# Patient Record
Sex: MALE | Race: WHITE | NOT HISPANIC OR LATINO
[De-identification: names, ages, dates, MRNs, and addresses within clinical notes are randomized per-mention and may not be internally consistent; named-entity substitution may affect disease eponyms.]

---

## 2016-12-31 NOTE — ED ADULT TRIAGE NOTE - NS ED TRIAGE AVPU SCALE
Verbal - The patient responds to verbal stimuli by opening their eyes when someone speaks to them. The patient is not fully oriented to time, place, or person.

## 2016-12-31 NOTE — ED PROVIDER NOTE - CRITICAL CARE PROVIDED
additional history taking/direct patient care (not related to procedure)/documentation/interpretation of diagnostic studies

## 2016-12-31 NOTE — ED PROVIDER NOTE - OBJECTIVE STATEMENT
[age] y/o male ems and family state he has a h/o DM and parkinson's disease and he has been vomiting for 2 days his family says other at home were also sick with vomiting a few days prior and pt was found next to his bed this am and he was too weak to stand and was obtunded pt can speak a little and says no pain no c/o other than feels generally weak

## 2016-12-31 NOTE — CONSULT NOTE ADULT - SUBJECTIVE AND OBJECTIVE BOX
75 yo male admitted to MICU for DKA.  Was found down and is unable to provide history.  Consulted by MICU attending 2/2 concerns over a foul smelling b/l groin/perineal/scrotal infection.  Patient unable to provide info.  WBC 20K.  Lactate 2.1 on presentation, now down to 1.8 with some fluid resuscitation.  B/l inguinal region, medial thighs, lower abdominal wall, scrotum and perineum has malodorous, sharply demarcated, erythematous, raised, scaly rash particularly in the intertrigal areas.  Appears to be Tinea cruris and less likely necrotizing soft tissue infection.  Keep area clean and dry.  Apply topical antifungal.  Maintain good glycemic control.  Re-consult if worsens or changes.

## 2016-12-31 NOTE — H&P ADULT. - HISTORY OF PRESENT ILLNESS
76M with Pmhx of DM. Not feeling well at home for last few days. Not eating/drinking with vomiting. brought to ER today with lethargy. Found to have severe Hyperglycemia, with a BG > 1100 and a venous pH of 6.9. His anion gap was 43. He was started on IY hydration and an insulin infusion for DKA. 76M with Pmhx of DM. Not feeling well at home for last few days. Not eating/drinking with vomiting. brought to ER today with lethargy. Found to have severe Hyperglycemia, with a BG > 1100 and a venous pH of 6.9. His anion gap was 43. He was started on IV hydration and an insulin infusion for DKA/Hyperosmolar state .

## 2016-12-31 NOTE — ED ADULT TRIAGE NOTE - CHIEF COMPLAINT QUOTE
as per family, pt found down on ground next to the bed. pt lethargic, confused at this time. pt is insulin dependent diabetic. fingerstick critical high. breathing even but labored. no signs of trauma noted. dr. lanza called to bedside.

## 2016-12-31 NOTE — H&P ADULT. - ASSESSMENT
Impression/Plan:    1) Altered mental status: Metabolic encephalopathy likely related to severe DKA and Dehydration. Need to do STAT Head CT and Check Tox screen.     2) DKA: Will aggressively hydrate, treat with insulin infusion, Long Acting insulin, electrolyte F/U and replacement as needed. Will check Hgb A1C and obtain Diabetic teaching evaluation. Jraett check cultures for sign s of underlying infection as nidus, although it appears there may have been a viral prodrome which precipitated tis event.    3) Acute renal failure: Appears multifactoral and related to DKA/Dehydration in presence of ACE inhibitor and LASIX use. Will cont hydration, F/U BUN/CRT and hold ACE and diuretics for now.    4) HTN: need to hold ACE at this point due to ARF. Will treat with IV hrdralazine of beta blocker as needed.    A total of 60 mins was spent evaluating and treating this critical patient. Impression/Plan:    1) Altered mental status: Metabolic encephalopathy likely related to severe DKA and Dehydration. Need to do STAT Head CT to exclude central neurologic event and Check Tox screen.     2) DKA: Will aggressively hydrate, treat with insulin infusion, Long Acting insulin, electrolyte F/U and replacement as needed. Will check Hgb A1C and obtain Diabetic teaching evaluation. Jarett check cultures for sign s of underlying infection as nidus, although it appears there may have been a viral prodrome which precipitated tis event.    3) Acute renal failure: Appears multifactoral and related to DKA/Dehydration in presence of ACE inhibitor and LASIX use. Will cont hydration, F/U BUN/CRT and hold ACE and diuretics for now.    4) HTN: need to hold ACE at this point due to ARF. Will treat with IV hrdralazine of beta blocker as needed.    A total of 60 mins was spent evaluating and treating this critical patient.

## 2016-12-31 NOTE — H&P ADULT. - ATTENDING COMMENTS
I saw the patient independently at 1700 and agree with the above.  Of note, his mental status remains quite poor, and per report worse than prior.  He has received 3 liters of normal saline, and reportedly was on plasmalyte as well as insulin infusion, and fingersticks remain "high".    This is an unusual case of diabetic ketoacidosis which has morphed into hyperosmolar hyperglycemic coma, and he has both entities.  The strategy is to maintain euvolemia in the face of significant osmotic (hyperglycemic) diuresis, replete electrolytes, and continue insulin infusion to attempt to bring down the glucose more gradually -- hopefully it will take a few more hours prior to having a value which registers on the glucometer.    His HgBA1C was 12.3%.  On examination, he is very somnulent but arousable to deep stimuli.  His penis and R scrotal area are beefy red without exudates, but particularly malodorous.  There was the suggestion of lymphadenopathy in the right inguinal region.  No necrotic tissue was noted.  No crepitation.  painful scrotum.  There was also erythema in the superior portion of the pelvis.    Plan:    Continue with aggressive intravascular volume repletion.  Insulin infusion.  Electrolytes every 4 hours.  ABG stat to assess acid/base and whether he is hypercapneic driving his alteration in mental status (tachypneic).  Will ask for surgical evaluation; this could be dermatitis -- but could be West's Gangrene.    Critical care time excluding procedures: 40 minutes.

## 2016-12-31 NOTE — ED PROVIDER NOTE - SKIN, MLM
Skin normal color for race, warm, dry and intact pos peripheral motteling no edema no cellulitis noted

## 2017-01-01 NOTE — PROGRESS NOTE ADULT - SUBJECTIVE AND OBJECTIVE BOX
Patient is a 76y old  Male who presents with a chief complaint of not feeling well lethargy (31 Dec 2016 11:15)      BRIEF HOSPITAL COURSE: Pt presented  in coma, with both DKA and hyperosmotic hyperglycemic coma    Events last 24 hours: Patient has been waking up slowly; metabolic derangements have improved.    PAST MEDICAL & SURGICAL HISTORY:  High cholesterol  Diabetes  Hypertension  S/P hip hemiarthroplasty: right hip repair 2014  No significant past surgical history      Review of Systems:  unable, somnulent    Medications:    hydrALAZINE Injectable 10milliGRAM(s) IV Push every 4 hours PRN          heparin  Injectable 5000Unit(s) SubCutaneous every 12 hours  aspirin  chewable 81milliGRAM(s) Oral daily        atorvastatin 20milliGRAM(s) Oral at bedtime  insulin glargine Injectable (LANTUS) 30Unit(s) SubCutaneous at bedtime  insulin lispro (HumaLOG) corrective regimen sliding scale  SubCutaneous every 6 hours  dextrose Gel 1Dose(s) Oral once PRN  dextrose 50% Injectable 12.5Gram(s) IV Push once  dextrose 50% Injectable 25Gram(s) IV Push once  dextrose 50% Injectable 25Gram(s) IV Push once  glucagon  Injectable 1milliGRAM(s) IntraMuscular once PRN    plasma-lyte A. 1000milliLiter(s) IV Continuous <Continuous>  dextrose 5%. 1000milliLiter(s) IV Continuous <Continuous>      timolol 0.5% Solution 1Drop(s) Both EYES daily  nystatin Powder 1Application(s) Topical three times a day            ICU Vital Signs Last 24 Hrs  T(C): 36.8, Max: 93 ( @ 11:15)  T(F): 98.3, Max: 199.4 ( @ 11:15)  HR: 106 (77 - 110)  BP: 167/77 (121/76 - 184/84)  BP(mean): 111 (94 - 127)  ABP: --  ABP(mean): --  RR: 26 (20 - 35)  SpO2: 98% (96% - 98%)      ABG - ( 31 Dec 2016 17:39 )  pH: 7.39  /  pCO2: 29    /  pO2: 76    / HCO3: 19    / Base Excess: -6.5  /  SaO2: 96                  I&O's Detail        LABS:                        12.7   12.63 )-----------( 200      ( 2017 05:19 )             35.5     2017 05:19    144    |  103    |  53.0   ----------------------------<  148    3.7     |  24.0   |  1.78     Ca    8.4        2017 05:19  Phos  2.4       2017 05:19  Mg     2.9       2017 05:19    TPro  6.8    /  Alb  3.3    /  TBili  0.2    /  DBili  x      /  AST  15     /  ALT  12     /  AlkPhos  101    31 Dec 2016 07:39      CARDIAC MARKERS ( 31 Dec 2016 07:39 )  x     / 0.09 ng/mL / x     / x     / x          CAPILLARY BLOOD GLUCOSE  104 (2017 06:30)    PT/INR - ( 31 Dec 2016 07:40 )   PT: 10.2 sec;   INR: 0.93 ratio         PTT - ( 31 Dec 2016 07:40 )  PTT:30.6 sec  Urinalysis Basic - ( 31 Dec 2016 12:45 )    Color: Yellow / Appearance: Clear / S.020 / pH: x  Gluc: x / Ketone: Large  / Bili: Negative / Urobili: Negative mg/dL   Blood: x / Protein: 100 mg/dL / Nitrite: Negative   Leuk Esterase: Negative / RBC: 3-5 /HPF / WBC 0-2   Sq Epi: x / Non Sq Epi: x / Bacteria: x      CULTURES:      Physical Examination:    General: Dissheveled, somnulent but arousable to voice, intermittently responding verbally with groans, not following commands, moving all extremities.    HEENT: Pupils equal, reactive to light.  Symmetric.    PULM: Coarse bilaterally with additional upper airway sounds, inability to fully clear airway independently.    CVS: Regular rate and rhythm, no murmurs, rubs, or gallops    ABD: Soft, nondistended, nontender, normoactive bowel sounds, no masses    EXT: 1+ edema with venous stasis changes    SKIN: Warm and well perfused, severe groin rash    Radiology: CT   IMPRESSION:      Small to moderate right occipital lobe acute/subacute   infarct. Routine MRI is recommended to confirm.

## 2017-01-01 NOTE — PROGRESS NOTE ADULT - SUBJECTIVE AND OBJECTIVE BOX
Patient is a 76y old  Male who presents with a chief complaint of not feeling well lethargy (31 Dec 2016 11:15)    PAST MEDICAL & SURGICAL HISTORY:  High cholesterol  Diabetes  Hypertension  S/P hip hemiarthroplasty: right hip repair 2014  No significant past surgical history      BRIEF HOSPITAL COURSE:   Severe mixed DKA/HHS    Events last 24 hours:   insulin drip IVF       Review of Systems:        confused                                              Medications:    hydrALAZINE Injectable 10milliGRAM(s) IV Push every 4 hours PRN          heparin  Injectable 5000Unit(s) SubCutaneous every 12 hours  aspirin  chewable 81milliGRAM(s) Oral daily        insulin Infusion 3Unit(s)/Hr IV Continuous <Continuous>  atorvastatin 20milliGRAM(s) Oral at bedtime  insulin glargine Injectable (LANTUS) 30Unit(s) SubCutaneous at bedtime    plasma-lyte A. 1000milliLiter(s) IV Continuous <Continuous>      timolol 0.5% Solution 1Drop(s) Both EYES daily        ICU Vital Signs Last 24 Hrs  T(C): 36.8, Max: 93 ( @ 11:15)  T(F): 98.3, Max: 199.4 ( @ 11:15)  HR: 101 (77 - 110)  BP: 148/67 (103/52 - 184/84)  BP(mean): 97 (94 - 127)  ABP: --  ABP(mean): --  RR: 21 (20 - 35)  SpO2: 98% (96% - 98%)    Physical Examination:    General:     HEENT: no JVD    PULM: bilateeral BS    CVS:s1 s2    ABD: soft    scrotum/penis erythematous     EXT: no edema venous stasis changes   groin erythema excoriation   no nec fasc per sx    SKIN:   as above    Neuro:  ABG - ( 31 Dec 2016 17:39 )  pH: 7.39  /  pCO2: 29    /  pO2: 76    / HCO3: 19    / Base Excess: -6.5  /  SaO2: 96                  LABS:                        12.7   12.63 )-----------( 200      ( 2017 05:19 )             35.5     2017 05:19    144    |  103    |  53.0   ----------------------------<  148    3.7     |  24.0   |  1.78     Ca    8.4        2017 05:19  Phos  2.4       2017 05:19  Mg     2.9       2017 05:19    TPro  6.8    /  Alb  3.3    /  TBili  0.2    /  DBili  x      /  AST  15     /  ALT  12     /  AlkPhos  101    31 Dec 2016 07:39      CARDIAC MARKERS ( 31 Dec 2016 07:39 )  x     / 0.09 ng/mL / x     / x     / x          CAPILLARY BLOOD GLUCOSE  104 (2017 06:30)    PT/INR - ( 31 Dec 2016 07:40 )   PT: 10.2 sec;   INR: 0.93 ratio         PTT - ( 31 Dec 2016 07:40 )  PTT:30.6 sec  Urinalysis Basic - ( 31 Dec 2016 12:45 )    Color: Yellow / Appearance: Clear / S.020 / pH: x  Gluc: x / Ketone: Large  / Bili: Negative / Urobili: Negative mg/dL   Blood: x / Protein: 100 mg/dL / Nitrite: Negative   Leuk Esterase: Negative / RBC: 3-5 /HPF / WBC 0-2   Sq Epi: x / Non Sq Epi: x / Bacteria: x          RADIOLOGY/IMAGING/ECHO        Critical Care time: 34  (Reviewing data, imaging, discussing with multidisciplinary team, non inclusive of procedures, discussing goals of care with patient/family)

## 2017-01-01 NOTE — PROGRESS NOTE ADULT - ASSESSMENT
76M DKA/HHS/HENRIETTA/AMS   all improved with insulin infusion and hydration.   MS improved  ?? baseline.

## 2017-01-01 NOTE — CONSULT NOTE ADULT - SUBJECTIVE AND OBJECTIVE BOX
[facility]                                 Julito Hill, & Tom                                  [address] [facility] [address]                                        [place], [place], [address]                                             [phone]    HISTORY:    The patient is a 76y Male with history of tremor for which I had seen him in the past.  He was now admitted after being found poorly responsive and was found to be in DKA. Initial blood sugar was > 1000.    In addition CT of the head was done yesterday and showed infarct.   I was called this afternoon to evaluate him.    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    PAST MEDICAL & SURGICAL HISTORY:  High cholesterol  Diabetes  Hypertension  S/P hip hemiarthroplasty: right hip repair june 2014  No significant past surgical history      MEDICATION PRIOR TO ADMISSION:  Insulin  Enalapril  Sertraline  Lipitor  Lasix  Eye drops.    MEDICATIONS  (STANDING):  plasma-lyte A. 1000milliLiter(s) IV Continuous <Continuous>  heparin  Injectable 5000Unit(s) SubCutaneous every 12 hours  atorvastatin 20milliGRAM(s) Oral at bedtime  timolol 0.5% Solution 1Drop(s) Both EYES daily  aspirin  chewable 81milliGRAM(s) Oral daily  dextrose 5%. 1000milliLiter(s) IV Continuous <Continuous>  dextrose 50% Injectable 12.5Gram(s) IV Push once  dextrose 50% Injectable 25Gram(s) IV Push once  dextrose 50% Injectable 25Gram(s) IV Push once  nystatin Powder 1Application(s) Topical three times a day  insulin glargine Injectable (LANTUS) 20Unit(s) SubCutaneous at bedtime  insulin lispro (HumaLOG) corrective regimen sliding scale  SubCutaneous every 4 hours    MEDICATIONS  (PRN):  hydrALAZINE Injectable 10milliGRAM(s) IV Push every 4 hours PRN SBP>160 mm/hg  dextrose Gel 1Dose(s) Oral once PRN Blood Glucose LESS THAN 70 milliGRAM(s)/deciliter  glucagon  Injectable 1milliGRAM(s) IntraMuscular once PRN Glucose LESS THAN 70 milligrams/deciliter      Allergies  No Known Allergies    SOCIAL HISTORY:  Non smoker    FAMILY HISTORY:  No significant family history    ROS:  Unobtainable due to patient's condition.     Exam:  Vital Signs Last 24 Hrs  T(C): 37.2, Max: 37.2 (12-31 @ 15:58)  T(F): 99, Max: 99 (12-31 @ 15:58)  HR: 109 (92 - 110)  BP: 167/81 (128/76 - 184/84)  BP(mean): 111 (94 - 127)  RR: 21 (17 - 30)  SpO2: 98% (96% - 99%)  General: NAD.   Carotid bruits absent.     Mental status: The patient opens his eyes slightly to voice. He does not answer questions appropriately and does not follow instructions.    Cranial nerves: There is no papilledema. Pupils react Symmetrically to light.  Corneal reflexes are present. Facial musculature is symmetric (although a nasogastric tube is in place). Palate elevates symmetrically. Tongue is midline.    Motor/Sensory: There is some increased tone throughout with some cogwheeling.  He moves all extremities to stimuli although seems diffusely weak.    Reflexes: 1+ throughout and plantar responses are flexor.    Cerebellar: Cannot be tested.    LABS:                         12.7   12.63 )-----------( 200      ( 01 Jan 2017 05:19 )             35.5       144    |  103    |  53.0   ----------------------------<  148    3.7     |  24.0   |  1.78     Ca    8.4       Phos  2.4        Mg     2.9          TPro  6.8    /  Alb  3.3    /  TBili  0.2    /  DBili  x      /  AST  15     /  ALT  12     /  AlkPhos  101    31 Dec 2016 07:39    PT/INR -  PT: 10.2 sec;   INR: 0.93 ratio    PTT -  PTT:30.6 sec    RADIOLOGY & ADDITIONAL STUDIES:  CT head reviewed: there is what appears to be acute infarct in the right occipital region as well as a large arachnoid cyst in the right middle cranial fossa.

## 2017-01-01 NOTE — PROGRESS NOTE ADULT - PROBLEM SELECTOR PLAN 1
AG has closed glucose has normalized HGB A!C > 12 poor baseline control.  transition to LA insulin and coverage feed
Anion gap closed.  HgBA1C 12%.  Clearly poorly controlled.  Need education, continue fluids, gave basal lantus, now to be on sliding scale.

## 2017-01-01 NOTE — PROGRESS NOTE ADULT - PROBLEM SELECTOR PROBLEM 1
Diabetic ketoacidosis with coma associated with type 1 diabetes mellitus
Diabetic ketoacidosis with coma associated with type 1 diabetes mellitus

## 2017-01-01 NOTE — PROGRESS NOTE ADULT - ASSESSMENT
This is 76Y M with PMH of DM, HTN, admitted for DKA and AMS, admitted to ICU, started on insulin drip, anion gap closed, insulin drip d/c'd, received Lantus 30 units last night. Pt is very lethargic, barely open eyes on verbal stimuli. He was also noted to foul smelling discharge from groin, seen bu surgery, looks fungal infection, started on Nystatin powder. He had CT head showed right occipital lobe acute/subacute infarct.    A/P    > DKA - Non compliant  A1C: 12  anion gap closed, off insulin drip  Pt is very lethargic, NG tube, start glucerna, decrease lantus 20 units plus q4h FS  monitor BP  diabetic education consult    >AMS - Multifactorial - DKA, stroke  will monitor closely  Neurology consult requested    >Right occipital lobe stroke  aspirin, statin  TTE, carotid doppler  Neurology consult requested    >HTN  permissive HTN for now due to stroke  monitor BP    >Groin fungal infection  appreciate surgery input, cont. Nystatin    >HENRIETTA - improving  CPK  cont. IVF, f/u BMP    >DVT PPX  heparin

## 2017-01-01 NOTE — PROGRESS NOTE ADULT - PROBLEM SELECTOR PLAN 5
ASA/statin.  Unknown timing.  Would benefit from eventual neurology consultation and workup as he wakes up.

## 2017-01-01 NOTE — PROGRESS NOTE ADULT - PROBLEM SELECTOR PLAN 2
Improved with hydration.  Pre renal component and ?? some baseline CKD
Slow to wake; difficulty with secretions (needs verbal stimulation to remind); improving slowly.  NPO for now.

## 2017-01-01 NOTE — PROGRESS NOTE ADULT - SUBJECTIVE AND OBJECTIVE BOX
Internal Medicine Hospitalist - Dr. Goddard - Medicine accepting note     SHARON EDSTROM    [id number]    76y      Male    Patient is a 76y old  Male who presents with a chief complaint of not feeling well lethargy (31 Dec 2016 11:15)    HPI:  76M with Pmhx of DM. Not feeling well at home for last few days. Not eating/drinking with vomiting. brought to ER today with lethargy. Found to have severe Hyperglycemia, with a BG > 1100 and a venous pH of 6.9. His anion gap was 43. He was started on IY hydration and an insulin infusion for DKA. (31 Dec 2016 11:15)    INTERVAL HPI/ OVERNIGHT EVENTS: Patient is seen and examined, this is 76Y M with PMH of DM, HTN, admitted for DKA and AMS, admitted to ICU, started on insulin drip, anion gap closed, insulin drip d/c'd, received Lantus 30 units. Pt is very lethargic, barely open eyes on verbal stimuli, moving all 4 extremities. He was also noted to foul smelling discharge from groin, seen bu surgery, looks fungal infection, started on Nystatin powder.     REVIEW OF SYSTEMS:    Unable to obtain    PHYSICAL EXAM:    Vital Signs Last 24 Hrs  T(C): 37.2, Max: 37.2 (12- @ 15:58)  T(F): 99, Max: 99 (12- @ 15:58)  HR: 109 (92 - 110)  BP: 167/81 (128/76 - 184/84)  BP(mean): 111 (94 - 127)  RR: 21 (17 - 30)  SpO2: 98% (96% - 99%)    GENERAL: Lethargic, open eyes on verbal stimuli  Neck: supple  CHEST/LUNG: positive air entry b/l  HEART: S1S2+ audible  ABDOMEN: Soft, Nontender, Nondistended; Bowel sounds present, foul smelling discharge noted - improving  CNS: lethargic, open eyes on verbal stimuli     LABS:                        12.7   12.63 )-----------( 200      ( 2017 05:19 )             35.5     2017 05:19    144    |  103    |  53.0   ----------------------------<  148    3.7     |  24.0   |  1.78     Ca    8.4        2017 05:19  Phos  2.4       2017 05:19  Mg     2.9       2017 05:19    TPro  6.8    /  Alb  3.3    /  TBili  0.2    /  DBili  x      /  AST  15     /  ALT  12     /  AlkPhos  101    31 Dec 2016 07:39    PT/INR - ( 31 Dec 2016 07:40 )   PT: 10.2 sec;   INR: 0.93 ratio      Hemoglobin A1C, Whole Blood: 12.3 % (12.31.16 @ 12:59)      Color: Yellow / Appearance: Clear / S.020 / pH: x  Gluc: x / Ketone: Large  / Bili: Negative / Urobili: Negative mg/dL   Blood: x / Protein: 100 mg/dL / Nitrite: Negative   Leuk Esterase: Negative / RBC: 3-5 /HPF / WBC 0-2   Sq Epi: x / Non Sq Epi: x / Bacteria: x      MEDICATIONS  (STANDING):  plasma-lyte A. 1000milliLiter(s) IV Continuous <Continuous>  heparin  Injectable 5000Unit(s) SubCutaneous every 12 hours  atorvastatin 20milliGRAM(s) Oral at bedtime  timolol 0.5% Solution 1Drop(s) Both EYES daily  aspirin  chewable 81milliGRAM(s) Oral daily  dextrose 5%. 1000milliLiter(s) IV Continuous <Continuous>  dextrose 50% Injectable 12.5Gram(s) IV Push once  dextrose 50% Injectable 25Gram(s) IV Push once  dextrose 50% Injectable 25Gram(s) IV Push once  nystatin Powder 1Application(s) Topical three times a day  insulin glargine Injectable (LANTUS) 20Unit(s) SubCutaneous at bedtime  insulin lispro (HumaLOG) corrective regimen sliding scale  SubCutaneous every 4 hours    MEDICATIONS  (PRN):  hydrALAZINE Injectable 10milliGRAM(s) IV Push every 4 hours PRN SBP>160 mm/hg  dextrose Gel 1Dose(s) Oral once PRN Blood Glucose LESS THAN 70 milliGRAM(s)/deciliter  glucagon  Injectable 1milliGRAM(s) IntraMuscular once PRN Glucose LESS THAN 70 milligrams/deciliter      RADIOLOGY & ADDITIONAL TEST   EXAM:  CT BRAIN                        PROCEDURE DATE:  2016    IMPRESSION:      Small to moderate right occipital lobe acute/subacute   infarct. Routine MRI is recommended to confirm

## 2017-01-02 NOTE — DIETITIAN INITIAL EVALUATION ADULT. - OTHER INFO
Pt currently NPO. Per nsg, pt removed dobhoff before initiating enteral feeds. Awaiting replacement dobhoff to initiate enteral feeds.

## 2017-01-02 NOTE — DIETITIAN INITIAL EVALUATION ADULT. - MD RECOMMEND
Initiate Glucerna 1.5cal at 20ml/hr, increase by 10ml/hr every 8 hours to goal rate 70ml/hr x 20 hours  (1400ml/daily) to provide 2100kcal and 115 grams

## 2017-01-02 NOTE — PROGRESS NOTE ADULT - SUBJECTIVE AND OBJECTIVE BOX
Internal Medicine Hospitalist - Dr. Rupesh HERRERA EDSTROM    [id number]    76y      Male    Patient is a 76y old  Male who presents with a chief complaint of not feeling well lethargy (31 Dec 2016 11:15)    HPI:  76M with Pmhx of DM. Not feeling well at home for last few days. Not eating/drinking with vomiting. brought to ER today with lethargy. Found to have severe Hyperglycemia, with a BG > 1100 and a venous pH of 6.9. His anion gap was 43. He was started on IY hydration and an insulin infusion for DKA. (31 Dec 2016 11:15)    INTERVAL HPI/ OVERNIGHT EVENTS: Patient is seen and examined, pt is more awake today, able to answer simple question, moving all 4 extremities, denied chest pain, SOB, abd. pain, nausea and vomiting.     REVIEW OF SYSTEMS:    Denied fever, chills, abd. pain, nausea, vomiting, chest pain, SOB, headache, dizziness    PHYSICAL EXAM:    Vital Signs Last 24 Hrs  T(C): 37.2, Max: 37.5 ( @ 10:00)  T(F): 99, Max: 99.5 ( @ 10:00)  HR: 102 (100 - 115)  BP: 151/79 (129/91 - 211/96)  BP(mean): 109 (100 - 138)  RR: 15 (12 - 22)  SpO2: 96% (94% - 98%)    GENERAL: Elderly woman laying on bed, drowsy but arouse able with verbal stimuli  Neck: supple  CHEST/LUNG: positive air entry   HEART: S1S2+ audible  ABDOMEN: Soft, Nontender, Nondistended; Bowel sounds present  EXTREMITIES:  no edema  CNS: open eyes on verbal stimuli, moving all 4 extremities, answer simple question and follow simple commands    LABS:                        11.9   9.43  )-----------( 196      ( 2017 03:41 )             34.5     2017 03:41    148    |  106    |  53.0   ----------------------------<  217    3.4     |  24.0   |  1.53     Ca    8.2        2017 03:41  Phos  2.4       2017 05:19  Mg     2.9       2017 05:19        Urinalysis Basic - ( 2017 11:28 )    Color: Yellow / Appearance: Clear / S.020 / pH: x  Gluc: x / Ketone: Small  / Bili: Negative / Urobili: Negative mg/dL   Blood: x / Protein: 500 mg/dL / Nitrite: Negative   Leuk Esterase: Negative / RBC: x / WBC 6-10   Sq Epi: x / Non Sq Epi: Few / Bacteria: Few      MEDICATIONS  (STANDING):  plasma-lyte A. 1000milliLiter(s) IV Continuous <Continuous>  heparin  Injectable 5000Unit(s) SubCutaneous every 12 hours  atorvastatin 20milliGRAM(s) Oral at bedtime  timolol 0.5% Solution 1Drop(s) Both EYES daily  aspirin  chewable 81milliGRAM(s) Oral daily  dextrose 5%. 1000milliLiter(s) IV Continuous <Continuous>  dextrose 50% Injectable 12.5Gram(s) IV Push once  dextrose 50% Injectable 25Gram(s) IV Push once  dextrose 50% Injectable 25Gram(s) IV Push once  nystatin Powder 1Application(s) Topical three times a day  insulin glargine Injectable (LANTUS) 20Unit(s) SubCutaneous at bedtime  insulin lispro (HumaLOG) corrective regimen sliding scale  SubCutaneous every 4 hours  carbidopa/levodopa  25/100 1Tablet(s) Oral three times a day    MEDICATIONS  (PRN):  hydrALAZINE Injectable 10milliGRAM(s) IV Push every 4 hours PRN SBP>160 mm/hg  dextrose Gel 1Dose(s) Oral once PRN Blood Glucose LESS THAN 70 milliGRAM(s)/deciliter  glucagon  Injectable 1milliGRAM(s) IntraMuscular once PRN Glucose LESS THAN 70 milligrams/deciliter      RADIOLOGY & ADDITIONAL TEST

## 2017-01-02 NOTE — PROGRESS NOTE ADULT - ASSESSMENT
This is 76Y M with PMH of DM, HTN, admitted for DKA and AMS, admitted to ICU, started on insulin drip, anion gap closed, insulin drip d/c'd, received Lantus 30 units last night. Pt is very lethargic, barely open eyes on verbal stimuli. He was also noted to foul smelling discharge from groin, seen bu surgery, looks fungal infection, started on Nystatin powder. He had CT head showed right occipital lobe acute/subacute infarct.    A/P    > DKA - Non compliant  FS: [phone]  A1C: 12  anion gap closed, off insulin drip  more awake today, had bedside swallow eval, started on pureed diet with honey  cont. lantus 20 units plus q4h FS  diabetic education consult    >Hypokalemia  potassium supplement f/u BMP and mag    >AMS - Multifactorial - DKA, stroke - improving  appreciate Neurology consult    >Right occipital lobe stroke  appreciate Neurology input  cont. aspirin, statin  TTE, carotid doppler, MRI pending    >HTN - 151/79  permissive HTN for now due to stroke  hydralazine prn if SBP is more than 160mmHg    >Groin fungal infection - improving  appreciate surgery input, cont. Nystatin    >HENRIETTA - improving  cont. IVF, f/u BMP    >DVT PPX  heparin This is 76Y M with PMH of DM, HTN, admitted for DKA and AMS, admitted to ICU, started on insulin drip, anion gap closed, insulin drip d/c'd, received Lantus 30 units last night. Pt is very lethargic, barely open eyes on verbal stimuli. He was also noted to foul smelling discharge from groin, seen bu surgery, looks fungal infection, started on Nystatin powder. He had CT head showed right occipital lobe acute/subacute infarct.    A/P    > DKA - Non compliant  FS: [phone]  A1C: 12  anion gap closed, off insulin drip  more awake today, had bedside swallow eval, started on pureed diet with honey  cont. lantus 20 units plus q4h FS  diabetic education consult    >Hypokalemia  potassium supplement f/u BMP and mag    >AMS - Multifactorial - DKA, stroke - improving  appreciate Neurology consult    >Right occipital lobe stroke  appreciate Neurology input  cont. aspirin, statin  TTE, carotid doppler, MRI pending    >HTN -   permissive HTN for now due to stroke  start amlodipine 5mg daily  hydralazine prn if SBP is more than 160mmHg    >Groin fungal infection - improving  appreciate surgery input, cont. Nystatin    >Parkinson disease  cont. sinemet as per Neurology    >HENRIETTA - improving  cont. IVF, f/u BMP    >DVT PPX  heparin

## 2017-01-02 NOTE — SWALLOW BEDSIDE ASSESSMENT ADULT - ASR SWALLOW ASPIRATION MONITOR
fever/position upright (90Y)/cough/gurgly voice/upper respiratory infection/pneumonia/change of breathing pattern/oral hygiene/throat clearing/If any s/s aspiration noted, discontinue PO & resume NPO with alternative means of nutrition/hydration

## 2017-01-02 NOTE — SWALLOW BEDSIDE ASSESSMENT ADULT - SWALLOW EVAL: RECOMMENDED FEEDING/EATING TECHNIQUES
position upright (90 degrees)/allow for swallow between intakes/check mouth frequently for oral residue/pocketing/crush medication (when feasible)/maintain upright posture during/after eating for 30 mins/small sips/bites/oral hygiene

## 2017-01-02 NOTE — DIETITIAN INITIAL EVALUATION ADULT. - NS AS NUTRI INTERV ENTERAL NUTRITION
Initiate Glucerna 1.5cal at 20ml/hr, increase by 10ml/hr every 8 hours to goal rate 70ml/hr x 20 hours  (1400ml/daily) to provide 2100kcal and 115 grams protein

## 2017-01-02 NOTE — SWALLOW BEDSIDE ASSESSMENT ADULT - SWALLOW EVAL: DIAGNOSIS
At least mild oral dysphagia negatively impacted by lethargy, suspect posterior loss with thin liquids, solids not assessed 2* lethargy.  Pt able to maintain arousal for assessment, however, kept eyes closed.  Suspect pharyngeal dysphagia 2* +cough after nectar.

## 2017-01-02 NOTE — PROGRESS NOTE ADULT - SUBJECTIVE AND OBJECTIVE BOX
[facility]                                 Julito Hill, & Tom                                  [address]                                        [place], [place], [address]                                             [phone]      Vital signs:  T(C): 37.1, Max: 37.5 (01-02 @ 10:00)  HR: 94 (94 - 115)  BP: 180/88 (129/91 - 211/96)  RR: 17 (12 - 26)  SpO2: 98% (94% - 98%)  Wt(kg): --    Exam:    No new complaints.  Awake and alert.  speech language intact  Pupils react.  visually impaired  Face symmetric smile and sensation  hearing symmetric  tongue ML  4 to 4+/5 power in 4 ext  increased tone and (+) tremor at rest  intact FT x 4 ext    Parkinson's disease/parkinsonism stable  Await MRI to further evaluate CVA    will follow with you    Valentin Vila MD PhD   [phone]

## 2017-01-03 NOTE — DISCHARGE NOTE ADULT - MEDICATION SUMMARY - MEDICATIONS TO TAKE
I will START or STAY ON the medications listed below when I get home from the hospital:    aspirin 81 mg oral tablet, chewable  -- 1 tab(s) by mouth once a day  -- Indication: For Stroke    acetaminophen 325 mg oral tablet  -- 2 tab(s) by mouth every 6 hours, As needed, Headache  -- Indication: For pain     lisinopril 10 mg oral tablet  -- 1 tab(s) by mouth once a day  -- Indication: For Essential hypertension    sertraline 25 mg oral tablet  -- 1 tab(s) by mouth once a day  -- Indication: For Depression     insulin glargine 100 units/mL subcutaneous solution  -- 34 unit(s) subcutaneous once a day (at bedtime)  -- Indication: For Diabetes    Lipitor 20 mg oral tablet  -- 1 tab(s) by mouth once a day (at bedtime)  -- Indication: For Stroke    carbidopa-levodopa 25 mg-100 mg oral tablet  -- 1 tab(s) by mouth 3 times a day  -- Indication: For Parkinson    amLODIPine 10 mg oral tablet  -- 1 tab(s) by mouth once a day  -- Indication: For Essential hypertension    nystatin 100,000 units/g topical powder  -- 1 application on skin 3 times a day  -- Indication: For tinea cruris    Lasix 40 mg oral tablet  -- 1 tab(s) by mouth once a day  -- Indication: For Essential hypertension    timolol ophthalmic 0.5% ophthalmic gel forming solution  -- 1 drop(s) to each affected eye once a day (at bedtime)  -- Indication: For Home med

## 2017-01-03 NOTE — DISCHARGE NOTE ADULT - SECONDARY DIAGNOSIS.
Altered mental status, unspecified Acute renal failure with tubular necrosis Stroke Essential hypertension Patent foramen ovale Acute DVT of left tibial vein

## 2017-01-03 NOTE — DISCHARGE NOTE ADULT - CARE PLAN
Principal Discharge DX:	Diabetic ketoacidosis with coma associated with type 1 diabetes mellitus  Goal:	resolved  Instructions for follow-up, activity and diet:	f/u with PMD  activity - per rehab  Secondary Diagnosis:	Altered mental status, unspecified  Secondary Diagnosis:	Acute renal failure with tubular necrosis  Secondary Diagnosis:	Stroke  Instructions for follow-up, activity and diet:	rehab   f/u with Neuro  Secondary Diagnosis:	Essential hypertension Principal Discharge DX:	Diabetic ketoacidosis with coma associated with type 1 diabetes mellitus  Goal:	resolved  Instructions for follow-up, activity and diet:	f/u with PMD  activity - per rehab  Secondary Diagnosis:	Altered mental status, unspecified  Secondary Diagnosis:	Acute renal failure with tubular necrosis  Secondary Diagnosis:	Stroke  Instructions for follow-up, activity and diet:	rehab   f/u with Neuro  Secondary Diagnosis:	Essential hypertension  Secondary Diagnosis:	Patent foramen ovale  Instructions for follow-up, activity and diet:	f/u with cardiology  Secondary Diagnosis:	Acute DVT of left tibial vein

## 2017-01-03 NOTE — DISCHARGE NOTE ADULT - NS AS DC STROKE ED MATERIALS
Prescribed Medications/Risk Factors for Stroke/Stroke Warning Signs and Symptoms/Call 911 for Stroke/Need for Followup After Discharge/Stroke Education Booklet

## 2017-01-03 NOTE — DISCHARGE NOTE ADULT - PATIENT PORTAL LINK FT
“You can access the Follow[employer] Patient Portal, offered by [facility], by registering with the following website: [email or web address]”

## 2017-01-03 NOTE — DISCHARGE NOTE ADULT - CARE PROVIDERS DIRECT ADDRESSES
[email or web address],[email or web address],DirectAddress_Unknown

## 2017-01-03 NOTE — DISCHARGE NOTE ADULT - CARE PROVIDER_API CALL
Rey Rader), Cardiology; Internal Medicine  [address] [place] [address]  Phone: [phone]  Fax: [phone]    Valentin Vila), Neurology  [address] [place] [address]  Phone: [phone]  Fax: [phone]

## 2017-01-03 NOTE — PHYSICAL THERAPY INITIAL EVALUATION ADULT - CRITERIA FOR SKILLED THERAPEUTIC INTERVENTIONS
anticipated discharge recommendation/impairments found/functional limitations in following categories/anticipated equipment needs at discharge/rehab potential

## 2017-01-03 NOTE — DISCHARGE NOTE ADULT - HOSPITAL COURSE
76Y M with PMH of DM, HTN, admitted for DKA and AMS, admitted to ICU, started on insulin drip, anion gap closed, off insulin drip, on Lantus 15 units. He was also noted to foul smelling discharge from groin, seen bu surgery, looks fungal infection, started on Nystatin powder, improving. MRI brain showed b/l stroke with mild petechial hemorrhage in right occipital lobe, Stroke work up was done. ECHO - EF - 55%, JESUS MANUEL - showed 76Y M with PMH of DM, HTN, admitted for DKA and AMS, admitted to ICU, started on insulin drip, anion gap closed, off insulin drip, on Lantus 15 units. He was also noted to foul smelling discharge from groin, seen bu surgery, looks fungal infection, started on Nystatin powder, improving. MRI brain showed b/l stroke with mild petechial hemorrhage in right occipital lobe, Stroke work up was done. ECHO - EF - 55%, JESUS MANUEL - showed large  and acute DVT in LLE -He was deemed not a good candidate for AC per Neuro and Cardio 2/2 parknison and multiple falls. Hence  - decided to undergo- IVC filter placement on 1/11. He was cleared by neuro and cardio for discharge. HE was discahrged in stable condition to Acute rehab.  Everything was discussed with patient in details. he is in agreement with the plan,     Vital Signs Last 24 Hrs  T(C): 36.8, Max: 37.4 (01-12 @ 00:18)  T(F): 98.2, Max: 99.3 (01-12 @ 00:18)  HR: 79 (71 - 81)  BP: 119/66 (119/66 - 145/66)  BP(mean): 93 (82 - 99)  RR: 11 (11 - 20)  SpO2: 100% (94% - 100%)    PHYSICAL EXAM:    GENERAL: NAD, well-groomed, well-developed, obese in NAD  HEAD:  Atraumatic, Normocephalic  EYES: EOMI, PERRLA, conjunctiva and sclera clear  ENMT:  No lesions  NECK: Supple, No JVD  NERVOUS SYSTEM:  Alert & Oriented X3, Good concentration  CHEST/LUNG: Clear to percussion bilaterally; No rales, rhonchi, wheezing, or rubs  HEART: Regular rate and rhythm. murmur+  ABDOMEN: Soft, Nontender, Nondistended; Bowel sounds present  EXTREMITIES:  2+ Peripheral Pulses, No clubbing, cyanosis, or edema    Time spent in discharge planning and co-ordination : 60min

## 2017-01-03 NOTE — DISCHARGE NOTE ADULT - MEDICATION SUMMARY - MEDICATIONS TO STOP TAKING
I will STOP taking the medications listed below when I get home from the hospital:    enalapril 10 mg oral tablet  -- 1 tab(s) by mouth 2 times a day    Percocet 5/325 oral tablet  -- 2 tab(s) by mouth every 6 hours, As Needed for severe pain  -- Caution federal law prohibits the transfer of this drug to any person other  than the person for whom it was prescribed.  May cause drowsiness.  Alcohol may intensify this effect.  Use care when operating dangerous machinery.  This prescription cannot be refilled.  This product contains acetaminophen.  Do not use  with any other product containing acetaminophen to prevent possible liver damage.  Using more of this medication than prescribed may cause serious breathing problems.

## 2017-01-03 NOTE — PROGRESS NOTE ADULT - SUBJECTIVE AND OBJECTIVE BOX
[facility]                                 Julito Hill, & Tom                                  [address]                                        [place], [place], [address]                                             [phone]        No new complaints.    Awake and alert.  Vision and hearing poor.  Pupils react.  Face symmetric.  Good strength bilaterally.  Tremor persists.

## 2017-01-03 NOTE — PROGRESS NOTE ADULT - ASSESSMENT
This is 76Y M with PMH of DM, HTN, admitted for DKA and AMS, admitted to ICU, started on insulin drip, anion gap closed, off insulin drip, on Lantus 20 units. He was also noted to foul smelling discharge from groin, seen bu surgery, looks fungal infection, started on Nystatin powder. MRI brain showed b/l stroke with mild petechial hemorrhage in right occipital lobe    A/P    > DKA - Non compliant  A1C: 12  anion gap closed, off insulin drip  FS are stable  cont. pureed diet with honey  cont. Lantus 20 units plus q4h FS  diabetic education consult appreciated    >Hypokalemia - resolved    >AMS - Multifactorial - DKA, stroke - improving  appreciate Neurology input    >B/L stroke with mild petechial hemorrhage in right occipital lobe  appreciate Neurology input, MRI showed b/l stroke with petechial hemorrhage in occipital lobe  d/c'd aspirin, heparin, will discuss with Neurology   cont. statin  TTE normal, carotid doppler showed ?stenosis in distal intracranial stenosis, may need CTA head, defer to Neurology for further management    >HTN -   permissive HTN for now due to stroke, his BP is high, received hydralazine x 4 prn dose, increase amlodipine 10 mg daily, monitor BP  hydralazine prn if SBP is more than 160mmHg    >Groin fungal infection - improving  appreciate surgery input, cont. Nystatin    >Parkinson disease  cont. sinemet as per Neurology    >HENRIETTA - improving  cont. IVF, f/u BMP    >urinary retention  healy catheter for now     >DVT PPX  scd

## 2017-01-03 NOTE — PROGRESS NOTE ADULT - ASSESSMENT
The patient is a 76y Male seen previously in my office for Parkinson's disease and started on sinemet.   Now with CVA in setting of DKA. (Glucose 179 today).    Await MRI brain.  Mobilize with physical therapy.   Continue antiplatelet and statin.

## 2017-01-04 ENCOUNTER — APPOINTMENT (OUTPATIENT)
Dept: FAMILY MEDICINE | Facility: CLINIC | Age: 77
End: 2017-01-04

## 2017-01-04 NOTE — PROGRESS NOTE ADULT - ASSESSMENT
This is 76Y M with PMH of DM, HTN, admitted for DKA and AMS, admitted to ICU, started on insulin drip, anion gap closed, off insulin drip, on Lantus 20 units. He was also noted to foul smelling discharge from groin, seen bu surgery, looks fungal infection, started on Nystatin powder. MRI brain showed b/l stroke with mild petechial hemorrhage in right occipital lobe    A/P    > DKA - Non compliant  A1C: 12  anion gap closed, off insulin drip  FS are stable  cont. pureed diet with honey  cont. Lantus 20 units plus q4h FS  diabetic education consult appreciated    >Hypokalemia - resolved    >AMS - Multifactorial - DKA, stroke - improving  appreciate Neurology input    >B/L stroke with mild petechial hemorrhage in right occipital lobe  appreciate Neurology input, MRI showed b/l stroke with petechial hemorrhage in occipital lobe  d/c'd aspirin, heparin, will discuss with Neurology   cont. statin  TTE normal, carotid doppler showed ?stenosis in distal intracranial stenosis, may need CTA head, defer to Neurology for further management    >HTN -   permissive HTN for now due to stroke, his BP is high, received hydralazine x 4 prn dose, increase amlodipine 10 mg daily, monitor BP  hydralazine prn if SBP is more than 160mmHg    >Groin fungal infection - improving  appreciate surgery input, cont. Nystatin    >Parkinson disease  cont. sinemet as per Neurology    >HENRIETTA - improving  cont. IVF, f/u BMP    >urinary retention  healy catheter for now     >DVT PPX  scd This is 76Y M with PMH of DM, HTN, admitted for DKA and AMS, admitted to ICU, started on insulin drip, anion gap closed, off insulin drip, on Lantus 20 units. He was also noted to foul smelling discharge from groin, seen bu surgery, looks fungal infection, started on Nystatin powder. MRI brain showed b/l stroke with mild petechial hemorrhage in right occipital lobe    A/P    > DKA due to Non compliant  FS stable, cont. Lantus 20 units plus q4h FS  A1C: 12  cont. pureed diet with honey  diabetic education consult appreciated    >Hypokalemia - supplement  f/u bmp, mag      >B/L stroke with mild petechial hemorrhage in right occipital lobe  appreciate Neurology input, MRI showed b/l stroke with petechial hemorrhage in occipital lobe  Repeat CT scan stable mild petechial hemorrhage, discussed with Dr. Vila, agree to restart baby aspirin  cont. statin  TTE normal, carotid doppler showed ?stenosis in distal intracranial stenosis, may need CTA head, defer to Neurology for further management  cardiology consulted for JESUS MANUEL to r/o PFO as patient has b/l stroke    >HTN - Stable  cont. amlodipine 10 mg daily, monitor BP  hydralazine prn if SBP is more than 160mmHg    >Groin fungal infection - improving  appreciate surgery input, cont. Nystatin    >Parkinson disease  cont. sinemet as per Neurology    >HENRIETTA - improving  cont. IVF, f/u BMP    >urinary retention  healy catheter for now     >DVT PPX  scd This is 76Y M with PMH of DM, HTN, admitted for DKA and AMS, admitted to ICU, started on insulin drip, anion gap closed, off insulin drip, on Lantus 20 units. He was also noted to foul smelling discharge from groin, seen bu surgery, looks fungal infection, started on Nystatin powder. MRI brain showed b/l stroke with mild petechial hemorrhage in right occipital lobe    A/P    > DKA due to Non compliant  FS stable, cont. Lantus 20 units plus q4h FS  A1C: 12  cont. pureed diet with honey  diabetic education consult appreciated    >Hypokalemia - supplement  f/u bmp, mag      >B/L stroke with mild petechial hemorrhage in right occipital lobe  appreciate Neurology input, MRI showed b/l stroke with petechial hemorrhage in occipital lobe  Repeat CT scan stable mild petechial hemorrhage, discussed with Dr. Vila, agree to restart baby aspirin  cont. statin  TTE normal, carotid doppler showed ?stenosis in distal intracranial stenosis, may need CTA head, defer to Neurology for further management  cardiology consulted for JESUS MANUEL to r/o PFO as patient has b/l stroke  PMR consult requested     >HTN - Stable  cont. amlodipine 10 mg daily, monitor BP  hydralazine prn if SBP is more than 160mmHg    >Groin fungal infection - improving  appreciate surgery input, cont. Nystatin    >Parkinson disease  cont. sinemet as per Neurology    >HENRIETTA - improving  cont. IVF, f/u BMP    >urinary retention  healy catheter for now     >DVT PPX  scd

## 2017-01-04 NOTE — CONSULT NOTE ADULT - SUBJECTIVE AND OBJECTIVE BOX
76 M with PMH of DM admitted 12/31/16 with a chief complaint of not feeling well and lethargy for few days PTA and not eating/drinking with vomiting. Found to have severe hyperglycemia, with a BG > 1100 and a venous pH of 6.9. His anion gap was 43. He was started on IV hydration and an insulin infusion for DKA.    REVIEW OF SYSTEMS  Constitutional - No fever, No weight loss, No fatigue  HEENT - No eye pain, No visual disturbances, No difficulty hearing, No tinnitus, No vertigo, No neck pain  Respiratory - No cough, No wheezing, No shortness of breath  Cardiovascular - No chest pain, No palpitations  Gastrointestinal - No abdominal pain, No nausea, No vomiting, No diarrhea, No constipation  Genitourinary - No dysuria, No frequency, No hematuria, No incontinence  Neurological - No headaches, No memory loss, No loss of strength, No numbness, No tremors  Skin - No itching, No rashes, No lesions   Endocrine - No temperature intolerance  Musculoskeletal - No joint pain, No joint swelling, No muscle pain  Psychiatric - No depression, No anxiety    PAST MEDICAL & SURGICAL HISTORY  High cholesterol  Diabetes  Hypertension  S/P hip hemiarthroplasty  No significant past surgical history      SOCIAL HISTORY  Smoking - Denied  EtOH - Denied   Drugs - Denied    FUNCTIONAL HISTORY  Lives   Independent    CURRENT FUNCTIONAL STATUS      FAMILY HISTORY   No significant family history      RECENT LABS/IMAGING  CBC Full  -  ( 04 Jan 2017 07:28 )  WBC Count : 8.01 K/uL  Hemoglobin : 10.6 g/dL  Hematocrit : 31.7 %  Platelet Count - Automated : 154 K/uL  Mean Cell Volume : 94.3 fl  Mean Cell Hemoglobin : 31.5 pg  Mean Cell Hemoglobin Concentration : 33.4 g/dL  Auto Neutrophil # : x  Auto Lymphocyte # : x  Auto Monocyte # : x  Auto Eosinophil # : x  Auto Basophil # : x  Auto Neutrophil % : x  Auto Lymphocyte % : x  Auto Monocyte % : x  Auto Eosinophil % : x  Auto Basophil % : x    04 Jan 2017 05:26    144    |  104    |  37.0   ----------------------------<  120    3.4     |  27.0   |  1.06     Ca    7.9        04 Jan 2017 05:26  Mg     3.0       03 Jan 2017 04:21          VITALS  T(C): 37, Max: 37 (01-04 @ 11:00)  HR: 78 (74 - 89)  BP: 121/58 (109/59 - 176/79)  RR: 22 (9 - 22)  SpO2: 97% (94% - 99%)  Wt(kg): --    ALLERGIES  No Known Allergies      MEDICATIONS   atorvastatin 20milliGRAM(s) Oral at bedtime  timolol 0.5% Solution 1Drop(s) Both EYES daily  hydrALAZINE Injectable 10milliGRAM(s) IV Push every 4 hours PRN  dextrose 5%. 1000milliLiter(s) IV Continuous <Continuous>  dextrose Gel 1Dose(s) Oral once PRN  dextrose 50% Injectable 12.5Gram(s) IV Push once  dextrose 50% Injectable 25Gram(s) IV Push once  dextrose 50% Injectable 25Gram(s) IV Push once  glucagon  Injectable 1milliGRAM(s) IntraMuscular once PRN  nystatin Powder 1Application(s) Topical three times a day  insulin glargine Injectable (LANTUS) 20Unit(s) SubCutaneous at bedtime  carbidopa/levodopa  25/100 1Tablet(s) Oral three times a day  ondansetron Injectable 4milliGRAM(s) IV Push every 6 hours PRN  amLODIPine   Tablet 10milliGRAM(s) Oral daily  BACItracin   Ointment 1Application(s) Topical two times a day  sodium chloride 0.45%. 1000milliLiter(s) IV Continuous <Continuous>  insulin lispro (HumaLOG) corrective regimen sliding scale  SubCutaneous every 6 hours      ----------------------------------------------------------------------------------------  PHYSICAL EXAM  Constitutional - NAD, Comfortable  HEENT - NCAT, EOMI  Neck - Supple, No limited ROM  Chest - CTA bilaterally, No wheeze, No rhonchi, No crackles  Cardiovascular - RRR, S1S2, No murmurs  Abdomen - BS+, Soft, NTND  Extremities - No C/C/E, No calf tenderness   Neurologic Exam -                    Cognitive - Awake, Alert, AAO to self, place, date, year, situation     Communication - Fluent, No dysarthria     Cranial Nerves - CN 2-12 intact     Motor - No focal deficits                    LEFT    UE - ShAB 5/5, EF 5/5, EE 5/5, WE 5/5,  5/5                    RIGHT UE - ShAB 5/5, EF 5/5, EE 5/5, WE 5/5,  5/5                    LEFT    LE - HF 5/5, KE 5/5, DF 5/5, PF 5/5                    RIGHT LE - HF 5/5, KE 5/5, DF 5/5, PF 5/5        Sensory - Intact to LT     Reflexes - DTR Intact, No primitive reflexive     Coordination - FTN intact     OculoVestibular - No saccades, No nystagmus, VOR         Balance - WNL Static  Psychiatric - Mood stable, Affect WNL  ----------------------------------------------------------------------------------------  ASSESSMENT/PLAN      - Recommend ACUTE inpatient rehabilitation for the functional deficits consisting of 3 hours of therapy/day & 24 hour RN/daily PMR physician for comorbid medical management. Will continue to follow for ongoing rehab needs and recommendations.  - Recommend ENE, patient DOES NOT meet acute inpatient rehabilitation criteria  - Recommend DC HOME with outpatient  - Recommend DC HOME with homecare  - Follow up with [facility] - Call [phone] for an appointment

## 2017-01-04 NOTE — CONSULT NOTE ADULT - ATTENDING COMMENTS
76 year old right handed male with prior history of  Parkinson's disease admitted with DKA and also found to have bilateral supra and infratentorial infarcts. Scheduled for [facility] today. Patient has generalized weakness on exam mainly in bilateral lower extremity. Recommend acute rehab PT/OT/ST upto 3hrs/day 5 days/week when medically stable.   will follow up in am.

## 2017-01-04 NOTE — PROGRESS NOTE ADULT - ASSESSMENT
The patient is a 76y Male with mulitple infarcts in multiple arterial distributions arguing for embolic phenomenon  restart ASA but given hemorrhage hold a/c  Suggest J[facility]  will follow with you    Valentin Vila MD PhD   [phone]

## 2017-01-04 NOTE — PROGRESS NOTE ADULT - SUBJECTIVE AND OBJECTIVE BOX
[facility]                                 [address]                                  [address]                                        [place], [place], [address]                                             [phone]      Vital signs:  T(C): 36.8, Max: 37 (01-04 @ 11:00)  HR: 80 (74 - 89)  BP: 131/61 (109/59 - 176/79)  RR: 14 (9 - 22)  SpO2: 93% (93% - 99%)  Wt(kg): --    Exam:    No new complaints.  Awake and alert.  speech language intact  Pupils react.  left VF cut  Face symmetric smile and sensation  hearing symmetric  tongue ML  4 to 4+/5 power in 4 ext  intact FT x 4 ext      MRI brain:  b/l CVA in cerebrum and cerebellar peduncle, right occipital petechial hemorrhage  CT head: stable occipital petechial hemorrhage, evolving right occipital CVA

## 2017-01-04 NOTE — CONSULT NOTE ADULT - SUBJECTIVE AND OBJECTIVE BOX
76 M with PMH of DM admitted 12/31 c/o not feeling well at home with lethargy for last few days PTA and not eating/drinking with vomiting. Found to have severe Hyperglycemia, with a BG > 1100 and a venous pH of 6.9. His anion gap was 43. He was started on IV hydration and an insulin infusion for DKA.    REVIEW OF SYSTEMS  Constitutional - No fever, No weight loss, No fatigue  HEENT - No eye pain, No visual disturbances, No difficulty hearing, No tinnitus, No vertigo, No neck pain  Respiratory - No cough, No wheezing, No shortness of breath  Cardiovascular - No chest pain, No palpitations  Gastrointestinal - No abdominal pain, No nausea, No vomiting, No diarrhea, No constipation  Genitourinary - No dysuria, No frequency, No hematuria, No incontinence  Neurological - No headaches, No memory loss, No loss of strength, No numbness, No tremors  Skin - No itching, No rashes, No lesions   Endocrine - No temperature intolerance  Musculoskeletal - No joint pain, No joint swelling, No muscle pain  Psychiatric - No depression, No anxiety    PAST MEDICAL & SURGICAL HISTORY  High cholesterol  Diabetes  Hypertension  S/P hip hemiarthroplasty  No significant past surgical history      SOCIAL HISTORY  Smoking - Denied  EtOH - Denied   Drugs - Denied    FUNCTIONAL HISTORY  Lives   Independent    CURRENT FUNCTIONAL STATUS      FAMILY HISTORY   No significant family history      RECENT LABS/IMAGING  CBC Full  -  ( 04 Jan 2017 07:28 )  WBC Count : 8.01 K/uL  Hemoglobin : 10.6 g/dL  Hematocrit : 31.7 %  Platelet Count - Automated : 154 K/uL  Mean Cell Volume : 94.3 fl  Mean Cell Hemoglobin : 31.5 pg  Mean Cell Hemoglobin Concentration : 33.4 g/dL  Auto Neutrophil # : x  Auto Lymphocyte # : x  Auto Monocyte # : x  Auto Eosinophil # : x  Auto Basophil # : x  Auto Neutrophil % : x  Auto Lymphocyte % : x  Auto Monocyte % : x  Auto Eosinophil % : x  Auto Basophil % : x    04 Jan 2017 05:26    144    |  104    |  37.0   ----------------------------<  120    3.4     |  27.0   |  1.06     Ca    7.9        04 Jan 2017 05:26  Mg     3.0       03 Jan 2017 04:21          VITALS  T(C): 37, Max: 37 (01-04 @ 11:00)  HR: 78 (74 - 89)  BP: 121/58 (109/59 - 176/79)  RR: 22 (9 - 22)  SpO2: 97% (94% - 99%)  Wt(kg): --    ALLERGIES  No Known Allergies      MEDICATIONS   atorvastatin 20milliGRAM(s) Oral at bedtime  timolol 0.5% Solution 1Drop(s) Both EYES daily  hydrALAZINE Injectable 10milliGRAM(s) IV Push every 4 hours PRN  dextrose 5%. 1000milliLiter(s) IV Continuous <Continuous>  dextrose Gel 1Dose(s) Oral once PRN  dextrose 50% Injectable 12.5Gram(s) IV Push once  dextrose 50% Injectable 25Gram(s) IV Push once  dextrose 50% Injectable 25Gram(s) IV Push once  glucagon  Injectable 1milliGRAM(s) IntraMuscular once PRN  nystatin Powder 1Application(s) Topical three times a day  insulin glargine Injectable (LANTUS) 20Unit(s) SubCutaneous at bedtime  carbidopa/levodopa  25/100 1Tablet(s) Oral three times a day  ondansetron Injectable 4milliGRAM(s) IV Push every 6 hours PRN  amLODIPine   Tablet 10milliGRAM(s) Oral daily  BACItracin   Ointment 1Application(s) Topical two times a day  sodium chloride 0.45%. 1000milliLiter(s) IV Continuous <Continuous>  insulin lispro (HumaLOG) corrective regimen sliding scale  SubCutaneous every 6 hours  aspirin  chewable 81milliGRAM(s) Oral daily      ----------------------------------------------------------------------------------------  PHYSICAL EXAM  Constitutional - NAD, Comfortable  HEENT - NCAT, EOMI  Neck - Supple, No limited ROM  Chest - CTA bilaterally, No wheeze, No rhonchi, No crackles  Cardiovascular - RRR, S1S2, No murmurs  Abdomen - BS+, Soft, NTND  Extremities - No C/C/E, No calf tenderness   Neurologic Exam -                    Cognitive - Awake, Alert, AAO to self, place, date, year, situation     Communication - Fluent, No dysarthria     Cranial Nerves - CN 2-12 intact     Motor - No focal deficits                    LEFT    UE - ShAB 5/5, EF 5/5, EE 5/5, WE 5/5,  5/5                    RIGHT UE - ShAB 5/5, EF 5/5, EE 5/5, WE 5/5,  5/5                    LEFT    LE - HF 5/5, KE 5/5, DF 5/5, PF 5/5                    RIGHT LE - HF 5/5, KE 5/5, DF 5/5, PF 5/5        Sensory - Intact to LT     Reflexes - DTR Intact, No primitive reflexive     Coordination - FTN intact     OculoVestibular - No saccades, No nystagmus, VOR         Balance - WNL Static  Psychiatric - Mood stable, Affect WNL  ----------------------------------------------------------------------------------------  ASSESSMENT/PLAN      - Recommend ACUTE inpatient rehabilitation for the functional deficits consisting of 3 hours of therapy/day & 24 hour RN/daily PMR physician for comorbid medical management. Will continue to follow for ongoing rehab needs and recommendations.  - Recommend ENE, patient DOES NOT meet acute inpatient rehabilitation criteria  - Recommend DC HOME with outpatient  - Recommend DC HOME with homecare  - Follow up with CONCUSSION PROGRAM - Call [phone] for an appointment 76 M with PMH of DM admitted 12/31 c/o not feeling well at home with lethargy for last few days PTA and not eating/drinking with vomiting. Found to have severe Hyperglycemia, with a BG > 1100 and a venous pH of 6.9. His anion gap was 43. He was started on IV hydration and an insulin infusion for DKA. Surgery/wound care evaluated patient for foul smelling b/l groin/perineal/scrotal infection in b/l inguinal region, medial thighs, lower abdominal wall, scrotum and perineum. Appears to be Tinea cruris and not likely necrotizing soft tissue infection per surgery.     REVIEW OF SYSTEMS  Constitutional - No fever, No weight loss, No fatigue  HEENT - No eye pain, No visual disturbances, No difficulty hearing, No tinnitus, No vertigo, No neck pain  Respiratory - No cough, No wheezing, No shortness of breath  Cardiovascular - No chest pain, No palpitations  Gastrointestinal - No abdominal pain, No nausea, No vomiting, No diarrhea, No constipation  Genitourinary - No dysuria, No frequency, No hematuria, No incontinence  Neurological - No headaches, No memory loss, No loss of strength, No numbness, No tremors  Skin - No itching, No rashes, No lesions   Endocrine - No temperature intolerance  Musculoskeletal - No joint pain, No joint swelling, No muscle pain  Psychiatric - No depression, No anxiety    PAST MEDICAL & SURGICAL HISTORY  High cholesterol  Diabetes  Hypertension  S/P hip hemiarthroplasty  No significant past surgical history      SOCIAL HISTORY  Smoking - Denied  EtOH - Denied   Drugs - Denied    FUNCTIONAL HISTORY  Lives   Independent    CURRENT FUNCTIONAL STATUS      FAMILY HISTORY   No significant family history      RECENT LABS/IMAGING  CBC Full  -  ( 04 Jan 2017 07:28 )  WBC Count : 8.01 K/uL  Hemoglobin : 10.6 g/dL  Hematocrit : 31.7 %  Platelet Count - Automated : 154 K/uL  Mean Cell Volume : 94.3 fl  Mean Cell Hemoglobin : 31.5 pg  Mean Cell Hemoglobin Concentration : 33.4 g/dL  Auto Neutrophil # : x  Auto Lymphocyte # : x  Auto Monocyte # : x  Auto Eosinophil # : x  Auto Basophil # : x  Auto Neutrophil % : x  Auto Lymphocyte % : x  Auto Monocyte % : x  Auto Eosinophil % : x  Auto Basophil % : x    04 Jan 2017 05:26    144    |  104    |  37.0   ----------------------------<  120    3.4     |  27.0   |  1.06     Ca    7.9        04 Jan 2017 05:26  Mg     3.0       03 Jan 2017 04:21          VITALS  T(C): 37, Max: 37 (01-04 @ 11:00)  HR: 78 (74 - 89)  BP: 121/58 (109/59 - 176/79)  RR: 22 (9 - 22)  SpO2: 97% (94% - 99%)  Wt(kg): --    ALLERGIES  No Known Allergies      MEDICATIONS   atorvastatin 20milliGRAM(s) Oral at bedtime  timolol 0.5% Solution 1Drop(s) Both EYES daily  hydrALAZINE Injectable 10milliGRAM(s) IV Push every 4 hours PRN  dextrose 5%. 1000milliLiter(s) IV Continuous <Continuous>  dextrose Gel 1Dose(s) Oral once PRN  dextrose 50% Injectable 12.5Gram(s) IV Push once  dextrose 50% Injectable 25Gram(s) IV Push once  dextrose 50% Injectable 25Gram(s) IV Push once  glucagon  Injectable 1milliGRAM(s) IntraMuscular once PRN  nystatin Powder 1Application(s) Topical three times a day  insulin glargine Injectable (LANTUS) 20Unit(s) SubCutaneous at bedtime  carbidopa/levodopa  25/100 1Tablet(s) Oral three times a day  ondansetron Injectable 4milliGRAM(s) IV Push every 6 hours PRN  amLODIPine   Tablet 10milliGRAM(s) Oral daily  BACItracin   Ointment 1Application(s) Topical two times a day  sodium chloride 0.45%. 1000milliLiter(s) IV Continuous <Continuous>  insulin lispro (HumaLOG) corrective regimen sliding scale  SubCutaneous every 6 hours  aspirin  chewable 81milliGRAM(s) Oral daily      ----------------------------------------------------------------------------------------  PHYSICAL EXAM  Constitutional - NAD, Comfortable  HEENT - NCAT, EOMI  Neck - Supple, No limited ROM  Chest - CTA bilaterally, No wheeze, No rhonchi, No crackles  Cardiovascular - RRR, S1S2, No murmurs  Abdomen - BS+, Soft, NTND  Extremities - No C/C/E, No calf tenderness   Neurologic Exam -                    Cognitive - Awake, Alert, AAO to self, place, date, year, situation     Communication - Fluent, No dysarthria     Cranial Nerves - CN 2-12 intact     Motor - No focal deficits                    LEFT    UE - ShAB 5/5, EF 5/5, EE 5/5, WE 5/5,  5/5                    RIGHT UE - ShAB 5/5, EF 5/5, EE 5/5, WE 5/5,  5/5                    LEFT    LE - HF 5/5, KE 5/5, DF 5/5, PF 5/5                    RIGHT LE - HF 5/5, KE 5/5, DF 5/5, PF 5/5        Sensory - Intact to LT     Reflexes - DTR Intact, No primitive reflexive     Coordination - FTN intact     OculoVestibular - No saccades, No nystagmus, VOR         Balance - WNL Static  Psychiatric - Mood stable, Affect WNL  ----------------------------------------------------------------------------------------  ASSESSMENT/PLAN      - Recommend ACUTE inpatient rehabilitation for the functional deficits consisting of 3 hours of therapy/day & 24 hour RN/daily PMR physician for comorbid medical management. Will continue to follow for ongoing rehab needs and recommendations.  - Recommend ENE, patient DOES NOT meet acute inpatient rehabilitation criteria  - Recommend DC HOME with outpatient  - Recommend DC HOME with homecare  - Follow up with CONCUSSION PROGRAM - Call [phone] for an appointment 76 M with PMH of DM admitted 12/31 c/o not feeling well at home with lethargy for last few days PTA and not eating/drinking with vomiting. Found to have severe Hyperglycemia, with a BG > 1100 and a venous pH of 6.9. His anion gap was 43. He was started on IV hydration and an insulin infusion for DKA. Surgery/wound care evaluated patient for foul smelling b/l groin/perineal/scrotal infection in b/l inguinal region, medial thighs, lower abdominal wall, scrotum and perineum. Appears to be Tinea cruris and not likely necrotizing soft tissue infection per surgery. CTH 12/31 for increased lethargy revealed acute/subacute small to moderate right occipital lobe infarct.     REVIEW OF SYSTEMS  Constitutional - No fever, No weight loss, No fatigue  HEENT - No eye pain, No visual disturbances, No difficulty hearing, No tinnitus, No vertigo, No neck pain  Respiratory - No cough, No wheezing, No shortness of breath  Cardiovascular - No chest pain, No palpitations  Gastrointestinal - No abdominal pain, No nausea, No vomiting, No diarrhea, No constipation  Genitourinary - No dysuria, No frequency, No hematuria, No incontinence  Neurological - No headaches, No memory loss, No loss of strength, No numbness, No tremors  Skin - No itching, No rashes, No lesions   Endocrine - No temperature intolerance  Musculoskeletal - No joint pain, No joint swelling, No muscle pain  Psychiatric - No depression, No anxiety    PAST MEDICAL & SURGICAL HISTORY  High cholesterol  Diabetes  Hypertension  S/P hip hemiarthroplasty  No significant past surgical history      SOCIAL HISTORY  Smoking - Denied  EtOH - Denied   Drugs - Denied    FUNCTIONAL HISTORY  Lives   Independent    CURRENT FUNCTIONAL STATUS      FAMILY HISTORY   No significant family history      RECENT LABS/IMAGING  CBC Full  -  ( 04 Jan 2017 07:28 )  WBC Count : 8.01 K/uL  Hemoglobin : 10.6 g/dL  Hematocrit : 31.7 %  Platelet Count - Automated : 154 K/uL  Mean Cell Volume : 94.3 fl  Mean Cell Hemoglobin : 31.5 pg  Mean Cell Hemoglobin Concentration : 33.4 g/dL  Auto Neutrophil # : x  Auto Lymphocyte # : x  Auto Monocyte # : x  Auto Eosinophil # : x  Auto Basophil # : x  Auto Neutrophil % : x  Auto Lymphocyte % : x  Auto Monocyte % : x  Auto Eosinophil % : x  Auto Basophil % : x    04 Jan 2017 05:26    144    |  104    |  37.0   ----------------------------<  120    3.4     |  27.0   |  1.06     Ca    7.9        04 Jan 2017 05:26  Mg     3.0       03 Jan 2017 04:21          VITALS  T(C): 37, Max: 37 (01-04 @ 11:00)  HR: 78 (74 - 89)  BP: 121/58 (109/59 - 176/79)  RR: 22 (9 - 22)  SpO2: 97% (94% - 99%)  Wt(kg): --    ALLERGIES  No Known Allergies      MEDICATIONS   atorvastatin 20milliGRAM(s) Oral at bedtime  timolol 0.5% Solution 1Drop(s) Both EYES daily  hydrALAZINE Injectable 10milliGRAM(s) IV Push every 4 hours PRN  dextrose 5%. 1000milliLiter(s) IV Continuous <Continuous>  dextrose Gel 1Dose(s) Oral once PRN  dextrose 50% Injectable 12.5Gram(s) IV Push once  dextrose 50% Injectable 25Gram(s) IV Push once  dextrose 50% Injectable 25Gram(s) IV Push once  glucagon  Injectable 1milliGRAM(s) IntraMuscular once PRN  nystatin Powder 1Application(s) Topical three times a day  insulin glargine Injectable (LANTUS) 20Unit(s) SubCutaneous at bedtime  carbidopa/levodopa  25/100 1Tablet(s) Oral three times a day  ondansetron Injectable 4milliGRAM(s) IV Push every 6 hours PRN  amLODIPine   Tablet 10milliGRAM(s) Oral daily  BACItracin   Ointment 1Application(s) Topical two times a day  sodium chloride 0.45%. 1000milliLiter(s) IV Continuous <Continuous>  insulin lispro (HumaLOG) corrective regimen sliding scale  SubCutaneous every 6 hours  aspirin  chewable 81milliGRAM(s) Oral daily      ----------------------------------------------------------------------------------------  PHYSICAL EXAM  Constitutional - NAD, Comfortable  HEENT - NCAT, EOMI  Neck - Supple, No limited ROM  Chest - CTA bilaterally, No wheeze, No rhonchi, No crackles  Cardiovascular - RRR, S1S2, No murmurs  Abdomen - BS+, Soft, NTND  Extremities - No C/C/E, No calf tenderness   Neurologic Exam -                    Cognitive - Awake, Alert, AAO to self, place, date, year, situation     Communication - Fluent, No dysarthria     Cranial Nerves - CN 2-12 intact     Motor - No focal deficits                    LEFT    UE - ShAB 5/5, EF 5/5, EE 5/5, WE 5/5,  5/5                    RIGHT UE - ShAB 5/5, EF 5/5, EE 5/5, WE 5/5,  5/5                    LEFT    LE - HF 5/5, KE 5/5, DF 5/5, PF 5/5                    RIGHT LE - HF 5/5, KE 5/5, DF 5/5, PF 5/5        Sensory - Intact to LT     Reflexes - DTR Intact, No primitive reflexive     Coordination - FTN intact     OculoVestibular - No saccades, No nystagmus, VOR         Balance - WNL Static  Psychiatric - Mood stable, Affect WNL  ----------------------------------------------------------------------------------------  ASSESSMENT/PLAN      - Recommend ACUTE inpatient rehabilitation for the functional deficits consisting of 3 hours of therapy/day & 24 hour RN/daily PMR physician for comorbid medical management. Will continue to follow for ongoing rehab needs and recommendations.  - Recommend ENE, patient DOES NOT meet acute inpatient rehabilitation criteria  - Recommend DC HOME with outpatient  - Recommend DC HOME with homecare  - Follow up with CONCUSSION PROGRAM - Call [phone] for an appointment 76 M with PMH of DM admitted 12/31 c/o not feeling well at home with lethargy for last few days PTA and not eating/drinking with vomiting. Found to have severe Hyperglycemia, with a BG > 1100 and a venous pH of 6.9. His anion gap was 43. He was started on IV hydration and an insulin infusion for DKA. Surgery/wound care evaluated patient for foul smelling b/l groin/perineal/scrotal infection in b/l inguinal region, medial thighs, lower abdominal wall, scrotum and perineum. Appears to be Tinea cruris and not likely necrotizing soft tissue infection per surgery. CTH 12/31 for increased lethargy revealed acute/subacute small to moderate right occipital lobe infarct. Neurology evaluated patient and started on statin/ASA. MRI acute b/l supratentorial and infratentorial infarcts with mild petechial hemorrhage in right occipital lobe. Carotid dopplers and TTE WNL. Patients mentation gradually improving.     REVIEW OF SYSTEMS  Constitutional - No fever, No weight loss, No fatigue  HEENT - No eye pain, No visual disturbances, No difficulty hearing, No tinnitus, No vertigo, No neck pain  Respiratory - No cough, No wheezing, No shortness of breath  Cardiovascular - No chest pain, No palpitations  Gastrointestinal - No abdominal pain, No nausea, No vomiting, No diarrhea, No constipation  Genitourinary - No dysuria, No frequency, No hematuria, No incontinence  Neurological - No headaches, No memory loss, No loss of strength, No numbness, No tremors  Skin - No itching, No rashes, No lesions   Endocrine - No temperature intolerance  Musculoskeletal - No joint pain, No joint swelling, No muscle pain  Psychiatric - No depression, No anxiety    PAST MEDICAL & SURGICAL HISTORY  High cholesterol  Diabetes  Hypertension  S/P hip hemiarthroplasty  No significant past surgical history      SOCIAL HISTORY  Smoking - Denied  EtOH - Denied   Drugs - Denied    FUNCTIONAL HISTORY  Lives   Independent    CURRENT FUNCTIONAL STATUS      FAMILY HISTORY   No significant family history      RECENT LABS/IMAGING  CBC Full  -  ( 04 Jan 2017 07:28 )  WBC Count : 8.01 K/uL  Hemoglobin : 10.6 g/dL  Hematocrit : 31.7 %  Platelet Count - Automated : 154 K/uL  Mean Cell Volume : 94.3 fl  Mean Cell Hemoglobin : 31.5 pg  Mean Cell Hemoglobin Concentration : 33.4 g/dL  Auto Neutrophil # : x  Auto Lymphocyte # : x  Auto Monocyte # : x  Auto Eosinophil # : x  Auto Basophil # : x  Auto Neutrophil % : x  Auto Lymphocyte % : x  Auto Monocyte % : x  Auto Eosinophil % : x  Auto Basophil % : x    04 Jan 2017 05:26    144    |  104    |  37.0   ----------------------------<  120    3.4     |  27.0   |  1.06     Ca    7.9        04 Jan 2017 05:26  Mg     3.0       03 Jan 2017 04:21          VITALS  T(C): 37, Max: 37 (01-04 @ 11:00)  HR: 78 (74 - 89)  BP: 121/58 (109/59 - 176/79)  RR: 22 (9 - 22)  SpO2: 97% (94% - 99%)  Wt(kg): --    ALLERGIES  No Known Allergies      MEDICATIONS   atorvastatin 20milliGRAM(s) Oral at bedtime  timolol 0.5% Solution 1Drop(s) Both EYES daily  hydrALAZINE Injectable 10milliGRAM(s) IV Push every 4 hours PRN  dextrose 5%. 1000milliLiter(s) IV Continuous <Continuous>  dextrose Gel 1Dose(s) Oral once PRN  dextrose 50% Injectable 12.5Gram(s) IV Push once  dextrose 50% Injectable 25Gram(s) IV Push once  dextrose 50% Injectable 25Gram(s) IV Push once  glucagon  Injectable 1milliGRAM(s) IntraMuscular once PRN  nystatin Powder 1Application(s) Topical three times a day  insulin glargine Injectable (LANTUS) 20Unit(s) SubCutaneous at bedtime  carbidopa/levodopa  25/100 1Tablet(s) Oral three times a day  ondansetron Injectable 4milliGRAM(s) IV Push every 6 hours PRN  amLODIPine   Tablet 10milliGRAM(s) Oral daily  BACItracin   Ointment 1Application(s) Topical two times a day  sodium chloride 0.45%. 1000milliLiter(s) IV Continuous <Continuous>  insulin lispro (HumaLOG) corrective regimen sliding scale  SubCutaneous every 6 hours  aspirin  chewable 81milliGRAM(s) Oral daily      ----------------------------------------------------------------------------------------  PHYSICAL EXAM  Constitutional - NAD, Comfortable  HEENT - NCAT, EOMI  Neck - Supple, No limited ROM  Chest - CTA bilaterally, No wheeze, No rhonchi, No crackles  Cardiovascular - RRR, S1S2, No murmurs  Abdomen - BS+, Soft, NTND  Extremities - No C/C/E, No calf tenderness   Neurologic Exam -                    Cognitive - Awake, Alert, AAO to self, place, date, year, situation     Communication - Fluent, No dysarthria     Cranial Nerves - CN 2-12 intact     Motor - No focal deficits                    LEFT    UE - ShAB 5/5, EF 5/5, EE 5/5, WE 5/5,  5/5                    RIGHT UE - ShAB 5/5, EF 5/5, EE 5/5, WE 5/5,  5/5                    LEFT    LE - HF 5/5, KE 5/5, DF 5/5, PF 5/5                    RIGHT LE - HF 5/5, KE 5/5, DF 5/5, PF 5/5        Sensory - Intact to LT     Reflexes - DTR Intact, No primitive reflexive     Coordination - FTN intact     OculoVestibular - No saccades, No nystagmus, VOR         Balance - WNL Static  Psychiatric - Mood stable, Affect WNL  ----------------------------------------------------------------------------------------  ASSESSMENT/PLAN      - Recommend ACUTE inpatient rehabilitation for the functional deficits consisting of 3 hours of therapy/day & 24 hour RN/daily PMR physician for comorbid medical management. Will continue to follow for ongoing rehab needs and recommendations.  - Recommend ENE, patient DOES NOT meet acute inpatient rehabilitation criteria  - Recommend DC HOME with outpatient  - Recommend DC HOME with homecare  - Follow up with CONCUSSION PROGRAM - Call [phone] for an appointment 76 M with PMH of DM admitted 12/31 c/o not feeling well at home with lethargy for last few days PTA and not eating/drinking with vomiting. Found to have severe Hyperglycemia, with a BG > 1100 and a venous pH of 6.9. His anion gap was 43. He was started on IV hydration and an insulin infusion for DKA. Surgery/wound care evaluated patient for foul smelling b/l groin/perineal/scrotal infection in b/l inguinal region, medial thighs, lower abdominal wall, scrotum and perineum. Appears to be Tinea cruris and not likely necrotizing soft tissue infection per surgery. CTH 12/31 for increased lethargy revealed acute/subacute small to moderate right occipital lobe infarct. Neurology evaluated patient and started on statin/ASA. MRI acute b/l supratentorial and infratentorial infarcts with mild petechial hemorrhage in right occipital lobe. Carotid dopplers and TTE WNL. Patients mentation gradually improving. Cardiology consulted for possible JESUS MANUEL r/o PFO.     REVIEW OF SYSTEMS  Constitutional - No fever, No weight loss, No fatigue  HEENT - No eye pain, No visual disturbances, No difficulty hearing, No tinnitus, No vertigo, No neck pain  Respiratory - No cough, No wheezing, No shortness of breath  Cardiovascular - No chest pain, No palpitations  Gastrointestinal - No abdominal pain, No nausea, No vomiting, No diarrhea, No constipation  Genitourinary - No dysuria, No frequency, No hematuria, No incontinence  Neurological - No headaches, No memory loss, No loss of strength, No numbness, No tremors  Skin - No itching, No rashes, No lesions   Endocrine - No temperature intolerance  Musculoskeletal - No joint pain, No joint swelling, No muscle pain  Psychiatric - No depression, No anxiety    PAST MEDICAL & SURGICAL HISTORY  High cholesterol  Diabetes  Hypertension  S/P hip hemiarthroplasty  No significant past surgical history      SOCIAL HISTORY  Smoking - Denied  EtOH - Denied   Drugs - Denied    FUNCTIONAL HISTORY  Lives   Independent    CURRENT FUNCTIONAL STATUS      FAMILY HISTORY   No significant family history      RECENT LABS/IMAGING  CBC Full  -  ( 04 Jan 2017 07:28 )  WBC Count : 8.01 K/uL  Hemoglobin : 10.6 g/dL  Hematocrit : 31.7 %  Platelet Count - Automated : 154 K/uL  Mean Cell Volume : 94.3 fl  Mean Cell Hemoglobin : 31.5 pg  Mean Cell Hemoglobin Concentration : 33.4 g/dL  Auto Neutrophil # : x  Auto Lymphocyte # : x  Auto Monocyte # : x  Auto Eosinophil # : x  Auto Basophil # : x  Auto Neutrophil % : x  Auto Lymphocyte % : x  Auto Monocyte % : x  Auto Eosinophil % : x  Auto Basophil % : x    04 Jan 2017 05:26    144    |  104    |  37.0   ----------------------------<  120    3.4     |  27.0   |  1.06     Ca    7.9        04 Jan 2017 05:26  Mg     3.0       03 Jan 2017 04:21          VITALS  T(C): 37, Max: 37 (01-04 @ 11:00)  HR: 78 (74 - 89)  BP: 121/58 (109/59 - 176/79)  RR: 22 (9 - 22)  SpO2: 97% (94% - 99%)  Wt(kg): --    ALLERGIES  No Known Allergies      MEDICATIONS   atorvastatin 20milliGRAM(s) Oral at bedtime  timolol 0.5% Solution 1Drop(s) Both EYES daily  hydrALAZINE Injectable 10milliGRAM(s) IV Push every 4 hours PRN  dextrose 5%. 1000milliLiter(s) IV Continuous <Continuous>  dextrose Gel 1Dose(s) Oral once PRN  dextrose 50% Injectable 12.5Gram(s) IV Push once  dextrose 50% Injectable 25Gram(s) IV Push once  dextrose 50% Injectable 25Gram(s) IV Push once  glucagon  Injectable 1milliGRAM(s) IntraMuscular once PRN  nystatin Powder 1Application(s) Topical three times a day  insulin glargine Injectable (LANTUS) 20Unit(s) SubCutaneous at bedtime  carbidopa/levodopa  25/100 1Tablet(s) Oral three times a day  ondansetron Injectable 4milliGRAM(s) IV Push every 6 hours PRN  amLODIPine   Tablet 10milliGRAM(s) Oral daily  BACItracin   Ointment 1Application(s) Topical two times a day  sodium chloride 0.45%. 1000milliLiter(s) IV Continuous <Continuous>  insulin lispro (HumaLOG) corrective regimen sliding scale  SubCutaneous every 6 hours  aspirin  chewable 81milliGRAM(s) Oral daily      ----------------------------------------------------------------------------------------  PHYSICAL EXAM  Constitutional - NAD, Comfortable  HEENT - NCAT, EOMI  Neck - Supple, No limited ROM  Chest - CTA bilaterally, No wheeze, No rhonchi, No crackles  Cardiovascular - RRR, S1S2, No murmurs  Abdomen - BS+, Soft, NTND  Extremities - No C/C/E, No calf tenderness   Neurologic Exam -                    Cognitive - Awake, Alert, AAO to self, place, date, year, situation     Communication - Fluent, No dysarthria     Cranial Nerves - CN 2-12 intact     Motor - No focal deficits                    LEFT    UE - ShAB 5/5, EF 5/5, EE 5/5, WE 5/5,  5/5                    RIGHT UE - ShAB 5/5, EF 5/5, EE 5/5, WE 5/5,  5/5                    LEFT    LE - HF 5/5, KE 5/5, DF 5/5, PF 5/5                    RIGHT LE - HF 5/5, KE 5/5, DF 5/5, PF 5/5        Sensory - Intact to LT     Reflexes - DTR Intact, No primitive reflexive     Coordination - FTN intact     OculoVestibular - No saccades, No nystagmus, VOR         Balance - WNL Static  Psychiatric - Mood stable, Affect WNL  ----------------------------------------------------------------------------------------  ASSESSMENT/PLAN      - Recommend ACUTE inpatient rehabilitation for the functional deficits consisting of 3 hours of therapy/day & 24 hour RN/daily PMR physician for comorbid medical management. Will continue to follow for ongoing rehab needs and recommendations.  - Recommend ENE, patient DOES NOT meet acute inpatient rehabilitation criteria  - Recommend DC HOME with outpatient  - Recommend DC HOME with homecare  - Follow up with [facility] - Call [phone] for an appointment 76 M with PMH of DM admitted 12/31 c/o not feeling well at home with lethargy for last few days PTA and not eating/drinking with vomiting. Found to have severe Hyperglycemia, with a BG > 1100 and a venous pH of 6.9. His anion gap was 43. He was started on IV hydration and an insulin infusion for DKA. Surgery/wound care evaluated patient for foul smelling b/l groin/perineal/scrotal infection in b/l inguinal region, medial thighs, lower abdominal wall, scrotum and perineum. Appears to be Tinea cruris and not likely necrotizing soft tissue infection per surgery. CTH 12/31 for increased lethargy revealed acute/subacute small to moderate right occipital lobe infarct. Neurology evaluated patient and started on statin/ASA. MRI acute b/l supratentorial and infratentorial infarcts with mild petechial hemorrhage in right occipital lobe. Carotid dopplers and TTE WNL. Patients mentation gradually improving. Cardiology consulted for possible JESUS MANUEL r/o PFO.     REVIEW OF SYSTEMS  Constitutional - No fever, No weight loss, No fatigue  HEENT - No eye pain, No visual disturbances, No difficulty hearing, No tinnitus, No vertigo, No neck pain  Respiratory - No cough, No wheezing, No shortness of breath  Cardiovascular - No chest pain, No palpitations  Gastrointestinal - No abdominal pain, No nausea, No vomiting, No diarrhea, No constipation  Genitourinary - No dysuria, No frequency, No hematuria, No incontinence  Neurological - No headaches, No memory loss, No loss of strength, No numbness, No tremors  Skin - No itching, No rashes, No lesions   Endocrine - No temperature intolerance  Musculoskeletal - No joint pain, No joint swelling, No muscle pain  Psychiatric - No depression, No anxiety    PAST MEDICAL & SURGICAL HISTORY  High cholesterol  Diabetes  Hypertension  S/P hip hemiarthroplasty  No significant past surgical history  Parkinson's Disease      SOCIAL HISTORY  [place]  to enter with a handrail.  Uses a cane for ambulation but poor historian for other levels of function.	    Smoking - Denied  EtOH - Denied   Drugs - Denied    FUNCTIONAL HISTORY    Independent with cane    CURRENT FUNCTIONAL STATUS  Gait - max asisst x 2 with RW from bed to chair  Bed mobility - max assist  Transfers - max assist      FAMILY HISTORY   No significant family history      RECENT LABS/IMAGING  CBC Full  -  ( 04 Jan 2017 07:28 )  WBC Count : 8.01 K/uL  Hemoglobin : 10.6 g/dL  Hematocrit : 31.7 %  Platelet Count - Automated : 154 K/uL  Mean Cell Volume : 94.3 fl  Mean Cell Hemoglobin : 31.5 pg  Mean Cell Hemoglobin Concentration : 33.4 g/dL  Auto Neutrophil # : x  Auto Lymphocyte # : x  Auto Monocyte # : x  Auto Eosinophil # : x  Auto Basophil # : x  Auto Neutrophil % : x  Auto Lymphocyte % : x  Auto Monocyte % : x  Auto Eosinophil % : x  Auto Basophil % : x    04 Jan 2017 05:26    144    |  104    |  37.0   ----------------------------<  120    3.4     |  27.0   |  1.06     Ca    7.9        04 Jan 2017 05:26  Mg     3.0       03 Jan 2017 04:21          VITALS  T(C): 37, Max: 37 (01-04 @ 11:00)  HR: 78 (74 - 89)  BP: 121/58 (109/59 - 176/79)  RR: 22 (9 - 22)  SpO2: 97% (94% - 99%)  Wt(kg): --    ALLERGIES  No Known Allergies      MEDICATIONS   atorvastatin 20milliGRAM(s) Oral at bedtime  timolol 0.5% Solution 1Drop(s) Both EYES daily  hydrALAZINE Injectable 10milliGRAM(s) IV Push every 4 hours PRN  dextrose 5%. 1000milliLiter(s) IV Continuous <Continuous>  dextrose Gel 1Dose(s) Oral once PRN  dextrose 50% Injectable 12.5Gram(s) IV Push once  dextrose 50% Injectable 25Gram(s) IV Push once  dextrose 50% Injectable 25Gram(s) IV Push once  glucagon  Injectable 1milliGRAM(s) IntraMuscular once PRN  nystatin Powder 1Application(s) Topical three times a day  insulin glargine Injectable (LANTUS) 20Unit(s) SubCutaneous at bedtime  carbidopa/levodopa  25/100 1Tablet(s) Oral three times a day  ondansetron Injectable 4milliGRAM(s) IV Push every 6 hours PRN  amLODIPine   Tablet 10milliGRAM(s) Oral daily  BACItracin   Ointment 1Application(s) Topical two times a day  sodium chloride 0.45%. 1000milliLiter(s) IV Continuous <Continuous>  insulin lispro (HumaLOG) corrective regimen sliding scale  SubCutaneous every 6 hours  aspirin  chewable 81milliGRAM(s) Oral daily      ----------------------------------------------------------------------------------------  PHYSICAL EXAM  Constitutional - NAD, Comfortable  HEENT - NCAT, EOMI  Neck - Supple, No limited ROM  Chest - CTA bilaterally, No wheeze, No rhonchi, No crackles  Cardiovascular - RRR, S1S2, No murmurs  Abdomen - BS+, Soft, NTND  Extremities - No C/C/E, No calf tenderness   Neurologic Exam -                    Cognitive - Awake, Alert, AAO to self, place, date, year, situation     Communication - Fluent, No dysarthria     Cranial Nerves - CN 2-12 intact     Motor - No focal deficits                    LEFT    UE - ShAB 5/5, EF 5/5, EE 5/5, WE 5/5,  5/5                    RIGHT UE - ShAB 5/5, EF 5/5, EE 5/5, WE 5/5,  5/5                    LEFT    LE - HF 5/5, KE 5/5, DF 5/5, PF 5/5                    RIGHT LE - HF 5/5, KE 5/5, DF 5/5, PF 5/5        Sensory - Intact to LT     Reflexes - DTR Intact, No primitive reflexive     Coordination - FTN intact     OculoVestibular - No saccades, No nystagmus, VOR         Balance - WNL Static  Psychiatric - Mood stable, Affect WNL  ----------------------------------------------------------------------------------------  ASSESSMENT/PLAN    - PT - strength training, mobility, gait training  - OT - ADLs, balance  - Precautions - Fall, aspiration  - Recommend ACUTE inpatient rehabilitation for the functional deficits consisting of 3 hours of therapy/day & 24 hour RN/daily PMR physician for comorbid medical management. Will continue to follow for ongoing rehab needs and recommendations. 76 M with PMH of DM admitted 12/31 c/o not feeling well at home with lethargy for last few days PTA and not eating/drinking with vomiting. Found to have severe Hyperglycemia, with a BG > 1100 and a venous pH of 6.9. His anion gap was 43. He was started on IV hydration and an insulin infusion for DKA. Surgery/wound care evaluated patient for foul smelling b/l groin/perineal/scrotal infection in b/l inguinal region, medial thighs, lower abdominal wall, scrotum and perineum. Appears to be Tinea cruris and not likely necrotizing soft tissue infection per surgery. CTH 12/31 for increased lethargy revealed acute/subacute small to moderate right occipital lobe infarct. Neurology evaluated patient and started on statin/ASA. MRI acute b/l supratentorial and infratentorial infarcts with mild petechial hemorrhage in right occipital lobe. Carotid dopplers and TTE WNL. Patients mentation gradually improving. Cardiology consulted for possible JESUS MANUEL r/o PFO. Dysphagia diet: puree with honey. ICDs for DVT ppx.     REVIEW OF SYSTEMS  Constitutional - No fever, No weight loss, No fatigue  HEENT - No eye pain, No visual disturbances, No difficulty hearing, No tinnitus, No vertigo, No neck pain  Respiratory - No cough, No wheezing, No shortness of breath  Cardiovascular - No chest pain, No palpitations  Gastrointestinal - No abdominal pain, No nausea, No vomiting, No diarrhea, No constipation  Genitourinary - No dysuria, + healy   Neurological - No headaches, No memory loss, No loss of strength, No numbness, No tremors  Skin - No itching, No rashes, No lesions   Endocrine - No temperature intolerance  Musculoskeletal - No joint pain, No joint swelling, No muscle pain  Psychiatric - No depression, No anxiety    PAST MEDICAL & SURGICAL HISTORY  High cholesterol  Diabetes  Hypertension  S/P hip hemiarthroplasty  No significant past surgical history  Parkinson's Disease      SOCIAL HISTORY  [place]  to enter with a handrail.  Uses a cane for ambulation but poor historian for other levels of function.	    Smoking - Denied  EtOH - Denied   Drugs - Denied    FUNCTIONAL HISTORY    Independent with cane    CURRENT FUNCTIONAL STATUS  Gait - max asisst x 2 with RW from bed to chair  Bed mobility - max assist  Transfers - max assist      FAMILY HISTORY   No significant family history      RECENT LABS/IMAGING  CBC Full  -  ( 04 Jan 2017 07:28 )  WBC Count : 8.01 K/uL  Hemoglobin : 10.6 g/dL  Hematocrit : 31.7 %  Platelet Count - Automated : 154 K/uL  Mean Cell Volume : 94.3 fl  Mean Cell Hemoglobin : 31.5 pg  Mean Cell Hemoglobin Concentration : 33.4 g/dL  Auto Neutrophil # : x  Auto Lymphocyte # : x  Auto Monocyte # : x  Auto Eosinophil # : x  Auto Basophil # : x  Auto Neutrophil % : x  Auto Lymphocyte % : x  Auto Monocyte % : x  Auto Eosinophil % : x  Auto Basophil % : x    04 Jan 2017 05:26    144    |  104    |  37.0   ----------------------------<  120    3.4     |  27.0   |  1.06     Ca    7.9        04 Jan 2017 05:26  Mg     3.0       03 Jan 2017 04:21          VITALS  T(C): 37, Max: 37 (01-04 @ 11:00)  HR: 78 (74 - 89)  BP: 121/58 (109/59 - 176/79)  RR: 22 (9 - 22)  SpO2: 97% (94% - 99%)  Wt(kg): --    ALLERGIES  No Known Allergies      MEDICATIONS   atorvastatin 20milliGRAM(s) Oral at bedtime  timolol 0.5% Solution 1Drop(s) Both EYES daily  hydrALAZINE Injectable 10milliGRAM(s) IV Push every 4 hours PRN  dextrose 5%. 1000milliLiter(s) IV Continuous <Continuous>  dextrose Gel 1Dose(s) Oral once PRN  dextrose 50% Injectable 12.5Gram(s) IV Push once  dextrose 50% Injectable 25Gram(s) IV Push once  dextrose 50% Injectable 25Gram(s) IV Push once  glucagon  Injectable 1milliGRAM(s) IntraMuscular once PRN  nystatin Powder 1Application(s) Topical three times a day  insulin glargine Injectable (LANTUS) 20Unit(s) SubCutaneous at bedtime  carbidopa/levodopa  25/100 1Tablet(s) Oral three times a day  ondansetron Injectable 4milliGRAM(s) IV Push every 6 hours PRN  amLODIPine   Tablet 10milliGRAM(s) Oral daily  BACItracin   Ointment 1Application(s) Topical two times a day  sodium chloride 0.45%. 1000milliLiter(s) IV Continuous <Continuous>  insulin lispro (HumaLOG) corrective regimen sliding scale  SubCutaneous every 6 hours  aspirin  chewable 81milliGRAM(s) Oral daily      ----------------------------------------------------------------------------------------  PHYSICAL EXAM  Constitutional - NAD, Comfortable  HEENT - NCAT, EOMI  Neck - Supple, No limited ROM  Chest - CTA bilaterally, No wheeze, No rhonchi, No crackles  Cardiovascular - RRR, S1S2, No murmurs  Abdomen - BS+, Soft, NTND  Extremities - No C/C/E, No calf tenderness   Neurologic Exam -                    Cognitive - Awake, Alert, AAO to self, place, date, year, situation     Communication - Fluent, No dysarthria     Cranial Nerves - CN 2-12 intact     Motor - No focal deficits                    LEFT    UE - ShAB 5/5, EF 5/5, EE 5/5, WE 5/5,  5/5                    RIGHT UE - ShAB 5/5, EF 5/5, EE 5/5, WE 5/5,  5/5                    LEFT    LE - HF 5/5, KE 5/5, DF 5/5, PF 5/5                    RIGHT LE - HF 5/5, KE 5/5, DF 5/5, PF 5/5        Sensory - Intact to LT     Reflexes - DTR Intact, No primitive reflexive     Coordination - FTN intact     OculoVestibular - No saccades, No nystagmus, VOR         Balance - WNL Static  Psychiatric - Mood stable, Affect WNL  ----------------------------------------------------------------------------------------  ASSESSMENT/PLAN    - PT - strength training, mobility, gait training  - OT - ADLs, balance  - ST - cont. dysphagia diet, advance as tolerated  - Precautions - Fall, aspiration  - ICDs per primary team for DVT ppx.   - Recommend ACUTE inpatient rehabilitation for the functional deficits consisting of 3 hours of therapy/day & 24 hour RN/daily PMR physician for comorbid medical management. Will continue to follow for ongoing rehab needs and recommendations. 76 RH M with PMH of DM admitted 12/31 c/o not feeling well at home with lethargy for last few days PTA and not eating/drinking with vomiting. Found to have severe Hyperglycemia, with a BG > 1100 and a venous pH of 6.9. His anion gap was 43. He was started on IV hydration and an insulin infusion for DKA. Surgery/wound care evaluated patient for foul smelling b/l groin/perineal/scrotal infection in b/l inguinal region, medial thighs, lower abdominal wall, scrotum and perineum. Appears to be Tinea cruris and not likely necrotizing soft tissue infection per surgery. CTH 12/31 for increased lethargy revealed acute/subacute small to moderate right occipital lobe infarct. Neurology evaluated patient and started on statin/ASA. MRI acute b/l supratentorial and infratentorial infarcts with mild petechial hemorrhage in right occipital lobe. Carotid dopplers and TTE WNL. Patients mentation gradually improving. Cardiology consulted for possible JESUS MANUEL r/o PFO. Dysphagia diet: puree with honey. ICDs for DVT ppx.     REVIEW OF SYSTEMS  Constitutional - No fever, No weight loss, No fatigue, + polydipsia  HEENT - No eye pain, No visual disturbances, No difficulty hearing, No tinnitus, No vertigo, No neck pain  Respiratory - No cough, No wheezing, No shortness of breath  Cardiovascular - No chest pain, No palpitations  Gastrointestinal - No abdominal pain, No nausea, No vomiting, No diarrhea, No constipation  Genitourinary - No dysuria, + healy   Neurological - No headaches, No memory loss, No loss of strength, No numbness, No tremors  Skin - No itching, No rashes, No lesions   Endocrine - No temperature intolerance  Musculoskeletal - No joint pain, No joint swelling, No muscle pain  Psychiatric - No depression, No anxiety    PAST MEDICAL & SURGICAL HISTORY  High cholesterol  Diabetes  Hypertension  S/P hip hemiarthroplasty  No significant past surgical history  Parkinson's Disease      SOCIAL HISTORY  [place]  to enter with a handrail.  Uses a cane for ambulation but poor historian for other levels of function.	    Smoking - Denied  EtOH - Denied   Drugs - Denied    FUNCTIONAL HISTORY    Independent with cane    CURRENT FUNCTIONAL STATUS  Gait - max asisst x 2 with RW from bed to chair  Bed mobility - max assist  Transfers - max assist      FAMILY HISTORY   No significant family history      RECENT LABS/IMAGING  CBC Full  -  ( 04 Jan 2017 07:28 )  WBC Count : 8.01 K/uL  Hemoglobin : 10.6 g/dL  Hematocrit : 31.7 %  Platelet Count - Automated : 154 K/uL  Mean Cell Volume : 94.3 fl  Mean Cell Hemoglobin : 31.5 pg  Mean Cell Hemoglobin Concentration : 33.4 g/dL  Auto Neutrophil # : x  Auto Lymphocyte # : x  Auto Monocyte # : x  Auto Eosinophil # : x  Auto Basophil # : x  Auto Neutrophil % : x  Auto Lymphocyte % : x  Auto Monocyte % : x  Auto Eosinophil % : x  Auto Basophil % : x    04 Jan 2017 05:26    144    |  104    |  37.0   ----------------------------<  120    3.4     |  27.0   |  1.06     Ca    7.9        04 Jan 2017 05:26  Mg     3.0       03 Jan 2017 04:21          VITALS  T(C): 37, Max: 37 (01-04 @ 11:00)  HR: 78 (74 - 89)  BP: 121/58 (109/59 - 176/79)  RR: 22 (9 - 22)  SpO2: 97% (94% - 99%)  Wt(kg): --    ALLERGIES  No Known Allergies      MEDICATIONS   atorvastatin 20milliGRAM(s) Oral at bedtime  timolol 0.5% Solution 1Drop(s) Both EYES daily  hydrALAZINE Injectable 10milliGRAM(s) IV Push every 4 hours PRN  dextrose 5%. 1000milliLiter(s) IV Continuous <Continuous>  dextrose Gel 1Dose(s) Oral once PRN  dextrose 50% Injectable 12.5Gram(s) IV Push once  dextrose 50% Injectable 25Gram(s) IV Push once  dextrose 50% Injectable 25Gram(s) IV Push once  glucagon  Injectable 1milliGRAM(s) IntraMuscular once PRN  nystatin Powder 1Application(s) Topical three times a day  insulin glargine Injectable (LANTUS) 20Unit(s) SubCutaneous at bedtime  carbidopa/levodopa  25/100 1Tablet(s) Oral three times a day  ondansetron Injectable 4milliGRAM(s) IV Push every 6 hours PRN  amLODIPine   Tablet 10milliGRAM(s) Oral daily  BACItracin   Ointment 1Application(s) Topical two times a day  sodium chloride 0.45%. 1000milliLiter(s) IV Continuous <Continuous>  insulin lispro (HumaLOG) corrective regimen sliding scale  SubCutaneous every 6 hours  aspirin  chewable 81milliGRAM(s) Oral daily      ----------------------------------------------------------------------------------------  PHYSICAL EXAM  Constitutional - NAD, Comfortable  HEENT - NCAT, Left neglect  Neck - Supple, No limited ROM  Chest - CTA bilaterally, No wheeze, No rhonchi, No crackles  Cardiovascular - RRR, S1S2  Abdomen - BS+, Soft, NTND  Extremities - No C/C/E, No calf tenderness, + b/l venous stasis changes  Neurologic Exam -                    Cognitive - Awake, Alert, AAO to self, place, date, year, situation, names objects     Left pronator drift     Communication - Fluent, No dysarthria     Cranial Nerves - CN 2-12 intact grossly     Motor - No focal deficits                    LEFT    UE - ShAB 3/5, EF 4/5, EE 4/5, WE 4/5,  5/5                    RIGHT UE - ShAB 3/5, EF 4/5, EE 4/5, WE 4/5,  4/5                    LEFT    LE - HF 3/5, KE 3/5, DF 5/5, PF 5/5                    RIGHT LE - HF 3/5, KE 3/5, DF 5/5, PF 5/5        Sensory - Intact to LT     Reflexes - DTR Intact, No primitive reflexive     Coordination - FTN impaired b/l     OculoVestibular - No saccades, No nystagmus        Balance - Poor static and dynamic balance  Psychiatric - Mood stable, Affect WNL  ----------------------------------------------------------------------------------------  ASSESSMENT/PLAN    - PT - strength training, mobility, gait training  - OT - ADLs, balance  - ST - cont. dysphagia diet, advance as tolerated  - Precautions - Fall, aspiration  - ICDs per primary team for DVT ppx.   - Recommend ACUTE inpatient rehabilitation for the functional deficits consisting of 3 hours of therapy/day & 24 hour RN/daily PMR physician for comorbid medical management. Will continue to follow for ongoing rehab needs and recommendations. 76 RH M with PMH of DM admitted 12/31 c/o not feeling well at home with lethargy for last few days PTA and not eating/drinking with vomiting. Found to have severe Hyperglycemia, with a BG > 1100 and a venous pH of 6.9. His anion gap was 43. He was started on IV hydration and an insulin infusion for DKA. Surgery/wound care evaluated patient for foul smelling b/l groin/perineal/scrotal infection in b/l inguinal region, medial thighs, lower abdominal wall, scrotum and perineum. Appears to be Tinea cruris and not likely necrotizing soft tissue infection per surgery. CTH 12/31 for increased lethargy revealed acute/subacute small to moderate right occipital lobe infarct. Neurology evaluated patient and started on statin/ASA. MRI acute b/l supratentorial and infratentorial infarcts with mild petechial hemorrhage in right occipital lobe. Carotid dopplers and TTE WNL. Patients mentation gradually improving. JESUS MANUEL today per cards to r/o PFO. Dysphagia diet: puree with honey. ICDs for DVT ppx.     REVIEW OF SYSTEMS  Constitutional - No fever, No weight loss, No fatigue, + polydipsia  HEENT - No eye pain, No visual disturbances, No difficulty hearing, No tinnitus, No vertigo, No neck pain  Respiratory - No cough, No wheezing, No shortness of breath  Cardiovascular - No chest pain, No palpitations  Gastrointestinal - No abdominal pain, No nausea, No vomiting, No diarrhea, No constipation  Genitourinary - No dysuria, + healy   Neurological - No headaches, No memory loss, No loss of strength, No numbness, No tremors  Skin - No itching, No rashes, No lesions   Endocrine - No temperature intolerance  Musculoskeletal - No joint pain, No joint swelling, No muscle pain  Psychiatric - No depression, No anxiety    PAST MEDICAL & SURGICAL HISTORY  High cholesterol  Diabetes  Hypertension  S/P hip hemiarthroplasty  No significant past surgical history  Parkinson's Disease      SOCIAL HISTORY  [place]. [place]  to enter with a handrail.  Uses a cane for ambulation but poor historian for other levels of function.	    Smoking - Denied  EtOH - Denied   Drugs - Denied    FUNCTIONAL HISTORY    Independent with cane    CURRENT FUNCTIONAL STATUS  Gait - max asisst x 2 with RW from bed to chair  Bed mobility - max assist  Transfers - max assist      FAMILY HISTORY   No significant family history      RECENT LABS/IMAGING  CBC Full  -  ( 04 Jan 2017 07:28 )  WBC Count : 8.01 K/uL  Hemoglobin : 10.6 g/dL  Hematocrit : 31.7 %  Platelet Count - Automated : 154 K/uL  Mean Cell Volume : 94.3 fl  Mean Cell Hemoglobin : 31.5 pg  Mean Cell Hemoglobin Concentration : 33.4 g/dL  Auto Neutrophil # : x  Auto Lymphocyte # : x  Auto Monocyte # : x  Auto Eosinophil # : x  Auto Basophil # : x  Auto Neutrophil % : x  Auto Lymphocyte % : x  Auto Monocyte % : x  Auto Eosinophil % : x  Auto Basophil % : x    04 Jan 2017 05:26    144    |  104    |  37.0   ----------------------------<  120    3.4     |  27.0   |  1.06     Ca    7.9        04 Jan 2017 05:26  Mg     3.0       03 Jan 2017 04:21          VITALS  T(C): 37, Max: 37 (01-04 @ 11:00)  HR: 78 (74 - 89)  BP: 121/58 (109/59 - 176/79)  RR: 22 (9 - 22)  SpO2: 97% (94% - 99%)  Wt(kg): --    ALLERGIES  No Known Allergies      MEDICATIONS   atorvastatin 20milliGRAM(s) Oral at bedtime  timolol 0.5% Solution 1Drop(s) Both EYES daily  hydrALAZINE Injectable 10milliGRAM(s) IV Push every 4 hours PRN  dextrose 5%. 1000milliLiter(s) IV Continuous <Continuous>  dextrose Gel 1Dose(s) Oral once PRN  dextrose 50% Injectable 12.5Gram(s) IV Push once  dextrose 50% Injectable 25Gram(s) IV Push once  dextrose 50% Injectable 25Gram(s) IV Push once  glucagon  Injectable 1milliGRAM(s) IntraMuscular once PRN  nystatin Powder 1Application(s) Topical three times a day  insulin glargine Injectable (LANTUS) 20Unit(s) SubCutaneous at bedtime  carbidopa/levodopa  25/100 1Tablet(s) Oral three times a day  ondansetron Injectable 4milliGRAM(s) IV Push every 6 hours PRN  amLODIPine   Tablet 10milliGRAM(s) Oral daily  BACItracin   Ointment 1Application(s) Topical two times a day  sodium chloride 0.45%. 1000milliLiter(s) IV Continuous <Continuous>  insulin lispro (HumaLOG) corrective regimen sliding scale  SubCutaneous every 6 hours  aspirin  chewable 81milliGRAM(s) Oral daily      ----------------------------------------------------------------------------------------  PHYSICAL EXAM  Constitutional - NAD, Comfortable  HEENT - NCAT, Left field cut  Neck - Supple, No limited ROM  Chest - CTA bilaterally, No wheeze, No rhonchi, No crackles  Cardiovascular - RRR, S1S2  Abdomen - BS+, Soft, NTND  Extremities - No C/C/E, No calf tenderness, + b/l venous stasis changes  Neurologic Exam -                    Cognitive - Awake, Alert, AAO to self, place, date, year, situation, names objects     Left pronator drift     Communication - Fluent, No dysarthria     Cranial Nerves - CN 2-12 intact grossly     Motor - No focal deficits                    LEFT    UE - ShAB 3/5, EF 4/5, EE 4/5, WE 4/5,  5/5                    RIGHT UE - ShAB 3/5, EF 4/5, EE 4/5, WE 4/5,  4/5                    LEFT    LE - HF 3/5, KE 3/5, DF 5/5, PF 5/5                    RIGHT LE - HF 3/5, KE 3/5, DF 5/5, PF 5/5        Sensory - Intact to LT     Reflexes - DTR Intact, No primitive reflexive     Coordination - FTN impaired b/l     OculoVestibular - No saccades, No nystagmus        Psychiatric - Mood stable, Affect WNL  ----------------------------------------------------------------------------------------  ASSESSMENT/PLAN    - PT - strength training, mobility, gait training  - OT - ADLs, balance  - ST - cont. dysphagia diet, advance as tolerated  - Precautions - Fall, aspiration  - ICDs per primary team for DVT ppx.   - Recommend ACUTE inpatient rehabilitation for the functional deficits consisting of 3 hours of therapy/day & 24 hour RN/daily PMR physician for comorbid medical management. Will continue to follow for ongoing rehab needs and recommendations. 76 RH M with PMH of DM admitted 12/31 c/o not feeling well at home with lethargy for last few days PTA and not eating/drinking with vomiting. Found to have severe Hyperglycemia, with a BG > 1100 and a venous pH of 6.9. His anion gap was 43. He was started on IV hydration and an insulin infusion for DKA. Surgery/wound care evaluated patient for foul smelling b/l groin/perineal/scrotal infection in b/l inguinal region, medial thighs, lower abdominal wall, scrotum and perineum. Appears to be Tinea cruris and not likely necrotizing soft tissue infection per surgery. CTH 12/31 for increased lethargy revealed acute/subacute small to moderate right occipital lobe infarct. Neurology evaluated patient and started on statin/ASA. MRI acute b/l supratentorial and infratentorial infarcts with mild petechial hemorrhage in right occipital lobe. Carotid dopplers and TTE WNL. Patients mentation gradually improving. JESUS MANUEL today per cards to r/o PFO. Dysphagia diet: puree with honey. ICDs for DVT ppx.       PAST MEDICAL & SURGICAL HISTORY  High cholesterol  Diabetes  Hypertension  S/P hip hemiarthroplasty  No significant past surgical history  Parkinson's Disease      SOCIAL HISTORY  [place]  to enter with a handrail.  Uses a cane for ambulation but poor historian for other levels of function.	    Smoking - Denied  EtOH - Denied   Drugs - Denied    FUNCTIONAL HISTORY    Independent with cane    CURRENT FUNCTIONAL STATUS  Gait - max asisst x 2 with RW from bed to chair  Bed mobility - max assist  Transfers - max assist      FAMILY HISTORY   No significant family history    REVIEW OF SYSTEMS  Constitutional - No fever, No weight loss, No fatigue, + polydipsia  HEENT - No eye pain, No visual disturbances, No difficulty hearing, No tinnitus, No vertigo, No neck pain  Respiratory - No cough, No wheezing, No shortness of breath  Cardiovascular - No chest pain, No palpitations  Gastrointestinal - No abdominal pain, No nausea, No vomiting, No diarrhea, No constipation  Genitourinary - No dysuria, + healy   Neurological - No headaches, No memory loss, No loss of strength, No numbness, No tremors  Skin - No itching, No rashes, No lesions   Endocrine - No temperature intolerance  Musculoskeletal - No joint pain, No joint swelling, No muscle pain  Psychiatric - No depression, No anxiety      RECENT LABS/IMAGING  CBC Full  -  ( 04 Jan 2017 07:28 )  WBC Count : 8.01 K/uL  Hemoglobin : 10.6 g/dL  Hematocrit : 31.7 %  Platelet Count - Automated : 154 K/uL  Mean Cell Volume : 94.3 fl  Mean Cell Hemoglobin : 31.5 pg  Mean Cell Hemoglobin Concentration : 33.4 g/dL  Auto Neutrophil # : x  Auto Lymphocyte # : x  Auto Monocyte # : x  Auto Eosinophil # : x  Auto Basophil # : x  Auto Neutrophil % : x  Auto Lymphocyte % : x  Auto Monocyte % : x  Auto Eosinophil % : x  Auto Basophil % : x    04 Jan 2017 05:26    144    |  104    |  37.0   ----------------------------<  120    3.4     |  27.0   |  1.06     Ca    7.9        04 Jan 2017 05:26  Mg     3.0       03 Jan 2017 04:21          VITALS  T(C): 37, Max: 37 (01-04 @ 11:00)  HR: 78 (74 - 89)  BP: 121/58 (109/59 - 176/79)  RR: 22 (9 - 22)  SpO2: 97% (94% - 99%)  Wt(kg): --    ALLERGIES  No Known Allergies      MEDICATIONS   atorvastatin 20milliGRAM(s) Oral at bedtime  timolol 0.5% Solution 1Drop(s) Both EYES daily  hydrALAZINE Injectable 10milliGRAM(s) IV Push every 4 hours PRN  dextrose 5%. 1000milliLiter(s) IV Continuous <Continuous>  dextrose Gel 1Dose(s) Oral once PRN  dextrose 50% Injectable 12.5Gram(s) IV Push once  dextrose 50% Injectable 25Gram(s) IV Push once  dextrose 50% Injectable 25Gram(s) IV Push once  glucagon  Injectable 1milliGRAM(s) IntraMuscular once PRN  nystatin Powder 1Application(s) Topical three times a day  insulin glargine Injectable (LANTUS) 20Unit(s) SubCutaneous at bedtime  carbidopa/levodopa  25/100 1Tablet(s) Oral three times a day  ondansetron Injectable 4milliGRAM(s) IV Push every 6 hours PRN  amLODIPine   Tablet 10milliGRAM(s) Oral daily  BACItracin   Ointment 1Application(s) Topical two times a day  sodium chloride 0.45%. 1000milliLiter(s) IV Continuous <Continuous>  insulin lispro (HumaLOG) corrective regimen sliding scale  SubCutaneous every 6 hours  aspirin  chewable 81milliGRAM(s) Oral daily      ----------------------------------------------------------------------------------------  PHYSICAL EXAM  Constitutional - NAD, Comfortable  HEENT - NCAT, Left field cut  Neck - Supple, No limited ROM  Chest - CTA bilaterally, No wheeze, No rhonchi, No crackles  Cardiovascular - RRR, S1S2  Abdomen - BS+, Soft, NTND  Extremities - No C/C/E, No calf tenderness, + b/l venous stasis changes  Neurologic Exam -                    Cognitive - Awake, Alert, AAO to self, place, date, year, situation, names objects     Left pronator drift     Communication - Fluent, No dysarthria     Cranial Nerves - CN 2-12 intact grossly     Motor - No focal deficits                    LEFT    UE - ShAB 3/5, EF 4/5, EE 4/5, WE 4/5,  5/5                    RIGHT UE - ShAB 3/5, EF 4/5, EE 4/5, WE 4/5,  4/5                    LEFT    LE - HF 3/5, KE 3/5, DF 5/5, PF 5/5                    RIGHT LE - HF 3/5, KE 3/5, DF 5/5, PF 5/5        Sensory - Intact to LT     Reflexes - DTR Intact, No primitive reflexive     Coordination - FTN impaired b/l     OculoVestibular - No saccades, No nystagmus        Psychiatric - Mood stable, Affect WNL  ----------------------------------------------------------------------------------------  ASSESSMENT/PLAN    - PT - strength training, mobility, gait training  - OT - ADLs, balance  - ST - cont. dysphagia diet, advance as tolerated  - Precautions - Fall, aspiration  - ICDs per primary team for DVT ppx.   - Recommend ACUTE inpatient rehabilitation for the functional deficits consisting of 3 hours of therapy/day & 24 hour RN/daily PMR physician for comorbid medical management. Will continue to follow for ongoing rehab needs and recommendations. 76 RH M with PMH of DM admitted 12/31 c/o not feeling well at home with lethargy for last few days PTA and not eating/drinking with vomiting. Found to have severe Hyperglycemia, with a BG > 1100 and a venous pH of 6.9. His anion gap was 43. He was started on IV hydration and an insulin infusion for DKA. Surgery/wound care evaluated patient for foul smelling b/l groin/perineal/scrotal infection in b/l inguinal region, medial thighs, lower abdominal wall, scrotum and perineum. Appears to be Tinea cruris and not likely necrotizing soft tissue infection per surgery. CTH 12/31 for increased lethargy revealed acute/subacute small to moderate right occipital lobe infarct. Neurology evaluated patient and started on statin/ASA. MRI acute b/l supratentorial and infratentorial infarcts with mild petechial hemorrhage in right occipital lobe. Carotid dopplers and TTE WNL. Patients mentation and blood sugars gradually improving. JESUS MANUEL today per cards to r/o PFO. Dysphagia diet: puree with honey. ICDs for DVT ppx.       PAST MEDICAL & SURGICAL HISTORY  High cholesterol  Diabetes  Hypertension  S/P hip hemiarthroplasty  No significant past surgical history  Parkinson's Disease      SOCIAL HISTORY  [place]  to enter with a handrail.  Uses a cane for ambulation.	    Smoking - Denied  EtOH - Denied   Drugs - Denied    FUNCTIONAL HISTORY    Independent with cane    CURRENT FUNCTIONAL STATUS  Gait - max asisst x 2 with RW from bed to chair  Bed mobility - max assist  Transfers - max assist      FAMILY HISTORY   No significant family history    REVIEW OF SYSTEMS  Constitutional - No fever, No weight loss, No fatigue, + polydipsia  HEENT - No eye pain, No visual disturbances, No difficulty hearing,  Respiratory - No cough, No wheezing, No shortness of breath  Cardiovascular - No chest pain, No palpitations  Gastrointestinal - No abdominal pain, No nausea, No vomiting, No diarrhea, No constipation  Genitourinary - No dysuria, + healy   Neurological - No headaches, No memory loss, No numbness,   Skin - No itching, No rashes, No lesions   Endocrine - No temperature intolerance  Musculoskeletal - No joint pain, No joint swelling, No muscle pain  Psychiatric - No depression, No anxiety      RECENT LABS/IMAGING  CBC Full  -  ( 04 Jan 2017 07:28 )  WBC Count : 8.01 K/uL  Hemoglobin : 10.6 g/dL  Hematocrit : 31.7 %  Platelet Count - Automated : 154 K/uL  Mean Cell Volume : 94.3 fl  Mean Cell Hemoglobin : 31.5 pg  Mean Cell Hemoglobin Concentration : 33.4 g/dL  Auto Neutrophil # : x  Auto Lymphocyte # : x  Auto Monocyte # : x  Auto Eosinophil # : x  Auto Basophil # : x  Auto Neutrophil % : x  Auto Lymphocyte % : x  Auto Monocyte % : x  Auto Eosinophil % : x  Auto Basophil % : x    04 Jan 2017 05:26    144    |  104    |  37.0   ----------------------------<  120    3.4     |  27.0   |  1.06     Ca    7.9        04 Jan 2017 05:26  Mg     3.0       03 Jan 2017 04:21          VITALS  T(C): 37, Max: 37 (01-04 @ 11:00)  HR: 78 (74 - 89)  BP: 121/58 (109/59 - 176/79)  RR: 22 (9 - 22)  SpO2: 97% (94% - 99%)    ALLERGIES  No Known Allergies      MEDICATIONS   atorvastatin 20milliGRAM(s) Oral at bedtime  timolol 0.5% Solution 1Drop(s) Both EYES daily  hydrALAZINE Injectable 10milliGRAM(s) IV Push every 4 hours PRN  dextrose 5%. 1000milliLiter(s) IV Continuous <Continuous>  dextrose Gel 1Dose(s) Oral once PRN  dextrose 50% Injectable 12.5Gram(s) IV Push once  dextrose 50% Injectable 25Gram(s) IV Push once  dextrose 50% Injectable 25Gram(s) IV Push once  glucagon  Injectable 1milliGRAM(s) IntraMuscular once PRN  nystatin Powder 1Application(s) Topical three times a day  insulin glargine Injectable (LANTUS) 20Unit(s) SubCutaneous at bedtime  carbidopa/levodopa  25/100 1Tablet(s) Oral three times a day  ondansetron Injectable 4milliGRAM(s) IV Push every 6 hours PRN  amLODIPine   Tablet 10milliGRAM(s) Oral daily  BACItracin   Ointment 1Application(s) Topical two times a day  sodium chloride 0.45%. 1000milliLiter(s) IV Continuous <Continuous>  insulin lispro (HumaLOG) corrective regimen sliding scale  SubCutaneous every 6 hours  aspirin  chewable 81milliGRAM(s) Oral daily      ----------------------------------------------------------------------------------------  PHYSICAL EXAM  Constitutional - NAD, Comfortable  HEENT - NCAT, Left field cut  Chest - CTA bilaterally, No wheeze, No rhonchi,  Cardiovascular - RRR, S1S2  Abdomen - BS+, Soft, NTND  Extremities - No C/C/E, No calf tenderness, + b/l venous stasis changes  Neurologic Exam -                    Cognitive - Awake, Alert, AAO to self, place, date, year, situation, names objects     Left pronator drift     Communication - Fluent, No dysarthria     Cranial Nerves - CN 2-12 intact grossly     Motor - No focal deficits                    LEFT    UE - ShAB 3/5, EF 4/5, EE 4/5, WE 4/5,  5/5                    RIGHT UE - ShAB 3/5, EF 4/5, EE 4/5, WE 4/5,  4/5                    LEFT    LE - HF 3/5, KE 3/5, DF 5/5, PF 5/5                    RIGHT LE - HF 3/5, KE 3/5, DF 5/5, PF 5/5        Sensory - Intact to LT     Reflexes - DTR Intact,      Coordination - FTN impaired b/l     Psychiatric - Mood stable, Affect WNL  ----------------------------------------------------------------------------------------  ASSESSMENT/PLAN    - PT - strength training, mobility, gait training  - OT - ADLs, balance  - ST - cont. dysphagia diet, advance as tolerated  - Precautions - Fall, aspiration  - ICDs per primary team for DVT ppx.   - Recommend ACUTE inpatient rehabilitation for the functional deficits consisting of 3 hours of therapy/day & 24 hour RN/daily PMR physician for comorbid medical management. Will continue to follow for ongoing rehab needs and recommendations. 76 RH M with PMH of DM admitted 12/31 c/o not feeling well at home with lethargy for last few days PTA and not eating/drinking with vomiting. Found to have severe Hyperglycemia, with a BG > 1100 and a venous pH of 6.9. His anion gap was 43. He was started on IV hydration and an insulin infusion for DKA. Surgery/wound care evaluated patient for foul smelling b/l groin/perineal/scrotal infection in b/l inguinal region, medial thighs, lower abdominal wall, scrotum and perineum. Appears to be Tinea cruris and not likely necrotizing soft tissue infection per surgery. CTH 12/31 for increased lethargy revealed acute/subacute small to moderate right occipital lobe infarct. Neurology evaluated patient and started on statin/ASA. MRI acute b/l supratentorial and infratentorial infarcts with mild petechial hemorrhage in right occipital lobe. Carotid dopplers and TTE WNL. Patients mentation and blood sugars gradually improving. JESUS MANUEL today per cards to r/o PFO. Dysphagia diet: puree with honey. ICDs for DVT ppx.       PAST MEDICAL & SURGICAL HISTORY  High cholesterol  Diabetes  Hypertension  S/P hip hemiarthroplasty  No significant past surgical history  Parkinson's Disease    Allergies: NKDA    SOCIAL HISTORY  [place]. [place]7 steps  to enter with a handrail.  Uses a cane for ambulation.	    Smoking - Denied  EtOH - Denied   Drugs - Denied    FUNCTIONAL HISTORY    Independent with cane    CURRENT FUNCTIONAL STATUS  Gait - max asisst x 2 with RW from bed to chair  Bed mobility - max assist  Transfers - max assist      FAMILY HISTORY   No significant family history    REVIEW OF SYSTEMS  Constitutional - No fever, No weight loss, No fatigue, + polydipsia  HEENT - No eye pain, No visual disturbances, No difficulty hearing,  Respiratory - No cough, No wheezing, No shortness of breath  Cardiovascular - No chest pain, No palpitations  Gastrointestinal - No abdominal pain, No nausea, No vomiting, No diarrhea, No constipation  Genitourinary - No dysuria, + healy   Neurological - No headaches, No memory loss, No numbness,   Skin - No itching, No rashes, No lesions   Endocrine - No temperature intolerance  Musculoskeletal - No joint pain, No joint swelling, No muscle pain  Psychiatric - No depression, No anxiety      RECENT LABS/IMAGING  CBC Full  -  ( 04 Jan 2017 07:28 )  WBC Count : 8.01 K/uL  Hemoglobin : 10.6 g/dL  Hematocrit : 31.7 %  Platelet Count - Automated : 154 K/uL  Mean Cell Volume : 94.3 fl  Mean Cell Hemoglobin : 31.5 pg  Mean Cell Hemoglobin Concentration : 33.4 g/dL  Auto Neutrophil # : x  Auto Lymphocyte # : x  Auto Monocyte # : x  Auto Eosinophil # : x  Auto Basophil # : x  Auto Neutrophil % : x  Auto Lymphocyte % : x  Auto Monocyte % : x  Auto Eosinophil % : x  Auto Basophil % : x    04 Jan 2017 05:26    144    |  104    |  37.0   ----------------------------<  120    3.4     |  27.0   |  1.06     Ca    7.9        04 Jan 2017 05:26  Mg     3.0       03 Jan 2017 04:21          VITALS  T(C): 37, Max: 37 (01-04 @ 11:00)  HR: 78 (74 - 89)  BP: 121/58 (109/59 - 176/79)  RR: 22 (9 - 22)  SpO2: 97% (94% - 99%)      MEDICATIONS   atorvastatin 20milliGRAM(s) Oral at bedtime  timolol 0.5% Solution 1Drop(s) Both EYES daily  hydrALAZINE Injectable 10milliGRAM(s) IV Push every 4 hours PRN  dextrose 5%. 1000milliLiter(s) IV Continuous <Continuous>  dextrose Gel 1Dose(s) Oral once PRN  dextrose 50% Injectable 12.5Gram(s) IV Push once  dextrose 50% Injectable 25Gram(s) IV Push once  dextrose 50% Injectable 25Gram(s) IV Push once  glucagon  Injectable 1milliGRAM(s) IntraMuscular once PRN  nystatin Powder 1Application(s) Topical three times a day  insulin glargine Injectable (LANTUS) 20Unit(s) SubCutaneous at bedtime  carbidopa/levodopa  25/100 1Tablet(s) Oral three times a day  ondansetron Injectable 4milliGRAM(s) IV Push every 6 hours PRN  amLODIPine   Tablet 10milliGRAM(s) Oral daily  BACItracin   Ointment 1Application(s) Topical two times a day  sodium chloride 0.45%. 1000milliLiter(s) IV Continuous <Continuous>  insulin lispro (HumaLOG) corrective regimen sliding scale  SubCutaneous every 6 hours  aspirin  chewable 81milliGRAM(s) Oral daily      ----------------------------------------------------------------------------------------  PHYSICAL EXAM  Constitutional - NAD, Comfortable  HEENT - NCAT, Left field cut  Chest - CTA bilaterally, No wheeze, No rhonchi,  Cardiovascular - RRR, S1S2  Abdomen - BS+, Soft, NTND  Extremities - No C/C/E, No calf tenderness, + b/l venous stasis changes  Neurologic Exam -                    Cognitive - Awake, Alert, AAO to self, place, date, year, situation, names objects     Left pronator drift     Communication - Fluent, No dysarthria     Cranial Nerves - CN 2-12 intact grossly     Motor - No focal deficits                    LEFT    UE - ShAB 3/5, EF 4/5, EE 4/5, WE 4/5,  5/5                    RIGHT UE - ShAB 3/5, EF 4/5, EE 4/5, WE 4/5,  4/5                    LEFT    LE - HF 3/5, KE 3/5, DF 5/5, PF 5/5                    RIGHT LE - HF 3/5, KE 3/5, DF 5/5, PF 5/5        Sensory - Intact to LT     Reflexes - DTR Intact,      Coordination - FTN impaired b/l     Psychiatric - Mood stable, Affect WNL  ----------------------------------------------------------------------------------------  ASSESSMENT/PLAN: 76  year old male admitted with DKA and finding of bilateral supratentorial and infratentorial strokes and w/u in progress:     - PT - strength training, mobility, gait training  - OT - ADLs, balance  - ST - cont. dysphagia diet, advance as tolerated  - Precautions - Fall, aspiration  - ICDs per primary team for DVT ppx.   - Recommend ACUTE inpatient rehabilitation for the functional deficits consisting of 3 hours of therapy/day & 24 hour RN/daily PMR physician for comorbid medical management. Will continue to follow for ongoing rehab needs and recommendations.

## 2017-01-04 NOTE — PROGRESS NOTE ADULT - SUBJECTIVE AND OBJECTIVE BOX
Internal Medicine Hospitalist - Dr. Rupesh HERRERA EDSTROM    [id number]    76y      Male    Patient is a 76y old  Male who presents with a chief complaint of not feeling well lethargy (03 Jan 2017 15:48)    HPI:  76M with Pmhx of DM. Not feeling well at home for last few days. Not eating/drinking with vomiting. brought to ER today with lethargy. Found to have severe Hyperglycemia, with a BG > 1100 and a venous pH of 6.9. His anion gap was 43. He was started on IY hydration and an insulin infusion for DKA. (31 Dec 2016 11:15)        INTERVAL HPI/ OVERNIGHT EVENTS: Patient is seen and examined, no new event overnight.     REVIEW OF SYSTEMS:    Denied fever, chills, abd. pain, nausea, vomiting, chest pain, SOB, headache, dizziness    PHYSICAL EXAM:    Vital Signs Last 24 Hrs  T(C): 37, Max: 37 (01-04 @ 11:00)  T(F): 98.6, Max: 98.6 (01-04 @ 11:00)  HR: 78 (74 - 89)  BP: 121/58 (109/59 - 176/79)  BP(mean): 84 (80 - 116)  RR: 22 (9 - 22)  SpO2: 97% (94% - 99%)    GENERAL: NAD  HEENT: EOMI  Neck: supple  CHEST/LUNG: Clear to percussion bilaterally; No wheezing  HEART: S1S2+ audible  ABDOMEN: Soft, Nontender, Nondistended; Bowel sounds present  EXTREMITIES:  no edema  CNS: AAO X 3  Psychiatry: normal mood    LABS:                        10.6   8.01  )-----------( 154      ( 04 Jan 2017 07:28 )             31.7     04 Jan 2017 05:26    144    |  104    |  37.0   ----------------------------<  120    3.4     |  27.0   |  1.06     Ca    7.9        04 Jan 2017 05:26  Mg     3.0       03 Jan 2017 04:21      MEDICATIONS  (STANDING):  atorvastatin 20milliGRAM(s) Oral at bedtime  timolol 0.5% Solution 1Drop(s) Both EYES daily  dextrose 5%. 1000milliLiter(s) IV Continuous <Continuous>  dextrose 50% Injectable 12.5Gram(s) IV Push once  dextrose 50% Injectable 25Gram(s) IV Push once  dextrose 50% Injectable 25Gram(s) IV Push once  nystatin Powder 1Application(s) Topical three times a day  insulin glargine Injectable (LANTUS) 20Unit(s) SubCutaneous at bedtime  carbidopa/levodopa  25/100 1Tablet(s) Oral three times a day  amLODIPine   Tablet 10milliGRAM(s) Oral daily  BACItracin   Ointment 1Application(s) Topical two times a day  sodium chloride 0.45%. 1000milliLiter(s) IV Continuous <Continuous>  insulin lispro (HumaLOG) corrective regimen sliding scale  SubCutaneous every 6 hours    MEDICATIONS  (PRN):  hydrALAZINE Injectable 10milliGRAM(s) IV Push every 4 hours PRN SBP>160 mm/hg  dextrose Gel 1Dose(s) Oral once PRN Blood Glucose LESS THAN 70 milliGRAM(s)/deciliter  glucagon  Injectable 1milliGRAM(s) IntraMuscular once PRN Glucose LESS THAN 70 milligrams/deciliter  ondansetron Injectable 4milliGRAM(s) IV Push every 6 hours PRN Nausea and/or Vomiting      RADIOLOGY & ADDITIONAL TEST  EXAM:  CT BRAIN                        PROCEDURE DATE:  01/04/2017   IMPRESSION:      Evolving right occipital lobe infarct with stable mild   petechial hemorrhage. No new hemorrhage Internal Medicine Hospitalist - Dr. Rupesh HERRERA EDSTROM    [id number]    76y      Male    Patient is a 76y old  Male who presents with a chief complaint of not feeling well lethargy (03 Jan 2017 15:48)    HPI:  76M with Pmhx of DM. Not feeling well at home for last few days. Not eating/drinking with vomiting. brought to ER today with lethargy. Found to have severe Hyperglycemia, with a BG > 1100 and a venous pH of 6.9. His anion gap was 43. He was started on IY hydration and an insulin infusion for DKA.    INTERVAL HPI/ OVERNIGHT EVENTS: Patient is seen and examined, pt is improving, more awake, answering questions appropriately, moving all 4 extremities, report generalized weakness     REVIEW OF SYSTEMS:    Denied fever, chills, abd. pain, nausea, vomiting, chest pain, SOB, headache, dizziness    PHYSICAL EXAM:    Vital Signs Last 24 Hrs  T(C): 37, Max: 37 (01-04 @ 11:00)  T(F): 98.6, Max: 98.6 (01-04 @ 11:00)  HR: 78 (74 - 89)  BP: 121/58 (109/59 - 176/79)  BP(mean): 84 (80 - 116)  RR: 22 (9 - 22)  SpO2: 97% (94% - 99%)    GENERAL: NAD  HEENT: EOMI  Neck: supple  CHEST/LUNG: Clear to percussion bilaterally; No wheezing  HEART: S1S2+ audible  ABDOMEN: Soft, Nontender, Nondistended; Bowel sounds present  EXTREMITIES:  no edema  CNS: Awake, oriented, moving all extremities   Psychiatry: normal mood    LABS:                        10.6   8.01  )-----------( 154      ( 04 Jan 2017 07:28 )             31.7     04 Jan 2017 05:26    144    |  104    |  37.0   ----------------------------<  120    3.4     |  27.0   |  1.06     Ca    7.9        04 Jan 2017 05:26  Mg     3.0       03 Jan 2017 04:21      MEDICATIONS  (STANDING):  atorvastatin 20milliGRAM(s) Oral at bedtime  timolol 0.5% Solution 1Drop(s) Both EYES daily  dextrose 5%. 1000milliLiter(s) IV Continuous <Continuous>  dextrose 50% Injectable 12.5Gram(s) IV Push once  dextrose 50% Injectable 25Gram(s) IV Push once  dextrose 50% Injectable 25Gram(s) IV Push once  nystatin Powder 1Application(s) Topical three times a day  insulin glargine Injectable (LANTUS) 20Unit(s) SubCutaneous at bedtime  carbidopa/levodopa  25/100 1Tablet(s) Oral three times a day  amLODIPine   Tablet 10milliGRAM(s) Oral daily  BACItracin   Ointment 1Application(s) Topical two times a day  sodium chloride 0.45%. 1000milliLiter(s) IV Continuous <Continuous>  insulin lispro (HumaLOG) corrective regimen sliding scale  SubCutaneous every 6 hours    MEDICATIONS  (PRN):  hydrALAZINE Injectable 10milliGRAM(s) IV Push every 4 hours PRN SBP>160 mm/hg  dextrose Gel 1Dose(s) Oral once PRN Blood Glucose LESS THAN 70 milliGRAM(s)/deciliter  glucagon  Injectable 1milliGRAM(s) IntraMuscular once PRN Glucose LESS THAN 70 milligrams/deciliter  ondansetron Injectable 4milliGRAM(s) IV Push every 6 hours PRN Nausea and/or Vomiting      RADIOLOGY & ADDITIONAL TEST  EXAM:  CT BRAIN                        PROCEDURE DATE:  01/04/2017   IMPRESSION:      Evolving right occipital lobe infarct with stable mild   petechial hemorrhage. No new hemorrhage

## 2017-01-05 NOTE — PROGRESS NOTE ADULT - ASSESSMENT
This is 76Y M with PMH of DM, HTN, admitted for DKA and AMS, admitted to ICU, started on insulin drip, anion gap closed, off insulin drip, on Lantus 20 units. He was also noted to foul smelling discharge from groin, seen bu surgery, looks fungal infection, started on Nystatin powder. MRI brain showed b/l stroke with mild petechial hemorrhage in right occipital lobe    A/P    > DKA due to Non compliant  FS stable, cont. Lantus 20 units plus q4h FS  A1C: 12  cont. pureed diet with honey  diabetic education consult appreciated    >Hypokalemia - supplement  f/u bmp, mag      >B/L stroke with mild petechial hemorrhage in right occipital lobe  appreciate Neurology input, MRI showed b/l stroke with petechial hemorrhage in occipital lobe  Repeat CT scan stable mild petechial hemorrhage, discussed with Dr. Vila, agree to restart baby aspirin  cont. statin  TTE normal, carotid doppler showed ?stenosis in distal intracranial stenosis, may need CTA head, defer to Neurology for further management  cardiology consulted for JESUS MANUEL to r/o PFO as patient has b/l stroke  PMR consult requested     >HTN - Stable  cont. amlodipine 10 mg daily, monitor BP  hydralazine prn if SBP is more than 160mmHg    >Groin fungal infection - improving  appreciate surgery input, cont. Nystatin    >Parkinson disease  cont. sinemet as per Neurology    >HENRIETTA - improving  cont. IVF, f/u BMP    >urinary retention  healy catheter for now     >DVT PPX  scd This is 76Y M with PMH of DM, HTN, admitted for DKA and AMS, admitted to ICU, started on insulin drip, anion gap closed, off insulin drip, on Lantus 20 units. He was also noted to foul smelling discharge from groin, seen bu surgery, looks fungal infection, started on Nystatin powder. MRI brain showed b/l stroke with mild petechial hemorrhage in right occipital lobe    A/P    > DKA due to Non compliant  noted to have hypoglycemic episode last night, FS - [id number], decrease Lantus 10 units plus sliding scale  A1C: 12  cont. pureed diet with honey, need speech reval as pt is more awake  diabetic education consult appreciated    >Hypokalemia - supplement, add potassium in IVF  f/u bmp, mag normal      >B/L stroke with mild petechial hemorrhage in right occipital lobe  appreciate Neurology input, MRI showed b/l stroke with petechial hemorrhage in occipital lobe  Repeat CT scan stable mild petechial hemorrhage, discussed with Dr. Vila, agree to restart baby aspirin  cont. statin  TTE normal, carotid doppler showed ?stenosis in distal intracranial stenosis, as per Neurology no need of CTA  cardiology consulted for JESUS MANUEL to r/o cardiac source of emboli  PMR consult requested     >HTN - Stable  cont. amlodipine 10 mg daily, renal function in improving, start lisinopril 10mg   hydralazine prn if SBP is more than 160mmHg    >Groin fungal infection - improving  appreciate surgery input, cont. Nystatin    >Parkinson disease  cont. sinemet as per Neurology    >HENRIETTA - improving  cont. IVF, f/u BMP    >urinary retention  healy catheter for now     >DVT PPX  scd

## 2017-01-05 NOTE — CONSULT NOTE ADULT - SUBJECTIVE AND OBJECTIVE BOX
Cardiology Consult    CHIEF COMPLAINT: Diabetes mellitus of other type with ketoacidosis without coma    HISTORY OF PRESENT ILLNESS: SHARON FELDMAN is a 76y old male with a history of essential hypertension,IDDM,hyperlipidemia,Parkinson's disease who presents with increased lethargy and glucose>1100 and noted to have DKA with hyperosmolar coma.He was seen by neurology and his CT scan of the head revealed an occipital stroke with petechial hemorrhage.    PROBLEM LIST:  Living accommodation issues  Stroke  Intravascular volume depletion  Hyperosmolar coma  Diabetes  Essential hypertension  Dehydration  Acute renal failure with tubular necrosis  Diabetic ketoacidosis with coma associated with type 1 diabetes mellitus  Altered mental status, unspecified    PAST MEDICAL HISTORY:  High cholesterol  Diabetes  Hypertension    PAST SURGICAL HISTORY:  S/P hip hemiarthroplasty  No significant past surgical history    MEDICATIONS  (STANDING):  atorvastatin 20milliGRAM(s) Oral at bedtime  timolol 0.5% Solution 1Drop(s) Both EYES daily  dextrose 5%. 1000milliLiter(s) IV Continuous <Continuous>  dextrose 50% Injectable 12.5Gram(s) IV Push once  dextrose 50% Injectable 25Gram(s) IV Push once  dextrose 50% Injectable 25Gram(s) IV Push once  nystatin Powder 1Application(s) Topical three times a day  carbidopa/levodopa  25/100 1Tablet(s) Oral three times a day  amLODIPine   Tablet 10milliGRAM(s) Oral daily  BACItracin   Ointment 1Application(s) Topical two times a day  insulin lispro (HumaLOG) corrective regimen sliding scale  SubCutaneous every 6 hours  aspirin  chewable 81milliGRAM(s) Oral daily  potassium chloride    Tablet ER 40milliEquivalent(s) Oral once  insulin glargine Injectable (LANTUS) 10Unit(s) SubCutaneous at bedtime  lisinopril 10milliGRAM(s) Oral daily  sodium chloride 0.45% with potassium chloride 20 mEq/L 1000milliLiter(s) IV Continuous <Continuous>    MEDICATIONS  (PRN):  hydrALAZINE Injectable 10milliGRAM(s) IV Push every 4 hours PRN SBP>160 mm/hg  dextrose Gel 1Dose(s) Oral once PRN Blood Glucose LESS THAN 70 milliGRAM(s)/deciliter  glucagon  Injectable 1milliGRAM(s) IntraMuscular once PRN Glucose LESS THAN 70 milligrams/deciliter  ondansetron Injectable 4milliGRAM(s) IV Push every 6 hours PRN Nausea and/or Vomiting    ALLERGIES:  No Known Allergies    SOCIAL HISTORY:  Tobacco: quit smoking 35 years ago  Alcohol: no  Drugs: no    FAMILY HISTORY:  No significant family history    REVIEW OF SYSTEMS:  Constitutional: had fatigue, no fevers/chills, no weight change  HEENT: has visual disturbances, no hearing loss  Cardiac: no chest pain, no palpitations, no orthopnea, no PND  Respiratory: no shortness of breath, no cough  GI: no abdominal pain, no blood in the stool, no N/V, no diarrhea  Urological: no dysuria, no hematuria  Hematological: no easy bruising or bleeding  Musculoskeletal: no joint pain, no back pain  Neurological: no headaches, no syncope  Psychiatric: no anxiety, no depression  Skin: no rashes    PHYSICAL EXAM:  I & Os for 24h ending  @ 07:00  =============================================  IN: 2520 ml / OUT: 1000 ml / NET: 1520 ml    I & Os for current day (as of  @ 13:10)  =============================================  IN: 0 ml / OUT: 50 ml / NET: -50 ml    T(C): 36.8, Max: 37 ( @ 19:00)  T(F): 98.2, Max: 98.6 ( @ 19:00)  HR: 78 (75 - 80)  BP: 143/57 (109/59 - 166/75)  RR: 18 (11 - 37)  SpO2: 96% (93% - 99%)  Wt(kg): --  Telemetry: sinus rhythm  General: comfortable, in NAD  HEENT: EOMI, normocephalic, atraumatic  Neck: no JVD, no carotid bruits  Heart: +S1S2, RRR, no murmurs, no rubs, no gallops  Lungs: clear to auscultation bilaterally, no wheezes, no rales, no rhonchi  Abdomen: soft, non-tender, non-distended, + bowel sounds  Extremities: no clubbing, no cyanosis, no edema  Vascular: 2+ dorsalis pedis pulses bilaterally  Neuro: A&O x3    EKG: sinus rhythm,NSTT    LABS:                              10.5   8.21  )-----------( 165      ( 2017 05:53 )             30.8     2017 05:53    143    |  105    |  28.0   ----------------------------<  71     3.2     |  28.0   |  0.97     Ca    7.7        2017 05:53  Mg     2.3       2017 05:53        RADIOLOGY & ADDITIONAL TESTS:    ASSESSMENT:  SHARON FELDMAN is a 76y old male with a history of IDDM,essential hypertension,Parkinson's disease,hyperlipidemia who presented with increased lethargy,DKA,hyperosmolar coma.He has subsequently been diagnosed with an occipital CVA.His echo revealed normal LV systolic function with no intracavitary thrombus noted.    Please permit me to suggest the followin. He will be scheduled for a JESUS MANUEL as per in house cardiology to definitively assess for a cardiac source of embolization  2.His K+ was repleted,maintain level>4.0  3.Continue treatment as per neurology  4.Will follow rhythm and BP which are currently controlled

## 2017-01-05 NOTE — PROGRESS NOTE ADULT - SUBJECTIVE AND OBJECTIVE BOX
Internal Medicine Hospitalist - Dr. Rupesh HERRERA EDSTROM    [id number]    76y      Male    Patient is a 76y old  Male who presents with a chief complaint of not feeling well lethargy (03 Jan 2017 15:48)    HPI:  76M with Pmhx of DM. Not feeling well at home for last few days. Not eating/drinking with vomiting. brought to ER today with lethargy. Found to have severe Hyperglycemia, with a BG > 1100 and a venous pH of 6.9. His anion gap was 43. He was started on IY hydration and an insulin infusion for DKA. (31 Dec 2016 11:15    INTERVAL HPI/ OVERNIGHT EVENTS: Patient is seen and examined, pt is improving, awake, oriented,     REVIEW OF SYSTEMS:    Denied fever, chills, abd. pain, nausea, vomiting, chest pain, SOB, headache, dizziness    PHYSICAL EXAM:    Vital Signs Last 24 Hrs  T(C): 36.8, Max: 37 (01-04 @ 19:00)  T(F): 98.2, Max: 98.6 (01-04 @ 19:00)  HR: 80 (75 - 81)  BP: 143/57 (109/59 - 166/75)  BP(mean): 82 (80 - 114)  RR: 17 (11 - 37)  SpO2: 96% (93% - 99%)    GENERAL: NAD  HEENT: EOMI  Neck: supple  CHEST/LUNG: Clear to percussion bilaterally; No wheezing  HEART: S1S2+ audible  ABDOMEN: Soft, Nontender, Nondistended; Bowel sounds present  EXTREMITIES:  no edema  CNS: AAO X 3  Psychiatry: normal mood    LABS:                        10.5   8.21  )-----------( 165      ( 05 Jan 2017 05:53 )             30.8     05 Jan 2017 05:53    143    |  105    |  28.0   ----------------------------<  71     3.2     |  28.0   |  0.97     Ca    7.7        05 Jan 2017 05:53  Mg     2.3       05 Jan 2017 05:53              MEDICATIONS  (STANDING):  atorvastatin 20milliGRAM(s) Oral at bedtime  timolol 0.5% Solution 1Drop(s) Both EYES daily  dextrose 5%. 1000milliLiter(s) IV Continuous <Continuous>  dextrose 50% Injectable 12.5Gram(s) IV Push once  dextrose 50% Injectable 25Gram(s) IV Push once  dextrose 50% Injectable 25Gram(s) IV Push once  nystatin Powder 1Application(s) Topical three times a day  carbidopa/levodopa  25/100 1Tablet(s) Oral three times a day  amLODIPine   Tablet 10milliGRAM(s) Oral daily  BACItracin   Ointment 1Application(s) Topical two times a day  sodium chloride 0.45%. 1000milliLiter(s) IV Continuous <Continuous>  insulin lispro (HumaLOG) corrective regimen sliding scale  SubCutaneous every 6 hours  aspirin  chewable 81milliGRAM(s) Oral daily  potassium chloride    Tablet ER 40milliEquivalent(s) Oral once  insulin glargine Injectable (LANTUS) 10Unit(s) SubCutaneous at bedtime    MEDICATIONS  (PRN):  hydrALAZINE Injectable 10milliGRAM(s) IV Push every 4 hours PRN SBP>160 mm/hg  dextrose Gel 1Dose(s) Oral once PRN Blood Glucose LESS THAN 70 milliGRAM(s)/deciliter  glucagon  Injectable 1milliGRAM(s) IntraMuscular once PRN Glucose LESS THAN 70 milligrams/deciliter  ondansetron Injectable 4milliGRAM(s) IV Push every 6 hours PRN Nausea and/or Vomiting      RADIOLOGY & ADDITIONAL TEST Internal Medicine Hospitalist - Dr. Rupesh HERRERA EDSTROM    [id number]    76y      Male    Patient is a 76y old  Male who presents with a chief complaint of not feeling well lethargy (03 Jan 2017 15:48)    HPI:  76M with Pmhx of DM. Not feeling well at home for last few days. Not eating/drinking with vomiting. brought to ER today with lethargy. Found to have severe Hyperglycemia, with a BG > 1100 and a venous pH of 6.9. His anion gap was 43. He was started on IY hydration and an insulin infusion for DKA. (31 Dec 2016 11:15    INTERVAL HPI/ OVERNIGHT EVENTS: Patient is seen and examined, pt is improving, awake, oriented, moving all 4 extremities, last night he had hypoglycemic episode. He is schedule fro JESUS MANUEL today.    REVIEW OF SYSTEMS:    Denied fever, chills, abd. pain, nausea, vomiting, chest pain, SOB, headache, dizziness    PHYSICAL EXAM:    Vital Signs Last 24 Hrs  T(C): 36.8, Max: 37 (01-04 @ 19:00)  T(F): 98.2, Max: 98.6 (01-04 @ 19:00)  HR: 80 (75 - 81)  BP: 143/57 (109/59 - 166/75)  BP(mean): 82 (80 - 114)  RR: 17 (11 - 37)  SpO2: 96% (93% - 99%)    GENERAL: NAD  HEENT: EOMI  Neck: supple  CHEST/LUNG: Clear to percussion bilaterally; No wheezing  HEART: S1S2+ audible  ABDOMEN: Soft, Nontender, Nondistended; Bowel sounds present  EXTREMITIES:  no edema  CNS: AAO X 3, moving all 4 extremities  Psychiatry: normal mood    LABS:                        10.5   8.21  )-----------( 165      ( 05 Jan 2017 05:53 )             30.8     05 Jan 2017 05:53    143    |  105    |  28.0   ----------------------------<  71     3.2     |  28.0   |  0.97     Ca    7.7        05 Jan 2017 05:53  Mg     2.3       05 Jan 2017 05:53              MEDICATIONS  (STANDING):  atorvastatin 20milliGRAM(s) Oral at bedtime  timolol 0.5% Solution 1Drop(s) Both EYES daily  dextrose 5%. 1000milliLiter(s) IV Continuous <Continuous>  dextrose 50% Injectable 12.5Gram(s) IV Push once  dextrose 50% Injectable 25Gram(s) IV Push once  dextrose 50% Injectable 25Gram(s) IV Push once  nystatin Powder 1Application(s) Topical three times a day  carbidopa/levodopa  25/100 1Tablet(s) Oral three times a day  amLODIPine   Tablet 10milliGRAM(s) Oral daily  BACItracin   Ointment 1Application(s) Topical two times a day  sodium chloride 0.45%. 1000milliLiter(s) IV Continuous <Continuous>  insulin lispro (HumaLOG) corrective regimen sliding scale  SubCutaneous every 6 hours  aspirin  chewable 81milliGRAM(s) Oral daily  potassium chloride    Tablet ER 40milliEquivalent(s) Oral once  insulin glargine Injectable (LANTUS) 10Unit(s) SubCutaneous at bedtime    MEDICATIONS  (PRN):  hydrALAZINE Injectable 10milliGRAM(s) IV Push every 4 hours PRN SBP>160 mm/hg  dextrose Gel 1Dose(s) Oral once PRN Blood Glucose LESS THAN 70 milliGRAM(s)/deciliter  glucagon  Injectable 1milliGRAM(s) IntraMuscular once PRN Glucose LESS THAN 70 milligrams/deciliter  ondansetron Injectable 4milliGRAM(s) IV Push every 6 hours PRN Nausea and/or Vomiting      RADIOLOGY & ADDITIONAL TEST

## 2017-01-06 NOTE — ADVANCED PRACTICE NURSE CONSULT - RECOMMEDATIONS
Wound care recommendation:  1. Clean the wounds at bilateral buttocks with normal saline and pat dry,   2. Apply 3M no sting barrier film to periwound skin,   3. Cover the wounds with allevyn foam dressing.   Wound care recommended daily. Wound care recommendation discussed with ILAN Valdes. Will continue to follow up with pt upon request.

## 2017-01-06 NOTE — ADVANCED PRACTICE NURSE CONSULT - ASSESSMENT
Pt is 77 y/o male, alert and oriented. Assessed pt's pelvic skin with RN Keisha. As per RN, pt is continent on stool, pt had healy bedside bag to divert the urine. Pt had multiple area of partial thickness skin loss on bilateral buttocks. Multiple areas of skin flap noted around the wound edge, as per RN, she received reported that pt had history of bullaes/blisters at bilateral buttocks. Periwound skin intact with no erythema. Periwound skin non tender to touch. The wounds at bilateral buttocks presented as skin tears vs. deroofed bulleas/blisters? Wounds were irregular in shapes. Wounds did not presented as pressure injury/ulcers. No sign and symptoms of infection noted. Please see flow sheet for detailed wound care assessment.

## 2017-01-06 NOTE — PROGRESS NOTE ADULT - SUBJECTIVE AND OBJECTIVE BOX
[facility]                                 [address], [address]                                  [address]                                        [place], [place], [address]                                             [phone]      Vital signs:  T(C): 36.9, Max: 37.7 (01-06 @ 00:18)  HR: 77 (77 - 90)  BP: 126/71 (107/77 - 145/68)  RR: 20 (15 - 22)  SpO2: 92% (92% - 99%)  Wt(kg): --    Exam:    No new complaints.  Awake and alert.  speech language intact  Pupils react.  Face symmetric smile and sensation  mild diffuse weakness  no drift  intact FT x 4 ext    b/l CVA on MRI  await JESUS MANUEL  no a/c for now due to heme on MRI    will follow with you    Valentin Vila MD PhD   [id number]

## 2017-01-06 NOTE — PROGRESS NOTE ADULT - SUBJECTIVE AND OBJECTIVE BOX
Cardiology Follow-up    SHARON FELDMAN was seen and examined. No events overnight. The patient denies any chest pain, SOB, palpitations, orthopnea, PND or abdominal pain.He is resting comfortably.    PROBLEM LIST:  Living accommodation issues  Stroke  Intravascular volume depletion  Hyperosmolar coma  Diabetes  Essential hypertension  Dehydration  Acute renal failure with tubular necrosis  Diabetic ketoacidosis with coma associated with type 1 diabetes mellitus  Altered mental status, unspecified    PAST MEDICAL HISTORY:  High cholesterol  Diabetes  Hypertension    PAST SURGICAL HISTORY:  S/P hip hemiarthroplasty  No significant past surgical history    MEDICATIONS  (STANDING):  atorvastatin 20milliGRAM(s) Oral at bedtime  timolol 0.5% Solution 1Drop(s) Both EYES daily  dextrose 5%. 1000milliLiter(s) IV Continuous <Continuous>  dextrose 50% Injectable 12.5Gram(s) IV Push once  dextrose 50% Injectable 25Gram(s) IV Push once  dextrose 50% Injectable 25Gram(s) IV Push once  nystatin Powder 1Application(s) Topical three times a day  carbidopa/levodopa  25/100 1Tablet(s) Oral three times a day  amLODIPine   Tablet 10milliGRAM(s) Oral daily  BACItracin   Ointment 1Application(s) Topical two times a day  insulin lispro (HumaLOG) corrective regimen sliding scale  SubCutaneous every 6 hours  aspirin  chewable 81milliGRAM(s) Oral daily  insulin glargine Injectable (LANTUS) 10Unit(s) SubCutaneous at bedtime  lisinopril 10milliGRAM(s) Oral daily  sodium chloride 0.45% with potassium chloride 20 mEq/L 1000milliLiter(s) IV Continuous <Continuous>    MEDICATIONS  (PRN):  hydrALAZINE Injectable 10milliGRAM(s) IV Push every 4 hours PRN SBP>160 mm/hg  dextrose Gel 1Dose(s) Oral once PRN Blood Glucose LESS THAN 70 milliGRAM(s)/deciliter  glucagon  Injectable 1milliGRAM(s) IntraMuscular once PRN Glucose LESS THAN 70 milligrams/deciliter  ondansetron Injectable 4milliGRAM(s) IV Push every 6 hours PRN Nausea and/or Vomiting    ALLERGIES:  No Known Allergies    PHYSICAL EXAM:  T(C): 37.2, Max: 37.7 ( @ 00:18)  T(F): 99, Max: 99.8 (01-06 @ 00:18)  HR: 81 (77 - 90)  BP: 137/69 (107/77 - 145/68)  RR: 20 (15 - 22)  SpO2: 96% (95% - 99%)  Wt(kg): --  I&O's Summary    Telemetry: sinus rhythmCardiology Follow-up    SHARON FELDMAN was seen and examined. No events overnight. The patient denies any chest pain, SOB, palpitations, orthopnea, PND or abdominal pain.    PROBLEM LIST:  Living accommodation issues  Stroke  Intravascular volume depletion  Hyperosmolar coma  Diabetes  Essential hypertension  Dehydration  Acute renal failure with tubular necrosis  Diabetic ketoacidosis with coma associated with type 1 diabetes mellitus  Altered mental status, unspecified    PAST MEDICAL HISTORY:  High cholesterol  Diabetes  Hypertension    PAST SURGICAL HISTORY:  S/P hip hemiarthroplasty  No significant past surgical history    MEDICATIONS  (STANDING):  atorvastatin 20milliGRAM(s) Oral at bedtime  timolol 0.5% Solution 1Drop(s) Both EYES daily  dextrose 5%. 1000milliLiter(s) IV Continuous <Continuous>  dextrose 50% Injectable 12.5Gram(s) IV Push once  dextrose 50% Injectable 25Gram(s) IV Push once  dextrose 50% Injectable 25Gram(s) IV Push once  nystatin Powder 1Application(s) Topical three times a day  carbidopa/levodopa  25/100 1Tablet(s) Oral three times a day  amLODIPine   Tablet 10milliGRAM(s) Oral daily  BACItracin   Ointment 1Application(s) Topical two times a day  insulin lispro (HumaLOG) corrective regimen sliding scale  SubCutaneous every 6 hours  aspirin  chewable 81milliGRAM(s) Oral daily  insulin glargine Injectable (LANTUS) 10Unit(s) SubCutaneous at bedtime  lisinopril 10milliGRAM(s) Oral daily  sodium chloride 0.45% with potassium chloride 20 mEq/L 1000milliLiter(s) IV Continuous <Continuous>    MEDICATIONS  (PRN):  hydrALAZINE Injectable 10milliGRAM(s) IV Push every 4 hours PRN SBP>160 mm/hg  dextrose Gel 1Dose(s) Oral once PRN Blood Glucose LESS THAN 70 milliGRAM(s)/deciliter  glucagon  Injectable 1milliGRAM(s) IntraMuscular once PRN Glucose LESS THAN 70 milligrams/deciliter  ondansetron Injectable 4milliGRAM(s) IV Push every 6 hours PRN Nausea and/or Vomiting    ALLERGIES:  No Known Allergies    PHYSICAL EXAM:  T(C): 37.2, Max: 37.7 ( @ 00:18)  T(F): 99, Max: 99.8 ( @ 00:18)  HR: 81 (77 - 90)  BP: 137/69 (107/77 - 145/68)  RR: 20 (15 - 22)  SpO2: 96% (95% - 99%)  Wt(kg): --  I&O's Summary    Telemetry: sinus rhythm  General: comfortable, in NAD  Heart: +S1S2, RRR, 2/6 systolic murmur at the LSB no rubs, no gallops  Lungs: clear to auscultation bilaterally, no wheezes, no rales, no rhonchi  Abdomen: soft, non-tender, non-distended, + bowel sounds  Extremities: no clubbing, no cyanosis, no edema  Neuro: A&O x3    LABS:                              10.5   8.21  )-----------( 165      ( 2017 05:53 )             30.8     2017 06:11    137    |  101    |  22.0   ----------------------------<  234    3.9     |  27.0   |  0.99     Ca    7.9        2017 06:11  Mg     2.3       2017 05:53        RADIOLOGY & ADDITIONAL TESTS:    ASSESSMENT:  SHARON FELDMAN is a 76y old male with a history of essential hypertension,hyperlipidemia,Parkinson's disease who presented with DKA,hyperosmolar coma.He was subsequently diagnosed with an occipital stroke.    Please permit me to suggest the followin. Awaiting JESUS MANUEL to assess for a cardiac source of embolization  2.Continue treatment as per neurology  3.Will follow prn  General: comfortable, in NAD  Heart: +S1S2, RRR, no murmurs, no rubs, no gallops  Lungs: clear to auscultation bilaterally, no wheezes, no rales, no rhonchi  Abdomen: soft, non-tender, non-distended, + bowel sounds  Extremities: no clubbing, no cyanosis, no edema  Neuro: A&O x3    LABS:                              10.5   8.21  )-----------( 165      ( 2017 05:53 )             30.8     2017 06:11    137    |  101    |  22.0   ----------------------------<  234    3.9     |  27.0   |  0.99     Ca    7.9        2017 06:11  Mg     2.3       2017 05:53        RADIOLOGY & ADDITIONAL TESTS:  ***  ASSESSMENT:  SHARON FELDMAN is a 76y old male with a history as described above who presented with ***.    Please permit me to suggest the followin.

## 2017-01-06 NOTE — PROGRESS NOTE ADULT - SUBJECTIVE AND OBJECTIVE BOX
Internal Medicine Hospitalist - Dr. Rupesh HERRERA EDSTROM    [id number]    76y      Male    Patient is a 76y old  Male who presents with a chief complaint of not feeling well lethargy (03 Jan 2017 15:48)    HPI:  76M with Pmhx of DM. Not feeling well at home for last few days. Not eating/drinking with vomiting. brought to ER today with lethargy. Found to have severe Hyperglycemia, with a BG > 1100 and a venous pH of 6.9. His anion gap was 43. He was started on IY hydration and an insulin infusion for DKA. (31 Dec 2016 11:15)    INTERVAL HPI/ OVERNIGHT EVENTS: Patient is seen and examined, JESUS MANUEL was cancelled yesterday as pt ate, reschedule for today. Pt report he is feeling better, denied chest pain, SOB    REVIEW OF SYSTEMS:    Denied fever, chills, abd. pain, nausea, vomiting, chest pain, SOB, headache, dizziness    PHYSICAL EXAM:    Vital Signs Last 24 Hrs  T(C): 37, Max: 37.7 (01-06 @ 00:18)  T(F): 98.6, Max: 99.8 (01-06 @ 00:18)  HR: 80 (77 - 90)  BP: 144/69 (107/77 - 145/68)  BP(mean): 86 (86 - 96)  RR: 19 (15 - 22)  SpO2: 96% (95% - 99%)    GENERAL: NAD  HEENT: EOMI  Neck: supple  CHEST/LUNG: Clear to percussion bilaterally; No wheezing  HEART: S1S2+ audible  ABDOMEN: Soft, Nontender, Nondistended; Bowel sounds present  EXTREMITIES:  no edema  CNS: AAO X 3, moving all 4 extremities  Psychiatry: normal mood    LABS:                        10.5   8.21  )-----------( 165      ( 05 Jan 2017 05:53 )             30.8     06 Jan 2017 06:11    137    |  101    |  22.0   ----------------------------<  234    3.9     |  27.0   |  0.99     Ca    7.9        06 Jan 2017 06:11  Mg     2.3       05 Jan 2017 05:53      MEDICATIONS  (STANDING):  atorvastatin 20milliGRAM(s) Oral at bedtime  timolol 0.5% Solution 1Drop(s) Both EYES daily  dextrose 5%. 1000milliLiter(s) IV Continuous <Continuous>  dextrose 50% Injectable 12.5Gram(s) IV Push once  dextrose 50% Injectable 25Gram(s) IV Push once  dextrose 50% Injectable 25Gram(s) IV Push once  nystatin Powder 1Application(s) Topical three times a day  carbidopa/levodopa  25/100 1Tablet(s) Oral three times a day  amLODIPine   Tablet 10milliGRAM(s) Oral daily  BACItracin   Ointment 1Application(s) Topical two times a day  insulin lispro (HumaLOG) corrective regimen sliding scale  SubCutaneous every 6 hours  aspirin  chewable 81milliGRAM(s) Oral daily  lisinopril 10milliGRAM(s) Oral daily  sodium chloride 0.45% with potassium chloride 20 mEq/L 1000milliLiter(s) IV Continuous <Continuous>  insulin glargine Injectable (LANTUS) 15Unit(s) SubCutaneous at bedtime    MEDICATIONS  (PRN):  hydrALAZINE Injectable 10milliGRAM(s) IV Push every 4 hours PRN SBP>160 mm/hg  dextrose Gel 1Dose(s) Oral once PRN Blood Glucose LESS THAN 70 milliGRAM(s)/deciliter  glucagon  Injectable 1milliGRAM(s) IntraMuscular once PRN Glucose LESS THAN 70 milligrams/deciliter  ondansetron Injectable 4milliGRAM(s) IV Push every 6 hours PRN Nausea and/or Vomiting      RADIOLOGY & ADDITIONAL TEST

## 2017-01-06 NOTE — PROGRESS NOTE ADULT - ASSESSMENT
This is 76Y M with PMH of DM, HTN, admitted for DKA and AMS, admitted to ICU, started on insulin drip, anion gap closed, off insulin drip, on Lantus 15 units. He was also noted to foul smelling discharge from groin, seen bu surgery, looks fungal infection, started on Nystatin powder, improving. MRI brain showed b/l stroke with mild petechial hemorrhage in right occipital lobe    A/P    > DKA due to Non compliant  FS was 224 this morning, increase Lantus 15 units plus sliding scale  A1C: 12  revaluated by jack bach with honey  diabetic education consult appreciated    >Hypokalemia - resolved    >B/L stroke with mild petechial hemorrhage in right occipital lobe  appreciate Neurology input, MRI showed b/l stroke with petechial hemorrhage in occipital lobe  Repeat CT scan stable mild petechial hemorrhage, discussed with Dr. Vila, agree to restart baby aspirin  cont. statin  TTE normal, carotid doppler showed ?stenosis in distal intracranial stenosis, as per Neurology no need of CTA  cardiology consulted for JESUS MANUEL to r/o cardiac source of emboli  PMR consult requested, may need acute rehab     >HTN - Stable  cont. amlodipine 10 mg daily and lisinopril 10mg   hydralazine prn if SBP is more than 160mmHg    >Groin fungal infection - improving  appreciate surgery input, cont. Nystatin    >Parkinson disease  cont. sinemet as per Neurology    >HENRIETTA - improving  cont. IVF, f/u BMP    >urinary retention  healy catheter for now     >DVT PPX  scd

## 2017-01-07 RX ORDER — LISINOPRIL 2.5 MG/1
10 TABLET ORAL DAILY
Qty: 0 | Refills: 0 | Status: DISCONTINUED | OUTPATIENT
Start: 2017-01-13 | End: 2017-01-18

## 2017-01-07 RX ORDER — ACETAMINOPHEN 500 MG
650 TABLET ORAL EVERY 6 HOURS
Qty: 0 | Refills: 0 | Status: DISCONTINUED | OUTPATIENT
Start: 2017-01-13 | End: 2017-02-02

## 2017-01-07 RX ORDER — AMLODIPINE BESYLATE 2.5 MG/1
10 TABLET ORAL DAILY
Qty: 0 | Refills: 0 | Status: DISCONTINUED | OUTPATIENT
Start: 2017-01-13 | End: 2017-02-02

## 2017-01-07 RX ORDER — CARBIDOPA AND LEVODOPA 25; 100 MG/1; MG/1
1 TABLET ORAL THREE TIMES A DAY
Qty: 0 | Refills: 0 | Status: DISCONTINUED | OUTPATIENT
Start: 2017-01-13 | End: 2017-02-02

## 2017-01-07 RX ORDER — INSULIN LISPRO 100/ML
VIAL (ML) SUBCUTANEOUS
Qty: 0 | Refills: 0 | Status: DISCONTINUED | OUTPATIENT
Start: 2017-01-13 | End: 2017-01-17

## 2017-01-07 RX ORDER — ASPIRIN/CALCIUM CARB/MAGNESIUM 324 MG
81 TABLET ORAL DAILY
Qty: 0 | Refills: 0 | Status: DISCONTINUED | OUTPATIENT
Start: 2017-01-13 | End: 2017-02-02

## 2017-01-07 RX ORDER — ATORVASTATIN CALCIUM 80 MG/1
20 TABLET, FILM COATED ORAL AT BEDTIME
Qty: 0 | Refills: 0 | Status: DISCONTINUED | OUTPATIENT
Start: 2017-01-13 | End: 2017-02-02

## 2017-01-07 RX ORDER — TIMOLOL 0.5 %
1 DROPS OPHTHALMIC (EYE) DAILY
Qty: 0 | Refills: 0 | Status: DISCONTINUED | OUTPATIENT
Start: 2017-01-13 | End: 2017-02-02

## 2017-01-07 NOTE — PROGRESS NOTE ADULT - SUBJECTIVE AND OBJECTIVE BOX
HPI:  76M with Pmhx of DM. Not feeling well at home for last few days. brought to ER today with lethargy. Found to have severe Hyperglycemia, with a BG > 1100 and a venous pH of 6.9. His anion gap was 43. He was started on IV hydration and an insulin infusion for DKA.     CC: no specific complaints today, low back pain 2/2 position    INTERVAL HPI/ OVERNIGHT EVENTS: Patient is seen and examined, JESUS MANUEL was cancelled yesterday as pt ate, reschedule for today.     REVIEW OF SYSTEMS:    Denied fever, chills, abd. pain, nausea, vomiting, chest pain, SOB, headache, dizziness    PHYSICAL EXAM:  Vital Signs Last 24 Hrs  T(C): 36.6, Max: 37.4 (01-07 @ 04:28)  T(F): 97.9, Max: 99.3 (01-07 @ 04:28)  HR: 78 (77 - 84)  BP: 132/62 (116/59 - 153/92)  BP(mean): 79 (77 - 100)  RR: 12 (12 - 20)  SpO2: 98% (92% - 98%)    GENERAL: NAD  HEENT: EOMI  Neck: supple  CHEST/LUNG: Clear to percussion bilaterally; No wheezing  HEART: S1S2+ audible  ABDOMEN: Soft, Nontender, Nondistended; Bowel sounds present  EXTREMITIES:  no edema  CNS: AAO X 3, moving all 4 extremities  Psychiatry: normal mood    LABS:                        10.5   8.21  )-----------( 165      ( 05 Jan 2017 05:53 )             30.8     06 Jan 2017 06:11    137    |  101    |  22.0   ----------------------------<  234    3.9     |  27.0   |  0.99     Ca    7.9        06 Jan 2017 06:11  Mg     2.3       05 Jan 2017 05:53      MEDICATIONS   reviewed    RADIOLOGY & ADDITIONAL TEST:  MRI brain and CT brain - reviewed  ECHO - reviewed  carotid duplex - reviewed

## 2017-01-07 NOTE — PROGRESS NOTE ADULT - ASSESSMENT
This is 76Y M with PMH of DM, HTN, admitted for DKA and AMS, admitted to ICU, started on insulin drip, anion gap closed, off insulin drip, on Lantus 15 units. He was also noted to foul smelling discharge from groin, seen bu surgery, looks fungal infection, started on Nystatin powder, improving. MRI brain showed b/l stroke with mild petechial hemorrhage in right occipital lobe    A/P    > DKA due to Non compliant  ct current regimen   A1C: 12  revaluated by jack bach with honey  diabetic education consult appreciated    >Hypokalemia - resolved    >B/L stroke with mild petechial hemorrhage in right occipital lobe  appreciate Neurology input, MRI showed b/l stroke with petechial hemorrhage in occipital lobe  Repeat CT scan stable mild petechial hemorrhage, discussed with Dr. Vila, agree to restart baby aspirin  cont. statin  TTE normal, carotid doppler showed ?stenosis in distal intracranial stenosis, as per Neurology no need of CTA  cardiology consulted for JESUS MANUEL to r/o cardiac source of emboli  PMR - acute rehab after JESUS MANUEL    >HTN - Stable  cont. amlodipine 10 mg daily and lisinopril 10mg   hydralazine prn if SBP is more than 160mmHg    >Groin fungal infection - improving  appreciate surgery input, cont. Nystatin    >Parkinson disease  cont. sinemet as per Neurology    >HENRIETTA - improving  cont. IVF, f/u BMP    >urinary retention  healy catheter for now     >DVT PPX  scd

## 2017-01-08 NOTE — PROGRESS NOTE ADULT - SUBJECTIVE AND OBJECTIVE BOX
[facility]                                 Julito Hill, [address] Tom                                  [address]                                        [place], [place], [address]                                             [phone]      Vital signs:  T(C): 37.3, Max: 37.4 (01-07 @ 23:39)  HR: 87 (77 - 87)  BP: 146/73 (129/63 - 168/67)  RR: 20 (14 - 24)  SpO2: 98% (94% - 98%)  Wt(kg): --    Exam:    No new complaints.  Awake and alert.  speech language intact  Pupils react.  Face symmetric smile and sensation  hearing symmetric  tongue ML  mild diffuse weakness  no drift  intact FT x 4 ext

## 2017-01-08 NOTE — PROGRESS NOTE ADULT - ASSESSMENT
This is 76Y M with PMH of DM, HTN, admitted for DKA and AMS, admitted to ICU, started on insulin drip, anion gap closed, off insulin drip, on Lantus 15 units. He was also noted to foul smelling discharge from groin, seen bu surgery, looks fungal infection, started on Nystatin powder, improving. MRI brain showed b/l stroke with mild petechial hemorrhage in right occipital lobe    A/P    > DKA due to Non compliant  Increase lantus   A1C: 12  revaluated by jack bach with honey  diabetic education consult appreciated    >Hypokalemia - resolved    >B/L stroke with mild petechial hemorrhage in right occipital lobe  appreciate Neurology input, MRI showed b/l stroke with petechial hemorrhage in occipital lobe  Repeat CT scan stable mild petechial hemorrhage, discussed with Dr. Vila, agree to restart baby aspirin  cont. statin  TTE normal, carotid doppler showed ?stenosis in distal intracranial stenosis, as per Neurology no need of CTA  cardiology consulted for JESUS MANUEL to r/o cardiac source of emboli  PMR - acute rehab after JESUS MANUEL    >HTN - Stable  cont. amlodipine 10 mg daily and lisinopril 10mg   hydralazine prn if SBP is more than 160mmHg    >Groin fungal infection - improving  appreciate surgery input, cont. Nystatin    >Parkinson disease  cont. sinemet as per Neurology    >HENRIETTA - improving  cont. IVF, f/u BMP    >urinary retention  healy catheter for now     >DVT PPX  scd

## 2017-01-08 NOTE — PROGRESS NOTE ADULT - SUBJECTIVE AND OBJECTIVE BOX
HPI:  76M with Pmhx of DM. Not feeling well at home for last few days. brought to ER today with lethargy. Found to have severe Hyperglycemia, with a BG > 1100 and a venous pH of 6.9. His anion gap was 43. He was started on IV hydration and an insulin infusion for DKA.     CC: no specific complaints today. doing well    INTERVAL HPI/ OVERNIGHT EVENTS: Patient is seen and examined    REVIEW OF SYSTEMS:    Denied fever, chills, abd. pain, nausea, vomiting, chest pain, SOB, headache, dizziness    PHYSICAL EXAM:  Vital Signs Last 24 Hrs  T(C): 37.1, Max: 37.4 (01-07 @ 23:39)  T(F): 98.7, Max: 99.3 (01-07 @ 23:39)  HR: 83 (71 - 83)  BP: 168/67 (112/61 - 168/67)  BP(mean): 92 (76 - 92)  RR: 21 (12 - 24)  SpO2: 95% (94% - 98%)    GENERAL: NAD  HEENT: EOMI  Neck: supple  CHEST/LUNG: Clear to percussion bilaterally; No wheezing  HEART: S1S2+ audible  ABDOMEN: Soft, Nontender, Nondistended; Bowel sounds present  EXTREMITIES:  no edema  CNS: AAO X 3, moving all 4 extremities  Psychiatry: normal mood    LABS:                        10.5   8.21  )-----------( 165      ( 05 Jan 2017 05:53 )             30.8     06 Jan 2017 06:11    137    |  101    |  22.0   ----------------------------<  234    3.9     |  27.0   |  0.99     Ca    7.9        06 Jan 2017 06:11  Mg     2.3       05 Jan 2017 05:53      MEDICATIONS   reviewed    RADIOLOGY & ADDITIONAL TEST:  MRI brain and CT brain - reviewed  ECHO - reviewed  carotid duplex - reviewed

## 2017-01-09 RX ORDER — HEPARIN SODIUM 5000 [USP'U]/ML
5000 INJECTION INTRAVENOUS; SUBCUTANEOUS EVERY 12 HOURS
Qty: 0 | Refills: 0 | Status: DISCONTINUED | OUTPATIENT
Start: 2017-01-13 | End: 2017-02-02

## 2017-01-09 RX ORDER — INSULIN GLARGINE 100 [IU]/ML
18 INJECTION, SOLUTION SUBCUTANEOUS AT BEDTIME
Qty: 0 | Refills: 0 | Status: DISCONTINUED | OUTPATIENT
Start: 2017-01-13 | End: 2017-01-17

## 2017-01-09 NOTE — PROGRESS NOTE ADULT - SUBJECTIVE AND OBJECTIVE BOX
[facility]                                                                     Julito Hill, [address] Tom                                                                      [address]                                                                           [place], [place], [address]                                                                                 [phone]                                                                           Neurology Progress Note      No new complaints.    Awake and alert, fully oriented  Speech, comprehension intact  Pupils =, reactive  Full visual fields and extraocular movements  Face symmetric.  Good strength bilaterally  No drift    Neuro status stable    Await JESUS MANUEL results

## 2017-01-09 NOTE — PROGRESS NOTE ADULT - SUBJECTIVE AND OBJECTIVE BOX
HPI:  76M with Pmhx of DM. Not feeling well at home for last few days. brought to ER today with lethargy. Found to have severe Hyperglycemia, with a BG > 1100 and a venous pH of 6.9. His anion gap was 43. He was started on IV hydration and an insulin infusion for DKA.     CC: no specific complaints today. waiting for JESUS MANUEL    INTERVAL HPI/ OVERNIGHT EVENTS: Patient is seen and examined    REVIEW OF SYSTEMS:    Denied fever, chills, abd. pain, nausea, vomiting, chest pain, SOB, headache, dizziness    PHYSICAL EXAM:  Vital Signs Last 24 Hrs  T(C): 36.9, Max: 37.3 (01-08 @ 15:12)  T(F): 98.5, Max: 99.1 (01-08 @ 15:12)  HR: 76 (70 - 87)  BP: 126/65 (126/65 - 164/69)  BP(mean): 83 (79 - 106)  RR: 18 (15 - 20)  SpO2: 97% (96% - 98%)    GENERAL: NAD  HEENT: EOMI  Neck: supple  CHEST/LUNG: Clear to percussion bilaterally; No wheezing  HEART: S1S2+ audible  ABDOMEN: Soft, Nontender, Nondistended; Bowel sounds present  EXTREMITIES:  no edema  CNS: AAO X 3, moving all 4 extremities  Psychiatry: normal mood             MEDICATIONS   reviewed    RADIOLOGY & ADDITIONAL TEST:  MRI brain and CT brain - reviewed  ECHO - reviewed  carotid duplex - reviewed

## 2017-01-09 NOTE — PROGRESS NOTE ADULT - ASSESSMENT
This is 76Y M with PMH of DM, HTN, admitted for DKA and AMS, admitted to ICU, started on insulin drip, anion gap closed, off insulin drip, on Lantus 15 units. He was also noted to foul smelling discharge from groin, seen bu surgery, looks fungal infection, started on Nystatin powder, improving. MRI brain showed b/l stroke with mild petechial hemorrhage in right occipital lobe    A/P    > DKA due to Non compliant  ct lantus   A1C: 12  revaluated by jack bach with honey  diabetic education consult appreciated    >Hypokalemia - resolved    >B/L stroke with mild petechial hemorrhage in right occipital lobe  appreciate Neurology input, MRI showed b/l stroke with petechial hemorrhage in occipital lobe  Repeat CT scan stable mild petechial hemorrhage, discussed with Dr. Vila, agree to restart baby aspirin  cont. statin  TTE normal, carotid doppler showed ?stenosis in distal intracranial stenosis, as per Neurology no need of CTA  cardiology consulted for JESUS MANUEL to r/o cardiac source of emboli  PMR - acute rehab after JESUS MANUEL    >HTN - Stable  cont. amlodipine 10 mg daily and lisinopril 10mg   hydralazine prn if SBP is more than 160mmHg    >Groin fungal infection - improving  appreciate surgery input, cont. Nystatin    >Parkinson disease  cont. sinemet as per Neurology    >HENRIETTA - improving  cont. IVF, f/u BMP    >urinary retention  healy catheter for now     >DVT PPX  scd

## 2017-01-09 NOTE — PROGRESS NOTE ADULT - SUBJECTIVE AND OBJECTIVE BOX
INTERVAL SUBJECTIVE & REVIEW OF SYMPTOMS:    Patient seen and examined. No overnight events. Patient stable with no new issues. Awaiting JESUS MANUEL (scheduled today) before coming to rehab pending results/further action on medical floor.       FUNCTIONAL PROGRESS:  Bed mobility:   Transfers:   Gait:   ADLs:     MEDICATIONS:  atorvastatin 20milliGRAM(s) Oral at bedtime  timolol 0.5% Solution 1Drop(s) Both EYES daily  hydrALAZINE Injectable 10milliGRAM(s) IV Push every 4 hours PRN  dextrose 5%. 1000milliLiter(s) IV Continuous <Continuous>  dextrose Gel 1Dose(s) Oral once PRN  dextrose 50% Injectable 12.5Gram(s) IV Push once  dextrose 50% Injectable 25Gram(s) IV Push once  dextrose 50% Injectable 25Gram(s) IV Push once  glucagon  Injectable 1milliGRAM(s) IntraMuscular once PRN  nystatin Powder 1Application(s) Topical three times a day  carbidopa/levodopa  25/100 1Tablet(s) Oral three times a day  ondansetron Injectable 4milliGRAM(s) IV Push every 6 hours PRN  amLODIPine   Tablet 10milliGRAM(s) Oral daily  BACItracin   Ointment 1Application(s) Topical two times a day  insulin lispro (HumaLOG) corrective regimen sliding scale  SubCutaneous every 6 hours  aspirin  chewable 81milliGRAM(s) Oral daily  lisinopril 10milliGRAM(s) Oral daily  heparin  Injectable 5000Unit(s) SubCutaneous every 12 hours  insulin glargine Injectable (LANTUS) 18Unit(s) SubCutaneous at bedtime  acetaminophen   Tablet. 650milliGRAM(s) Oral every 6 hours PRN      REVIEW OF SYSTEMS  [   ] Constitutional WNL  [   ] Cardio WNL  [   ] Resp WNL  [   ] GI WNL  [   ]  WNL  [   ] Heme WNL  [   ] Endo WNL  [   ] Skin WNL  [   ] MSK WNL  [   ] Neuro WNL  [   ] Cognitive WNL  [   ] Psych WNL    VITAL SIGNS  Vital Signs Last 24 Hrs  T(C): 36.9, Max: 37.3 (01-08 @ 15:12)  T(F): 98.5, Max: 99.1 (01-08 @ 15:12)  HR: 76 (70 - 87)  BP: 126/65 (126/65 - 164/69)  BP(mean): 83 (79 - 106)  RR: 18 (15 - 20)  SpO2: 97% (96% - 98%)    PHYSICAL EXAM  General:   HEENT:   Cardio:   Resp:   Abdomen:   Neuro:   Extrem:   Skin:   Functional Exam:     RECENT LABS:                RADIOLOGY/OTHER RESULTS: INTERVAL SUBJECTIVE & REVIEW OF SYMPTOMS:    Patient seen and examined. No overnight events. Patient stable with no new issues. Awaiting JESUS MANUEL (scheduled today) before coming to rehab pending results/further action on medical floor. Diet upgraded yesterday to mechanical soft with thin liquids.       FUNCTIONAL PROGRESS:  Bed mobility: max assist  Transfers: max assist  Gait: mod asisst x 2 with RW from bed to chair        MEDICATIONS:  atorvastatin 20milliGRAM(s) Oral at bedtime  timolol 0.5% Solution 1Drop(s) Both EYES daily  hydrALAZINE Injectable 10milliGRAM(s) IV Push every 4 hours PRN  dextrose 5%. 1000milliLiter(s) IV Continuous <Continuous>  dextrose Gel 1Dose(s) Oral once PRN  dextrose 50% Injectable 12.5Gram(s) IV Push once  dextrose 50% Injectable 25Gram(s) IV Push once  dextrose 50% Injectable 25Gram(s) IV Push once  glucagon  Injectable 1milliGRAM(s) IntraMuscular once PRN  nystatin Powder 1Application(s) Topical three times a day  carbidopa/levodopa  25/100 1Tablet(s) Oral three times a day  ondansetron Injectable 4milliGRAM(s) IV Push every 6 hours PRN  amLODIPine   Tablet 10milliGRAM(s) Oral daily  BACItracin   Ointment 1Application(s) Topical two times a day  insulin lispro (HumaLOG) corrective regimen sliding scale  SubCutaneous every 6 hours  aspirin  chewable 81milliGRAM(s) Oral daily  lisinopril 10milliGRAM(s) Oral daily  heparin  Injectable 5000Unit(s) SubCutaneous every 12 hours  insulin glargine Injectable (LANTUS) 18Unit(s) SubCutaneous at bedtime  acetaminophen   Tablet. 650milliGRAM(s) Oral every 6 hours PRN      REVIEW OF SYSTEMS  [ X] Constitutional WNL  [   ] Cardio WNL  [ X] Resp WNL  [ X] GI WNL  [   ]  WNL  [   ] Heme WNL  [   ] Endo WNL  [   ] Skin WNL  [ X] MSK WNL  [   ] Neuro WNL  [   ] Cognitive WNL  [ X] Psych WNL    VITAL SIGNS  Vital Signs Last 24 Hrs  T(C): 36.9, Max: 37.3 (01-08 @ 15:12)  T(F): 98.5, Max: 99.1 (01-08 @ 15:12)  HR: 76 (70 - 87)  BP: 126/65 (126/65 - 164/69)  BP(mean): 83 (79 - 106)  RR: 18 (15 - 20)  SpO2: 97% (96% - 98%)    PHYSICAL EXAM  General: AAO x 3, NAD, sitting up in chair  HEENT: NCAT  Cardio: +S1/S2  Resp: CTA b/l, no rales, rhonchi, wheezing  Abdomen: soft, NT/ND, +BS  Neuro: CN 2-12 grossly intact, sensation to LT intact throughout  Extrem: LE venous stasis changes b/l INTERVAL SUBJECTIVE & REVIEW OF SYMPTOMS:    Patient seen and examined. No overnight events. Patient stable with no new issues. Awaiting JESUS MANUEL (scheduled today) before coming to rehab pending results/further action on medical floor. Diet upgraded yesterday to mechanical soft with thin liquids.       FUNCTIONAL PROGRESS:  Bed mobility: max assist  Transfers: max assist  Gait: mod asisst x 2 with RW from bed to chair        MEDICATIONS:  atorvastatin 20milliGRAM(s) Oral at bedtime  timolol 0.5% Solution 1Drop(s) Both EYES daily  hydrALAZINE Injectable 10milliGRAM(s) IV Push every 4 hours PRN  dextrose 5%. 1000milliLiter(s) IV Continuous <Continuous>  dextrose Gel 1Dose(s) Oral once PRN  dextrose 50% Injectable 12.5Gram(s) IV Push once  dextrose 50% Injectable 25Gram(s) IV Push once  dextrose 50% Injectable 25Gram(s) IV Push once  glucagon  Injectable 1milliGRAM(s) IntraMuscular once PRN  nystatin Powder 1Application(s) Topical three times a day  carbidopa/levodopa  25/100 1Tablet(s) Oral three times a day  ondansetron Injectable 4milliGRAM(s) IV Push every 6 hours PRN  amLODIPine   Tablet 10milliGRAM(s) Oral daily  BACItracin   Ointment 1Application(s) Topical two times a day  insulin lispro (HumaLOG) corrective regimen sliding scale  SubCutaneous every 6 hours  aspirin  chewable 81milliGRAM(s) Oral daily  lisinopril 10milliGRAM(s) Oral daily  heparin  Injectable 5000Unit(s) SubCutaneous every 12 hours  insulin glargine Injectable (LANTUS) 18Unit(s) SubCutaneous at bedtime  acetaminophen   Tablet. 650milliGRAM(s) Oral every 6 hours PRN      REVIEW OF SYSTEMS  [ X] Constitutional WNL  [   ] Cardio WNL  [ X] Resp WNL  [ X] GI WNL  [   ]  WNL  [   ] Heme WNL  [   ] Endo WNL  [   ] Skin WNL  [ X] MSK WNL  [   ] Neuro WNL  [   ] Cognitive WNL  [ X] Psych WNL    VITAL SIGNS  Vital Signs Last 24 Hrs  T(C): 36.9, Max: 37.3 (01-08 @ 15:12)  T(F): 98.5, Max: 99.1 (01-08 @ 15:12)  HR: 76 (70 - 87)  BP: 126/65 (126/65 - 164/69)  BP(mean): 83 (79 - 106)  RR: 18 (15 - 20)  SpO2: 97% (96% - 98%)    PHYSICAL EXAM  General: AAO x 3, NAD, sitting up in chair  HEENT: NCAT  Cardio: +S1/S2  Resp: CTA b/l, no rales, rhonchi, wheezing  Abdomen: soft, NT/ND, +BS  Neuro: CN 2-12 grossly intact, sensation to LT intact throughout  Extrem: LE venous stasis changes b/l      ASSESSMENT/PLAN: 76  year old male admitted with DKA and finding of 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 dysphagia diet, advance as tolerated  - Precautions - Fall, aspiration  - ICDs per primary team for DVT ppx.   - ACUTE inpatient rehabilitation for the functional deficits consisting of 3 hours of therapy/day & 24 hour RN/daily PMR physician for comorbid medical management pending JESUS MANUEL and further work-up by medical team. INTERVAL SUBJECTIVE & REVIEW OF SYMPTOMS:    Patient seen and examined. No overnight events. Patient stable with no new issues. Awaiting JESUS MANUEL (scheduled today) before coming to rehab pending results/further action on medical floor. Diet upgraded yesterday to mechanical soft with thin liquids. Callie d/c'ed.       FUNCTIONAL PROGRESS:  Bed mobility: max assist  Transfers: max assist  Gait: mod asisst x 2 with RW from bed to chair        MEDICATIONS:  atorvastatin 20milliGRAM(s) Oral at bedtime  timolol 0.5% Solution 1Drop(s) Both EYES daily  hydrALAZINE Injectable 10milliGRAM(s) IV Push every 4 hours PRN  dextrose 5%. 1000milliLiter(s) IV Continuous <Continuous>  dextrose Gel 1Dose(s) Oral once PRN  dextrose 50% Injectable 12.5Gram(s) IV Push once  dextrose 50% Injectable 25Gram(s) IV Push once  dextrose 50% Injectable 25Gram(s) IV Push once  glucagon  Injectable 1milliGRAM(s) IntraMuscular once PRN  nystatin Powder 1Application(s) Topical three times a day  carbidopa/levodopa  25/100 1Tablet(s) Oral three times a day  ondansetron Injectable 4milliGRAM(s) IV Push every 6 hours PRN  amLODIPine   Tablet 10milliGRAM(s) Oral daily  BACItracin   Ointment 1Application(s) Topical two times a day  insulin lispro (HumaLOG) corrective regimen sliding scale  SubCutaneous every 6 hours  aspirin  chewable 81milliGRAM(s) Oral daily  lisinopril 10milliGRAM(s) Oral daily  heparin  Injectable 5000Unit(s) SubCutaneous every 12 hours  insulin glargine Injectable (LANTUS) 18Unit(s) SubCutaneous at bedtime  acetaminophen   Tablet. 650milliGRAM(s) Oral every 6 hours PRN      REVIEW OF SYSTEMS  [ X] Constitutional WNL  [   ] Cardio WNL  [ X] Resp WNL  [ X] GI WNL  [   ]  WNL  [   ] Heme WNL  [   ] Endo WNL  [   ] Skin WNL  [ X] MSK WNL  [   ] Neuro WNL  [   ] Cognitive WNL  [ X] Psych WNL    VITAL SIGNS  Vital Signs Last 24 Hrs  T(C): 36.9, Max: 37.3 (01-08 @ 15:12)  T(F): 98.5, Max: 99.1 (01-08 @ 15:12)  HR: 76 (70 - 87)  BP: 126/65 (126/65 - 164/69)  BP(mean): 83 (79 - 106)  RR: 18 (15 - 20)  SpO2: 97% (96% - 98%)    PHYSICAL EXAM  General: AAO x 3, NAD, sitting up in chair  HEENT: NCAT  Cardio: +S1/S2  Resp: CTA b/l, no rales, rhonchi, wheezing  Abdomen: soft, NT/ND, +BS  Neuro: CN 2-12 grossly intact, sensation to LT intact throughout  Extrem: LE venous stasis changes b/l      ASSESSMENT/PLAN: 76  year old male admitted with DKA and finding of 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 dysphagia diet, advance as tolerated  - Precautions - Fall, aspiration  - ICDs per primary team for DVT ppx.   - ACUTE inpatient rehabilitation for the functional deficits consisting of 3 hours of therapy/day & 24 hour RN/daily PMR physician for comorbid medical management pending JESUS MANUEL and further work-up by medical team. INTERVAL SUBJECTIVE & REVIEW OF SYMPTOMS:    Patient seen and examined. No overnight events. Patient stable with no new issues. Awaiting JESUS MANUEL (scheduled today) before coming to rehab pending results/further action on medical floor. Diet upgraded yesterday to mechanical soft with thin liquids. Callie d/c'ed.       FUNCTIONAL PROGRESS:  Bed mobility: max assist  Transfers: max assist  Gait: mod asisst x 2 with RW from bed to chair        MEDICATIONS:  MEDICATIONS  (STANDING):  atorvastatin 20milliGRAM(s) Oral at bedtime  timolol 0.5% Solution 1Drop(s) Both EYES daily  dextrose 5%. 1000milliLiter(s) IV Continuous <Continuous>  dextrose 50% Injectable 12.5Gram(s) IV Push once  dextrose 50% Injectable 25Gram(s) IV Push once  dextrose 50% Injectable 25Gram(s) IV Push once  nystatin Powder 1Application(s) Topical three times a day  carbidopa/levodopa  25/100 1Tablet(s) Oral three times a day  amLODIPine   Tablet 10milliGRAM(s) Oral daily  BACItracin   Ointment 1Application(s) Topical two times a day  insulin lispro (HumaLOG) corrective regimen sliding scale  SubCutaneous every 6 hours  aspirin  chewable 81milliGRAM(s) Oral daily  lisinopril 10milliGRAM(s) Oral daily  heparin  Injectable 5000Unit(s) SubCutaneous every 12 hours  insulin glargine Injectable (LANTUS) 18Unit(s) SubCutaneous at bedtime    MEDICATIONS  (PRN):  hydrALAZINE Injectable 10milliGRAM(s) IV Push every 4 hours PRN SBP>160 mm/hg  dextrose Gel 1Dose(s) Oral once PRN Blood Glucose LESS THAN 70 milliGRAM(s)/deciliter  glucagon  Injectable 1milliGRAM(s) IntraMuscular once PRN Glucose LESS THAN 70 milligrams/deciliter  ondansetron Injectable 4milliGRAM(s) IV Push every 6 hours PRN Nausea and/or Vomiting  acetaminophen   Tablet. 650milliGRAM(s) Oral every 6 hours PRN Headache      REVIEW OF SYSTEMS  [ X] Constitutional WNL  [   ] Cardio WNL  [ X] Resp WNL  [ X] GI WNL  [   ]  WNL  [   ] Heme WNL  [   ] Endo WNL  [   ] Skin WNL  [ X] MSK WNL  [   ] Neuro WNL  [   ] Cognitive WNL  [ X] Psych WNL    VITAL SIGNS  Vital Signs Last 24 Hrs  T(C): 36.9, Max: 37.3 (01-08 @ 15:12)  T(F): 98.5, Max: 99.1 (01-08 @ 15:12)  HR: 76 (70 - 87)  BP: 126/65 (126/65 - 164/69)  BP(mean): 83 (79 - 106)  RR: 18 (15 - 20)  SpO2: 97% (96% - 98%)    PHYSICAL EXAM  General: AAO x 3, NAD, sitting up in chair  HEENT: NCAT  Cardio: +S1/S2  Resp: CTA b/l, no rales, rhonchi, wheezing  Abdomsoft, ND, +BS  Neuro: No facial droop, Motor LE 2/5 proximally, 4/5 distally. 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 dysphagia diet, advance as tolerated  - Precautions - Fall, aspiration  - ICDs per primary team for DVT ppx.   - ACUTE inpatient rehabilitation for the functional deficits consisting of 3 hours of therapy/day & 24 hour RN/daily PMR physician for comorbid medical management pending JESUS MANUEL and further work-up by medical team.

## 2017-01-10 NOTE — PROGRESS NOTE ADULT - SUBJECTIVE AND OBJECTIVE BOX
Cardiology Follow-up    SHARON FELDMAN was seen and examined. No events overnight. The patient denies any chest pain, SOB, palpitations, orthopnea, PND or abdominal pain.    PROBLEM LIST:  Living accommodation issues  Stroke  Intravascular volume depletion  Hyperosmolar coma  Diabetes  Essential hypertension  Dehydration  Acute renal failure with tubular necrosis  Diabetic ketoacidosis with coma associated with type 1 diabetes mellitus  Altered mental status, unspecified    PAST MEDICAL HISTORY:  High cholesterol  Diabetes  Hypertension    PAST SURGICAL HISTORY:  S/P hip hemiarthroplasty  No significant past surgical history    MEDICATIONS  (STANDING):  atorvastatin 20milliGRAM(s) Oral at bedtime  timolol 0.5% Solution 1Drop(s) Both EYES daily  dextrose 5%. 1000milliLiter(s) IV Continuous <Continuous>  dextrose 50% Injectable 12.5Gram(s) IV Push once  dextrose 50% Injectable 25Gram(s) IV Push once  dextrose 50% Injectable 25Gram(s) IV Push once  nystatin Powder 1Application(s) Topical three times a day  carbidopa/levodopa  25/100 1Tablet(s) Oral three times a day  amLODIPine   Tablet 10milliGRAM(s) Oral daily  BACItracin   Ointment 1Application(s) Topical two times a day  insulin lispro (HumaLOG) corrective regimen sliding scale  SubCutaneous every 6 hours  aspirin  chewable 81milliGRAM(s) Oral daily  lisinopril 10milliGRAM(s) Oral daily  heparin  Injectable 5000Unit(s) SubCutaneous every 12 hours  insulin glargine Injectable (LANTUS) 18Unit(s) SubCutaneous at bedtime    MEDICATIONS  (PRN):  hydrALAZINE Injectable 10milliGRAM(s) IV Push every 4 hours PRN SBP>160 mm/hg  dextrose Gel 1Dose(s) Oral once PRN Blood Glucose LESS THAN 70 milliGRAM(s)/deciliter  glucagon  Injectable 1milliGRAM(s) IntraMuscular once PRN Glucose LESS THAN 70 milligrams/deciliter  ondansetron Injectable 4milliGRAM(s) IV Push every 6 hours PRN Nausea and/or Vomiting  acetaminophen   Tablet. 650milliGRAM(s) Oral every 6 hours PRN Headache    ALLERGIES:  No Known Allergies    PHYSICAL EXAM:  T(C): 37.2, Max: 37.7 (01-10 @ 00:12)  T(F): 99, Max: 99.9 (01-10 @ 00:12)  HR: 84 (78 - 94)  BP: 130/61 (113/59 - 149/67)  RR: 21 (18 - 22)  SpO2: 96% (92% - 98%)  Wt(kg): --  I&O's Summary    Telemetry: ***  General: comfortable, in NAD  Heart: +S1S2, RRR, no murmurs, no rubs, no gallops  Lungs: clear to auscultation bilaterally, no wheezes, no rales, no rhonchi  Abdomen: soft, non-tender, non-distended, + bowel sounds  Extremities: no clubbing, no cyanosis, no edema  Neuro: A&O x3    LABS:        CBC:                9.8    7.15  >-----------< 192     @ 08:03               29.7       CHEMISTRY:   140   |  104    |  16.0   --------------------------------< --       @ 08:03    4.0    |  26.0   |  0.84     eGFR non-  86     eGFR   100     137   |  101    |  22.0   --------------------------------< --       @ 06:11    3.9    |  27.0   |  0.99     eGFR non-  75     eGFR   87           URINALYSIS: ( @ 11:28)  Color Yellow / pH 5.0   / Sp Gr 1.020 / LE <<16 / Nit Negative / Prot 500   / Blood Moderate / Uro Negative / WBC 6-10  / RBC --    / Bacteria Few      RADIOLOGY & ADDITIONAL TESTS:  JESUS MANUEL:   1. No cardiac mass, vegetations or thrombus visualized.    2. The left atrium is normal in size.   3. No left atrial or left atrial appendage thrombus visualized.   Prominent left atrial appendage and normal left atrial appendage   velocities.   4. Large patent foramen ovale, with bidirectional shunting across atrial   septum.   5. Interatrial septal aneurym with PFO. Tunnel length 1.3 cm, PFO size   or diameter 0.58 cm. There is a 0.5 cm aortic rim.   6. There is mild concentric left ventricular hypertrophy.   7. Left ventricular ejection fraction, by visual estimation, is 55 to   60%. Grade I diastolic dysfunction.   8. The right atrium is normal in size.   9. Normal right ventricular size and function.  10. No significant valvular abnormality.  11. There is no evidence of pericardial effusion.      ASSESSMENT:  SHARON FELDMAN is a 76y old male with a history of *** who presented with an acute embolic stroke.    Please permit me to suggest the followin. The patient was found to have a large PFO and atrial septal aneurysm on JESUS MANUEL. Cardiology Follow-up    SHARON FELDMAN was seen and examined. No events overnight. The patient denies any chest pain, SOB, palpitations, orthopnea, PND or abdominal pain.    PROBLEM LIST:  Living accommodation issues  Stroke  Intravascular volume depletion  Hyperosmolar coma  Diabetes  Essential hypertension  Dehydration  Acute renal failure with tubular necrosis  Diabetic ketoacidosis with coma associated with type 1 diabetes mellitus  Altered mental status, unspecified    PAST MEDICAL HISTORY:  High cholesterol  Diabetes  Hypertension    PAST SURGICAL HISTORY:  S/P hip hemiarthroplasty  No significant past surgical history    MEDICATIONS  (STANDING):  atorvastatin 20milliGRAM(s) Oral at bedtime  timolol 0.5% Solution 1Drop(s) Both EYES daily  dextrose 5%. 1000milliLiter(s) IV Continuous <Continuous>  dextrose 50% Injectable 12.5Gram(s) IV Push once  dextrose 50% Injectable 25Gram(s) IV Push once  dextrose 50% Injectable 25Gram(s) IV Push once  nystatin Powder 1Application(s) Topical three times a day  carbidopa/levodopa  25/100 1Tablet(s) Oral three times a day  amLODIPine   Tablet 10milliGRAM(s) Oral daily  BACItracin   Ointment 1Application(s) Topical two times a day  insulin lispro (HumaLOG) corrective regimen sliding scale  SubCutaneous every 6 hours  aspirin  chewable 81milliGRAM(s) Oral daily  lisinopril 10milliGRAM(s) Oral daily  heparin  Injectable 5000Unit(s) SubCutaneous every 12 hours  insulin glargine Injectable (LANTUS) 18Unit(s) SubCutaneous at bedtime    MEDICATIONS  (PRN):  hydrALAZINE Injectable 10milliGRAM(s) IV Push every 4 hours PRN SBP>160 mm/hg  dextrose Gel 1Dose(s) Oral once PRN Blood Glucose LESS THAN 70 milliGRAM(s)/deciliter  glucagon  Injectable 1milliGRAM(s) IntraMuscular once PRN Glucose LESS THAN 70 milligrams/deciliter  ondansetron Injectable 4milliGRAM(s) IV Push every 6 hours PRN Nausea and/or Vomiting  acetaminophen   Tablet. 650milliGRAM(s) Oral every 6 hours PRN Headache    ALLERGIES:  No Known Allergies    PHYSICAL EXAM:  T(C): 37.2, Max: 37.7 (01-10 @ 00:12)  T(F): 99, Max: 99.9 (01-10 @ 00:12)  HR: 84 (78 - 94)  BP: 130/61 (113/59 - 149/67)  RR: 21 (18 - 22)  SpO2: 96% (92% - 98%)  Wt(kg): --  I&O's Summary    Telemetry: Sinus, HR 70s-90s  General: comfortable, in NAD  Heart: +S1S2, RRR, no murmurs, no rubs, no gallops  Lungs: clear to auscultation bilaterally, no wheezes, no rales, no rhonchi  Abdomen: soft, non-tender, non-distended, + bowel sounds  Extremities: no clubbing, no cyanosis, no edema  Neuro: A&O x3    LABS:        CBC:                9.8    7.15  >-----------< 192     @ 08:03               29.7       CHEMISTRY:   140   |  104    |  16.0   --------------------------------< --       @ 08:03    4.0    |  26.0   |  0.84     eGFR non-  86     eGFR   100     137   |  101    |  22.0   --------------------------------< --       @ 06:11    3.9    |  27.0   |  0.99     eGFR non-  75     eGFR   87           URINALYSIS: ( @ 11:28)  Color Yellow / pH 5.0   / Sp Gr 1.020 / LE <<16 / Nit Negative / Prot 500   / Blood Moderate / Uro Negative / WBC 6-10  / RBC --    / Bacteria Few      RADIOLOGY & ADDITIONAL TESTS:  JESUS MANUEL:   1. No cardiac mass, vegetations or thrombus visualized.    2. The left atrium is normal in size.   3. No left atrial or left atrial appendage thrombus visualized.   Prominent left atrial appendage and normal left atrial appendage   velocities.   4. Large patent foramen ovale, with bidirectional shunting across atrial   septum.   5. Interatrial septal aneurym with PFO. Tunnel length 1.3 cm, PFO size   or diameter 0.58 cm. There is a 0.5 cm aortic rim.   6. There is mild concentric left ventricular hypertrophy.   7. Left ventricular ejection fraction, by visual estimation, is 55 to   60%. Grade I diastolic dysfunction.   8. The right atrium is normal in size.   9. Normal right ventricular size and function.  10. No significant valvular abnormality.  11. There is no evidence of pericardial effusion.      ASSESSMENT:  SHARON FELDMAN is a 76y old male with a history of IDDM with ophthalmic complications, essential hypertension and pure hypercholesterolemia who presented with an acute embolic stroke.    Please permit me to suggest the followin. The patient was found to have a large PFO and atrial septal aneurysm on JESUS MANUEL. Cardiology Follow-up    SHARON FELDMAN was seen and examined. No events overnight. The patient denies any chest pain, SOB, palpitations, orthopnea, PND or abdominal pain.    PROBLEM LIST:  Living accommodation issues  Stroke  Intravascular volume depletion  Hyperosmolar coma  Diabetes  Essential hypertension  Dehydration  Acute renal failure with tubular necrosis  Diabetic ketoacidosis with coma associated with type 1 diabetes mellitus  Altered mental status, unspecified    PAST MEDICAL HISTORY:  High cholesterol  Diabetes  Hypertension    PAST SURGICAL HISTORY:  S/P hip hemiarthroplasty  No significant past surgical history    MEDICATIONS  (STANDING):  atorvastatin 20milliGRAM(s) Oral at bedtime  timolol 0.5% Solution 1Drop(s) Both EYES daily  dextrose 5%. 1000milliLiter(s) IV Continuous <Continuous>  dextrose 50% Injectable 12.5Gram(s) IV Push once  dextrose 50% Injectable 25Gram(s) IV Push once  dextrose 50% Injectable 25Gram(s) IV Push once  nystatin Powder 1Application(s) Topical three times a day  carbidopa/levodopa  25/100 1Tablet(s) Oral three times a day  amLODIPine   Tablet 10milliGRAM(s) Oral daily  BACItracin   Ointment 1Application(s) Topical two times a day  insulin lispro (HumaLOG) corrective regimen sliding scale  SubCutaneous every 6 hours  aspirin  chewable 81milliGRAM(s) Oral daily  lisinopril 10milliGRAM(s) Oral daily  heparin  Injectable 5000Unit(s) SubCutaneous every 12 hours  insulin glargine Injectable (LANTUS) 18Unit(s) SubCutaneous at bedtime    MEDICATIONS  (PRN):  hydrALAZINE Injectable 10milliGRAM(s) IV Push every 4 hours PRN SBP>160 mm/hg  dextrose Gel 1Dose(s) Oral once PRN Blood Glucose LESS THAN 70 milliGRAM(s)/deciliter  glucagon  Injectable 1milliGRAM(s) IntraMuscular once PRN Glucose LESS THAN 70 milligrams/deciliter  ondansetron Injectable 4milliGRAM(s) IV Push every 6 hours PRN Nausea and/or Vomiting  acetaminophen   Tablet. 650milliGRAM(s) Oral every 6 hours PRN Headache    ALLERGIES:  No Known Allergies    PHYSICAL EXAM:  T(C): 37.2, Max: 37.7 (01-10 @ 00:12)  T(F): 99, Max: 99.9 (01-10 @ 00:12)  HR: 84 (78 - 94)  BP: 130/61 (113/59 - 149/67)  RR: 21 (18 - 22)  SpO2: 96% (92% - 98%)  Wt(kg): --  I&O's Summary    Telemetry: Sinus, HR 70s-90s  General: comfortable, in NAD  Heart: +S1S2, RRR, 1/6 systolic murmur at the right 2nd ICS and LLSB, no rubs, no gallops  Lungs: clear to auscultation bilaterally, no wheezes, no rales, no rhonchi  Abdomen: soft, non-tender, non-distended, + bowel sounds  Extremities: no clubbing, no cyanosis, trace RLE and 1+ LLE edema  Neuro: A&O x3    LABS:    CBC:                9.8    7.15  >-----------< 192     @ 08:03               29.7       CHEMISTRY:   140   |  104    |  16.0   --------------------------------< --       @ 08:03    4.0    |  26.0   |  0.84     eGFR non-  86     eGFR   100     137   |  101    |  22.0   --------------------------------< --       @ 06:11    3.9    |  27.0   |  0.99     eGFR non-  75     eGFR   87           URINALYSIS: ( @ 11:28)  Color Yellow / pH 5.0   / Sp Gr 1.020 / LE <<16 / Nit Negative / Prot 500   / Blood Moderate / Uro Negative / WBC 6-10  / RBC --    / Bacteria Few      RADIOLOGY & ADDITIONAL TESTS:    JESUS MANUEL:   1. No cardiac mass, vegetations or thrombus visualized.    2. The left atrium is normal in size.   3. No left atrial or left atrial appendage thrombus visualized.   Prominent left atrial appendage and normal left atrial appendage   velocities.   4. Large patent foramen ovale, with bidirectional shunting across atrial   septum.   5. Interatrial septal aneurym with PFO. Tunnel length 1.3 cm, PFO size   or diameter 0.58 cm. There is a 0.5 cm aortic rim.   6. There is mild concentric left ventricular hypertrophy.   7. Left ventricular ejection fraction, by visual estimation, is 55 to   60%. Grade I diastolic dysfunction.   8. The right atrium is normal in size.   9. Normal right ventricular size and function.  10. No significant valvular abnormality.  11. There is no evidence of pericardial effusion.      ASSESSMENT:  SHARON FELDMAN is a 76y old male with a history of IDDM with ophthalmic complications, essential hypertension and pure hypercholesterolemia who presented with an acute embolic stroke.    Please permit me to suggest the followin. The patient was found to have a large PFO and atrial septal aneurysm on JESUS MANUEL. I will call Dr. Joya regarding the possibility of PFO closure. In the meantime, he has asymmetric edema so I will order a venous duplex to rule out DVT. If this is positive, he should be on full dose anticoagulation and closure would only be necessary if he has another event on anticoagulation. If negative, closure would theoretically be an option although I believe that he should ideally fail aspirin and then aspirin + plavix. He was not even on aspirin prior to admission when he had his stroke.  2. Venous duplex to rule out DVT.

## 2017-01-10 NOTE — PROGRESS NOTE ADULT - ASSESSMENT
The patient is a 76y Male with bilateral CVAs and large PFO.     Further plan per cardiology.   ? Closure prior to transfer to rehab.

## 2017-01-10 NOTE — PROGRESS NOTE ADULT - SUBJECTIVE AND OBJECTIVE BOX
HPI:  76M with Pmhx of DM. Not feeling well at home for last few days. brought to ER today with lethargy. Found to have severe Hyperglycemia, with a BG > 1100 and a venous pH of 6.9. His anion gap was 43. He was started on IV hydration and an insulin infusion for DKA.     CC: no specific complaints today. feels well    INTERVAL HPI/ OVERNIGHT EVENTS: Patient is seen and examined    REVIEW OF SYSTEMS:    Denied fever, chills, abd. pain, nausea, vomiting, chest pain, SOB, headache, dizziness    PHYSICAL EXAM:  Vital Signs Last 24 Hrs  T(C): 37.2, Max: 37.7 (01-10 @ 00:12)  T(F): 99, Max: 99.9 (01-10 @ 00:12)  HR: 88 (78 - 94)  BP: 140/68 (113/59 - 154/74)  BP(mean): 90 (74 - 96)  RR: 22 (18 - 24)  SpO2: 98% (92% - 98%)    GENERAL: NAD  HEENT: EOMI  Neck: supple  CHEST/LUNG: Clear to percussion bilaterally; No wheezing  HEART: S1S2+ audible  ABDOMEN: Soft, Nontender, Nondistended; Bowel sounds present  EXTREMITIES:  no edema  CNS: AAO X 3, moving all 4 extremities  Psychiatry: normal mood             MEDICATIONS   reviewed    RADIOLOGY & ADDITIONAL TEST:  MRI brain and CT brain - reviewed  ECHO - reviewed  carotid duplex - reviewed

## 2017-01-10 NOTE — PROGRESS NOTE ADULT - ASSESSMENT
This is 76Y M with PMH of DM, HTN, admitted for DKA and AMS, admitted to ICU, started on insulin drip, anion gap closed, off insulin drip, on Lantus 15 units. He was also noted to foul smelling discharge from groin, seen bu surgery, looks fungal infection, started on Nystatin powder, improving. MRI brain showed b/l stroke with mild petechial hemorrhage in right occipital lobe    A/P    > DKA due to Non compliant  ct lantus   A1C: 12  revaluated by jack bach with honey  diabetic education consult appreciated    >Hypokalemia - resolved    >B/L stroke with mild petechial hemorrhage in right occipital lobe  appreciate Neurology input, MRI showed b/l stroke with petechial hemorrhage in occipital lobe  Repeat CT scan stable mild petechial hemorrhage, discussed with Dr. Vila, agree to restart baby aspirin  cont. statin  TTE normal, carotid doppler showed ?stenosis in distal intracranial stenosis, as per Neurology no need of CTA  JESUS MANUEL - Large PFO.   await final cardio/interventional recs. ?AC    >HTN - Stable  cont. amlodipine 10 mg daily and lisinopril 10mg   hydralazine prn if SBP is more than 160mmHg    >Groin fungal infection - improving  appreciate surgery input, cont. Nystatin    >Parkinson disease  cont. sinemet as per Neurology    >HENRIETTA - improving  cont. IVF, f/u BMP    >urinary retention  healy catheter for now     >DVT PPX  scd

## 2017-01-10 NOTE — PROGRESS NOTE ADULT - SUBJECTIVE AND OBJECTIVE BOX
I discussed the case with Dr. Joya, who reviewed the patient's JESUS MANUEL images. Although percutaneous PFO closure would be feasible, the patient would have to be on aspirin and plavix for 6 weeks following closure. I don't feel that baby aspirin is sufficient for preventing a future stroke. I will therefore add plavix. He has a history of frequent falls secondary to Parkinson's. Therefore, I don't believe that full dose anticoagulation is a good option. Should he fail aspirin and plavix, I would recommend PFO closure at that time. After discharge, I will order a 30 day Cardionet monitor to look for AFib as the cause of stroke.    I will sign off at this time. Please call if any further questions or concerns.

## 2017-01-10 NOTE — PROGRESS NOTE ADULT - SUBJECTIVE AND OBJECTIVE BOX
[facility]                                 [address]                                  [address]                                        [place], [place], [address]                                             [phone]        No new complaints.    Awake and alert.  Pupils react.  Face symmetric.  symmetric strength although diffusely weak.    JESUS MANUEL reports large PFO with bidirectional shunting.

## 2017-01-11 PROCEDURE — 37191 INS ENDOVAS VENA CAVA FILTR: CPT

## 2017-01-11 NOTE — PROGRESS NOTE ADULT - ASSESSMENT
Imp:  CVA likely cause is DVT/PFO  will send for IVC filter as he is poor anticoagulation candidate  discussed with patient and Christine Shannon (IM), Prasanth (Cards) and David (IR)    will follow with you    Valentin Vila MD PhD   [id number] Imp:  CVA likely cause is DVT/PFO  will send for IVC filter as he is poor anticoagulation candidate  discussed with patient and Christine Shannon (IM), Prasanth (Cards) and David (IR)    will follow with you    Valentin Vila MD PhD   [id number]    Addendum:    He is unsafe at this time for anticoagulation b/c of risk of falls from Parkinson's disease and recent ICH (from CVA)  Valentin Vila MD, PhD   [id number]

## 2017-01-11 NOTE — PROGRESS NOTE ADULT - SUBJECTIVE AND OBJECTIVE BOX
[facility]                                 [address]                                  [address]                                        [place], [place], [address]                                             [phone]      Vital signs:  T(C): 36.8, Max: 36.9 (01-10 @ 20:00)  HR: 79 (74 - 88)  BP: 136/60 (122/69 - 142/64)  RR: 18 (17 - 22)  SpO2: 98% (98% - 99%)  Wt(kg): --    Exam:    No new complaints.  Awake and alert.  speech language intact  Pupils react.  left HH  Face symmetric smile and sensation  hearing symmetric  tongue ML  mild diffuse weakness t/o  no drift  intact FT x 4 ext    JESUS MANUEL showed large PFO with bidirectional shunting  LE duplex shows acute DVT    He is not safe for long term anticoagualtion    I will arrange for IVC filter to protect from future stroke due to DVT/PFO.  will keep ASA for CVA prophylaxis, will d/c plavix

## 2017-01-11 NOTE — PROGRESS NOTE ADULT - SUBJECTIVE AND OBJECTIVE BOX
HPI:  76M with Pmhx of DM. Not feeling well at home for last few days. brought to ER today with lethargy. Found to have severe Hyperglycemia, with a BG > 1100 and a venous pH of 6.9. His anion gap was 43. He was started on IV hydration and an insulin infusion for DKA. some improvement - transferred out of ICU . CT brain s/o infarcts. MRI brain showed acute b/l infarcts. JESUS MANUEL - large PFO, and doppler s/o acute LL DVT    CC: no specific complaints today. c/o urinary burning    INTERVAL HPI/ OVERNIGHT EVENTS: Patient is seen and examined    REVIEW OF SYSTEMS:    Denied fever, chills, abd. pain, nausea, vomiting, chest pain, SOB, headache, dizziness    PHYSICAL EXAM:  Vital Signs Last 24 Hrs  T(C): 36.8, Max: 36.9 (01-10 @ 20:00)  T(F): 98.2, Max: 98.5 (01-10 @ 20:00)  HR: 79 (74 - 88)  BP: 136/60 (122/69 - 142/64)  BP(mean): 85 (84 - 90)  RR: 18 (17 - 22)  SpO2: 98% (98% - 99%)    GENERAL: NAD  HEENT: EOMI  Neck: supple  CHEST/LUNG: Clear to percussion bilaterally; No wheezing  HEART: S1S2+ audible  ABDOMEN: Soft, Nontender, Nondistended; Bowel sounds present  EXTREMITIES:  no edema  CNS: AAO X 3, moving all 4 extremities  Psychiatry: normal mood             MEDICATIONS   reviewed    RADIOLOGY & ADDITIONAL TEST:  MRI brain and CT brain - reviewed  ECHO - reviewed  carotid duplex - reviewed

## 2017-01-11 NOTE — PROGRESS NOTE ADULT - SUBJECTIVE AND OBJECTIVE BOX
Cardiology Follow-up    SHARON FELDMAN was seen and examined. No events overnight. The patient denies any chest pain, SOB, palpitations, orthopnea, PND or abdominal pain.    PROBLEM LIST:  Living accommodation issues  Stroke  Intravascular volume depletion  Hyperosmolar coma  Diabetes  Essential hypertension  Dehydration  Acute renal failure with tubular necrosis  Diabetic ketoacidosis with coma associated with type 1 diabetes mellitus  Altered mental status, unspecified    PAST MEDICAL HISTORY:  High cholesterol  Diabetes  Hypertension    PAST SURGICAL HISTORY:  S/P hip hemiarthroplasty  No significant past surgical history    MEDICATIONS  (STANDING):  atorvastatin 20milliGRAM(s) Oral at bedtime  timolol 0.5% Solution 1Drop(s) Both EYES daily  dextrose 5%. 1000milliLiter(s) IV Continuous <Continuous>  dextrose 50% Injectable 12.5Gram(s) IV Push once  dextrose 50% Injectable 25Gram(s) IV Push once  dextrose 50% Injectable 25Gram(s) IV Push once  nystatin Powder 1Application(s) Topical three times a day  carbidopa/levodopa  25/100 1Tablet(s) Oral three times a day  amLODIPine   Tablet 10milliGRAM(s) Oral daily  BACItracin   Ointment 1Application(s) Topical two times a day  insulin lispro (HumaLOG) corrective regimen sliding scale  SubCutaneous every 6 hours  aspirin  chewable 81milliGRAM(s) Oral daily  lisinopril 10milliGRAM(s) Oral daily  heparin  Injectable 5000Unit(s) SubCutaneous every 12 hours  insulin glargine Injectable (LANTUS) 18Unit(s) SubCutaneous at bedtime    MEDICATIONS  (PRN):  hydrALAZINE Injectable 10milliGRAM(s) IV Push every 4 hours PRN SBP>160 mm/hg  dextrose Gel 1Dose(s) Oral once PRN Blood Glucose LESS THAN 70 milliGRAM(s)/deciliter  glucagon  Injectable 1milliGRAM(s) IntraMuscular once PRN Glucose LESS THAN 70 milligrams/deciliter  ondansetron Injectable 4milliGRAM(s) IV Push every 6 hours PRN Nausea and/or Vomiting  acetaminophen   Tablet. 650milliGRAM(s) Oral every 6 hours PRN Headache    ALLERGIES:  No Known Allergies    PHYSICAL EXAM:  T(C): 36.8, Max: 36.9 (01-10 @ 20:00)  T(F): 98.2, Max: 98.5 (01-10 @ 20:00)  HR: 79 (74 - 88)  BP: 136/60 (122/69 - 142/64)  RR: 18 (17 - 22)  SpO2: 98% (98% - 99%)  Wt(kg): --  I&O's Summary    Telemetry: Sinus, HR 70s-90s, APC  General: comfortable, in NAD  Heart: +S1S2, RRR, 1/6 systolic murmur at the right 2nd ICS and LLSB, no rubs, no gallops  Lungs: clear to auscultation bilaterally, no wheezes, no rales, no rhonchi  Abdomen: soft, non-tender, non-distended, + bowel sounds  Extremities: no clubbing, no cyanosis, trace RLE and 1+ LLE edema  Neuro: A&O x3      LABS:        CBC:                10.2   10.34 >-----------< 257     @ 06:46               30.8                   9.8    7.15  >-----------< 192     @ 08:03               29.7       CHEMISTRY:   134   |  97     |  15.0   --------------------------------< --       @ 06:46    3.8    |  25.0   |  0.95     eGFR non-  79     eGFR   91      140   |  104    |  16.0   --------------------------------< --       @ 08:03    4.0    |  26.0   |  0.84     eGFR non-  86     eGFR   100          URINALYSIS: ( @ 11:28)  Color Yellow / pH 5.0   / Sp Gr 1.020 / LE <<16 / Nit Negative / Prot 500   / Blood Moderate / Uro Negative / WBC 6-10  / RBC --    / Bacteria Few      RADIOLOGY & ADDITIONAL TESTS:  JESUS MANUEL:   1. No cardiac mass, vegetations or thrombus visualized.    2. The left atrium is normal in size.   3. No left atrial or left atrial appendage thrombus visualized.   Prominent left atrial appendage and normal left atrial appendage   velocities.   4. Large patent foramen ovale, with bidirectional shunting across atrial   septum.   5. Interatrial septal aneurym with PFO. Tunnel length 1.3 cm, PFO size   or diameter 0.58 cm. There is a 0.5 cm aortic rim.   6. There is mild concentric left ventricular hypertrophy.   7. Left ventricular ejection fraction, by visual estimation, is 55 to   60%. Grade I diastolic dysfunction.   8. The right atrium is normal in size.   9. Normal right ventricular size and function.  10. No significant valvular abnormality.  11. There is no evidence of pericardial effusion.    Venous duplex:   Acute deep vein thrombosis below the knee in the right lower   extremity.       ASSESSMENT:  complications, essential hypertension and pure hypercholesterolemia who presented with an acute embolic stroke and was found to have a large PFO and atrial septal aneurysm and a DVT.    Please permit me to suggest the followin. Given the PFO and the newly diagnosed DVT, I suspect that his initial stroke was secondary to a paradoxical embolus. The patient has Parkinson's and has had multiple falls so he is not a good candidate for full anticoagulation. I agree with placement of an IVC filter. No need for plavix but I would continue aspirin 81 mg daily.   2. I spoke with Dr. Joya yesterday, who reviewed the patient's JESUS MANUEL images. Although PFO closure would be feasible, I would only do this if he has another TIA or CVA despite having an IVC filter.  3. I will follow as needed.  Discussed with Dr. Vila and Dr. Shannon. Cardiology Follow-up    SHARON FELDMAN was seen and examined. No events overnight. The patient denies any chest pain, SOB, palpitations, orthopnea, PND or abdominal pain.    PROBLEM LIST:  Living accommodation issues  Stroke  Intravascular volume depletion  Hyperosmolar coma  Diabetes  Essential hypertension  Dehydration  Acute renal failure with tubular necrosis  Diabetic ketoacidosis with coma associated with type 1 diabetes mellitus  Altered mental status, unspecified    PAST MEDICAL HISTORY:  High cholesterol  Diabetes  Hypertension    PAST SURGICAL HISTORY:  S/P hip hemiarthroplasty  No significant past surgical history    MEDICATIONS  (STANDING):  atorvastatin 20milliGRAM(s) Oral at bedtime  timolol 0.5% Solution 1Drop(s) Both EYES daily  dextrose 5%. 1000milliLiter(s) IV Continuous <Continuous>  dextrose 50% Injectable 12.5Gram(s) IV Push once  dextrose 50% Injectable 25Gram(s) IV Push once  dextrose 50% Injectable 25Gram(s) IV Push once  nystatin Powder 1Application(s) Topical three times a day  carbidopa/levodopa  25/100 1Tablet(s) Oral three times a day  amLODIPine   Tablet 10milliGRAM(s) Oral daily  BACItracin   Ointment 1Application(s) Topical two times a day  insulin lispro (HumaLOG) corrective regimen sliding scale  SubCutaneous every 6 hours  aspirin  chewable 81milliGRAM(s) Oral daily  lisinopril 10milliGRAM(s) Oral daily  heparin  Injectable 5000Unit(s) SubCutaneous every 12 hours  insulin glargine Injectable (LANTUS) 18Unit(s) SubCutaneous at bedtime    MEDICATIONS  (PRN):  hydrALAZINE Injectable 10milliGRAM(s) IV Push every 4 hours PRN SBP>160 mm/hg  dextrose Gel 1Dose(s) Oral once PRN Blood Glucose LESS THAN 70 milliGRAM(s)/deciliter  glucagon  Injectable 1milliGRAM(s) IntraMuscular once PRN Glucose LESS THAN 70 milligrams/deciliter  ondansetron Injectable 4milliGRAM(s) IV Push every 6 hours PRN Nausea and/or Vomiting  acetaminophen   Tablet. 650milliGRAM(s) Oral every 6 hours PRN Headache    ALLERGIES:  No Known Allergies    PHYSICAL EXAM:  T(C): 36.8, Max: 36.9 (01-10 @ 20:00)  T(F): 98.2, Max: 98.5 (01-10 @ 20:00)  HR: 79 (74 - 88)  BP: 136/60 (122/69 - 142/64)  RR: 18 (17 - 22)  SpO2: 98% (98% - 99%)  Wt(kg): --  I&O's Summary    Telemetry: Sinus, HR 70s-90s, APC  General: comfortable, in NAD  Heart: +S1S2, RRR, 1/6 systolic murmur at the right 2nd ICS and LLSB, no rubs, no gallops  Lungs: clear to auscultation bilaterally, no wheezes, no rales, no rhonchi  Abdomen: soft, non-tender, non-distended, + bowel sounds  Extremities: no clubbing, no cyanosis, 1+ bilateral lower extremity edema  Neuro: A&O x3      LABS:        CBC:                10.2   10.34 >-----------< 257     @ 06:46               30.8                   9.8    7.15  >-----------< 192     @ 08:03               29.7       CHEMISTRY:   134   |  97     |  15.0   --------------------------------< --       @ 06:46    3.8    |  25.0   |  0.95     eGFR non-  79     eGFR   91      140   |  104    |  16.0   --------------------------------< --       @ 08:03    4.0    |  26.0   |  0.84     eGFR non-  86     eGFR   100          URINALYSIS: ( @ 11:28)  Color Yellow / pH 5.0   / Sp Gr 1.020 / LE <<16 / Nit Negative / Prot 500   / Blood Moderate / Uro Negative / WBC 6-10  / RBC --    / Bacteria Few      RADIOLOGY & ADDITIONAL TESTS:  JESUS MANUEL:   1. No cardiac mass, vegetations or thrombus visualized.    2. The left atrium is normal in size.   3. No left atrial or left atrial appendage thrombus visualized.   Prominent left atrial appendage and normal left atrial appendage   velocities.   4. Large patent foramen ovale, with bidirectional shunting across atrial   septum.   5. Interatrial septal aneurym with PFO. Tunnel length 1.3 cm, PFO size   or diameter 0.58 cm. There is a 0.5 cm aortic rim.   6. There is mild concentric left ventricular hypertrophy.   7. Left ventricular ejection fraction, by visual estimation, is 55 to   60%. Grade I diastolic dysfunction.   8. The right atrium is normal in size.   9. Normal right ventricular size and function.  10. No significant valvular abnormality.  11. There is no evidence of pericardial effusion.    Venous duplex:   Acute deep vein thrombosis below the knee in the right lower   extremity.       ASSESSMENT:  complications, essential hypertension and pure hypercholesterolemia who presented with an acute embolic stroke and was found to have a large PFO and atrial septal aneurysm and a DVT.    Please permit me to suggest the followin. Given the PFO and the newly diagnosed DVT, I suspect that his initial stroke was secondary to a paradoxical embolus. The patient has Parkinson's with a history of multiple falls and petechial hemorrhages in his brain so he is not a good candidate for full anticoagulation. I agree with placement of an IVC filter. No need for plavix but I would continue aspirin 81 mg daily.   2. I spoke with Dr. Joya yesterday, who reviewed the patient's JESUS MANUEL images. Although PFO closure would be feasible, I would only do this if he has another TIA or CVA despite having an IVC filter.  3. I will follow as needed.  Discussed with Dr. Vila and Dr. Shannon.

## 2017-01-11 NOTE — PROGRESS NOTE ADULT - ASSESSMENT
This is 76Y M with PMH of DM, HTN, admitted for DKA and AMS, admitted to ICU, started on insulin drip, anion gap closed, off insulin drip, on Lantus 15 units. He was also noted to foul smelling discharge from groin, seen bu surgery, looks fungal infection, started on Nystatin powder, improving. MRI brain showed b/l stroke with mild petechial hemorrhage in right occipital lobe    A/P    > DKA due to Non compliant  ct lantus   A1C: 12  revaluated by jack bach with honey  diabetic education consult appreciated    >Hypokalemia - resolved    >B/L stroke with mild petechial hemorrhage in right occipital lobe  appreciate Neurology input, MRI showed b/l stroke with petechial hemorrhage in occipital lobe  Repeat CT scan stable mild petechial hemorrhage, discussed with Dr. Vila, agree to restart baby aspirin  cont. statin  TTE normal, carotid doppler showed ?stenosis in distal intracranial stenosis, as per Neurology no need of CTA  JESUS MANUEL - Large PFO. acute DVT in LLE - not a good candidate for AC per Neuro and Cardio - plan - IVC filter placement. DC Plavix.      >HTN - Stable  cont. amlodipine 10 mg daily and lisinopril 10mg   hydralazine prn if SBP is more than 160mmHg    >Groin fungal infection - improving  appreciate surgery input, cont. Nystatin    >Parkinson disease  cont. sinemet as per Neurology    >HENRIETTA - improved

## 2017-01-12 NOTE — PROGRESS NOTE ADULT - ASSESSMENT
This is 76Y M with PMH of DM, HTN, admitted for DKA and AMS, admitted to ICU, started on insulin drip, anion gap closed, off insulin drip, on Lantus 15 units. He was also noted to foul smelling discharge from groin, seen bu surgery, looks fungal infection, started on Nystatin powder, improving. MRI brain showed b/l stroke with mild petechial hemorrhage in right occipital lobe    A/P    > DKA due to Non compliant  ct lantus   A1C: 12  revaluated by jack bach with honey  diabetic education consult appreciated    >Hypokalemia - resolved    >B/L stroke with mild petechial hemorrhage in right occipital lobe  appreciate Neurology input, MRI showed b/l stroke with petechial hemorrhage in occipital lobe  Repeat CT scan stable mild petechial hemorrhage, discussed with Dr. Vila, agree to restart baby aspirin  cont. statin  TTE normal, carotid doppler showed ?stenosis in distal intracranial stenosis, as per Neurology no need of CTA  JESUS MANUEL - Large PFO. acute DVT in LLE - not a good candidate for AC per Neuro and Cardio - s/p - IVC filter placement. DC Plavix.      >HTN - Stable  cont. amlodipine 10 mg daily and lisinopril 10mg   hydralazine prn if SBP is more than 160mmHg    >Groin fungal infection - improving  appreciate surgery input, cont. Nystatin    >Parkinson disease  cont. sinemet as per Neurology    >HENRIETTA - improved    Rehab soon

## 2017-01-12 NOTE — PROGRESS NOTE ADULT - SUBJECTIVE AND OBJECTIVE BOX
INTERVAL SUBJECTIVE & REVIEW OF SYMPTOMS:    Patient seen and examined at bedside. No overnight events. Pt. s/p IVCF placement yesterday. Doing well. Offers no complaints at this time.       FUNCTIONAL PROGRESS:  Bed mobility:   Transfers:   Gait:   ADLs:     MEDICATIONS:  atorvastatin 20milliGRAM(s) Oral at bedtime  timolol 0.5% Solution 1Drop(s) Both EYES daily  hydrALAZINE Injectable 10milliGRAM(s) IV Push every 4 hours PRN  dextrose 5%. 1000milliLiter(s) IV Continuous <Continuous>  dextrose Gel 1Dose(s) Oral once PRN  dextrose 50% Injectable 12.5Gram(s) IV Push once  dextrose 50% Injectable 25Gram(s) IV Push once  dextrose 50% Injectable 25Gram(s) IV Push once  glucagon  Injectable 1milliGRAM(s) IntraMuscular once PRN  nystatin Powder 1Application(s) Topical three times a day  carbidopa/levodopa  25/100 1Tablet(s) Oral three times a day  ondansetron Injectable 4milliGRAM(s) IV Push every 6 hours PRN  amLODIPine   Tablet 10milliGRAM(s) Oral daily  BACItracin   Ointment 1Application(s) Topical two times a day  insulin lispro (HumaLOG) corrective regimen sliding scale  SubCutaneous every 6 hours  aspirin  chewable 81milliGRAM(s) Oral daily  lisinopril 10milliGRAM(s) Oral daily  heparin  Injectable 5000Unit(s) SubCutaneous every 12 hours  insulin glargine Injectable (LANTUS) 18Unit(s) SubCutaneous at bedtime  acetaminophen   Tablet. 650milliGRAM(s) Oral every 6 hours PRN      REVIEW OF SYSTEMS  [   ] Constitutional WNL  [   ] Cardio WNL  [   ] Resp WNL  [   ] GI WNL  [   ]  WNL  [   ] Heme WNL  [   ] Endo WNL  [   ] Skin WNL  [   ] MSK WNL  [   ] Neuro WNL  [   ] Cognitive WNL  [   ] Psych WNL    VITAL SIGNS  Vital Signs Last 24 Hrs  T(C): 36.8, Max: 37.4 (01-12 @ 00:18)  T(F): 98.3, Max: 99.3 (01-12 @ 00:18)  HR: 79 (71 - 81)  BP: 124/80 (124/66 - 145/66)  BP(mean): 93 (82 - 99)  RR: 11 (11 - 20)  SpO2: 100% (94% - 100%)    PHYSICAL EXAM  General:   HEENT:   Cardio:   Resp:   Abdomen:   Neuro:   Extrem:   Skin:   Functional Exam:     RECENT LABS:                        10.2   10.34 )-----------( 257      ( 11 Jan 2017 06:46 )             30.8     11 Jan 2017 06:46    134    |  97     |  15.0   ----------------------------<  173    3.8     |  25.0   |  0.95     Ca    8.4        11 Jan 2017 06:46            RADIOLOGY/OTHER RESULTS: INTERVAL SUBJECTIVE & REVIEW OF SYMPTOMS:    Patient seen and examined at bedside. No overnight events. Pt. s/p IVCF placement yesterday. Doing well. Offers no complaints at this time.       FUNCTIONAL PROGRESS: 1/12 Min assist with RW, marched in place 10 steps       MEDICATIONS:  atorvastatin 20milliGRAM(s) Oral at bedtime  timolol 0.5% Solution 1Drop(s) Both EYES daily  hydrALAZINE Injectable 10milliGRAM(s) IV Push every 4 hours PRN  dextrose 5%. 1000milliLiter(s) IV Continuous <Continuous>  dextrose Gel 1Dose(s) Oral once PRN  dextrose 50% Injectable 12.5Gram(s) IV Push once  dextrose 50% Injectable 25Gram(s) IV Push once  dextrose 50% Injectable 25Gram(s) IV Push once  glucagon  Injectable 1milliGRAM(s) IntraMuscular once PRN  nystatin Powder 1Application(s) Topical three times a day  carbidopa/levodopa  25/100 1Tablet(s) Oral three times a day  ondansetron Injectable 4milliGRAM(s) IV Push every 6 hours PRN  amLODIPine   Tablet 10milliGRAM(s) Oral daily  BACItracin   Ointment 1Application(s) Topical two times a day  insulin lispro (HumaLOG) corrective regimen sliding scale  SubCutaneous every 6 hours  aspirin  chewable 81milliGRAM(s) Oral daily  lisinopril 10milliGRAM(s) Oral daily  heparin  Injectable 5000Unit(s) SubCutaneous every 12 hours  insulin glargine Injectable (LANTUS) 18Unit(s) SubCutaneous at bedtime  acetaminophen   Tablet. 650milliGRAM(s) Oral every 6 hours PRN      REVIEW OF SYSTEMS  [ X] Constitutional WNL  [ X] Cardio WNL  [ X] Resp WNL  [   ] GI WNL  [ X]  WNL  [   ] Heme WNL  [   ] Endo WNL  [   ] Skin WNL  [   ] MSK WNL  [ X] Neuro WNL  [   ] Cognitive WNL  [   ] Psych WNL    VITAL SIGNS  Vital Signs Last 24 Hrs  T(C): 36.8, Max: 37.4 (01-12 @ 00:18)  T(F): 98.3, Max: 99.3 (01-12 @ 00:18)  HR: 79 (71 - 81)  BP: 124/80 (124/66 - 145/66)  BP(mean): 93 (82 - 99)  RR: 11 (11 - 20)  SpO2: 100% (94% - 100%)    PHYSICAL EXAM  General: NAD  HEENT: NCAT  Cardio: +S1/S2  Resp: CTA b/l, mild expiratory wheeze R>L  Abdomen: soft, NT, ND, +BS  Neuro: AAO x 3, CN 2-12 intact grossly  Extrem: b/l LE edema 1+ R>L, no focal muscle weakness, but diffusely weak LE>UE  Skin: b/l LE venous stasis changes      RECENT LABS:                        10.2   10.34 )-----------( 257      ( 11 Jan 2017 06:46 )             30.8     11 Jan 2017 06:46    134    |  97     |  15.0   ----------------------------<  173    3.8     |  25.0   |  0.95     Ca    8.4        11 Jan 2017 06:46 INTERVAL SUBJECTIVE & REVIEW OF SYMPTOMS:    Patient seen and examined at bedside. No overnight events. Pt. s/p IVCF placement yesterday. Doing well. Offers no complaints at this time.       FUNCTIONAL PROGRESS: 1/12 Min assist with RW, marched in place 10 steps       MEDICATIONS:  atorvastatin 20milliGRAM(s) Oral at bedtime  timolol 0.5% Solution 1Drop(s) Both EYES daily  hydrALAZINE Injectable 10milliGRAM(s) IV Push every 4 hours PRN  dextrose 5%. 1000milliLiter(s) IV Continuous <Continuous>  dextrose Gel 1Dose(s) Oral once PRN  dextrose 50% Injectable 12.5Gram(s) IV Push once  dextrose 50% Injectable 25Gram(s) IV Push once  dextrose 50% Injectable 25Gram(s) IV Push once  glucagon  Injectable 1milliGRAM(s) IntraMuscular once PRN  nystatin Powder 1Application(s) Topical three times a day  carbidopa/levodopa  25/100 1Tablet(s) Oral three times a day  ondansetron Injectable 4milliGRAM(s) IV Push every 6 hours PRN  amLODIPine   Tablet 10milliGRAM(s) Oral daily  BACItracin   Ointment 1Application(s) Topical two times a day  insulin lispro (HumaLOG) corrective regimen sliding scale  SubCutaneous every 6 hours  aspirin  chewable 81milliGRAM(s) Oral daily  lisinopril 10milliGRAM(s) Oral daily  heparin  Injectable 5000Unit(s) SubCutaneous every 12 hours  insulin glargine Injectable (LANTUS) 18Unit(s) SubCutaneous at bedtime  acetaminophen   Tablet. 650milliGRAM(s) Oral every 6 hours PRN      REVIEW OF SYSTEMS  [ X] Constitutional WNL  [ X] Cardio WNL  [ X] Resp WNL  [   ] GI WNL  [ X]  WNL  [   ] Heme WNL  [   ] Endo WNL  [   ] Skin WNL  [   ] MSK WNL  [ X] Neuro WNL  [   ] Cognitive WNL  [   ] Psych WNL    VITAL SIGNS  Vital Signs Last 24 Hrs  T(C): 36.8, Max: 37.4 (01-12 @ 00:18)  T(F): 98.3, Max: 99.3 (01-12 @ 00:18)  HR: 79 (71 - 81)  BP: 124/80 (124/66 - 145/66)  BP(mean): 93 (82 - 99)  RR: 11 (11 - 20)  SpO2: 100% (94% - 100%)    PHYSICAL EXAM  General: NAD  HEENT: NCAT  Cardio: +S1/S2  Resp: CTA b/l, mild expiratory wheeze R>L  Abdomen: soft, NT, ND, +BS  Neuro: AAO x 3, CN 2-12 intact grossly  Extrem: b/l LE edema 1+ R>L, no focal muscle weakness, but diffusely weak LE>UE  Skin: b/l LE venous stasis changes      RECENT LABS:                        10.2   10.34 )-----------( 257      ( 11 Jan 2017 06:46 )             30.8     11 Jan 2017 06:46    134    |  97     |  15.0   ----------------------------<  173    3.8     |  25.0   |  0.95     Ca    8.4        11 Jan 2017 06:46    ASSESSMENT/PLAN    76  year old male admitted with DKA and finding of bilateral supratentorial and infratentorial strokes, with PFO, DVT with gait impairment, decreased functional mobility    - PT - strength training, mobility, gait training  - OT - ADLs, balance  - ST - cont. dysphagia diet, advance as tolerated  - Precautions - Fall, aspiration   - ACUTE inpatient rehabilitation for the functional deficits consisting of 3 hours of therapy/day & 24 hour RN/daily PMR physician for comorbid medical management.

## 2017-01-12 NOTE — PROGRESS NOTE ADULT - SUBJECTIVE AND OBJECTIVE BOX
HPI:  76M with Pmhx of DM. Not feeling well at home for last few days. brought to ER today with lethargy. Found to have severe Hyperglycemia, with a BG > 1100 and a venous pH of 6.9. His anion gap was 43. He was started on IV hydration and an insulin infusion for DKA. some improvement - transferred out of ICU . CT brain s/o infarcts. MRI brain showed acute b/l infarcts. JESUS MANUEL - large PFO, and doppler s/o acute LL DVT    CC: no specific complaints today    INTERVAL HPI/ OVERNIGHT EVENTS: Patient is seen and examined    REVIEW OF SYSTEMS:    Denied fever, chills, abd. pain, nausea, vomiting, chest pain, SOB, headache, dizziness    PHYSICAL EXAM:  Vital Signs Last 24 Hrs  T(C): 36.8, Max: 37.4 (01-12 @ 00:18)  T(F): 98.2, Max: 99.3 (01-12 @ 00:18)  HR: 79 (71 - 81)  BP: 119/66 (119/66 - 145/66)  BP(mean): 93 (82 - 99)  RR: 11 (11 - 20)  SpO2: 100% (94% - 100%)    GENERAL: NAD  HEENT: EOMI  Neck: supple  CHEST/LUNG: Clear to percussion bilaterally; No wheezing  HEART: S1S2+ audible  ABDOMEN: Soft, Nontender, Nondistended; Bowel sounds present  EXTREMITIES:  no edema  CNS: AAO X 3, moving all 4 extremities  Psychiatry: normal mood             MEDICATIONS   reviewed    RADIOLOGY & ADDITIONAL TEST:  MRI brain and CT brain - reviewed  ECHO - reviewed  carotid duplex - reviewed

## 2017-01-12 NOTE — PROGRESS NOTE ADULT - ATTENDING COMMENTS
JESUS MANUEL Tomorrow hopefully  Acute rehab   YIN healy
rehab once bed arranged
Mr. Mojica was seen at the bedside. Seated in a chair. Appears comfortable, but does not remember that he had filter placed after findings of DVT.     As recommended, he would benefit from acute rehab when medically stable.
Patient had JESUS MANUEL today.   If cleared medically, may be transferred to rehab. Thank you.  Rehab program d/w wife at bedside.
I discussed with Dr Goddard who will care for Mr Mojica on his service.  I feel stepdown status will be required for the next several hours to day while he continues to improve his mental status.

## 2017-01-13 ENCOUNTER — INPATIENT (INPATIENT)
Facility: HOSPITAL | Age: 77
LOS: 19 days | Discharge: HOME | DRG: 57 | End: 2017-02-02
Attending: PHYSICAL MEDICINE & REHABILITATION | Admitting: PHYSICAL MEDICINE & REHABILITATION
Payer: COMMERCIAL

## 2017-01-13 VITALS
RESPIRATION RATE: 18 BRPM | WEIGHT: 227.96 LBS | SYSTOLIC BLOOD PRESSURE: 147 MMHG | OXYGEN SATURATION: 95 % | DIASTOLIC BLOOD PRESSURE: 80 MMHG | HEART RATE: 72 BPM | TEMPERATURE: 99 F

## 2017-01-13 DIAGNOSIS — Z51.89 ENCOUNTER FOR OTHER SPECIFIED AFTERCARE: ICD-10-CM

## 2017-01-13 RX ORDER — INSULIN LISPRO 100/ML
VIAL (ML) SUBCUTANEOUS AT BEDTIME
Qty: 0 | Refills: 0 | Status: DISCONTINUED | OUTPATIENT
Start: 2017-01-13 | End: 2017-01-17

## 2017-01-13 RX ORDER — LANOLIN ALCOHOL/MO/W.PET/CERES
6 CREAM (GRAM) TOPICAL AT BEDTIME
Qty: 0 | Refills: 0 | Status: DISCONTINUED | OUTPATIENT
Start: 2017-01-13 | End: 2017-01-18

## 2017-01-13 RX ORDER — BACITRACIN ZINC 500 UNIT/G
1 OINTMENT IN PACKET (EA) TOPICAL
Qty: 0 | Refills: 0 | Status: DISCONTINUED | OUTPATIENT
Start: 2017-01-13 | End: 2017-01-17

## 2017-01-13 RX ORDER — NYSTATIN CREAM 100000 [USP'U]/G
1 CREAM TOPICAL THREE TIMES A DAY
Qty: 0 | Refills: 0 | Status: DISCONTINUED | OUTPATIENT
Start: 2017-01-13 | End: 2017-01-17

## 2017-01-13 RX ADMIN — INSULIN GLARGINE 18 UNIT(S): 100 INJECTION, SOLUTION SUBCUTANEOUS at 23:26

## 2017-01-13 RX ADMIN — Medication 2: at 23:26

## 2017-01-13 NOTE — PROGRESS NOTE ADULT - ASSESSMENT
The patient is a 76y Male now with CVAs found to have PFO and DVT.  He is status post IVC filter.   On aspirin and statin.    Rehab when medically stable.

## 2017-01-13 NOTE — PROGRESS NOTE ADULT - SUBJECTIVE AND OBJECTIVE BOX
[facility]                                 [address], [address]                                  [address]                                        [place], [place], [address]                                             [phone]        No new complaints.    Awake and alert.  Pupils react.  Face symmetric.  diffusely weak.   Mild cogwheeling.

## 2017-01-13 NOTE — PROGRESS NOTE ADULT - PROVIDER SPECIALTY LIST ADULT
Cardiology
Critical Care
Critical Care
Hospitalist
Neurology
Rehab Medicine
Rehab Medicine
Cardiology
Neurology

## 2017-01-13 NOTE — PROGRESS NOTE ADULT - ASSESSMENT
This is 76Y M with PMH of DM, HTN, admitted for DKA and AMS, admitted to ICU, started on insulin drip, anion gap closed, off insulin drip, on Lantus 15 units. He was also noted to foul smelling discharge from groin, seen bu surgery, looks fungal infection, started on Nystatin powder, improving. MRI brain showed b/l stroke with mild petechial hemorrhage in right occipital lobe. JESUS MANUEL - Large PFO. acute DVT in LLE - not a good candidate for AC per Neuro and Cardio - s/p - IVC filter placement. DC Plavix. awaiting bed in rehab -[facility]      A/P    > DKA due to Non compliant  ct lantus   A1C: 12  revaluated by jack bach with honey  diabetic education consult appreciated    >Hypokalemia - resolved    >B/L stroke with mild petechial hemorrhage in right occipital lobe  appreciate Neurology input, MRI showed b/l stroke with petechial hemorrhage in occipital lobe  Repeat CT scan stable mild petechial hemorrhage, discussed with Dr. Vila, agree to restart baby aspirin  cont. statin  TTE normal, carotid doppler showed ?stenosis in distal intracranial stenosis, as per Neurology no need of CTA  JESUS MANUEL - Large PFO. acute DVT in LLE - not a good candidate for AC per Neuro and Cardio - s/p - IVC filter placement. DC Plavix.      >HTN - Stable  cont. amlodipine 10 mg daily and lisinopril 10mg   hydralazine prn if SBP is more than 160mmHg    >Groin fungal infection - improving  appreciate surgery input, cont. Nystatin    >Parkinson disease  cont. sinemet as per Neurology    >HENRIETTA - improved    Rehab once bed arranged

## 2017-01-13 NOTE — PROGRESS NOTE ADULT - SUBJECTIVE AND OBJECTIVE BOX
76M with Pmhx of DM. Not feeling well at home for last few days. brought to ER today with lethargy. Found to have severe Hyperglycemia, with a BG > 1100 and a venous pH of 6.9. His anion gap was 43. He was started on IV hydration and an insulin infusion for DKA. some improvement - transferred out of ICU . CT brain s/o infarcts. MRI brain showed acute b/l infarcts. JESUS MANUEL - large PFO, and doppler s/o acute LL DVT    CC: no specific complaints today    INTERVAL HPI/ OVERNIGHT EVENTS: Patient is seen and examined    REVIEW OF SYSTEMS:    Denied fever, chills, abd. pain, nausea, vomiting, chest pain, SOB, headache, dizziness    PHYSICAL EXAM:  Vital Signs Last 24 Hrs  T(C): 36.9, Max: 37.1 (01-13 @ 04:20)  T(F): 98.4, Max: 98.7 (01-13 @ 04:20)  HR: 81 (78 - 86)  BP: 131/84 (128/65 - 159/86)  BP(mean): 85 (85 - 85)  RR: 14 (14 - 19)  SpO2: 97% (95% - 97%)    GENERAL: NAD  HEENT: EOMI  Neck: supple  CHEST/LUNG: Clear to percussion bilaterally; No wheezing  HEART: S1S2+ audible  ABDOMEN: Soft, Nontender, Nondistended; Bowel sounds present  EXTREMITIES:  no edema  CNS: AAO X 3, moving all 4 extremities  Psychiatry: normal mood             MEDICATIONS   reviewed    RADIOLOGY & ADDITIONAL TEST:  MRI brain and CT brain - reviewed  ECHO - reviewed  carotid duplex - reviewed

## 2017-01-14 LAB
ALBUMIN SERPL ELPH-MCNC: 2.4 G/DL — LOW (ref 3.3–5.2)
ALP SERPL-CCNC: 63 U/L — SIGNIFICANT CHANGE UP (ref 40–120)
ALT FLD-CCNC: <5 U/L — SIGNIFICANT CHANGE UP
ANION GAP SERPL CALC-SCNC: 12 MMOL/L — SIGNIFICANT CHANGE UP (ref 5–17)
APPEARANCE UR: ABNORMAL
AST SERPL-CCNC: 11 U/L — SIGNIFICANT CHANGE UP
BACTERIA # UR AUTO: ABNORMAL
BILIRUB SERPL-MCNC: 0.1 MG/DL — LOW (ref 0.4–2)
BILIRUB UR-MCNC: NEGATIVE — SIGNIFICANT CHANGE UP
BUN SERPL-MCNC: 13 MG/DL — SIGNIFICANT CHANGE UP (ref 8–20)
CALCIUM SERPL-MCNC: 7.8 MG/DL — LOW (ref 8.6–10.2)
CHLORIDE SERPL-SCNC: 95 MMOL/L — LOW (ref 98–107)
CO2 SERPL-SCNC: 25 MMOL/L — SIGNIFICANT CHANGE UP (ref 22–29)
COLOR SPEC: YELLOW — SIGNIFICANT CHANGE UP
CREAT SERPL-MCNC: 0.9 MG/DL — SIGNIFICANT CHANGE UP (ref 0.5–1.3)
DIFF PNL FLD: ABNORMAL
EPI CELLS # UR: SIGNIFICANT CHANGE UP
GLUCOSE SERPL-MCNC: 171 MG/DL — HIGH (ref 70–115)
GLUCOSE UR QL: 100 MG/DL
HCT VFR BLD CALC: 25 % — LOW (ref 42–52)
HGB BLD-MCNC: 8.4 G/DL — LOW (ref 14–18)
KETONES UR-MCNC: NEGATIVE — SIGNIFICANT CHANGE UP
LEUKOCYTE ESTERASE UR-ACNC: ABNORMAL
MCHC RBC-ENTMCNC: 31.6 PG — HIGH (ref 27–31)
MCHC RBC-ENTMCNC: 33.6 G/DL — SIGNIFICANT CHANGE UP (ref 32–36)
MCV RBC AUTO: 94 FL — SIGNIFICANT CHANGE UP (ref 80–94)
NITRITE UR-MCNC: NEGATIVE — SIGNIFICANT CHANGE UP
PH UR: 6 — SIGNIFICANT CHANGE UP (ref 4.8–8)
PLATELET # BLD AUTO: 225 K/UL — SIGNIFICANT CHANGE UP (ref 150–400)
POTASSIUM SERPL-MCNC: 3.8 MMOL/L — SIGNIFICANT CHANGE UP (ref 3.5–5.3)
POTASSIUM SERPL-SCNC: 3.8 MMOL/L — SIGNIFICANT CHANGE UP (ref 3.5–5.3)
PREALB SERPL-MCNC: 12 MG/DL — LOW (ref 18–38)
PROT SERPL-MCNC: 5.1 G/DL — LOW (ref 6.6–8.7)
PROT UR-MCNC: 100 MG/DL
RBC # BLD: 2.66 M/UL — LOW (ref 4.6–6.2)
RBC # FLD: 12.8 % — SIGNIFICANT CHANGE UP (ref 11–15.6)
RBC CASTS # UR COMP ASSIST: ABNORMAL /HPF (ref 0–4)
SODIUM SERPL-SCNC: 132 MMOL/L — LOW (ref 135–145)
SP GR SPEC: 1.01 — SIGNIFICANT CHANGE UP (ref 1.01–1.02)
TSH SERPL-MCNC: 2.1 UIU/ML — SIGNIFICANT CHANGE UP (ref 0.27–4.2)
UROBILINOGEN FLD QL: NEGATIVE MG/DL — SIGNIFICANT CHANGE UP
WBC # BLD: 5.7 K/UL — SIGNIFICANT CHANGE UP (ref 4.8–10.8)
WBC # FLD AUTO: 5.7 K/UL — SIGNIFICANT CHANGE UP (ref 4.8–10.8)
WBC UR QL: >50

## 2017-01-14 PROCEDURE — 99233 SBSQ HOSP IP/OBS HIGH 50: CPT

## 2017-01-14 PROCEDURE — 99223 1ST HOSP IP/OBS HIGH 75: CPT

## 2017-01-14 RX ORDER — LANOLIN ALCOHOL/MO/W.PET/CERES
3 CREAM (GRAM) TOPICAL AT BEDTIME
Qty: 0 | Refills: 0 | Status: DISCONTINUED | OUTPATIENT
Start: 2017-01-14 | End: 2017-01-14

## 2017-01-14 RX ORDER — SENNA PLUS 8.6 MG/1
1 TABLET ORAL AT BEDTIME
Qty: 0 | Refills: 0 | Status: DISCONTINUED | OUTPATIENT
Start: 2017-01-14 | End: 2017-01-17

## 2017-01-14 RX ORDER — FERROUS SULFATE 325(65) MG
325 TABLET ORAL DAILY
Qty: 0 | Refills: 0 | Status: DISCONTINUED | OUTPATIENT
Start: 2017-01-14 | End: 2017-01-22

## 2017-01-14 RX ORDER — FUROSEMIDE 40 MG
40 TABLET ORAL DAILY
Qty: 0 | Refills: 0 | Status: DISCONTINUED | OUTPATIENT
Start: 2017-01-15 | End: 2017-01-15

## 2017-01-14 RX ORDER — DOCUSATE SODIUM 100 MG
100 CAPSULE ORAL
Qty: 0 | Refills: 0 | Status: DISCONTINUED | OUTPATIENT
Start: 2017-01-14 | End: 2017-01-17

## 2017-01-14 RX ADMIN — HEPARIN SODIUM 5000 UNIT(S): 5000 INJECTION INTRAVENOUS; SUBCUTANEOUS at 17:09

## 2017-01-14 RX ADMIN — CARBIDOPA AND LEVODOPA 1 TABLET(S): 25; 100 TABLET ORAL at 21:35

## 2017-01-14 RX ADMIN — Medication 81 MILLIGRAM(S): at 12:03

## 2017-01-14 RX ADMIN — Medication 6: at 17:08

## 2017-01-14 RX ADMIN — NYSTATIN CREAM 1 APPLICATION(S): 100000 CREAM TOPICAL at 13:11

## 2017-01-14 RX ADMIN — SENNA PLUS 1 TABLET(S): 8.6 TABLET ORAL at 21:35

## 2017-01-14 RX ADMIN — Medication 650 MILLIGRAM(S): at 06:20

## 2017-01-14 RX ADMIN — LISINOPRIL 10 MILLIGRAM(S): 2.5 TABLET ORAL at 05:32

## 2017-01-14 RX ADMIN — Medication 1 TABLET(S): at 16:16

## 2017-01-14 RX ADMIN — Medication 650 MILLIGRAM(S): at 05:41

## 2017-01-14 RX ADMIN — Medication 6 MILLIGRAM(S): at 21:34

## 2017-01-14 RX ADMIN — CARBIDOPA AND LEVODOPA 1 TABLET(S): 25; 100 TABLET ORAL at 05:32

## 2017-01-14 RX ADMIN — AMLODIPINE BESYLATE 10 MILLIGRAM(S): 2.5 TABLET ORAL at 05:32

## 2017-01-14 RX ADMIN — NYSTATIN CREAM 1 APPLICATION(S): 100000 CREAM TOPICAL at 05:32

## 2017-01-14 RX ADMIN — Medication 1 APPLICATION(S): at 05:32

## 2017-01-14 RX ADMIN — CARBIDOPA AND LEVODOPA 1 TABLET(S): 25; 100 TABLET ORAL at 13:11

## 2017-01-14 RX ADMIN — Medication 1 APPLICATION(S): at 17:12

## 2017-01-14 RX ADMIN — Medication 2: at 21:35

## 2017-01-14 RX ADMIN — Medication 2: at 12:02

## 2017-01-14 RX ADMIN — Medication 1 TABLET(S): at 21:34

## 2017-01-14 RX ADMIN — ATORVASTATIN CALCIUM 20 MILLIGRAM(S): 80 TABLET, FILM COATED ORAL at 21:34

## 2017-01-14 RX ADMIN — NYSTATIN CREAM 1 APPLICATION(S): 100000 CREAM TOPICAL at 21:36

## 2017-01-14 RX ADMIN — HEPARIN SODIUM 5000 UNIT(S): 5000 INJECTION INTRAVENOUS; SUBCUTANEOUS at 05:32

## 2017-01-14 RX ADMIN — Medication 1 DROP(S): at 12:03

## 2017-01-14 RX ADMIN — Medication 6 MILLIGRAM(S): at 00:03

## 2017-01-14 RX ADMIN — Medication 100 MILLIGRAM(S): at 17:09

## 2017-01-14 RX ADMIN — Medication 2: at 08:51

## 2017-01-14 RX ADMIN — Medication 325 MILLIGRAM(S): at 13:10

## 2017-01-14 RX ADMIN — INSULIN GLARGINE 18 UNIT(S): 100 INJECTION, SOLUTION SUBCUTANEOUS at 21:35

## 2017-01-14 NOTE — DIETITIAN INITIAL EVALUATION ADULT. - OTHER INFO
Pt s/p DKA + CVA. Pt tolerating consistent carbohydrate diet with Glucerna TID. Pt with good intake. T2DM nutrition education left at bedside; will f/u to discuss diet with Pt. No noted GI distress.

## 2017-01-14 NOTE — PROGRESS NOTE ADULT - ASSESSMENT
This is 76Y M with PMH of DM, HTN, admitted for DKA and AMS, admitted to ICU, started on insulin drip, anion gap closed, off insulin drip, on Lantus 15 units. He was also noted to foul smelling discharge from groin, seen bu surgery, looks fungal infection, started on Nystatin powder, improving. MRI brain showed b/l stroke with mild petechial hemorrhage in right occipital lobe. JESUS MANUEL - Large PFO. acute DVT in LLE - not a good candidate for AC per Neuro and Cardio - s/p - IVC filter placement. DC Plavix.       A/P    > DKA due to Non compliant , resolved , on  lantus   A1C: 12 , diabetic education consult appreciated    >Hypokalemia - resolved    >B/L stroke with mild petechial hemorrhage in right occipital lobe  appreciate Neurology input, MRI showed b/l stroke with petechial hemorrhage in occipital lobe  Repeat CT scan stable mild petechial hemorrhage, as per neuro on baby aspirin ,  cont. statin  TTE normal, carotid doppler showed ?stenosis in distal intracranial stenosis, as per Neurology no need of CTA  JESUS MANUEL - Large PFO.     Acute DVT in LLE - not a good candidate for AC per Neuro and Cardio - s/p - IVC filter placement.       >HTN - Stable  cont. amlodipine 10 mg daily and lisinopril 10mg       >Groin fungal infection - improving  appreciate surgery input, cont. Nystatin    >Parkinson disease  cont. sinemet as per Neurology    >HENRIETTA - improved , follow up bmp . This is 76Y M with PMH of DM, HTN, admitted for DKA and AMS, admitted to ICU, started on insulin drip, anion gap closed, off insulin drip, on Lantus 15 units. He was also noted to foul smelling discharge from groin, seen bu surgery, looks fungal infection, started on Nystatin powder, improving. MRI brain showed b/l stroke with mild petechial hemorrhage in right occipital lobe. JESUS MANUEL - Large PFO. acute DVT in LLE - not a good candidate for AC per Neuro and Cardio - s/p - IVC filter placement. C/O  urinary frequency/burning , otherwise doing well      A/P    > DKA due to Non compliant , resolved , on  lantus   A1C: 12 , diabetic education consult appreciated    >Hypokalemia - resolved    >B/L stroke with mild petechial hemorrhage in right occipital lobe  appreciate Neurology input, MRI showed b/l stroke with petechial hemorrhage in occipital lobe  Repeat CT scan stable mild petechial hemorrhage, as per neuro on baby aspirin ,  cont. statin  TTE normal, carotid doppler showed ?stenosis in distal intracranial stenosis, as per Neurology no need of CTA  JESUS MANUEL - Large PFO - as per cardiology .     Acute DVT in LLE - not a good candidate for AC per Neuro and Cardio - s/p - IVC filter placement.       >HTN - Stable  cont. amlodipine 10 mg daily and lisinopril 10mg       >Groin fungal infection - improving  appreciate surgery input, cont. Nystatin    >Parkinson disease  cont. sinemet as per Neurology    >HENRIETTA - improved , follow up bmp .    > UTI- will start PO abx , follow up cultures    > Hyponatremia - will repeat BMP

## 2017-01-14 NOTE — PROGRESS NOTE ADULT - SUBJECTIVE AND OBJECTIVE BOX
Patient seen and examined . Known to Hospitalist team .    76Y M with PMH of DM, HTN, admitted for DKA and AMS, admitted to ICU, started on insulin drip, anion gap closed, off insulin drip, on Lantus 15 units. He was also noted to foul smelling discharge from groin, seen bu surgery, looks fungal infection, started on Nystatin powder, improving. MRI brain showed b/l stroke with mild petechial hemorrhage in right occipital lobe, Stroke work up was done. ECHO - EF - 55%, JESUS MANUEL - showed large  and acute DVT in LLE -He was deemed not a good candidate for AC per Neuro and Cardio 2/2 parknison and multiple falls. Hence  - decided to undergo- IVC filter placement on . He was cleared by neuro and cardio for discharge. HE was discharged in stable condition to Acute rehab.         PAST MEDICAL & SURGICAL HISTORY:  High cholesterol  Diabetes  Hypertension  S/P hip hemiarthroplasty: right hip repair 2014  No significant past surgical history      MEDICATIONS  (STANDING):  aspirin enteric coated 81milliGRAM(s) Oral daily  lisinopril 10milliGRAM(s) Oral daily  amLODIPine   Tablet 10milliGRAM(s) Oral daily  carbidopa/levodopa  25/100 1Tablet(s) Oral three times a day  atorvastatin 20milliGRAM(s) Oral at bedtime  timolol 0.5% Solution 1Drop(s) Both EYES daily  insulin lispro (HumaLOG) corrective regimen sliding scale  SubCutaneous three times a day before meals  heparin  Injectable 5000Unit(s) SubCutaneous every 12 hours  insulin glargine Injectable (LANTUS) 18Unit(s) SubCutaneous at bedtime  insulin lispro (HumaLOG) corrective regimen sliding scale  SubCutaneous at bedtime  BACItracin   Ointment 1Application(s) Topical two times a day  nystatin Powder 1Application(s) Topical three times a day  melatonin. 6milliGRAM(s) Oral at bedtime  ferrous    sulfate 325milliGRAM(s) Oral daily  docusate sodium 100milliGRAM(s) Oral two times a day  senna 1Tablet(s) Oral at bedtime    MEDICATIONS  (PRN):  acetaminophen   Tablet 650milliGRAM(s) Oral every 6 hours PRN For Temp greater than 38 C (100.4 F)  acetaminophen   Tablet. 650milliGRAM(s) Oral every 6 hours PRN Mild Pain (1 - 3)      LABS:                          8.4    5.70  )-----------( 225      ( 2017 09:27 )             25.0     2017 09:27    132    |  95     |  13.0   ----------------------------<  171    3.8     |  25.0   |  0.90     Ca    7.8        2017 09:27    TPro  5.1    /  Alb  2.4    /  TBili  0.1    /  DBili  x      /  AST  11     /  ALT  <5     /  AlkPhos  63     2017 09:27      Urinalysis Basic - ( 2017 12:28 )    Color: Yellow / Appearance: very cloudy / S.015 / pH: x  Gluc: x / Ketone: Negative  / Bili: Negative / Urobili: Negative mg/dL   Blood: x / Protein: 100 mg/dL / Nitrite: Negative   Leuk Esterase: Moderate / RBC: 3-5 /HPF / WBC >50   Sq Epi: x / Non Sq Epi: Few / Bacteria: Occasional        RADIOLOGY & ADDITIONAL TESTS:      REVIEW OF SYSTEMS:    CONSTITUTIONAL: No fever, weight loss, or fatigue  EYES: No eye pain, visual disturbances, or discharge  ENMT:  No difficulty hearing, tinnitus, vertigo; No sinus or throat pain  NECK: No pain or stiffness  RESPIRATORY: No cough, wheezing, chills or hemoptysis; No shortness of breath  CARDIOVASCULAR: No chest pain, palpitations, dizziness, or leg swelling  GASTROINTESTINAL: No abdominal or epigastric pain. No nausea, vomiting, or hematemesis; No diarrhea or constipation. No melena or hematochezia.  GENITOURINARY: No dysuria, frequency, hematuria, or incontinence  NEUROLOGICAL: No headaches, memory loss, loss of strength, numbness, or tremors  SKIN: No itching, burning, rashes, or lesions   LYMPH NODES: No enlarged glands  ENDOCRINE: No heat or cold intolerance; No hair loss  MUSCULOSKELETAL:   PSYCHIATRIC: No depression, anxiety, mood swings, or difficulty sleeping  HEME/LYMPH: No easy bruising, or bleeding gums  ALLERGY AND IMMUNOLOGIC: No hives or eczema    Vital Signs Last 24 Hrs  T(C): 37.4, Max: 37.4 ( @ 05:20)  T(F): 99.3, Max: 99.3 (-14 @ 05:20)  HR: 73 (72 - 73)  BP: 124/67 (124/67 - 147/80)  BP(mean): --  RR: 18 (18 - 18)  SpO2: 94% (92% - 95%)    f/s - 255    PHYSICAL EXAM:    GENERAL: NAD, well-groomed, well-developed  HEAD:  Atraumatic, Normocephalic  EYES: EOMI, PERRLA, conjunctiva and sclera clear  NECK: Supple, No JVD, Normal thyroid  NERVOUS SYSTEM:  Alert & Oriented X3, no focal deficit  CHEST/LUNG: CTA b/l ,  no  rales, rhonchi, wheezing, or rubs  HEART: Regular rate and rhythm; No murmurs, rubs, or gallops  ABDOMEN: Soft, Nontender, Nondistended; Bowel sounds present  EXTREMITIES:  2+ Peripheral Pulses, No clubbing, cyanosis, or edema  LYMPH: No lymphadenopathy noted  SKIN: No rashes or lesions Patient seen and examined . Known to Hospitalist team . NAD , c/o urinary frequency and burning , no sob , no cp    76Y M with PMH of DM, HTN, admitted for DKA and AMS, admitted to ICU, started on insulin drip, anion gap closed, off insulin drip, on Lantus 15 units. He was also noted to foul smelling discharge from groin, seen bu surgery, looks fungal infection, started on Nystatin powder, improving. MRI brain showed b/l stroke with mild petechial hemorrhage in right occipital lobe, Stroke work up was done. ECHO - EF - 55%, JESUS MANUEL - showed large  and acute DVT in LLE -He was deemed not a good candidate for AC per Neuro and Cardio 2/2 parknison and multiple falls. Hence  - decided to undergo- IVC filter placement on . He was cleared by neuro and cardio for discharge. HE was discharged in stable condition to Acute rehab.         PAST MEDICAL & SURGICAL HISTORY:  High cholesterol  Diabetes  Hypertension  S/P hip hemiarthroplasty: right hip repair 2014  No significant past surgical history      MEDICATIONS  (STANDING):  aspirin enteric coated 81milliGRAM(s) Oral daily  lisinopril 10milliGRAM(s) Oral daily  amLODIPine   Tablet 10milliGRAM(s) Oral daily  carbidopa/levodopa  25/100 1Tablet(s) Oral three times a day  atorvastatin 20milliGRAM(s) Oral at bedtime  timolol 0.5% Solution 1Drop(s) Both EYES daily  insulin lispro (HumaLOG) corrective regimen sliding scale  SubCutaneous three times a day before meals  heparin  Injectable 5000Unit(s) SubCutaneous every 12 hours  insulin glargine Injectable (LANTUS) 18Unit(s) SubCutaneous at bedtime  insulin lispro (HumaLOG) corrective regimen sliding scale  SubCutaneous at bedtime  BACItracin   Ointment 1Application(s) Topical two times a day  nystatin Powder 1Application(s) Topical three times a day  melatonin. 6milliGRAM(s) Oral at bedtime  ferrous    sulfate 325milliGRAM(s) Oral daily  docusate sodium 100milliGRAM(s) Oral two times a day  senna 1Tablet(s) Oral at bedtime    MEDICATIONS  (PRN):  acetaminophen   Tablet 650milliGRAM(s) Oral every 6 hours PRN For Temp greater than 38 C (100.4 F)  acetaminophen   Tablet. 650milliGRAM(s) Oral every 6 hours PRN Mild Pain (1 - 3)      LABS:                          8.4    5.70  )-----------( 225      ( 2017 09:27 )             25.0     2017 09:27    132    |  95     |  13.0   ----------------------------<  171    3.8     |  25.0   |  0.90     Ca    7.8        2017 09:27    TPro  5.1    /  Alb  2.4    /  TBili  0.1    /  DBili  x      /  AST  11     /  ALT  <5     /  AlkPhos  63     2017 09:27      Urinalysis Basic - ( 2017 12:28 )    Color: Yellow / Appearance: very cloudy / S.015 / pH: x  Gluc: x / Ketone: Negative  / Bili: Negative / Urobili: Negative mg/dL   Blood: x / Protein: 100 mg/dL / Nitrite: Negative   Leuk Esterase: Moderate / RBC: 3-5 /HPF / WBC >50   Sq Epi: x / Non Sq Epi: Few / Bacteria: Occasional        REVIEW OF SYSTEMS:    CONSTITUTIONAL: No fever, weight loss, or fatigue  EYES: No eye pain, visual disturbances, or discharge  ENMT:  No difficulty hearing, tinnitus, vertigo; No sinus or throat pain  NECK: No pain or stiffness  RESPIRATORY: No cough, wheezing, chills or hemoptysis; No shortness of breath  CARDIOVASCULAR: No chest pain, palpitations, dizziness, or leg swelling  GASTROINTESTINAL: No abdominal or epigastric pain. No nausea, vomiting, or hematemesis; No diarrhea or constipation. No melena or hematochezia.  GENITOURINARY:  frequency + , burning+ , no hematuria, or incontinence  NEUROLOGICAL: no HA  SKIN: No itching, burning, rashes, or lesions   LYMPH NODES: No enlarged glands  ENDOCRINE: No heat or cold intolerance; No hair loss  MUSCULOSKELETAL: no joint pain or swelling   PSYCHIATRIC: No depression, anxiety, mood swings, or difficulty sleeping  HEME/LYMPH: No easy bruising, or bleeding gums  ALLERGY AND IMMUNOLOGIC: No hives or eczema    Vital Signs Last 24 Hrs  T(C): 37.4, Max: 37.4 ( @ 05:20)  T(F): 99.3, Max: 99.3 (-14 @ 05:20)  HR: 73 (72 - 73)  BP: 124/67 (124/67 - 147/80)  BP(mean): --  RR: 18 (18 - 18)  SpO2: 94% (92% - 95%)    f/s - 255    PHYSICAL EXAM:    GENERAL: NAD, well-groomed, well-developed  HEAD:  Atraumatic, Normocephalic  EYES: EOMI, PERRLA, conjunctiva and sclera clear  NECK: Supple, No JVD, Normal thyroid  NERVOUS SYSTEM:  Alert & Oriented X3, no focal deficit  CHEST/LUNG: CTA b/l ,  no  rales, rhonchi, wheezing, or rubs  HEART: IRRR +; No murmurs, rubs, or gallops  ABDOMEN: Soft, Nontender, Nondistended; Bowel sounds present  EXTREMITIES:  2+ Peripheral Pulses, No clubbing, cyanosis, or edema, B/L LE edema 2+ as per patient chronic  LYMPH: No lymphadenopathy noted  SKIN: No rashes or lesions , chronic lower ext changes

## 2017-01-15 LAB
CULTURE RESULTS: SIGNIFICANT CHANGE UP
SPECIMEN SOURCE: SIGNIFICANT CHANGE UP

## 2017-01-15 PROCEDURE — 99232 SBSQ HOSP IP/OBS MODERATE 35: CPT

## 2017-01-15 RX ORDER — SOD,AMMONIUM,POTASSIUM LACTATE
1 CREAM (GRAM) TOPICAL
Qty: 0 | Refills: 0 | Status: DISCONTINUED | OUTPATIENT
Start: 2017-01-15 | End: 2017-02-02

## 2017-01-15 RX ORDER — FUROSEMIDE 40 MG
20 TABLET ORAL DAILY
Qty: 0 | Refills: 0 | Status: DISCONTINUED | OUTPATIENT
Start: 2017-01-15 | End: 2017-01-16

## 2017-01-15 RX ADMIN — Medication 325 MILLIGRAM(S): at 12:00

## 2017-01-15 RX ADMIN — ATORVASTATIN CALCIUM 20 MILLIGRAM(S): 80 TABLET, FILM COATED ORAL at 21:33

## 2017-01-15 RX ADMIN — Medication 1 DROP(S): at 12:00

## 2017-01-15 RX ADMIN — Medication 1 TABLET(S): at 17:09

## 2017-01-15 RX ADMIN — HEPARIN SODIUM 5000 UNIT(S): 5000 INJECTION INTRAVENOUS; SUBCUTANEOUS at 05:34

## 2017-01-15 RX ADMIN — Medication 650 MILLIGRAM(S): at 05:34

## 2017-01-15 RX ADMIN — INSULIN GLARGINE 18 UNIT(S): 100 INJECTION, SOLUTION SUBCUTANEOUS at 21:33

## 2017-01-15 RX ADMIN — Medication 40 MILLIGRAM(S): at 05:34

## 2017-01-15 RX ADMIN — CARBIDOPA AND LEVODOPA 1 TABLET(S): 25; 100 TABLET ORAL at 12:55

## 2017-01-15 RX ADMIN — Medication 4: at 17:09

## 2017-01-15 RX ADMIN — Medication 6 MILLIGRAM(S): at 21:33

## 2017-01-15 RX ADMIN — Medication 100 MILLIGRAM(S): at 17:09

## 2017-01-15 RX ADMIN — Medication 1 APPLICATION(S): at 17:09

## 2017-01-15 RX ADMIN — LISINOPRIL 10 MILLIGRAM(S): 2.5 TABLET ORAL at 05:34

## 2017-01-15 RX ADMIN — Medication 1 APPLICATION(S): at 21:32

## 2017-01-15 RX ADMIN — Medication 1 APPLICATION(S): at 05:34

## 2017-01-15 RX ADMIN — NYSTATIN CREAM 1 APPLICATION(S): 100000 CREAM TOPICAL at 21:34

## 2017-01-15 RX ADMIN — NYSTATIN CREAM 1 APPLICATION(S): 100000 CREAM TOPICAL at 05:35

## 2017-01-15 RX ADMIN — CARBIDOPA AND LEVODOPA 1 TABLET(S): 25; 100 TABLET ORAL at 21:33

## 2017-01-15 RX ADMIN — NYSTATIN CREAM 1 APPLICATION(S): 100000 CREAM TOPICAL at 12:55

## 2017-01-15 RX ADMIN — AMLODIPINE BESYLATE 10 MILLIGRAM(S): 2.5 TABLET ORAL at 05:34

## 2017-01-15 RX ADMIN — HEPARIN SODIUM 5000 UNIT(S): 5000 INJECTION INTRAVENOUS; SUBCUTANEOUS at 17:09

## 2017-01-15 RX ADMIN — Medication 2: at 12:00

## 2017-01-15 RX ADMIN — SENNA PLUS 1 TABLET(S): 8.6 TABLET ORAL at 21:34

## 2017-01-15 RX ADMIN — Medication 100 MILLIGRAM(S): at 05:34

## 2017-01-15 RX ADMIN — Medication 1 TABLET(S): at 05:34

## 2017-01-15 RX ADMIN — CARBIDOPA AND LEVODOPA 1 TABLET(S): 25; 100 TABLET ORAL at 05:34

## 2017-01-15 RX ADMIN — Medication 81 MILLIGRAM(S): at 12:00

## 2017-01-15 NOTE — PROGRESS NOTE ADULT - SUBJECTIVE AND OBJECTIVE BOX
76 RH M with PMH of DM admitted  c/o not feeling well at home with lethargy for last few days PTA and not eating/drinking with vomiting. Found to have severe Hyperglycemia, with a BG > 1100 and a venous pH of 6.9. His anion gap was 43. He was started on IV hydration and an insulin infusion for DKA. Surgery/wound care evaluated patient for foul smelling b/l groin/perineal/scrotal infection in b/l inguinal region, medial thighs, lower abdominal wall, scrotum and perineum. Appears to be Tinea cruris and not likely necrotizing soft tissue infection per surgery. CTH  for increased lethargy revealed acute/subacute small to moderate right occipital lobe infarct. Neurology evaluated patient and started on statin/ASA. MRI acute b/l supratentorial and infratentorial infarcts with mild petechial hemorrhage in right occipital lobe. Carotid dopplers and TTE WNL. Patients mentation and blood sugars gradually improving. JESUS MANUEL  revealed PFO. Venous duplex 1/10 revealed acute DVT right post tibial vein.  Pt not a good candidate for AC per Neuro and Cardio - s/p - IVC filter placement   Dysphagia diet: puree with honey.  Pt medically stabilized and transferred to acute inpatient rehab 17      PAST MEDICAL & SURGICAL HISTORY  High cholesterol  Diabetes  Hypertension  S/P hip hemiarthroplasty  No significant past surgical history  Parkinson's Disease    Allergies: NKDA    SOCIAL HISTORY  [place] 6[place]7 steps  to enter with a handrail.  Uses a cane for ambulation.	    CURRENT FUNCTIONAL STATUS  Gait - Pt ambulated 20' with RW mod A  Bed mobility - max assist  Transfers - sit to stand mod assist  Bed transfers max/total A  ADLs Grooming min A, bathing max A, UE dress mod A, LE dress total A      RECENT LABS/IMAGING                        8.4    5.70  )-----------( 225      ( 2017 09:27 )             25.0     2017 09:27    132    |  95     |  13.0   ----------------------------<  171    3.8     |  25.0   |  0.90     Ca    7.8        2017 09:27    TPro  5.1    /  Alb  2.4    /  TBili  0.1    /  DBili  x      /  AST  11     /  ALT  <5     /  AlkPhos  63     2017 09:27    Urinalysis Basic - ( 2017 12:28 )    Color: Yellow / Appearance: very cloudy / S.015 / pH: x  Gluc: x / Ketone: Negative  / Bili: Negative / Urobili: Negative mg/dL   Blood: x / Protein: 100 mg/dL / Nitrite: Negative   Leuk Esterase: Moderate / RBC: 3-5 /HPF / WBC >50   Sq Epi: x / Non Sq Epi: Few / Bacteria: Occasional        VITALS  T(C): 37, Max: 37 (-04 @ 11:00)  HR: 78 (74 - 89)  BP: 121/58 (109/59 - 176/79)  RR: 22 (9 - 22)  SpO2: 97% (94% - 99%)      MEDICATIONS   atorvastatin 20milliGRAM(s) Oral at bedtime  timolol 0.5% Solution 1Drop(s) Both EYES daily  hydrALAZINE Injectable 10milliGRAM(s) IV Push every 4 hours PRN  dextrose 5%. 1000milliLiter(s) IV Continuous <Continuous>  dextrose Gel 1Dose(s) Oral once PRN  dextrose 50% Injectable 12.5Gram(s) IV Push once  dextrose 50% Injectable 25Gram(s) IV Push once  dextrose 50% Injectable 25Gram(s) IV Push once  glucagon  Injectable 1milliGRAM(s) IntraMuscular once PRN  nystatin Powder 1Application(s) Topical three times a day  insulin glargine Injectable (LANTUS) 20Unit(s) SubCutaneous at bedtime  carbidopa/levodopa  25/100 1Tablet(s) Oral three times a day  ondansetron Injectable 4milliGRAM(s) IV Push every 6 hours PRN  amLODIPine   Tablet 10milliGRAM(s) Oral daily  BACItracin   Ointment 1Application(s) Topical two times a day  sodium chloride 0.45%. 1000milliLiter(s) IV Continuous <Continuous>  insulin lispro (HumaLOG) corrective regimen sliding scale  SubCutaneous every 6 hours  aspirin  chewable 81milliGRAM(s) Oral daily    Subjective:  Pt reports that he slept better.  Pt c/o constipation, dysuria, urinary frequency and urgency.    ----------------------------------------------------------------------------------------  PHYSICAL EXAM  Constitutional - NAD, Comfortable  Chest - CTAB, No wheeze, No rhonchi  Cardiovascular - RRR, S1S2  Abdomen - BS+, Soft, NTND  Extremities - No C/C/E, No calf tenderness, + bilateral venous stasis changes with mild BLE edema  Neurologic Exam -                    Cognitive - Awake, Alert, AAO to self, place, date, year, situation, names objects     Communication - Fluent, No dysarthria     Cranial Nerves - CN 2-12 intact grossly     Motor - No focal deficits.  3-4/5     Sensory - Intact to LT     Psychiatric - Mood stable, Affect WNL  ----------------------------------------------------------------------------------------  ASSESSMENT/PLAN: 76  year old male admitted with DKA and finding of bilateral supratentorial and infratentorial strokes and w/u in progress:     - Continue comprehensive PT and OT - strength training, mobility, gait training, ADLs, balance  - ST - Pt upgraded dysphagia diet to regular diet and thin liquids  with close supervision with all po intake  - Precautions - Fall, aspiration  -CVA-ASA  +Acute RLE DVT-not a good candidate for AC per Neuro and Cardio - s/p - IVC filter placement  Cont heparin 5,000 SC q12 and ASA  -Diabetes-Insulin SS, Lantus.  Monitor FS; 157, 136 this a.m, Consistent carb diet  -HTN-Lasix, Lisinopril, DASH diet  -HLD-Lipitor  -Parkinsons Disease-Sinemet  -Anemia-Cont Fe  -Bowel regimen-Senna, colace  -Protein Malnutrition-Ensure  -Hyponatremia-F/U BMP in a.m  Consider fluid restriction  -UTI-+UA  Placed on Bactrim empirically . Low grade temp 100.4 this a.m  F/U urine C&S  -Groin fungal infection-cont Nystatin 76 RH M with PMH of DM admitted  c/o not feeling well at home with lethargy for last few days PTA and not eating/drinking with vomiting. Found to have severe Hyperglycemia, with a BG > 1100 and a venous pH of 6.9. His anion gap was 43. He was started on IV hydration and an insulin infusion for DKA. Surgery/wound care evaluated patient for foul smelling b/l groin/perineal/scrotal infection in b/l inguinal region, medial thighs, lower abdominal wall, scrotum and perineum. Appears to be Tinea cruris and not likely necrotizing soft tissue infection per surgery. CTH  for increased lethargy revealed acute/subacute small to moderate right occipital lobe infarct. Neurology evaluated patient and started on statin/ASA. MRI acute b/l supratentorial and infratentorial infarcts with mild petechial hemorrhage in right occipital lobe. Carotid dopplers and TTE WNL. Patients mentation and blood sugars gradually improving. JESUS MANUEL  revealed PFO. Venous duplex 1/10 revealed acute DVT right post tibial vein.  Pt not a good candidate for AC per Neuro and Cardio - s/p - IVC filter placement   Dysphagia diet: puree with honey.  Pt medically stabilized and transferred to acute inpatient rehab 17      PAST MEDICAL & SURGICAL HISTORY  High cholesterol  Diabetes  Hypertension  S/P hip hemiarthroplasty  No significant past surgical history  Parkinson's Disease    Allergies: NKDA    SOCIAL HISTORY  Pt. [place] 6/7 steps  to enter with a handrail.  Uses a cane for ambulation.	    CURRENT FUNCTIONAL STATUS  Gait - Pt ambulated 20' with RW mod A  Bed mobility - max assist  Transfers - sit to stand mod assist  Bed transfers max/total A  ADLs Grooming min A, bathing max A, UE dress mod A, LE dress total A      RECENT LABS/IMAGING                        8.4    5.70  )-----------( 225      ( 2017 09:27 )             25.0     2017 09:27    132    |  95     |  13.0   ----------------------------<  171    3.8     |  25.0   |  0.90     Ca    7.8        2017 09:27    TPro  5.1    /  Alb  2.4    /  TBili  0.1    /  DBili  x      /  AST  11     /  ALT  <5     /  AlkPhos  63     2017 09:27    Urinalysis Basic - ( 2017 12:28 )    Color: Yellow / Appearance: very cloudy / S.015 / pH: x  Gluc: x / Ketone: Negative  / Bili: Negative / Urobili: Negative mg/dL   Blood: x / Protein: 100 mg/dL / Nitrite: Negative   Leuk Esterase: Moderate / RBC: 3-5 /HPF / WBC >50   Sq Epi: x / Non Sq Epi: Few / Bacteria: Occasional        VITALS  T(C): 37, Max: 37 (-04 @ 11:00)  HR: 78 (74 - 89)  BP: 121/58 (109/59 - 176/79)  RR: 22 (9 - 22)  SpO2: 97% (94% - 99%)      MEDICATIONS   atorvastatin 20milliGRAM(s) Oral at bedtime  timolol 0.5% Solution 1Drop(s) Both EYES daily  hydrALAZINE Injectable 10milliGRAM(s) IV Push every 4 hours PRN  dextrose 5%. 1000milliLiter(s) IV Continuous <Continuous>  dextrose Gel 1Dose(s) Oral once PRN  dextrose 50% Injectable 12.5Gram(s) IV Push once  dextrose 50% Injectable 25Gram(s) IV Push once  dextrose 50% Injectable 25Gram(s) IV Push once  glucagon  Injectable 1milliGRAM(s) IntraMuscular once PRN  nystatin Powder 1Application(s) Topical three times a day  insulin glargine Injectable (LANTUS) 20Unit(s) SubCutaneous at bedtime  carbidopa/levodopa  25/100 1Tablet(s) Oral three times a day  ondansetron Injectable 4milliGRAM(s) IV Push every 6 hours PRN  amLODIPine   Tablet 10milliGRAM(s) Oral daily  BACItracin   Ointment 1Application(s) Topical two times a day  sodium chloride 0.45%. 1000milliLiter(s) IV Continuous <Continuous>  insulin lispro (HumaLOG) corrective regimen sliding scale  SubCutaneous every 6 hours  aspirin  chewable 81milliGRAM(s) Oral daily    Subjective:  Pt reports that he slept better.  Pt c/o constipation, dysuria, urinary frequency and urgency.    ----------------------------------------------------------------------------------------  PHYSICAL EXAM  Constitutional - NAD, Comfortable  Chest - CTAB, No wheeze, No rhonchi  Cardiovascular - RRR, S1S2  Abdomen - BS+, Soft, NTND  Extremities - No C/C/E, No calf tenderness, + bilateral venous stasis changes with mild BLE edema  Neurologic Exam -                    Cognitive - Awake, Alert, AAO to self, place, date, year, situation, names objects     Communication - Fluent, No dysarthria     Cranial Nerves - CN 2-12 intact grossly     Motor - No focal deficits.  3-4/5     Sensory - Intact to LT     Psychiatric - Mood stable, Affect WNL  ----------------------------------------------------------------------------------------  ASSESSMENT/PLAN: 76  year old male admitted with DKA and finding of bilateral supratentorial and infratentorial strokes and w/u in progress:     - Continue comprehensive PT and OT - strength training, mobility, gait training, ADLs, balance  - ST - Pt upgraded dysphagia diet to regular diet and thin liquids  with close supervision with all po intake  - Precautions - Fall, aspiration  -CVA-ASA  +Acute RLE DVT-not a good candidate for AC per Neuro and Cardio - s/p - IVC filter placement  Cont heparin 5,000 SC q12 and ASA  -Diabetes-Insulin SS, Lantus.  Monitor FS; 157, 136 this a.m, Consistent carb diet  -HTN-Lasix, Lisinopril, DASH diet  -HLD-Lipitor  -Parkinsons Disease-Sinemet  -Anemia-Cont Fe  -Bowel regimen-Senna, colace  -Protein Malnutrition-Ensure  -Hyponatremia-F/U BMP in a.m  Consider fluid restriction  -UTI-+UA  Placed on Bactrim empirically . Low grade temp 100.4 this a.m  F/U urine C&S  Urinary retention-Bladder scan 730cc, straight cathd 700cc.  Will check bladder scans q6hrs  -Groin fungal infection-cont Nystatin

## 2017-01-16 DIAGNOSIS — R33.9 RETENTION OF URINE, UNSPECIFIED: ICD-10-CM

## 2017-01-16 DIAGNOSIS — N30.00 ACUTE CYSTITIS WITHOUT HEMATURIA: ICD-10-CM

## 2017-01-16 LAB
ANION GAP SERPL CALC-SCNC: 11 MMOL/L — SIGNIFICANT CHANGE UP (ref 5–17)
BUN SERPL-MCNC: 17 MG/DL — SIGNIFICANT CHANGE UP (ref 8–20)
CALCIUM SERPL-MCNC: 8.6 MG/DL — SIGNIFICANT CHANGE UP (ref 8.6–10.2)
CHLORIDE SERPL-SCNC: 97 MMOL/L — LOW (ref 98–107)
CO2 SERPL-SCNC: 27 MMOL/L — SIGNIFICANT CHANGE UP (ref 22–29)
CREAT SERPL-MCNC: 1.1 MG/DL — SIGNIFICANT CHANGE UP (ref 0.5–1.3)
GLUCOSE SERPL-MCNC: 181 MG/DL — HIGH (ref 70–115)
HCT VFR BLD CALC: 26.3 % — LOW (ref 42–52)
HGB BLD-MCNC: 9 G/DL — LOW (ref 14–18)
MCHC RBC-ENTMCNC: 31.9 PG — HIGH (ref 27–31)
MCHC RBC-ENTMCNC: 34.2 G/DL — SIGNIFICANT CHANGE UP (ref 32–36)
MCV RBC AUTO: 93.3 FL — SIGNIFICANT CHANGE UP (ref 80–94)
PLATELET # BLD AUTO: 227 K/UL — SIGNIFICANT CHANGE UP (ref 150–400)
POTASSIUM SERPL-MCNC: 4.1 MMOL/L — SIGNIFICANT CHANGE UP (ref 3.5–5.3)
POTASSIUM SERPL-SCNC: 4.1 MMOL/L — SIGNIFICANT CHANGE UP (ref 3.5–5.3)
RBC # BLD: 2.82 M/UL — LOW (ref 4.6–6.2)
RBC # FLD: 13 % — SIGNIFICANT CHANGE UP (ref 11–15.6)
SODIUM SERPL-SCNC: 135 MMOL/L — SIGNIFICANT CHANGE UP (ref 135–145)
WBC # BLD: 5.28 K/UL — SIGNIFICANT CHANGE UP (ref 4.8–10.8)
WBC # FLD AUTO: 5.28 K/UL — SIGNIFICANT CHANGE UP (ref 4.8–10.8)

## 2017-01-16 PROCEDURE — 99233 SBSQ HOSP IP/OBS HIGH 50: CPT

## 2017-01-16 PROCEDURE — 99232 SBSQ HOSP IP/OBS MODERATE 35: CPT | Mod: GC

## 2017-01-16 RX ORDER — FUROSEMIDE 40 MG
20 TABLET ORAL ONCE
Qty: 0 | Refills: 0 | Status: COMPLETED | OUTPATIENT
Start: 2017-01-16 | End: 2017-01-16

## 2017-01-16 RX ORDER — CEFTRIAXONE 500 MG/1
1 INJECTION, POWDER, FOR SOLUTION INTRAMUSCULAR; INTRAVENOUS EVERY 24 HOURS
Qty: 0 | Refills: 0 | Status: DISCONTINUED | OUTPATIENT
Start: 2017-01-17 | End: 2017-01-17

## 2017-01-16 RX ORDER — TAMSULOSIN HYDROCHLORIDE 0.4 MG/1
0.4 CAPSULE ORAL AT BEDTIME
Qty: 0 | Refills: 0 | Status: DISCONTINUED | OUTPATIENT
Start: 2017-01-16 | End: 2017-01-25

## 2017-01-16 RX ORDER — FUROSEMIDE 40 MG
40 TABLET ORAL DAILY
Qty: 0 | Refills: 0 | Status: DISCONTINUED | OUTPATIENT
Start: 2017-01-17 | End: 2017-02-02

## 2017-01-16 RX ORDER — CEFTRIAXONE 500 MG/1
INJECTION, POWDER, FOR SOLUTION INTRAMUSCULAR; INTRAVENOUS
Qty: 0 | Refills: 0 | Status: DISCONTINUED | OUTPATIENT
Start: 2017-01-16 | End: 2017-01-17

## 2017-01-16 RX ORDER — CEFTRIAXONE 500 MG/1
1 INJECTION, POWDER, FOR SOLUTION INTRAMUSCULAR; INTRAVENOUS ONCE
Qty: 0 | Refills: 0 | Status: COMPLETED | OUTPATIENT
Start: 2017-01-16 | End: 2017-01-16

## 2017-01-16 RX ADMIN — Medication 6: at 12:27

## 2017-01-16 RX ADMIN — NYSTATIN CREAM 1 APPLICATION(S): 100000 CREAM TOPICAL at 22:03

## 2017-01-16 RX ADMIN — Medication 2: at 21:55

## 2017-01-16 RX ADMIN — INSULIN GLARGINE 18 UNIT(S): 100 INJECTION, SOLUTION SUBCUTANEOUS at 21:55

## 2017-01-16 RX ADMIN — CARBIDOPA AND LEVODOPA 1 TABLET(S): 25; 100 TABLET ORAL at 12:26

## 2017-01-16 RX ADMIN — TAMSULOSIN HYDROCHLORIDE 0.4 MILLIGRAM(S): 0.4 CAPSULE ORAL at 21:54

## 2017-01-16 RX ADMIN — Medication 81 MILLIGRAM(S): at 12:25

## 2017-01-16 RX ADMIN — Medication 6 MILLIGRAM(S): at 21:54

## 2017-01-16 RX ADMIN — Medication 1 DROP(S): at 12:25

## 2017-01-16 RX ADMIN — LISINOPRIL 10 MILLIGRAM(S): 2.5 TABLET ORAL at 05:47

## 2017-01-16 RX ADMIN — Medication 325 MILLIGRAM(S): at 12:25

## 2017-01-16 RX ADMIN — SENNA PLUS 1 TABLET(S): 8.6 TABLET ORAL at 21:54

## 2017-01-16 RX ADMIN — Medication 20 MILLIGRAM(S): at 05:47

## 2017-01-16 RX ADMIN — NYSTATIN CREAM 1 APPLICATION(S): 100000 CREAM TOPICAL at 05:47

## 2017-01-16 RX ADMIN — HEPARIN SODIUM 5000 UNIT(S): 5000 INJECTION INTRAVENOUS; SUBCUTANEOUS at 18:22

## 2017-01-16 RX ADMIN — Medication 20 MILLIGRAM(S): at 09:10

## 2017-01-16 RX ADMIN — Medication 1 TABLET(S): at 05:47

## 2017-01-16 RX ADMIN — Medication 1 APPLICATION(S): at 18:22

## 2017-01-16 RX ADMIN — Medication 100 MILLIGRAM(S): at 05:46

## 2017-01-16 RX ADMIN — NYSTATIN CREAM 1 APPLICATION(S): 100000 CREAM TOPICAL at 12:25

## 2017-01-16 RX ADMIN — ATORVASTATIN CALCIUM 20 MILLIGRAM(S): 80 TABLET, FILM COATED ORAL at 21:54

## 2017-01-16 RX ADMIN — Medication 1 APPLICATION(S): at 05:46

## 2017-01-16 RX ADMIN — Medication 1 APPLICATION(S): at 05:47

## 2017-01-16 RX ADMIN — CARBIDOPA AND LEVODOPA 1 TABLET(S): 25; 100 TABLET ORAL at 05:46

## 2017-01-16 RX ADMIN — CEFTRIAXONE 100 GRAM(S): 500 INJECTION, POWDER, FOR SOLUTION INTRAMUSCULAR; INTRAVENOUS at 10:06

## 2017-01-16 RX ADMIN — Medication 2: at 09:09

## 2017-01-16 RX ADMIN — HEPARIN SODIUM 5000 UNIT(S): 5000 INJECTION INTRAVENOUS; SUBCUTANEOUS at 05:47

## 2017-01-16 RX ADMIN — AMLODIPINE BESYLATE 10 MILLIGRAM(S): 2.5 TABLET ORAL at 05:47

## 2017-01-16 RX ADMIN — Medication 1 APPLICATION(S): at 18:24

## 2017-01-16 RX ADMIN — Medication 4: at 18:23

## 2017-01-16 RX ADMIN — Medication 100 MILLIGRAM(S): at 18:22

## 2017-01-16 RX ADMIN — CARBIDOPA AND LEVODOPA 1 TABLET(S): 25; 100 TABLET ORAL at 21:54

## 2017-01-16 NOTE — CONSULT NOTE ADULT - PROBLEM SELECTOR RECOMMENDATION 9
Patient was started on flomax today.  Continue with flomax.  As patient is miserable with each catheterization, I recommend an indwelling healy for several days.  TOV toward end of week.

## 2017-01-16 NOTE — PROGRESS NOTE ADULT - SUBJECTIVE AND OBJECTIVE BOX
HISTORY OF PRESENT ILLNESS  76 RH M with PMH of DM admitted  c/o not feeling well at home with lethargy for last few days PTA and not eating/drinking with vomiting. Found to have severe Hyperglycemia, with a BG > 1100 and a venous pH of 6.9. His anion gap was 43. He was started on IV hydration and an insulin infusion for DKA. Surgery/wound care evaluated patient for foul smelling b/l groin/perineal/scrotal infection in b/l inguinal region, medial thighs, lower abdominal wall, scrotum and perineum. Appears to be Tinea cruris and not likely necrotizing soft tissue infection per surgery. CTH  for increased lethargy revealed acute/subacute small to moderate right occipital lobe infarct. Neurology evaluated patient and started on statin/ASA. MRI acute b/l supratentorial and infratentorial infarcts with mild petechial hemorrhage in right occipital lobe. Carotid dopplers and TTE WNL. Patients mentation and blood sugars gradually improving. JESUS MANUEL  revealed PFO. Venous duplex 1/10 revealed acute DVT right post tibial vein.  Pt not a good candidate for AC per Neuro and Cardio - s/p - IVC filter placement   Dysphagia diet: puree with honey.  Pt medically stabilized and transferred to acute inpatient rehab 17      PAST MEDICAL & SURGICAL HISTORY  High cholesterol  Diabetes  Hypertension  S/P hip hemiarthroplasty  No significant past surgical history  Parkinson's Disease    INTERVAL SUBJECTIVE AND OBJECTIVE  Patient seen and examined at bedside. No acute overnight events, continues to have urinary retention and burning with urination and cloudy urine, was switched from PO to IV antibioitcs per medicine and repeat urine culture is pending. Urology consult (Dr. Blanco) placed and case discussed, to see patient today.  Increase lower extremity edema, lasix increased per medicine. He denies abdominal pain or constipation, last BM .      CURRENT FUNCTIONAL STATUS  Gait - Pt ambulated 20' with RW mod A  Bed mobility - max assist  Transfers - toilet transfer stand pivot with RW with moderate assistance.  Bed transfers mod assist  ADLs Grooming min A, bathing max A, UE dress mod A, LE dress total A      RECENT LABS/IMAGING                        9.0    5.28  )-----------( 227      ( 2017 08:26 )             26.3   2017 08:26    135    |  97     |  17.0   ----------------------------<  181    4.1     |  27.0   |  1.10     Ca    8.6        2017 08:26    2017 08:26    135    |  97     |  17.0   ----------------------------<  181    4.1     |  27.0   |  1.10     Ca    8.6        2017 08:26    Urinalysis Basic - ( 2017 12:28 )    Color: Yellow / Appearance: very cloudy / S.015 / pH: x  Gluc: x / Ketone: Negative  / Bili: Negative / Urobili: Negative mg/dL   Blood: x / Protein: 100 mg/dL / Nitrite: Negative   Leuk Esterase: Moderate / RBC: 3-5 /HPF / WBC >50   Sq Epi: x / Non Sq Epi: Few / Bacteria: Occasional    Culture - Urine (17 @ 12:28)    Specimen Source: .Urine Clean Catch (Midstream)    Culture Results:   Culture grew 3 or more types of organisms which indicate  collection contamination; consider recollection only if clinically  indicated.        VITALS  Vital Signs Last 24 Hrs  T(C): 36.7, Max: 37.4 ( @ 05:10)  T(F): 98.1, Max: 99.3 ( @ 05:10)  HR: 59 (59 - 71)  BP: 125/68 (125/68 - 134/71)  BP(mean): --  RR: 18 (16 - 18)  SpO2: 95% (92% - 95%)      MEDICATIONS  (STANDING):  aspirin enteric coated 81milliGRAM(s) Oral daily  lisinopril 10milliGRAM(s) Oral daily  amLODIPine   Tablet 10milliGRAM(s) Oral daily  carbidopa/levodopa  25/100 1Tablet(s) Oral three times a day  atorvastatin 20milliGRAM(s) Oral at bedtime  timolol 0.5% Solution 1Drop(s) Both EYES daily  insulin lispro (HumaLOG) corrective regimen sliding scale  SubCutaneous three times a day before meals  heparin  Injectable 5000Unit(s) SubCutaneous every 12 hours  insulin glargine Injectable (LANTUS) 18Unit(s) SubCutaneous at bedtime  insulin lispro (HumaLOG) corrective regimen sliding scale  SubCutaneous at bedtime  BACItracin   Ointment 1Application(s) Topical two times a day  nystatin Powder 1Application(s) Topical three times a day  melatonin. 6milliGRAM(s) Oral at bedtime  ferrous    sulfate 325milliGRAM(s) Oral daily  docusate sodium 100milliGRAM(s) Oral two times a day  senna 1Tablet(s) Oral at bedtime  ammonium lactate 12% Lotion 1Application(s) Topical two times a day  cefTRIAXone   IVPB  IV Intermittent     MEDICATIONS  (PRN):  acetaminophen   Tablet 650milliGRAM(s) Oral every 6 hours PRN For Temp greater than 38 C (100.4 F)  acetaminophen   Tablet. 650milliGRAM(s) Oral every 6 hours PRN Mild Pain (1 - 3)    ----------------------------------------------------------------------------------------  PHYSICAL EXAM  Constitutional - NAD, Comfortable  Chest - CTAB, No wheeze, No rhonchi  Cardiovascular - RRR, S1S2  Abdomen - BS+, Soft, NTND  Extremities - No C/C/E, No calf tenderness, + bilateral venous stasis changes with moderate BLE edema  Neurologic Exam -                    Cognitive - Awake, Alert, AAO to self, place, date, year, situation, names objects     Communication - Fluent, No dysarthria     Cranial Nerves - CN 2-12 intact grossly     Motor - No focal deficits.  3-4/5     Sensory - Intact to LT     Psychiatric - Mood stable, Affect WNL  ----------------------------------------------------------------------------------------  ASSESSMENT/PLAN: 76  year old male admitted with DKA and finding of bilateral supratentorial and infratentorial strokes and w/u in progress:     - Continue comprehensive PT and OT - strength training, mobility, gait training, ADLs, balance  - ST - Pt upgraded dysphagia diet to regular diet and thin liquids  with close supervision with all po intake  - Precautions - Fall, aspiration  -CVA-ASA  +Acute RLE DVT-not a good candidate for AC per Neuro and Cardio - s/p - IVC filter placement  Cont heparin 5,000 SC q12 and ASA  -Diabetes-Insulin SS, Lantus.  Monitor FS; 157, 136 this a.m, Consistent carb diet  -HTN-Lasix increased ), Lisinopril, DASH diet  -HLD-Lipitor  -Parkinsons Disease-Sinemet  -Anemia-Cont Fe  -Bowel regimen-Senna, colace  -Protein Malnutrition-Ensure  -Hyponatremia-F/U BMP in a.m  Consider fluid restriction  -Urinary retention/UTI - BS/IC Q6hrs, DC Bactrim change to Rocephin Q24hrs (start), start Flomax 0.4 QHS, Urology consult (Dr. Blanco) called today  -Groin fungal infection-cont Nystatin  -Sleep - melatonin 6mg QHS

## 2017-01-16 NOTE — PROGRESS NOTE ADULT - SUBJECTIVE AND OBJECTIVE BOX
Patient seen and examined .       76Y M with PMH of DM, HTN, admitted for DKA and AMS, admitted to ICU, started on insulin drip, anion gap closed, off insulin drip, on Lantus 15 units. He was also noted to foul smelling discharge from groin, seen bu surgery, looks fungal infection, started on Nystatin powder, improving. MRI brain showed b/l stroke with mild petechial hemorrhage in right occipital lobe, Stroke work up was done. ECHO - EF - 55%, JESUS MANUEL - showed large  and acute DVT in LLE -He was deemed not a good candidate for AC per Neuro and Cardio 2/2 Parkinson's  and multiple falls. Hence  - decided to undergo- IVC filter placement on . He was cleared by neuro and cardio for discharge. He was discharged in stable condition to Acute rehab.      PAST MEDICAL & SURGICAL HISTORY:  High cholesterol  Diabetes  Hypertension  S/P hip hemiarthroplasty: right hip repair 2014  No significant past surgical history      MEDICATIONS  (STANDING):  aspirin enteric coated 81milliGRAM(s) Oral daily  lisinopril 10milliGRAM(s) Oral daily  amLODIPine   Tablet 10milliGRAM(s) Oral daily  carbidopa/levodopa  25/100 1Tablet(s) Oral three times a day  atorvastatin 20milliGRAM(s) Oral at bedtime  timolol 0.5% Solution 1Drop(s) Both EYES daily  insulin lispro (HumaLOG) corrective regimen sliding scale  SubCutaneous three times a day before meals  heparin  Injectable 5000Unit(s) SubCutaneous every 12 hours  insulin glargine Injectable (LANTUS) 18Unit(s) SubCutaneous at bedtime  insulin lispro (HumaLOG) corrective regimen sliding scale  SubCutaneous at bedtime  BACItracin   Ointment 1Application(s) Topical two times a day  nystatin Powder 1Application(s) Topical three times a day  melatonin. 6milliGRAM(s) Oral at bedtime  ferrous    sulfate 325milliGRAM(s) Oral daily  docusate sodium 100milliGRAM(s) Oral two times a day  senna 1Tablet(s) Oral at bedtime  trimethoprim  160 mG/sulfamethoxazole 800 mG 1Tablet(s) Oral two times a day  ammonium lactate 12% Lotion 1Application(s) Topical two times a day  furosemide    Tablet 20milliGRAM(s) Oral daily    MEDICATIONS  (PRN):  acetaminophen   Tablet 650milliGRAM(s) Oral every 6 hours PRN For Temp greater than 38 C (100.4 F)  acetaminophen   Tablet. 650milliGRAM(s) Oral every 6 hours PRN Mild Pain (1 - 3)      LABS:                          8.4    5.70  )-----------( 225      ( 2017 09:27 )             25.0     2017 09:27    132    |  95     |  13.0   ----------------------------<  171    3.8     |  25.0   |  0.90     Ca    7.8        2017 09:27    TPro  5.1    /  Alb  2.4    /  TBili  0.1    /  DBili  x      /  AST  11     /  ALT  <5     /  AlkPhos  63     2017 09:27      Urinalysis Basic - ( 2017 12:28 )    Color: Yellow / Appearance: very cloudy / S.015 / pH: x  Gluc: x / Ketone: Negative  / Bili: Negative / Urobili: Negative mg/dL   Blood: x / Protein: 100 mg/dL / Nitrite: Negative   Leuk Esterase: Moderate / RBC: 3-5 /HPF / WBC >50   Sq Epi: x / Non Sq Epi: Few / Bacteria: Occasional        RADIOLOGY & ADDITIONAL TESTS:      REVIEW OF SYSTEMS:    CONSTITUTIONAL: No fever, weight loss, or fatigue  EYES: No eye pain, visual disturbances, or discharge  ENMT:  No difficulty hearing, tinnitus, vertigo; No sinus or throat pain  NECK: No pain or stiffness  RESPIRATORY: No cough, wheezing, chills or hemoptysis; No shortness of breath  CARDIOVASCULAR: No chest pain, palpitations, dizziness, or leg swelling  GASTROINTESTINAL: No abdominal or epigastric pain. No nausea, vomiting, or hematemesis; No diarrhea or constipation. No melena or hematochezia.  GENITOURINARY: No dysuria, frequency, hematuria, or incontinence  NEUROLOGICAL: No headaches, memory loss, loss of strength, numbness, or tremors  SKIN: No itching, burning, rashes, or lesions   LYMPH NODES: No enlarged glands  ENDOCRINE: No heat or cold intolerance; No hair loss  MUSCULOSKELETAL:   PSYCHIATRIC: No depression, anxiety, mood swings, or difficulty sleeping  HEME/LYMPH: No easy bruising, or bleeding gums  ALLERGY AND IMMUNOLOGIC: No hives or eczema    Vital Signs Last 24 Hrs  T(C): 37.4, Max: 37.4 ( @ 05:10)  T(F): 99.3, Max: 99.3 ( @ 05:10)  HR: 69 (69 - 82)  BP: 133/70 (96/54 - 134/71)  BP(mean): --  RR: 17 (16 - 18)  SpO2: 92% (92% - 94%)    f/s -166    PHYSICAL EXAM:    GENERAL: NAD, well-groomed, well-developed  HEAD:  Atraumatic, Normocephalic  EYES: EOMI, PERRLA, conjunctiva and sclera clear  NECK: Supple, No JVD, Normal thyroid  NERVOUS SYSTEM:  Alert & Oriented X3, no focal deficit  CHEST/LUNG: CTA b/l ,  no  rales, rhonchi, wheezing, or rubs  HEART: Regular rate and rhythm; No murmurs, rubs, or gallops  ABDOMEN: Soft, Nontender, Nondistended; Bowel sounds present  EXTREMITIES:  2+ Peripheral Pulses, No clubbing, cyanosis, or edema  LYMPH: No lymphadenopathy noted  SKIN: No rashes or lesions Patient seen and examined . C/O dysuria , urinary retention      76Y M with PMH of DM, HTN, admitted for DKA and AMS, admitted to ICU, started on insulin drip, anion gap closed, off insulin drip, on Lantus 15 units. He was also noted to foul smelling discharge from groin, seen bu surgery, looks fungal infection, started on Nystatin powder, improving. MRI brain showed b/l stroke with mild petechial hemorrhage in right occipital lobe, Stroke work up was done. ECHO - EF - 55%, JESUS MANUEL - showed large  and acute DVT in LLE -He was deemed not a good candidate for AC per Neuro and Cardio 2/2 Parkinson's  and multiple falls. Hence  - decided to undergo- IVC filter placement on . He was cleared by neuro and cardio for discharge. He was discharged in stable condition to Acute rehab.    CC: urinary retention , dysuria ,all other systems reviewed and negative    PAST MEDICAL & SURGICAL HISTORY:    High cholesterol  Diabetes  Hypertension  S/P hip hemiarthroplasty: right hip repair 2014  No significant past surgical history      MEDICATIONS  (STANDING):  aspirin enteric coated 81milliGRAM(s) Oral daily  lisinopril 10milliGRAM(s) Oral daily  amLODIPine   Tablet 10milliGRAM(s) Oral daily  carbidopa/levodopa  25/100 1Tablet(s) Oral three times a day  atorvastatin 20milliGRAM(s) Oral at bedtime  timolol 0.5% Solution 1Drop(s) Both EYES daily  insulin lispro (HumaLOG) corrective regimen sliding scale  SubCutaneous three times a day before meals  heparin  Injectable 5000Unit(s) SubCutaneous every 12 hours  insulin glargine Injectable (LANTUS) 18Unit(s) SubCutaneous at bedtime  insulin lispro (HumaLOG) corrective regimen sliding scale  SubCutaneous at bedtime  BACItracin   Ointment 1Application(s) Topical two times a day  nystatin Powder 1Application(s) Topical three times a day  melatonin. 6milliGRAM(s) Oral at bedtime  ferrous    sulfate 325milliGRAM(s) Oral daily  docusate sodium 100milliGRAM(s) Oral two times a day  senna 1Tablet(s) Oral at bedtime  trimethoprim  160 mG/sulfamethoxazole 800 mG 1Tablet(s) Oral two times a day  ammonium lactate 12% Lotion 1Application(s) Topical two times a day  furosemide    Tablet 20milliGRAM(s) Oral daily    MEDICATIONS  (PRN):  acetaminophen   Tablet 650milliGRAM(s) Oral every 6 hours PRN For Temp greater than 38 C (100.4 F)  acetaminophen   Tablet. 650milliGRAM(s) Oral every 6 hours PRN Mild Pain (1 - 3)      LABS:                          8.4    5.70  )-----------( 225      ( 2017 09:27 )             25.0     2017 09:27    132    |  95     |  13.0   ----------------------------<  171    3.8     |  25.0   |  0.90     Ca    7.8        2017 09:27    TPro  5.1    /  Alb  2.4    /  TBili  0.1    /  DBili  x      /  AST  11     /  ALT  <5     /  AlkPhos  63     2017 09:27      Urinalysis Basic - ( 2017 12:28 )    Color: Yellow / Appearance: very cloudy / S.015 / pH: x  Gluc: x / Ketone: Negative  / Bili: Negative / Urobili: Negative mg/dL   Blood: x / Protein: 100 mg/dL / Nitrite: Negative   Leuk Esterase: Moderate / RBC: 3-5 /HPF / WBC >50   Sq Epi: x / Non Sq Epi: Few / Bacteria: Occasional          REVIEW OF SYSTEMS:    CONSTITUTIONAL: No fever, weight loss, or fatigue  EYES: No eye pain, visual disturbances, or discharge  ENMT:  No difficulty hearing, tinnitus, vertigo; No sinus or throat pain  NECK: No pain or stiffness  RESPIRATORY: No cough, wheezing, chills or hemoptysis; No shortness of breath  CARDIOVASCULAR: No chest pain, palpitations, dizziness, or leg swelling  GASTROINTESTINAL: No abdominal or epigastric pain. No nausea, vomiting, or hematemesis; No diarrhea or constipation. No melena or hematochezia.  GENITOURINARY: dysuria+, frequency+,  incontinence +, no hematuria,    NEUROLOGICAL: No headaches, memory loss, loss of strength, numbness, or tremors  SKIN: No itching, burning, rashes, or lesions   LYMPH NODES: No enlarged glands  ENDOCRINE: No heat or cold intolerance; No hair loss  MUSCULOSKELETAL: no joint pain or swelling  PSYCHIATRIC: no anxiety, flat affect +  HEME/LYMPH: No easy bruising, or bleeding gums  ALLERGY AND IMMUNOLOGIC: No hives or eczema    Vital Signs Last 24 Hrs    T(C): 37.4, Max: 37.4 ( @ 05:10)  T(F): 99.3, Max: 99.3 ( @ 05:10)  HR: 69 (69 - 82)  BP: 133/70 (96/54 - 134/71)  BP(mean): --  RR: 17 (16 - 18)  SpO2: 92% (92% - 94%)    f/s -166    PHYSICAL EXAM:    GENERAL: NAD, well-groomed, well-developed  HEAD:  Atraumatic, Normocephalic  EYES: EOMI, PERRLA, conjunctiva and sclera clear  NECK: Supple, No JVD, Normal thyroid  NERVOUS SYSTEM:  Alert & Oriented X3, no focal deficit  CHEST/LUNG: CTA b/l ,  no  rales, rhonchi, wheezing, or rubs  HEART: Regular rate and rhythm; No murmurs, rubs, or gallops  ABDOMEN: Soft, Nontender, Nondistended; Bowel sounds present  EXTREMITIES:  2+ Peripheral Pulses, No clubbing, cyanosis, b/l LE edema 2+ , mild erythema up to mid calf with chronic skin changes  LYMPH: No lymphadenopathy noted  SKIN: No rashes or lesions Patient seen and examined . C/O dysuria , urinary retention, urinary frequecy      76Y M with PMH of DM, HTN, admitted for DKA and AMS, admitted to ICU, started on insulin drip, anion gap closed, off insulin drip, on Lantus . He was also noted to foul smelling discharge from groin, seen bu surgery, looks fungal infection, started on Nystatin powder, improving. MRI brain showed b/l stroke with mild petechial hemorrhage in right occipital lobe, Stroke work up was done. ECHO - EF - 55%, JESUS MANUEL - showed large  and acute DVT in LLE -He was deemed not a good candidate for AC per Neuro and Cardio 2/2 Parkinson's  and multiple falls. Hence  - decided to undergo- IVC filter placement on . He was cleared by neuro and cardio for discharge. He was discharged in stable condition to Acute rehab.    CC: urinary retention , dysuria , urinary frequency ,all other systems reviewed and negative    PAST MEDICAL & SURGICAL HISTORY:    High cholesterol  Diabetes  Hypertension  S/P hip hemiarthroplasty: right hip repair 2014  No significant past surgical history      MEDICATIONS  (STANDING):    aspirin enteric coated 81milliGRAM(s) Oral daily  lisinopril 10milliGRAM(s) Oral daily  amLODIPine   Tablet 10milliGRAM(s) Oral daily  carbidopa/levodopa  25/100 1Tablet(s) Oral three times a day  atorvastatin 20milliGRAM(s) Oral at bedtime  timolol 0.5% Solution 1Drop(s) Both EYES daily  insulin lispro (HumaLOG) corrective regimen sliding scale  SubCutaneous three times a day before meals  heparin  Injectable 5000Unit(s) SubCutaneous every 12 hours  insulin glargine Injectable (LANTUS) 18Unit(s) SubCutaneous at bedtime  insulin lispro (HumaLOG) corrective regimen sliding scale  SubCutaneous at bedtime  BACItracin   Ointment 1Application(s) Topical two times a day  nystatin Powder 1Application(s) Topical three times a day  melatonin. 6milliGRAM(s) Oral at bedtime  ferrous    sulfate 325milliGRAM(s) Oral daily  docusate sodium 100milliGRAM(s) Oral two times a day  senna 1Tablet(s) Oral at bedtime  trimethoprim  160 mG/sulfamethoxazole 800 mG 1Tablet(s) Oral two times a day  ammonium lactate 12% Lotion 1Application(s) Topical two times a day  furosemide    Tablet 20milliGRAM(s) Oral daily    MEDICATIONS  (PRN):  acetaminophen   Tablet 650milliGRAM(s) Oral every 6 hours PRN For Temp greater than 38 C (100.4 F)  acetaminophen   Tablet. 650milliGRAM(s) Oral every 6 hours PRN Mild Pain (1 - 3)      LABS:                          8.4    5.70  )-----------( 225      ( 2017 09:27 )             25.0     2017 09:27    132    |  95     |  13.0   ----------------------------<  171    3.8     |  25.0   |  0.90     Ca    7.8        2017 09:27    TPro  5.1    /  Alb  2.4    /  TBili  0.1    /  DBili  x      /  AST  11     /  ALT  <5     /  AlkPhos  63     2017 09:27      Urinalysis Basic - ( 2017 12:28 )    Color: Yellow / Appearance: very cloudy / S.015 / pH: x  Gluc: x / Ketone: Negative  / Bili: Negative / Urobili: Negative mg/dL   Blood: x / Protein: 100 mg/dL / Nitrite: Negative   Leuk Esterase: Moderate / RBC: 3-5 /HPF / WBC >50   Sq Epi: x / Non Sq Epi: Few / Bacteria: Occasional          REVIEW OF SYSTEMS:    CONSTITUTIONAL: No fever, weight loss, or fatigue  EYES: No eye pain, visual disturbances, or discharge  ENMT:  No difficulty hearing, tinnitus, vertigo; No sinus or throat pain  NECK: No pain or stiffness  RESPIRATORY: No cough, wheezing, chills or hemoptysis; No shortness of breath  CARDIOVASCULAR: No chest pain, palpitations, dizziness, or leg swelling  GASTROINTESTINAL: No abdominal or epigastric pain. No nausea, vomiting, or hematemesis; No diarrhea or constipation. No melena or hematochezia.  GENITOURINARY: dysuria+, frequency+,  incontinence +, no hematuria,    NEUROLOGICAL: No headaches, memory loss, loss of strength, numbness, or tremors  SKIN: No itching, burning, rashes, or lesions   LYMPH NODES: No enlarged glands  ENDOCRINE: No heat or cold intolerance; No hair loss  MUSCULOSKELETAL: no joint pain or swelling  PSYCHIATRIC: no anxiety, flat affect +  HEME/LYMPH: No easy bruising, or bleeding gums  ALLERGY AND IMMUNOLOGIC: No hives or eczema    Vital Signs Last 24 Hrs    T(C): 37.4, Max: 37.4 ( @ 05:10)  T(F): 99.3, Max: 99.3 ( @ 05:10)  HR: 69 (69 - 82)  BP: 133/70 (96/54 - 134/71)  BP(mean): --  RR: 17 (16 - 18)  SpO2: 92% (92% - 94%)    f/s -166    PHYSICAL EXAM:    GENERAL: NAD, well-groomed, well-developed  HEAD:  Atraumatic, Normocephalic  EYES: EOMI, PERRLA, conjunctiva and sclera clear  NECK: Supple, No JVD, Normal thyroid  NERVOUS SYSTEM:  Alert & Oriented X3, no focal deficit  CHEST/LUNG: CTA b/l ,  no  rales, rhonchi, wheezing, or rubs  HEART: Regular rate and rhythm; No murmurs, rubs, or gallops  ABDOMEN: Soft, Nontender, Nondistended; Bowel sounds present  EXTREMITIES:  2+ Peripheral Pulses, No clubbing, cyanosis, b/l LE edema 2+ , mild erythema up to mid calf with chronic skin changes  LYMPH: No lymphadenopathy noted  SKIN: No rashes or lesions

## 2017-01-16 NOTE — PROGRESS NOTE ADULT - ASSESSMENT
This is 76Y M with PMH of DM, HTN, admitted for DKA and AMS, admitted to ICU, started on insulin drip, anion gap closed, off insulin drip, on Lantus 15 units. He was also noted to foul smelling discharge from groin, seen bu surgery, looks fungal infection, started on Nystatin powder, improving. MRI brain showed b/l stroke with mild petechial hemorrhage in right occipital lobe. JESUS MANUEL - Large PFO. acute DVT in LLE - not a good candidate for AC per Neuro and Cardio - s/p - IVC filter placement. C/O  urinary frequency/burning , otherwise doing well      A/P    > DKA due to Non compliant , resolved , on  lantus   A1C: 12 , diabetic education consult provided    >Hypokalemia - resolved , follow up BMP     >B/L stroke with mild petechial hemorrhage in right occipital lobe  appreciate Neurology input, MRI showed b/l stroke with petechial hemorrhage in occipital lobe  Repeat CT scan stable mild petechial hemorrhage, as per neuro on baby aspirin ,  cont. statin  TTE normal, carotid doppler showed ?stenosis in distal intracranial stenosis, as per Neurology no need of CTA  JESUS MANUEL - Large PFO - as per cardiology .     Acute DVT in LLE - not a good candidate for AC per Neuro and Cardio - s/p - IVC filter placement.       >HTN - Stable  cont. amlodipine 10 mg daily and lisinopril 10mg       >Groin fungal infection - improving  appreciate surgery input, cont. Nystatin    >Parkinson disease  cont. sinemet as per Neurology    >HENRIETTA - improved , follow up bmp .    > UTI- will start PO abx , follow up cultures    > Hyponatremia - will repeat BMP This is 76Y M with PMH of DM, HTN, admitted for DKA and AMS, admitted to ICU, started on insulin drip, anion gap closed, off insulin drip, on Lantus 15 units. He was also noted to foul smelling discharge from groin, seen bu surgery, looks fungal infection, started on Nystatin powder, improving. MRI brain showed b/l stroke with mild petechial hemorrhage in right occipital lobe. JESUS MANUEL - Large PFO. acute DVT in LLE - not a good candidate for AC per Neuro and Cardio - s/p - IVC filter placement. C/O  urinary frequency/burning , otherwise doing well      A/P    > DKA due to Non compliant , resolved - continue diet , ISSC ,  lantus   A1C: 12 , diabetic education consult provided    >Hypokalemia - resolved , follow up BMP     >B/L stroke with mild petechial hemorrhage in right occipital lobe  appreciate Neurology input, MRI showed b/l stroke with petechial hemorrhage in occipital lobe  Repeat CT scan stable mild petechial hemorrhage, as per neuro on baby aspirin ,  cont. statin  TTE normal, carotid doppler showed ?stenosis in distal intracranial stenosis, as per Neurology no need of CTA  JESUS MANUEL - Large PFO - as per cardiology .     Acute DVT in LLE - not a good candidate for AC per Neuro and Cardio - s/p - IVC filter placement.       >HTN - Stable  continue  amlodipine 10 mg daily and lisinopril 10mg daily with parameters      >Groin fungal infection - improving , continue  Nystatin    >Parkinson disease  cont. sinemet as per Neurology    >HENRIETTA - improved , follow up bmp .    > UTI- still symptomatic , urine cultures repeated as firs specimen was contaminated , will change abx to iv Rochephin    > Hyponatremia - will repeat BMP    > Urinary retention - consider to add Flomax and  eval    > B/L LE edema - will increase Lasix to 40 mg Daily    f/u today's labs

## 2017-01-16 NOTE — CONSULT NOTE ADULT - SUBJECTIVE AND OBJECTIVE BOX
HPI:  The patient is now in BIU s/p admission for DKA.  He currently is undergoing rehab for weakness.  The patient had a complaint of dysuria a couple days ago.  A urinalysis demonstrated a contaminated specimen.  Subsequently, he developed urinary retention and he has been straight cath'd for high residuals.  The patient is miserable with each catheterization.  He has been empirically started on Rocephin and flomax today.    The patient has a history of BPH and is s/p TURP about 10 years ago.  He does not recall who performed the TURP.  At home, he reports nocturia x 2 but denies daytime frequency.  He reports his urinary stream is adequate at home.  He denies a prior history of gross hematuria or dysuria.      PAST MEDICAL & SURGICAL HISTORY:  High cholesterol  Diabetes  Hypertension  S/P hip hemiarthroplasty: right hip repair june 2014  No significant past surgical history      REVIEW OF SYSTEMS:    Constitutional: No fever, weight loss. Has history of weakness  Eyes: No eye pain, visual disturbances, or discharge  ENMT:  No difficulty hearing, tinnitus, vertigo; No sinus or throat pain  Respiratory: No cough, wheezing, chills or hemoptysis  Cardiovascular: No chest pain, palpitations, shortness of breath, dizziness or leg swelling  Gastrointestinal: No abdominal or epigastric pain. No nausea, vomiting or hematemesis; No diarrhea or constipation. No melena or hematochezia.  Genitourinary: No dysuria, frequency, hematuria or incontinence  Rectal: No pain, hemorrhoids or incontinence  Neurological: No headaches, memory loss, loss of strength, numbness or tremors  Skin: No itching, burning, rashes or lesions   Lymph Nodes: No enlarged glands  Musculoskeletal: No joint pain or swelling; No muscle, back or extremity pain  Psychiatric: No depression, anxiety, mood swings or difficulty sleeping  Heme/Lymph: No easy bruising or bleeding gums  Allergy and Immunologic: No hives or eczema    MEDICATIONS  (STANDING):  aspirin enteric coated 81milliGRAM(s) Oral daily  lisinopril 10milliGRAM(s) Oral daily  amLODIPine   Tablet 10milliGRAM(s) Oral daily  carbidopa/levodopa  25/100 1Tablet(s) Oral three times a day  atorvastatin 20milliGRAM(s) Oral at bedtime  timolol 0.5% Solution 1Drop(s) Both EYES daily  insulin lispro (HumaLOG) corrective regimen sliding scale  SubCutaneous three times a day before meals  heparin  Injectable 5000Unit(s) SubCutaneous every 12 hours  insulin glargine Injectable (LANTUS) 18Unit(s) SubCutaneous at bedtime  insulin lispro (HumaLOG) corrective regimen sliding scale  SubCutaneous at bedtime  BACItracin   Ointment 1Application(s) Topical two times a day  nystatin Powder 1Application(s) Topical three times a day  melatonin. 6milliGRAM(s) Oral at bedtime  ferrous    sulfate 325milliGRAM(s) Oral daily  docusate sodium 100milliGRAM(s) Oral two times a day  senna 1Tablet(s) Oral at bedtime  ammonium lactate 12% Lotion 1Application(s) Topical two times a day  cefTRIAXone   IVPB  IV Intermittent   tamsulosin 0.4milliGRAM(s) Oral at bedtime    MEDICATIONS  (PRN):  acetaminophen   Tablet 650milliGRAM(s) Oral every 6 hours PRN For Temp greater than 38 C (100.4 F)  acetaminophen   Tablet. 650milliGRAM(s) Oral every 6 hours PRN Mild Pain (1 - 3)      Allergies    No Known Allergies    Intolerances        SOCIAL HISTORY:    FAMILY HISTORY:  No significant family history      Vital Signs Last 24 Hrs  T(C): 36.7, Max: 37.4 (01-16 @ 05:10)  T(F): 98.1, Max: 99.3 (01-16 @ 05:10)  HR: 59 (59 - 71)  BP: 125/68 (125/68 - 134/71)  BP(mean): --  RR: 18 (16 - 18)  SpO2: 95% (92% - 95%)    PHYSICAL EXAM:    General: Well developed; well nourished; in no acute distress  Head: Normocephalic; atraumatic  Respiratory: No tachypnea  Gastrointestinal: Soft non-tender non-distended;   Genitourinary: No costovertebral angle tenderness.  Urinary bladder is clinically not distended  Extremities: Normal range of motion, No edema  Neurological: Alert and oriented x4  Skin: Warm and dry. No acute rash  Musculoskeletal: Normal gait, tone, without deformities  Psychiatric: Cooperative and appropriate      LABS:                        9.0    5.28  )-----------( 227      ( 16 Jan 2017 08:26 )             26.3     16 Jan 2017 08:26    135    |  97     |  17.0   ----------------------------<  181    4.1     |  27.0   |  1.10     Ca    8.6        16 Jan 2017 08:26            RADIOLOGY & ADDITIONAL STUDIES:

## 2017-01-17 DIAGNOSIS — K59.09 OTHER CONSTIPATION: ICD-10-CM

## 2017-01-17 DIAGNOSIS — T83.511D INFECTION AND INFLAMMATORY REACTION DUE TO INDWELLING URETHRAL CATHETER, SUBSEQUENT ENCOUNTER: ICD-10-CM

## 2017-01-17 PROCEDURE — 99233 SBSQ HOSP IP/OBS HIGH 50: CPT | Mod: GC

## 2017-01-17 PROCEDURE — 99233 SBSQ HOSP IP/OBS HIGH 50: CPT

## 2017-01-17 RX ORDER — POTASSIUM CHLORIDE 20 MEQ
20 PACKET (EA) ORAL DAILY
Qty: 0 | Refills: 0 | Status: DISCONTINUED | OUTPATIENT
Start: 2017-01-17 | End: 2017-02-02

## 2017-01-17 RX ORDER — LIDOCAINE HCL 20 MG/ML
5 VIAL (ML) INJECTION
Qty: 0 | Refills: 0 | Status: DISCONTINUED | OUTPATIENT
Start: 2017-01-17 | End: 2017-01-24

## 2017-01-17 RX ORDER — CEFPODOXIME PROXETIL 100 MG
100 TABLET ORAL EVERY 12 HOURS
Qty: 0 | Refills: 0 | Status: DISCONTINUED | OUTPATIENT
Start: 2017-01-18 | End: 2017-01-18

## 2017-01-17 RX ORDER — INSULIN LISPRO 100/ML
VIAL (ML) SUBCUTANEOUS
Qty: 0 | Refills: 0 | Status: DISCONTINUED | OUTPATIENT
Start: 2017-01-17 | End: 2017-02-02

## 2017-01-17 RX ORDER — INSULIN GLARGINE 100 [IU]/ML
22 INJECTION, SOLUTION SUBCUTANEOUS AT BEDTIME
Qty: 0 | Refills: 0 | Status: DISCONTINUED | OUTPATIENT
Start: 2017-01-17 | End: 2017-01-22

## 2017-01-17 RX ORDER — NYSTATIN CREAM 100000 [USP'U]/G
1 CREAM TOPICAL THREE TIMES A DAY
Qty: 0 | Refills: 0 | Status: DISCONTINUED | OUTPATIENT
Start: 2017-01-17 | End: 2017-02-02

## 2017-01-17 RX ORDER — INSULIN GLARGINE 100 [IU]/ML
20 INJECTION, SOLUTION SUBCUTANEOUS EVERY MORNING
Qty: 0 | Refills: 0 | Status: DISCONTINUED | OUTPATIENT
Start: 2017-01-17 | End: 2017-01-17

## 2017-01-17 RX ORDER — BACITRACIN ZINC 500 UNIT/G
1 OINTMENT IN PACKET (EA) TOPICAL
Qty: 0 | Refills: 0 | Status: DISCONTINUED | OUTPATIENT
Start: 2017-01-17 | End: 2017-01-22

## 2017-01-17 RX ORDER — SACCHAROMYCES BOULARDII 250 MG
250 POWDER IN PACKET (EA) ORAL
Qty: 0 | Refills: 0 | Status: DISCONTINUED | OUTPATIENT
Start: 2017-01-17 | End: 2017-01-18

## 2017-01-17 RX ADMIN — TAMSULOSIN HYDROCHLORIDE 0.4 MILLIGRAM(S): 0.4 CAPSULE ORAL at 21:54

## 2017-01-17 RX ADMIN — Medication 325 MILLIGRAM(S): at 12:46

## 2017-01-17 RX ADMIN — Medication 650 MILLIGRAM(S): at 05:00

## 2017-01-17 RX ADMIN — Medication 8: at 12:47

## 2017-01-17 RX ADMIN — NYSTATIN CREAM 1 APPLICATION(S): 100000 CREAM TOPICAL at 12:48

## 2017-01-17 RX ADMIN — Medication 6 MILLIGRAM(S): at 21:54

## 2017-01-17 RX ADMIN — CARBIDOPA AND LEVODOPA 1 TABLET(S): 25; 100 TABLET ORAL at 12:46

## 2017-01-17 RX ADMIN — NYSTATIN CREAM 1 APPLICATION(S): 100000 CREAM TOPICAL at 06:08

## 2017-01-17 RX ADMIN — Medication 10: at 08:11

## 2017-01-17 RX ADMIN — ATORVASTATIN CALCIUM 20 MILLIGRAM(S): 80 TABLET, FILM COATED ORAL at 21:54

## 2017-01-17 RX ADMIN — CEFTRIAXONE 100 GRAM(S): 500 INJECTION, POWDER, FOR SOLUTION INTRAMUSCULAR; INTRAVENOUS at 12:47

## 2017-01-17 RX ADMIN — Medication 650 MILLIGRAM(S): at 04:02

## 2017-01-17 RX ADMIN — CARBIDOPA AND LEVODOPA 1 TABLET(S): 25; 100 TABLET ORAL at 21:54

## 2017-01-17 RX ADMIN — Medication 650 MILLIGRAM(S): at 17:26

## 2017-01-17 RX ADMIN — Medication 1 APPLICATION(S): at 06:08

## 2017-01-17 RX ADMIN — Medication 5 MILLILITER(S): at 17:33

## 2017-01-17 RX ADMIN — Medication 650 MILLIGRAM(S): at 10:55

## 2017-01-17 RX ADMIN — Medication 1 APPLICATION(S): at 17:33

## 2017-01-17 RX ADMIN — Medication 1 DROP(S): at 12:47

## 2017-01-17 RX ADMIN — Medication 4: at 17:33

## 2017-01-17 RX ADMIN — HEPARIN SODIUM 5000 UNIT(S): 5000 INJECTION INTRAVENOUS; SUBCUTANEOUS at 06:08

## 2017-01-17 RX ADMIN — Medication 650 MILLIGRAM(S): at 10:10

## 2017-01-17 RX ADMIN — Medication 40 MILLIGRAM(S): at 06:08

## 2017-01-17 RX ADMIN — NYSTATIN CREAM 1 APPLICATION(S): 100000 CREAM TOPICAL at 21:55

## 2017-01-17 RX ADMIN — AMLODIPINE BESYLATE 10 MILLIGRAM(S): 2.5 TABLET ORAL at 06:08

## 2017-01-17 RX ADMIN — LISINOPRIL 10 MILLIGRAM(S): 2.5 TABLET ORAL at 06:08

## 2017-01-17 RX ADMIN — Medication 20 MILLIEQUIVALENT(S): at 22:28

## 2017-01-17 RX ADMIN — HEPARIN SODIUM 5000 UNIT(S): 5000 INJECTION INTRAVENOUS; SUBCUTANEOUS at 17:32

## 2017-01-17 RX ADMIN — INSULIN GLARGINE 22 UNIT(S): 100 INJECTION, SOLUTION SUBCUTANEOUS at 21:54

## 2017-01-17 RX ADMIN — Medication 1 APPLICATION(S): at 17:32

## 2017-01-17 RX ADMIN — Medication 8: at 21:55

## 2017-01-17 RX ADMIN — Medication 100 MILLIGRAM(S): at 06:08

## 2017-01-17 RX ADMIN — Medication 650 MILLIGRAM(S): at 18:10

## 2017-01-17 RX ADMIN — Medication 81 MILLIGRAM(S): at 12:46

## 2017-01-17 RX ADMIN — CARBIDOPA AND LEVODOPA 1 TABLET(S): 25; 100 TABLET ORAL at 06:08

## 2017-01-17 NOTE — PROGRESS NOTE ADULT - ASSESSMENT
This is 76Y M with PMH of DM, HTN, admitted for DKA and AMS, admitted to ICU, started on insulin drip, anion gap closed, off insulin drip, on Lantus 15 units. He was also noted to foul smelling discharge from groin, seen bu surgery, looks fungal infection, started on Nystatin powder, improving. MRI brain showed b/l stroke with mild petechial hemorrhage in right occipital lobe. JESUS MANUEL - Large PFO. acute DVT in LLE - not a good candidate for AC per Neuro and Cardio - s/p - IVC filter placement. C/O  urinary frequency/burning , otherwise doing well      A/P    > DKA due to Non compliant , resolved - continue diet , ISSC ,  lantus   A1C: 12 , diabetic education consult provided    >Hypokalemia - resolved , follow up BMP     >B/L stroke with mild petechial hemorrhage in right occipital lobe  appreciate Neurology input, MRI showed b/l stroke with petechial hemorrhage in occipital lobe  Repeat CT scan stable mild petechial hemorrhage, as per neuro on baby aspirin ,  cont. statin  TTE normal, carotid doppler showed ?stenosis in distal intracranial stenosis, as per Neurology no need of CTA  JESUS MANUEL - Large PFO - as per cardiology .     Acute DVT in LLE - not a good candidate for AC per Neuro and Cardio - s/p - IVC filter placement.       >HTN - Stable  continue  amlodipine 10 mg daily and lisinopril 10mg daily with parameters      >Groin fungal infection - improving , continue  Nystatin    >Parkinson disease  cont. sinemet as per Neurology    >HENRIETTA - improved , follow up bmp .    > UTI- still symptomatic , urine cultures repeated as firs specimen was contaminated , will change abx to iv Rochephin    > Hyponatremia - will repeat BMP    > Urinary retention - consider to add Flomax and  eval    > B/L LE edema - will increase Lasix to 40 mg Daily This is 76Y M with PMH of DM, HTN, admitted for DKA and AMS, admitted to ICU, started on insulin drip, anion gap closed, off insulin drip, on Lantus 15 units. He was also noted to foul smelling discharge from groin, seen bu surgery, looks fungal infection, started on Nystatin powder, improving. MRI brain showed b/l stroke with mild petechial hemorrhage in right occipital lobe. JESUS MANUEL - Large PFO. acute DVT in LLE - not a good candidate for AC per Neuro and Cardio - s/p - IVC filter placement. S/P Ledesma cath placement , feeling better , c/o b/l LE itching , no other complaints .      A/P    > DKA due to Non compliant , resolved - continue diet , ISSC ,  will increase Lantus   A1C: 12 , diabetic education consult provided    >Hypokalemia - resolved , follow up BMP     >B/L stroke with mild petechial hemorrhage in right occipital lobe  appreciate Neurology input, MRI showed b/l stroke with petechial hemorrhage in occipital lobe  Repeat CT scan stable mild petechial hemorrhage, as per neuro on baby aspirin ,  cont. statin  TTE normal, carotid doppler showed ?stenosis in distal intracranial stenosis, as per Neurology no need of CTA  JESUS MANUEL - Large PFO - as per cardiology .     Acute DVT in LLE - not a good candidate for AC per Neuro and Cardio - s/p - IVC filter placement.       >HTN - Stable  continue  amlodipine 10 mg daily and lisinopril 10mg daily with parameters      >Groin fungal infection - improving , continue  Nystatin    >Parkinson disease  cont. sinemet as per Neurology    > UTI- on empiric  abx  , f/u culture    > Hyponatremia -  resolved    > Urinary retention - Hx of bph and s/p TURP 10 yrs ago,  consult  noted  and appreciated , started on Flomax , Ledesma placed secondary to painful urination and painful straight  cath  , will give TOV in few days    > B/L LE edema - will increase Lasix to 40 mg Daily, monitor BMP , continue topical care of LE

## 2017-01-17 NOTE — PROGRESS NOTE ADULT - SUBJECTIVE AND OBJECTIVE BOX
INTERVAL HPI/OVERNIGHT EVENTS:  healy placed  some urethral discomfort, waiting to start lidocaine jelly.  no bowel movement since 1/15  feels better now that he is not getting catheterized.      MEDICATIONS  (STANDING):  aspirin enteric coated 81milliGRAM(s) Oral daily  lisinopril 10milliGRAM(s) Oral daily  amLODIPine   Tablet 10milliGRAM(s) Oral daily  carbidopa/levodopa  25/100 1Tablet(s) Oral three times a day  atorvastatin 20milliGRAM(s) Oral at bedtime  timolol 0.5% Solution 1Drop(s) Both EYES daily  insulin lispro (HumaLOG) corrective regimen sliding scale  SubCutaneous three times a day before meals  heparin  Injectable 5000Unit(s) SubCutaneous every 12 hours  insulin lispro (HumaLOG) corrective regimen sliding scale  SubCutaneous at bedtime  BACItracin   Ointment 1Application(s) Topical two times a day  nystatin Powder 1Application(s) Topical three times a day  melatonin. 6milliGRAM(s) Oral at bedtime  ferrous    sulfate 325milliGRAM(s) Oral daily  ammonium lactate 12% Lotion 1Application(s) Topical two times a day  cefTRIAXone   IVPB 1Gram(s) IV Intermittent every 24 hours  cefTRIAXone   IVPB  IV Intermittent   furosemide    Tablet 40milliGRAM(s) Oral daily  tamsulosin 0.4milliGRAM(s) Oral at bedtime  lidocaine 2% Jelly 5milliLiter(s) IntraUrethral two times a day  insulin glargine Injectable (LANTUS) 22Unit(s) SubCutaneous at bedtime    MEDICATIONS  (PRN):  acetaminophen   Tablet 650milliGRAM(s) Oral every 6 hours PRN For Temp greater than 38 C (100.4 F)  acetaminophen   Tablet. 650milliGRAM(s) Oral every 6 hours PRN Mild Pain (1 - 3)  sodium biphosphate Rectal Enema 1Enema Rectal daily PRN Constipation  bisacodyl 10milliGRAM(s) Oral daily PRN Constipation      Vital Signs Last 24 Hrs  T(C): 36.9, Max: 37.1 (01-16 @ 20:35)  T(F): 98.4, Max: 98.8 (01-16 @ 20:35)  HR: 67 (67 - 70)  BP: 108/59 (108/59 - 138/79)  BP(mean): --  RR: 18 (17 - 18)  SpO2: 95% (91% - 95%)    PHYSICAL EXAM:    Gen: tired  Respirations: unlabored  Abdomen: softly distended  extremities: warm  urine yellow.        I&O's Detail      LABS:                        9.0    5.28  )-----------( 227      ( 16 Jan 2017 08:26 )             26.3     16 Jan 2017 08:26    135    |  97     |  17.0   ----------------------------<  181    4.1     |  27.0   |  1.10     Ca    8.6        16 Jan 2017 08:26            RADIOLOGY & ADDITIONAL STUDIES:

## 2017-01-17 NOTE — PROGRESS NOTE ADULT - SUBJECTIVE AND OBJECTIVE BOX
Patient seen and examined . C/O dysuria , urinary retention, urinary frequecy      76Y M with PMH of DM, HTN, admitted for DKA and AMS, admitted to ICU, started on insulin drip, anion gap closed, off insulin drip, on Lantus . He was also noted to foul smelling discharge from groin, seen bu surgery, looks fungal infection, started on Nystatin powder, improving. MRI brain showed b/l stroke with mild petechial hemorrhage in right occipital lobe, Stroke work up was done. ECHO - EF - 55%, JESUS MANUEL - showed large  and acute DVT in LLE -He was deemed not a good candidate for AC per Neuro and Cardio 2/2 Parkinson's  and multiple falls. Hence  - decided to undergo- IVC filter placement on 1/11. He was cleared by neuro and cardio for discharge. He was discharged in stable condition to Acute rehab.    CC: urinary retention , dysuria , urinary frequency ,all other systems reviewed and negative    PAST MEDICAL & SURGICAL HISTORY:  High cholesterol  Diabetes  Hypertension  S/P hip hemiarthroplasty: right hip repair june 2014  No significant past surgical history      MEDICATIONS  (STANDING):  aspirin enteric coated 81milliGRAM(s) Oral daily  lisinopril 10milliGRAM(s) Oral daily  amLODIPine   Tablet 10milliGRAM(s) Oral daily  carbidopa/levodopa  25/100 1Tablet(s) Oral three times a day  atorvastatin 20milliGRAM(s) Oral at bedtime  timolol 0.5% Solution 1Drop(s) Both EYES daily  insulin lispro (HumaLOG) corrective regimen sliding scale  SubCutaneous three times a day before meals  heparin  Injectable 5000Unit(s) SubCutaneous every 12 hours  insulin glargine Injectable (LANTUS) 18Unit(s) SubCutaneous at bedtime  insulin lispro (HumaLOG) corrective regimen sliding scale  SubCutaneous at bedtime  BACItracin   Ointment 1Application(s) Topical two times a day  nystatin Powder 1Application(s) Topical three times a day  melatonin. 6milliGRAM(s) Oral at bedtime  ferrous    sulfate 325milliGRAM(s) Oral daily  docusate sodium 100milliGRAM(s) Oral two times a day  senna 1Tablet(s) Oral at bedtime  ammonium lactate 12% Lotion 1Application(s) Topical two times a day  cefTRIAXone   IVPB 1Gram(s) IV Intermittent every 24 hours  cefTRIAXone   IVPB  IV Intermittent   furosemide    Tablet 40milliGRAM(s) Oral daily  tamsulosin 0.4milliGRAM(s) Oral at bedtime    MEDICATIONS  (PRN):  acetaminophen   Tablet 650milliGRAM(s) Oral every 6 hours PRN For Temp greater than 38 C (100.4 F)  acetaminophen   Tablet. 650milliGRAM(s) Oral every 6 hours PRN Mild Pain (1 - 3)      LABS:                          9.0    5.28  )-----------( 227      ( 16 Jan 2017 08:26 )             26.3     16 Jan 2017 08:26    135    |  97     |  17.0   ----------------------------<  181    4.1     |  27.0   |  1.10     Ca    8.6        16 Jan 2017 08:26            RADIOLOGY & ADDITIONAL TESTS:      REVIEW OF SYSTEMS:    CONSTITUTIONAL: No fever, weight loss, or fatigue  EYES: No eye pain, visual disturbances, or discharge  ENMT:  No difficulty hearing, tinnitus, vertigo; No sinus or throat pain  NECK: No pain or stiffness  RESPIRATORY: No cough, wheezing, chills or hemoptysis; No shortness of breath  CARDIOVASCULAR: No chest pain, palpitations, dizziness, or leg swelling  GASTROINTESTINAL: No abdominal or epigastric pain. No nausea, vomiting, or hematemesis; No diarrhea or constipation. No melena or hematochezia.  GENITOURINARY: No dysuria, frequency, hematuria, or incontinence  NEUROLOGICAL: No headaches, memory loss, loss of strength, numbness, or tremors  SKIN: No itching, burning, rashes, or lesions   LYMPH NODES: No enlarged glands  ENDOCRINE: No heat or cold intolerance; No hair loss  MUSCULOSKELETAL:   PSYCHIATRIC: No depression, anxiety, mood swings, or difficulty sleeping  HEME/LYMPH: No easy bruising, or bleeding gums  ALLERGY AND IMMUNOLOGIC: No hives or eczema    Vital Signs Last 24 Hrs  T(C): 37.1, Max: 37.1 (01-16 @ 20:35)  T(F): 98.8, Max: 98.8 (01-16 @ 20:35)  HR: 70 (59 - 70)  BP: 115/58 (115/58 - 138/79)  BP(mean): --  RR: 17 (17 - 18)  SpO2: 94% (91% - 95%)  PHYSICAL EXAM:    GENERAL: NAD, well-groomed, well-developed  HEAD:  Atraumatic, Normocephalic  EYES: EOMI, PERRLA, conjunctiva and sclera clear  NECK: Supple, No JVD, Normal thyroid  NERVOUS SYSTEM:  Alert & Oriented X3, no focal deficit  CHEST/LUNG: CTA b/l ,  no  rales, rhonchi, wheezing, or rubs  HEART: Regular rate and rhythm; No murmurs, rubs, or gallops  ABDOMEN: Soft, Nontender, Nondistended; Bowel sounds present  EXTREMITIES:  2+ Peripheral Pulses, No clubbing, cyanosis, or edema  LYMPH: No lymphadenopathy noted  SKIN: No rashes or lesions Patient seen and examined . S/P Ledesma catheter placement  , feels better today , c/o b/l LE itching , no other complaints      76Y M with PMH of DM, HTN, admitted for DKA and AMS, admitted to ICU, started on insulin drip, anion gap closed, off insulin drip, on Lantus . He was also noted to foul smelling discharge from groin, seen bu surgery, looks fungal infection, started on Nystatin powder, improving. MRI brain showed b/l stroke with mild petechial hemorrhage in right occipital lobe, Stroke work up was done. ECHO - EF - 55%, JESUS MANUEL - showed large  and acute DVT in LLE -He was deemed not a good candidate for AC per Neuro and Cardio 2/2 Parkinson's  and multiple falls. Hence  - decided to undergo- IVC filter placement on 1/11. He was cleared by neuro and cardio for discharge. He was discharged in stable condition to Acute rehab.    CC: urinary retention , dysuria , urinary frequency  resolved today after Ledesma placement , c/o B/L LE itching , all other systems reviewed and negative    PAST MEDICAL & SURGICAL HISTORY:    High cholesterol  Diabetes  Hypertension  BPH   Parkinson's disease    S/P TURP - 10 yrs ago  S/P hip hemiarthroplasty:   S/P R  hip repair june 2014        MEDICATIONS  (STANDING):    aspirin enteric coated 81milliGRAM(s) Oral daily  lisinopril 10milliGRAM(s) Oral daily  amLODIPine   Tablet 10milliGRAM(s) Oral daily  carbidopa/levodopa  25/100 1Tablet(s) Oral three times a day  atorvastatin 20milliGRAM(s) Oral at bedtime  timolol 0.5% Solution 1Drop(s) Both EYES daily  insulin lispro (HumaLOG) corrective regimen sliding scale  SubCutaneous three times a day before meals  heparin  Injectable 5000Unit(s) SubCutaneous every 12 hours  insulin glargine Injectable (LANTUS) 18Unit(s) SubCutaneous at bedtime  insulin lispro (HumaLOG) corrective regimen sliding scale  SubCutaneous at bedtime  BACItracin   Ointment 1Application(s) Topical two times a day  nystatin Powder 1Application(s) Topical three times a day  melatonin. 6milliGRAM(s) Oral at bedtime  ferrous    sulfate 325milliGRAM(s) Oral daily  docusate sodium 100milliGRAM(s) Oral two times a day  senna 1Tablet(s) Oral at bedtime  ammonium lactate 12% Lotion 1Application(s) Topical two times a day  cefTRIAXone   IVPB 1Gram(s) IV Intermittent every 24 hours  cefTRIAXone   IVPB  IV Intermittent   furosemide    Tablet 40milliGRAM(s) Oral daily  tamsulosin 0.4milliGRAM(s) Oral at bedtime    MEDICATIONS  (PRN):  acetaminophen   Tablet 650milliGRAM(s) Oral every 6 hours PRN For Temp greater than 38 C (100.4 F)  acetaminophen   Tablet. 650milliGRAM(s) Oral every 6 hours PRN Mild Pain (1 - 3)      LABS:                          9.0    5.28  )-----------( 227      ( 16 Jan 2017 08:26 )             26.3     16 Jan 2017 08:26    135    |  97     |  17.0   ----------------------------<  181    4.1     |  27.0   |  1.10     Ca    8.6        16 Jan 2017 08:26      REVIEW OF SYSTEMS:    CONSTITUTIONAL: No fever, weight loss, or fatigue  EYES: No eye pain, visual disturbances, or discharge  ENMT:  No difficulty hearing, tinnitus, vertigo; No sinus or throat pain  NECK: No pain or stiffness  RESPIRATORY: No cough, wheezing, chills or hemoptysis; No shortness of breath  CARDIOVASCULAR: No chest pain, palpitations, dizziness, or leg swelling  GASTROINTESTINAL: No abdominal or epigastric pain. No nausea, vomiting, or hematemesis; No diarrhea or constipation. No melena or hematochezia.  GENITOURINARY: No dysuria, frequency, hematuria, or incontinence  NEUROLOGICAL: No headaches, memory loss, loss of strength, numbness, or tremors  SKIN: No itching, burning, rashes, or lesions   LYMPH NODES: No enlarged glands  ENDOCRINE: No heat or cold intolerance; No hair loss  MUSCULOSKELETAL:   PSYCHIATRIC: No depression, anxiety, mood swings, or difficulty sleeping  HEME/LYMPH: No easy bruising, or bleeding gums  ALLERGY AND IMMUNOLOGIC: No hives or eczema    Vital Signs Last 24 Hrs  T(C): 37.1, Max: 37.1 (01-16 @ 20:35)  T(F): 98.8, Max: 98.8 (01-16 @ 20:35)  HR: 70 (59 - 70)  BP: 115/58 (115/58 - 138/79)  BP(mean): --  RR: 17 (17 - 18)  SpO2: 94% (91% - 95%)  PHYSICAL EXAM:    GENERAL: NAD, well-groomed, well-developed  HEAD:  Atraumatic, Normocephalic  EYES: EOMI, PERRLA, conjunctiva and sclera clear  NECK: Supple, No JVD, Normal thyroid  NERVOUS SYSTEM:  Alert & Oriented X3, no focal deficit  CHEST/LUNG: CTA b/l ,  no  rales, rhonchi, wheezing, or rubs  HEART: Regular rate and rhythm; No murmurs, rubs, or gallops  ABDOMEN: Soft, Nontender, Nondistended; Bowel sounds present  EXTREMITIES:  2+ Peripheral Pulses, No clubbing, cyanosis, or edema  LYMPH: No lymphadenopathy noted  SKIN: No rashes or lesions

## 2017-01-17 NOTE — PROGRESS NOTE ADULT - SUBJECTIVE AND OBJECTIVE BOX
HISTORY OF PRESENT ILLNESS    PMHx - 77 yo right-hand dominant man with PMHx of DM2 admitted to [facility] on 12/31/16 c/o not feeling well at home with lethargy for last few days PTA and not eating/drinking with vomiting. He was found to have severe Hyperglycemia, with blood glucose > 1100, venous pH of 6.9, and anion gap of 43. He was treated with IV hydration and insulin infusion for DKA. Surgery/wound care evaluated patient for foul smelling b/l groin/perineal/scrotal infection in b/l inguinal region, medial thighs, lower abdominal wall, scrotum, and perineum; appeared to be Tinea cruris and not likely necrotizing soft tissue infection as per surgery.    Hospital course complicated by increased lethargy. CTH revealed acute/subacute small to moderate right occipital lobe infarct. Neurology evaluated patient and started on statin/ASA. MRI showed acute b/l supratentorial and infratentorial infarcts with mild petechial hemorrhage in right occipital lobe. Carotid dopplers WNL. JESUS MANUEL on 1/9/2017 revealed PFO, large and acute DVT found in LLE. As he was deemed not a good candidate for AC per neurology and cardiology secondary to Parkinson's Disease and multiple falls, an IVC filter was placed without complication on 1/11/2017. Stable for discharge to acute rehab on 1/13/2017.    PMHx: HLD, DM II, HTN, Parkinson's Disease      TODAY'S SUBJECTIVE & REVIEW OF SYMPTOMS  [X] Constitutional WNL     [X] Cardio WNL            [X] Resp WNL           [X] GI WNL                          [X]  WNL                   [X] Heme WNL              [X] Endo WNL                     [X] Skin WNL                 [X] MSK WNL            [X] Neuro WNL                   [X] Cognitive WNL        [X] Psych WNL      No acute events overnight. Ledesma placed yesterday for urinary retention + UTI and rocephin was started. Eugenia'd by Urology over weekend --> recommend TOV at end of week. Initial urine cultures contaminated, repeat pending.  Patient reports that his b/l LEs are more swollen than his baseline and does cause some pain but mostly itches.  Denies bowel issues. +Ledesma.  No other complaints today.      VITALS  Vital Signs Last 24 Hrs  T(C): 37.1, Max: 37.1 (01-16 @ 20:35)  T(F): 98.8, Max: 98.8 (01-16 @ 20:35)  HR: 70 (59 - 70)  BP: 115/58 (115/58 - 138/79)  BP(mean): --  RR: 17 (17 - 18)  SpO2: 94% (91% - 95%)      PHYSICAL EXAM  Constitutional - NAD, Comfortable  HEENT - NCAT, EOMI  Neck - Supple, No limited ROM  Chest - CTA bilaterally, No wheeze, No rhonchi, No crackles  Cardiovascular - RRR, S1S2, No murmurs  Abdomen - BS+, Soft, NTND  Extremities - No calf tenderness, 1+ edema bilaterally with venous stasis changes  Neurologic Exam -                    Cognitive - Awake, Alert, AAO to self, place, year, situation     Communication - Fluent, No dysarthria     Cranial Nerves - CN 2-12 grossly intact     Motor - No focal deficits                    LEFT    UE - 5-/5                    RIGHT UE - 4+/5                    LEFT    LE - proximal weakness ~3/5, 5/5 distally                    RIGHT LE - proximal weakness ~3/5, 5/5 distally     Sensory - Intact to LT     Reflexes - DTR 2+ bilaterally  Psychiatric - Mood stable, Affect WNL  Skin - Bilateral LE venous stasis changes, left medial great toe abrasion, bilateral groin/medial thigh rashes  Wounds - None    FUNCTIONAL PROGRESS  Gait - length of parallel bars min A  ADLs - UE Dress mod A, LE Dress total A  Transfers - sit-to-stand mod A  Functional transfer - bed max/total A      RECENT LABS                        9.0    5.28  )-----------( 227      ( 16 Jan 2017 08:26 )             26.3     16 Jan 2017 08:26    135    |  97     |  17.0   ----------------------------<  181    4.1     |  27.0   |  1.10     Ca    8.6        16 Jan 2017 08:26    UCx (1/14): Contaminated                 RADIOLOGY/OTHER RESULTS  ---      MEDICATIONS  (STANDING):  aspirin enteric coated 81milliGRAM(s) Oral daily  lisinopril 10milliGRAM(s) Oral daily  amLODIPine   Tablet 10milliGRAM(s) Oral daily  carbidopa/levodopa  25/100 1Tablet(s) Oral three times a day  atorvastatin 20milliGRAM(s) Oral at bedtime  timolol 0.5% Solution 1Drop(s) Both EYES daily  insulin lispro (HumaLOG) corrective regimen sliding scale  SubCutaneous three times a day before meals  heparin  Injectable 5000Unit(s) SubCutaneous every 12 hours  insulin lispro (HumaLOG) corrective regimen sliding scale  SubCutaneous at bedtime  BACItracin   Ointment 1Application(s) Topical two times a day  nystatin Powder 1Application(s) Topical three times a day  melatonin. 6milliGRAM(s) Oral at bedtime  ferrous    sulfate 325milliGRAM(s) Oral daily  ammonium lactate 12% Lotion 1Application(s) Topical two times a day  cefTRIAXone   IVPB 1Gram(s) IV Intermittent every 24 hours  cefTRIAXone   IVPB  IV Intermittent   furosemide    Tablet 40milliGRAM(s) Oral daily  tamsulosin 0.4milliGRAM(s) Oral at bedtime  insulin glargine Injectable (LANTUS) 20Unit(s) SubCutaneous every morning    MEDICATIONS  (PRN):  acetaminophen   Tablet 650milliGRAM(s) Oral every 6 hours PRN For Temp greater than 38 C (100.4 F)  acetaminophen   Tablet. 650milliGRAM(s) Oral every 6 hours PRN Mild Pain (1 - 3)        ASSESSMENT & PLAN  76M admitted for DKA and found to have bilateral supratentorial and infratentorial strokes with resulting gait impairment, decreased functional mobility, and dysphagia.    HTN - Lisinopril, Amlodipine, Lasix  DM II - Lantus, ISS  CVA PPX/HLD - Lipitor, ASA  Urinary Retention/UTI - Flomax, Lasix, +Ledesma, Rocephin, repeat UCx pending  Anemia - Ferrous Sulfate  Parkinson's Disease - Sinemet  Insomnia - Melatonin  GI/Bowel Management - Dulcolax PRN, Fleet PRN   Management - Toilet Q2  Skin - Turn Q2, Nystatin Powder, Bacitracin Ointment, Lac-Hydrin  Pain - Tylenol PRN  DVT PPX - Heparin SC, TEDs, SCDs  Diet - CC/DASH/TLC + Glucerna    Continue comprehensive acute rehab program consisting of 3hrs/day of OT/PT and SLP. HISTORY OF PRESENT ILLNESS    PMHx - 77 yo right-hand dominant man with PMHx of DM2 admitted to [facility] on 12/31/16 c/o not feeling well at home with lethargy for last few days PTA and not eating/drinking with vomiting. He was found to have severe Hyperglycemia, with blood glucose > 1100, venous pH of 6.9, and anion gap of 43. He was treated with IV hydration and insulin infusion for DKA. Surgery/wound care evaluated patient for foul smelling b/l groin/perineal/scrotal infection in b/l inguinal region, medial thighs, lower abdominal wall, scrotum, and perineum; appeared to be Tinea cruris and not likely necrotizing soft tissue infection as per surgery.    Hospital course complicated by increased lethargy. CTH revealed acute/subacute small to moderate right occipital lobe infarct. Neurology evaluated patient and started on statin/ASA. MRI showed acute b/l supratentorial and infratentorial infarcts with mild petechial hemorrhage in right occipital lobe. Carotid dopplers WNL. JESUS MANUEL on 1/9/2017 revealed PFO, large and acute DVT found in LLE. As he was deemed not a good candidate for AC per neurology and cardiology secondary to Parkinson's Disease and multiple falls, an IVC filter was placed without complication on 1/11/2017. Stable for discharge to acute rehab on 1/13/2017.    PMHx: HLD, DM II, HTN, Parkinson's Disease      TODAY'S SUBJECTIVE & REVIEW OF SYMPTOMS  [X] Constitutional WNL     [X] Cardio WNL            [X] Resp WNL           [X] GI WNL                          [X]  WNL                   [X] Heme WNL              [X] Endo WNL                     [X] Skin WNL                 [X] MSK WNL            [X] Neuro WNL                   [X] Cognitive WNL        [X] Psych WNL      No acute events overnight. Ledesma placed yesterday for urinary retention + UTI and rocephin was started. Eugenia'd by Urology over weekend --> recommend TOV at end of week. Initial urine cultures contaminated, repeat pending.  Patient reports that his b/l LEs are more swollen than his baseline and does cause some pain but mostly itches.  Denies bowel issues. +Ledesma.  No other complaints today.      VITALS  Vital Signs Last 24 Hrs  T(C): 37.1, Max: 37.1 (01-16 @ 20:35)  T(F): 98.8, Max: 98.8 (01-16 @ 20:35)  HR: 70 (59 - 70)  BP: 115/58 (115/58 - 138/79)  BP(mean): --  RR: 17 (17 - 18)  SpO2: 94% (91% - 95%)      PHYSICAL EXAM  Constitutional - NAD, Comfortable  HEENT - NCAT, EOMI  Neck - Supple, No limited ROM  Chest - CTA bilaterally, No wheeze, No rhonchi, No crackles  Cardiovascular - RRR, S1S2, No murmurs  Abdomen - BS+, Soft, NTND  Extremities - No calf tenderness, 1+ edema bilaterally with venous stasis changes  Neurologic Exam -                    Cognitive - Awake, Alert, AAO to self, place, year, situation     Communication - Fluent, No dysarthria     Cranial Nerves - CN 2-12 grossly intact     Motor - No focal deficits                    LEFT    UE - 5-/5                    RIGHT UE - 4+/5                    LEFT    LE - proximal weakness ~3/5, 5/5 distally                    RIGHT LE - proximal weakness ~3/5, 5/5 distally     Sensory - Intact to LT     Reflexes - DTR 2+ bilaterally  Psychiatric - Mood stable, Affect WNL  Skin - Bilateral LE venous stasis changes, left medial great toe abrasion, bilateral groin/medial thigh rashes  Wounds - None    FUNCTIONAL PROGRESS  Gait - length of parallel bars min A  ADLs - UE Dress mod A, LE Dress total A  Transfers - sit-to-stand mod A  Functional transfer - bed max/total A      RECENT LABS                        9.0    5.28  )-----------( 227      ( 16 Jan 2017 08:26 )             26.3     16 Jan 2017 08:26    135    |  97     |  17.0   ----------------------------<  181    4.1     |  27.0   |  1.10     Ca    8.6        16 Jan 2017 08:26    UCx (1/14): Contaminated                 RADIOLOGY/OTHER RESULTS  ---      MEDICATIONS  (STANDING):  aspirin enteric coated 81milliGRAM(s) Oral daily  lisinopril 10milliGRAM(s) Oral daily  amLODIPine   Tablet 10milliGRAM(s) Oral daily  carbidopa/levodopa  25/100 1Tablet(s) Oral three times a day  atorvastatin 20milliGRAM(s) Oral at bedtime  timolol 0.5% Solution 1Drop(s) Both EYES daily  insulin lispro (HumaLOG) corrective regimen sliding scale  SubCutaneous three times a day before meals  heparin  Injectable 5000Unit(s) SubCutaneous every 12 hours  insulin lispro (HumaLOG) corrective regimen sliding scale  SubCutaneous at bedtime  BACItracin   Ointment 1Application(s) Topical two times a day  nystatin Powder 1Application(s) Topical three times a day  melatonin. 6milliGRAM(s) Oral at bedtime  ferrous    sulfate 325milliGRAM(s) Oral daily  ammonium lactate 12% Lotion 1Application(s) Topical two times a day  cefTRIAXone   IVPB 1Gram(s) IV Intermittent every 24 hours  cefTRIAXone   IVPB  IV Intermittent   furosemide    Tablet 40milliGRAM(s) Oral daily  tamsulosin 0.4milliGRAM(s) Oral at bedtime  insulin glargine Injectable (LANTUS) 20Unit(s) SubCutaneous every morning    MEDICATIONS  (PRN):  acetaminophen   Tablet 650milliGRAM(s) Oral every 6 hours PRN For Temp greater than 38 C (100.4 F)  acetaminophen   Tablet. 650milliGRAM(s) Oral every 6 hours PRN Mild Pain (1 - 3)        ASSESSMENT & PLAN  76M admitted for DKA and found to have bilateral supratentorial and infratentorial strokes with resulting gait impairment, decreased functional mobility, and dysphagia.    HTN - Lisinopril, Amlodipine, Lasix  DM II - Lantus, ISS  CVA PPX/HLD - Lipitor, ASA  Urinary Retention/UTI - Flomax, Lasix, +Ledesma, Rocephin, repeat UCx pending  Anemia - Ferrous Sulfate  Parkinson's Disease - Sinemet  Insomnia - Melatonin  Increased IOP - Timolol ophth  GI/Bowel Management - Dulcolax PRN, Fleet PRN   Management - Toilet Q2  Skin - Turn Q2, Nystatin Powder, Bacitracin Ointment, Lac-Hydrin  Pain - Tylenol PRN  DVT PPX - Heparin SC, TEDs, SCDs  Diet - CC/DASH/TLC + Glucerna    Continue comprehensive acute rehab program consisting of 3hrs/day of OT/PT and SLP. HISTORY OF PRESENT ILLNESS    PMHx - 75 yo right-hand dominant man with PMHx of DM2 admitted to [facility] on 12/31/16 c/o not feeling well at home with lethargy for last few days PTA and not eating/drinking with vomiting. He was found to have severe Hyperglycemia, with blood glucose > 1100, venous pH of 6.9, and anion gap of 43. He was treated with IV hydration and insulin infusion for DKA. Surgery/wound care evaluated patient for foul smelling b/l groin/perineal/scrotal infection in b/l inguinal region, medial thighs, lower abdominal wall, scrotum, and perineum; appeared to be Tinea cruris and not likely necrotizing soft tissue infection as per surgery.    Hospital course complicated by increased lethargy. CTH revealed acute/subacute small to moderate right occipital lobe infarct. Neurology evaluated patient and started on statin/ASA. MRI showed acute b/l supratentorial and infratentorial infarcts with mild petechial hemorrhage in right occipital lobe. Carotid dopplers WNL. JESUS MANUEL on 1/9/2017 revealed PFO, large and acute DVT found in LLE. As he was deemed not a good candidate for AC per neurology and cardiology secondary to Parkinson's Disease and multiple falls, an IVC filter was placed without complication on 1/11/2017. Stable for discharge to acute rehab on 1/13/2017.    PMHx: HLD, DM II, HTN, Parkinson's Disease      TODAY'S SUBJECTIVE & REVIEW OF SYMPTOMS  [X] Constitutional WNL     [X] Cardio WNL            [X] Resp WNL           [X] GI WNL                          [X]  WNL                   [X] Heme WNL              [X] Endo WNL                     [X] Skin WNL                 [X] MSK WNL            [X] Neuro WNL                   [X] Cognitive WNL        [X] Psych WNL      No acute events overnight. Healy placed yesterday for urinary retention + UTI and rocephin was started. Eugenia'd by Urology over weekend --> recommend TOV at end of week. Initial urine cultures contaminated, repeat pending.  Patient reports that his b/l LEs are more swollen than his baseline and does cause some pain but mostly itches.  Denies bowel issues. +Healy with pain around healy - added Lidocaine Jelly.  No other complaints today.      VITALS  Vital Signs Last 24 Hrs  T(C): 37.1, Max: 37.1 (01-16 @ 20:35)  T(F): 98.8, Max: 98.8 (01-16 @ 20:35)  HR: 70 (59 - 70)  BP: 115/58 (115/58 - 138/79)  BP(mean): --  RR: 17 (17 - 18)  SpO2: 94% (91% - 95%)      PHYSICAL EXAM  Constitutional - NAD, Comfortable  HEENT - NCAT, EOMI  Neck - Supple, No limited ROM  Chest - CTA bilaterally, No wheeze, No rhonchi, No crackles  Cardiovascular - RRR, S1S2, No murmurs  Abdomen - BS+, Soft, NTND  Extremities - No calf tenderness, 1+ edema bilaterally with venous stasis changes  Neurologic Exam -                    Cognitive - Awake, Alert, AAO to self, place, year, situation     Communication - Fluent, No dysarthria     Cranial Nerves - CN 2-12 grossly intact     Motor - No focal deficits                    LEFT    UE - 5-/5                    RIGHT UE - 4+/5                    LEFT    LE - proximal weakness ~3/5, 5/5 distally                    RIGHT LE - proximal weakness ~3/5, 5/5 distally     Sensory - Intact to LT     Reflexes - DTR 2+ bilaterally  Psychiatric - Mood stable, Affect WNL  Skin - Bilateral LE venous stasis changes, left medial great toe abrasion, bilateral groin/medial thigh rashes  Wounds - None    FUNCTIONAL PROGRESS  Gait - length of parallel bars min A  ADLs - UE Dress mod A, LE Dress total A  Transfers - sit-to-stand mod A  Functional transfer - bed max/total A      RECENT LABS                        9.0    5.28  )-----------( 227      ( 16 Jan 2017 08:26 )             26.3     16 Jan 2017 08:26    135    |  97     |  17.0   ----------------------------<  181    4.1     |  27.0   |  1.10     Ca    8.6        16 Jan 2017 08:26    UCx (1/14): Contaminated                 RADIOLOGY/OTHER RESULTS  ---      MEDICATIONS  (STANDING):  aspirin enteric coated 81milliGRAM(s) Oral daily  lisinopril 10milliGRAM(s) Oral daily  amLODIPine   Tablet 10milliGRAM(s) Oral daily  carbidopa/levodopa  25/100 1Tablet(s) Oral three times a day  atorvastatin 20milliGRAM(s) Oral at bedtime  timolol 0.5% Solution 1Drop(s) Both EYES daily  insulin lispro (HumaLOG) corrective regimen sliding scale  SubCutaneous three times a day before meals  heparin  Injectable 5000Unit(s) SubCutaneous every 12 hours  insulin lispro (HumaLOG) corrective regimen sliding scale  SubCutaneous at bedtime  BACItracin   Ointment 1Application(s) Topical two times a day  nystatin Powder 1Application(s) Topical three times a day  melatonin. 6milliGRAM(s) Oral at bedtime  ferrous    sulfate 325milliGRAM(s) Oral daily  ammonium lactate 12% Lotion 1Application(s) Topical two times a day  cefTRIAXone   IVPB 1Gram(s) IV Intermittent every 24 hours  cefTRIAXone   IVPB  IV Intermittent   furosemide    Tablet 40milliGRAM(s) Oral daily  tamsulosin 0.4milliGRAM(s) Oral at bedtime  insulin glargine Injectable (LANTUS) 20Unit(s) SubCutaneous every morning    MEDICATIONS  (PRN):  acetaminophen   Tablet 650milliGRAM(s) Oral every 6 hours PRN For Temp greater than 38 C (100.4 F)  acetaminophen   Tablet. 650milliGRAM(s) Oral every 6 hours PRN Mild Pain (1 - 3)        ASSESSMENT & PLAN  76M admitted for DKA and found to have bilateral supratentorial and infratentorial strokes with resulting gait impairment, decreased functional mobility, and dysphagia.    HTN - Lisinopril, Amlodipine, Lasix  DM II - Lantus, ISS  CVA PPX/HLD - Lipitor, ASA  UTI - Rocephin, repeat UCx pending  Anemia - Ferrous Sulfate  Parkinson's Disease - Sinemet  Insomnia - Melatonin  Increased IOP - Timolol ophth  GI/Bowel Management - Dulcolax PRN, Fleet PRN   Management - Toilet Q2, +Healy, Lidocaine Jelly, Flomax  Skin - Turn Q2, Nystatin Powder, Bacitracin Ointment, Lac-Hydrin  Pain - Tylenol PRN  DVT PPX - Heparin SC, TEDs, SCDs  Diet - CC/DASH/TLC + Glucerna    Continue comprehensive acute rehab program consisting of 3hrs/day of OT/PT and SLP. HISTORY OF PRESENT ILLNESS    PMHx - 75 yo right-hand dominant man with PMHx of DM2 admitted to [facility] on 12/31/16 c/o not feeling well at home with lethargy for last few days PTA and not eating/drinking with vomiting. He was found to have severe Hyperglycemia, with blood glucose > 1100, venous pH of 6.9, and anion gap of 43. He was treated with IV hydration and insulin infusion for DKA. Surgery/wound care evaluated patient for foul smelling b/l groin/perineal/scrotal infection in b/l inguinal region, medial thighs, lower abdominal wall, scrotum, and perineum; appeared to be Tinea cruris and not likely necrotizing soft tissue infection as per surgery.    Hospital course complicated by increased lethargy. CTH revealed acute/subacute small to moderate right occipital lobe infarct. Neurology evaluated patient and started on statin/ASA. MRI showed acute b/l supratentorial and infratentorial infarcts with mild petechial hemorrhage in right occipital lobe. Carotid dopplers WNL. JESUS MANUEL on 1/9/2017 revealed PFO, large and acute DVT found in LLE. As he was deemed not a good candidate for AC per neurology and cardiology secondary to Parkinson's Disease and multiple falls, an IVC filter was placed without complication on 1/11/2017. Stable for discharge to acute rehab on 1/13/2017.    PMHx: HLD, DM II, HTN, Parkinson's Disease      TODAY'S SUBJECTIVE & REVIEW OF SYMPTOMS  [X] Constitutional WNL     [X] Cardio WNL            [X] Resp WNL           [X] GI WNL                          [X]  WNL                   [X] Heme WNL              [X] Endo WNL                     [X] Skin WNL                 [X] MSK WNL            [X] Neuro WNL                   [X] Cognitive WNL        [X] Psych WNL      No acute events overnight. Healy placed yesterday for urinary retention + UTI and rocephin was started. Eugenia'd by Urology over weekend --> recommend TOV at end of week. Initial urine cultures contaminated, repeat pending.  Patient reports that his b/l LEs are more swollen than his baseline and does cause some pain but mostly itches.  Denies bowel issues. +Healy with pain around healy - added Lidocaine Jelly.  No other complaints today.      VITALS  Vital Signs Last 24 Hrs  T(C): 37.1, Max: 37.1 (01-16 @ 20:35)  T(F): 98.8, Max: 98.8 (01-16 @ 20:35)  HR: 70 (59 - 70)  BP: 115/58 (115/58 - 138/79)  BP(mean): --  RR: 17 (17 - 18)  SpO2: 94% (91% - 95%)      PHYSICAL EXAM  Constitutional - NAD, Comfortable  HEENT - NCAT, EOMI  Neck - Supple, No limited ROM  Chest - CTA bilaterally, No wheeze, No rhonchi, No crackles  Cardiovascular - RRR, S1S2, No murmurs  Abdomen - BS+, Soft, NTND  Extremities - No calf tenderness, 1+ edema bilaterally with venous stasis changes  Neurologic Exam -                    Cognitive - Awake, Alert, AAO to self, place, year, situation     Communication - Fluent, No dysarthria     Cranial Nerves - CN 2-12 grossly intact     Motor - No focal deficits                    LEFT    UE - 5-/5                    RIGHT UE - 4+/5                    LEFT    LE - proximal weakness ~3/5, 5/5 distally                    RIGHT LE - proximal weakness ~3/5, 5/5 distally     Sensory - Intact to LT     Reflexes - DTR 2+ bilaterally  Psychiatric - Mood stable, Affect WNL  Skin - Bilateral LE venous stasis changes, left medial great toe abrasion, bilateral groin/medial thigh rashes  Wounds - None    FUNCTIONAL PROGRESS  Gait - length of parallel bars min A  ADLs - UE Dress mod A, LE Dress total A  Transfers - sit-to-stand mod A  Functional transfer - bed max/total A      RECENT LABS                        9.0    5.28  )-----------( 227      ( 16 Jan 2017 08:26 )             26.3     16 Jan 2017 08:26    135    |  97     |  17.0   ----------------------------<  181    4.1     |  27.0   |  1.10     Ca    8.6        16 Jan 2017 08:26    UCx (1/14): Contaminated                 RADIOLOGY/OTHER RESULTS  ---      MEDICATIONS  (STANDING):  aspirin enteric coated 81milliGRAM(s) Oral daily  lisinopril 10milliGRAM(s) Oral daily  amLODIPine   Tablet 10milliGRAM(s) Oral daily  carbidopa/levodopa  25/100 1Tablet(s) Oral three times a day  atorvastatin 20milliGRAM(s) Oral at bedtime  timolol 0.5% Solution 1Drop(s) Both EYES daily  insulin lispro (HumaLOG) corrective regimen sliding scale  SubCutaneous three times a day before meals  heparin  Injectable 5000Unit(s) SubCutaneous every 12 hours  insulin lispro (HumaLOG) corrective regimen sliding scale  SubCutaneous at bedtime  BACItracin   Ointment 1Application(s) Topical two times a day  nystatin Powder 1Application(s) Topical three times a day  melatonin. 6milliGRAM(s) Oral at bedtime  ferrous    sulfate 325milliGRAM(s) Oral daily  ammonium lactate 12% Lotion 1Application(s) Topical two times a day  cefTRIAXone   IVPB 1Gram(s) IV Intermittent every 24 hours  cefTRIAXone   IVPB  IV Intermittent   furosemide    Tablet 40milliGRAM(s) Oral daily  tamsulosin 0.4milliGRAM(s) Oral at bedtime  insulin glargine Injectable (LANTUS) 20Unit(s) SubCutaneous every morning    MEDICATIONS  (PRN):  acetaminophen   Tablet 650milliGRAM(s) Oral every 6 hours PRN For Temp greater than 38 C (100.4 F)  acetaminophen   Tablet. 650milliGRAM(s) Oral every 6 hours PRN Mild Pain (1 - 3)        ASSESSMENT & PLAN  76M admitted for DKA and found to have bilateral supratentorial and infratentorial strokes with resulting gait impairment, decreased functional mobility, and dysphagia.    HTN - Lisinopril, Amlodipine, Lasix  DM II - Lantus, ISS  CVA PPX/HLD - Lipitor, ASA  UTI - Vantin + Florastor (1/17-1/22), repeat UCx pending  Anemia - Ferrous Sulfate  Parkinson's Disease - Sinemet  Insomnia - Melatonin  Increased IOP - Timolol ophth  GI/Bowel Management - Dulcolax PRN, Fleet PRN   Management - Toilet Q2, +Healy, Lidocaine Jelly, Flomax  Skin - Turn Q2, Nystatin Powder, Bacitracin Ointment, Lac-Hydrin  Pain - Tylenol PRN  DVT PPX - Heparin SC, TEDs, SCDs  Diet - CC/DASH/TLC + Glucerna    Continue comprehensive acute rehab program consisting of 3hrs/day of OT/PT and SLP. HISTORY OF PRESENT ILLNESS  76M was admitted to [facility] on 12/31/16 after not feeling well for a few days. Workup showed his FS was >1100 and in DKA. Course was complicated by Tinea in the groin, lethargy on 1/2 where a brain MRI showed acute bilateral supratentorial and infratentorial infarcts with mild petechial hemorrhage in right occipital lobe. Source was found to be from PFO on JESUS MANUEL done 1/9. Course was further complicated by an acute Left LE DVT s/p IVCF on 1/11. Admitted to acute rehab on 1/13.    PMHx - HLD, DM 2, HTN, Parkinson's Disease      TODAY'S SUBJECTIVE & REVIEW OF SYMPTOMS  [X] Constitutional WNL     [X] Cardio WNL            [X] Resp WNL           [X] GI WNL                          [X]  WNL                   [X] Heme WNL              [X] Endo WNL                     [X] Skin WNL                 [X] MSK WNL            [X] Neuro WNL                   [X] Cognitive WNL        [X] Psych WNL      No acute events overnight. Healy placed yesterday for urinary retention + UTI and rocephin was started. Eval'd by Urology over weekend --> recommend TOV at end of week. Initial urine cultures contaminated, repeat pending.  Patient reports that his b/l LEs are more swollen than his baseline and does cause some pain but mostly itches.  Denies bowel issues. +Healy with pain around healy - added Lidocaine Jelly.  No other complaints today.      VITALS    T(C): 37.1, Max: 37.1 (01-16 @ 20:35)  T(F): 98.8, Max: 98.8 (01-16 @ 20:35)  HR: 70 (59 - 70)  BP: 115/58 (115/58 - 138/79)  BP(mean): --  RR: 17 (17 - 18)  SpO2: 94% (91% - 95%)      PHYSICAL EXAM  Constitutional - NAD, Comfortable  HEENT - NCAT, EOMI  Neck - Supple, No limited ROM  Chest - CTA bilaterally, No wheeze, No rhonchi, No crackles  Cardiovascular - RRR, S1S2, No murmurs  Abdomen - BS+, Soft, NTND  Extremities - BLE 1+ edema and tenderness to light touch  Neurologic Exam -                    Cognitive - Awake, Alert, AAO to self, place, year, situation     Communication - Fluent, No dysarthria     Cranial Nerves - CN 2-12 grossly intact     Motor -     LEFT    UE - ShAB 4/5, EF 4/5, EE 4/5, WE 4/5,  5/5                    RIGHT UE - ShAB 5/5, EF 5/5, EE 5/5, WE 5/5,  5/5                    LEFT    LE - HF 2/5, KE 3/5, DF 5/5, PF 5/5                    RIGHT LE - HF 2/5, KE 3/5, DF 5/5, PF 5/5         Sensory - Decreased to LT in BLE  Psychiatric - Mood stable, Affect Flat  Skin - Bilateral LE venous stasis changes, left medial great toe abrasion, bilateral groin/medial thigh rashes  Wounds - None    FUNCTIONAL PROGRESS  Gait - length of parallel bars min A  ADLs - UE Dress mod A, LE Dress total A  Transfers - sit-to-stand mod A  Functional transfer - bed max/total A      RECENT LABS            9.0    5.28  )-----------( 227      ( 16 Jan 2017 08:26 )             26.3     16 Jan 2017 08:26    135    |  97     |  17.0   ----------------------------<  181    4.1     |  27.0   |  1.10     Ca    8.6        16 Jan 2017 08:26    UCx 1/14 - Contaminated               FS Last 24HRS  268, 387, 306      RADIOLOGY/OTHER RESULTS  ---      MEDICATIONS  (STANDING):  aspirin enteric coated 81milliGRAM(s) Oral daily  lisinopril 10milliGRAM(s) Oral daily  amLODIPine   Tablet 10milliGRAM(s) Oral daily  carbidopa/levodopa  25/100 1Tablet(s) Oral three times a day  atorvastatin 20milliGRAM(s) Oral at bedtime  timolol 0.5% Solution 1Drop(s) Both EYES daily  insulin lispro (HumaLOG) corrective regimen sliding scale  SubCutaneous three times a day before meals  heparin  Injectable 5000Unit(s) SubCutaneous every 12 hours  insulin lispro (HumaLOG) corrective regimen sliding scale  SubCutaneous at bedtime  BACItracin   Ointment 1Application(s) Topical two times a day  nystatin Powder 1Application(s) Topical three times a day  melatonin. 6milliGRAM(s) Oral at bedtime  ferrous    sulfate 325milliGRAM(s) Oral daily  ammonium lactate 12% Lotion 1Application(s) Topical two times a day  cefTRIAXone   IVPB 1Gram(s) IV Intermittent every 24 hours  cefTRIAXone   IVPB  IV Intermittent   furosemide    Tablet 40milliGRAM(s) Oral daily  tamsulosin 0.4milliGRAM(s) Oral at bedtime  insulin glargine Injectable (LANTUS) 20Unit(s) SubCutaneous every morning    MEDICATIONS  (PRN):  acetaminophen   Tablet 650milliGRAM(s) Oral every 6 hours PRN For Temp greater than 38 C (100.4 F)  acetaminophen   Tablet. 650milliGRAM(s) Oral every 6 hours PRN Mild Pain (1 - 3)      ASSESSMENT & PLAN  76M with multiple CVAs now with functional deficits  HTN - Lisinopril, Amlodipine, Lasix + KCl  DM2 - Lantus, ISS  CVA - Lipitor, Aspirin  UTI - Vantin x5 days (1/17-1/22)  Anemia - Iron  Parkinson's Disease - Sinemet  Sleep - Melatonin 6mg   Glaucoma - Timolol   GI/Bowel Management - Dulcolax PRN, Fleet PRN   Management - Toilet Q2, +Healy, Flomax 0.4mg (1/16)  Skin - Turn Q2, Nystatin to groin, Bacitracin to groin, Lac-Hydrin to LE  Pain - Tylenol PRN  DVT PPX - Heparin Q12, TEDs, SCDs  Diet - Cardiac, CC/Thins, Glucerna TID    Continue comprehensive acute rehab program consisting of 3hrs/day of OT/PT. HISTORY OF PRESENT ILLNESS  76M was admitted to [facility] on 12/31/16 after not feeling well for a few days. Workup showed his FS was >1100 and in DKA. Course was complicated by Tinea in the groin, lethargy on 1/2 where a brain MRI showed acute bilateral supratentorial and infratentorial infarcts with mild petechial hemorrhage in right occipital lobe. Source was found to be from PFO on JESUS MANUEL done 1/9. Course was further complicated by an acute Left LE DVT s/p IVCF on 1/11. Admitted to acute rehab on 1/13.    PMHx - HLD, DM 2, HTN, Parkinson's Disease      TODAY'S SUBJECTIVE & REVIEW OF SYMPTOMS  [X] Constitutional WNL     [X] Cardio WNL            [X] Resp WNL           [X] GI WNL                          [X]  WNL                   [X] Heme WNL              [X] Endo WNL                     [X] Skin WNL                 [X] MSK WNL            [X] Neuro WNL                   [X] Cognitive WNL        [X] Psych WNL      No acute events overnight. Healy placed yesterday for urinary retention + UTI and rocephin was started. Eval'd by Urology over weekend --> recommend TOV at end of week. Initial urine cultures contaminated, repeat pending.  Patient reports that his b/l LEs are more swollen than his baseline and does cause some pain but mostly itches.  Denies bowel issues. +Healy with pain around healy - added Lidocaine Jelly.  No other complaints today.      VITALS    T(C): 37.1, Max: 37.1 (01-16 @ 20:35)  T(F): 98.8, Max: 98.8 (01-16 @ 20:35)  HR: 70 (59 - 70)  BP: 115/58 (115/58 - 138/79)  BP(mean): --  RR: 17 (17 - 18)  SpO2: 94% (91% - 95%)      PHYSICAL EXAM  Constitutional - NAD, Comfortable  HEENT - NCAT, EOMI  Neck - Supple, No limited ROM  Chest - CTA bilaterally, No wheeze, No rhonchi, No crackles  Cardiovascular - RRR, S1S2, No murmurs  Abdomen - BS+, Soft, NTND  Extremities - BLE 1+ edema and tenderness to light touch  Neurologic Exam -                    Cognitive - Awake, Alert, AAO to self, place, year, situation     Communication - Fluent, No dysarthria     Cranial Nerves - CN 2-12 grossly intact     Motor -     LEFT    UE - ShAB 4/5, EF 4/5, EE 4/5, WE 4/5,  5/5                    RIGHT UE - ShAB 5/5, EF 5/5, EE 5/5, WE 5/5,  5/5                    LEFT    LE - HF 2/5, KE 3/5, DF 5/5, PF 5/5                    RIGHT LE - HF 2/5, KE 3/5, DF 5/5, PF 5/5         Sensory - Decreased to LT in BLE  Psychiatric - Mood stable, Affect Flat  Skin - Bilateral LE venous stasis changes, left medial great toe abrasion, bilateral groin/medial thigh rashes  Wounds - None    FUNCTIONAL PROGRESS  Gait - length of parallel bars min A  ADLs - UE Dress mod A, LE Dress total A  Transfers - sit-to-stand mod A  Functional transfer - bed max/total A      RECENT LABS            9.0    5.28  )-----------( 227      ( 16 Jan 2017 08:26 )             26.3     16 Jan 2017 08:26    135    |  97     |  17.0   ----------------------------<  181    4.1     |  27.0   |  1.10     Ca    8.6        16 Jan 2017 08:26    UCx 1/14 - Contaminated               FS Last 24HRS  268, 387, 306      RADIOLOGY/OTHER RESULTS  ---      MEDICATIONS  (STANDING):  aspirin enteric coated 81milliGRAM(s) Oral daily  lisinopril 10milliGRAM(s) Oral daily  amLODIPine   Tablet 10milliGRAM(s) Oral daily  carbidopa/levodopa  25/100 1Tablet(s) Oral three times a day  atorvastatin 20milliGRAM(s) Oral at bedtime  timolol 0.5% Solution 1Drop(s) Both EYES daily  heparin  Injectable 5000Unit(s) SubCutaneous every 12 hours  melatonin. 6milliGRAM(s) Oral at bedtime  ferrous    sulfate 325milliGRAM(s) Oral daily  ammonium lactate 12% Lotion 1Application(s) Topical two times a day  furosemide    Tablet 40milliGRAM(s) Oral daily  tamsulosin 0.4milliGRAM(s) Oral at bedtime  lidocaine 2% Jelly 5milliLiter(s) IntraUrethral two times a day  insulin glargine Injectable (LANTUS) 22Unit(s) SubCutaneous at bedtime  saccharomyces boulardii 250milliGRAM(s) Oral two times a day  BACItracin   Ointment 1Application(s) Topical two times a day  potassium chloride    Tablet ER 20milliEquivalent(s) Oral daily  insulin lispro (HumaLOG) corrective regimen sliding scale  SubCutaneous Before meals and at bedtime  nystatin Powder 1Application(s) Topical three times a day    MEDICATIONS  (PRN):  acetaminophen   Tablet 650milliGRAM(s) Oral every 6 hours PRN For Temp greater than 38 C (100.4 F)  acetaminophen   Tablet. 650milliGRAM(s) Oral every 6 hours PRN Mild Pain (1 - 3)  sodium biphosphate Rectal Enema 1Enema Rectal daily PRN Constipation  bisacodyl 10milliGRAM(s) Oral daily PRN Constipation      ASSESSMENT & PLAN  76M with multiple CVAs now with functional deficits  HTN - Lisinopril, Amlodipine, Lasix + KCl  DM2 - Lantus, ISS  CVA - Lipitor, Aspirin  UTI - Vantin x5 days (1/17-1/22)  Anemia - Iron  Parkinson's Disease - Sinemet  Sleep - Melatonin 6mg   Glaucoma - Timolol   GI/Bowel Management - Dulcolax PRN, Fleet PRN   Management - Toilet Q2, +Healy, Flomax 0.4mg (1/16)  Skin - Turn Q2, Nystatin to groin, Bacitracin to groin, Lac-Hydrin to LE  Pain - Tylenol PRN  DVT PPX - Heparin Q12, TEDs, SCDs  Diet - Cardiac, CC/Thins, Glucerna TID    Continue comprehensive acute rehab program consisting of 3hrs/day of OT/PT. HISTORY OF PRESENT ILLNESS  76M was admitted to [facility] on 12/31/16 after not feeling well for a few days. Workup showed his FS was >1100 and in DKA. Course was complicated by Tinea in the groin, lethargy on 1/2 where a brain MRI showed acute bilateral supratentorial and infratentorial infarcts with mild petechial hemorrhage in right occipital lobe. Source was found to be from PFO on JESUS MANUEL done 1/9. Course was further complicated by an acute Left LE DVT s/p IVCF on 1/11. Admitted to acute rehab on 1/13.    PMHx - HLD, DM 2, HTN, Parkinson's Disease      TODAY'S SUBJECTIVE & REVIEW OF SYMPTOMS  [X] Constitutional WNL     [X] Cardio WNL            [X] Resp WNL           [X] GI WNL                          [X]  WNL                   [X] Heme WNL              [X] Endo WNL                     [X] Skin WNL                 [X] MSK WNL            [X] Neuro WNL                   [X] Cognitive WNL        [X] Psych WNL      No acute events overnight. Healy placed yesterday for urinary retention + UTI and rocephin was started. Eval'd by Urology over weekend --> recommend TOV at end of week. Initial urine cultures contaminated, repeat pending.  Patient reports that his b/l LEs are more swollen than his baseline and does cause some pain but mostly itches.  Denies bowel issues. +Healy with pain around healy - added Lidocaine Jelly.  No other complaints today.      VITALS    T(C): 37.1, Max: 37.1 (01-16 @ 20:35)  T(F): 98.8, Max: 98.8 (01-16 @ 20:35)  HR: 70 (59 - 70)  BP: 115/58 (115/58 - 138/79)  BP(mean): --  RR: 17 (17 - 18)  SpO2: 94% (91% - 95%)      PHYSICAL EXAM  Constitutional - NAD, Comfortable  HEENT - NCAT, EOMI  Neck - Supple, No limited ROM  Chest - CTA bilaterally, No wheeze, No rhonchi, No crackles  Cardiovascular - RRR, S1S2, No murmurs  Abdomen - BS+, Soft, NTND  Extremities - BLE 1+ edema and tenderness to light touch  Neurologic Exam -                    Cognitive - Awake, Alert, AAO to self, place, year, situation     Communication - Fluent, No dysarthria     Cranial Nerves - CN 2-12 grossly intact     Motor -     LEFT    UE - ShAB 4/5, EF 4/5, EE 4/5, WE 4/5,  5/5                    RIGHT UE - ShAB 5/5, EF 5/5, EE 5/5, WE 5/5,  5/5                    LEFT    LE - HF 2/5, KE 3/5, DF 5/5, PF 5/5                    RIGHT LE - HF 2/5, KE 3/5, DF 5/5, PF 5/5         Sensory - Decreased to LT in BLE  Psychiatric - Mood stable, Affect Flat  Skin - Bilateral LE venous stasis changes, left medial great toe abrasion, bilateral groin/medial thigh rashes  Wounds - None    FUNCTIONAL PROGRESS  Gait - length of parallel bars min A  ADLs - UE Dress mod A, LE Dress total A  Transfers - sit-to-stand mod A  Functional transfer - bed max/total A      RECENT LABS            9.0    5.28  )-----------( 227      ( 16 Jan 2017 08:26 )             26.3     16 Jan 2017 08:26    135    |  97     |  17.0   ----------------------------<  181    4.1     |  27.0   |  1.10     Ca    8.6        16 Jan 2017 08:26    UCx 1/14 - Contaminated               FS Last 24HRS  268, 387, 306      RADIOLOGY/OTHER RESULTS  ---      MEDICATIONS  (STANDING):  aspirin enteric coated 81milliGRAM(s) Oral daily  lisinopril 10milliGRAM(s) Oral daily  amLODIPine   Tablet 10milliGRAM(s) Oral daily  carbidopa/levodopa  25/100 1Tablet(s) Oral three times a day  atorvastatin 20milliGRAM(s) Oral at bedtime  timolol 0.5% Solution 1Drop(s) Both EYES daily  heparin  Injectable 5000Unit(s) SubCutaneous every 12 hours  melatonin. 6milliGRAM(s) Oral at bedtime  ferrous    sulfate 325milliGRAM(s) Oral daily  ammonium lactate 12% Lotion 1Application(s) Topical two times a day  furosemide    Tablet 40milliGRAM(s) Oral daily  tamsulosin 0.4milliGRAM(s) Oral at bedtime  lidocaine 2% Jelly 5milliLiter(s) IntraUrethral two times a day  insulin glargine Injectable (LANTUS) 22Unit(s) SubCutaneous at bedtime  saccharomyces boulardii 250milliGRAM(s) Oral two times a day  BACItracin   Ointment 1Application(s) Topical two times a day  potassium chloride    Tablet ER 20milliEquivalent(s) Oral daily  insulin lispro (HumaLOG) corrective regimen sliding scale  SubCutaneous Before meals and at bedtime  nystatin Powder 1Application(s) Topical three times a day    MEDICATIONS  (PRN):  acetaminophen   Tablet 650milliGRAM(s) Oral every 6 hours PRN For Temp greater than 38 C (100.4 F)  acetaminophen   Tablet. 650milliGRAM(s) Oral every 6 hours PRN Mild Pain (1 - 3)  sodium biphosphate Rectal Enema 1Enema Rectal daily PRN Constipation  bisacodyl 10milliGRAM(s) Oral daily PRN Constipation      ASSESSMENT & PLAN  76M with multiple CVAs now with functional deficits  HTN - Lisinopril, Amlodipine, Lasix + KCl  DM2 - Lantus, ISS  CVA - Lipitor, Aspirin  UTI - Vantin x5 days (1/17-1/22)  Anemia - Iron  Parkinson's Disease - Sinemet  Sleep - Melatonin 6mg   Glaucoma - Timolol   GI/Bowel Management - Dulcolax PRN, Fleet PRN   Management - Toilet Q2, +Healy, Flomax 0.4mg (1/16)  Skin - Turn Q2, Nystatin to groin, Bacitracin to groin, Lac-Hydrin to LE  Pain - Tylenol PRN  DVT PPX - Heparin Q12, TEDs, SCDs  Diet - Cardiac, CC/Thins, Glucerna TID    Continue comprehensive acute rehab program consisting of 3hrs/day of OT/PT/SLP.

## 2017-01-18 PROCEDURE — 99233 SBSQ HOSP IP/OBS HIGH 50: CPT

## 2017-01-18 PROCEDURE — 99232 SBSQ HOSP IP/OBS MODERATE 35: CPT

## 2017-01-18 RX ORDER — FINASTERIDE 5 MG/1
5 TABLET, FILM COATED ORAL DAILY
Qty: 0 | Refills: 0 | Status: DISCONTINUED | OUTPATIENT
Start: 2017-01-18 | End: 2017-02-02

## 2017-01-18 RX ORDER — LANOLIN ALCOHOL/MO/W.PET/CERES
9 CREAM (GRAM) TOPICAL AT BEDTIME
Qty: 0 | Refills: 0 | Status: DISCONTINUED | OUTPATIENT
Start: 2017-01-18 | End: 2017-02-02

## 2017-01-18 RX ORDER — LACTULOSE 10 G/15ML
20 SOLUTION ORAL AT BEDTIME
Qty: 0 | Refills: 0 | Status: DISCONTINUED | OUTPATIENT
Start: 2017-01-18 | End: 2017-01-19

## 2017-01-18 RX ADMIN — Medication 650 MILLIGRAM(S): at 16:03

## 2017-01-18 RX ADMIN — NYSTATIN CREAM 1 APPLICATION(S): 100000 CREAM TOPICAL at 05:38

## 2017-01-18 RX ADMIN — LACTULOSE 20 GRAM(S): 10 SOLUTION ORAL at 21:10

## 2017-01-18 RX ADMIN — Medication 1 DROP(S): at 13:08

## 2017-01-18 RX ADMIN — Medication 5 MILLILITER(S): at 18:13

## 2017-01-18 RX ADMIN — NYSTATIN CREAM 1 APPLICATION(S): 100000 CREAM TOPICAL at 13:04

## 2017-01-18 RX ADMIN — Medication 1 APPLICATION(S): at 18:13

## 2017-01-18 RX ADMIN — ATORVASTATIN CALCIUM 20 MILLIGRAM(S): 80 TABLET, FILM COATED ORAL at 21:11

## 2017-01-18 RX ADMIN — Medication 2: at 21:10

## 2017-01-18 RX ADMIN — Medication 40 MILLIGRAM(S): at 05:37

## 2017-01-18 RX ADMIN — Medication 5 MILLILITER(S): at 05:38

## 2017-01-18 RX ADMIN — NYSTATIN CREAM 1 APPLICATION(S): 100000 CREAM TOPICAL at 21:10

## 2017-01-18 RX ADMIN — Medication 6: at 13:06

## 2017-01-18 RX ADMIN — Medication 650 MILLIGRAM(S): at 17:56

## 2017-01-18 RX ADMIN — FINASTERIDE 5 MILLIGRAM(S): 5 TABLET, FILM COATED ORAL at 13:04

## 2017-01-18 RX ADMIN — Medication 20 MILLIEQUIVALENT(S): at 13:15

## 2017-01-18 RX ADMIN — CARBIDOPA AND LEVODOPA 1 TABLET(S): 25; 100 TABLET ORAL at 21:11

## 2017-01-18 RX ADMIN — Medication 100 MILLIGRAM(S): at 13:03

## 2017-01-18 RX ADMIN — Medication 1 APPLICATION(S): at 05:38

## 2017-01-18 RX ADMIN — TAMSULOSIN HYDROCHLORIDE 0.4 MILLIGRAM(S): 0.4 CAPSULE ORAL at 21:10

## 2017-01-18 RX ADMIN — Medication 10 MILLIGRAM(S): at 18:19

## 2017-01-18 RX ADMIN — INSULIN GLARGINE 22 UNIT(S): 100 INJECTION, SOLUTION SUBCUTANEOUS at 21:10

## 2017-01-18 RX ADMIN — Medication 81 MILLIGRAM(S): at 13:02

## 2017-01-18 RX ADMIN — CARBIDOPA AND LEVODOPA 1 TABLET(S): 25; 100 TABLET ORAL at 05:36

## 2017-01-18 RX ADMIN — Medication 1 APPLICATION(S): at 05:36

## 2017-01-18 RX ADMIN — AMLODIPINE BESYLATE 10 MILLIGRAM(S): 2.5 TABLET ORAL at 05:36

## 2017-01-18 RX ADMIN — Medication 8: at 16:08

## 2017-01-18 RX ADMIN — HEPARIN SODIUM 5000 UNIT(S): 5000 INJECTION INTRAVENOUS; SUBCUTANEOUS at 18:12

## 2017-01-18 RX ADMIN — CARBIDOPA AND LEVODOPA 1 TABLET(S): 25; 100 TABLET ORAL at 13:05

## 2017-01-18 RX ADMIN — Medication 325 MILLIGRAM(S): at 13:02

## 2017-01-18 RX ADMIN — HEPARIN SODIUM 5000 UNIT(S): 5000 INJECTION INTRAVENOUS; SUBCUTANEOUS at 05:37

## 2017-01-18 RX ADMIN — Medication 250 MILLIGRAM(S): at 05:36

## 2017-01-18 RX ADMIN — LISINOPRIL 10 MILLIGRAM(S): 2.5 TABLET ORAL at 05:36

## 2017-01-18 RX ADMIN — Medication 9 MILLIGRAM(S): at 21:10

## 2017-01-18 RX ADMIN — Medication 2: at 07:58

## 2017-01-18 NOTE — PROGRESS NOTE ADULT - ASSESSMENT
This is 76Y M with PMH of DM, HTN, admitted for DKA and AMS, admitted to ICU, started on insulin drip, anion gap closed, off insulin drip, on Lantus 22 units. He was also noted to foul smelling discharge from groin, seen bu surgery, looks fungal infection, started on Nystatin powder, improving. MRI brain showed b/l stroke with mild petechial hemorrhage in right occipital lobe. JESUS MANUEL - Large PFO. acute DVT in LLE - not a good candidate for AC per Neuro and Cardio - s/p - IVC filter placement. S/P Ledesma cath placement , feeling better , c/o b/l LE itching , no other complaints .      A/P    > DKA due to Non compliant , resolved - continue diet , ISSC ,  will increase Lantus   A1C: 12 , diabetic education consult provided    >Hypokalemia - resolved , follow up BMP     >B/L stroke with mild petechial hemorrhage in right occipital lobe  appreciate Neurology input, MRI showed b/l stroke with petechial hemorrhage in occipital lobe  Repeat CT scan stable mild petechial hemorrhage, as per neuro on baby aspirin ,  cont. statin  TTE normal, carotid doppler showed ?stenosis in distal intracranial stenosis, as per Neurology no need of CTA  JESUS MANUEL - Large PFO - as per cardiology .     Acute DVT in LLE - not a good candidate for AC per Neuro and Cardio - s/p - IVC filter placement.       >HTN - Stable  continue  amlodipine 10 mg daily and lisinopril 10mg daily with parameters      >Groin fungal infection - improving , continue  Nystatin    >Parkinson disease  cont. sinemet as per Neurology    > UTI- on empiric  abx  , f/u culture    > Hyponatremia -  resolved    > Urinary retention - Hx of bph and s/p TURP 10 yrs ago,  consult  noted  and appreciated , started on Flomax , Ledesma placed secondary to painful urination and painful straight  cath  , will give TOV in few days    > B/L LE edema - will increase Lasix to 40 mg Daily, monitor BMP , continue topical care of LE This is 76Y M with PMH of DM, HTN, admitted for DKA and AMS, admitted to ICU, started on insulin drip, anion gap closed, off insulin drip, on Lantus 22 units. He was also noted to foul smelling discharge from groin, seen bu surgery, looks fungal infection, started on Nystatin powder, improving. MRI brain showed b/l stroke with mild petechial hemorrhage in right occipital lobe. JESUS MANUEL - Large PFO. acute DVT in LLE - not a good candidate for AC per Neuro and Cardio - s/p - IVC filter placement. S/P Ledesma cath placement , feeling better , c/o b/l LE itching / pain , no BM for few days, no other complaints .      A/P    > DKA due to Non compliant , resolved - continue diet , ISSC ,  will increase Lantus to 28 units  A1C: 12 , diabetic education consult provided    >Hypokalemia - resolved , follow up BMP     >B/L stroke with mild petechial hemorrhage in right occipital lobe - on ASA / Statin / continue rehab program    TTE normal, carotid doppler showed ?stenosis in distal intracranial stenosis, as per Neurology no need of CTA  JESUS MANUEL - Large PFO - as per cardiology  , follow up as outpatient     Acute DVT in LLE - not a good candidate for AC per Neuro and Cardio - s/p - IVC filter placement.       >HTN - Stable  continue  amlodipine 10 mg daily and lisinopril 10mg daily with parameters      >Groin fungal infection - improving , continue  Nystatin    >Parkinson disease  cont. sinemet as per Neurology    > UTI- on empiric  abx  , f/u culture    > Hyponatremia -  resolved    > Urinary retention - Hx of BPH and s/p TURP 10 yrs ago,  consult  noted  and appreciated , started on Flomax , Ledesma placed secondary to painful urination and painful straight  cath  ,  Finasteride started will give TOV in few days    > B/L LE edema - will increase Lasix to 40 mg Daily, monitor BMP , continue topical care of LE , add compressive stockings  b/l , leg elevation    > Constipation - add colace/Senna

## 2017-01-18 NOTE — PROGRESS NOTE ADULT - SUBJECTIVE AND OBJECTIVE BOX
Patient seen and examined . S/P Ledesma catheter placement day 2 , feels better today , c/o b/l LE itching , no other complaints    CC:    PAST MEDICAL & SURGICAL HISTORY:  High cholesterol  Diabetes  Hypertension  S/P hip hemiarthroplasty: right hip repair june 2014  No significant past surgical history      MEDICATIONS  (STANDING):  aspirin enteric coated 81milliGRAM(s) Oral daily  amLODIPine   Tablet 10milliGRAM(s) Oral daily  carbidopa/levodopa  25/100 1Tablet(s) Oral three times a day  atorvastatin 20milliGRAM(s) Oral at bedtime  timolol 0.5% Solution 1Drop(s) Both EYES daily  heparin  Injectable 5000Unit(s) SubCutaneous every 12 hours  ferrous    sulfate 325milliGRAM(s) Oral daily  ammonium lactate 12% Lotion 1Application(s) Topical two times a day  furosemide    Tablet 40milliGRAM(s) Oral daily  tamsulosin 0.4milliGRAM(s) Oral at bedtime  lidocaine 2% Jelly 5milliLiter(s) IntraUrethral two times a day  insulin glargine Injectable (LANTUS) 22Unit(s) SubCutaneous at bedtime  cefpodoxime 100milliGRAM(s) Oral every 12 hours  saccharomyces boulardii 250milliGRAM(s) Oral two times a day  BACItracin   Ointment 1Application(s) Topical two times a day  potassium chloride    Tablet ER 20milliEquivalent(s) Oral daily  insulin lispro (HumaLOG) corrective regimen sliding scale  SubCutaneous Before meals and at bedtime  nystatin Powder 1Application(s) Topical three times a day  finasteride 5milliGRAM(s) Oral daily  melatonin. 9milliGRAM(s) Oral at bedtime    MEDICATIONS  (PRN):  acetaminophen   Tablet 650milliGRAM(s) Oral every 6 hours PRN For Temp greater than 38 C (100.4 F)  acetaminophen   Tablet. 650milliGRAM(s) Oral every 6 hours PRN Mild Pain (1 - 3)  sodium biphosphate Rectal Enema 1Enema Rectal daily PRN Constipation  bisacodyl 10milliGRAM(s) Oral daily PRN Constipation      LABS:                  RADIOLOGY & ADDITIONAL TESTS:      REVIEW OF SYSTEMS:    CONSTITUTIONAL: No fever, weight loss, or fatigue  EYES: No eye pain, visual disturbances, or discharge  ENMT:  No difficulty hearing, tinnitus, vertigo; No sinus or throat pain  NECK: No pain or stiffness  RESPIRATORY: No cough, wheezing, chills or hemoptysis; No shortness of breath  CARDIOVASCULAR: No chest pain, palpitations, dizziness, or leg swelling  GASTROINTESTINAL: No abdominal or epigastric pain. No nausea, vomiting, or hematemesis; No diarrhea or constipation. No melena or hematochezia.  GENITOURINARY: No dysuria, frequency, hematuria, or incontinence  NEUROLOGICAL: No headaches, memory loss, loss of strength, numbness, or tremors  SKIN: No itching, burning, rashes, or lesions   LYMPH NODES: No enlarged glands  ENDOCRINE: No heat or cold intolerance; No hair loss  MUSCULOSKELETAL:   PSYCHIATRIC: No depression, anxiety, mood swings, or difficulty sleeping  HEME/LYMPH: No easy bruising, or bleeding gums  ALLERGY AND IMMUNOLOGIC: No hives or eczema    Vital Signs Last 24 Hrs  T(C): 37.1, Max: 37.1 (01-18 @ 06:06)  T(F): 98.7, Max: 98.7 (01-18 @ 06:06)  HR: 69 (67 - 69)  BP: 111/73 (108/59 - 131/73)  BP(mean): --  RR: 18 (18 - 18)  SpO2: 96% (95% - 96%)  PHYSICAL EXAM:    GENERAL: NAD, well-groomed, well-developed  HEAD:  Atraumatic, Normocephalic  EYES: EOMI, PERRLA, conjunctiva and sclera clear  NECK: Supple, No JVD, Normal thyroid  NERVOUS SYSTEM:  Alert & Oriented X3, no focal deficit  CHEST/LUNG: CTA b/l ,  no  rales, rhonchi, wheezing, or rubs  HEART: Regular rate and rhythm; No murmurs, rubs, or gallops  ABDOMEN: Soft, Nontender, Nondistended; Bowel sounds present  EXTREMITIES:  2+ Peripheral Pulses, No clubbing, cyanosis, or edema  LYMPH: No lymphadenopathy noted  SKIN: No rashes or lesions Patient seen and examined . S/P Ledesma catheter placement day 2 , feels better today , c/o b/l LE itching/ pain ,  no BM for several days , no other complaints    CC: constipation , b/l lower extremity pain / itching ,  constipation ,no other complaints    PAST MEDICAL & SURGICAL HISTORY:    High cholesterol  Diabetes  Hypertension  S/P hip hemiarthroplasty: right hip repair june 2014  No significant past surgical history      MEDICATIONS  (STANDING):  aspirin enteric coated 81milliGRAM(s) Oral daily  amLODIPine   Tablet 10milliGRAM(s) Oral daily  carbidopa/levodopa  25/100 1Tablet(s) Oral three times a day  atorvastatin 20milliGRAM(s) Oral at bedtime  timolol 0.5% Solution 1Drop(s) Both EYES daily  heparin  Injectable 5000Unit(s) SubCutaneous every 12 hours  ferrous    sulfate 325milliGRAM(s) Oral daily  ammonium lactate 12% Lotion 1Application(s) Topical two times a day  furosemide    Tablet 40milliGRAM(s) Oral daily  tamsulosin 0.4milliGRAM(s) Oral at bedtime  lidocaine 2% Jelly 5milliLiter(s) IntraUrethral two times a day  insulin glargine Injectable (LANTUS) 22Unit(s) SubCutaneous at bedtime  cefpodoxime 100milliGRAM(s) Oral every 12 hours  saccharomyces boulardii 250milliGRAM(s) Oral two times a day  BACItracin   Ointment 1Application(s) Topical two times a day  potassium chloride    Tablet ER 20milliEquivalent(s) Oral daily  insulin lispro (HumaLOG) corrective regimen sliding scale  SubCutaneous Before meals and at bedtime  nystatin Powder 1Application(s) Topical three times a day  finasteride 5milliGRAM(s) Oral daily  melatonin. 9milliGRAM(s) Oral at bedtime    MEDICATIONS  (PRN):  acetaminophen   Tablet 650milliGRAM(s) Oral every 6 hours PRN For Temp greater than 38 C (100.4 F)  acetaminophen   Tablet. 650milliGRAM(s) Oral every 6 hours PRN Mild Pain (1 - 3)  sodium biphosphate Rectal Enema 1Enema Rectal daily PRN Constipation  bisacodyl 10milliGRAM(s) Oral daily PRN Constipation      REVIEW OF SYSTEMS:    CONSTITUTIONAL: No fever, weight loss, or fatigue  EYES: No eye pain, visual disturbances, or discharge  ENMT:  No difficulty hearing, tinnitus, vertigo; No sinus or throat pain  NECK: No pain or stiffness  RESPIRATORY: No cough, wheezing, chills or hemoptysis; No shortness of breath  CARDIOVASCULAR: No chest pain, palpitations, dizziness, or leg swelling  GASTROINTESTINAL: No abdominal or epigastric pain. No nausea, vomiting, or hematemesis; No diarrhea ,  constipation + . No melena or hematochezia.  GENITOURINARY: No dysuria, frequency, hematuria, or incontinence  NEUROLOGICAL: AAOX3 , no focal deficit   SKIN: No itching, burning, rashes, or lesions   LYMPH NODES: No enlarged glands  ENDOCRINE: No heat or cold intolerance; No hair loss  MUSCULOSKELETAL: no joint pain or swelling  PSYCHIATRIC: No depression, anxiety, mood swings, or difficulty sleeping  HEME/LYMPH: No easy bruising, or bleeding gums  ALLERGY AND IMMUNOLOGIC: No hives or eczema    Vital Signs Last 24 Hrs    T(C): 37.1, Max: 37.1 (01-18 @ 06:06)  T(F): 98.7, Max: 98.7 (01-18 @ 06:06)  HR: 69 (67 - 69)  BP: 111/73 (108/59 - 131/73)  BP(mean): --  RR: 18 (18 - 18)  SpO2: 96% (95% - 96%)    PHYSICAL EXAM:    GENERAL: NAD, well-groomed, well-developed  HEAD:  Atraumatic, Normocephalic  EYES: EOMI, PERRLA, conjunctiva and sclera clear  NECK: Supple, No JVD, Normal thyroid  NERVOUS SYSTEM:  Alert & Oriented X3, no focal deficit  CHEST/LUNG: CTA b/l ,  no  rales, rhonchi, wheezing, or rubs  HEART: Regular rate and rhythm; No murmurs, rubs, or gallops  ABDOMEN: Soft, Nontender, Nondistended; Bowel sounds present  EXTREMITIES:  2+ Peripheral Pulses, No clubbing, cyanosis, edema 2 + , erythema up to below mid calf +  LYMPH: No lymphadenopathy noted  SKIN: No rashes or lesions

## 2017-01-18 NOTE — PROGRESS NOTE ADULT - SUBJECTIVE AND OBJECTIVE BOX
INTERVAL HPI/OVERNIGHT EVENTS:    MEDICATIONS  (STANDING):  aspirin enteric coated 81milliGRAM(s) Oral daily  amLODIPine   Tablet 10milliGRAM(s) Oral daily  carbidopa/levodopa  25/100 1Tablet(s) Oral three times a day  atorvastatin 20milliGRAM(s) Oral at bedtime  timolol 0.5% Solution 1Drop(s) Both EYES daily  heparin  Injectable 5000Unit(s) SubCutaneous every 12 hours  ferrous    sulfate 325milliGRAM(s) Oral daily  ammonium lactate 12% Lotion 1Application(s) Topical two times a day  furosemide    Tablet 40milliGRAM(s) Oral daily  tamsulosin 0.4milliGRAM(s) Oral at bedtime  lidocaine 2% Jelly 5milliLiter(s) IntraUrethral two times a day  insulin glargine Injectable (LANTUS) 22Unit(s) SubCutaneous at bedtime  BACItracin   Ointment 1Application(s) Topical two times a day  potassium chloride    Tablet ER 20milliEquivalent(s) Oral daily  insulin lispro (HumaLOG) corrective regimen sliding scale  SubCutaneous Before meals and at bedtime  nystatin Powder 1Application(s) Topical three times a day  finasteride 5milliGRAM(s) Oral daily  melatonin. 9milliGRAM(s) Oral at bedtime    MEDICATIONS  (PRN):  acetaminophen   Tablet 650milliGRAM(s) Oral every 6 hours PRN For Temp greater than 38 C (100.4 F)  acetaminophen   Tablet. 650milliGRAM(s) Oral every 6 hours PRN Mild Pain (1 - 3)  sodium biphosphate Rectal Enema 1Enema Rectal daily PRN Constipation  bisacodyl 10milliGRAM(s) Oral daily PRN Constipation      Allergies    No Known Allergies    Intolerances        Vital Signs Last 24 Hrs  T(C): 36.7, Max: 37.1 (01-18 @ 06:06)  T(F): 98.1, Max: 98.7 (01-18 @ 06:06)  HR: 66 (66 - 69)  BP: 113/60 (111/73 - 131/73)  BP(mean): --  RR: 18 (18 - 18)  SpO2: 95% (95% - 96%)     ON PE:  General: alert and awake  Abdomen: Soft ND/NT BS+  : Ledesma intact and urinary bladder decompressed    LABS:                RADIOLOGY & ADDITIONAL TESTS:

## 2017-01-18 NOTE — PROGRESS NOTE ADULT - SUBJECTIVE AND OBJECTIVE BOX
HISTORY OF PRESENT ILLNESS  76M was admitted to [facility] on 12/31/16 after not feeling well for a few days. Workup showed his FS was >1100 and in DKA. Course was complicated by Tinea in the groin, lethargy on 1/2 where a brain MRI showed acute bilateral supratentorial and infratentorial infarcts with mild petechial hemorrhage in right occipital lobe. Source was found to be from PFO on JESUS MANUEL done 1/9. Course was further complicated by an acute Left LE DVT s/p IVCF on 1/11. Admitted to acute rehab on 1/13.    PMHx - HLD, DM 2, HTN, Parkinson's Disease      TODAY'S SUBJECTIVE & REVIEW OF SYMPTOMS  [ ] Constitutional WNL          [X] Cardio WNL              [X] Resp WNL           [X] GI WNL                          [ ]  WNL                   [X] Heme WNL              [X] Endo WNL                     [X] Skin WNL                 [X] MSK WNL            [X] Neuro WNL                    [ ] Cognitive WNL           [X] Psych WNL      Patient he feels did not sleep very well and feels fatigued. Denies pain.   Looking to improve in function and go home. Urology consultation appreciated.   Will add Proscar, but will likely drop KAREN more and will cut back on the lisinopril which was just increased.  A second rrine culture was apparently sent, given the most recent was contaminated.   Repeat Urine culture will be affected by the antibiotic already received. Will continue to monitor. Vitals normal.  Also stated has itchiness of the legs. Lachydrin is ordered. Awaiting larger TEDs for BLE.    VITALS  T(C): 37.1  T(F): 98.7, Max: 98.7 (01-18 @ 06:06)  HR: 69 (67 - 69)  BP: 111/73 (108/59 - 131/73)  RR:  (18 - 18)  SpO2:  (95% - 96%)  Wt(kg): --      PHYSICAL EXAM  Constitutional - NAD, Comfortable  HEENT - NCAT, EOMI  Neck - Supple, No limited ROM  Chest - CTA bilaterally, No wheeze, No rhonchi, No crackles  Cardiovascular - RRR, S1S2, No murmurs  Abdomen - BS+, Soft, NTND  Extremities - BLE 1+ edema and tenderness to light touch  Neurologic Exam -                    Cognitive - Awake, Alert, AAO to self, place, year, situation     Communication - Fluent, No dysarthria     Cranial Nerves - CN 2-12 grossly intact     Motor -     LEFT    UE - ShAB 4/5, EF 4/5, EE 4/5, WE 4/5,  5/5                    RIGHT UE - ShAB 5/5, EF 5/5, EE 5/5, WE 5/5,  5/5                    LEFT    LE - HF 2/5, KE 3/5, DF 5/5, PF 5/5                    RIGHT LE - HF 2/5, KE 3/5, DF 5/5, PF 5/5         Sensory - Decreased to LT in BLE  Psychiatric - Mood stable, Affect Flat  Skin - Bilateral LE venous stasis changes, left medial great toe abrasion, bilateral groin/medial thigh rashes  Wounds - None    FUNCTIONAL PROGRESS  Gait - length of parallel bars min A  ADLs - UE Dress mod A, LE Dress total A  Transfers - sit-to-stand mod A  Functional transfer - bed max/total A      RECENT LABS            9.0    5.28  )-----------( 227      ( 16 Jan 2017 08:26 )             26.3     16 Jan 2017 08:26    135    |  97     |  17.0   ----------------------------<  181    4.1     |  27.0   |  1.10     Ca    8.6        16 Jan 2017 08:26    UCx 1/14 - Contaminated               FS Last 24HRS  248, 319      RADIOLOGY/OTHER RESULTS  ---      MEDICATIONS  (STANDING):  aspirin enteric coated 81milliGRAM(s) Oral daily  amLODIPine   Tablet 10milliGRAM(s) Oral daily  carbidopa/levodopa  25/100 1Tablet(s) Oral three times a day  atorvastatin 20milliGRAM(s) Oral at bedtime  timolol 0.5% Solution 1Drop(s) Both EYES daily  heparin  Injectable 5000Unit(s) SubCutaneous every 12 hours  ferrous    sulfate 325milliGRAM(s) Oral daily  ammonium lactate 12% Lotion 1Application(s) Topical two times a day  furosemide    Tablet 40milliGRAM(s) Oral daily  tamsulosin 0.4milliGRAM(s) Oral at bedtime  lidocaine 2% Jelly 5milliLiter(s) IntraUrethral two times a day  insulin glargine Injectable (LANTUS) 22Unit(s) SubCutaneous at bedtime  cefpodoxime 100milliGRAM(s) Oral every 12 hours  saccharomyces boulardii 250milliGRAM(s) Oral two times a day  BACItracin   Ointment 1Application(s) Topical two times a day  potassium chloride    Tablet ER 20milliEquivalent(s) Oral daily  insulin lispro (HumaLOG) corrective regimen sliding scale  SubCutaneous Before meals and at bedtime  nystatin Powder 1Application(s) Topical three times a day  finasteride 5milliGRAM(s) Oral daily  melatonin. 9milliGRAM(s) Oral at bedtime    MEDICATIONS  (PRN):  acetaminophen   Tablet 650milliGRAM(s) Oral every 6 hours PRN For Temp greater than 38 C (100.4 F)  acetaminophen   Tablet. 650milliGRAM(s) Oral every 6 hours PRN Mild Pain (1 - 3)  sodium biphosphate Rectal Enema 1Enema Rectal daily PRN Constipation  bisacodyl 10milliGRAM(s) Oral daily PRN Constipation        ASSESSMENT & PLAN  76M with multiple CVAs now with functional deficits  HTN - Amlodipine, Lasix + KCl  DM2 - Lantus, ISS  CVA - Lipitor, Aspirin  UTI - Vantin x5 days (1/17-1/22)  Anemia - Iron  Parkinson's Disease - Sinemet  Sleep - Melatonin 9mg (increased 1/18)  Glaucoma - Timolol   GI/Bowel Management - Dulcolax PRN, Fleet PRN   Management - Toilet Q2, +Ledesma, Proscar 5mg (1/18), Flomax 0.4mg (1/16)  Skin - Turn Q2, Nystatin to groin, Bacitracin to groin, Lac-Hydrin to LE  Pain - Tylenol PRN  DVT PPX - Heparin Q12, TEDs, SCDs  Diet - Cardiac, CC/Thins, Glucerna TID    Continue comprehensive acute rehab program consisting of 3hrs/day of OT/PT/SLP. HISTORY OF PRESENT ILLNESS  76M was admitted to [facility] on 12/31/16 after not feeling well for a few days. Workup showed his FS was >1100 and in DKA. Course was complicated by Tinea in the groin, lethargy on 1/2 where a brain MRI showed acute bilateral supratentorial and infratentorial infarcts with mild petechial hemorrhage in right occipital lobe. Source was found to be from PFO on JESUS MANUEL done 1/9. Course was further complicated by an acute Left LE DVT s/p IVCF on 1/11. Admitted to acute rehab on 1/13.    PMHx - HLD, DM 2, HTN, Parkinson's Disease      TODAY'S SUBJECTIVE & REVIEW OF SYMPTOMS  [ ] Constitutional WNL          [X] Cardio WNL              [X] Resp WNL           [X] GI WNL                          [ ]  WNL                   [X] Heme WNL              [X] Endo WNL                     [X] Skin WNL                 [X] MSK WNL            [X] Neuro WNL                    [ ] Cognitive WNL           [X] Psych WNL      Patient he feels did not sleep very well and feels fatigued. Denies pain.   Looking to improve in function and go home. Urology consultation appreciated.   Will add Proscar, but will likely drop KAREN more and will cut back on the lisinopril which was just increased.  A second rrine culture was apparently sent, given the most recent was contaminated.   Repeat Urine culture will be affected by the antibiotic already received. Will continue to monitor. Vitals normal.  Also stated has itchiness of the legs. Lachydrin is ordered. Awaiting larger TEDs for BLE.    VITALS  T(C): 37.1  T(F): 98.7, Max: 98.7 (01-18 @ 06:06)  HR: 69 (67 - 69)  BP: 111/73 (108/59 - 131/73)  RR:  (18 - 18)  SpO2:  (95% - 96%)  Wt(kg): --      PHYSICAL EXAM  Constitutional - NAD, Comfortable  HEENT - NCAT, EOMI  Neck - Supple, No limited ROM  Chest - CTA bilaterally, No wheeze, No rhonchi, No crackles  Cardiovascular - RRR, S1S2, No murmurs  Abdomen - BS+, Soft, NTND  Extremities - BLE 1+ edema and tenderness to light touch  Neurologic Exam -                    Cognitive - Awake, Alert, AAO to self, place, year, situation     Communication - Fluent, No dysarthria     Cranial Nerves - CN 2-12 grossly intact     Motor -     LEFT    UE - ShAB 4/5, EF 4/5, EE 4/5, WE 4/5,  5/5                    RIGHT UE - ShAB 5/5, EF 5/5, EE 5/5, WE 5/5,  5/5                    LEFT    LE - HF 2/5, KE 3/5, DF 5/5, PF 5/5                    RIGHT LE - HF 2/5, KE 3/5, DF 5/5, PF 5/5         Sensory - Decreased to LT in BLE  Psychiatric - Mood stable, Affect Flat  Skin - Bilateral LE venous stasis changes, left medial great toe abrasion, bilateral groin/medial thigh rashes  Wounds - None    FUNCTIONAL PROGRESS  Gait - length of parallel bars min A  ADLs - UE Dress mod A, LE Dress total A  Transfers - sit-to-stand mod A  Functional transfer - bed max/total A      RECENT LABS            9.0    5.28  )-----------( 227      ( 16 Jan 2017 08:26 )             26.3     16 Jan 2017 08:26    135    |  97     |  17.0   ----------------------------<  181    4.1     |  27.0   |  1.10     Ca    8.6        16 Jan 2017 08:26    UCx 1/14 - Contaminated           UCx 1/15 - Candida albicans 7K    FS Last 24HRS  248, 319      RADIOLOGY/OTHER RESULTS  ---      MEDICATIONS  (STANDING):  aspirin enteric coated 81milliGRAM(s) Oral daily  amLODIPine   Tablet 10milliGRAM(s) Oral daily  carbidopa/levodopa  25/100 1Tablet(s) Oral three times a day  atorvastatin 20milliGRAM(s) Oral at bedtime  timolol 0.5% Solution 1Drop(s) Both EYES daily  heparin  Injectable 5000Unit(s) SubCutaneous every 12 hours  ferrous    sulfate 325milliGRAM(s) Oral daily  ammonium lactate 12% Lotion 1Application(s) Topical two times a day  furosemide    Tablet 40milliGRAM(s) Oral daily  tamsulosin 0.4milliGRAM(s) Oral at bedtime  lidocaine 2% Jelly 5milliLiter(s) IntraUrethral two times a day  insulin glargine Injectable (LANTUS) 22Unit(s) SubCutaneous at bedtime  cefpodoxime 100milliGRAM(s) Oral every 12 hours  saccharomyces boulardii 250milliGRAM(s) Oral two times a day  BACItracin   Ointment 1Application(s) Topical two times a day  potassium chloride    Tablet ER 20milliEquivalent(s) Oral daily  insulin lispro (HumaLOG) corrective regimen sliding scale  SubCutaneous Before meals and at bedtime  nystatin Powder 1Application(s) Topical three times a day  finasteride 5milliGRAM(s) Oral daily  melatonin. 9milliGRAM(s) Oral at bedtime    MEDICATIONS  (PRN):  acetaminophen   Tablet 650milliGRAM(s) Oral every 6 hours PRN For Temp greater than 38 C (100.4 F)  acetaminophen   Tablet. 650milliGRAM(s) Oral every 6 hours PRN Mild Pain (1 - 3)  sodium biphosphate Rectal Enema 1Enema Rectal daily PRN Constipation  bisacodyl 10milliGRAM(s) Oral daily PRN Constipation        ASSESSMENT & PLAN  76M with multiple CVAs now with functional deficits  HTN - Amlodipine, Lasix + KCl  DM2 - Lantus, ISS  CVA - Lipitor, Aspirin  Anemia - Iron  Parkinson's Disease - Sinemet  Sleep - Melatonin 9mg (increased 1/18)  Glaucoma - Timolol   GI/Bowel Management - Dulcolax PRN, Fleet PRN   Management - Toilet Q2, +Ledesma, Proscar 5mg (1/18), Flomax 0.4mg (1/16)  Skin - Turn Q2, Nystatin to groin, Bacitracin to groin, Lac-Hydrin to LE  Pain - Tylenol PRN  DVT PPX - Heparin Q12, TEDs, SCDs  Diet - Cardiac, CC/Thins, Glucerna TID    Continue comprehensive acute rehab program consisting of 3hrs/day of OT/PT/SLP.

## 2017-01-19 LAB
CULTURE RESULTS: SIGNIFICANT CHANGE UP
SPECIMEN SOURCE: SIGNIFICANT CHANGE UP

## 2017-01-19 PROCEDURE — 99233 SBSQ HOSP IP/OBS HIGH 50: CPT

## 2017-01-19 PROCEDURE — 99232 SBSQ HOSP IP/OBS MODERATE 35: CPT | Mod: GC

## 2017-01-19 RX ORDER — SENNA PLUS 8.6 MG/1
2 TABLET ORAL AT BEDTIME
Qty: 0 | Refills: 0 | Status: DISCONTINUED | OUTPATIENT
Start: 2017-01-19 | End: 2017-01-23

## 2017-01-19 RX ORDER — SACCHAROMYCES BOULARDII 250 MG
250 POWDER IN PACKET (EA) ORAL
Qty: 0 | Refills: 0 | Status: COMPLETED | OUTPATIENT
Start: 2017-01-19 | End: 2017-01-23

## 2017-01-19 RX ORDER — DOCUSATE SODIUM 100 MG
100 CAPSULE ORAL
Qty: 0 | Refills: 0 | Status: DISCONTINUED | OUTPATIENT
Start: 2017-01-19 | End: 2017-01-23

## 2017-01-19 RX ORDER — CEFPODOXIME PROXETIL 100 MG
100 TABLET ORAL EVERY 12 HOURS
Qty: 0 | Refills: 0 | Status: COMPLETED | OUTPATIENT
Start: 2017-01-19 | End: 2017-01-23

## 2017-01-19 RX ADMIN — NYSTATIN CREAM 1 APPLICATION(S): 100000 CREAM TOPICAL at 22:07

## 2017-01-19 RX ADMIN — Medication 1 APPLICATION(S): at 05:28

## 2017-01-19 RX ADMIN — Medication 4: at 22:13

## 2017-01-19 RX ADMIN — Medication 20 MILLIEQUIVALENT(S): at 12:15

## 2017-01-19 RX ADMIN — Medication 1 DROP(S): at 12:15

## 2017-01-19 RX ADMIN — HEPARIN SODIUM 5000 UNIT(S): 5000 INJECTION INTRAVENOUS; SUBCUTANEOUS at 17:11

## 2017-01-19 RX ADMIN — Medication 650 MILLIGRAM(S): at 14:50

## 2017-01-19 RX ADMIN — ATORVASTATIN CALCIUM 20 MILLIGRAM(S): 80 TABLET, FILM COATED ORAL at 22:06

## 2017-01-19 RX ADMIN — INSULIN GLARGINE 22 UNIT(S): 100 INJECTION, SOLUTION SUBCUTANEOUS at 22:13

## 2017-01-19 RX ADMIN — Medication 100 MILLIGRAM(S): at 17:11

## 2017-01-19 RX ADMIN — Medication 9 MILLIGRAM(S): at 22:06

## 2017-01-19 RX ADMIN — Medication 1 APPLICATION(S): at 17:11

## 2017-01-19 RX ADMIN — TAMSULOSIN HYDROCHLORIDE 0.4 MILLIGRAM(S): 0.4 CAPSULE ORAL at 22:06

## 2017-01-19 RX ADMIN — Medication 650 MILLIGRAM(S): at 15:30

## 2017-01-19 RX ADMIN — CARBIDOPA AND LEVODOPA 1 TABLET(S): 25; 100 TABLET ORAL at 22:06

## 2017-01-19 RX ADMIN — Medication 100 MILLIGRAM(S): at 22:43

## 2017-01-19 RX ADMIN — Medication 4: at 17:10

## 2017-01-19 RX ADMIN — NYSTATIN CREAM 1 APPLICATION(S): 100000 CREAM TOPICAL at 14:20

## 2017-01-19 RX ADMIN — Medication 5 MILLILITER(S): at 18:35

## 2017-01-19 RX ADMIN — SENNA PLUS 2 TABLET(S): 8.6 TABLET ORAL at 22:06

## 2017-01-19 RX ADMIN — AMLODIPINE BESYLATE 10 MILLIGRAM(S): 2.5 TABLET ORAL at 05:29

## 2017-01-19 RX ADMIN — Medication 325 MILLIGRAM(S): at 12:15

## 2017-01-19 RX ADMIN — Medication 81 MILLIGRAM(S): at 12:15

## 2017-01-19 RX ADMIN — CARBIDOPA AND LEVODOPA 1 TABLET(S): 25; 100 TABLET ORAL at 05:29

## 2017-01-19 RX ADMIN — FINASTERIDE 5 MILLIGRAM(S): 5 TABLET, FILM COATED ORAL at 12:15

## 2017-01-19 RX ADMIN — Medication 40 MILLIGRAM(S): at 05:29

## 2017-01-19 RX ADMIN — Medication 4: at 12:14

## 2017-01-19 RX ADMIN — HEPARIN SODIUM 5000 UNIT(S): 5000 INJECTION INTRAVENOUS; SUBCUTANEOUS at 05:29

## 2017-01-19 RX ADMIN — Medication 5 MILLILITER(S): at 05:29

## 2017-01-19 RX ADMIN — Medication 250 MILLIGRAM(S): at 18:35

## 2017-01-19 RX ADMIN — Medication 1 APPLICATION(S): at 05:29

## 2017-01-19 RX ADMIN — CARBIDOPA AND LEVODOPA 1 TABLET(S): 25; 100 TABLET ORAL at 14:22

## 2017-01-19 RX ADMIN — NYSTATIN CREAM 1 APPLICATION(S): 100000 CREAM TOPICAL at 05:28

## 2017-01-19 NOTE — PROGRESS NOTE ADULT - SUBJECTIVE AND OBJECTIVE BOX
Patient seen and examined . S/P Ledesma catheter placement day 3 , feels better today , c/o b/l LE itching/ pain ,  no BM for several days , no other complaints    CC: constipation , b/l lower extremity pain / itching ,  constipation ,no other complaints    PAST MEDICAL & SURGICAL HISTORY:    High cholesterol  Diabetes  Hypertension  S/P hip hemiarthroplasty: right hip repair june 2014  No significant past surgical history      MEDICATIONS  (STANDING):  aspirin enteric coated 81milliGRAM(s) Oral daily  amLODIPine   Tablet 10milliGRAM(s) Oral daily  carbidopa/levodopa  25/100 1Tablet(s) Oral three times a day  atorvastatin 20milliGRAM(s) Oral at bedtime  timolol 0.5% Solution 1Drop(s) Both EYES daily  heparin  Injectable 5000Unit(s) SubCutaneous every 12 hours  ferrous    sulfate 325milliGRAM(s) Oral daily  ammonium lactate 12% Lotion 1Application(s) Topical two times a day  furosemide    Tablet 40milliGRAM(s) Oral daily  tamsulosin 0.4milliGRAM(s) Oral at bedtime  lidocaine 2% Jelly 5milliLiter(s) IntraUrethral two times a day  insulin glargine Injectable (LANTUS) 22Unit(s) SubCutaneous at bedtime  BACItracin   Ointment 1Application(s) Topical two times a day  potassium chloride    Tablet ER 20milliEquivalent(s) Oral daily  insulin lispro (HumaLOG) corrective regimen sliding scale  SubCutaneous Before meals and at bedtime  nystatin Powder 1Application(s) Topical three times a day  finasteride 5milliGRAM(s) Oral daily  melatonin. 9milliGRAM(s) Oral at bedtime    MEDICATIONS  (PRN):  acetaminophen   Tablet 650milliGRAM(s) Oral every 6 hours PRN For Temp greater than 38 C (100.4 F)  acetaminophen   Tablet. 650milliGRAM(s) Oral every 6 hours PRN Mild Pain (1 - 3)  sodium biphosphate Rectal Enema 1Enema Rectal daily PRN Constipation  bisacodyl 10milliGRAM(s) Oral daily PRN Constipation  bisacodyl Suppository 10milliGRAM(s) Rectal daily PRN Constipation      REVIEW OF SYSTEMS:    CONSTITUTIONAL: No fever, weight loss, or fatigue  EYES: No eye pain, visual disturbances, or discharge  ENMT:  No difficulty hearing, tinnitus, vertigo; No sinus or throat pain  NECK: No pain or stiffness  RESPIRATORY: No cough, wheezing, chills or hemoptysis; No shortness of breath  CARDIOVASCULAR: No chest pain, palpitations, dizziness, or leg swelling  GASTROINTESTINAL: No abdominal or epigastric pain. No nausea, vomiting, or hematemesis; No diarrhea or constipation. No melena or hematochezia.  GENITOURINARY: No dysuria, frequency, hematuria, or incontinence  NEUROLOGICAL: No headaches, memory loss, loss of strength, numbness, or tremors  SKIN: No itching, burning, rashes, or lesions   LYMPH NODES: No enlarged glands  ENDOCRINE: No heat or cold intolerance; No hair loss  MUSCULOSKELETAL:   PSYCHIATRIC: No depression, anxiety, mood swings, or difficulty sleeping  HEME/LYMPH: No easy bruising, or bleeding gums  ALLERGY AND IMMUNOLOGIC: No hives or eczema    Vital Signs Last 24 Hrs  T(C): 36.2, Max: 36.9 (01-18 @ 21:05)  T(F): 97.1, Max: 98.5 (01-18 @ 21:05)  HR: 76 (67 - 76)  BP: 121/66 (121/66 - 134/70)  BP(mean): --  RR: 18 (18 - 18)  SpO2: 94% (93% - 94%)  PHYSICAL EXAM:    GENERAL: NAD, well-groomed, well-developed  HEAD:  Atraumatic, Normocephalic  EYES: EOMI, PERRLA, conjunctiva and sclera clear  NECK: Supple, No JVD, Normal thyroid  NERVOUS SYSTEM:  Alert & Oriented X3, no focal deficit  CHEST/LUNG: CTA b/l ,  no  rales, rhonchi, wheezing, or rubs  HEART: Regular rate and rhythm; No murmurs, rubs, or gallops  ABDOMEN: Soft, Nontender, Nondistended; Bowel sounds present  EXTREMITIES:  2+ Peripheral Pulses, No clubbing, cyanosis, or edema  LYMPH: No lymphadenopathy noted  SKIN: No rashes or lesions Patient seen and examined . S/P Ledesma catheter placement day 3 , feels better today , c/o b/l LE itching/ pain ,  no BM for several days , no other complaints    CC: constipation , b/l lower extremity pain / itching ,  constipation ,no other complaints    HPI:  76Y M with PMH of DM, HTN, admitted for DKA and AMS, admitted to ICU, started on insulin drip, anion gap closed, off insulin drip, on Lantus 15 units. He was also noted to foul smelling discharge from groin, seen bu surgery, looks fungal infection, started on Nystatin powder, improving. MRI brain showed b/l stroke with mild petechial hemorrhage in right occipital lobe, Stroke work up was done. ECHO - EF - 55%, JESUS MANUEL - showed large  and acute DVT in LLE -He was deemed not a good candidate for AC per Neuro and Cardio 2/2 parknison and multiple falls. Hence  - decided to undergo- IVC filter placement on 1/11. He was cleared by neuro and cardio for discharge. HE was discharged   in stable condition to Acute rehab. C/O dysuria , urinary retention , treated  with empiric abx as urine found to be very cloudy and patient asymptomatic , first urine culture - contaminated . Now on Vantin, seen by  and Ledesma placed , started on Flomax/Proscar with plan for TOV in few days . Diabetic education provided .        PAST MEDICAL & SURGICAL HISTORY:    High cholesterol  Diabetes  Hypertension  S/P hip hemiarthroplasty: right hip repair june 2014  No significant past surgical history      MEDICATIONS  (STANDING):    aspirin enteric coated 81milliGRAM(s) Oral daily  amLODIPine   Tablet 10milliGRAM(s) Oral daily  carbidopa/levodopa  25/100 1Tablet(s) Oral three times a day  atorvastatin 20milliGRAM(s) Oral at bedtime  timolol 0.5% Solution 1Drop(s) Both EYES daily  heparin  Injectable 5000Unit(s) SubCutaneous every 12 hours  ferrous    sulfate 325milliGRAM(s) Oral daily  ammonium lactate 12% Lotion 1Application(s) Topical two times a day  furosemide    Tablet 40milliGRAM(s) Oral daily  tamsulosin 0.4milliGRAM(s) Oral at bedtime  lidocaine 2% Jelly 5milliLiter(s) IntraUrethral two times a day  insulin glargine Injectable (LANTUS) 22Unit(s) SubCutaneous at bedtime  BACItracin   Ointment 1Application(s) Topical two times a day  potassium chloride    Tablet ER 20milliEquivalent(s) Oral daily  insulin lispro (HumaLOG) corrective regimen sliding scale  SubCutaneous Before meals and at bedtime  nystatin Powder 1Application(s) Topical three times a day  finasteride 5milliGRAM(s) Oral daily  melatonin. 9milliGRAM(s) Oral at bedtime    MEDICATIONS  (PRN):  acetaminophen   Tablet 650milliGRAM(s) Oral every 6 hours PRN For Temp greater than 38 C (100.4 F)  acetaminophen   Tablet. 650milliGRAM(s) Oral every 6 hours PRN Mild Pain (1 - 3)  sodium biphosphate Rectal Enema 1Enema Rectal daily PRN Constipation  bisacodyl 10milliGRAM(s) Oral daily PRN Constipation  bisacodyl Suppository 10milliGRAM(s) Rectal daily PRN Constipation      REVIEW OF SYSTEMS:    CONSTITUTIONAL: No fever, weight loss, or fatigue  EYES: No eye pain, visual disturbances, or discharge  ENMT:  No difficulty hearing, tinnitus, vertigo; No sinus or throat pain  NECK: No pain or stiffness  RESPIRATORY: No cough, wheezing, chills or hemoptysis; No shortness of breath  CARDIOVASCULAR: No chest pain, palpitations, dizziness, or leg swelling  GASTROINTESTINAL: No abdominal or epigastric pain. No nausea, vomiting, or hematemesis; No diarrhea or constipation. No melena or hematochezia.  GENITOURINARY: No dysuria, frequency, hematuria, or incontinence  NEUROLOGICAL: No headaches, memory loss, loss of strength, numbness, or tremors  SKIN: No itching, burning, rashes, or lesions   LYMPH NODES: No enlarged glands  ENDOCRINE: No heat or cold intolerance; No hair loss  MUSCULOSKELETAL:   PSYCHIATRIC: No depression, anxiety, mood swings, or difficulty sleeping  HEME/LYMPH: No easy bruising, or bleeding gums  ALLERGY AND IMMUNOLOGIC: No hives or eczema    Vital Signs Last 24 Hrs  T(C): 36.2, Max: 36.9 (01-18 @ 21:05)  T(F): 97.1, Max: 98.5 (01-18 @ 21:05)  HR: 76 (67 - 76)  BP: 121/66 (121/66 - 134/70)  BP(mean): --  RR: 18 (18 - 18)  SpO2: 94% (93% - 94%)  PHYSICAL EXAM:    GENERAL: NAD, well-groomed, well-developed  HEAD:  Atraumatic, Normocephalic  EYES: EOMI, PERRLA, conjunctiva and sclera clear  NECK: Supple, No JVD, Normal thyroid  NERVOUS SYSTEM:  Alert & Oriented X3, no focal deficit  CHEST/LUNG: CTA b/l ,  no  rales, rhonchi, wheezing, or rubs  HEART: Regular rate and rhythm; No murmurs, rubs, or gallops  ABDOMEN: Soft, Nontender, Nondistended; Bowel sounds present  EXTREMITIES:  2+ Peripheral Pulses, No clubbing, cyanosis, or edema  LYMPH: No lymphadenopathy noted  SKIN: No rashes or lesions Patient seen and examined . S/P Ledesma catheter placement day 3 , feels better today , c/o b/l LE itching/ pain ,  had large BM this am after seeral days    CC: constipation- resolved ,no other complaints    HPI:  76Y M with PMH of DM, HTN, admitted for DKA and AMS, admitted to ICU, started on insulin drip, anion gap closed, off insulin drip, on Lantus 15 units. He was also noted to foul smelling discharge from groin, seen bu surgery, looks fungal infection, started on Nystatin powder, improving. MRI brain showed b/l stroke with mild petechial hemorrhage in right occipital lobe, Stroke work up was done. ECHO - EF - 55%, JESUS MANUEL - showed large  and acute DVT in LLE -He was deemed not a good candidate for AC per Neuro and Cardio 2/2 parknison and multiple falls. Hence  - decided to undergo- IVC filter placement on 1/11. He was cleared by neuro and cardio for discharge. HE was discharged   in stable condition to Acute rehab. C/O dysuria , urinary retention , treated  with empiric abx as urine found to be very cloudy and patient asymptomatic , first urine culture - contaminated . Now on Vantin, seen by  and Ledesma placed , started on Flomax/Proscar with plan for TOV in few days . Diabetic education provided .        PAST MEDICAL & SURGICAL HISTORY:    High cholesterol  Diabetes  Hypertension  S/P hip hemiarthroplasty: right hip repair june 2014  No significant past surgical history      MEDICATIONS  (STANDING):    aspirin enteric coated 81milliGRAM(s) Oral daily  amLODIPine   Tablet 10milliGRAM(s) Oral daily  carbidopa/levodopa  25/100 1Tablet(s) Oral three times a day  atorvastatin 20milliGRAM(s) Oral at bedtime  timolol 0.5% Solution 1Drop(s) Both EYES daily  heparin  Injectable 5000Unit(s) SubCutaneous every 12 hours  ferrous    sulfate 325milliGRAM(s) Oral daily  ammonium lactate 12% Lotion 1Application(s) Topical two times a day  furosemide    Tablet 40milliGRAM(s) Oral daily  tamsulosin 0.4milliGRAM(s) Oral at bedtime  lidocaine 2% Jelly 5milliLiter(s) IntraUrethral two times a day  insulin glargine Injectable (LANTUS) 22Unit(s) SubCutaneous at bedtime  BACItracin   Ointment 1Application(s) Topical two times a day  potassium chloride    Tablet ER 20milliEquivalent(s) Oral daily  insulin lispro (HumaLOG) corrective regimen sliding scale  SubCutaneous Before meals and at bedtime  nystatin Powder 1Application(s) Topical three times a day  finasteride 5milliGRAM(s) Oral daily  melatonin. 9milliGRAM(s) Oral at bedtime    MEDICATIONS  (PRN):  acetaminophen   Tablet 650milliGRAM(s) Oral every 6 hours PRN For Temp greater than 38 C (100.4 F)  acetaminophen   Tablet. 650milliGRAM(s) Oral every 6 hours PRN Mild Pain (1 - 3)  sodium biphosphate Rectal Enema 1Enema Rectal daily PRN Constipation  bisacodyl 10milliGRAM(s) Oral daily PRN Constipation  bisacodyl Suppository 10milliGRAM(s) Rectal daily PRN Constipation      REVIEW OF SYSTEMS:    CONSTITUTIONAL: No fever, weight loss, or fatigue  EYES: No eye pain, visual disturbances, or discharge  ENMT:  No difficulty hearing, tinnitus, vertigo; No sinus or throat pain  NECK: No pain or stiffness  RESPIRATORY: No cough, wheezing, chills or hemoptysis; No shortness of breath  CARDIOVASCULAR: No chest pain, palpitations, dizziness, or leg swelling  GASTROINTESTINAL: No abdominal or epigastric pain. No nausea, vomiting, or hematemesis; No diarrhea ,constipation+. No melena or hematochezia.  GENITOURINARY: No dysuria, frequency, hematuria, or incontinence  NEUROLOGICAL: No headaches, memory loss, loss of strength, numbness, or tremors  SKIN: No itching, burning, rashes, or lesions   LYMPH NODES: No enlarged glands  ENDOCRINE: No heat or cold intolerance; No hair loss  MUSCULOSKELETAL: no joint pain , no swelling ,   PSYCHIATRIC: No depression, anxiety, mood swings, or difficulty sleeping  HEME/LYMPH: No easy bruising, or bleeding gums  ALLERGY AND IMMUNOLOGIC: No hives or eczema    Vital Signs Last 24 Hrs    T(C): 36.2, Max: 36.9 (01-18 @ 21:05)  T(F): 97.1, Max: 98.5 (01-18 @ 21:05)  HR: 76 (67 - 76)  BP: 121/66 (121/66 - 134/70)  BP(mean): --  RR: 18 (18 - 18)  SpO2: 94% (93% - 94%)  PHYSICAL EXAM:    GENERAL: NAD, well-groomed, well-developed  HEAD:  Atraumatic, Normocephalic  EYES: EOMI, PERRLA, conjunctiva and sclera clear  NECK: Supple, No JVD, Normal thyroid  NERVOUS SYSTEM:  Alert & Oriented X3, no focal deficit  CHEST/LUNG: CTA b/l ,  no  rales, rhonchi, wheezing, or rubs  HEART: Regular rate and rhythm; No murmurs, rubs, or gallops  ABDOMEN: Soft, Nontender, Nondistended; Bowel sounds present  EXTREMITIES:  2+ Peripheral Pulses, No clubbing, cyanosis, or edema , B/L LE pedal edema with chronic skin changes - decreasing  LYMPH: No lymphadenopathy noted  SKIN: No rashes or lesions, b/l LE chronic changes +  Ledesma cath + , urine clear

## 2017-01-20 PROCEDURE — 99232 SBSQ HOSP IP/OBS MODERATE 35: CPT

## 2017-01-20 PROCEDURE — 99232 SBSQ HOSP IP/OBS MODERATE 35: CPT | Mod: GC

## 2017-01-20 RX ADMIN — Medication 1 APPLICATION(S): at 06:37

## 2017-01-20 RX ADMIN — ATORVASTATIN CALCIUM 20 MILLIGRAM(S): 80 TABLET, FILM COATED ORAL at 21:03

## 2017-01-20 RX ADMIN — CARBIDOPA AND LEVODOPA 1 TABLET(S): 25; 100 TABLET ORAL at 21:03

## 2017-01-20 RX ADMIN — Medication 8: at 17:06

## 2017-01-20 RX ADMIN — CARBIDOPA AND LEVODOPA 1 TABLET(S): 25; 100 TABLET ORAL at 06:37

## 2017-01-20 RX ADMIN — Medication 1 APPLICATION(S): at 19:06

## 2017-01-20 RX ADMIN — Medication 40 MILLIGRAM(S): at 06:37

## 2017-01-20 RX ADMIN — NYSTATIN CREAM 1 APPLICATION(S): 100000 CREAM TOPICAL at 21:18

## 2017-01-20 RX ADMIN — TAMSULOSIN HYDROCHLORIDE 0.4 MILLIGRAM(S): 0.4 CAPSULE ORAL at 21:03

## 2017-01-20 RX ADMIN — NYSTATIN CREAM 1 APPLICATION(S): 100000 CREAM TOPICAL at 06:36

## 2017-01-20 RX ADMIN — SENNA PLUS 2 TABLET(S): 8.6 TABLET ORAL at 21:03

## 2017-01-20 RX ADMIN — Medication 100 MILLIGRAM(S): at 07:52

## 2017-01-20 RX ADMIN — FINASTERIDE 5 MILLIGRAM(S): 5 TABLET, FILM COATED ORAL at 12:49

## 2017-01-20 RX ADMIN — Medication 20 MILLIEQUIVALENT(S): at 12:49

## 2017-01-20 RX ADMIN — Medication 5 MILLILITER(S): at 19:06

## 2017-01-20 RX ADMIN — Medication 8: at 12:46

## 2017-01-20 RX ADMIN — INSULIN GLARGINE 22 UNIT(S): 100 INJECTION, SOLUTION SUBCUTANEOUS at 21:17

## 2017-01-20 RX ADMIN — AMLODIPINE BESYLATE 10 MILLIGRAM(S): 2.5 TABLET ORAL at 06:36

## 2017-01-20 RX ADMIN — Medication 1 APPLICATION(S): at 06:36

## 2017-01-20 RX ADMIN — Medication 650 MILLIGRAM(S): at 21:03

## 2017-01-20 RX ADMIN — Medication 100 MILLIGRAM(S): at 17:06

## 2017-01-20 RX ADMIN — Medication 650 MILLIGRAM(S): at 22:00

## 2017-01-20 RX ADMIN — Medication 81 MILLIGRAM(S): at 12:49

## 2017-01-20 RX ADMIN — CARBIDOPA AND LEVODOPA 1 TABLET(S): 25; 100 TABLET ORAL at 12:49

## 2017-01-20 RX ADMIN — Medication 325 MILLIGRAM(S): at 12:49

## 2017-01-20 RX ADMIN — NYSTATIN CREAM 1 APPLICATION(S): 100000 CREAM TOPICAL at 12:49

## 2017-01-20 RX ADMIN — Medication 250 MILLIGRAM(S): at 06:37

## 2017-01-20 RX ADMIN — Medication 100 MILLIGRAM(S): at 06:36

## 2017-01-20 RX ADMIN — Medication 5 MILLILITER(S): at 06:36

## 2017-01-20 RX ADMIN — Medication 9 MILLIGRAM(S): at 21:03

## 2017-01-20 RX ADMIN — HEPARIN SODIUM 5000 UNIT(S): 5000 INJECTION INTRAVENOUS; SUBCUTANEOUS at 17:05

## 2017-01-20 RX ADMIN — Medication 1 DROP(S): at 12:49

## 2017-01-20 RX ADMIN — Medication 250 MILLIGRAM(S): at 17:06

## 2017-01-20 RX ADMIN — HEPARIN SODIUM 5000 UNIT(S): 5000 INJECTION INTRAVENOUS; SUBCUTANEOUS at 06:37

## 2017-01-20 NOTE — PROGRESS NOTE ADULT - SUBJECTIVE AND OBJECTIVE BOX
HISTORY OF PRESENT ILLNESS  76M was admitted to [facility] on 12/31/16 after not feeling well for a few days. Workup showed his FS was >1100 and in DKA. Course was complicated by Tinea in the groin, lethargy on 1/2 where a brain MRI showed acute bilateral supratentorial and infratentorial infarcts with mild petechial hemorrhage in right occipital lobe. Source was found to be from PFO on JESUS MANUEL done 1/9. Course was further complicated by an acute Left LE DVT s/p IVCF on 1/11. Admitted to acute rehab on 1/13.    PMHx - HLD, DM 2, HTN, Parkinson's Disease      TODAY'S SUBJECTIVE & REVIEW OF SYMPTOMS  [ ] Constitutional WNL          [X] Cardio WNL              [X] Resp WNL           [X] GI WNL                          [ ]  WNL                   [X] Heme WNL              [X] Endo WNL                     [X] Skin WNL                 [X] MSK WNL            [X] Neuro WNL                    [ ] Cognitive WNL           [X] Psych WNL      No acute events overnight.  This AM FS improved at 121. +BM yesterday.  Generally feels well except still bothered by edema/itchiness in his legs.  Larger TEDs unavailable. Will use abd pads wrapped in gauze + ACE Wrap during the day.  No other complaints.      VITALS  T(C): 36.8  T(F): 98.3, Max: 98.3 (01-19 @ 17:07)  HR: 79 (65 - 79)  BP: 164/77 (118/68 - 164/77)  RR:  (16 - 18)  SpO2:  (94% - 97%)  Wt(kg): --      PHYSICAL EXAM  Constitutional - NAD, Comfortable  HEENT - NCAT, EOMI  Neck - Supple, No limited ROM  Chest - CTA bilaterally, No wheeze, No rhonchi, No crackles  Cardiovascular - RRR, S1S2, No murmurs  Abdomen - BS+, Soft, NTND  Extremities - BLE 1+ edema and tenderness to light touch  Neurologic Exam -                    Cognitive - Awake, Alert, AAO to self, place, year, situation     Communication - Fluent, No dysarthria     Cranial Nerves - CN 2-12 grossly intact     Motor -     LEFT    UE - ShAB 4/5, EF 4/5, EE 4/5, WE 4/5,  5/5                    RIGHT UE - ShAB 5/5, EF 5/5, EE 5/5, WE 5/5,  5/5                    LEFT    LE - HF 2/5, KE 3/5, DF 5/5, PF 5/5                    RIGHT LE - HF 2/5, KE 3/5, DF 5/5, PF 5/5         Sensory - Decreased to LT in BLE  Psychiatric - Mood stable, Affect Flat  Skin - Bilateral LE venous stasis changes, left medial great toe abrasion, bilateral groin/medial thigh rashes  Wounds - None    FUNCTIONAL PROGRESS  Gait - 30' + 40' RW min A  ADLs - UE Dress S, grooming min A  Transfers - sit-to-stand mod A  Functional transfer - mat SPT & RW mod A      RECENT LABS            9.0    5.28  )-----------( 227      ( 16 Jan 2017 08:26 )             26.3     16 Jan 2017 08:26    135    |  97     |  17.0   ----------------------------<  181    4.1     |  27.0   |  1.10     Ca    8.6        16 Jan 2017 08:26    UCx 1/14 - Contaminated           UCx 1/15 - Candida albicans 7K    FS Last 24HRS  220, 236, 226, 121      RADIOLOGY/OTHER RESULTS  ---        MEDICATIONS  (STANDING):  aspirin enteric coated 81milliGRAM(s) Oral daily  amLODIPine   Tablet 10milliGRAM(s) Oral daily  carbidopa/levodopa  25/100 1Tablet(s) Oral three times a day  atorvastatin 20milliGRAM(s) Oral at bedtime  timolol 0.5% Solution 1Drop(s) Both EYES daily  heparin  Injectable 5000Unit(s) SubCutaneous every 12 hours  ferrous    sulfate 325milliGRAM(s) Oral daily  ammonium lactate 12% Lotion 1Application(s) Topical two times a day  furosemide    Tablet 40milliGRAM(s) Oral daily  tamsulosin 0.4milliGRAM(s) Oral at bedtime  lidocaine 2% Jelly 5milliLiter(s) IntraUrethral two times a day  insulin glargine Injectable (LANTUS) 22Unit(s) SubCutaneous at bedtime  BACItracin   Ointment 1Application(s) Topical two times a day  potassium chloride    Tablet ER 20milliEquivalent(s) Oral daily  insulin lispro (HumaLOG) corrective regimen sliding scale  SubCutaneous Before meals and at bedtime  nystatin Powder 1Application(s) Topical three times a day  finasteride 5milliGRAM(s) Oral daily  melatonin. 9milliGRAM(s) Oral at bedtime  docusate sodium 100milliGRAM(s) Oral two times a day  senna 2Tablet(s) Oral at bedtime  cefpodoxime 100milliGRAM(s) Oral every 12 hours  saccharomyces boulardii 250milliGRAM(s) Oral two times a day    MEDICATIONS  (PRN):  acetaminophen   Tablet 650milliGRAM(s) Oral every 6 hours PRN For Temp greater than 38 C (100.4 F)  acetaminophen   Tablet. 650milliGRAM(s) Oral every 6 hours PRN Mild Pain (1 - 3)  sodium biphosphate Rectal Enema 1Enema Rectal daily PRN Constipation  bisacodyl 10milliGRAM(s) Oral daily PRN Constipation  bisacodyl Suppository 10milliGRAM(s) Rectal daily PRN Constipation          ASSESSMENT & PLAN  76M with multiple CVAs now with functional deficits  HTN - Amlodipine, Lasix + KCl  DM2 - Lantus, ISS  CVA - Lipitor, Aspirin  Anemia - Iron  UTI - Vantin + Florastor (1/17-1/23)  Parkinson's Disease - Sinemet  Sleep - Melatonin 9mg (increased 1/18)  Glaucoma - Timolol   GI/Bowel Management - Colace, Senna, Lactulose PRN, Dulcolax PRN, Fleet PRN   Management - Toilet Q2, +Ledesma, Proscar 5mg (1/18), Flomax 0.4mg (1/16)  Skin - Turn Q2, Nystatin to groin, Bacitracin to groin, Lac-Hydrin to LE, ACE Wrap to LE  Pain - Tylenol PRN  DVT PPX - Heparin Q12, TEDs, SCDs  Diet - Cardiac, CC/Thins, Glucerna TID    Continue comprehensive acute rehab program consisting of 3hrs/day of OT/PT/SLP.

## 2017-01-20 NOTE — PROGRESS NOTE ADULT - SUBJECTIVE AND OBJECTIVE BOX
Patient seen and examined . S/P Ledesma catheter placement day 34  , feels better today , c/o b/l LE itching/ pain     CC: constipation- resolved ,no other complaints    HPI:  76Y M with PMH of DM, HTN, admitted for DKA and AMS, admitted to ICU, started on insulin drip, anion gap closed, off insulin drip, on Lantus 15 units. He was also noted to foul smelling discharge from groin, seen bu surgery, looks fungal infection, started on Nystatin powder, improving. MRI brain showed b/l stroke with mild petechial hemorrhage in right occipital lobe, Stroke work up was done. ECHO - EF - 55%, JESUS MANUEL - showed large  and acute DVT in LLE -He was deemed not a good candidate for AC per Neuro and Cardio 2/2 parknison and multiple falls. Hence  - decided to undergo- IVC filter placement on 1/11. He was cleared by neuro and cardio for discharge. HE was discharged   in stable condition to Acute rehab. C/O dysuria , urinary retention , treated  with empiric abx as urine found to be very cloudy and patient asymptomatic , first urine culture - contaminated . Now on Vantin, seen by  and Ledesma placed , started on Flomax/Proscar with plan for TOV in few days . Diabetic education provided .    PAST MEDICAL & SURGICAL HISTORY:  High cholesterol  Diabetes  Hypertension  S/P hip hemiarthroplasty: right hip repair june 2014  No significant past surgical history      MEDICATIONS  (STANDING):  aspirin enteric coated 81milliGRAM(s) Oral daily  amLODIPine   Tablet 10milliGRAM(s) Oral daily  carbidopa/levodopa  25/100 1Tablet(s) Oral three times a day  atorvastatin 20milliGRAM(s) Oral at bedtime  timolol 0.5% Solution 1Drop(s) Both EYES daily  heparin  Injectable 5000Unit(s) SubCutaneous every 12 hours  ferrous    sulfate 325milliGRAM(s) Oral daily  ammonium lactate 12% Lotion 1Application(s) Topical two times a day  furosemide    Tablet 40milliGRAM(s) Oral daily  tamsulosin 0.4milliGRAM(s) Oral at bedtime  lidocaine 2% Jelly 5milliLiter(s) IntraUrethral two times a day  insulin glargine Injectable (LANTUS) 22Unit(s) SubCutaneous at bedtime  BACItracin   Ointment 1Application(s) Topical two times a day  potassium chloride    Tablet ER 20milliEquivalent(s) Oral daily  insulin lispro (HumaLOG) corrective regimen sliding scale  SubCutaneous Before meals and at bedtime  nystatin Powder 1Application(s) Topical three times a day  finasteride 5milliGRAM(s) Oral daily  melatonin. 9milliGRAM(s) Oral at bedtime  docusate sodium 100milliGRAM(s) Oral two times a day  senna 2Tablet(s) Oral at bedtime  cefpodoxime 100milliGRAM(s) Oral every 12 hours  saccharomyces boulardii 250milliGRAM(s) Oral two times a day    MEDICATIONS  (PRN):  acetaminophen   Tablet 650milliGRAM(s) Oral every 6 hours PRN For Temp greater than 38 C (100.4 F)  acetaminophen   Tablet. 650milliGRAM(s) Oral every 6 hours PRN Mild Pain (1 - 3)  sodium biphosphate Rectal Enema 1Enema Rectal daily PRN Constipation  bisacodyl 10milliGRAM(s) Oral daily PRN Constipation  bisacodyl Suppository 10milliGRAM(s) Rectal daily PRN Constipation    REVIEW OF SYSTEMS:    CONSTITUTIONAL: No fever, weight loss, or fatigue  EYES: No eye pain, visual disturbances, or discharge  ENMT:  No difficulty hearing, tinnitus, vertigo; No sinus or throat pain  NECK: No pain or stiffness  RESPIRATORY: No cough, wheezing, chills or hemoptysis; No shortness of breath  CARDIOVASCULAR: No chest pain, palpitations, dizziness, or leg swelling  GASTROINTESTINAL: No abdominal or epigastric pain. No nausea, vomiting, or hematemesis; No diarrhea or constipation. No melena or hematochezia.  GENITOURINARY: No dysuria, frequency, hematuria, or incontinence  NEUROLOGICAL: No headaches, memory loss, loss of strength, numbness, or tremors  SKIN: No itching, burning, rashes, or lesions   LYMPH NODES: No enlarged glands  ENDOCRINE: No heat or cold intolerance; No hair loss  MUSCULOSKELETAL:   PSYCHIATRIC: No depression, anxiety, mood swings, or difficulty sleeping  HEME/LYMPH: No easy bruising, or bleeding gums  ALLERGY AND IMMUNOLOGIC: No hives or eczema    Vital Signs Last 24 Hrs  T(C): 36.8, Max: 36.8 (01-19 @ 17:07)  T(F): 98.3, Max: 98.3 (01-19 @ 17:07)  HR: 79 (65 - 79)  BP: 164/77 (118/68 - 164/77)  BP(mean): --  RR: 17 (16 - 18)  SpO2: 97% (94% - 97%)  PHYSICAL EXAM:    GENERAL: NAD, well-groomed, well-developed  HEAD:  Atraumatic, Normocephalic  EYES: EOMI, PERRLA, conjunctiva and sclera clear  NECK: Supple, No JVD, Normal thyroid  NERVOUS SYSTEM:  Alert & Oriented X3, no focal deficit  CHEST/LUNG: CTA b/l ,  no  rales, rhonchi, wheezing, or rubs  HEART: Regular rate and rhythm; No murmurs, rubs, or gallops  ABDOMEN: Soft, Nontender, Nondistended; Bowel sounds present  EXTREMITIES:  2+ Peripheral Pulses, No clubbing, cyanosis, or edema  LYMPH: No lymphadenopathy noted  SKIN: No rashes or lesions Patient seen and examined . S/P Ledesma catheter placement day 4  , feels better today , c/o b/l LE itching/ pain  but better    CC: b/l LE pain/itching , no other complaints    HPI:  76Y M with PMH of DM, HTN, admitted for DKA and AMS, admitted to ICU, started on insulin drip, anion gap closed, off insulin drip, on Lantus 15 units. He was also noted to foul smelling discharge from groin, seen bu surgery, looks fungal infection, started on Nystatin powder, improving. MRI brain showed b/l stroke with mild petechial hemorrhage in right occipital lobe, Stroke work up was done. ECHO - EF - 55%, JESUS MANUEL - showed large  and acute DVT in LLE -He was deemed not a good candidate for AC per Neuro and Cardio 2/2 parknison and multiple falls. Hence  - decided to undergo- IVC filter placement on 1/11. He was cleared by neuro and cardio for discharge. HE was discharged   in stable condition to Acute rehab. C/O dysuria , urinary retention , treated  with empiric abx as urine found to be very cloudy and patient asymptomatic , first urine culture - contaminated . Now on Vantin, seen by  and Ledesma placed , started on Flomax/Proscar with plan for TOV in few days . Diabetic education provided .    PAST MEDICAL & SURGICAL HISTORY:  High cholesterol  Diabetes  Hypertension  S/P hip hemiarthroplasty: right hip repair june 2014  No significant past surgical history      MEDICATIONS  (STANDING):  aspirin enteric coated 81milliGRAM(s) Oral daily  amLODIPine   Tablet 10milliGRAM(s) Oral daily  carbidopa/levodopa  25/100 1Tablet(s) Oral three times a day  atorvastatin 20milliGRAM(s) Oral at bedtime  timolol 0.5% Solution 1Drop(s) Both EYES daily  heparin  Injectable 5000Unit(s) SubCutaneous every 12 hours  ferrous    sulfate 325milliGRAM(s) Oral daily  ammonium lactate 12% Lotion 1Application(s) Topical two times a day  furosemide    Tablet 40milliGRAM(s) Oral daily  tamsulosin 0.4milliGRAM(s) Oral at bedtime  lidocaine 2% Jelly 5milliLiter(s) IntraUrethral two times a day  insulin glargine Injectable (LANTUS) 22Unit(s) SubCutaneous at bedtime  BACItracin   Ointment 1Application(s) Topical two times a day  potassium chloride    Tablet ER 20milliEquivalent(s) Oral daily  insulin lispro (HumaLOG) corrective regimen sliding scale  SubCutaneous Before meals and at bedtime  nystatin Powder 1Application(s) Topical three times a day  finasteride 5milliGRAM(s) Oral daily  melatonin. 9milliGRAM(s) Oral at bedtime  docusate sodium 100milliGRAM(s) Oral two times a day  senna 2Tablet(s) Oral at bedtime  cefpodoxime 100milliGRAM(s) Oral every 12 hours  saccharomyces boulardii 250milliGRAM(s) Oral two times a day    MEDICATIONS  (PRN):  acetaminophen   Tablet 650milliGRAM(s) Oral every 6 hours PRN For Temp greater than 38 C (100.4 F)  acetaminophen   Tablet. 650milliGRAM(s) Oral every 6 hours PRN Mild Pain (1 - 3)  sodium biphosphate Rectal Enema 1Enema Rectal daily PRN Constipation  bisacodyl 10milliGRAM(s) Oral daily PRN Constipation  bisacodyl Suppository 10milliGRAM(s) Rectal daily PRN Constipation    REVIEW OF SYSTEMS:    CONSTITUTIONAL: No fever, weight loss, or fatigue  EYES: No eye pain, visual disturbances, or discharge  ENMT:  No difficulty hearing, tinnitus, vertigo; No sinus or throat pain  NECK: No pain or stiffness  RESPIRATORY: No cough, wheezing, chills or hemoptysis; No shortness of breath  CARDIOVASCULAR: No chest pain, palpitations, dizziness, or leg swelling  GASTROINTESTINAL: No abdominal or epigastric pain. No nausea, vomiting, or hematemesis; No diarrhea or constipation. No melena or hematochezia.  GENITOURINARY: No dysuria, frequency, hematuria, or incontinence , Ledesma +  NEUROLOGICAL: No headaches, memory loss, loss of strength, numbness, or tremors  SKIN: b/l LE  itching+ ,  no burning, rashes, or lesions   LYMPH NODES: No enlarged glands  ENDOCRINE: No heat or cold intolerance; No hair loss  MUSCULOSKELETAL: no joint pain or swelling  PSYCHIATRIC: No depression, anxiety, mood swings, or difficulty sleeping  HEME/LYMPH: No easy bruising, or bleeding gums  ALLERGY AND IMMUNOLOGIC: No hives or eczema    Vital Signs Last 24 Hrs  T(C): 36.8, Max: 36.8 (01-19 @ 17:07)  T(F): 98.3, Max: 98.3 (01-19 @ 17:07)  HR: 79 (65 - 79)  BP: 164/77 (118/68 - 164/77)  BP(mean): --  RR: 17 (16 - 18)  SpO2: 97% (94% - 97%)  PHYSICAL EXAM:    GENERAL: NAD, well-groomed, well-developed  HEAD:  Atraumatic, Normocephalic  EYES: EOMI, PERRLA, conjunctiva and sclera clear  NECK: Supple, No JVD, Normal thyroid  NERVOUS SYSTEM:  Alert & Oriented X3, no focal deficit  CHEST/LUNG: CTA b/l ,  no  rales, rhonchi, wheezing, or rubs  HEART: Regular rate and rhythm; No murmurs, rubs, or gallops  ABDOMEN: Soft, Nontender, Nondistended; Bowel sounds present  EXTREMITIES:  2+ Peripheral Pulses, No clubbing, cyanosis, b/l LE  edema , wrapped with ACE dressing b/l  LYMPH: No lymphadenopathy noted  SKIN: No rashes or lesions

## 2017-01-20 NOTE — PROGRESS NOTE ADULT - ASSESSMENT
This is 76Y M with PMH of DM, HTN, admitted for DKA and AMS, admitted to ICU, started on insulin drip, anion gap closed, off insulin drip, on Lantus 22 units. He was also noted to foul smelling discharge from groin, seen bu surgery, looks fungal infection, started on Nystatin powder, improving. MRI brain showed b/l stroke with mild petechial hemorrhage in right occipital lobe. JESUS MANUEL - Large PFO. acute DVT in LLE - not a good candidate for AC per Neuro and Cardio - s/p - IVC filter placement. S/P Ledesma cath placement , feeling better , c/o b/l LE itching /pain , had constipation that resolved      A/P    > DKA due to Non compliant , resolved - continue diet , ISSC ,  episode of  hypoglycemia  , snack QHS added , now f/s better  A1C: 12 , diabetic education consult provided    >Hypokalemia - resolved , follow up BMP     >B/L stroke with mild petechial hemorrhage in right occipital lobe - on ASA / Statin / continue rehab program    TTE normal, carotid doppler showed ?stenosis in distal intracranial stenosis, as per Neurology no need of CTA  JESUS MANUEL - Large PFO - as per cardiology  , follow up as outpatient     Acute DVT in LLE - not a good candidate for AC per Neuro and Cardio - s/p - IVC filter placement.       >HTN - Stable  continue  amlodipine 10 mg daily and lisinopril 10mg daily with parameters      >Groin fungal infection - improving , continue  Nystatin    >Parkinson disease  cont. sinemet as per Neurology    > UTI- on empiric  abx  ,  repeated cultures negative , done while on abx , will treat for presumed UTI caused by straight catheterization for total 7 days of abx as patiet was symptomatic and urine was very cloudy    > Hyponatremia -  resolved    > Urinary retention - Hx of BPH and s/p TURP 10 yrs ago,  consult  noted  and appreciated , started on Flomax/ Proscar , Ledesma placed secondary to painful urination and painful straight  cath  ,  will give TOV in 2-3 days    > B/L LE edema - Lasix increased  to 40 mg Daily, monitor BMP , continue topical care of LE , add compressive stockings  b/l , leg elevation    > Constipation -  had large BM after several days ,continue current mgmt , add Colace , may d/c Calcium This is 76Y M with PMH of DM, HTN, admitted for DKA and AMS, admitted to ICU, started on insulin drip, anion gap closed, off insulin drip, on Lantus 22 units. He was also noted to foul smelling discharge from groin, seen bu surgery, looks fungal infection, started on Nystatin powder, improving. MRI brain showed b/l stroke with mild petechial hemorrhage in right occipital lobe. JESUS MANUEL - Large PFO. acute DVT in LLE - not a good candidate for AC per Neuro and Cardio - s/p - IVC filter placement. S/P Ledesma cath placement , feeling better , c/o b/l LE itching /pain , LE wrapped with ACE dressing, had constipation that resolved      A/P    > DKA due to Non compliant , resolved - continue diet , ISSC ,  episode of  hypoglycemia  , snack QHS added , now f/s better  A1C: 12 , diabetic education consult provided    >Hypokalemia - resolved , follow up BMP     >B/L stroke with mild petechial hemorrhage in right occipital lobe - on ASA / Statin / continue rehab program    TTE normal, carotid doppler showed ?stenosis in distal intracranial stenosis, as per Neurology no need of CTA  JESUS MANUEL - Large PFO - as per cardiology  , follow up as outpatient     Acute DVT in LLE - not a good candidate for AC per Neuro and Cardio - s/p - IVC filter placement.       >HTN - Stable  continue  amlodipine 10 mg daily and lisinopril 10mg daily with parameters      >Groin fungal infection - improving , continue  Nystatin    >Parkinson disease  cont. sinemet as per Neurology    > UTI- on empiric  abx  ,  repeated cultures negative , done while on abx , will treat for presumed UTI caused by straight catheterization for total 7 days of abx as patient was symptomatic and urine was very cloudy    > Hyponatremia -  resolved    > Urinary retention - Hx of BPH and s/p TURP 10 yrs ago,  consult  noted  and appreciated , started on Flomax/ Proscar , Ledesma placed secondary to painful urination and painful straight  cath  ,  will give TOV in 1-2 days    > B/L LE edema - Lasix increased  to 40 mg Daily, monitor BMP , continue topical care of LE ,  compressive stockings,  b/l  leg elevation    > Constipation -  had large BM after several days ,continue current mgmt , add Colace ,  Calcium d/c

## 2017-01-21 DIAGNOSIS — E11.9 TYPE 2 DIABETES MELLITUS WITHOUT COMPLICATIONS: ICD-10-CM

## 2017-01-21 DIAGNOSIS — I63.9 CEREBRAL INFARCTION, UNSPECIFIED: ICD-10-CM

## 2017-01-21 DIAGNOSIS — I10 ESSENTIAL (PRIMARY) HYPERTENSION: ICD-10-CM

## 2017-01-21 DIAGNOSIS — D64.9 ANEMIA, UNSPECIFIED: ICD-10-CM

## 2017-01-21 PROCEDURE — 99232 SBSQ HOSP IP/OBS MODERATE 35: CPT | Mod: GC

## 2017-01-21 PROCEDURE — 99232 SBSQ HOSP IP/OBS MODERATE 35: CPT

## 2017-01-21 RX ADMIN — Medication 5 MILLILITER(S): at 18:49

## 2017-01-21 RX ADMIN — Medication 1 APPLICATION(S): at 06:51

## 2017-01-21 RX ADMIN — Medication 10: at 16:45

## 2017-01-21 RX ADMIN — Medication 100 MILLIGRAM(S): at 06:52

## 2017-01-21 RX ADMIN — HEPARIN SODIUM 5000 UNIT(S): 5000 INJECTION INTRAVENOUS; SUBCUTANEOUS at 16:45

## 2017-01-21 RX ADMIN — Medication 1 APPLICATION(S): at 18:49

## 2017-01-21 RX ADMIN — Medication 100 MILLIGRAM(S): at 06:51

## 2017-01-21 RX ADMIN — AMLODIPINE BESYLATE 10 MILLIGRAM(S): 2.5 TABLET ORAL at 06:51

## 2017-01-21 RX ADMIN — Medication 250 MILLIGRAM(S): at 16:44

## 2017-01-21 RX ADMIN — CARBIDOPA AND LEVODOPA 1 TABLET(S): 25; 100 TABLET ORAL at 06:51

## 2017-01-21 RX ADMIN — Medication 1 APPLICATION(S): at 06:52

## 2017-01-21 RX ADMIN — CARBIDOPA AND LEVODOPA 1 TABLET(S): 25; 100 TABLET ORAL at 22:02

## 2017-01-21 RX ADMIN — CARBIDOPA AND LEVODOPA 1 TABLET(S): 25; 100 TABLET ORAL at 16:44

## 2017-01-21 RX ADMIN — HEPARIN SODIUM 5000 UNIT(S): 5000 INJECTION INTRAVENOUS; SUBCUTANEOUS at 06:51

## 2017-01-21 RX ADMIN — Medication 40 MILLIGRAM(S): at 06:51

## 2017-01-21 RX ADMIN — Medication 20 MILLIEQUIVALENT(S): at 11:58

## 2017-01-21 RX ADMIN — ATORVASTATIN CALCIUM 20 MILLIGRAM(S): 80 TABLET, FILM COATED ORAL at 22:01

## 2017-01-21 RX ADMIN — Medication 650 MILLIGRAM(S): at 16:46

## 2017-01-21 RX ADMIN — Medication 250 MILLIGRAM(S): at 06:53

## 2017-01-21 RX ADMIN — Medication 6: at 12:47

## 2017-01-21 RX ADMIN — TAMSULOSIN HYDROCHLORIDE 0.4 MILLIGRAM(S): 0.4 CAPSULE ORAL at 22:01

## 2017-01-21 RX ADMIN — Medication 1 DROP(S): at 11:59

## 2017-01-21 RX ADMIN — Medication 5 MILLILITER(S): at 06:52

## 2017-01-21 RX ADMIN — INSULIN GLARGINE 22 UNIT(S): 100 INJECTION, SOLUTION SUBCUTANEOUS at 22:02

## 2017-01-21 RX ADMIN — FINASTERIDE 5 MILLIGRAM(S): 5 TABLET, FILM COATED ORAL at 11:58

## 2017-01-21 RX ADMIN — Medication 100 MILLIGRAM(S): at 16:45

## 2017-01-21 RX ADMIN — SENNA PLUS 2 TABLET(S): 8.6 TABLET ORAL at 22:01

## 2017-01-21 RX ADMIN — NYSTATIN CREAM 1 APPLICATION(S): 100000 CREAM TOPICAL at 06:52

## 2017-01-21 RX ADMIN — NYSTATIN CREAM 1 APPLICATION(S): 100000 CREAM TOPICAL at 22:02

## 2017-01-21 RX ADMIN — Medication 81 MILLIGRAM(S): at 11:58

## 2017-01-21 RX ADMIN — NYSTATIN CREAM 1 APPLICATION(S): 100000 CREAM TOPICAL at 16:44

## 2017-01-21 RX ADMIN — Medication 6: at 22:01

## 2017-01-21 RX ADMIN — Medication 9 MILLIGRAM(S): at 22:02

## 2017-01-21 RX ADMIN — Medication 325 MILLIGRAM(S): at 11:58

## 2017-01-21 NOTE — PROGRESS NOTE ADULT - ASSESSMENT
This is 76Y M with PMH of DM, HTN, admitted for DKA and AMS, admitted to ICU, started on insulin drip, anion gap closed, off insulin drip, on Lantus 22 units. He was also noted to foul smelling discharge from groin, seen bu surgery, looks fungal infection, started on Nystatin powder, improving. MRI brain showed b/l stroke with mild petechial hemorrhage in right occipital lobe. JESUS MANUEL - Large PFO. acute DVT in LLE - not a good candidate for AC per Neuro and Cardio - s/p - IVC filter placement. S/P Ledesma cath placement , feeling better , c/o b/l LE itching /pain , LE wrapped with ACE dressing, had constipation that resolved

## 2017-01-21 NOTE — PROGRESS NOTE ADULT - SUBJECTIVE AND OBJECTIVE BOX
Patient seen and examined , chart reviewed  S/P Ledesma catheter placement c/o leg itching  and tired from sitting in the chair since am     CC: b/l LE pain/itching , buttock pain     HPI:  76Y M with PMH of DM, HTN, admitted for DKA and AMS, admitted to ICU, started on insulin drip, anion gap closed, off insulin drip, on Lantus 15 units. He was also noted to foul smelling discharge from groin, seen bu surgery, looks fungal infection, started on Nystatin powder, improving. MRI brain showed b/l stroke with mild petechial hemorrhage in right occipital lobe, Stroke work up was done. ECHO - EF - 55%, JESUS MANUEL - showed large  and acute DVT in LLE -He was deemed not a good candidate for AC per Neuro and Cardio 2/2 parknison and multiple falls. Hence  - decided to undergo- IVC filter placement on 1/11. He was cleared by neuro and cardio for discharge. HE was discharged   in stable condition to Acute rehab. C/O dysuria , urinary retention , treated  with empiric abx as urine found to be very cloudy and patient asymptomatic , first urine culture - contaminated . Now on Vantin, seen by  and Ledesma placed , started on Flomax/Proscar with plan for TOV in few days . Diabetic education provided .    MEDICATIONS  (STANDING):  aspirin enteric coated 81milliGRAM(s) Oral daily  amLODIPine   Tablet 10milliGRAM(s) Oral daily  carbidopa/levodopa  25/100 1Tablet(s) Oral three times a day  atorvastatin 20milliGRAM(s) Oral at bedtime  timolol 0.5% Solution 1Drop(s) Both EYES daily  heparin  Injectable 5000Unit(s) SubCutaneous every 12 hours  ferrous    sulfate 325milliGRAM(s) Oral daily  ammonium lactate 12% Lotion 1Application(s) Topical two times a day  furosemide    Tablet 40milliGRAM(s) Oral daily  tamsulosin 0.4milliGRAM(s) Oral at bedtime  lidocaine 2% Jelly 5milliLiter(s) IntraUrethral two times a day  insulin glargine Injectable (LANTUS) 22Unit(s) SubCutaneous at bedtime  BACItracin   Ointment 1Application(s) Topical two times a day  potassium chloride    Tablet ER 20milliEquivalent(s) Oral daily  insulin lispro (HumaLOG) corrective regimen sliding scale  SubCutaneous Before meals and at bedtime  nystatin Powder 1Application(s) Topical three times a day  finasteride 5milliGRAM(s) Oral daily  melatonin. 9milliGRAM(s) Oral at bedtime  docusate sodium 100milliGRAM(s) Oral two times a day  senna 2Tablet(s) Oral at bedtime  cefpodoxime 100milliGRAM(s) Oral every 12 hours  saccharomyces boulardii 250milliGRAM(s) Oral two times a day    MEDICATIONS  (PRN):  acetaminophen   Tablet 650milliGRAM(s) Oral every 6 hours PRN For Temp greater than 38 C (100.4 F)  acetaminophen   Tablet. 650milliGRAM(s) Oral every 6 hours PRN Mild Pain (1 - 3)  sodium biphosphate Rectal Enema 1Enema Rectal daily PRN Constipation  bisacodyl 10milliGRAM(s) Oral daily PRN Constipation  bisacodyl Suppository 10milliGRAM(s) Rectal daily PRN Constipation    REVIEW OF SYSTEMS:    as above otherwise negative   Vital Signs Last 24 Hrs  T(C): 36.6, Max: 37 (01-21 @ 05:47)  T(F): 97.8, Max: 98.6 (01-21 @ 05:47)  HR: 76 (73 - 80)  BP: 128/66 (128/66 - 172/62)  BP(mean): --  RR: 18 (16 - 18)  SpO2: 99% (95% - 99%)  PHYSICAL EXAM:    GENERAL: NAD, well-groomed, well-developed  HEAD:  Atraumatic, Normocephalic  EYES: EOMI, PERRLA, conjunctiva and sclera clear  NECK: Supple, No JVD,   NERVOUS SYSTEM:  Alert & Oriented X3, no focal deficit  CHEST/LUNG: CTA b/l ,  no  rales, rhonchi, wheezing, or rubs  HEART: Regular rate and rhythm; No murmurs, rubs, or gallops  ABDOMEN: Soft, Nontender, Nondistended; Bowel sounds present  EXTREMITIES:  2+ Peripheral Pulses, No clubbing, cyanosis, b/l LE  edema , wrapped with ACE dressing b/l  SKIN: No rashes or lesions

## 2017-01-21 NOTE — PROGRESS NOTE ADULT - SUBJECTIVE AND OBJECTIVE BOX
HISTORY OF PRESENT ILLNESS  76M was admitted to [facility] on 12/31/16 after not feeling well for a few days. Workup showed his FS was >1100 and in DKA. Course was complicated by Tinea in the groin, lethargy on 1/2 where a brain MRI showed acute bilateral supratentorial and infratentorial infarcts with mild petechial hemorrhage in right occipital lobe. Source was found to be from PFO on JESUS MANUEL done 1/9. Course was further complicated by an acute Left LE DVT s/p IVCF on 1/11. Admitted to acute rehab on 1/13.    PMHx - HLD, DM 2, HTN, Parkinson's Disease      TODAY'S SUBJECTIVE & REVIEW OF SYMPTOMS  [ ] Constitutional WNL          [X] Cardio WNL              [X] Resp WNL           [X] GI WNL                          [ ]  WNL                   [X] Heme WNL              [X] Endo WNL                     [X] Skin WNL                 [X] MSK WNL            [X] Neuro WNL                    [ ] Cognitive WNL           [X] Psych WNL    Patient seen and examined. No overnight events. Patient lethargic this morning, but arousable. Offers no specific complaints. Denies pain. No new issues at this time.       Vital Signs Last 24 Hrs  T(C): 37, Max: 37 (01-21 @ 05:47)  T(F): 98.6, Max: 98.6 (01-21 @ 05:47)  HR: 76 (73 - 80)  BP: 172/62 (133/71 - 172/62)  BP(mean): --  RR: 16 (16 - 18)  SpO2: 96% (95% - 98%)      PHYSICAL EXAM  Constitutional - NAD, Comfortable, lethargic, but arousable  HEENT - NCAT, EOMI  Neck - Supple, No limited ROM  Chest - CTA bilaterally, No wheeze, No rhonchi, No crackles  Cardiovascular - RRR, S1S2, No murmurs  Abdomen - BS+, Soft, NTND  Extremities - BLE 1+ edema and tenderness to light touch  Neurologic Exam -                    Cognitive - Awake, Alert, AAO to self, place     Communication - Fluent, No dysarthria     Cranial Nerves - CN 2-12 grossly intact     Motor -     LEFT    UE - ShAB 4/5, EF 4/5, EE 4/5, WE 4/5,  5/5                    RIGHT UE - ShAB 5/5, EF 5/5, EE 5/5, WE 5/5,  5/5                    LEFT    LE - HF 2/5, KE 3/5, DF 5/5, PF 5/5                    RIGHT LE - HF 2/5, KE 3/5, DF 5/5, PF 5/5         Sensory - Decreased to LT in BLE  Psychiatric - Mood stable, Affect Flat  Skin - Bilateral LE venous stasis changes, left medial great toe abrasion, bilateral groin/medial thigh rashes  Wounds - None    FUNCTIONAL PROGRESS  Gait - 30' + 40' RW min A  ADLs - UE Dress S, grooming min A  Transfers - sit-to-stand mod A  Functional transfer - mat SPT & RW mod A          MEDICATIONS  (STANDING):  aspirin enteric coated 81milliGRAM(s) Oral daily  amLODIPine   Tablet 10milliGRAM(s) Oral daily  carbidopa/levodopa  25/100 1Tablet(s) Oral three times a day  atorvastatin 20milliGRAM(s) Oral at bedtime  timolol 0.5% Solution 1Drop(s) Both EYES daily  heparin  Injectable 5000Unit(s) SubCutaneous every 12 hours  ferrous    sulfate 325milliGRAM(s) Oral daily  ammonium lactate 12% Lotion 1Application(s) Topical two times a day  furosemide    Tablet 40milliGRAM(s) Oral daily  tamsulosin 0.4milliGRAM(s) Oral at bedtime  lidocaine 2% Jelly 5milliLiter(s) IntraUrethral two times a day  insulin glargine Injectable (LANTUS) 22Unit(s) SubCutaneous at bedtime  BACItracin   Ointment 1Application(s) Topical two times a day  potassium chloride    Tablet ER 20milliEquivalent(s) Oral daily  insulin lispro (HumaLOG) corrective regimen sliding scale  SubCutaneous Before meals and at bedtime  nystatin Powder 1Application(s) Topical three times a day  finasteride 5milliGRAM(s) Oral daily  melatonin. 9milliGRAM(s) Oral at bedtime  docusate sodium 100milliGRAM(s) Oral two times a day  senna 2Tablet(s) Oral at bedtime  cefpodoxime 100milliGRAM(s) Oral every 12 hours  saccharomyces boulardii 250milliGRAM(s) Oral two times a day    MEDICATIONS  (PRN):  acetaminophen   Tablet 650milliGRAM(s) Oral every 6 hours PRN For Temp greater than 38 C (100.4 F)  acetaminophen   Tablet. 650milliGRAM(s) Oral every 6 hours PRN Mild Pain (1 - 3)  sodium biphosphate Rectal Enema 1Enema Rectal daily PRN Constipation  bisacodyl 10milliGRAM(s) Oral daily PRN Constipation  bisacodyl Suppository 10milliGRAM(s) Rectal daily PRN Constipation          ASSESSMENT & PLAN  76M with multiple CVAs now with functional deficits  HTN - Amlodipine, Lasix + KCl  DM2 - Lantus, ISS  CVA - Lipitor, Aspirin  Anemia - Iron  UTI - Vantin + Florastor (1/17-1/23)  Parkinson's Disease - Sinemet  Sleep - Melatonin 9mg (increased 1/18)  Glaucoma - Timolol   GI/Bowel Management - Colace, Senna, Lactulose PRN, Dulcolax PRN, Fleet PRN   Management - Toilet Q2, +Ledesma, Proscar 5mg (1/18), Flomax 0.4mg (1/16)  Skin - Turn Q2, Nystatin to groin, Bacitracin to groin, Lac-Hydrin to LE, ACE Wrap to LE  Pain - Tylenol PRN  DVT PPX - Heparin Q12, TEDs, SCDs  Diet - Cardiac, CC/Thins, Glucerna TID    Continue comprehensive acute rehab program consisting of 3hrs/day of OT/PT/SLP.

## 2017-01-21 NOTE — PROGRESS NOTE ADULT - SUBJECTIVE AND OBJECTIVE BOX
INTERVAL HPI/OVERNIGHT EVENTS:  feels well.   resting comfortably.  no nausea, vomitting, fevers or chills.  + BM  rash in groin.    MEDICATIONS  (STANDING):  aspirin enteric coated 81milliGRAM(s) Oral daily  amLODIPine   Tablet 10milliGRAM(s) Oral daily  carbidopa/levodopa  25/100 1Tablet(s) Oral three times a day  atorvastatin 20milliGRAM(s) Oral at bedtime  timolol 0.5% Solution 1Drop(s) Both EYES daily  heparin  Injectable 5000Unit(s) SubCutaneous every 12 hours  ferrous    sulfate 325milliGRAM(s) Oral daily  ammonium lactate 12% Lotion 1Application(s) Topical two times a day  furosemide    Tablet 40milliGRAM(s) Oral daily  tamsulosin 0.4milliGRAM(s) Oral at bedtime  lidocaine 2% Jelly 5milliLiter(s) IntraUrethral two times a day  insulin glargine Injectable (LANTUS) 22Unit(s) SubCutaneous at bedtime  BACItracin   Ointment 1Application(s) Topical two times a day  potassium chloride    Tablet ER 20milliEquivalent(s) Oral daily  insulin lispro (HumaLOG) corrective regimen sliding scale  SubCutaneous Before meals and at bedtime  nystatin Powder 1Application(s) Topical three times a day  finasteride 5milliGRAM(s) Oral daily  melatonin. 9milliGRAM(s) Oral at bedtime  docusate sodium 100milliGRAM(s) Oral two times a day  senna 2Tablet(s) Oral at bedtime  cefpodoxime 100milliGRAM(s) Oral every 12 hours  saccharomyces boulardii 250milliGRAM(s) Oral two times a day    MEDICATIONS  (PRN):  acetaminophen   Tablet 650milliGRAM(s) Oral every 6 hours PRN For Temp greater than 38 C (100.4 F)  acetaminophen   Tablet. 650milliGRAM(s) Oral every 6 hours PRN Mild Pain (1 - 3)  sodium biphosphate Rectal Enema 1Enema Rectal daily PRN Constipation  bisacodyl 10milliGRAM(s) Oral daily PRN Constipation  bisacodyl Suppository 10milliGRAM(s) Rectal daily PRN Constipation      Vital Signs Last 24 Hrs  T(C): 36.9, Max: 36.9 (01-20 @ 21:00)  T(F): 98.4, Max: 98.4 (01-20 @ 21:00)  HR: 80 (73 - 80)  BP: 136/70 (133/71 - 164/77)  BP(mean): --  RR: 18 (17 - 18)  SpO2: 95% (95% - 98%)    PHYSICAL EXAM:  Gen: NAD  Abdomen: softly distended  urine: clear and yellow        I&O's Detail      LABS:                RADIOLOGY & ADDITIONAL STUDIES:

## 2017-01-22 DIAGNOSIS — R33.9 RETENTION OF URINE, UNSPECIFIED: ICD-10-CM

## 2017-01-22 LAB
FERRITIN SERPL-MCNC: 139.6 NG/ML — SIGNIFICANT CHANGE UP (ref 30–400)
FOLATE SERPL-MCNC: 10.9 NG/ML — SIGNIFICANT CHANGE UP (ref 4–16)
IRON SATN MFR SERPL: 10 % — LOW (ref 16–55)
IRON SATN MFR SERPL: 22 UG/DL — LOW (ref 59–158)
TIBC SERPL-MCNC: 227 UG/DL — SIGNIFICANT CHANGE UP (ref 220–430)
TRANSFERRIN SERPL-MCNC: 159 MG/DL — LOW (ref 180–329)
TSH SERPL-MCNC: 1.55 UIU/ML — SIGNIFICANT CHANGE UP (ref 0.27–4.2)
VIT B12 SERPL-MCNC: 425 PG/ML — SIGNIFICANT CHANGE UP (ref 180–914)

## 2017-01-22 PROCEDURE — 99232 SBSQ HOSP IP/OBS MODERATE 35: CPT

## 2017-01-22 PROCEDURE — 99232 SBSQ HOSP IP/OBS MODERATE 35: CPT | Mod: GC

## 2017-01-22 RX ORDER — INSULIN GLARGINE 100 [IU]/ML
25 INJECTION, SOLUTION SUBCUTANEOUS AT BEDTIME
Qty: 0 | Refills: 0 | Status: DISCONTINUED | OUTPATIENT
Start: 2017-01-22 | End: 2017-01-26

## 2017-01-22 RX ORDER — FERROUS SULFATE 325(65) MG
325 TABLET ORAL
Qty: 0 | Refills: 0 | Status: DISCONTINUED | OUTPATIENT
Start: 2017-01-22 | End: 2017-02-02

## 2017-01-22 RX ORDER — ERYTHROPOIETIN 10000 [IU]/ML
10000 INJECTION, SOLUTION INTRAVENOUS; SUBCUTANEOUS ONCE
Qty: 0 | Refills: 0 | Status: COMPLETED | OUTPATIENT
Start: 2017-01-22 | End: 2017-01-22

## 2017-01-22 RX ADMIN — Medication 650 MILLIGRAM(S): at 06:51

## 2017-01-22 RX ADMIN — CARBIDOPA AND LEVODOPA 1 TABLET(S): 25; 100 TABLET ORAL at 18:07

## 2017-01-22 RX ADMIN — Medication 20 MILLIEQUIVALENT(S): at 12:47

## 2017-01-22 RX ADMIN — FINASTERIDE 5 MILLIGRAM(S): 5 TABLET, FILM COATED ORAL at 12:47

## 2017-01-22 RX ADMIN — NYSTATIN CREAM 1 APPLICATION(S): 100000 CREAM TOPICAL at 21:53

## 2017-01-22 RX ADMIN — Medication 250 MILLIGRAM(S): at 18:20

## 2017-01-22 RX ADMIN — Medication 5 MILLILITER(S): at 05:52

## 2017-01-22 RX ADMIN — ATORVASTATIN CALCIUM 20 MILLIGRAM(S): 80 TABLET, FILM COATED ORAL at 21:54

## 2017-01-22 RX ADMIN — Medication 4: at 12:46

## 2017-01-22 RX ADMIN — Medication 100 MILLIGRAM(S): at 18:20

## 2017-01-22 RX ADMIN — Medication 1 APPLICATION(S): at 05:51

## 2017-01-22 RX ADMIN — Medication 1 TABLET(S): at 18:14

## 2017-01-22 RX ADMIN — Medication 9 MILLIGRAM(S): at 21:55

## 2017-01-22 RX ADMIN — TAMSULOSIN HYDROCHLORIDE 0.4 MILLIGRAM(S): 0.4 CAPSULE ORAL at 21:55

## 2017-01-22 RX ADMIN — Medication 100 MILLIGRAM(S): at 18:10

## 2017-01-22 RX ADMIN — SENNA PLUS 2 TABLET(S): 8.6 TABLET ORAL at 21:55

## 2017-01-22 RX ADMIN — HEPARIN SODIUM 5000 UNIT(S): 5000 INJECTION INTRAVENOUS; SUBCUTANEOUS at 18:11

## 2017-01-22 RX ADMIN — Medication 1 APPLICATION(S): at 05:52

## 2017-01-22 RX ADMIN — Medication 81 MILLIGRAM(S): at 12:47

## 2017-01-22 RX ADMIN — Medication 1 DROP(S): at 12:47

## 2017-01-22 RX ADMIN — Medication 10 MILLIGRAM(S): at 05:50

## 2017-01-22 RX ADMIN — Medication 1 APPLICATION(S): at 21:53

## 2017-01-22 RX ADMIN — CARBIDOPA AND LEVODOPA 1 TABLET(S): 25; 100 TABLET ORAL at 21:55

## 2017-01-22 RX ADMIN — HEPARIN SODIUM 5000 UNIT(S): 5000 INJECTION INTRAVENOUS; SUBCUTANEOUS at 05:52

## 2017-01-22 RX ADMIN — AMLODIPINE BESYLATE 10 MILLIGRAM(S): 2.5 TABLET ORAL at 05:51

## 2017-01-22 RX ADMIN — INSULIN GLARGINE 25 UNIT(S): 100 INJECTION, SOLUTION SUBCUTANEOUS at 21:56

## 2017-01-22 RX ADMIN — Medication 250 MILLIGRAM(S): at 05:52

## 2017-01-22 RX ADMIN — Medication 6: at 18:08

## 2017-01-22 RX ADMIN — Medication 100 MILLIGRAM(S): at 05:51

## 2017-01-22 RX ADMIN — ERYTHROPOIETIN 10000 UNIT(S): 10000 INJECTION, SOLUTION INTRAVENOUS; SUBCUTANEOUS at 18:08

## 2017-01-22 RX ADMIN — Medication 650 MILLIGRAM(S): at 05:51

## 2017-01-22 RX ADMIN — Medication 325 MILLIGRAM(S): at 18:10

## 2017-01-22 RX ADMIN — CARBIDOPA AND LEVODOPA 1 TABLET(S): 25; 100 TABLET ORAL at 05:51

## 2017-01-22 RX ADMIN — Medication 40 MILLIGRAM(S): at 05:51

## 2017-01-22 RX ADMIN — NYSTATIN CREAM 1 APPLICATION(S): 100000 CREAM TOPICAL at 05:52

## 2017-01-22 RX ADMIN — Medication: at 21:56

## 2017-01-22 NOTE — PROGRESS NOTE ADULT - SUBJECTIVE AND OBJECTIVE BOX
INTERVAL HPI/OVERNIGHT EVENTS:  feels well.  no nausea, vomitting, fevers or chills.  wouldn't mind keeping catheter.    MEDICATIONS  (STANDING):  aspirin enteric coated 81milliGRAM(s) Oral daily  amLODIPine   Tablet 10milliGRAM(s) Oral daily  carbidopa/levodopa  25/100 1Tablet(s) Oral three times a day  atorvastatin 20milliGRAM(s) Oral at bedtime  timolol 0.5% Solution 1Drop(s) Both EYES daily  heparin  Injectable 5000Unit(s) SubCutaneous every 12 hours  ammonium lactate 12% Lotion 1Application(s) Topical two times a day  furosemide    Tablet 40milliGRAM(s) Oral daily  tamsulosin 0.4milliGRAM(s) Oral at bedtime  lidocaine 2% Jelly 5milliLiter(s) IntraUrethral two times a day  potassium chloride    Tablet ER 20milliEquivalent(s) Oral daily  insulin lispro (HumaLOG) corrective regimen sliding scale  SubCutaneous Before meals and at bedtime  nystatin Powder 1Application(s) Topical three times a day  finasteride 5milliGRAM(s) Oral daily  melatonin. 9milliGRAM(s) Oral at bedtime  docusate sodium 100milliGRAM(s) Oral two times a day  senna 2Tablet(s) Oral at bedtime  cefpodoxime 100milliGRAM(s) Oral every 12 hours  saccharomyces boulardii 250milliGRAM(s) Oral two times a day  ferrous    sulfate 325milliGRAM(s) Oral two times a day with meals  Nephro-nate 1Tablet(s) Oral daily  insulin glargine Injectable (LANTUS) 25Unit(s) SubCutaneous at bedtime    MEDICATIONS  (PRN):  acetaminophen   Tablet 650milliGRAM(s) Oral every 6 hours PRN For Temp greater than 38 C (100.4 F)  acetaminophen   Tablet. 650milliGRAM(s) Oral every 6 hours PRN Mild Pain (1 - 3)  sodium biphosphate Rectal Enema 1Enema Rectal daily PRN Constipation  bisacodyl 10milliGRAM(s) Oral daily PRN Constipation  bisacodyl Suppository 10milliGRAM(s) Rectal daily PRN Constipation      Vital Signs Last 24 Hrs  T(C): 36.7, Max: 36.7 (01-22 @ 08:45)  T(F): 98.1, Max: 98.1 (01-22 @ 10:31)  HR: 75 (74 - 83)  BP: 131/67 (131/67 - 155/76)  BP(mean): --  RR: 18 (16 - 18)  SpO2: 98% (94% - 98%)    PHYSICAL EXAM:    :urine appears cloudy/yeasty        I&O's Detail      LABS:                RADIOLOGY & ADDITIONAL STUDIES:

## 2017-01-22 NOTE — PROGRESS NOTE ADULT - SUBJECTIVE AND OBJECTIVE BOX
HISTORY OF PRESENT ILLNESS  76M was admitted to [facility] on 12/31/16 after not feeling well for a few days. Workup showed his FS was >1100 and in DKA. Course was complicated by Tinea in the groin, lethargy on 1/2 where a brain MRI showed acute bilateral supratentorial and infratentorial infarcts with mild petechial hemorrhage in right occipital lobe. Source was found to be from PFO on JESUS MANUEL done 1/9. Course was further complicated by an acute Left LE DVT s/p IVCF on 1/11. Admitted to acute rehab on 1/13.    PMHx - HLD, DM 2, HTN, Parkinson's Disease      TODAY'S SUBJECTIVE & REVIEW OF SYMPTOMS  [ ] Constitutional WNL          [X] Cardio WNL              [X] Resp WNL           [X] GI WNL                          [ ]  WNL                   [X] Heme WNL              [X] Endo WNL                     [X] Skin WNL                 [X] MSK WNL            [X] Neuro WNL                    [ ] Cognitive WNL           [X] Psych WNL      No acute events overnight. Lethargy improved this am.  No complaints. Denies pain.      VITAL SIGNS:  T(C): 36.7  T(F): 98, Max: 98.5 (01-21 @ 21:57)  HR: 74 (74 - 83)  BP: 155/76 (128/66 - 155/76)  RR:  (16 - 18)  SpO2:  (94% - 99%)  Wt(kg): --      PHYSICAL EXAM  Constitutional - NAD, Comfortable  HEENT - NCAT, EOMI  Neck - Supple, No limited ROM  Chest - CTA bilaterally, No wheeze, No rhonchi, No crackles  Cardiovascular - RRR, S1S2, No murmurs  Abdomen - BS+, Soft, NTND  Extremities - BLE 1+ edema and tenderness to light touch  Neurologic Exam -                    Cognitive - Awake, Alert, AAO to self + place     Communication - Fluent, No dysarthria     Cranial Nerves - CN 2-12 grossly intact     Motor -     LEFT    UE - ShAB 4/5, EF 4/5, EE 4/5, WE 4/5,  5/5                    RIGHT UE - ShAB 5/5, EF 5/5, EE 5/5, WE 5/5,  5/5                    LEFT    LE - HF 2/5, KE 3/5, DF 5/5, PF 5/5                    RIGHT LE - HF 2/5, KE 3/5, DF 5/5, PF 5/5         Sensory - Decreased to LT in BLE  Psychiatric - Mood stable, Affect Flat  Skin - Bilateral LE venous stasis changes, left medial great toe abrasion, bilateral groin/medial thigh rashes  Wounds - None    FUNCTIONAL PROGRESS  Gait - 30' + 40' RW min A  ADLs - UE Dress S, grooming min A  Transfers - sit-to-stand mod A  Functional transfer - mat SPT & RW mod A        MEDICATIONS  (STANDING):  aspirin enteric coated 81milliGRAM(s) Oral daily  amLODIPine   Tablet 10milliGRAM(s) Oral daily  carbidopa/levodopa  25/100 1Tablet(s) Oral three times a day  atorvastatin 20milliGRAM(s) Oral at bedtime  timolol 0.5% Solution 1Drop(s) Both EYES daily  heparin  Injectable 5000Unit(s) SubCutaneous every 12 hours  ferrous    sulfate 325milliGRAM(s) Oral daily  ammonium lactate 12% Lotion 1Application(s) Topical two times a day  furosemide    Tablet 40milliGRAM(s) Oral daily  tamsulosin 0.4milliGRAM(s) Oral at bedtime  lidocaine 2% Jelly 5milliLiter(s) IntraUrethral two times a day  insulin glargine Injectable (LANTUS) 22Unit(s) SubCutaneous at bedtime  potassium chloride    Tablet ER 20milliEquivalent(s) Oral daily  insulin lispro (HumaLOG) corrective regimen sliding scale  SubCutaneous Before meals and at bedtime  nystatin Powder 1Application(s) Topical three times a day  finasteride 5milliGRAM(s) Oral daily  melatonin. 9milliGRAM(s) Oral at bedtime  docusate sodium 100milliGRAM(s) Oral two times a day  senna 2Tablet(s) Oral at bedtime  cefpodoxime 100milliGRAM(s) Oral every 12 hours  saccharomyces boulardii 250milliGRAM(s) Oral two times a day    MEDICATIONS  (PRN):  acetaminophen   Tablet 650milliGRAM(s) Oral every 6 hours PRN For Temp greater than 38 C (100.4 F)  acetaminophen   Tablet. 650milliGRAM(s) Oral every 6 hours PRN Mild Pain (1 - 3)  sodium biphosphate Rectal Enema 1Enema Rectal daily PRN Constipation  bisacodyl 10milliGRAM(s) Oral daily PRN Constipation  bisacodyl Suppository 10milliGRAM(s) Rectal daily PRN Constipation          ASSESSMENT & PLAN  76M with multiple CVAs now with functional deficits  HTN - Amlodipine, Lasix + KCl  DM2 - Lantus, ISS  CVA - Lipitor, Aspirin  Anemia - Iron  UTI - Vantin + Florastor (1/17-1/23)  Parkinson's Disease - Sinemet  Sleep - Melatonin 9mg (increased 1/18)  Glaucoma - Timolol   GI/Bowel Management - Colace, Senna, Lactulose PRN, Dulcolax PRN, Fleet PRN   Management - Toilet Q2, +Ledesma, Proscar 5mg (1/18), Flomax 0.4mg (1/16)  Skin - Turn Q2, Nystatin to groin, Bacitracin to groin, Lac-Hydrin to LE, ACE Wrap to LE  Pain - Tylenol PRN  DVT PPX - Heparin Q12, TEDs, SCDs  Diet - Cardiac, CC/Thins, Glucerna TID    Continue comprehensive acute rehab program consisting of 3hrs/day of OT/PT/SLP.

## 2017-01-22 NOTE — PROGRESS NOTE ADULT - SUBJECTIVE AND OBJECTIVE BOX
Patient seen and examined , chart reviewed no overnight events     CC: b/l LE pain/itching     HPI:  76Y M with PMH of DM, HTN, admitted for DKA and AMS, admitted to ICU, started on insulin drip, anion gap closed, off insulin drip, on Lantus 15 units. He was also noted to foul smelling discharge from groin, seen bu surgery, looks fungal infection, started on Nystatin powder, improving. MRI brain showed b/l stroke with mild petechial hemorrhage in right occipital lobe, Stroke work up was done. ECHO - EF - 55%, JESUS MANUEL - showed large  and acute DVT in LLE -He was deemed not a good candidate for AC per Neuro and Cardio 2/2 parknison and multiple falls. Hence  - decided to undergo- IVC filter placement on 1/11. He was cleared by neuro and cardio for discharge. HE was discharged   in stable condition to Acute rehab. C/O dysuria , urinary retention , treated  with empiric abx as urine found to be very cloudy and patient asymptomatic , first urine culture - contaminated . Now on Vantin, seen by  and Ledesma placed , started on Flomax/Proscar with plan for TOV in few days . Diabetic education provided .    MEDICATIONS  (STANDING):  aspirin enteric coated 81milliGRAM(s) Oral daily  amLODIPine   Tablet 10milliGRAM(s) Oral daily  carbidopa/levodopa  25/100 1Tablet(s) Oral three times a day  atorvastatin 20milliGRAM(s) Oral at bedtime  timolol 0.5% Solution 1Drop(s) Both EYES daily  heparin  Injectable 5000Unit(s) SubCutaneous every 12 hours  ferrous    sulfate 325milliGRAM(s) Oral daily  ammonium lactate 12% Lotion 1Application(s) Topical two times a day  furosemide    Tablet 40milliGRAM(s) Oral daily  tamsulosin 0.4milliGRAM(s) Oral at bedtime  lidocaine 2% Jelly 5milliLiter(s) IntraUrethral two times a day  insulin glargine Injectable (LANTUS) 22Unit(s) SubCutaneous at bedtime  BACItracin   Ointment 1Application(s) Topical two times a day  potassium chloride    Tablet ER 20milliEquivalent(s) Oral daily  insulin lispro (HumaLOG) corrective regimen sliding scale  SubCutaneous Before meals and at bedtime  nystatin Powder 1Application(s) Topical three times a day  finasteride 5milliGRAM(s) Oral daily  melatonin. 9milliGRAM(s) Oral at bedtime  docusate sodium 100milliGRAM(s) Oral two times a day  senna 2Tablet(s) Oral at bedtime  cefpodoxime 100milliGRAM(s) Oral every 12 hours  saccharomyces boulardii 250milliGRAM(s) Oral two times a day    MEDICATIONS  (PRN):  acetaminophen   Tablet 650milliGRAM(s) Oral every 6 hours PRN For Temp greater than 38 C (100.4 F)  acetaminophen   Tablet. 650milliGRAM(s) Oral every 6 hours PRN Mild Pain (1 - 3)  sodium biphosphate Rectal Enema 1Enema Rectal daily PRN Constipation  bisacodyl 10milliGRAM(s) Oral daily PRN Constipation  bisacodyl Suppository 10milliGRAM(s) Rectal daily PRN Constipation    REVIEW OF SYSTEMS:    as above otherwise negative   Vital Signs Last 24 Hrs  T(C): 36.7, Max: 36.9 (01-21 @ 21:57)  T(F): 98, Max: 98.5 (01-21 @ 21:57)  HR: 74 (74 - 83)  BP: 155/76 (139/66 - 155/76)  BP(mean): --  RR: 16 (16 - 18)  SpO2: 95% (94% - 95%)      GENERAL: NAD, well-groomed, well-developed  HEAD:  Atraumatic, Normocephalic  EYES: EOMI, PERRLA, conjunctiva and sclera clear  NECK: Supple, No JVD,   NERVOUS SYSTEM:  Alert & Oriented X3, no focal deficit  CHEST/LUNG: CTA b/l ,  no  rales, rhonchi, wheezing, or rubs  HEART: Regular rate and rhythm; No murmurs, rubs, or gallops  ABDOMEN: Soft, Nontender, Nondistended; Bowel sounds present  EXTREMITIES:  2+ Peripheral Pulses, No clubbing, cyanosis, b/l LE  edema , wrapped with ACE dressing b/l  SKIN: No rashes or lesions

## 2017-01-23 DIAGNOSIS — F32.9 MAJOR DEPRESSIVE DISORDER, SINGLE EPISODE, UNSPECIFIED: ICD-10-CM

## 2017-01-23 LAB
ANION GAP SERPL CALC-SCNC: 14 MMOL/L — SIGNIFICANT CHANGE UP (ref 5–17)
APPEARANCE UR: SIGNIFICANT CHANGE UP
BILIRUB UR-MCNC: NEGATIVE — SIGNIFICANT CHANGE UP
BUN SERPL-MCNC: 19 MG/DL — SIGNIFICANT CHANGE UP (ref 8–20)
CALCIUM SERPL-MCNC: 8.3 MG/DL — LOW (ref 8.6–10.2)
CHLORIDE SERPL-SCNC: 97 MMOL/L — LOW (ref 98–107)
CO2 SERPL-SCNC: 24 MMOL/L — SIGNIFICANT CHANGE UP (ref 22–29)
COLOR SPEC: YELLOW — SIGNIFICANT CHANGE UP
CREAT SERPL-MCNC: 0.84 MG/DL — SIGNIFICANT CHANGE UP (ref 0.5–1.3)
DIFF PNL FLD: ABNORMAL
GLUCOSE SERPL-MCNC: 172 MG/DL — HIGH (ref 70–115)
GLUCOSE UR QL: 250
HCT VFR BLD CALC: 26 % — LOW (ref 42–52)
HGB BLD-MCNC: 8.7 G/DL — LOW (ref 14–18)
KETONES UR-MCNC: NEGATIVE — SIGNIFICANT CHANGE UP
LEUKOCYTE ESTERASE UR-ACNC: ABNORMAL
MCHC RBC-ENTMCNC: 31.8 PG — HIGH (ref 27–31)
MCHC RBC-ENTMCNC: 33.5 G/DL — SIGNIFICANT CHANGE UP (ref 32–36)
MCV RBC AUTO: 94.9 FL — HIGH (ref 80–94)
NITRITE UR-MCNC: NEGATIVE — SIGNIFICANT CHANGE UP
PH UR: 6 — SIGNIFICANT CHANGE UP (ref 4.8–8)
PLATELET # BLD AUTO: 273 K/UL — SIGNIFICANT CHANGE UP (ref 150–400)
POTASSIUM SERPL-MCNC: 4.4 MMOL/L — SIGNIFICANT CHANGE UP (ref 3.5–5.3)
POTASSIUM SERPL-SCNC: 4.4 MMOL/L — SIGNIFICANT CHANGE UP (ref 3.5–5.3)
PROT UR-MCNC: 500 MG/DL
RBC # BLD: 2.74 M/UL — LOW (ref 4.6–6.2)
RBC # FLD: 13.5 % — SIGNIFICANT CHANGE UP (ref 11–15.6)
SODIUM SERPL-SCNC: 135 MMOL/L — SIGNIFICANT CHANGE UP (ref 135–145)
SP GR SPEC: 1.02 — SIGNIFICANT CHANGE UP (ref 1.01–1.02)
UROBILINOGEN FLD QL: NEGATIVE — SIGNIFICANT CHANGE UP
WBC # BLD: 8.35 K/UL — SIGNIFICANT CHANGE UP (ref 4.8–10.8)
WBC # FLD AUTO: 8.35 K/UL — SIGNIFICANT CHANGE UP (ref 4.8–10.8)

## 2017-01-23 PROCEDURE — 99232 SBSQ HOSP IP/OBS MODERATE 35: CPT

## 2017-01-23 PROCEDURE — 99232 SBSQ HOSP IP/OBS MODERATE 35: CPT | Mod: GC

## 2017-01-23 RX ORDER — POLYETHYLENE GLYCOL 3350 17 G/17G
17 POWDER, FOR SOLUTION ORAL DAILY
Qty: 0 | Refills: 0 | Status: DISCONTINUED | OUTPATIENT
Start: 2017-01-23 | End: 2017-01-31

## 2017-01-23 RX ORDER — METFORMIN HYDROCHLORIDE 850 MG/1
500 TABLET ORAL
Qty: 0 | Refills: 0 | Status: DISCONTINUED | OUTPATIENT
Start: 2017-01-23 | End: 2017-01-26

## 2017-01-23 RX ADMIN — Medication 1 TABLET(S): at 14:44

## 2017-01-23 RX ADMIN — Medication 1 APPLICATION(S): at 05:43

## 2017-01-23 RX ADMIN — NYSTATIN CREAM 1 APPLICATION(S): 100000 CREAM TOPICAL at 05:43

## 2017-01-23 RX ADMIN — ATORVASTATIN CALCIUM 20 MILLIGRAM(S): 80 TABLET, FILM COATED ORAL at 22:43

## 2017-01-23 RX ADMIN — Medication 1 DROP(S): at 11:49

## 2017-01-23 RX ADMIN — POLYETHYLENE GLYCOL 3350 17 GRAM(S): 17 POWDER, FOR SOLUTION ORAL at 14:44

## 2017-01-23 RX ADMIN — HEPARIN SODIUM 5000 UNIT(S): 5000 INJECTION INTRAVENOUS; SUBCUTANEOUS at 17:45

## 2017-01-23 RX ADMIN — INSULIN GLARGINE 25 UNIT(S): 100 INJECTION, SOLUTION SUBCUTANEOUS at 22:55

## 2017-01-23 RX ADMIN — Medication 1 APPLICATION(S): at 17:40

## 2017-01-23 RX ADMIN — Medication 650 MILLIGRAM(S): at 12:50

## 2017-01-23 RX ADMIN — NYSTATIN CREAM 1 APPLICATION(S): 100000 CREAM TOPICAL at 14:43

## 2017-01-23 RX ADMIN — Medication 325 MILLIGRAM(S): at 10:21

## 2017-01-23 RX ADMIN — Medication 650 MILLIGRAM(S): at 11:48

## 2017-01-23 RX ADMIN — Medication 4: at 22:55

## 2017-01-23 RX ADMIN — Medication 325 MILLIGRAM(S): at 17:35

## 2017-01-23 RX ADMIN — Medication 250 MILLIGRAM(S): at 05:44

## 2017-01-23 RX ADMIN — FINASTERIDE 5 MILLIGRAM(S): 5 TABLET, FILM COATED ORAL at 11:48

## 2017-01-23 RX ADMIN — HEPARIN SODIUM 5000 UNIT(S): 5000 INJECTION INTRAVENOUS; SUBCUTANEOUS at 05:45

## 2017-01-23 RX ADMIN — Medication 20 MILLIEQUIVALENT(S): at 11:49

## 2017-01-23 RX ADMIN — CARBIDOPA AND LEVODOPA 1 TABLET(S): 25; 100 TABLET ORAL at 05:44

## 2017-01-23 RX ADMIN — Medication 100 MILLIGRAM(S): at 05:44

## 2017-01-23 RX ADMIN — AMLODIPINE BESYLATE 10 MILLIGRAM(S): 2.5 TABLET ORAL at 05:44

## 2017-01-23 RX ADMIN — TAMSULOSIN HYDROCHLORIDE 0.4 MILLIGRAM(S): 0.4 CAPSULE ORAL at 22:43

## 2017-01-23 RX ADMIN — Medication 81 MILLIGRAM(S): at 11:48

## 2017-01-23 RX ADMIN — CARBIDOPA AND LEVODOPA 1 TABLET(S): 25; 100 TABLET ORAL at 14:54

## 2017-01-23 RX ADMIN — CARBIDOPA AND LEVODOPA 1 TABLET(S): 25; 100 TABLET ORAL at 22:43

## 2017-01-23 RX ADMIN — Medication 100 MILLIGRAM(S): at 05:45

## 2017-01-23 RX ADMIN — Medication 6: at 11:49

## 2017-01-23 RX ADMIN — NYSTATIN CREAM 1 APPLICATION(S): 100000 CREAM TOPICAL at 22:00

## 2017-01-23 RX ADMIN — Medication 40 MILLIGRAM(S): at 05:44

## 2017-01-23 RX ADMIN — Medication 6: at 17:39

## 2017-01-23 RX ADMIN — Medication 9 MILLIGRAM(S): at 22:43

## 2017-01-23 RX ADMIN — METFORMIN HYDROCHLORIDE 500 MILLIGRAM(S): 850 TABLET ORAL at 17:36

## 2017-01-23 NOTE — PROGRESS NOTE ADULT - SUBJECTIVE AND OBJECTIVE BOX
HISTORY OF PRESENT ILLNESS  76M was admitted to [facility] on 16 after not feeling well for a few days. Workup showed his FS was >1100 and in DKA. Course was complicated by Tinea in the groin, lethargy on  where a brain MRI showed acute bilateral supratentorial and infratentorial infarcts with mild petechial hemorrhage in right occipital lobe. Source was found to be from PFO on JESUS MANUEL done . Course was further complicated by an acute Left LE DVT s/p IVCF on . Admitted to acute rehab on .    PMHx - HLD, DM 2, HTN, Parkinson's Disease      TODAY'S SUBJECTIVE & REVIEW OF SYMPTOMS  [ ] Constitutional WNL          [X] Cardio WNL              [X] Resp WNL           [X] GI WNL                          [ ]  WNL                   [X] Heme WNL              [X] Endo WNL                     [X] Skin WNL                 [X] MSK WNL            [X] Neuro WNL                    [ ] Cognitive WNL           [X] Psych WNL    No acute events overnight.  This am, reports that he doesn't feel well overall, mainly due to itchiness of his legs.  Start metformin for elevated FS.  Will also ask podiatry to evaluate feet/nails.  Urology following regarding healy removal.    VITAL SIGNS:  T(C): 37.2  T(F): 98.9, Max: 98.9 (-23 @ 11:39)  HR: 81 (76 - 81)  BP: 115/67 (115/67 - 128/68)  RR:  (18 - 20)  SpO2:  (98% - 99%)  Wt(kg): --    PHYSICAL EXAM  Constitutional - NAD, Comfortable  HEENT - NCAT, EOMI  Neck - Supple, No limited ROM  Chest - CTA bilaterally, No wheeze, No rhonchi, No crackles  Cardiovascular - RRR, S1S2, No murmurs  Abdomen - BS+, Soft, NTND  Extremities - BLE 1+ edema and tenderness to light touch  Neurologic Exam -                    Cognitive - Awake, Alert, AAO to self + place     Communication - Fluent, No dysarthria     Cranial Nerves - CN 2-12 grossly intact     Motor -     LEFT    UE - ShAB 4/5, EF 4/5, EE 4/5, WE 4/5,  5/5                    RIGHT UE - ShAB 5/5, EF 5/5, EE 5/5, WE 5/5,  5/5                    LEFT    LE - HF 2/5, KE 3/5, DF 5/5, PF 5/5                    RIGHT LE - HF 2/5, KE 3/5, DF 5/5, PF 5/5         Sensory - Decreased to LT in BLE  Psychiatric - Mood stable, Affect Flat  Skin - Bilateral LE venous stasis changes, left medial great toe abrasion, bilateral groin/medial thigh rashes  Wounds - None    FUNCTIONAL PROGRESS  Gait - 30' + 40' RW min A  ADLs - UE Dress S, grooming min A  Transfers - sit-to-stand mod A  Functional transfer - mat SPT & RW mod A    RECENT LABS                        8.7    8.35  )-----------( 273      ( 2017 07:19 )             26.0     2017 07:19    135    |  97     |  19.0   ----------------------------<  172    4.4     |  24.0   |  0.84     Ca    8.3        2017 07:19    Urinalysis Basic - ( 2017 01:20 )    Color: Yellow / Appearance: Cloudy / S.020 / pH: x  Gluc: x / Ketone: Negative  / Bili: Negative / Urobili: Negative   Blood: x / Protein: 500 mg/dL / Nitrite: Negative   Leuk Esterase: Moderate / RBC: 3-5 /HPF / WBC 26-50   Sq Epi: x / Non Sq Epi: Occasional / Bacteria: Occasional    Finger Sticks (Last 24 Hours):  262, 259, 125, 272    RADIOLOGY & OTHER RESULTS  ---    CURRENT MEDICATIONS  MEDICATIONS  (STANDING):  aspirin enteric coated 81milliGRAM(s) Oral daily  amLODIPine   Tablet 10milliGRAM(s) Oral daily  carbidopa/levodopa  25/100 1Tablet(s) Oral three times a day  atorvastatin 20milliGRAM(s) Oral at bedtime  timolol 0.5% Solution 1Drop(s) Both EYES daily  heparin  Injectable 5000Unit(s) SubCutaneous every 12 hours  ammonium lactate 12% Lotion 1Application(s) Topical two times a day  furosemide    Tablet 40milliGRAM(s) Oral daily  tamsulosin 0.4milliGRAM(s) Oral at bedtime  lidocaine 2% Jelly 5milliLiter(s) IntraUrethral two times a day  potassium chloride    Tablet ER 20milliEquivalent(s) Oral daily  insulin lispro (HumaLOG) corrective regimen sliding scale  SubCutaneous Before meals and at bedtime  nystatin Powder 1Application(s) Topical three times a day  finasteride 5milliGRAM(s) Oral daily  melatonin. 9milliGRAM(s) Oral at bedtime  docusate sodium 100milliGRAM(s) Oral two times a day  senna 2Tablet(s) Oral at bedtime  ferrous    sulfate 325milliGRAM(s) Oral two times a day with meals  Nephro-nate 1Tablet(s) Oral daily  insulin glargine Injectable (LANTUS) 25Unit(s) SubCutaneous at bedtime  polyethylene glycol 3350 17Gram(s) Oral daily  metFORMIN 500milliGRAM(s) Oral two times a day with meals    MEDICATIONS  (PRN):  acetaminophen   Tablet 650milliGRAM(s) Oral every 6 hours PRN For Temp greater than 38 C (100.4 F)  acetaminophen   Tablet. 650milliGRAM(s) Oral every 6 hours PRN Mild Pain (1 - 3)  sodium biphosphate Rectal Enema 1Enema Rectal daily PRN Constipation  bisacodyl 10milliGRAM(s) Oral daily PRN Constipation  bisacodyl Suppository 10milliGRAM(s) Rectal daily PRN Constipation      ASSESSMENT & PLAN  76M with multiple CVAs now with functional deficits    HTN - Amlodipine, Lasix + KCl  DM2 - Lantus 25 U (increased from 22 U ), ISS, Metformin ()  CVA - Lipitor, Aspirin  Anemia - Iron 325mg BID (increased from 325mg ), Epogen x1 ()  UTI - Vantin + Florastor (-)  Parkinson's Disease - Sinemet  Sleep - Melatonin 9mg (increased )  Glaucoma - Timolol   GI/Bowel Management - Colace, Senna, Miralax, Lactulose PRN, Dulcolax PRN, Fleet PRN   Management - Toilet Q2, +Healy, Proscar 5mg (), Flomax 0.4mg ()  Skin - Turn Q2, Nystatin to groin, Bacitracin to groin, Lac-Hydrin to LE, ACE Wrap to LE  Pain - Tylenol PRN  DVT PPX - Heparin Q12, TEDs, SCDs  Diet - Cardiac, CC/Thins, Glucerna TID, Nephrovite    Continue comprehensive acute rehab program consisting of 3hrs/day of OT/PT/SLP. HISTORY OF PRESENT ILLNESS  76M was admitted to [facility] on 16 after not feeling well for a few days. Workup showed his FS was >1100 and in DKA. Course was complicated by Tinea in the groin, lethargy on  where a brain MRI showed acute bilateral supratentorial and infratentorial infarcts with mild petechial hemorrhage in right occipital lobe. Source was found to be from PFO on JESUS MANUEL done . Course was further complicated by an acute Left LE DVT s/p IVCF on . Admitted to acute rehab on .    PMHx - HLD, DM 2, HTN, Parkinson's Disease      TODAY'S SUBJECTIVE & REVIEW OF SYMPTOMS  [ ] Constitutional WNL          [X] Cardio WNL              [X] Resp WNL           [X] GI WNL                          [ ]  WNL                   [X] Heme WNL              [X] Endo WNL                     [X] Skin WNL                 [X] MSK WNL            [X] Neuro WNL                    [ ] Cognitive WNL           [X] Psych WNL    No acute events overnight.  This am, reports that he doesn't feel well overall, mainly due to itchiness of his legs.  Start metformin for elevated FS.  Will also ask podiatry to evaluate feet/nails.  Urology following regarding healy removal.      VITAL SIGNS:  T(C): 37.2  T(F): 98.9, Max: 98.9 (-23 @ 11:39)  HR: 81 (76 - 81)  BP: 115/67 (115/67 - 128/68)  RR:  (18 - 20)  SpO2:  (98% - 99%)  Wt(kg): --      PHYSICAL EXAM  Constitutional - NAD, Comfortable  HEENT - NCAT, EOMI  Neck - Supple, No limited ROM  Chest - CTA bilaterally, No wheeze, No rhonchi, No crackles  Cardiovascular - RRR, S1S2, No murmurs  Abdomen - BS+, Soft, NTND  Extremities - BLE 1+ edema and tenderness to light touch  Neurologic Exam -                    Cognitive - Awake, Alert, AAO to self + place     Communication - Fluent, No dysarthria     Cranial Nerves - CN 2-12 grossly intact     Motor -     LEFT    UE - ShAB 4/5, EF 4/5, EE 4/5, WE 4/5,  5/5                    RIGHT UE - ShAB 5/5, EF 5/5, EE 5/5, WE 5/5,  5/5                    LEFT    LE - HF 2/5, KE 3/5, DF 5/5, PF 5/5                    RIGHT LE - HF 2/5, KE 3/5, DF 5/5, PF 5/5         Sensory - Decreased to LT in BLE  Psychiatric - Mood stable, Affect Flat  Skin - Bilateral LE venous stasis changes, left medial great toe abrasion, bilateral groin/medial thigh rashes  Wounds - None      FUNCTIONAL PROGRESS  Gait - 30' + 40' RW min A  ADLs - UE Dress S, grooming min A  Transfers - sit-to-stand mod A  Functional transfer - mat SPT & RW mod A      RECENT LABS                        8.7    8.35  )-----------( 273      ( 2017 07:19 )             26.0     2017 07:19    135    |  97     |  19.0   ----------------------------<  172    4.4     |  24.0   |  0.84     Ca    8.3        2017 07:19    Urinalysis Basic - ( 2017 01:20 )    Color: Yellow / Appearance: Cloudy / S.020 / pH: x  Gluc: x / Ketone: Negative  / Bili: Negative / Urobili: Negative   Blood: x / Protein: 500 mg/dL / Nitrite: Negative   Leuk Esterase: Moderate / RBC: 3-5 /HPF / WBC 26-50   Sq Epi: x / Non Sq Epi: Occasional / Bacteria: Occasional    FS Last 24HRS  262, 259, 125, 272    RADIOLOGY & OTHER RESULTS  ---    CURRENT MEDICATIONS  MEDICATIONS  (STANDING):  aspirin enteric coated 81milliGRAM(s) Oral daily  amLODIPine   Tablet 10milliGRAM(s) Oral daily  carbidopa/levodopa  25/100 1Tablet(s) Oral three times a day  atorvastatin 20milliGRAM(s) Oral at bedtime  timolol 0.5% Solution 1Drop(s) Both EYES daily  heparin  Injectable 5000Unit(s) SubCutaneous every 12 hours  ammonium lactate 12% Lotion 1Application(s) Topical two times a day  furosemide    Tablet 40milliGRAM(s) Oral daily  tamsulosin 0.4milliGRAM(s) Oral at bedtime  lidocaine 2% Jelly 5milliLiter(s) IntraUrethral two times a day  potassium chloride    Tablet ER 20milliEquivalent(s) Oral daily  insulin lispro (HumaLOG) corrective regimen sliding scale  SubCutaneous Before meals and at bedtime  nystatin Powder 1Application(s) Topical three times a day  finasteride 5milliGRAM(s) Oral daily  melatonin. 9milliGRAM(s) Oral at bedtime  docusate sodium 100milliGRAM(s) Oral two times a day  senna 2Tablet(s) Oral at bedtime  ferrous    sulfate 325milliGRAM(s) Oral two times a day with meals  Nephro-nate 1Tablet(s) Oral daily  insulin glargine Injectable (LANTUS) 25Unit(s) SubCutaneous at bedtime  polyethylene glycol 3350 17Gram(s) Oral daily  metFORMIN 500milliGRAM(s) Oral two times a day with meals    MEDICATIONS  (PRN):  acetaminophen   Tablet 650milliGRAM(s) Oral every 6 hours PRN For Temp greater than 38 C (100.4 F)  acetaminophen   Tablet. 650milliGRAM(s) Oral every 6 hours PRN Mild Pain (1 - 3)  sodium biphosphate Rectal Enema 1Enema Rectal daily PRN Constipation  bisacodyl 10milliGRAM(s) Oral daily PRN Constipation  bisacodyl Suppository 10milliGRAM(s) Rectal daily PRN Constipation      ASSESSMENT & PLAN  76M with multiple CVAs now with functional deficits  HTN - Amlodipine, Lasix + KCl  DM2 - Metformin (), Lantus, ISS  CVA - Lipitor, Aspirin  Anemia - Iron, Epogen x1 ()  UTI - Vantin + Florastor (-)  Parkinson's Disease - Sinemet  Sleep - Melatonin 9mg (increased )  Glaucoma - Timolol   GI/Bowel Management - Miralax, Dulcolax PRN, Fleet PRN   Management - Toilet Q2, Healy, Proscar 5mg (), Flomax 0.4mg ()  Skin - Turn Q2, Nystatin to groin, Lac-Hydrin to LE, ACE Wrap to LE  Pain - Tylenol PRN  DVT PPX - Heparin Q12, TEDs, SCDs  Diet - Cardiac, CC/Thins, Glucerna TID, Nephrovite    Continue comprehensive acute rehab program consisting of 3hrs/day of OT/PT/SLP.

## 2017-01-23 NOTE — PROGRESS NOTE ADULT - SUBJECTIVE AND OBJECTIVE BOX
Patient seen and examined , chart reviewed no overnight events     CC: b/l LE pain/itching , feels down sad from being in the hospital     HPI:  76Y M with PMH of DM, HTN, admitted for DKA and AMS, admitted to ICU, started on insulin drip, anion gap closed, off insulin drip, on Lantus 15 units. He was also noted to foul smelling discharge from groin, seen bu surgery, looks fungal infection, started on Nystatin powder, improving. MRI brain showed b/l stroke with mild petechial hemorrhage in right occipital lobe, Stroke work up was done. ECHO - EF - 55%, JESUS MANUEL - showed large  and acute DVT in LLE -He was deemed not a good candidate for AC per Neuro and Cardio 2/2 parknison and multiple falls. Hence  - decided to undergo- IVC filter placement on 1/11. He was cleared by neuro and cardio for discharge. HE was discharged   in stable condition to Acute rehab. C/O dysuria , urinary retention , treated  with empiric abx as urine found to be very cloudy and patient asymptomatic , first urine culture - contaminated . Now on Vantin, seen by  and Healy placed , started on Flomax/Proscar with plan for TOV in few days . Diabetic education provided .    MEDICATIONS  (STANDING):  aspirin enteric coated 81milliGRAM(s) Oral daily  amLODIPine   Tablet 10milliGRAM(s) Oral daily  carbidopa/levodopa  25/100 1Tablet(s) Oral three times a day  atorvastatin 20milliGRAM(s) Oral at bedtime  timolol 0.5% Solution 1Drop(s) Both EYES daily  heparin  Injectable 5000Unit(s) SubCutaneous every 12 hours  ferrous    sulfate 325milliGRAM(s) Oral daily  ammonium lactate 12% Lotion 1Application(s) Topical two times a day  furosemide    Tablet 40milliGRAM(s) Oral daily  tamsulosin 0.4milliGRAM(s) Oral at bedtime  lidocaine 2% Jelly 5milliLiter(s) IntraUrethral two times a day  insulin glargine Injectable (LANTUS) 22Unit(s) SubCutaneous at bedtime  BACItracin   Ointment 1Application(s) Topical two times a day  potassium chloride    Tablet ER 20milliEquivalent(s) Oral daily  insulin lispro (HumaLOG) corrective regimen sliding scale  SubCutaneous Before meals and at bedtime  nystatin Powder 1Application(s) Topical three times a day  finasteride 5milliGRAM(s) Oral daily  melatonin. 9milliGRAM(s) Oral at bedtime  docusate sodium 100milliGRAM(s) Oral two times a day  senna 2Tablet(s) Oral at bedtime  cefpodoxime 100milliGRAM(s) Oral every 12 hours  saccharomyces boulardii 250milliGRAM(s) Oral two times a day    MEDICATIONS  (PRN):  acetaminophen   Tablet 650milliGRAM(s) Oral every 6 hours PRN For Temp greater than 38 C (100.4 F)  acetaminophen   Tablet. 650milliGRAM(s) Oral every 6 hours PRN Mild Pain (1 - 3)  sodium biphosphate Rectal Enema 1Enema Rectal daily PRN Constipation  bisacodyl 10milliGRAM(s) Oral daily PRN Constipation  bisacodyl Suppository 10milliGRAM(s) Rectal daily PRN Constipation    REVIEW OF SYSTEMS:    as above otherwise negative   Vital Signs Last 24 Hrs  T(C): 36.7, Max: 36.7 (01-22 @ 10:31)  T(F): 98.1, Max: 98.1 (01-22 @ 10:31)  HR: 76 (75 - 77)  BP: 126/70 (126/70 - 131/67)  BP(mean): --  RR: 18 (18 - 20)  SpO2: 99% (98% - 99%)    GENERAL: NAD, well-groomed, well-developed  HEAD:  Atraumatic, Normocephalic  EYES: EOMI, PERRLA, conjunctiva and sclera clear  NECK: Supple, No JVD,   NERVOUS SYSTEM:  Alert & Oriented X3, no focal deficit  CHEST/LUNG: CTA b/l ,  no  rales, rhonchi, wheezing, or rubs  HEART: Regular rate and rhythm; No murmurs, rubs, or gallops  ABDOMEN: Soft, Nontender, Nondistended; Bowel sounds present  EXTREMITIES:  2+ Peripheral Pulses, No clubbing, cyanosis, b/l LE  edema  and venous stasis changes   SKIN: No rashes or lesions  genital : healy cathater in place

## 2017-01-23 NOTE — PROGRESS NOTE ADULT - ASSESSMENT
This is 76Y M with PMH of DM, HTN, admitted for DKA and AMS, admitted to ICU, started on insulin drip, anion gap closed, off insulin drip, on Lantus 22 units. He was also noted to foul smelling discharge from groin, seen bu surgery, looks fungal infection, started on Nystatin powder, improving. MRI brain showed b/l stroke with mild petechial hemorrhage in right occipital lobe. JESUS MANUEL - Large PFO. acute DVT in LLE - not a good candidate for AC per Neuro and Cardio - s/p - IVC filter placement. S/P Ledesma cath placement , feeling better , c/o b/l LE itching /pain  had constipation that resolved

## 2017-01-24 ENCOUNTER — LABORATORY RESULT (OUTPATIENT)
Age: 77
End: 2017-01-24

## 2017-01-24 DIAGNOSIS — E55.9 VITAMIN D DEFICIENCY, UNSPECIFIED: ICD-10-CM

## 2017-01-24 LAB — 24R-OH-CALCIDIOL SERPL-MCNC: 13 NG/ML — LOW (ref 30–100)

## 2017-01-24 PROCEDURE — 99232 SBSQ HOSP IP/OBS MODERATE 35: CPT | Mod: GC

## 2017-01-24 PROCEDURE — 99232 SBSQ HOSP IP/OBS MODERATE 35: CPT

## 2017-01-24 RX ORDER — ERGOCALCIFEROL 1.25 MG/1
50000 CAPSULE ORAL
Qty: 0 | Refills: 0 | Status: DISCONTINUED | OUTPATIENT
Start: 2017-01-24 | End: 2017-02-02

## 2017-01-24 RX ADMIN — Medication 8: at 22:01

## 2017-01-24 RX ADMIN — HEPARIN SODIUM 5000 UNIT(S): 5000 INJECTION INTRAVENOUS; SUBCUTANEOUS at 05:49

## 2017-01-24 RX ADMIN — Medication 10 MILLIGRAM(S): at 05:49

## 2017-01-24 RX ADMIN — Medication 9 MILLIGRAM(S): at 22:00

## 2017-01-24 RX ADMIN — Medication 10: at 17:15

## 2017-01-24 RX ADMIN — Medication 1 DROP(S): at 12:14

## 2017-01-24 RX ADMIN — ERGOCALCIFEROL 50000 UNIT(S): 1.25 CAPSULE ORAL at 14:16

## 2017-01-24 RX ADMIN — TAMSULOSIN HYDROCHLORIDE 0.4 MILLIGRAM(S): 0.4 CAPSULE ORAL at 22:00

## 2017-01-24 RX ADMIN — FINASTERIDE 5 MILLIGRAM(S): 5 TABLET, FILM COATED ORAL at 12:13

## 2017-01-24 RX ADMIN — NYSTATIN CREAM 1 APPLICATION(S): 100000 CREAM TOPICAL at 05:48

## 2017-01-24 RX ADMIN — NYSTATIN CREAM 1 APPLICATION(S): 100000 CREAM TOPICAL at 22:00

## 2017-01-24 RX ADMIN — CARBIDOPA AND LEVODOPA 1 TABLET(S): 25; 100 TABLET ORAL at 22:00

## 2017-01-24 RX ADMIN — Medication 81 MILLIGRAM(S): at 12:13

## 2017-01-24 RX ADMIN — ATORVASTATIN CALCIUM 20 MILLIGRAM(S): 80 TABLET, FILM COATED ORAL at 22:00

## 2017-01-24 RX ADMIN — Medication 40 MILLIGRAM(S): at 05:50

## 2017-01-24 RX ADMIN — Medication 1 APPLICATION(S): at 18:14

## 2017-01-24 RX ADMIN — METFORMIN HYDROCHLORIDE 500 MILLIGRAM(S): 850 TABLET ORAL at 18:17

## 2017-01-24 RX ADMIN — CARBIDOPA AND LEVODOPA 1 TABLET(S): 25; 100 TABLET ORAL at 05:49

## 2017-01-24 RX ADMIN — AMLODIPINE BESYLATE 10 MILLIGRAM(S): 2.5 TABLET ORAL at 05:50

## 2017-01-24 RX ADMIN — Medication 325 MILLIGRAM(S): at 08:26

## 2017-01-24 RX ADMIN — POLYETHYLENE GLYCOL 3350 17 GRAM(S): 17 POWDER, FOR SOLUTION ORAL at 12:13

## 2017-01-24 RX ADMIN — Medication 20 MILLIEQUIVALENT(S): at 12:13

## 2017-01-24 RX ADMIN — Medication 1 TABLET(S): at 12:13

## 2017-01-24 RX ADMIN — Medication 4: at 12:12

## 2017-01-24 RX ADMIN — CARBIDOPA AND LEVODOPA 1 TABLET(S): 25; 100 TABLET ORAL at 14:17

## 2017-01-24 RX ADMIN — INSULIN GLARGINE 25 UNIT(S): 100 INJECTION, SOLUTION SUBCUTANEOUS at 22:01

## 2017-01-24 RX ADMIN — HEPARIN SODIUM 5000 UNIT(S): 5000 INJECTION INTRAVENOUS; SUBCUTANEOUS at 18:16

## 2017-01-24 RX ADMIN — Medication 5 MILLILITER(S): at 05:50

## 2017-01-24 RX ADMIN — NYSTATIN CREAM 1 APPLICATION(S): 100000 CREAM TOPICAL at 14:17

## 2017-01-24 RX ADMIN — Medication 1 APPLICATION(S): at 05:49

## 2017-01-24 RX ADMIN — Medication 325 MILLIGRAM(S): at 18:17

## 2017-01-24 RX ADMIN — METFORMIN HYDROCHLORIDE 500 MILLIGRAM(S): 850 TABLET ORAL at 08:26

## 2017-01-24 NOTE — PROGRESS NOTE ADULT - SUBJECTIVE AND OBJECTIVE BOX
Patient seen and examined , chart reviewed no overnight events    CC: does not feel well , mood is down     HPI:  76Y M with PMH of DM, HTN, admitted for DKA and AMS, admitted to ICU, started on insulin drip, anion gap closed, off insulin drip, on Lantus 15 units. He was also noted to foul smelling discharge from groin, seen bu surgery, looks fungal infection, started on Nystatin powder, improving. MRI brain showed b/l stroke with mild petechial hemorrhage in right occipital lobe, Stroke work up was done. ECHO - EF - 55%, JESUS MANUEL - showed large  and acute DVT in LLE -He was deemed not a good candidate for AC per Neuro and Cardio 2/2 parknison and multiple falls. Hence  - decided to undergo- IVC filter placement on 1/11. He was cleared by neuro and cardio for discharge. HE was discharged   in stable condition to Acute rehab. C/O dysuria , urinary retention , treated  with empiric abx as urine found to be very cloudy and patient asymptomatic , first urine culture - contaminated . Now on Vantin, seen by  and Healy placed , started on Flomax/Proscar with plan for TOV in few days . Diabetic education provided .    MEDICATIONS  (STANDING):  aspirin enteric coated 81milliGRAM(s) Oral daily  amLODIPine   Tablet 10milliGRAM(s) Oral daily  carbidopa/levodopa  25/100 1Tablet(s) Oral three times a day  atorvastatin 20milliGRAM(s) Oral at bedtime  timolol 0.5% Solution 1Drop(s) Both EYES daily  heparin  Injectable 5000Unit(s) SubCutaneous every 12 hours  ferrous    sulfate 325milliGRAM(s) Oral daily  ammonium lactate 12% Lotion 1Application(s) Topical two times a day  furosemide    Tablet 40milliGRAM(s) Oral daily  tamsulosin 0.4milliGRAM(s) Oral at bedtime  lidocaine 2% Jelly 5milliLiter(s) IntraUrethral two times a day  insulin glargine Injectable (LANTUS) 22Unit(s) SubCutaneous at bedtime  BACItracin   Ointment 1Application(s) Topical two times a day  potassium chloride    Tablet ER 20milliEquivalent(s) Oral daily  insulin lispro (HumaLOG) corrective regimen sliding scale  SubCutaneous Before meals and at bedtime  nystatin Powder 1Application(s) Topical three times a day  finasteride 5milliGRAM(s) Oral daily  melatonin. 9milliGRAM(s) Oral at bedtime                        8.7    8.35  )-----------( 273      ( 23 Jan 2017 07:19 )             26.0   docusate sodium 100milliGRAM(s) Oral two times a day  senna 2Tablet(s) Oral at bedtime  cefpodoxime 100milliGRAM(s) Oral every 12 hours  saccharomyces boulardii 250milliGRAM(s) Oral two times a day    MEDICATIONS  (PRN):  acetaminophen   Tablet 650milliGRAM(s) Oral every 6 hours PRN For Temp greater than 38 C (100.4 F)  acetaminophen   Tablet. 650milliGRAM(s) Oral every 6 hours PRN Mild Pain (1 - 3)  sodium biphosphate Rectal Enema 1Enema Rectal daily PRN Constipation  bisacodyl 10milliGRAM(s) Oral daily PRN Constipation  bisacodyl Suppository 10milliGRAM(s) Rectal daily PRN Constipation    REVIEW OF SYSTEMS:    as above otherwise negative   Vital Signs Last 24 Hrs  T(C): 36.1, Max: 37.2 (01-23 @ 11:39)  T(F): 97, Max: 98.9 (01-23 @ 11:39)  HR: 72 (72 - 81)  BP: 136/68 (115/67 - 139/68)  BP(mean): --  RR: 18 (18 - 18)  SpO2: 94% (94% - 98%)  GENERAL: NAD, well-groomed, well-developed  HEAD:  Atraumatic, Normocephalic  EYES: EOMI, PERRLA, conjunctiva and sclera clear  NECK: Supple, No JVD,   NERVOUS SYSTEM:  Alert & Oriented X3, no focal deficit  CHEST/LUNG: CTA b/l ,  no  rales, rhonchi, wheezing, or rubs  HEART: Regular rate and rhythm; No murmurs, rubs, or gallops  ABDOMEN: Soft, Nontender, Nondistended; Bowel sounds present  EXTREMITIES:  2+ Peripheral Pulses, No clubbing, cyanosis, b/l LE  edema  and venous stasis changes   SKIN: No rashes or lesions  genital : healy cathater in place   CAPILLARY BLOOD GLUCOSE  109 (24 Jan 2017 08:15)  224 (23 Jan 2017 21:10)  263 (23 Jan 2017 17:46)  272 (23 Jan 2017 11:39)  Vitamin D, 25-Hydroxy (01.23.17 @ 18:41)    Vitamin D, 25-Hydroxy: 13.0: Classification of 25 [place] Vitamin D Status:

## 2017-01-24 NOTE — PROGRESS NOTE ADULT - SUBJECTIVE AND OBJECTIVE BOX
HISTORY OF PRESENT ILLNESS  76M was admitted to [facility] on 16 after not feeling well for a few days. Workup showed his FS was >1100 and in DKA. Course was complicated by Tinea in the groin, lethargy on  where a brain MRI showed acute bilateral supratentorial and infratentorial infarcts with mild petechial hemorrhage in right occipital lobe. Source was found to be from PFO on JESUS MANUEL done . Course was further complicated by an acute Left LE DVT s/p IVCF on . Admitted to acute rehab on .    PMHx - HLD, DM 2, HTN, Parkinson's Disease      TODAY'S SUBJECTIVE & REVIEW OF SYMPTOMS  [ ] Constitutional WNL          [X] Cardio WNL              [X] Resp WNL           [X] GI WNL                          [ ]  WNL                   [X] Heme WNL              [X] Endo WNL                     [X] Skin WNL                 [X] MSK WNL            [X] Neuro WNL                    [ ] Cognitive WNL           [X] Psych WNL    No acute events overnight. Slept okay.  Remove healy today, urine culture/gram stain pending.  Patient    No acute events overnight.  This am, reports that he doesn't feel well overall, mainly due to itchiness of his legs.  Start metformin for elevated FS.  Will also ask podiatry to evaluate feet/nails.  Urology following regarding healy removal.      VITAL SIGNS:  T(C): 36.1  T(F): 97, Max: 98.9 (01-23 @ 11:39)  HR: 72 (72 - 81)  BP: 136/68 (115/67 - 139/68)  RR:  (18 - 18)  SpO2:  (94% - 98%)  Wt(kg): --    PHYSICAL EXAM  Constitutional - NAD, Comfortable  HEENT - NCAT, EOMI  Neck - Supple, No limited ROM  Chest - CTA bilaterally, No wheeze, No rhonchi, No crackles  Cardiovascular - RRR, S1S2, No murmurs  Abdomen - BS+, Soft, NTND  Extremities - BLE 1+ edema and tenderness to light touch  Neurologic Exam -                    Cognitive - Awake, Alert, AAO to self + place     Communication - Fluent, No dysarthria     Cranial Nerves - CN 2-12 grossly intact     Motor -     LEFT    UE - ShAB 4/5, EF 4/5, EE 4/5, WE 4/5,  5/5                    RIGHT UE - ShAB 5/5, EF 5/5, EE 5/5, WE 5/5,  5/5                    LEFT    LE - HF 2/5, KE 3/5, DF 5/5, PF 5/5                    RIGHT LE - HF 2/5, KE 3/5, DF 5/5, PF 5/5         Sensory - Decreased to LT in BLE  Psychiatric - Mood stable, Affect Flat  Skin - Bilateral LE venous stasis changes, left medial great toe abrasion, bilateral groin/medial thigh rashes  Wounds - None      FUNCTIONAL PROGRESS  Gait - 30' + 40' RW min A  ADLs - UE Dress S, grooming min A  Transfers - sit-to-stand mod A  Functional transfer - mat SPT & RW mod A      RECENT LABS                        8.7    8.35  )-----------( 273      ( 2017 07:19 )             26.0     2017 07:19    135    |  97     |  19.0   ----------------------------<  172    4.4     |  24.0   |  0.84     Ca    8.3        2017 07:19    Urinalysis Basic - ( 2017 01:20 )    Color: Yellow / Appearance: Cloudy / S.020 / pH: x  Gluc: x / Ketone: Negative  / Bili: Negative / Urobili: Negative   Blood: x / Protein: 500 mg/dL / Nitrite: Negative   Leuk Esterase: Moderate / RBC: 3-5 /HPF / WBC 26-50   Sq Epi: x / Non Sq Epi: Occasional / Bacteria: Occasional    FS Last 24HRS  262, 259, 125, 272    RADIOLOGY & OTHER RESULTS  ---    CURRENT MEDICATIONS  MEDICATIONS  (STANDING):  aspirin enteric coated 81milliGRAM(s) Oral daily  amLODIPine   Tablet 10milliGRAM(s) Oral daily  carbidopa/levodopa  25/100 1Tablet(s) Oral three times a day  atorvastatin 20milliGRAM(s) Oral at bedtime  timolol 0.5% Solution 1Drop(s) Both EYES daily  heparin  Injectable 5000Unit(s) SubCutaneous every 12 hours  ammonium lactate 12% Lotion 1Application(s) Topical two times a day  furosemide    Tablet 40milliGRAM(s) Oral daily  tamsulosin 0.4milliGRAM(s) Oral at bedtime  lidocaine 2% Jelly 5milliLiter(s) IntraUrethral two times a day  potassium chloride    Tablet ER 20milliEquivalent(s) Oral daily  insulin lispro (HumaLOG) corrective regimen sliding scale  SubCutaneous Before meals and at bedtime  nystatin Powder 1Application(s) Topical three times a day  finasteride 5milliGRAM(s) Oral daily  melatonin. 9milliGRAM(s) Oral at bedtime  docusate sodium 100milliGRAM(s) Oral two times a day  senna 2Tablet(s) Oral at bedtime  ferrous    sulfate 325milliGRAM(s) Oral two times a day with meals  Nephro-nate 1Tablet(s) Oral daily  insulin glargine Injectable (LANTUS) 25Unit(s) SubCutaneous at bedtime  polyethylene glycol 3350 17Gram(s) Oral daily  metFORMIN 500milliGRAM(s) Oral two times a day with meals    MEDICATIONS  (PRN):  acetaminophen   Tablet 650milliGRAM(s) Oral every 6 hours PRN For Temp greater than 38 C (100.4 F)  acetaminophen   Tablet. 650milliGRAM(s) Oral every 6 hours PRN Mild Pain (1 - 3)  sodium biphosphate Rectal Enema 1Enema Rectal daily PRN Constipation  bisacodyl 10milliGRAM(s) Oral daily PRN Constipation  bisacodyl Suppository 10milliGRAM(s) Rectal daily PRN Constipation      ASSESSMENT & PLAN  76M with multiple CVAs now with functional deficits  HTN - Amlodipine, Lasix + KCl  DM2 - Metformin (), Lantus, ISS  CVA - Lipitor, Aspirin  Anemia - Iron, Epogen x1 ()  UTI - Vantin + Florastor (-)  Parkinson's Disease - Sinemet  Sleep - Melatonin 9mg (increased )  Glaucoma - Timolol   GI/Bowel Management - Miralax, Dulcolax PRN, Fleet PRN   Management - Toilet Q2, Healy, Proscar 5mg (), Flomax 0.4mg ()  Skin - Turn Q2, Nystatin to groin, Lac-Hydrin to LE, ACE Wrap to LE  Pain - Tylenol PRN  DVT PPX - Heparin Q12, TEDs, SCDs  Diet - Cardiac, CC/Thins, Glucerna TID, Nephrovite    Continue comprehensive acute rehab program consisting of 3hrs/day of OT/PT/SLP. HISTORY OF PRESENT ILLNESS  76M was admitted to [facility] on 16 after not feeling well for a few days. Workup showed his FS was >1100 and in DKA. Course was complicated by Tinea in the groin, lethargy on  where a brain MRI showed acute bilateral supratentorial and infratentorial infarcts with mild petechial hemorrhage in right occipital lobe. Source was found to be from PFO on JESUS MANUEL done . Course was further complicated by an acute Left LE DVT s/p IVCF on . Admitted to acute rehab on .    PMHx - HLD, DM 2, HTN, Parkinson's Disease      TODAY'S SUBJECTIVE & REVIEW OF SYMPTOMS  [ ] Constitutional WNL          [X] Cardio WNL              [X] Resp WNL           [X] GI WNL                          [ ]  WNL                   [X] Heme WNL              [X] Endo WNL                     [X] Skin WNL                 [X] MSK WNL            [X] Neuro WNL                    [ ] Cognitive WNL           [X] Psych WNL    No acute events overnight. Slept okay.  Remove healy today, urine culture/gram stain pending.  Podiatry consulted for routine foot/nail care.  Edema of legs improving.  Patient reports that he does feel like the itchiness/pain of his legs are improved.  Metformin started yesterday, FS better this am.    VITAL SIGNS:  T(C): 36.1  T(F): 97, Max: 98.9 (-23 @ 11:39)  HR: 72 (72 - 81)  BP: 136/68 (115/67 - 139/68)  RR:  (18 - 18)  SpO2:  (94% - 98%)  Wt(kg): --    PHYSICAL EXAM  Constitutional - NAD, Comfortable  HEENT - NCAT, EOMI  Neck - Supple, No limited ROM  Chest - CTA bilaterally, No wheeze, No rhonchi, No crackles  Cardiovascular - RRR, S1S2, No murmurs  Abdomen - BS+, Soft, NTND  Extremities - BLE 1+ edema and tenderness to light touch, improved from prior  Neurologic Exam -                    Cognitive - Awake, Alert, AAO to self + place     Communication - Fluent, No dysarthria     Cranial Nerves - CN 2-12 grossly intact     Motor -     LEFT    UE - ShAB 4/5, EF 4/5, EE 4/5, WE 4/5,  5/5                    RIGHT UE - ShAB 5/5, EF 5/5, EE 5/5, WE 5/5,  5/5                    LEFT    LE - HF 2/5, KE 3/5, DF 5/5, PF 5/5                    RIGHT LE - HF 2/5, KE 3/5, DF 5/5, PF 5/5         Sensory - Decreased to LT in BLE  Psychiatric - Mood stable, Affect Flat  Skin - Bilateral LE venous stasis changes, left medial great toe abrasion, bilateral groin/medial thigh rashes  Wounds - None      FUNCTIONAL PROGRESS  Gait - 50' + 70' RW min A  ADLs - UE Dress min A, LE Dress total A  Transfers - sit-to-stand mod A/min A  Functional transfer - Toilet SPT mod A      RECENT LABS                        8.7    8.35  )-----------( 273      ( 2017 07:19 )             26.0     2017 07:19    135    |  97     |  19.0   ----------------------------<  172    4.4     |  24.0   |  0.84     Ca    8.3        2017 07:19    Urinalysis Basic - ( 2017 01:20 )  Color: Yellow / Appearance: Cloudy / S.020 / pH: x  Gluc: x / Ketone: Negative  / Bili: Negative / Urobili: Negative   Blood: x / Protein: 500 mg/dL / Nitrite: Negative   Leuk Esterase: Moderate / RBC: 3-5 /HPF / WBC 26-50   Sq Epi: x / Non Sq Epi: Occasional / Bacteria: Occasional     Urine Culture: Pending    FS Last 24HRS  109 (2017 08:15)  224 (2017 21:10)  263 (2017 17:46)  272 (2017 11:39)      RADIOLOGY & OTHER RESULTS  ---    CURRENT MEDICATIONS  MEDICATIONS  (STANDING):  aspirin enteric coated 81milliGRAM(s) Oral daily  amLODIPine   Tablet 10milliGRAM(s) Oral daily  carbidopa/levodopa  25/100 1Tablet(s) Oral three times a day  atorvastatin 20milliGRAM(s) Oral at bedtime  timolol 0.5% Solution 1Drop(s) Both EYES daily  heparin  Injectable 5000Unit(s) SubCutaneous every 12 hours  ammonium lactate 12% Lotion 1Application(s) Topical two times a day  furosemide    Tablet 40milliGRAM(s) Oral daily  tamsulosin 0.4milliGRAM(s) Oral at bedtime  potassium chloride    Tablet ER 20milliEquivalent(s) Oral daily  insulin lispro (HumaLOG) corrective regimen sliding scale  SubCutaneous Before meals and at bedtime  nystatin Powder 1Application(s) Topical three times a day  finasteride 5milliGRAM(s) Oral daily  melatonin. 9milliGRAM(s) Oral at bedtime  ferrous    sulfate 325milliGRAM(s) Oral two times a day with meals  Nephro-nate 1Tablet(s) Oral daily  insulin glargine Injectable (LANTUS) 25Unit(s) SubCutaneous at bedtime  polyethylene glycol 3350 17Gram(s) Oral daily  metFORMIN 500milliGRAM(s) Oral two times a day with meals    MEDICATIONS  (PRN):  acetaminophen   Tablet 650milliGRAM(s) Oral every 6 hours PRN For Temp greater than 38 C (100.4 F)  acetaminophen   Tablet. 650milliGRAM(s) Oral every 6 hours PRN Mild Pain (1 - 3)  sodium biphosphate Rectal Enema 1Enema Rectal daily PRN Constipation  bisacodyl 10milliGRAM(s) Oral daily PRN Constipation  bisacodyl Suppository 10milliGRAM(s) Rectal daily PRN Constipation      ASSESSMENT & PLAN  76M with multiple CVAs now with functional deficits  HTN - Amlodipine, Lasix + KCl  DM2 - Metformin (), Lantus, ISS  CVA - Lipitor, Aspirin  Anemia - Iron, Epogen x1 ()  UTI - Vantin + Florastor (-)  Parkinson's Disease - Sinemet  Sleep - Melatonin 9mg (increased )  Glaucoma - Timolol   GI/Bowel Management - Miralax, Dulcolax PRN, Fleet PRN   Management - Toilet Q2, Bladder Scan q6, Proscar 5mg (), Flomax 0.4mg ()  Skin - Turn Q2, Nystatin to groin, Lac-Hydrin to LE, ACE Wrap to LE  Pain - Tylenol PRN  DVT PPX - Heparin Q12, TEDs, SCDs  Diet - Cardiac, CC/Thins, Glucerna TID, Nephrovite    Continue comprehensive acute rehab program consisting of 3hrs/day of OT/PT/SLP. HISTORY OF PRESENT ILLNESS  76M was admitted to [facility] on 16 after not feeling well for a few days. Workup showed his FS was >1100 and in DKA. Course was complicated by Tinea in the groin, lethargy on  where a brain MRI showed acute bilateral supratentorial and infratentorial infarcts with mild petechial hemorrhage in right occipital lobe. Source was found to be from PFO on JESUS MANUEL done . Course was further complicated by an acute Left LE DVT s/p IVCF on . Admitted to acute rehab on .    PMHx - HLD, DM 2, HTN, Parkinson's Disease      TODAY'S SUBJECTIVE & REVIEW OF SYMPTOMS  [ ] Constitutional WNL          [X] Cardio WNL              [X] Resp WNL           [X] GI WNL                          [ ]  WNL                   [X] Heme WNL              [X] Endo WNL                     [X] Skin WNL                 [X] MSK WNL            [X] Neuro WNL                    [ ] Cognitive WNL           [X] Psych WNL    No acute events overnight. Slept okay.  Remove healy today, urine culture/gram stain pending.  Podiatry consulted for routine foot/nail care.  Edema of legs improving.  Patient reports that he does feel like the itchiness/pain of his legs are improved.  Metformin started yesterday, FS better this am.    VITAL SIGNS:  T(C): 36.1  T(F): 97, Max: 98.9 (-23 @ 11:39)  HR: 72 (72 - 81)  BP: 136/68 (115/67 - 139/68)  RR:  (18 - 18)  SpO2:  (94% - 98%)  Wt(kg): --    PHYSICAL EXAM  Constitutional - NAD, Comfortable  HEENT - NCAT, EOMI  Neck - Supple, No limited ROM  Chest - CTA bilaterally, No wheeze, No rhonchi, No crackles  Cardiovascular - RRR, S1S2, No murmurs  Abdomen - BS+, Soft, NTND  Extremities - BLE 1+ edema and tenderness to light touch, improved from prior  Neurologic Exam -                    Cognitive - Awake, Alert, AAO to self + place     Communication - Fluent, No dysarthria     Cranial Nerves - CN 2-12 grossly intact     Motor -     LEFT    UE - ShAB 4/5, EF 4/5, EE 4/5, WE 4/5,  5/5                    RIGHT UE - ShAB 5/5, EF 5/5, EE 5/5, WE 5/5,  5/5                    LEFT    LE - HF 2/5, KE 3/5, DF 5/5, PF 5/5                    RIGHT LE - HF 2/5, KE 3/5, DF 5/5, PF 5/5         Sensory - Decreased to LT in BLE  Psychiatric - Mood stable, Affect Flat  Skin - Bilateral LE venous stasis changes, left medial great toe abrasion, bilateral groin/medial thigh rashes  Wounds - None      FUNCTIONAL PROGRESS  Gait - 50' + 70' RW min A  ADLs - UE Dress min A, LE Dress total A  Transfers - sit-to-stand mod A/min A  Functional transfer - Toilet SPT mod A      RECENT LABS                        8.7    8.35  )-----------( 273      ( 2017 07:19 )             26.0     2017 07:19    135    |  97     |  19.0   ----------------------------<  172    4.4     |  24.0   |  0.84     Ca    8.3        2017 07:19    Urinalysis Basic - ( 2017 01:20 )  Color: Yellow / Appearance: Cloudy / S.020 / pH: x  Gluc: x / Ketone: Negative  / Bili: Negative / Urobili: Negative   Blood: x / Protein: 500 mg/dL / Nitrite: Negative   Leuk Esterase: Moderate / RBC: 3-5 /HPF / WBC 26-50   Sq Epi: x / Non Sq Epi: Occasional / Bacteria: Occasional     Urine Culture: Pending    FS Last 24HRS  109 (2017 08:15)  224 (2017 21:10)  263 (2017 17:46)  272 (2017 11:39)      RADIOLOGY & OTHER RESULTS  ---    CURRENT MEDICATIONS  MEDICATIONS  (STANDING):  aspirin enteric coated 81milliGRAM(s) Oral daily  amLODIPine   Tablet 10milliGRAM(s) Oral daily  carbidopa/levodopa  25/100 1Tablet(s) Oral three times a day  atorvastatin 20milliGRAM(s) Oral at bedtime  timolol 0.5% Solution 1Drop(s) Both EYES daily  heparin  Injectable 5000Unit(s) SubCutaneous every 12 hours  ammonium lactate 12% Lotion 1Application(s) Topical two times a day  furosemide    Tablet 40milliGRAM(s) Oral daily  tamsulosin 0.4milliGRAM(s) Oral at bedtime  potassium chloride    Tablet ER 20milliEquivalent(s) Oral daily  insulin lispro (HumaLOG) corrective regimen sliding scale  SubCutaneous Before meals and at bedtime  nystatin Powder 1Application(s) Topical three times a day  finasteride 5milliGRAM(s) Oral daily  melatonin. 9milliGRAM(s) Oral at bedtime  ferrous    sulfate 325milliGRAM(s) Oral two times a day with meals  Nephro-nate 1Tablet(s) Oral daily  insulin glargine Injectable (LANTUS) 25Unit(s) SubCutaneous at bedtime  polyethylene glycol 3350 17Gram(s) Oral daily  metFORMIN 500milliGRAM(s) Oral two times a day with meals    MEDICATIONS  (PRN):  acetaminophen   Tablet 650milliGRAM(s) Oral every 6 hours PRN For Temp greater than 38 C (100.4 F)  acetaminophen   Tablet. 650milliGRAM(s) Oral every 6 hours PRN Mild Pain (1 - 3)  sodium biphosphate Rectal Enema 1Enema Rectal daily PRN Constipation  bisacodyl 10milliGRAM(s) Oral daily PRN Constipation  bisacodyl Suppository 10milliGRAM(s) Rectal daily PRN Constipation      ASSESSMENT & PLAN  76M with multiple CVAs now with functional deficits  HTN - Amlodipine, Lasix + KCl  DM2 - Metformin (), Lantus, ISS  CVA - Lipitor, Aspirin  Anemia - Iron, Epogen x1 ()  UTI - Vantin + Florastor (-)  Parkinson's Disease - Sinemet  Sleep - Melatonin 9mg (increased )  Glaucoma - Timolol   GI/Bowel Management - Miralax, Dulcolax PRN, Fleet PRN   Management - Toilet Q2, Bladder Scan q6, Proscar 5mg (), Flomax 0.4mg ()  Skin - Turn Q2, Nystatin to groin, Lac-Hydrin to LE, ACE Wrap to LE  Pain - Tylenol PRN  DVT PPX - Heparin Q12, TEDs, SCDs  Diet - Cardiac, CC/Thins, Glucerna TID, Nephrovite, Vit D    Continue comprehensive acute rehab program consisting of 3hrs/day of OT/PT/SLP.

## 2017-01-24 NOTE — PROGRESS NOTE ADULT - SUBJECTIVE AND OBJECTIVE BOX
INTERVAL HPI/OVERNIGHT EVENTS:  cleared urine    MEDICATIONS  (STANDING):  aspirin enteric coated 81milliGRAM(s) Oral daily  amLODIPine   Tablet 10milliGRAM(s) Oral daily  carbidopa/levodopa  25/100 1Tablet(s) Oral three times a day  atorvastatin 20milliGRAM(s) Oral at bedtime  timolol 0.5% Solution 1Drop(s) Both EYES daily  heparin  Injectable 5000Unit(s) SubCutaneous every 12 hours  ammonium lactate 12% Lotion 1Application(s) Topical two times a day  furosemide    Tablet 40milliGRAM(s) Oral daily  tamsulosin 0.4milliGRAM(s) Oral at bedtime  potassium chloride    Tablet ER 20milliEquivalent(s) Oral daily  insulin lispro (HumaLOG) corrective regimen sliding scale  SubCutaneous Before meals and at bedtime  nystatin Powder 1Application(s) Topical three times a day  finasteride 5milliGRAM(s) Oral daily  melatonin. 9milliGRAM(s) Oral at bedtime  ferrous    sulfate 325milliGRAM(s) Oral two times a day with meals  Nephro-nate 1Tablet(s) Oral daily  insulin glargine Injectable (LANTUS) 25Unit(s) SubCutaneous at bedtime  polyethylene glycol 3350 17Gram(s) Oral daily  metFORMIN 500milliGRAM(s) Oral two times a day with meals  ergocalciferol 33169Zdam(s) Oral every week    MEDICATIONS  (PRN):  acetaminophen   Tablet 650milliGRAM(s) Oral every 6 hours PRN For Temp greater than 38 C (100.4 F)  acetaminophen   Tablet. 650milliGRAM(s) Oral every 6 hours PRN Mild Pain (1 - 3)  sodium biphosphate Rectal Enema 1Enema Rectal daily PRN Constipation  bisacodyl 10milliGRAM(s) Oral daily PRN Constipation  bisacodyl Suppository 10milliGRAM(s) Rectal daily PRN Constipation      Allergies    No Known Allergies    Intolerances        Vital Signs Last 24 Hrs  T(C): 36.9, Max: 36.9 ( @ 21:10)  T(F): 98.4, Max: 98.4 ( @ 21:10)  HR: 75 (72 - 75)  BP: 146/78 (136/68 - 146/78)  BP(mean): --  RR: 18 (18 - 18)  SpO2: 99% (94% - 99%)     ON PE:  General: alert and awake  Abdomen: soft, nt, nd    urine clear       LABS:                        8.7    8.35  )-----------( 273      ( 2017 07:19 )             26.0     2017 07:19    135    |  97     |  19.0   ----------------------------<  172    4.4     |  24.0   |  0.84     Ca    8.3        2017 07:19        Urinalysis Basic - ( 2017 01:20 )    Color: Yellow / Appearance: Cloudy / S.020 / pH: x  Gluc: x / Ketone: Negative  / Bili: Negative / Urobili: Negative   Blood: x / Protein: 500 mg/dL / Nitrite: Negative   Leuk Esterase: Moderate / RBC: 3-5 /HPF / WBC 26-50   Sq Epi: x / Non Sq Epi: Occasional / Bacteria: Occasional        RADIOLOGY & ADDITIONAL TESTS:

## 2017-01-25 PROCEDURE — 99232 SBSQ HOSP IP/OBS MODERATE 35: CPT

## 2017-01-25 RX ORDER — TAMSULOSIN HYDROCHLORIDE 0.4 MG/1
0.8 CAPSULE ORAL AT BEDTIME
Qty: 0 | Refills: 0 | Status: DISCONTINUED | OUTPATIENT
Start: 2017-01-25 | End: 2017-02-02

## 2017-01-25 RX ADMIN — NYSTATIN CREAM 1 APPLICATION(S): 100000 CREAM TOPICAL at 21:53

## 2017-01-25 RX ADMIN — Medication 325 MILLIGRAM(S): at 18:58

## 2017-01-25 RX ADMIN — NYSTATIN CREAM 1 APPLICATION(S): 100000 CREAM TOPICAL at 06:16

## 2017-01-25 RX ADMIN — HEPARIN SODIUM 5000 UNIT(S): 5000 INJECTION INTRAVENOUS; SUBCUTANEOUS at 18:58

## 2017-01-25 RX ADMIN — METFORMIN HYDROCHLORIDE 500 MILLIGRAM(S): 850 TABLET ORAL at 18:58

## 2017-01-25 RX ADMIN — Medication 6: at 18:57

## 2017-01-25 RX ADMIN — FINASTERIDE 5 MILLIGRAM(S): 5 TABLET, FILM COATED ORAL at 14:14

## 2017-01-25 RX ADMIN — CARBIDOPA AND LEVODOPA 1 TABLET(S): 25; 100 TABLET ORAL at 06:18

## 2017-01-25 RX ADMIN — Medication 1 TABLET(S): at 14:15

## 2017-01-25 RX ADMIN — NYSTATIN CREAM 1 APPLICATION(S): 100000 CREAM TOPICAL at 14:16

## 2017-01-25 RX ADMIN — TAMSULOSIN HYDROCHLORIDE 0.8 MILLIGRAM(S): 0.4 CAPSULE ORAL at 21:52

## 2017-01-25 RX ADMIN — POLYETHYLENE GLYCOL 3350 17 GRAM(S): 17 POWDER, FOR SOLUTION ORAL at 14:17

## 2017-01-25 RX ADMIN — Medication 40 MILLIGRAM(S): at 06:18

## 2017-01-25 RX ADMIN — Medication 81 MILLIGRAM(S): at 14:17

## 2017-01-25 RX ADMIN — Medication 4: at 21:51

## 2017-01-25 RX ADMIN — HEPARIN SODIUM 5000 UNIT(S): 5000 INJECTION INTRAVENOUS; SUBCUTANEOUS at 06:18

## 2017-01-25 RX ADMIN — INSULIN GLARGINE 25 UNIT(S): 100 INJECTION, SOLUTION SUBCUTANEOUS at 21:53

## 2017-01-25 RX ADMIN — AMLODIPINE BESYLATE 10 MILLIGRAM(S): 2.5 TABLET ORAL at 06:18

## 2017-01-25 RX ADMIN — CARBIDOPA AND LEVODOPA 1 TABLET(S): 25; 100 TABLET ORAL at 21:52

## 2017-01-25 RX ADMIN — Medication 9 MILLIGRAM(S): at 21:52

## 2017-01-25 RX ADMIN — Medication 650 MILLIGRAM(S): at 21:52

## 2017-01-25 RX ADMIN — Medication 1 APPLICATION(S): at 06:16

## 2017-01-25 RX ADMIN — Medication 20 MILLIEQUIVALENT(S): at 14:16

## 2017-01-25 RX ADMIN — Medication 325 MILLIGRAM(S): at 08:31

## 2017-01-25 RX ADMIN — Medication 650 MILLIGRAM(S): at 22:52

## 2017-01-25 RX ADMIN — Medication 1 DROP(S): at 14:15

## 2017-01-25 RX ADMIN — ATORVASTATIN CALCIUM 20 MILLIGRAM(S): 80 TABLET, FILM COATED ORAL at 21:52

## 2017-01-25 RX ADMIN — Medication 10 MILLIGRAM(S): at 06:18

## 2017-01-25 RX ADMIN — Medication 1 APPLICATION(S): at 18:58

## 2017-01-25 RX ADMIN — METFORMIN HYDROCHLORIDE 500 MILLIGRAM(S): 850 TABLET ORAL at 08:31

## 2017-01-25 RX ADMIN — Medication 4: at 14:13

## 2017-01-25 RX ADMIN — CARBIDOPA AND LEVODOPA 1 TABLET(S): 25; 100 TABLET ORAL at 14:15

## 2017-01-25 NOTE — PROGRESS NOTE ADULT - SUBJECTIVE AND OBJECTIVE BOX
INTERVAL HPI/OVERNIGHT EVENTS:    MEDICATIONS  (STANDING):  aspirin enteric coated 81milliGRAM(s) Oral daily  amLODIPine   Tablet 10milliGRAM(s) Oral daily  carbidopa/levodopa  25/100 1Tablet(s) Oral three times a day  atorvastatin 20milliGRAM(s) Oral at bedtime  timolol 0.5% Solution 1Drop(s) Both EYES daily  heparin  Injectable 5000Unit(s) SubCutaneous every 12 hours  ammonium lactate 12% Lotion 1Application(s) Topical two times a day  furosemide    Tablet 40milliGRAM(s) Oral daily  potassium chloride    Tablet ER 20milliEquivalent(s) Oral daily  insulin lispro (HumaLOG) corrective regimen sliding scale  SubCutaneous Before meals and at bedtime  nystatin Powder 1Application(s) Topical three times a day  finasteride 5milliGRAM(s) Oral daily  melatonin. 9milliGRAM(s) Oral at bedtime  ferrous    sulfate 325milliGRAM(s) Oral two times a day with meals  Nephro-nate 1Tablet(s) Oral daily  insulin glargine Injectable (LANTUS) 25Unit(s) SubCutaneous at bedtime  polyethylene glycol 3350 17Gram(s) Oral daily  metFORMIN 500milliGRAM(s) Oral two times a day with meals  ergocalciferol 60457Uscr(s) Oral every week  tamsulosin 0.8milliGRAM(s) Oral at bedtime    MEDICATIONS  (PRN):  acetaminophen   Tablet 650milliGRAM(s) Oral every 6 hours PRN For Temp greater than 38 C (100.4 F)  acetaminophen   Tablet. 650milliGRAM(s) Oral every 6 hours PRN Mild Pain (1 - 3)  sodium biphosphate Rectal Enema 1Enema Rectal daily PRN Constipation  bisacodyl 10milliGRAM(s) Oral daily PRN Constipation  bisacodyl Suppository 10milliGRAM(s) Rectal daily PRN Constipation      Allergies    No Known Allergies    Intolerances        Vital Signs Last 24 Hrs  T(C): 37, Max: 37.2 (01-24 @ 19:50)  T(F): 98.6, Max: 99 (01-24 @ 19:50)  HR: 78 (75 - 83)  BP: 128/72 (128/72 - 146/78)  BP(mean): --  RR: 16 (16 - 18)  SpO2: 96% (95% - 99%)     ON PE:  General: alert and awake  Abdomen: Soft ND/NT BS+    : Voiding, bladder not distended    LABS:                RADIOLOGY & ADDITIONAL TESTS:

## 2017-01-25 NOTE — PROGRESS NOTE ADULT - SUBJECTIVE AND OBJECTIVE BOX
HISTORY OF PRESENT ILLNESS  76M was admitted to [facility] on 16 after not feeling well for a few days. Workup showed his FS was >1100 and in DKA. Course was complicated by Tinea in the groin, lethargy on  where a brain MRI showed acute bilateral supratentorial and infratentorial infarcts with mild petechial hemorrhage in right occipital lobe. Source was found to be from PFO on JESUS MANUEL done . Course was further complicated by an acute Left LE DVT s/p IVCF on . Admitted to acute rehab on .    PMHx - HLD, DM 2, HTN, Parkinson's Disease      TODAY'S SUBJECTIVE & REVIEW OF SYMPTOMS  [ ] Constitutional WNL          [X] Cardio WNL              [X] Resp WNL           [X] GI WNL                          [ ]  WNL                   [X] Heme WNL              [X] Endo WNL                      [X] Skin WNL                 [X] MSK WNL            [X] Neuro WNL                     [ ] Cognitive WNL           [X] Psych WNL    No overnight events. Slept well.  Feels fatigued. Amotivated and needs a lot of encouragement.   Understands that if wants to go home, needs to work on that goal.   Needed to IC since removal. Will increase Flomax.  Added metformin and appears to have lowered FS today. Will continue to monitor.    VITAL SIGNS:  T(C): 37  T(F): 98.6, Max: 99 ( @ 19:50)  HR: 78 (75 - 83)  BP: 128/72 (128/72 - 146/78)  RR:  (16 - 18)  SpO2:  (95% - 99%)  Wt(kg): --      PHYSICAL EXAM  Constitutional - NAD, Comfortable  HEENT - NCAT, EOMI  Neck - Supple, No limited ROM  Chest - CTA bilaterally, No wheeze, No rhonchi, No crackles  Cardiovascular - RRR, S1S2, No murmurs  Abdomen - BS+, Soft, NTND  Extremities - Improved BLE edema - 1+   Neurologic Exam -                    Cognitive - Awake, Alert, AAO to self, place     Communication - Fluent, No dysarthria     Cranial Nerves - CN 2-12 grossly intact     Motor -     LEFT    UE - ShAB 4/5, EF 4/5, EE 4/5, WE 4/5,  5/5                    RIGHT UE - ShAB 5/5, EF 5/5, EE 5/5, WE 5/5,  5/5                    LEFT    LE - HF 2/5, KE 3/5, DF 5/5, PF 5/5                    RIGHT LE - HF 2/5, KE 3/5, DF 5/5, PF 5/5         Sensory - Decreased to LT in BLE  Psychiatric - Mood stable, Affect Flat, Fatigued  Skin - Bilateral LE venous stasis changes, left medial great toe abrasion, bilateral groin/medial thigh rashes  Wounds - None      FUNCTIONAL PROGRESS  Gait - 50' + 70' RW min A  ADLs - UE Dress min A, LE Dress total A  Transfers - sit-to-stand mod A/min A  Functional transfer - Toilet SPT mod A      RECENT LABS                        8.7    8.35  )-----------( 273      ( 2017 07:19 )             26.0     2017 07:19    135    |  97     |  19.0   ----------------------------<  172    4.4     |  24.0   |  0.84     Ca    8.3        2017 07:19    Urinalysis Basic - ( 2017 01:20 )  Color: Yellow / Appearance: Cloudy / S.020 / pH: x  Gluc: x / Ketone: Negative  / Bili: Negative / Urobili: Negative   Blood: x / Protein: 500 mg/dL / Nitrite: Negative   Leuk Esterase: Moderate / RBC: 3-5 /HPF / WBC 26-50   Sq Epi: x / Non Sq Epi: Occasional / Bacteria: Occasional    UCx  - Pending    Vitamin D  -       FS Last 24HRS  108 (2017 07:42)  339 (2017 21:30)  355 (2017 17:24)  248 (2017 12:07)      RADIOLOGY & OTHER RESULTS  ---      CURRENT MEDICATIONS  MEDICATIONS  (STANDING):  aspirin enteric coated 81milliGRAM(s) Oral daily  amLODIPine   Tablet 10milliGRAM(s) Oral daily  carbidopa/levodopa  25/100 1Tablet(s) Oral three times a day  atorvastatin 20milliGRAM(s) Oral at bedtime  timolol 0.5% Solution 1Drop(s) Both EYES daily  heparin  Injectable 5000Unit(s) SubCutaneous every 12 hours  ammonium lactate 12% Lotion 1Application(s) Topical two times a day  furosemide    Tablet 40milliGRAM(s) Oral daily  tamsulosin 0.4milliGRAM(s) Oral at bedtime  potassium chloride    Tablet ER 20milliEquivalent(s) Oral daily  insulin lispro (HumaLOG) corrective regimen sliding scale  SubCutaneous Before meals and at bedtime  nystatin Powder 1Application(s) Topical three times a day  finasteride 5milliGRAM(s) Oral daily  melatonin. 9milliGRAM(s) Oral at bedtime  ferrous    sulfate 325milliGRAM(s) Oral two times a day with meals  Nephro-nate 1Tablet(s) Oral daily  insulin glargine Injectable (LANTUS) 25Unit(s) SubCutaneous at bedtime  polyethylene glycol 3350 17Gram(s) Oral daily  metFORMIN 500milliGRAM(s) Oral two times a day with meals  ergocalciferol 31586Nxgp(s) Oral every week    MEDICATIONS  (PRN):  acetaminophen   Tablet 650milliGRAM(s) Oral every 6 hours PRN For Temp greater than 38 C (100.4 F)  acetaminophen   Tablet. 650milliGRAM(s) Oral every 6 hours PRN Mild Pain (1 - 3)  sodium biphosphate Rectal Enema 1Enema Rectal daily PRN Constipation  bisacodyl 10milliGRAM(s) Oral daily PRN Constipation  bisacodyl Suppository 10milliGRAM(s) Rectal daily PRN Constipation      ASSESSMENT & PLAN  76M with multiple CVAs now with functional deficits  HTN - Amlodipine, Lasix + KCl  DM2 - Metformin (), Lantus, ISS  CVA - Lipitor, Aspirin  Anemia - Iron, Epogen x1 ()  UTI - Vantin + Florastor (-)  Parkinson's Disease - Sinemet  Sleep - Melatonin 9mg (increased )  Glaucoma - Timolol   GI/Bowel Management - Miralax, Dulcolax PRN, Fleet PRN   Management - Toilet Q2, Bladder Scan/IC Q6, Proscar 5mg (), Flomax 0.8mg (increased from 0.4mg )  Skin - Turn Q2, Nystatin to groin, Lac-Hydrin to LE  Pain - Tylenol PRN  DVT PPX - Heparin Q12, TEDs, SCDs  Diet - Cardiac, CC/Thins, Glucerna TID, Nephrovite, Vit D    Continue comprehensive acute rehab program consisting of 3hrs/day of OT/PT/SLP.

## 2017-01-26 PROCEDURE — 99232 SBSQ HOSP IP/OBS MODERATE 35: CPT | Mod: GC

## 2017-01-26 PROCEDURE — 99232 SBSQ HOSP IP/OBS MODERATE 35: CPT

## 2017-01-26 RX ORDER — INSULIN GLARGINE 100 [IU]/ML
22 INJECTION, SOLUTION SUBCUTANEOUS AT BEDTIME
Qty: 0 | Refills: 0 | Status: DISCONTINUED | OUTPATIENT
Start: 2017-01-26 | End: 2017-01-26

## 2017-01-26 RX ORDER — METFORMIN HYDROCHLORIDE 850 MG/1
500 TABLET ORAL DAILY
Qty: 0 | Refills: 0 | Status: DISCONTINUED | OUTPATIENT
Start: 2017-01-26 | End: 2017-01-29

## 2017-01-26 RX ADMIN — Medication 4: at 14:00

## 2017-01-26 RX ADMIN — POLYETHYLENE GLYCOL 3350 17 GRAM(S): 17 POWDER, FOR SOLUTION ORAL at 11:44

## 2017-01-26 RX ADMIN — Medication 20 MILLIEQUIVALENT(S): at 11:44

## 2017-01-26 RX ADMIN — Medication 1 TABLET(S): at 11:44

## 2017-01-26 RX ADMIN — HEPARIN SODIUM 5000 UNIT(S): 5000 INJECTION INTRAVENOUS; SUBCUTANEOUS at 05:22

## 2017-01-26 RX ADMIN — Medication 1 APPLICATION(S): at 05:22

## 2017-01-26 RX ADMIN — CARBIDOPA AND LEVODOPA 1 TABLET(S): 25; 100 TABLET ORAL at 14:00

## 2017-01-26 RX ADMIN — Medication 650 MILLIGRAM(S): at 15:15

## 2017-01-26 RX ADMIN — NYSTATIN CREAM 1 APPLICATION(S): 100000 CREAM TOPICAL at 05:22

## 2017-01-26 RX ADMIN — METFORMIN HYDROCHLORIDE 500 MILLIGRAM(S): 850 TABLET ORAL at 14:00

## 2017-01-26 RX ADMIN — TAMSULOSIN HYDROCHLORIDE 0.8 MILLIGRAM(S): 0.4 CAPSULE ORAL at 21:03

## 2017-01-26 RX ADMIN — Medication 10 MILLIGRAM(S): at 05:21

## 2017-01-26 RX ADMIN — ATORVASTATIN CALCIUM 20 MILLIGRAM(S): 80 TABLET, FILM COATED ORAL at 21:03

## 2017-01-26 RX ADMIN — Medication 325 MILLIGRAM(S): at 11:44

## 2017-01-26 RX ADMIN — Medication 2: at 21:09

## 2017-01-26 RX ADMIN — Medication 650 MILLIGRAM(S): at 14:45

## 2017-01-26 RX ADMIN — NYSTATIN CREAM 1 APPLICATION(S): 100000 CREAM TOPICAL at 21:03

## 2017-01-26 RX ADMIN — Medication 40 MILLIGRAM(S): at 05:22

## 2017-01-26 RX ADMIN — Medication 325 MILLIGRAM(S): at 17:26

## 2017-01-26 RX ADMIN — CARBIDOPA AND LEVODOPA 1 TABLET(S): 25; 100 TABLET ORAL at 05:22

## 2017-01-26 RX ADMIN — HEPARIN SODIUM 5000 UNIT(S): 5000 INJECTION INTRAVENOUS; SUBCUTANEOUS at 17:26

## 2017-01-26 RX ADMIN — Medication 1 DROP(S): at 11:44

## 2017-01-26 RX ADMIN — Medication 1 APPLICATION(S): at 17:26

## 2017-01-26 RX ADMIN — AMLODIPINE BESYLATE 10 MILLIGRAM(S): 2.5 TABLET ORAL at 05:22

## 2017-01-26 RX ADMIN — Medication 81 MILLIGRAM(S): at 11:44

## 2017-01-26 RX ADMIN — CARBIDOPA AND LEVODOPA 1 TABLET(S): 25; 100 TABLET ORAL at 21:03

## 2017-01-26 RX ADMIN — Medication 9 MILLIGRAM(S): at 21:03

## 2017-01-26 RX ADMIN — FINASTERIDE 5 MILLIGRAM(S): 5 TABLET, FILM COATED ORAL at 11:44

## 2017-01-26 RX ADMIN — Medication 6: at 17:27

## 2017-01-26 RX ADMIN — NYSTATIN CREAM 1 APPLICATION(S): 100000 CREAM TOPICAL at 14:02

## 2017-01-26 NOTE — PROGRESS NOTE ADULT - SUBJECTIVE AND OBJECTIVE BOX
HISTORY OF PRESENT ILLNESS  76M was admitted to [facility] on 16 after not feeling well for a few days. Workup showed his FS was >1100 and in DKA. Course was complicated by Tinea in the groin, lethargy on  where a brain MRI showed acute bilateral supratentorial and infratentorial infarcts with mild petechial hemorrhage in right occipital lobe. Source was found to be from PFO on JESUS MANUEL done . Course was further complicated by an acute Left LE DVT s/p IVCF on . Admitted to acute rehab on .    PMHx - HLD, DM 2, HTN, Parkinson's Disease    TODAY'S SUBJECTIVE & REVIEW OF SYMPTOMS  [ ] Constitutional WNL          [X] Cardio WNL              [X] Resp WNL           [X] GI WNL                          [ ]  WNL                   [X] Heme WNL              [X] Endo WNL                      [X] Skin WNL                 [X] MSK WNL            [X] Neuro WNL                     [ ] Cognitive WNL           [X] Psych WNL    No acute events overnight. Slept well.  This morning, FS 59 asymptomatic but given juice and FS became 89; held am Metformin dose.  Will decrease Lantus to 22 U and continue to monitor FS.  Continues to need a lot of encouragement for motivation.  He has no complaints otherwise, leg swelling/pain improving.    VITAL SIGNS:  T(C): 36.3  T(F): 97.4, Max: 98.6 ( @ 21:44)  HR: 76 (74 - 76)  BP: 136/73 (136/73 - 138/76)  RR:  (18 - 18)  SpO2:  (96% - 96%)  Wt(kg): --    PHYSICAL EXAM  Constitutional - NAD, Comfortable  HEENT - NCAT, EOMI  Neck - Supple, No limited ROM  Chest - CTA bilaterally, No wheeze, No rhonchi, No crackles  Cardiovascular - RRR, S1S2, No murmurs  Abdomen - BS+, Soft, NTND  Extremities - Improved BLE edema - 1+  Neurologic Exam -                    Cognitive - Awake, Alert, AAO to self, place     Communication - Fluent, No dysarthria     Cranial Nerves - CN 2-12 grossly intact     Motor -     LEFT    UE - ShAB 4/5, EF 4/5, EE 4/5, WE 4/5,  5/5                    RIGHT UE - ShAB 5/5, EF 5/5, EE 5/5, WE 5/5,  5/5                    LEFT    LE - HF 2/5, KE 3/5, DF 5/5, PF 5/5                    RIGHT LE - HF 2/5, KE 3/5, DF 5/5, PF 5/5         Sensory - Decreased to LT in BLE  Psychiatric - Mood stable, Affect Flat, Fatigued  Skin - Bilateral LE venous stasis changes, left medial great toe abrasion, bilateral groin rashes  Wounds - None    FUNCTIONAL PROGRESS  Gait - 60' RW min A  ADLs - Toileting total A, LE Dress total A  Transfers - sit-to-stand mod A  Functional transfer - Bed SPT min A    RECENT LABS                        8.7    8.35  )-----------( 273      ( 2017 07:19 )             26.0     2017 07:19    135    |  97     |  19.0   ----------------------------<  172    4.4     |  24.0   |  0.84     Ca    8.3        2017 07:19    Urinalysis Basic - ( 2017 01:20 )  Color: Yellow / Appearance: Cloudy / S.020 / pH: x  Gluc: x / Ketone: Negative  / Bili: Negative / Urobili: Negative   Blood: x / Protein: 500 mg/dL / Nitrite: Negative   Leuk Esterase: Moderate / RBC: 3-5 /HPF / WBC 26-50   Sq Epi: x / Non Sq Epi: Occasional / Bacteria: Occasional    UCx  - Pending    Vitamin D  -     FS Last 24HRS  89 (2017 07:56)  59 (2017 07:28)  234 (2017 21:50)  251 (2017 16:41)  207 (2017 12:30)    RADIOLOGY & OTHER RESULTS  ---    CURRENT MEDICATIONS  MEDICATIONS  (STANDING):  aspirin enteric coated 81milliGRAM(s) Oral daily  amLODIPine   Tablet 10milliGRAM(s) Oral daily  carbidopa/levodopa  25/100 1Tablet(s) Oral three times a day  atorvastatin 20milliGRAM(s) Oral at bedtime  timolol 0.5% Solution 1Drop(s) Both EYES daily  heparin  Injectable 5000Unit(s) SubCutaneous every 12 hours  ammonium lactate 12% Lotion 1Application(s) Topical two times a day  furosemide    Tablet 40milliGRAM(s) Oral daily  potassium chloride    Tablet ER 20milliEquivalent(s) Oral daily  insulin lispro (HumaLOG) corrective regimen sliding scale  SubCutaneous Before meals and at bedtime  nystatin Powder 1Application(s) Topical three times a day  finasteride 5milliGRAM(s) Oral daily  melatonin. 9milliGRAM(s) Oral at bedtime  ferrous    sulfate 325milliGRAM(s) Oral two times a day with meals  Nephro-nate 1Tablet(s) Oral daily  polyethylene glycol 3350 17Gram(s) Oral daily  metFORMIN 500milliGRAM(s) Oral two times a day with meals  ergocalciferol 04303Ebmj(s) Oral every week  tamsulosin 0.8milliGRAM(s) Oral at bedtime  insulin glargine Injectable (LANTUS) 22Unit(s) SubCutaneous at bedtime    MEDICATIONS  (PRN):  acetaminophen   Tablet 650milliGRAM(s) Oral every 6 hours PRN For Temp greater than 38 C (100.4 F)  acetaminophen   Tablet. 650milliGRAM(s) Oral every 6 hours PRN Mild Pain (1 - 3)  sodium biphosphate Rectal Enema 1Enema Rectal daily PRN Constipation  bisacodyl Suppository 10milliGRAM(s) Rectal daily PRN Constipation    ASSESSMENT & PLAN  76M with multiple CVAs now with functional deficits  HTN - Amlodipine, Lasix + KCl  DM2 - Metformin (), Lantus 22 U (decreased from 25 U ), ISS  CVA - Lipitor, Aspirin  Anemia - Iron, Epogen x1 ()  UTI - Vantin + Florastor (-)  Parkinson's Disease - Sinemet  Sleep - Melatonin 9mg (increased )  Glaucoma - Timolol   GI/Bowel Management - Miralax, Dulcolax PRN, Fleet PRN   Management - Toilet Q2, Bladder Scan/IC Q6, Proscar 5mg (), Flomax 0.8mg (increased from 0.4mg )  Skin - Turn Q2, Nystatin to groin, Lac-Hydrin to LE  Pain - Tylenol PRN  DVT PPX - Heparin Q12, TEDs, SCDs  Diet - Cardiac, CC/Thins, Glucerna TID, Nephrovite, Vit D    Continue comprehensive acute rehab program consisting of 3hrs/day of OT/PT/SLP. HISTORY OF PRESENT ILLNESS  76M was admitted to [facility] on 16 after not feeling well for a few days. Workup showed his FS was >1100 and in DKA. Course was complicated by Tinea in the groin, lethargy on  where a brain MRI showed acute bilateral supratentorial and infratentorial infarcts with mild petechial hemorrhage in right occipital lobe. Source was found to be from PFO on JESUS MANUEL done . Course was further complicated by an acute Left LE DVT s/p IVCF on . Admitted to acute rehab on .    PMHx - HLD, DM 2, HTN, Parkinson's Disease    TODAY'S SUBJECTIVE & REVIEW OF SYMPTOMS  [ ] Constitutional WNL          [X] Cardio WNL              [X] Resp WNL           [X] GI WNL                          [ ]  WNL                   [X] Heme WNL              [X] Endo WNL                      [X] Skin WNL                 [X] MSK WNL            [X] Neuro WNL                     [ ] Cognitive WNL           [X] Psych WNL    No acute events overnight. Slept well.  This morning, FS 59 asymptomatic but given juice and FS became 89; held am Metformin dose.  Will decrease Lantus to 22 U and continue to monitor FS.  Continues to need a lot of encouragement for motivation.  He has no complaints otherwise, leg swelling/pain improving.    VITAL SIGNS:  T(C): 36.3  T(F): 97.4, Max: 98.6 ( @ 21:44)  HR: 76 (74 - 76)  BP: 136/73 (136/73 - 138/76)  RR:  (18 - 18)  SpO2:  (96% - 96%)  Wt(kg): --    PHYSICAL EXAM  Constitutional - NAD, Comfortable  HEENT - NCAT, EOMI  Neck - Supple, No limited ROM  Chest - CTA bilaterally, No wheeze, No rhonchi, No crackles  Cardiovascular - RRR, S1S2, No murmurs  Abdomen - BS+, Soft, NTND  Extremities - Improved BLE edema - 1+  Neurologic Exam -                    Cognitive - Awake, Alert, AAO to self, place     Communication - Fluent, No dysarthria     Cranial Nerves - CN 2-12 grossly intact     Motor -     LEFT    UE - ShAB 4/5, EF 4/5, EE 4/5, WE 4/5,  5/5                    RIGHT UE - ShAB 5/5, EF 5/5, EE 5/5, WE 5/5,  5/5                    LEFT    LE - HF 2/5, KE 3/5, DF 5/5, PF 5/5                    RIGHT LE - HF 2/5, KE 3/5, DF 5/5, PF 5/5         Sensory - Decreased to LT in BLE  Psychiatric - Mood stable, Affect Flat, Fatigued  Skin - Bilateral LE venous stasis changes, left medial great toe abrasion, bilateral groin rashes  Wounds - None    FUNCTIONAL PROGRESS  Gait - 60' RW min A  ADLs - Toileting total A, LE Dress total A  Transfers - sit-to-stand mod A  Functional transfer - Bed SPT min A    RECENT LABS                        8.7    8.35  )-----------( 273      ( 2017 07:19 )             26.0     2017 07:19    135    |  97     |  19.0   ----------------------------<  172    4.4     |  24.0   |  0.84     Ca    8.3        2017 07:19    Urinalysis Basic - ( 2017 01:20 )  Color: Yellow / Appearance: Cloudy / S.020 / pH: x  Gluc: x / Ketone: Negative  / Bili: Negative / Urobili: Negative   Blood: x / Protein: 500 mg/dL / Nitrite: Negative   Leuk Esterase: Moderate / RBC: 3-5 /HPF / WBC 26-50   Sq Epi: x / Non Sq Epi: Occasional / Bacteria: Occasional    UCx  - Pending    Vitamin D  -     FS Last 24HRS  89 (2017 07:56)  59 (2017 07:28)  234 (2017 21:50)  251 (2017 16:41)  207 (2017 12:30)    RADIOLOGY & OTHER RESULTS  ---    CURRENT MEDICATIONS  MEDICATIONS  (STANDING):  aspirin enteric coated 81milliGRAM(s) Oral daily  amLODIPine   Tablet 10milliGRAM(s) Oral daily  carbidopa/levodopa  25/100 1Tablet(s) Oral three times a day  atorvastatin 20milliGRAM(s) Oral at bedtime  timolol 0.5% Solution 1Drop(s) Both EYES daily  heparin  Injectable 5000Unit(s) SubCutaneous every 12 hours  ammonium lactate 12% Lotion 1Application(s) Topical two times a day  furosemide    Tablet 40milliGRAM(s) Oral daily  potassium chloride    Tablet ER 20milliEquivalent(s) Oral daily  insulin lispro (HumaLOG) corrective regimen sliding scale  SubCutaneous Before meals and at bedtime  nystatin Powder 1Application(s) Topical three times a day  finasteride 5milliGRAM(s) Oral daily  melatonin. 9milliGRAM(s) Oral at bedtime  ferrous    sulfate 325milliGRAM(s) Oral two times a day with meals  Nephro-nate 1Tablet(s) Oral daily  polyethylene glycol 3350 17Gram(s) Oral daily  metFORMIN 500milliGRAM(s) Oral two times a day with meals  ergocalciferol 91560Isox(s) Oral every week  tamsulosin 0.8milliGRAM(s) Oral at bedtime  insulin glargine Injectable (LANTUS) 22Unit(s) SubCutaneous at bedtime    MEDICATIONS  (PRN):  acetaminophen   Tablet 650milliGRAM(s) Oral every 6 hours PRN For Temp greater than 38 C (100.4 F)  acetaminophen   Tablet. 650milliGRAM(s) Oral every 6 hours PRN Mild Pain (1 - 3)  sodium biphosphate Rectal Enema 1Enema Rectal daily PRN Constipation  bisacodyl Suppository 10milliGRAM(s) Rectal daily PRN Constipation    ASSESSMENT & PLAN  76M with multiple CVAs now with functional deficits  HTN - Amlodipine, Lasix + KCl  DM2 - Metformin, Lantus, ISS  CVA - Lipitor, Aspirin  Anemia - Iron, Epogen x1 ()  UTI - Vantin + Florastor (-)  Parkinson's Disease - Sinemet  Sleep - Melatonin 9mg   Glaucoma - Timolol   GI/Bowel Management - Miralax, Dulcolax PRN, Fleet PRN   Management - Toilet Q2, Bladder Scan/IC Q6, Proscar 5mg (), Flomax 0.8mg (increased from 0.4mg )  Skin - Turn Q2, Nystatin to groin, Lac-Hydrin to LE  Pain - Tylenol PRN  DVT PPX - Heparin Q12, TEDs, SCDs  Diet - Cardiac, CC/Thins, Glucerna TID, Nephrovite, Vit D    Continue comprehensive acute rehab program consisting of 3hrs/day of OT/PT/SLP.

## 2017-01-26 NOTE — PROGRESS NOTE ADULT - SUBJECTIVE AND OBJECTIVE BOX
Internal Medicine Hospitalist Progress Note  Cc : fatigue , had bowel movements today, denies any       HPI:  76Y M with PMH of DM, HTN, admitted for DKA and AMS, admitted to ICU, started on insulin drip, anion gap closed, off insulin drip, on Lantus 15 units. He was also noted to foul smelling discharge from groin, seen bu surgery, looks fungal infection, started on Nystatin powder, improving. MRI brain showed b/l stroke with mild petechial hemorrhage in right occipital lobe, Stroke work up was done. ECHO - EF - 55%, JESUS MANUEL - showed large  and acute DVT in LLE -He was deemed not a good candidate for AC per Neuro and Cardio 2/2 parknison and multiple falls. Hence  - decided to undergo- IVC filter placement on 1/11. He was cleared by neuro and cardio for discharge. HE was discharged   in stable condition to Acute rehab. C/O dysuria , urinary retention , treated  with empiric abx as urine found to be very cloudy and patient asymptomatic , first urine culture - contaminated . Now on Vantin, seen by  and Ledesma placed , started on Flomax/Proscar with plan for TOV in few days . Diabetic education provided .              ROS: as above, all remaining ROS are negative.       BACKGROUND:  MEDICATIONS  (STANDING):  aspirin enteric coated 81milliGRAM(s) Oral daily  amLODIPine   Tablet 10milliGRAM(s) Oral daily  carbidopa/levodopa  25/100 1Tablet(s) Oral three times a day  atorvastatin 20milliGRAM(s) Oral at bedtime  timolol 0.5% Solution 1Drop(s) Both EYES daily  heparin  Injectable 5000Unit(s) SubCutaneous every 12 hours  ammonium lactate 12% Lotion 1Application(s) Topical two times a day  furosemide    Tablet 40milliGRAM(s) Oral daily  potassium chloride    Tablet ER 20milliEquivalent(s) Oral daily  insulin lispro (HumaLOG) corrective regimen sliding scale  SubCutaneous Before meals and at bedtime  nystatin Powder 1Application(s) Topical three times a day  finasteride 5milliGRAM(s) Oral daily  melatonin. 9milliGRAM(s) Oral at bedtime  ferrous    sulfate 325milliGRAM(s) Oral two times a day with meals  Nephro-nate 1Tablet(s) Oral daily  insulin glargine Injectable (LANTUS) 25Unit(s) SubCutaneous at bedtime  polyethylene glycol 3350 17Gram(s) Oral daily  metFORMIN 500milliGRAM(s) Oral two times a day with meals  ergocalciferol 47991Dkfp(s) Oral every week  tamsulosin 0.8milliGRAM(s) Oral at bedtime    MEDICATIONS  (PRN):  acetaminophen   Tablet 650milliGRAM(s) Oral every 6 hours PRN For Temp greater than 38 C (100.4 F)  acetaminophen   Tablet. 650milliGRAM(s) Oral every 6 hours PRN Mild Pain (1 - 3)  sodium biphosphate Rectal Enema 1Enema Rectal daily PRN Constipation  bisacodyl Suppository 10milliGRAM(s) Rectal daily PRN Constipation    Allergies    No Known Allergies    Intolerances            VITALS:  Vital Signs Last 24 Hrs  T(C): 36.3, Max: 37 (01-25 @ 21:44)  T(F): 97.4, Max: 98.6 (01-25 @ 21:44)  HR: 76 (74 - 76)  BP: 136/73 (136/73 - 138/76)  BP(mean): --  RR: 18 (18 - 18)  SpO2: 96% (96% - 96%) Daily     Daily   CAPILLARY BLOOD GLUCOSE  89 (26 Jan 2017 07:56)  59 (26 Jan 2017 07:28)  234 (25 Jan 2017 21:50)  251 (25 Jan 2017 16:41)  207 (25 Jan 2017 12:30)    I&O's Summary      PHYSICAL EXAM:      Constitutional: awake alert , no distress     Neck: supple , no JVD     Respiratory: clear to auscultation     Cardiovascular: regular rate rythm S1 /S2     Gastrointestinal: soft no tenderness , bowel sounds present     Extremities: no edema, venous stasis changes       Neurological: normal speech , generalized weakness    Skin:normal color               LABS:              Radiology :

## 2017-01-27 DIAGNOSIS — N50.89 OTHER SPECIFIED DISORDERS OF THE MALE GENITAL ORGANS: ICD-10-CM

## 2017-01-27 PROCEDURE — 99233 SBSQ HOSP IP/OBS HIGH 50: CPT

## 2017-01-27 PROCEDURE — 73630 X-RAY EXAM OF FOOT: CPT | Mod: 26,LT

## 2017-01-27 PROCEDURE — 76870 US EXAM SCROTUM: CPT | Mod: 26

## 2017-01-27 PROCEDURE — 99232 SBSQ HOSP IP/OBS MODERATE 35: CPT | Mod: GC

## 2017-01-27 RX ORDER — SACCHAROMYCES BOULARDII 250 MG
500 POWDER IN PACKET (EA) ORAL
Qty: 0 | Refills: 0 | Status: DISCONTINUED | OUTPATIENT
Start: 2017-01-27 | End: 2017-02-02

## 2017-01-27 RX ORDER — INSULIN GLARGINE 100 [IU]/ML
22 INJECTION, SOLUTION SUBCUTANEOUS AT BEDTIME
Qty: 0 | Refills: 0 | Status: DISCONTINUED | OUTPATIENT
Start: 2017-01-28 | End: 2017-02-02

## 2017-01-27 RX ORDER — FLUCONAZOLE 150 MG/1
200 TABLET ORAL DAILY
Qty: 0 | Refills: 0 | Status: COMPLETED | OUTPATIENT
Start: 2017-01-27 | End: 2017-01-29

## 2017-01-27 RX ORDER — INSULIN GLARGINE 100 [IU]/ML
15 INJECTION, SOLUTION SUBCUTANEOUS AT BEDTIME
Qty: 0 | Refills: 0 | Status: COMPLETED | OUTPATIENT
Start: 2017-01-27 | End: 2017-01-27

## 2017-01-27 RX ORDER — INSULIN GLARGINE 100 [IU]/ML
22 INJECTION, SOLUTION SUBCUTANEOUS AT BEDTIME
Qty: 0 | Refills: 0 | Status: DISCONTINUED | OUTPATIENT
Start: 2017-01-27 | End: 2017-01-27

## 2017-01-27 RX ORDER — CIPROFLOXACIN LACTATE 400MG/40ML
500 VIAL (ML) INTRAVENOUS EVERY 12 HOURS
Qty: 0 | Refills: 0 | Status: DISCONTINUED | OUTPATIENT
Start: 2017-01-27 | End: 2017-02-02

## 2017-01-27 RX ADMIN — Medication 325 MILLIGRAM(S): at 08:16

## 2017-01-27 RX ADMIN — Medication 500 MILLIGRAM(S): at 21:39

## 2017-01-27 RX ADMIN — Medication 1 APPLICATION(S): at 05:46

## 2017-01-27 RX ADMIN — NYSTATIN CREAM 1 APPLICATION(S): 100000 CREAM TOPICAL at 21:44

## 2017-01-27 RX ADMIN — NYSTATIN CREAM 1 APPLICATION(S): 100000 CREAM TOPICAL at 14:06

## 2017-01-27 RX ADMIN — Medication 40 MILLIGRAM(S): at 05:46

## 2017-01-27 RX ADMIN — NYSTATIN CREAM 1 APPLICATION(S): 100000 CREAM TOPICAL at 05:46

## 2017-01-27 RX ADMIN — TAMSULOSIN HYDROCHLORIDE 0.8 MILLIGRAM(S): 0.4 CAPSULE ORAL at 21:38

## 2017-01-27 RX ADMIN — Medication 6: at 12:21

## 2017-01-27 RX ADMIN — Medication 9 MILLIGRAM(S): at 21:38

## 2017-01-27 RX ADMIN — Medication 12: at 21:44

## 2017-01-27 RX ADMIN — CARBIDOPA AND LEVODOPA 1 TABLET(S): 25; 100 TABLET ORAL at 14:07

## 2017-01-27 RX ADMIN — FLUCONAZOLE 200 MILLIGRAM(S): 150 TABLET ORAL at 18:10

## 2017-01-27 RX ADMIN — AMLODIPINE BESYLATE 10 MILLIGRAM(S): 2.5 TABLET ORAL at 05:46

## 2017-01-27 RX ADMIN — ATORVASTATIN CALCIUM 20 MILLIGRAM(S): 80 TABLET, FILM COATED ORAL at 21:38

## 2017-01-27 RX ADMIN — FINASTERIDE 5 MILLIGRAM(S): 5 TABLET, FILM COATED ORAL at 11:08

## 2017-01-27 RX ADMIN — POLYETHYLENE GLYCOL 3350 17 GRAM(S): 17 POWDER, FOR SOLUTION ORAL at 11:09

## 2017-01-27 RX ADMIN — INSULIN GLARGINE 15 UNIT(S): 100 INJECTION, SOLUTION SUBCUTANEOUS at 23:09

## 2017-01-27 RX ADMIN — METFORMIN HYDROCHLORIDE 500 MILLIGRAM(S): 850 TABLET ORAL at 11:09

## 2017-01-27 RX ADMIN — CARBIDOPA AND LEVODOPA 1 TABLET(S): 25; 100 TABLET ORAL at 05:46

## 2017-01-27 RX ADMIN — Medication 1 DROP(S): at 11:09

## 2017-01-27 RX ADMIN — Medication 500 MILLIGRAM(S): at 18:05

## 2017-01-27 RX ADMIN — Medication 81 MILLIGRAM(S): at 11:08

## 2017-01-27 RX ADMIN — CARBIDOPA AND LEVODOPA 1 TABLET(S): 25; 100 TABLET ORAL at 21:38

## 2017-01-27 RX ADMIN — Medication 20 MILLIEQUIVALENT(S): at 11:08

## 2017-01-27 RX ADMIN — Medication 650 MILLIGRAM(S): at 11:19

## 2017-01-27 RX ADMIN — Medication 4: at 18:01

## 2017-01-27 RX ADMIN — Medication 1 TABLET(S): at 11:08

## 2017-01-27 RX ADMIN — Medication 4: at 08:15

## 2017-01-27 RX ADMIN — Medication 1 APPLICATION(S): at 18:05

## 2017-01-27 RX ADMIN — HEPARIN SODIUM 5000 UNIT(S): 5000 INJECTION INTRAVENOUS; SUBCUTANEOUS at 05:46

## 2017-01-27 RX ADMIN — HEPARIN SODIUM 5000 UNIT(S): 5000 INJECTION INTRAVENOUS; SUBCUTANEOUS at 18:05

## 2017-01-27 RX ADMIN — Medication 325 MILLIGRAM(S): at 18:05

## 2017-01-27 NOTE — PROGRESS NOTE ADULT - PROBLEM SELECTOR PROBLEM 3
Diabetes
Diabetes
Urinary retention
Vitamin D deficiency
Urinary tract infection associated with catheterization of urinary tract, unspecified indwelling urinary catheter type, subsequent encounter
Diabetes
Diabetes

## 2017-01-27 NOTE — CONSULT NOTE ADULT - SUBJECTIVE AND OBJECTIVE BOX
S : 76y year old Male seen at bedside for Left foot necrotic ulceration. Patient denies pain the area.    Patient admits to  (-) Fevers, (-) Chills, (-) Nausea, (-) Vomiting, (-) Shortness of Breath      PMH: High cholesterol  Diabetes  Hypertension    PSH:S/P hip hemiarthroplasty  No significant past surgical history      Allergies:No Known Allergies      Labs:      WBC Trend  8.35 Date (01-23 @ 07:19)  5.28 Date (01-16 @ 08:26)  5.70 Date (01-14 @ 09:27)  10.34 Date (01-11 @ 06:46)  7.15 Date (01-07 @ 08:03)  8.21 Date (01-05 @ 05:53)  8.01 Date (01-04 @ 07:28)      Chem              T(F): 97.6, Max: 97.6 (01-27 @ 05:14)  HR: 86 (81 - 86)  BP: 146/80 (127/69 - 146/80)  RR: 16 (16 - 18)  SpO2: 96% (96% - 96%)  Wt(kg): --    O:   General: Pleasant  male NAD & AOX3.    Integument:  Skin warm, dry and supple bilateral.    Ulceration Left foot, Medial aspect of the hallux, and submet 5, - hyperkeratotic border, wound base Necrotic , - edema, - alycia-wound erythema, - purulence, - fluctuance, - tracking/tunneling, - probe to bone, very superficial.  Hyperkeratotic lesion noted on Right submet 1, all Nails elongated, thickened with subungual debris.  Vascular: Dorsalis Pedis and Posterior Tibial pulses 1/4.  Capillary re-fill time less then 3 seconds digits 1-5 bilateral.    Neuro: Protective sensation diminished to the level of the digits bilateral.    A: Left foot necrotic ulceraions, onychomycosis      P:   Chart reviewed and Patient evaluated  Discussed diagnosis and treatment with patient    Excisional debridement with 15 blade of hyperkeratotic lesion on the right submet 1.  Applied dry sterile dressing on left foot  X-rays ordered    Continue with IV antibiotics As Per ID  Ordered CHELY  Weight bearing/Non-weight bearing  to ------------  Offloading to bilateral Heels.   Discussed importance of daily foot examinations and proper shoe gear and to importance of lower Fasting Blood Glucose levels.   Podiatry will follow while in house.  Discussed with Attending ------ S : 76y year old Male seen at bedside for Left foot necrotic ulceration. Patient denies pain the area.    Patient admits to  (-) Fevers, (-) Chills, (-) Nausea, (-) Vomiting, (-) Shortness of Breath      PMH: High cholesterol  Diabetes  Hypertension    PSH:S/P hip hemiarthroplasty  No significant past surgical history      Allergies:No Known Allergies      Labs:      WBC Trend  8.35 Date (01-23 @ 07:19)  5.28 Date (01-16 @ 08:26)  5.70 Date (01-14 @ 09:27)  10.34 Date (01-11 @ 06:46)  7.15 Date (01-07 @ 08:03)  8.21 Date (01-05 @ 05:53)  8.01 Date (01-04 @ 07:28)      Chem              T(F): 97.6, Max: 97.6 (01-27 @ 05:14)  HR: 86 (81 - 86)  BP: 146/80 (127/69 - 146/80)  RR: 16 (16 - 18)  SpO2: 96% (96% - 96%)  Wt(kg): --    O:   General: Pleasant  male NAD & AOX3.    Integument:  Skin warm, dry and supple bilateral.    Ulceration Left foot, Medial aspect of the hallux, and submet 5, - hyperkeratotic border, wound base Necrotic , - edema, - alycia-wound erythema, - purulence, - fluctuance, - tracking/tunneling, - probe to bone, very superficial.  Hyperkeratotic lesion noted on Right submet 1, all Nails elongated, thickened with subungual debris.  Vascular: Dorsalis Pedis and Posterior Tibial pulses 1/4.  Capillary re-fill time less then 3 seconds digits 1-5 bilateral.    Neuro: Protective sensation diminished to the level of the digits bilateral.    A: Left foot necrotic ulceraions, onychomycosis      P:   Chart reviewed and Patient evaluated  Discussed diagnosis and treatment with patient    Excisional debridement with 15 blade of hyperkeratotic lesion on the right submet 1.  Applied dry sterile dressing on left foot  X-rays ordered  Ordered Iodosorb  Debridement of nails currently not possible due to lack of instrumentation in hospital, at this time. Podiatry will try to acquire instrumentation,    Patient advised to follow up with podiatrist as outpatient.   Discussed importance of daily foot examinations and proper shoe gear and to importance of lower Fasting Blood Glucose levels.   Podiatry will follow while in house.  Discussed with Attending Dr. Marquis.

## 2017-01-27 NOTE — PROGRESS NOTE ADULT - PROBLEM SELECTOR PLAN 5
consider low dose zoloft , emotional support given
ergocalciferol weekly for 12 weeks
iron studies , vit b 12 level reviewed , cronic iron deficiency, increase iron to bid, add vit c folic acid   epogen 1 dose ordered
stable
on flomax ,  healy cathater inserted few days ago , void trial in am

## 2017-01-27 NOTE — PROGRESS NOTE ADULT - ASSESSMENT
This is 76Y M with PMH of DM, HTN, admitted for DKA and AMS, admitted to ICU, started on insulin drip, anion gap closed, off insulin drip, on Lantus 22 units. He was also noted to foul smelling discharge from groin, seen bu surgery, looks fungal infection, started on Nystatin powder, improving. MRI brain showed b/l stroke with mild petechial hemorrhage in right occipital lobe. JESUS MANUEL - Large PFO. acute DVT in LLE - not a good candidate for AC per Neuro and Cardio - s/p - IVC filter placement. S/P Ledesma cath placement , feeling better , c/o b/l LE itching /pain  had constipation that resolved, noted to have scrotal palpable hard area on the left

## 2017-01-27 NOTE — PROGRESS NOTE ADULT - PROBLEM SELECTOR PLAN 6
consider low dose zoloft , emotional support given
controlled on current regimen
continue flomax ,  healy removed voiding
on flomax ,  healy cathater inserted few days ago , void trial per PM&R team   avoid constipation

## 2017-01-27 NOTE — PROGRESS NOTE ADULT - SUBJECTIVE AND OBJECTIVE BOX
HISTORY OF PRESENT ILLNESS  76M was admitted to [facility] on 16 after not feeling well for a few days. Workup showed his FS was >1100 and in DKA. Course was complicated by Tinea in the groin, lethargy on  where a brain MRI showed acute bilateral supratentorial and infratentorial infarcts with mild petechial hemorrhage in right occipital lobe. Source was found to be from PFO on JESUS MANUEL done . Course was further complicated by an acute Left LE DVT s/p IVCF on . Admitted to acute rehab on .    PMHx - HLD, DM 2, HTN, Parkinson's Disease    TODAY'S SUBJECTIVE & REVIEW OF SYMPTOMS  [ ] Constitutional WNL          [X] Cardio WNL              [X] Resp WNL           [X] GI WNL                          [ ]  WNL                   [X] Heme WNL              [X] Endo WNL                      [X] Skin WNL                 [X] MSK WNL            [X] Neuro WNL                     [ ] Cognitive WNL           [X] Psych WNL    Patient urinating on own x2 this AM, but was IC x2 overnight due to high volume.  Has not had podiatry for nail and foot evaluation. Will reconsult.       VITAL SIGNS:  T(C): 36.4  T(F): 97.6, Max: 97.6 (- @ 05:14)  HR: 86 (81 - 86)  BP: 146/80 (127/69 - 146/80)  RR:  (16 - 18)  SpO2:  (96% - 96%)  Wt(kg): --      PHYSICAL EXAM  Constitutional - NAD, Comfortable  HEENT - NCAT, EOMI  Neck - Supple, No limited ROM  Chest - CTA bilaterally, No wheeze, No rhonchi, No crackles  Cardiovascular - RRR, S1S2, No murmurs  Abdomen - BS+, Soft, NTND  Extremities - Improved BLE edema - 1+  Neurologic Exam -                    Cognitive - Awake, Alert, AAO to self, place     Communication - Fluent, No dysarthria     Cranial Nerves - CN 2-12 grossly intact     Motor -     Generalized weakness, No focal deficits      Sensory - Decreased to LT in BLE  Psychiatric - Mood depressed, Affect Flat  Skin - Bilateral LE venous stasis changes, Left medial great toe bruise, Right lateral 5th MT bruise, Groin rashes  Wounds - None    FUNCTIONAL PROGRESS  Gait - 60' RW min A  ADLs - Toileting total A, LE Dress total A  Transfers - sit-to-stand mod A  Functional transfer - Bed SPT min A      RECENT LABS                        8.7    8.35  )-----------( 273      ( 2017 07:19 )             26.0     2017 07:19    135    |  97     |  19.0   ----------------------------<  172    4.4     |  24.0   |  0.84     Ca    8.3        2017 07:19    Urinalysis Basic - ( 2017 01:20 )  Color: Yellow / Appearance: Cloudy / S.020 / pH: x  Gluc: x / Ketone: Negative  / Bili: Negative / Urobili: Negative   Blood: x / Protein: 500 mg/dL / Nitrite: Negative   Leuk Esterase: Moderate / RBC: 3-5 /HPF / WBC 26-50   Sq Epi: x / Non Sq Epi: Occasional / Bacteria: Occasional    UCx  - Pending    Vitamin D  -     FS Last 24HRS  205 (2017 08:12)  157 (2017 21:17)  278 (2017 16:34)  214 (2017 13:12)    RADIOLOGY & OTHER RESULTS  ---      CURRENT MEDICATIONS  MEDICATIONS  (STANDING):  aspirin enteric coated 81milliGRAM(s) Oral daily  amLODIPine   Tablet 10milliGRAM(s) Oral daily  carbidopa/levodopa  25/100 1Tablet(s) Oral three times a day  atorvastatin 20milliGRAM(s) Oral at bedtime  timolol 0.5% Solution 1Drop(s) Both EYES daily  heparin  Injectable 5000Unit(s) SubCutaneous every 12 hours  ammonium lactate 12% Lotion 1Application(s) Topical two times a day  furosemide    Tablet 40milliGRAM(s) Oral daily  potassium chloride    Tablet ER 20milliEquivalent(s) Oral daily  insulin lispro (HumaLOG) corrective regimen sliding scale  SubCutaneous Before meals and at bedtime  nystatin Powder 1Application(s) Topical three times a day  finasteride 5milliGRAM(s) Oral daily  melatonin. 9milliGRAM(s) Oral at bedtime  ferrous    sulfate 325milliGRAM(s) Oral two times a day with meals  Nephro-nate 1Tablet(s) Oral daily  polyethylene glycol 3350 17Gram(s) Oral daily  ergocalciferol 26610Xuuf(s) Oral every week  tamsulosin 0.8milliGRAM(s) Oral at bedtime  metFORMIN 500milliGRAM(s) Oral daily    MEDICATIONS  (PRN):  acetaminophen   Tablet 650milliGRAM(s) Oral every 6 hours PRN For Temp greater than 38 C (100.4 F)  acetaminophen   Tablet. 650milliGRAM(s) Oral every 6 hours PRN Mild Pain (1 - 3)  sodium biphosphate Rectal Enema 1Enema Rectal daily PRN Constipation  bisacodyl Suppository 10milliGRAM(s) Rectal daily PRN Constipation      ASSESSMENT & PLAN  76M with multiple CVAs now with functional deficits  HTN - Amlodipine, Lasix + KCl  DM2 - Metformin, Lantus, ISS  CVA - Lipitor, Aspirin  Anemia - Iron, Epogen x1 ()  UTI - Vantin + Florastor (-)  Parkinson's Disease - Sinemet  Sleep - Melatonin 9mg   Glaucoma - Timolol   GI/Bowel Management - Miralax, Dulcolax PRN, Fleet PRN   Management - Toilet Q2, Bladder Scan/IC Q6, Proscar 5mg (), Flomax 0.8mg (increased from 0.4mg )  Skin - Turn Q2, Nystatin to groin, Lac-Hydrin to LE  Pain - Tylenol PRN  DVT PPX - Heparin Q12, TEDs, SCDs  Diet - Cardiac, CC/Thins, Glucerna TID, Nephrovite, Vit D    Continue comprehensive acute rehab program consisting of 3hrs/day of OT/PT/SLP.

## 2017-01-27 NOTE — PROGRESS NOTE ADULT - SUBJECTIVE AND OBJECTIVE BOX
Internal Medicine Hospitalist Progress Note  Cc : fatigue , noted to have hard tenderness on the left scrotom by nurses no pain       HPI:  76M was admitted to [facility] on 12/31/16 after not feeling well for a few days. Workup showed his FS was >1100 and in DKA. Course was complicated by Tinea in the groin, lethargy on 1/2 where a brain MRI showed acute bilateral supratentorial and infratentorial infarcts with mild petechial hemorrhage in right occipital lobe. Source was found to be from PFO on JESUS MANUEL done 1/9. Course was further complicated by an acute Left LE DVT s/p IVCF on 1/11. Admitted to acute rehab on 1/13.Treated for UTI, healy cathater for retention removed few days ago , now retaining staright catheter ordered               ROS: as above, all remaining ROS are negative.       BACKGROUND:  MEDICATIONS  (STANDING):  aspirin enteric coated 81milliGRAM(s) Oral daily  amLODIPine   Tablet 10milliGRAM(s) Oral daily  carbidopa/levodopa  25/100 1Tablet(s) Oral three times a day  atorvastatin 20milliGRAM(s) Oral at bedtime  timolol 0.5% Solution 1Drop(s) Both EYES daily  heparin  Injectable 5000Unit(s) SubCutaneous every 12 hours  ammonium lactate 12% Lotion 1Application(s) Topical two times a day  furosemide    Tablet 40milliGRAM(s) Oral daily  potassium chloride    Tablet ER 20milliEquivalent(s) Oral daily  insulin lispro (HumaLOG) corrective regimen sliding scale  SubCutaneous Before meals and at bedtime  nystatin Powder 1Application(s) Topical three times a day  finasteride 5milliGRAM(s) Oral daily  melatonin. 9milliGRAM(s) Oral at bedtime  ferrous    sulfate 325milliGRAM(s) Oral two times a day with meals  Nephro-nate 1Tablet(s) Oral daily  insulin glargine Injectable (LANTUS) 25Unit(s) SubCutaneous at bedtime  polyethylene glycol 3350 17Gram(s) Oral daily  metFORMIN 500milliGRAM(s) Oral two times a day with meals  ergocalciferol 66167Fhyu(s) Oral every week  tamsulosin 0.8milliGRAM(s) Oral at bedtime    MEDICATIONS  (PRN):  acetaminophen   Tablet 650milliGRAM(s) Oral every 6 hours PRN For Temp greater than 38 C (100.4 F)  acetaminophen   Tablet. 650milliGRAM(s) Oral every 6 hours PRN Mild Pain (1 - 3)  sodium biphosphate Rectal Enema 1Enema Rectal daily PRN Constipation  bisacodyl Suppository 10milliGRAM(s) Rectal daily PRN Constipation    Allergies    No Known Allergies    Intolerances            VITALS:  Vital Signs Last 24 Hrs  T(C): 36.4, Max: 36.4 (01-27 @ 05:14)  T(F): 97.6, Max: 97.6 (01-27 @ 05:14)  HR: 86 (81 - 86)  BP: 146/80 (127/69 - 146/80)  BP(mean): --  RR: 16 (16 - 18)  SpO2: 96% (96% - 96%)  Daily   CAPILLARY BLOOD GLUCOSE  251 (27 Jan 2017 11:47)  205 (27 Jan 2017 08:12)  157 (26 Jan 2017 21:17)  278 (26 Jan 2017 16:34)  214 (26 Jan 2017 13:12)    PHYSICAL EXAM:      Constitutional: awake alert , no distress     Neck: supple , no JVD     Respiratory: clear to auscultation     Cardiovascular: regular rate rythm S1 /S2     Gastrointestinal: soft no tenderness , bowel sounds present     Extremities: no edema, venous stasis changes       Neurological: normal speech , generalized weakness    Skin:normal color               LABS:    Culture - Urine (01.15.17 @ 22:41)    Specimen Source: .Urine Clean Catch (Midstream)    Culture Results:   7,000 CFU/ml Candida albicans              Radiology :

## 2017-01-27 NOTE — PROGRESS NOTE ADULT - PROBLEM SELECTOR PLAN 3
FU CULTURE  ROCEPHIN FOR NOW
continue flomax , proscar  straight cath as needed
ergocalciferol weekly for 12 weeks
on lantus and sliding scale, increase lantus dose for better control
on lantus and sliding scale, increased lantus dose to 25 unit  for better control  provide bedtime snack
on lantus and sliding scale
sliding scale , lantus, metformin

## 2017-01-27 NOTE — PROGRESS NOTE ADULT - PROBLEM SELECTOR PLAN 4
an aspirin , continue rehab
iron studies , vit b 12 level reviewed , cronic iron deficiency, increase iron to bid, add vit c folic acid   epogen 1 dose ordered
iron studies , vit b 12 level reviewed , cronic iron deficiency, increase iron to bid, add vit c folic acid   epogen 1 dose ordered
on lantus and sliding scale, increased lantus dose to 25 unit  for better control, metformin added   provide bedtime snack
controlled
iron studies , vit b 12 level

## 2017-01-28 LAB
ANION GAP SERPL CALC-SCNC: 11 MMOL/L — SIGNIFICANT CHANGE UP (ref 5–17)
APPEARANCE UR: ABNORMAL
BACTERIA # UR AUTO: ABNORMAL
BASOPHILS # BLD AUTO: 0 K/UL — SIGNIFICANT CHANGE UP (ref 0–0.2)
BASOPHILS NFR BLD AUTO: 0.6 % — SIGNIFICANT CHANGE UP (ref 0–2)
BILIRUB UR-MCNC: NEGATIVE — SIGNIFICANT CHANGE UP
BUN SERPL-MCNC: 25 MG/DL — HIGH (ref 8–20)
CALCIUM SERPL-MCNC: 9 MG/DL — SIGNIFICANT CHANGE UP (ref 8.6–10.2)
CHLORIDE SERPL-SCNC: 94 MMOL/L — LOW (ref 98–107)
CO2 SERPL-SCNC: 27 MMOL/L — SIGNIFICANT CHANGE UP (ref 22–29)
COLOR SPEC: YELLOW — SIGNIFICANT CHANGE UP
CREAT SERPL-MCNC: 0.99 MG/DL — SIGNIFICANT CHANGE UP (ref 0.5–1.3)
DIFF PNL FLD: ABNORMAL
EOSINOPHIL # BLD AUTO: 0.4 K/UL — SIGNIFICANT CHANGE UP (ref 0–0.5)
EOSINOPHIL NFR BLD AUTO: 5.5 % — HIGH (ref 0–5)
EPI CELLS # UR: SIGNIFICANT CHANGE UP
GLUCOSE SERPL-MCNC: 277 MG/DL — HIGH (ref 70–115)
GLUCOSE UR QL: NEGATIVE MG/DL — SIGNIFICANT CHANGE UP
HCT VFR BLD CALC: 27.8 % — LOW (ref 42–52)
HGB BLD-MCNC: 9.3 G/DL — LOW (ref 14–18)
KETONES UR-MCNC: ABNORMAL
LEUKOCYTE ESTERASE UR-ACNC: ABNORMAL
LYMPHOCYTES # BLD AUTO: 1.6 K/UL — SIGNIFICANT CHANGE UP (ref 1–4.8)
LYMPHOCYTES # BLD AUTO: 20.4 % — SIGNIFICANT CHANGE UP (ref 20–55)
MAGNESIUM SERPL-MCNC: 1.8 MG/DL — SIGNIFICANT CHANGE UP (ref 1.8–2.5)
MCHC RBC-ENTMCNC: 31.6 PG — HIGH (ref 27–31)
MCHC RBC-ENTMCNC: 33.5 G/DL — SIGNIFICANT CHANGE UP (ref 32–36)
MCV RBC AUTO: 94.6 FL — HIGH (ref 80–94)
MONOCYTES # BLD AUTO: 0.8 K/UL — SIGNIFICANT CHANGE UP (ref 0–0.8)
MONOCYTES NFR BLD AUTO: 9.7 % — SIGNIFICANT CHANGE UP (ref 3–10)
NEUTROPHILS # BLD AUTO: 4.9 K/UL — SIGNIFICANT CHANGE UP (ref 1.8–8)
NEUTROPHILS NFR BLD AUTO: 63.3 % — SIGNIFICANT CHANGE UP (ref 37–73)
NITRITE UR-MCNC: NEGATIVE — SIGNIFICANT CHANGE UP
PH UR: 5 — SIGNIFICANT CHANGE UP (ref 4.8–8)
PHOSPHATE SERPL-MCNC: 3.7 MG/DL — SIGNIFICANT CHANGE UP (ref 2.4–4.7)
PLATELET # BLD AUTO: 305 K/UL — SIGNIFICANT CHANGE UP (ref 150–400)
POTASSIUM SERPL-MCNC: 4.2 MMOL/L — SIGNIFICANT CHANGE UP (ref 3.5–5.3)
POTASSIUM SERPL-SCNC: 4.2 MMOL/L — SIGNIFICANT CHANGE UP (ref 3.5–5.3)
PROT UR-MCNC: 100 MG/DL
RBC # BLD: 2.94 M/UL — LOW (ref 4.6–6.2)
RBC # FLD: 13.7 % — SIGNIFICANT CHANGE UP (ref 11–15.6)
RBC CASTS # UR COMP ASSIST: ABNORMAL /HPF (ref 0–4)
SODIUM SERPL-SCNC: 132 MMOL/L — LOW (ref 135–145)
SP GR SPEC: 1.01 — SIGNIFICANT CHANGE UP (ref 1.01–1.02)
UROBILINOGEN FLD QL: NEGATIVE MG/DL — SIGNIFICANT CHANGE UP
WBC # BLD: 7.8 K/UL — SIGNIFICANT CHANGE UP (ref 4.8–10.8)
WBC # FLD AUTO: 7.8 K/UL — SIGNIFICANT CHANGE UP (ref 4.8–10.8)
WBC UR QL: >50

## 2017-01-28 PROCEDURE — 99233 SBSQ HOSP IP/OBS HIGH 50: CPT | Mod: GC

## 2017-01-28 PROCEDURE — 99233 SBSQ HOSP IP/OBS HIGH 50: CPT

## 2017-01-28 RX ADMIN — Medication 500 MILLIGRAM(S): at 19:14

## 2017-01-28 RX ADMIN — Medication 1 DROP(S): at 11:22

## 2017-01-28 RX ADMIN — NYSTATIN CREAM 1 APPLICATION(S): 100000 CREAM TOPICAL at 22:03

## 2017-01-28 RX ADMIN — INSULIN GLARGINE 22 UNIT(S): 100 INJECTION, SOLUTION SUBCUTANEOUS at 22:03

## 2017-01-28 RX ADMIN — Medication 500 MILLIGRAM(S): at 05:00

## 2017-01-28 RX ADMIN — Medication 9 MILLIGRAM(S): at 21:57

## 2017-01-28 RX ADMIN — Medication 325 MILLIGRAM(S): at 09:56

## 2017-01-28 RX ADMIN — Medication 4: at 09:57

## 2017-01-28 RX ADMIN — CARBIDOPA AND LEVODOPA 1 TABLET(S): 25; 100 TABLET ORAL at 05:00

## 2017-01-28 RX ADMIN — METFORMIN HYDROCHLORIDE 500 MILLIGRAM(S): 850 TABLET ORAL at 11:21

## 2017-01-28 RX ADMIN — TAMSULOSIN HYDROCHLORIDE 0.8 MILLIGRAM(S): 0.4 CAPSULE ORAL at 21:57

## 2017-01-28 RX ADMIN — Medication 650 MILLIGRAM(S): at 03:50

## 2017-01-28 RX ADMIN — Medication 325 MILLIGRAM(S): at 19:12

## 2017-01-28 RX ADMIN — Medication 650 MILLIGRAM(S): at 03:07

## 2017-01-28 RX ADMIN — HEPARIN SODIUM 5000 UNIT(S): 5000 INJECTION INTRAVENOUS; SUBCUTANEOUS at 19:14

## 2017-01-28 RX ADMIN — HEPARIN SODIUM 5000 UNIT(S): 5000 INJECTION INTRAVENOUS; SUBCUTANEOUS at 05:00

## 2017-01-28 RX ADMIN — NYSTATIN CREAM 1 APPLICATION(S): 100000 CREAM TOPICAL at 05:00

## 2017-01-28 RX ADMIN — NYSTATIN CREAM 1 APPLICATION(S): 100000 CREAM TOPICAL at 19:11

## 2017-01-28 RX ADMIN — Medication 81 MILLIGRAM(S): at 11:20

## 2017-01-28 RX ADMIN — FLUCONAZOLE 200 MILLIGRAM(S): 150 TABLET ORAL at 19:09

## 2017-01-28 RX ADMIN — Medication 500 MILLIGRAM(S): at 05:01

## 2017-01-28 RX ADMIN — AMLODIPINE BESYLATE 10 MILLIGRAM(S): 2.5 TABLET ORAL at 05:00

## 2017-01-28 RX ADMIN — Medication 1 APPLICATION(S): at 19:09

## 2017-01-28 RX ADMIN — POLYETHYLENE GLYCOL 3350 17 GRAM(S): 17 POWDER, FOR SOLUTION ORAL at 11:22

## 2017-01-28 RX ADMIN — Medication 4: at 19:12

## 2017-01-28 RX ADMIN — FINASTERIDE 5 MILLIGRAM(S): 5 TABLET, FILM COATED ORAL at 11:21

## 2017-01-28 RX ADMIN — Medication 6: at 12:45

## 2017-01-28 RX ADMIN — Medication 10: at 22:03

## 2017-01-28 RX ADMIN — CARBIDOPA AND LEVODOPA 1 TABLET(S): 25; 100 TABLET ORAL at 19:10

## 2017-01-28 RX ADMIN — Medication 1 TABLET(S): at 11:21

## 2017-01-28 RX ADMIN — ATORVASTATIN CALCIUM 20 MILLIGRAM(S): 80 TABLET, FILM COATED ORAL at 21:57

## 2017-01-28 RX ADMIN — Medication 40 MILLIGRAM(S): at 05:00

## 2017-01-28 RX ADMIN — Medication 20 MILLIEQUIVALENT(S): at 11:23

## 2017-01-28 RX ADMIN — CARBIDOPA AND LEVODOPA 1 TABLET(S): 25; 100 TABLET ORAL at 21:57

## 2017-01-28 RX ADMIN — Medication 1 APPLICATION(S): at 05:00

## 2017-01-28 NOTE — PROGRESS NOTE ADULT - SUBJECTIVE AND OBJECTIVE BOX
INTERVAL HPI/OVERNIGHT EVENTS:    MEDICATIONS  (STANDING):  aspirin enteric coated 81milliGRAM(s) Oral daily  amLODIPine   Tablet 10milliGRAM(s) Oral daily  carbidopa/levodopa  25/100 1Tablet(s) Oral three times a day  atorvastatin 20milliGRAM(s) Oral at bedtime  timolol 0.5% Solution 1Drop(s) Both EYES daily  heparin  Injectable 5000Unit(s) SubCutaneous every 12 hours  ammonium lactate 12% Lotion 1Application(s) Topical two times a day  furosemide    Tablet 40milliGRAM(s) Oral daily  potassium chloride    Tablet ER 20milliEquivalent(s) Oral daily  insulin lispro (HumaLOG) corrective regimen sliding scale  SubCutaneous Before meals and at bedtime  nystatin Powder 1Application(s) Topical three times a day  finasteride 5milliGRAM(s) Oral daily  melatonin. 9milliGRAM(s) Oral at bedtime  ferrous    sulfate 325milliGRAM(s) Oral two times a day with meals  Nephro-nate 1Tablet(s) Oral daily  polyethylene glycol 3350 17Gram(s) Oral daily  ergocalciferol 14756Tewg(s) Oral every week  tamsulosin 0.8milliGRAM(s) Oral at bedtime  metFORMIN 500milliGRAM(s) Oral daily  freetext medication  - 1Application(s) Topical daily  fluconAZOLE   Tablet 200milliGRAM(s) Oral daily  ciprofloxacin     Tablet 500milliGRAM(s) Oral every 12 hours  saccharomyces boulardii 500milliGRAM(s) Oral two times a day  insulin glargine Injectable (LANTUS) 22Unit(s) SubCutaneous at bedtime    MEDICATIONS  (PRN):  acetaminophen   Tablet 650milliGRAM(s) Oral every 6 hours PRN For Temp greater than 38 C (100.4 F)  acetaminophen   Tablet. 650milliGRAM(s) Oral every 6 hours PRN Mild Pain (1 - 3)  sodium biphosphate Rectal Enema 1Enema Rectal daily PRN Constipation  bisacodyl Suppository 10milliGRAM(s) Rectal daily PRN Constipation      Allergies    No Known Allergies    Intolerances        Vital Signs Last 24 Hrs  T(C): 36.7, Max: 37 (01-27 @ 23:12)  T(F): 98, Max: 98.6 (01-27 @ 23:12)  HR: 76 (76 - 84)  BP: 128/72 (128/72 - 132/68)  BP(mean): --  RR: 16 (16 - 18)  SpO2: 97% (96% - 97%)     ON PE:  General: alert and awake  Abdomen:  soft ND/NT BS+ No flank pain  : Bladder not distended.  Both testis bilaterally descended. No fluctuance bilaterally. There is a left testis phlegmon consistent with left epididymo-orchitis.      LABS:                RADIOLOGY & ADDITIONAL TESTS:

## 2017-01-28 NOTE — PROGRESS NOTE ADULT - ASSESSMENT
This is 76Y M with PMH of DM, HTN, admitted for DKA and AMS, admitted to ICU, started on insulin drip, anion gap closed, off insulin drip, on Lantus 22 units. He was also noted to foul smelling discharge from groin, seen bu surgery, looks fungal infection, started on Nystatin powder, improving. MRI brain showed b/l stroke with mild petechial hemorrhage in right occipital lobe. JESUS MANUEL - Large PFO. acute DVT in LLE - not a good candidate for AC per Neuro and Cardio - s/p - IVC filter placement. S/P Ledesma cath placement , feeling better , c/o b/l LE itching /pain  had constipation that resolved, noted to have scrotal palpable hard area on the left       Problem/Plan - 1:  ·  Problem: Diabetes.  Plan: sliding scale , lantus, metformin adjust increase dose of lantus as needed.     Problem/Plan - 2:  ·  Problem: Scrotal fullness.  Plan: consider urology reevaluation and US of scrotum.     Problem/Plan - 3:  ·  Problem: Urinary retention.  Plan: continue flomax , proscar  straight cath as needed.     Problem/Plan - 4:  ·  Problem: CVA (cerebral vascular accident).  Plan: an aspirin , continue rehab.     Problem/Plan - 5:  ·  Problem: Vitamin D deficiency.  Plan: ergocalciferol weekly for 12 weeks.     Problem/Plan - 6:  Problem: Essential hypertension. Plan: controlled on current regimen.    Problem/Plan - 7:  ·  Problem: Anemia.  Plan: stable. This is 76Y M with PMH of DM, HTN, admitted for DKA and AMS, admitted to ICU, started on insulin drip, anion gap closed, off insulin drip, on Lantus 22 units. He was also noted to foul smelling discharge from groin, seen bu surgery, looks fungal infection, started on Nystatin powder, improving. MRI brain showed b/l stroke with mild petechial hemorrhage in right occipital lobe. JESUS MANUEL - Large PFO. acute DVT in LLE - not a good candidate for AC per Neuro and Cardio - s/p - IVC filter placement. S/P Ledesma cath placement , feeling better , c/o b/l LE itching /pain  had constipation that resolved, noted to have scrotal palpable hard area on the left , u/s reviewed by  - L epididymitis/orchitis , no abscess , s/p syncopal episode , likely vasoagal      Problem/Plan - 1:  ·  Problem: Diabetes.  Plan: sliding scale , lantus, metformin adjust increase dose of lantus as needed , will consider to increase Metformin to BID if f/s stay on higher side    Problem/Plan - 2:  ·  Problem: Scrotal fullness/ tenderness , secondary to L epididymitis/orchitis  Plan: on PO abx as per     Problem/Plan - 3:  ·  Problem: Urinary retention.  Plan: continue flomax / proscar  straight cath as needed.     Problem/Plan - 4:  ·  Problem: CVA (cerebral vascular accident).  Plan: an aspirin , continue rehab.     Problem/Plan - 5:  ·  Problem: Vitamin D deficiency.  Plan: ergocalciferol weekly for 12 weeks.     Problem/Plan - 6:  Problem: Essential hypertension. Plan: controlled on current regimen.    Problem/Plan - 7:  ·  Problem: Anemia.  Plan: stable.     Problem/Plan -8: Syncopal episode - likely vasovagal , will observe

## 2017-01-28 NOTE — PROGRESS NOTE ADULT - SUBJECTIVE AND OBJECTIVE BOX
HISTORY OF PRESENT ILLNESS  76M was admitted to [facility] on 16 after not feeling well for a few days. Workup showed his FS was >1100 and in DKA. Course was complicated by Tinea in the groin, lethargy on  where a brain MRI showed acute bilateral supratentorial and infratentorial infarcts with mild petechial hemorrhage in right occipital lobe. Source was found to be from PFO on JESUS MANUEL done . Course was further complicated by an acute Left LE DVT s/p IVCF on . Admitted to acute rehab on .    PMHx - HLD, DM 2, HTN, Parkinson's Disease    TODAY'S SUBJECTIVE & REVIEW OF SYMPTOMS  [ ] Constitutional WNL          [X] Cardio WNL              [X] Resp WNL           [X] GI WNL                          [ ]  WNL                   [X] Heme WNL              [X] Endo WNL                      [X] Skin WNL                 [X] MSK WNL            [X] Neuro WNL                     [ ] Cognitive WNL           [X] Psych WNL    No acute events overnight, though did need 12 units ISS for HS coverage - given lower Lantus 15 U instead of 22 U  Patient urinating on own in AM, but requires IC overnight.  Seen by podiatry yesterday and had diabetic ulcer debrided, foot XR negative for osteomyelitis.  Seen by urology this AM for scrotal swelling - no clinical abscess, left epididymo-orchitis: treat with Cipro & scrotal elevation.  This AM had rapid response due to unresponsiveness on toilet transfer with therapy --> likely vasovagal.  Labs drawn, EKG no changes, FS 200s, BP okay.    VITAL SIGNS:  T(C): 36.7  T(F): 98, Max: 98.6 ( @ 23:12)  HR: 76 (76 - 84)  BP: 128/72 (128/72 - 132/68)  RR:  (16 - 18)  SpO2:  (96% - 97%)  Wt(kg): --    PHYSICAL EXAM  Constitutional - NAD, Comfortable  HEENT - NCAT, EOMI  Neck - Supple, No limited ROM  Chest - CTA bilaterally, No wheeze, No rhonchi, No crackles  Cardiovascular - RRR, S1S2, No murmurs  Abdomen - BS+, Soft, NTND  Extremities - Improved BLE edema - 1+  Neurologic Exam -                    Cognitive - Awake, Alert, AAO to self, place     Communication - Fluent, No dysarthria     Cranial Nerves - CN 2-12 grossly intact     Motor -     Generalized weakness, No focal deficits      Sensory - Decreased to LT in BLE  Psychiatric - Mood depressed, Affect Flat  Skin - Bilateral LE venous stasis changes, Left medial great toe bruise, Right lateral 5th MT bruise, Groin rashes  Wounds - None    FUNCTIONAL PROGRESS  Gait - 60' RW min A  ADLs - Toileting total A, LE Dress total A  Transfers - sit-to-stand mod A  Functional transfer - Bed SPT min A    RECENT LABS                        8.7    8.35  )-----------( 273      ( 2017 07:19 )             26.0     2017 07:19    135    |  97     |  19.0   ----------------------------<  172    4.4     |  24.0   |  0.84     Ca    8.3        2017 07:19    Urinalysis Basic - ( 2017 01:20 )  Color: Yellow / Appearance: Cloudy / S.020 / pH: x  Gluc: x / Ketone: Negative  / Bili: Negative / Urobili: Negative   Blood: x / Protein: 500 mg/dL / Nitrite: Negative   Leuk Esterase: Moderate / RBC: 3-5 /HPF / WBC 26-50   Sq Epi: x / Non Sq Epi: Occasional / Bacteria: Occasional    UCx  - Pending    Vitamin D  -     FS Last 24HRS  193 (2017 04:43)  382 (2017 23:12)  437 (2017 21:45)  237 (2017 18:00)  251 (2017 11:47)    RADIOLOGY & OTHER RESULTS  Left Foot XR ()  1.    No  acute  fracture  or  dislocation  of  the  left  foot.  2.    No  definite  radiographic  evidence  of  osteomyelitis.    Scrotal U/S ()  1.      Right  hemiscrotum:    Epididymitis.  2.      Left  hemiscrotum:    Epididymoorchitis.    1.7  cm  abscess  within  the  epididymal  tail.    CURRENT MEDICATIONS  MEDICATIONS  (STANDING):  aspirin enteric coated 81milliGRAM(s) Oral daily  amLODIPine   Tablet 10milliGRAM(s) Oral daily  carbidopa/levodopa  25/100 1Tablet(s) Oral three times a day  atorvastatin 20milliGRAM(s) Oral at bedtime  timolol 0.5% Solution 1Drop(s) Both EYES daily  heparin  Injectable 5000Unit(s) SubCutaneous every 12 hours  ammonium lactate 12% Lotion 1Application(s) Topical two times a day  furosemide    Tablet 40milliGRAM(s) Oral daily  potassium chloride    Tablet ER 20milliEquivalent(s) Oral daily  insulin lispro (HumaLOG) corrective regimen sliding scale  SubCutaneous Before meals and at bedtime  nystatin Powder 1Application(s) Topical three times a day  finasteride 5milliGRAM(s) Oral daily  melatonin. 9milliGRAM(s) Oral at bedtime  ferrous    sulfate 325milliGRAM(s) Oral two times a day with meals  Nephro-nate 1Tablet(s) Oral daily  polyethylene glycol 3350 17Gram(s) Oral daily  ergocalciferol 60099Sqlz(s) Oral every week  tamsulosin 0.8milliGRAM(s) Oral at bedtime  metFORMIN 500milliGRAM(s) Oral daily  freetext medication  - 1Application(s) Topical daily  fluconAZOLE   Tablet 200milliGRAM(s) Oral daily  ciprofloxacin     Tablet 500milliGRAM(s) Oral every 12 hours  saccharomyces boulardii 500milliGRAM(s) Oral two times a day  insulin glargine Injectable (LANTUS) 22Unit(s) SubCutaneous at bedtime    MEDICATIONS  (PRN):  acetaminophen   Tablet 650milliGRAM(s) Oral every 6 hours PRN For Temp greater than 38 C (100.4 F)  acetaminophen   Tablet. 650milliGRAM(s) Oral every 6 hours PRN Mild Pain (1 - 3)  sodium biphosphate Rectal Enema 1Enema Rectal daily PRN Constipation  bisacodyl Suppository 10milliGRAM(s) Rectal daily PRN Constipation    ASSESSMENT & PLAN  76M with multiple CVAs now with functional deficits    HTN - Amlodipine, Lasix + KCl  DM2 - Metformin 500mg (decreased from 500mg BID ), Lantus 22 U (decreased from 25 U ), ISS  CVA - Lipitor, Aspirin  Anemia - Iron, Epogen x1 ()  Candiduria - Diflucan (-)  Left epididymo-orchitis - Cipro + Florastor (-2/3)  Parkinson's Disease - Sinemet  Sleep - Melatonin 9mg   Glaucoma - Timolol   GI/Bowel Management - Miralax, Dulcolax PRN, Fleet PRN   Management - Toilet Q2, Bladder Scan/IC Q6, Proscar 5mg (), Flomax 0.8mg (increased from 0.4mg )  Skin - Turn Q2, Nystatin to groin, Lac-Hydrin to LE  Pain - Tylenol PRN  DVT PPX - Heparin Q12, TEDs, SCDs  Diet - Cardiac, CC/Thins, Glucerna TID, Nephrovite, Vit D    Continue comprehensive acute rehab program consisting of 3hrs/day of OT/PT/SLP.

## 2017-01-28 NOTE — CHART NOTE - NSCHARTNOTEFT_GEN_A_CORE
Rapid Response PGY 2 note  76Y M with PMH of DM, HTN, admitted for DKA and AMS, admitted to ICU, started on insulin drip, anion gap closed, off insulin drip, on Lantus 15 units. He was also noted to foul smelling discharge from groin, seen bu surgery, looks fungal infection, started on Nystatin powder, improving. MRI brain showed b/l stroke with mild petechial hemorrhage in right occipital lobe, Stroke work up was done. ECHO - EF - 55%, JESUS MANUEL - showed large  and acute DVT in LLE -He was deemed not a good candidate for AC per Neuro and Cardio 2/2 Parkinson and multiple falls. Hence  - decided to undergo- IVC filter placement on 1/11. He was cleared by neuro and cardio for discharge. HE was discharged in stable condition to Acute rehab.    In BIU rapid response was called for unresponsiveness in the     Patient was seen and examined at the bedside by the rapid response team.    Allergies    No Known Allergies    Intolerances        PAST MEDICAL & SURGICAL HISTORY:  High cholesterol  Diabetes  Hypertension  S/P hip hemiarthroplasty: right hip repair june 2014  No significant past surgical history      Vital Signs Last 24 Hrs  Temp: 96.4  HR: 68  BP: 129/69  RR: 17  SpO2: 93%          GENERAL: The patient is awake and alert in no apparent distress.   HEENT: Head is normocephalic and atraumatic. Extraocular muscles are intact. Mucous membranes are moist. No throat erythema/exudates no lymphadenopathy, no JVD,   NECK: Supple.  LUNGS: Clear to auscultation BL without wheezing, rales or rhonchi; respirations unlabored  HEART: Regular rate and rhythm ,+S1/+S2, no murmurs, rubs, gallops  ABDOMEN: Soft, nontender, and nondistended, no rebound, guarding rigidity, bowel sounds in all 4 quadrants  EXTREMITIES: Without any cyanosis, clubbing, rash, lesions or edema.  SKIN: No new rashes or lesions.  MSK: strength equal BL  VASCULAR: Radial and Dorsal pedal pulses palpable BL  NEUROLOGIC: Grossly intact. NIH 0  PSYCH: No new changes.    I & Os for current day (as of 01-28 @ 10:58)  =============================================  IN: 0 ml / OUT: 2450 ml / NET: -2450 ml                      Assessment- Rapid Response called for 76y year old Male  like vaso vagal episode    Plan- EKG, CBC, BMP Mag phos, repeat orthostatics. continue to montor vitals       Discussed plan with Dr. Shoemaker

## 2017-01-28 NOTE — PROGRESS NOTE ADULT - SUBJECTIVE AND OBJECTIVE BOX
Cc : fatigue , noted to have hard tenderness on the left scrotum by nurses no pain       HPI:  76M was admitted to [facility] on 12/31/16 after not feeling well for a few days. Workup showed his FS was >1100 and in DKA. Course was complicated by Tinea in the groin, lethargy on 1/2 where a brain MRI showed acute bilateral supratentorial and infratentorial infarcts with mild petechial hemorrhage in right occipital lobe. Source was found to be from PFO on JESUS MANUEL done 1/9. Course was further complicated by an acute Left LE DVT s/p IVCF on 1/11. Admitted to acute rehab on 1/13.Treated for UTI, healy cathater for retention removed few days ago ,  straight catheter ordered       PAST MEDICAL & SURGICAL HISTORY:  High cholesterol  Diabetes  Hypertension  S/P hip hemiarthroplasty: right hip repair june 2014  No significant past surgical history      MEDICATIONS  (STANDING):  aspirin enteric coated 81milliGRAM(s) Oral daily  amLODIPine   Tablet 10milliGRAM(s) Oral daily  carbidopa/levodopa  25/100 1Tablet(s) Oral three times a day  atorvastatin 20milliGRAM(s) Oral at bedtime  timolol 0.5% Solution 1Drop(s) Both EYES daily  heparin  Injectable 5000Unit(s) SubCutaneous every 12 hours  ammonium lactate 12% Lotion 1Application(s) Topical two times a day  furosemide    Tablet 40milliGRAM(s) Oral daily  potassium chloride    Tablet ER 20milliEquivalent(s) Oral daily  insulin lispro (HumaLOG) corrective regimen sliding scale  SubCutaneous Before meals and at bedtime  nystatin Powder 1Application(s) Topical three times a day  finasteride 5milliGRAM(s) Oral daily  melatonin. 9milliGRAM(s) Oral at bedtime  ferrous    sulfate 325milliGRAM(s) Oral two times a day with meals  Nephro-nate 1Tablet(s) Oral daily  polyethylene glycol 3350 17Gram(s) Oral daily  ergocalciferol 83253Hnbq(s) Oral every week  tamsulosin 0.8milliGRAM(s) Oral at bedtime  metFORMIN 500milliGRAM(s) Oral daily  freetext medication  - 1Application(s) Topical daily  fluconAZOLE   Tablet 200milliGRAM(s) Oral daily  ciprofloxacin     Tablet 500milliGRAM(s) Oral every 12 hours  saccharomyces boulardii 500milliGRAM(s) Oral two times a day  insulin glargine Injectable (LANTUS) 22Unit(s) SubCutaneous at bedtime    MEDICATIONS  (PRN):  acetaminophen   Tablet 650milliGRAM(s) Oral every 6 hours PRN For Temp greater than 38 C (100.4 F)  acetaminophen   Tablet. 650milliGRAM(s) Oral every 6 hours PRN Mild Pain (1 - 3)  sodium biphosphate Rectal Enema 1Enema Rectal daily PRN Constipation  bisacodyl Suppository 10milliGRAM(s) Rectal daily PRN Constipation      LABS:                          9.3    7.80  )-----------( 305      ( 28 Jan 2017 11:34 )             27.8                 RADIOLOGY & ADDITIONAL TESTS:      REVIEW OF SYSTEMS:    CONSTITUTIONAL: No fever, weight loss, or fatigue  EYES: No eye pain, visual disturbances, or discharge  ENMT:  No difficulty hearing, tinnitus, vertigo; No sinus or throat pain  NECK: No pain or stiffness  RESPIRATORY: No cough, wheezing, chills or hemoptysis; No shortness of breath  CARDIOVASCULAR: No chest pain, palpitations, dizziness, or leg swelling  GASTROINTESTINAL: No abdominal or epigastric pain. No nausea, vomiting, or hematemesis; No diarrhea or constipation. No melena or hematochezia.  GENITOURINARY: No dysuria, frequency, hematuria, or incontinence  NEUROLOGICAL: No headaches, memory loss, loss of strength, numbness, or tremors  SKIN: No itching, burning, rashes, or lesions   LYMPH NODES: No enlarged glands  ENDOCRINE: No heat or cold intolerance; No hair loss  MUSCULOSKELETAL:   PSYCHIATRIC: No depression, anxiety, mood swings, or difficulty sleeping  HEME/LYMPH: No easy bruising, or bleeding gums  ALLERGY AND IMMUNOLOGIC: No hives or eczema    Vital Signs Last 24 Hrs  T(C): 36.7, Max: 37 (01-27 @ 23:12)  T(F): 98, Max: 98.6 (01-27 @ 23:12)  HR: 76 (76 - 84)  BP: 128/72 (128/72 - 132/68)  BP(mean): --  RR: 16 (16 - 18)  SpO2: 97% (96% - 97%)  PHYSICAL EXAM:    GENERAL: NAD, well-groomed, well-developed  HEAD:  Atraumatic, Normocephalic  EYES: EOMI, PERRLA, conjunctiva and sclera clear  NECK: Supple, No JVD, Normal thyroid  NERVOUS SYSTEM:  Alert & Oriented X3, no focal deficit  CHEST/LUNG: CTA b/l ,  no  rales, rhonchi, wheezing, or rubs  HEART: Regular rate and rhythm; No murmurs, rubs, or gallops  ABDOMEN: Soft, Nontender, Nondistended; Bowel sounds present  EXTREMITIES:  2+ Peripheral Pulses, No clubbing, cyanosis, or edema  LYMPH: No lymphadenopathy noted  SKIN: No rashes or lesions CC : S/P syncopal episode this am , patient was placed on toilet bowl by nurse , c/o lightheadedness and LOC for couple min , no seizure activity , came back to usual state right after . No cp , no palpitation , no other complaints. Now AAOX2 ,       HPI:  76M was admitted to [facility] on 12/31/16 after not feeling well for a few days. Workup showed his FS was >1100 and in DKA. Course was complicated by Tinea in the groin, lethargy on 1/2 where a brain MRI showed acute bilateral supratentorial and infratentorial infarcts with mild petechial hemorrhage in right occipital lobe. Source was found to be from PFO on JESUS MANUEL done 1/9. Course was further complicated by an acute Left LE DVT s/p IVCF on 1/11. Admitted to acute rehab on 1/13.Treated for UTI, healy cathater for retention removed few days ago ,  on straight catheter prn , found to have L scrotum hard and tender , seen by  - on abx for epididymitis  / orchitis . Syncopal episode this am , likely vasovagal , now back to usual state      PAST MEDICAL & SURGICAL HISTORY:  High cholesterol  Diabetes  Hypertension  S/P hip hemiarthroplasty: right hip repair june 2014  No significant past surgical history      MEDICATIONS  (STANDING):  aspirin enteric coated 81milliGRAM(s) Oral daily  amLODIPine   Tablet 10milliGRAM(s) Oral daily  carbidopa/levodopa  25/100 1Tablet(s) Oral three times a day  atorvastatin 20milliGRAM(s) Oral at bedtime  timolol 0.5% Solution 1Drop(s) Both EYES daily  heparin  Injectable 5000Unit(s) SubCutaneous every 12 hours  ammonium lactate 12% Lotion 1Application(s) Topical two times a day  furosemide    Tablet 40milliGRAM(s) Oral daily  potassium chloride    Tablet ER 20milliEquivalent(s) Oral daily  insulin lispro (HumaLOG) corrective regimen sliding scale  SubCutaneous Before meals and at bedtime  nystatin Powder 1Application(s) Topical three times a day  finasteride 5milliGRAM(s) Oral daily  melatonin. 9milliGRAM(s) Oral at bedtime  ferrous    sulfate 325milliGRAM(s) Oral two times a day with meals  Nephro-nate 1Tablet(s) Oral daily  polyethylene glycol 3350 17Gram(s) Oral daily  ergocalciferol 84500Jlzx(s) Oral every week  tamsulosin 0.8milliGRAM(s) Oral at bedtime  metFORMIN 500milliGRAM(s) Oral daily  freetext medication  - 1Application(s) Topical daily  fluconAZOLE   Tablet 200milliGRAM(s) Oral daily  ciprofloxacin     Tablet 500milliGRAM(s) Oral every 12 hours  saccharomyces boulardii 500milliGRAM(s) Oral two times a day  insulin glargine Injectable (LANTUS) 22Unit(s) SubCutaneous at bedtime    MEDICATIONS  (PRN):  acetaminophen   Tablet 650milliGRAM(s) Oral every 6 hours PRN For Temp greater than 38 C (100.4 F)  acetaminophen   Tablet. 650milliGRAM(s) Oral every 6 hours PRN Mild Pain (1 - 3)  sodium biphosphate Rectal Enema 1Enema Rectal daily PRN Constipation  bisacodyl Suppository 10milliGRAM(s) Rectal daily PRN Constipation      LABS:                          9.3    7.80  )-----------( 305      ( 28 Jan 2017 11:34 )             27.8                 RADIOLOGY & ADDITIONAL TESTS:  INTERPRETATION:  Ultrasound of the testes and scrotum        CLINICAL INFORMATION:      Scrotal swelling. Pain.    TECHNIQUE:   Transcutaneous sonography was performed.  Doppler color and   spectral techniques were employed as part of this exam to evaluate   vascular structures of each hemiscrotum with regard to patency and flow   characteristics.    FINDINGS:  No comparison studies are available for review.      The right testis measures 3.4 x 3.1 x 1.8 cm.  There is intact Doppler   flow within testicular parenchyma.  No focal lesion is found.  Testicular   contours remain smooth.  The abdomen there is diffuse right epididymitis   with epididymal head measuring 1.2 cm, body measuring 1.1 cm and tail   measuring 1.3 cm with diffuse increased vascular flow. Epididymal head   cyst measures 0.5 cm.  There is a physiologic amount of fluid in the   right hemiscrotum.         The left testis measures 3.2 x 2.5 x 2.2 cm.  there is diffuse increased   Doppler arterial flow consistent with the orchitis without abscess.   However there is a diffuse enlargement of the epididymis with increased   vascularity consistent with epididymitis. Within the epididymal tail on   complex hypovascular central area measuring 1.7 x 1.4 x 1.2 cm represents   a epididymal tail abscess. There is a physiologic amount of fluid in the   left hemiscrotum.         IMPRESSION:           1.   Right hemiscrotum:  Epididymitis.        2.   Left hemiscrotum:  Epididymoorchitis.  1.7 cm abscess within the   epididymal tail.      JUANY GILMORE M.D., ATTENDING RADIOLOGIST  This document has been electronically signed. Jan 27 2017  5:07PM    EKG - 1/28/17 - Sinus rhythm with PAC at 64 bpm      REVIEW OF SYSTEMS:    CONSTITUTIONAL: No fever, weight loss,  fatigue +  EYES: No eye pain, visual disturbances, or discharge  ENMT:  No difficulty hearing, tinnitus, vertigo; No sinus or throat pain  NECK: No pain or stiffness  RESPIRATORY: No cough, wheezing, chills or hemoptysis; No shortness of breath  CARDIOVASCULAR: No chest pain, palpitations, dizziness, or leg swelling  GASTROINTESTINAL: No abdominal or epigastric pain. No nausea, vomiting, or hematemesis; No diarrhea or constipation. No melena or hematochezia.  GENITOURINARY:  dysuria +, frequency, hematuria, or incontinence  NEUROLOGICAL: No headaches, memory loss, loss of strength, numbness, or tremors  SKIN: No itching, burning, rashes, or lesions   LYMPH NODES: No enlarged glands  ENDOCRINE: No heat or cold intolerance; No hair loss  MUSCULOSKELETAL:   PSYCHIATRIC: No depression, anxiety, mood swings, or difficulty sleeping  HEME/LYMPH: No easy bruising, or bleeding gums  ALLERGY AND IMMUNOLOGIC: No hives or eczema    Vital Signs Last 24 Hrs  T(C): 36.7, Max: 37 (01-27 @ 23:12)  T(F): 98, Max: 98.6 (01-27 @ 23:12)  HR: 76 (76 - 84)  BP: 128/72 (128/72 - 132/68)  BP(mean): --  RR: 16 (16 - 18)  SpO2: 97% (96% - 97%)  PHYSICAL EXAM:    GENERAL: NAD, well-groomed, well-developed  HEAD:  Atraumatic, Normocephalic  EYES: EOMI, PERRLA, conjunctiva and sclera clear  NECK: Supple, No JVD, Normal thyroid  NERVOUS SYSTEM:  Alert & Oriented X2, no focal deficit  CHEST/LUNG: CTA b/l ,  no  rales, rhonchi, wheezing, or rubs  HEART: Regular rate and rhythm; No murmurs, rubs, or gallops  ABDOMEN: Soft, Nontender, Nondistended; Bowel sounds present  EXTREMITIES:  2+ Peripheral Pulses, No clubbing, cyanosis, or edema, chronic venous changes+  LYMPH: No lymphadenopathy noted  SKIN: No rashes or lesions  Scrotum - tenderness +, L>R

## 2017-01-29 PROCEDURE — 99232 SBSQ HOSP IP/OBS MODERATE 35: CPT | Mod: GC

## 2017-01-29 RX ORDER — METFORMIN HYDROCHLORIDE 850 MG/1
500 TABLET ORAL
Qty: 0 | Refills: 0 | Status: DISCONTINUED | OUTPATIENT
Start: 2017-01-29 | End: 2017-02-02

## 2017-01-29 RX ADMIN — Medication 325 MILLIGRAM(S): at 17:15

## 2017-01-29 RX ADMIN — CARBIDOPA AND LEVODOPA 1 TABLET(S): 25; 100 TABLET ORAL at 06:55

## 2017-01-29 RX ADMIN — Medication 8: at 12:48

## 2017-01-29 RX ADMIN — METFORMIN HYDROCHLORIDE 500 MILLIGRAM(S): 850 TABLET ORAL at 17:16

## 2017-01-29 RX ADMIN — HEPARIN SODIUM 5000 UNIT(S): 5000 INJECTION INTRAVENOUS; SUBCUTANEOUS at 06:54

## 2017-01-29 RX ADMIN — CARBIDOPA AND LEVODOPA 1 TABLET(S): 25; 100 TABLET ORAL at 13:18

## 2017-01-29 RX ADMIN — Medication 81 MILLIGRAM(S): at 12:52

## 2017-01-29 RX ADMIN — Medication 500 MILLIGRAM(S): at 06:55

## 2017-01-29 RX ADMIN — CARBIDOPA AND LEVODOPA 1 TABLET(S): 25; 100 TABLET ORAL at 21:49

## 2017-01-29 RX ADMIN — Medication 325 MILLIGRAM(S): at 08:49

## 2017-01-29 RX ADMIN — Medication 500 MILLIGRAM(S): at 17:15

## 2017-01-29 RX ADMIN — NYSTATIN CREAM 1 APPLICATION(S): 100000 CREAM TOPICAL at 21:48

## 2017-01-29 RX ADMIN — Medication 6: at 08:48

## 2017-01-29 RX ADMIN — TAMSULOSIN HYDROCHLORIDE 0.8 MILLIGRAM(S): 0.4 CAPSULE ORAL at 21:49

## 2017-01-29 RX ADMIN — FLUCONAZOLE 200 MILLIGRAM(S): 150 TABLET ORAL at 13:17

## 2017-01-29 RX ADMIN — Medication 500 MILLIGRAM(S): at 17:16

## 2017-01-29 RX ADMIN — AMLODIPINE BESYLATE 10 MILLIGRAM(S): 2.5 TABLET ORAL at 06:55

## 2017-01-29 RX ADMIN — Medication 9 MILLIGRAM(S): at 21:49

## 2017-01-29 RX ADMIN — NYSTATIN CREAM 1 APPLICATION(S): 100000 CREAM TOPICAL at 13:19

## 2017-01-29 RX ADMIN — Medication 1 DROP(S): at 12:52

## 2017-01-29 RX ADMIN — POLYETHYLENE GLYCOL 3350 17 GRAM(S): 17 POWDER, FOR SOLUTION ORAL at 12:55

## 2017-01-29 RX ADMIN — Medication 4: at 21:50

## 2017-01-29 RX ADMIN — NYSTATIN CREAM 1 APPLICATION(S): 100000 CREAM TOPICAL at 06:56

## 2017-01-29 RX ADMIN — Medication 1 APPLICATION(S): at 06:55

## 2017-01-29 RX ADMIN — Medication 6: at 17:13

## 2017-01-29 RX ADMIN — Medication 500 MILLIGRAM(S): at 06:54

## 2017-01-29 RX ADMIN — Medication 20 MILLIEQUIVALENT(S): at 12:56

## 2017-01-29 RX ADMIN — Medication 1 APPLICATION(S): at 17:14

## 2017-01-29 RX ADMIN — HEPARIN SODIUM 5000 UNIT(S): 5000 INJECTION INTRAVENOUS; SUBCUTANEOUS at 17:16

## 2017-01-29 RX ADMIN — Medication 1 TABLET(S): at 12:55

## 2017-01-29 RX ADMIN — ATORVASTATIN CALCIUM 20 MILLIGRAM(S): 80 TABLET, FILM COATED ORAL at 21:49

## 2017-01-29 RX ADMIN — Medication 40 MILLIGRAM(S): at 06:55

## 2017-01-29 RX ADMIN — INSULIN GLARGINE 22 UNIT(S): 100 INJECTION, SOLUTION SUBCUTANEOUS at 21:49

## 2017-01-29 RX ADMIN — FINASTERIDE 5 MILLIGRAM(S): 5 TABLET, FILM COATED ORAL at 12:52

## 2017-01-29 NOTE — PROGRESS NOTE ADULT - SUBJECTIVE AND OBJECTIVE BOX
INTERVAL HPI/OVERNIGHT EVENTS:    MEDICATIONS  (STANDING):  aspirin enteric coated 81milliGRAM(s) Oral daily  amLODIPine   Tablet 10milliGRAM(s) Oral daily  carbidopa/levodopa  25/100 1Tablet(s) Oral three times a day  atorvastatin 20milliGRAM(s) Oral at bedtime  timolol 0.5% Solution 1Drop(s) Both EYES daily  heparin  Injectable 5000Unit(s) SubCutaneous every 12 hours  ammonium lactate 12% Lotion 1Application(s) Topical two times a day  furosemide    Tablet 40milliGRAM(s) Oral daily  potassium chloride    Tablet ER 20milliEquivalent(s) Oral daily  insulin lispro (HumaLOG) corrective regimen sliding scale  SubCutaneous Before meals and at bedtime  nystatin Powder 1Application(s) Topical three times a day  finasteride 5milliGRAM(s) Oral daily  melatonin. 9milliGRAM(s) Oral at bedtime  ferrous    sulfate 325milliGRAM(s) Oral two times a day with meals  Nephro-nate 1Tablet(s) Oral daily  polyethylene glycol 3350 17Gram(s) Oral daily  ergocalciferol 40170Gpbw(s) Oral every week  tamsulosin 0.8milliGRAM(s) Oral at bedtime  metFORMIN 500milliGRAM(s) Oral daily  freetext medication  - 1Application(s) Topical daily  fluconAZOLE   Tablet 200milliGRAM(s) Oral daily  ciprofloxacin     Tablet 500milliGRAM(s) Oral every 12 hours  saccharomyces boulardii 500milliGRAM(s) Oral two times a day  insulin glargine Injectable (LANTUS) 22Unit(s) SubCutaneous at bedtime    MEDICATIONS  (PRN):  acetaminophen   Tablet 650milliGRAM(s) Oral every 6 hours PRN For Temp greater than 38 C (100.4 F)  acetaminophen   Tablet. 650milliGRAM(s) Oral every 6 hours PRN Mild Pain (1 - 3)  sodium biphosphate Rectal Enema 1Enema Rectal daily PRN Constipation  bisacodyl Suppository 10milliGRAM(s) Rectal daily PRN Constipation      Allergies    No Known Allergies    Intolerances        Vital Signs Last 24 Hrs  T(C): 36.2, Max: 36.8 ( @ 20:30)  T(F): 97.2, Max: 98.3 ( @ 20:30)  HR: 74 (74 - 91)  BP: 137/66 (117/60 - 137/-)  BP(mean): --  RR: 18 (17 - 18)  SpO2: 95% (95% - 96%)     ON PE:  General: alert and awake  Abdomen: soft ND/NT BS+  : no fluctuance Phlegmon noted. No abscess noted clinically    LABS:                        9.3    7.80  )-----------( 305      ( 2017 11:34 )             27.8     2017 11:35    132    |  94     |  25.0   ----------------------------<  277    4.2     |  27.0   |  0.99     Ca    9.0        2017 11:35  Phos  3.7       2017 11:35  Mg     1.8       2017 11:35        Urinalysis Basic - ( 2017 13:16 )    Color: Yellow / Appearance: very cloudy / S.015 / pH: x  Gluc: x / Ketone: Trace  / Bili: Negative / Urobili: Negative mg/dL   Blood: x / Protein: 100 mg/dL / Nitrite: Negative   Leuk Esterase: Moderate / RBC: 6-10 /HPF / WBC >50   Sq Epi: x / Non Sq Epi: Few / Bacteria: Occasional        RADIOLOGY & ADDITIONAL TESTS:

## 2017-01-29 NOTE — PROGRESS NOTE ADULT - SUBJECTIVE AND OBJECTIVE BOX
HISTORY OF PRESENT ILLNESS  76M was admitted to [facility] on 16 after not feeling well for a few days. Workup showed his FS was >1100 and in DKA. Course was complicated by Tinea in the groin, lethargy on  where a brain MRI showed acute bilateral supratentorial and infratentorial infarcts with mild petechial hemorrhage in right occipital lobe. Source was found to be from PFO on JESUS MANUEL done . Course was further complicated by an acute Left LE DVT s/p IVCF on . Admitted to acute rehab on .    PMHx - HLD, DM 2, HTN, Parkinson's Disease    TODAY'S SUBJECTIVE & REVIEW OF SYMPTOMS  [ ] Constitutional WNL          [X] Cardio WNL              [X] Resp WNL           [X] GI WNL                          [ ]  WNL                   [X] Heme WNL              [X] Endo WNL                      [X] Skin WNL                 [X] MSK WNL            [X] Neuro WNL                     [ ] Cognitive WNL           [X] Psych WNL    No new medical issues this morning.  Had syncopal episode yesterday.  Evaluated by Hospitalist team. Doing better this morning.   Resting comfortably.     Vital Signs Last 24 Hrs  T(C): 36.2, Max: 36.8 ( @ 20:30)  T(F): 97.2, Max: 98.3 ( @ 20:30)  HR: 74 (74 - 91)  BP: 137/66 (117/60 - 137/-)  BP(mean): --  RR: 18 (17 - 18)  SpO2: 95% (95% - 96%)    PHYSICAL EXAM  Constitutional - NAD, Comfortable  HEENT - NCAT, EOMI  Neck - Supple, No limited ROM  Chest - CTA bilaterally, No wheeze, No rhonchi, No crackles  Cardiovascular - RRR, S1S2, No murmurs  Abdomen - BS+, Soft, NTND  Extremities - BLE edema - 1+  Neurologic Exam -                    Cognitive - Awake, Alert, AAO to self, place     Communication - Fluent, No dysarthria     Cranial Nerves - CN 2-12 grossly intact     Motor -     Generalized weakness, No focal deficits      Sensory - Decreased to LT in BLE  Psychiatric - Mood depressed, Affect Flat  Skin - Bilateral LE venous stasis changes, Left medial great toe bruise, Right lateral 5th MT bruise, Groin rashes  Wounds - None    FUNCTIONAL PROGRESS  Gait - 60' RW min A  ADLs - Toileting total A, LE Dress total A  Transfers - sit-to-stand mod A  Functional transfer - Bed SPT min A    RECENT LABS                        9.3    7.80  )-----------( 305      ( 2017 11:34 )             27.8                           8.7    2017 11:35    132    |  94     |  25.0   ----------------------------<  277    4.2     |  27.0   |  0.99     Ca    9.0        2017 11:35  Phos  3.7       2017 11:35  Mg     1.8       2017 11:35    8.35  )-----------( 273      ( 2017 07:19 )             26.0     2017 07:19    135    |  97     |  19.0   ----------------------------<  172    4.4     |  24.0   |  0.84     Ca    8.3        2017 07:19    Urinalysis Basic - ( 2017 01:20 )  Color: Yellow / Appearance: Cloudy / S.020 / pH: x  Gluc: x / Ketone: Negative  / Bili: Negative / Urobili: Negative   Blood: x / Protein: 500 mg/dL / Nitrite: Negative   Leuk Esterase: Moderate / RBC: 3-5 /HPF / WBC 26-50   Sq Epi: x / Non Sq Epi: Occasional / Bacteria: Occasional    UCx  - Pending    Vitamin D  -     FS Last 24HRS  193 (2017 04:43)  382 (2017 23:12)  437 (2017 21:45)  237 (2017 18:00)  251 (2017 11:47)    RADIOLOGY & OTHER RESULTS  Left Foot XR ()  1.    No  acute  fracture  or  dislocation  of  the  left  foot.  2.    No  definite  radiographic  evidence  of  osteomyelitis.    Scrotal U/S ()  1.      Right  hemiscrotum:    Epididymitis.  2.      Left  hemiscrotum:    Epididymoorchitis.    1.7  cm  abscess  within  the  epididymal  tail.    CURRENT MEDICATIONS  MEDICATIONS  (STANDING):  aspirin enteric coated 81milliGRAM(s) Oral daily  amLODIPine   Tablet 10milliGRAM(s) Oral daily  carbidopa/levodopa  25/100 1Tablet(s) Oral three times a day  atorvastatin 20milliGRAM(s) Oral at bedtime  timolol 0.5% Solution 1Drop(s) Both EYES daily  heparin  Injectable 5000Unit(s) SubCutaneous every 12 hours  ammonium lactate 12% Lotion 1Application(s) Topical two times a day  furosemide    Tablet 40milliGRAM(s) Oral daily  potassium chloride    Tablet ER 20milliEquivalent(s) Oral daily  insulin lispro (HumaLOG) corrective regimen sliding scale  SubCutaneous Before meals and at bedtime  nystatin Powder 1Application(s) Topical three times a day  finasteride 5milliGRAM(s) Oral daily  melatonin. 9milliGRAM(s) Oral at bedtime  ferrous    sulfate 325milliGRAM(s) Oral two times a day with meals  Nephro-nate 1Tablet(s) Oral daily  polyethylene glycol 3350 17Gram(s) Oral daily  ergocalciferol 75770Xdmy(s) Oral every week  tamsulosin 0.8milliGRAM(s) Oral at bedtime  metFORMIN 500milliGRAM(s) Oral daily  freetext medication  - 1Application(s) Topical daily  fluconAZOLE   Tablet 200milliGRAM(s) Oral daily  ciprofloxacin     Tablet 500milliGRAM(s) Oral every 12 hours  saccharomyces boulardii 500milliGRAM(s) Oral two times a day  insulin glargine Injectable (LANTUS) 22Unit(s) SubCutaneous at bedtime    MEDICATIONS  (PRN):  acetaminophen   Tablet 650milliGRAM(s) Oral every 6 hours PRN For Temp greater than 38 C (100.4 F)  acetaminophen   Tablet. 650milliGRAM(s) Oral every 6 hours PRN Mild Pain (1 - 3)  sodium biphosphate Rectal Enema 1Enema Rectal daily PRN Constipation  bisacodyl Suppository 10milliGRAM(s) Rectal daily PRN Constipation    ASSESSMENT & PLAN  76M with multiple CVAs now with functional deficits    HTN - Amlodipine, Lasix + KCl  DM2 - Metformin 500mg (decreased from 500mg BID ), Lantus 22 U (decreased from 25 U ), ISS  CVA - Lipitor, Aspirin  Anemia - Iron, Epogen x1 (); improved today ()  Candiduria - Diflucan (-)  Left epididymo-orchitis - Cipro + Florastor (-2/3)  Parkinson's Disease - Sinemet  Sleep - Melatonin 9mg   Glaucoma - Timolol   GI/Bowel Management - Miralax, Dulcolax PRN, Fleet PRN   Management - Toilet Q2, Bladder Scan/IC Q6, Proscar 5mg (), Flomax 0.8mg (increased from 0.4mg )  Skin - Turn Q2, Nystatin to groin, Lac-Hydrin to LE  Pain - Tylenol PRN  DVT PPX - Heparin Q12, TEDs, SCDs  Diet - Cardiac, CC/Thins, Glucerna TID, Nephrovite, Vit D  Na+- Follow up Na+       Continue comprehensive acute rehab program consisting of 3hrs/day of OT/PT/SLP.

## 2017-01-30 DIAGNOSIS — N45.1 EPIDIDYMITIS: ICD-10-CM

## 2017-01-30 LAB
ANION GAP SERPL CALC-SCNC: 14 MMOL/L — SIGNIFICANT CHANGE UP (ref 5–17)
BUN SERPL-MCNC: 21 MG/DL — HIGH (ref 8–20)
CALCIUM SERPL-MCNC: 9 MG/DL — SIGNIFICANT CHANGE UP (ref 8.6–10.2)
CHLORIDE SERPL-SCNC: 100 MMOL/L — SIGNIFICANT CHANGE UP (ref 98–107)
CO2 SERPL-SCNC: 26 MMOL/L — SIGNIFICANT CHANGE UP (ref 22–29)
CREAT SERPL-MCNC: 0.81 MG/DL — SIGNIFICANT CHANGE UP (ref 0.5–1.3)
GLUCOSE SERPL-MCNC: 171 MG/DL — HIGH (ref 70–115)
HCT VFR BLD CALC: 29.2 % — LOW (ref 42–52)
HGB BLD-MCNC: 9.9 G/DL — LOW (ref 14–18)
MCHC RBC-ENTMCNC: 31.9 PG — HIGH (ref 27–31)
MCHC RBC-ENTMCNC: 33.9 G/DL — SIGNIFICANT CHANGE UP (ref 32–36)
MCV RBC AUTO: 94.2 FL — HIGH (ref 80–94)
PLATELET # BLD AUTO: 318 K/UL — SIGNIFICANT CHANGE UP (ref 150–400)
POTASSIUM SERPL-MCNC: 4 MMOL/L — SIGNIFICANT CHANGE UP (ref 3.5–5.3)
POTASSIUM SERPL-SCNC: 4 MMOL/L — SIGNIFICANT CHANGE UP (ref 3.5–5.3)
RBC # BLD: 3.1 M/UL — LOW (ref 4.6–6.2)
RBC # FLD: 13.5 % — SIGNIFICANT CHANGE UP (ref 11–15.6)
SODIUM SERPL-SCNC: 140 MMOL/L — SIGNIFICANT CHANGE UP (ref 135–145)
WBC # BLD: 6.85 K/UL — SIGNIFICANT CHANGE UP (ref 4.8–10.8)
WBC # FLD AUTO: 6.85 K/UL — SIGNIFICANT CHANGE UP (ref 4.8–10.8)

## 2017-01-30 PROCEDURE — 99233 SBSQ HOSP IP/OBS HIGH 50: CPT

## 2017-01-30 PROCEDURE — 99232 SBSQ HOSP IP/OBS MODERATE 35: CPT | Mod: GC

## 2017-01-30 RX ADMIN — CARBIDOPA AND LEVODOPA 1 TABLET(S): 25; 100 TABLET ORAL at 21:27

## 2017-01-30 RX ADMIN — AMLODIPINE BESYLATE 10 MILLIGRAM(S): 2.5 TABLET ORAL at 06:41

## 2017-01-30 RX ADMIN — Medication 81 MILLIGRAM(S): at 12:41

## 2017-01-30 RX ADMIN — ATORVASTATIN CALCIUM 20 MILLIGRAM(S): 80 TABLET, FILM COATED ORAL at 21:27

## 2017-01-30 RX ADMIN — Medication 4: at 21:26

## 2017-01-30 RX ADMIN — Medication 500 MILLIGRAM(S): at 18:13

## 2017-01-30 RX ADMIN — HEPARIN SODIUM 5000 UNIT(S): 5000 INJECTION INTRAVENOUS; SUBCUTANEOUS at 06:40

## 2017-01-30 RX ADMIN — METFORMIN HYDROCHLORIDE 500 MILLIGRAM(S): 850 TABLET ORAL at 18:13

## 2017-01-30 RX ADMIN — Medication 500 MILLIGRAM(S): at 18:26

## 2017-01-30 RX ADMIN — FINASTERIDE 5 MILLIGRAM(S): 5 TABLET, FILM COATED ORAL at 12:41

## 2017-01-30 RX ADMIN — NYSTATIN CREAM 1 APPLICATION(S): 100000 CREAM TOPICAL at 21:26

## 2017-01-30 RX ADMIN — Medication 325 MILLIGRAM(S): at 18:14

## 2017-01-30 RX ADMIN — Medication 325 MILLIGRAM(S): at 08:47

## 2017-01-30 RX ADMIN — Medication 500 MILLIGRAM(S): at 06:41

## 2017-01-30 RX ADMIN — Medication 1 DROP(S): at 12:41

## 2017-01-30 RX ADMIN — INSULIN GLARGINE 22 UNIT(S): 100 INJECTION, SOLUTION SUBCUTANEOUS at 21:26

## 2017-01-30 RX ADMIN — METFORMIN HYDROCHLORIDE 500 MILLIGRAM(S): 850 TABLET ORAL at 08:47

## 2017-01-30 RX ADMIN — Medication 20 MILLIEQUIVALENT(S): at 12:41

## 2017-01-30 RX ADMIN — Medication 1 APPLICATION(S): at 06:42

## 2017-01-30 RX ADMIN — Medication 1 TABLET(S): at 12:41

## 2017-01-30 RX ADMIN — Medication 8: at 18:21

## 2017-01-30 RX ADMIN — HEPARIN SODIUM 5000 UNIT(S): 5000 INJECTION INTRAVENOUS; SUBCUTANEOUS at 18:12

## 2017-01-30 RX ADMIN — CARBIDOPA AND LEVODOPA 1 TABLET(S): 25; 100 TABLET ORAL at 14:43

## 2017-01-30 RX ADMIN — NYSTATIN CREAM 1 APPLICATION(S): 100000 CREAM TOPICAL at 14:44

## 2017-01-30 RX ADMIN — Medication 1 APPLICATION(S): at 18:12

## 2017-01-30 RX ADMIN — Medication 650 MILLIGRAM(S): at 10:23

## 2017-01-30 RX ADMIN — POLYETHYLENE GLYCOL 3350 17 GRAM(S): 17 POWDER, FOR SOLUTION ORAL at 12:43

## 2017-01-30 RX ADMIN — CARBIDOPA AND LEVODOPA 1 TABLET(S): 25; 100 TABLET ORAL at 06:41

## 2017-01-30 RX ADMIN — Medication 6: at 12:41

## 2017-01-30 RX ADMIN — Medication 9 MILLIGRAM(S): at 21:27

## 2017-01-30 RX ADMIN — TAMSULOSIN HYDROCHLORIDE 0.8 MILLIGRAM(S): 0.4 CAPSULE ORAL at 21:27

## 2017-01-30 RX ADMIN — Medication 2: at 08:47

## 2017-01-30 RX ADMIN — NYSTATIN CREAM 1 APPLICATION(S): 100000 CREAM TOPICAL at 06:42

## 2017-01-30 RX ADMIN — Medication 40 MILLIGRAM(S): at 06:41

## 2017-01-30 NOTE — PROGRESS NOTE ADULT - SUBJECTIVE AND OBJECTIVE BOX
INTERVAL HPI/OVERNIGHT EVENTS: Patient without new complaints.  Reports feeling terrible.    MEDICATIONS  (STANDING):  aspirin enteric coated 81milliGRAM(s) Oral daily  amLODIPine   Tablet 10milliGRAM(s) Oral daily  carbidopa/levodopa  25/100 1Tablet(s) Oral three times a day  atorvastatin 20milliGRAM(s) Oral at bedtime  timolol 0.5% Solution 1Drop(s) Both EYES daily  heparin  Injectable 5000Unit(s) SubCutaneous every 12 hours  ammonium lactate 12% Lotion 1Application(s) Topical two times a day  furosemide    Tablet 40milliGRAM(s) Oral daily  potassium chloride    Tablet ER 20milliEquivalent(s) Oral daily  insulin lispro (HumaLOG) corrective regimen sliding scale  SubCutaneous Before meals and at bedtime  nystatin Powder 1Application(s) Topical three times a day  finasteride 5milliGRAM(s) Oral daily  melatonin. 9milliGRAM(s) Oral at bedtime  ferrous    sulfate 325milliGRAM(s) Oral two times a day with meals  Nephro-nate 1Tablet(s) Oral daily  polyethylene glycol 3350 17Gram(s) Oral daily  ergocalciferol 93110Oadt(s) Oral every week  tamsulosin 0.8milliGRAM(s) Oral at bedtime  freetext medication  - 1Application(s) Topical daily  ciprofloxacin     Tablet 500milliGRAM(s) Oral every 12 hours  saccharomyces boulardii 500milliGRAM(s) Oral two times a day  insulin glargine Injectable (LANTUS) 22Unit(s) SubCutaneous at bedtime  metFORMIN 500milliGRAM(s) Oral two times a day with meals    MEDICATIONS  (PRN):  acetaminophen   Tablet 650milliGRAM(s) Oral every 6 hours PRN For Temp greater than 38 C (100.4 F)  acetaminophen   Tablet. 650milliGRAM(s) Oral every 6 hours PRN Mild Pain (1 - 3)  sodium biphosphate Rectal Enema 1Enema Rectal daily PRN Constipation  bisacodyl Suppository 10milliGRAM(s) Rectal daily PRN Constipation      Allergies    No Known Allergies    Intolerances        Vital Signs Last 24 Hrs  T(C): 36.1, Max: 36.5 ( @ 20:15)  T(F): 97, Max: 97.7 ( @ 20:15)  HR: 84 (70 - 84)  BP: 119/73 (119/73 - 155/83)  BP(mean): --  RR: 18 (16 - 18)  SpO2: 98% (95% - 98%)    ROS:  Constitutional: No fever, weight loss or fatigue  Respiratory: No cough, wheezing, chills or hemoptysis  Cardiovascular: No chest pain, palpitations, shortness of breath, dizziness or leg swelling  Genitourinary: No dysuria, frequency, hematuria or incontinence  Skin: No itching, burning, rashes or lesions   Musculoskeletal: No joint pain or swelling; No muscle, back or extremity pain  Psychiatric: No depression, anxiety, mood swings or difficulty sleeping  Allergy and Immunologic: No hives or eczema     ON PE:  General: Well developed; well nourished; in no acute distress  Head: Normocephalic; atraumatic  Respiratory: No tachypnea  Gastrointestinal: Soft non-tender non-distended;  Genitourinary: No costovertebral angle tenderness.  Urinary bladder is clinically not distended  Left scrotum: indurated left testicle/epididymis.  Right NT, no masses  Extremities: Normal range of motion, No edema  Neurological: Alert and oriented x4  Skin: Warm and dry. No acute rash  Musculoskeletal: Normal gait, tone, without deformities  Psychiatric: Cooperative and appropriate      LABS:                        9.9    6.85  )-----------( 318      ( 2017 07:16 )             29.2     2017 07:16    140    |  100    |  21.0   ----------------------------<  171    4.0     |  26.0   |  0.81     Ca    9.0        2017 07:16  Phos  3.7       2017 11:35  Mg     1.8       2017 11:35        Urinalysis Basic - ( 2017 13:16 )    Color: Yellow / Appearance: very cloudy / S.015 / pH: x  Gluc: x / Ketone: Trace  / Bili: Negative / Urobili: Negative mg/dL   Blood: x / Protein: 100 mg/dL / Nitrite: Negative   Leuk Esterase: Moderate / RBC: 6-10 /HPF / WBC >50   Sq Epi: x / Non Sq Epi: Few / Bacteria: Occasional        RADIOLOGY & ADDITIONAL TESTS:

## 2017-01-30 NOTE — PROGRESS NOTE ADULT - SUBJECTIVE AND OBJECTIVE BOX
HISTORY OF PRESENT ILLNESS  76M was admitted to [facility] on 12/31/16 after not feeling well for a few days. Workup showed his FS was >1100 and in DKA. Course was complicated by Tinea in the groin, lethargy on 1/2 where a brain MRI showed acute bilateral supratentorial and infratentorial infarcts with mild petechial hemorrhage in right occipital lobe. Source was found to be from PFO on JESUS MANUEL done 1/9. Course was further complicated by an acute Left LE DVT s/p IVCF on 1/11. Admitted to acute rehab on 1/13.    PMHx - HLD, DM 2, HTN, Parkinson's Disease    TODAY'S SUBJECTIVE & REVIEW OF SYMPTOMS  [ ] Constitutional WNL          [X] Cardio WNL              [X] Resp WNL           [X] GI WNL                          [ ]  WNL                   [X] Heme WNL              [X] Endo WNL                      [X] Skin WNL                 [X] MSK WNL            [X] Neuro WNL                     [ ] Cognitive WNL           [X] Psych WNL    No acute events overnight.  Urinating on own with occasional IC overnight.  Patient feels like he is deteriorating overall, though he does report that his legs feel much better.  Had vasovagal episode on Saturday, no issues yesterday.    VITAL SIGNS:  T(C): 36.1  T(F): 97, Max: 97.7 (01-29 @ 20:15)  HR: 74 (70 - 74)  BP: 155/83 (125/80 - 155/83)  RR:  (16 - 18)  SpO2:  (95% - 96%)  Wt(kg): --    PHYSICAL EXAM  Constitutional - NAD, Comfortable  HEENT - NCAT, EOMI  Neck - Supple, No limited ROM  Chest - CTA bilaterally, No wheeze, No rhonchi, No crackles  Cardiovascular - RRR, S1S2, No murmurs  Abdomen - BS+, Soft, NTND  Extremities - Improved BLE edema - 1+  Neurologic Exam -     Cognitive - Awake, Alert, AAO to self, place     Communication - Fluent, No dysarthria     Cranial Nerves - CN 2-12 grossly intact     Motor -     Generalized weakness, No focal deficits      Sensory - Decreased to LT in BLE  Psychiatric - Mood depressed, Affect Flat  Skin - Bilateral LE venous stasis changes, Left medial great toe bruise, Right lateral 5th MT bruise, Groin rashes  Wounds - None    FUNCTIONAL PROGRESS  Gait - 60' RW min A, 4x 6" steps min A  ADLs - Toileting max A, UE Dress min A  Transfers - sit-to-stand min A  Functional transfer - Toilet min A    RECENT LABS                        9.9    6.85  )-----------( 318      ( 30 Jan 2017 07:16 )             29.2     30 Jan 2017 07:16    140    |  100    |  21.0   ----------------------------<  171    4.0     |  26.0   |  0.81     Ca    9.0        30 Jan 2017 07:16  Phos  3.7       28 Jan 2017 11:35  Mg     1.8       28 Jan 2017 11:35    Vitamin D 1/23 - 13    FS Last 24HRS  169 (30 Jan 2017 07:43)  232 (29 Jan 2017 21:00)  279 (29 Jan 2017 17:00)  314 (29 Jan 2017 12:00)    RADIOLOGY & OTHER RESULTS  Left Foot XR (1/27)  1.    No  acute  fracture  or  dislocation  of  the  left  foot.  2.    No  definite  radiographic  evidence  of  osteomyelitis.    Scrotal U/S (1/27)  1.      Right  hemiscrotum:    Epididymitis.  2.      Left  hemiscrotum:    Epididymoorchitis.    1.7  cm  abscess  within  the  epididymal  tail.    CURRENT MEDICATIONS  MEDICATIONS  (STANDING):  aspirin enteric coated 81milliGRAM(s) Oral daily  amLODIPine   Tablet 10milliGRAM(s) Oral daily  carbidopa/levodopa  25/100 1Tablet(s) Oral three times a day  atorvastatin 20milliGRAM(s) Oral at bedtime  timolol 0.5% Solution 1Drop(s) Both EYES daily  heparin  Injectable 5000Unit(s) SubCutaneous every 12 hours  ammonium lactate 12% Lotion 1Application(s) Topical two times a day  furosemide    Tablet 40milliGRAM(s) Oral daily  potassium chloride    Tablet ER 20milliEquivalent(s) Oral daily  insulin lispro (HumaLOG) corrective regimen sliding scale  SubCutaneous Before meals and at bedtime  nystatin Powder 1Application(s) Topical three times a day  finasteride 5milliGRAM(s) Oral daily  melatonin. 9milliGRAM(s) Oral at bedtime  ferrous    sulfate 325milliGRAM(s) Oral two times a day with meals  Nephro-nate 1Tablet(s) Oral daily  polyethylene glycol 3350 17Gram(s) Oral daily  ergocalciferol 27052Gpau(s) Oral every week  tamsulosin 0.8milliGRAM(s) Oral at bedtime  freetext medication  - 1Application(s) Topical daily  ciprofloxacin     Tablet 500milliGRAM(s) Oral every 12 hours  saccharomyces boulardii 500milliGRAM(s) Oral two times a day  insulin glargine Injectable (LANTUS) 22Unit(s) SubCutaneous at bedtime  metFORMIN 500milliGRAM(s) Oral two times a day with meals    MEDICATIONS  (PRN):  acetaminophen   Tablet 650milliGRAM(s) Oral every 6 hours PRN For Temp greater than 38 C (100.4 F)  acetaminophen   Tablet. 650milliGRAM(s) Oral every 6 hours PRN Mild Pain (1 - 3)  sodium biphosphate Rectal Enema 1Enema Rectal daily PRN Constipation  bisacodyl Suppository 10milliGRAM(s) Rectal daily PRN Constipation    ASSESSMENT & PLAN  76M with multiple CVAs now with functional deficits    HTN - Amlodipine, Lasix + KCl  DM2 - Metformin 500mg BID (increaseed from 500mg 1/29), Lantus 22 U (decreased from 25 U 1/26), ISS  CVA - Lipitor, Aspirin  Anemia - Iron, Epogen x1 (1/22)  Candiduria - Diflucan (1/27-1/29)  Left epididymo-orchitis - Cipro + Florastor (1/27-2/3)  Parkinson's Disease - Sinemet  Sleep - Melatonin 9mg   Glaucoma - Timolol   GI/Bowel Management - Miralax, Dulcolax PRN, Fleet PRN   Management - Toilet Q2, Bladder Scan/IC Q6, Proscar 5mg (1/18), Flomax 0.8mg (increased from 0.4mg 1/25)  Skin - Turn Q2, Nystatin to groin, Lac-Hydrin to LE  Pain - Tylenol PRN  DVT PPX - Heparin Q12, TEDs, SCDs  Diet - Cardiac, CC/Thins, Glucerna TID, Nephrovite, Vit D    Continue comprehensive acute rehab program consisting of 3hrs/day of OT/PT/SLP. HISTORY OF PRESENT ILLNESS  76M was admitted to [facility] on 12/31/16 after not feeling well for a few days. Workup showed his FS was >1100 and in DKA. Course was complicated by Tinea in the groin, lethargy on 1/2 where a brain MRI showed acute bilateral supratentorial and infratentorial infarcts with mild petechial hemorrhage in right occipital lobe. Source was found to be from PFO on JESUS MANUEL done 1/9. Course was further complicated by an acute Left LE DVT s/p IVCF on 1/11. Admitted to acute rehab on 1/13.    PMHx - HLD, DM 2, HTN, Parkinson's Disease    TODAY'S SUBJECTIVE & REVIEW OF SYMPTOMS  [ ] Constitutional WNL          [X] Cardio WNL              [X] Resp WNL           [X] GI WNL                          [ ]  WNL                   [X] Heme WNL              [X] Endo WNL                      [X] Skin WNL                 [X] MSK WNL            [X] Neuro WNL                     [ ] Cognitive WNL           [X] Psych WNL    No acute events overnight.  Urinating on own with occasional IC overnight.  Patient feels like he is deteriorating overall, though he does report that his legs feel much better.  Had vasovagal episode on Saturday, no issues yesterday.    VITAL SIGNS:  T(C): 36.1  T(F): 97, Max: 97.7 (01-29 @ 20:15)  HR: 74 (70 - 74)  BP: 155/83 (125/80 - 155/83)  RR:  (16 - 18)  SpO2:  (95% - 96%)  Wt(kg): --    PHYSICAL EXAM  Constitutional - NAD, Comfortable  HEENT - NCAT, EOMI  Neck - Supple, No limited ROM  Chest - CTA bilaterally, No wheeze, No rhonchi, No crackles  Cardiovascular - RRR, S1S2, No murmurs  Abdomen - BS+, Soft, NTND  Extremities - Improved BLE edema - 1+  Neurologic Exam -     Cognitive - Awake, Alert, AAO to self, place     Communication - Fluent, No dysarthria     Cranial Nerves - CN 2-12 grossly intact     Motor -     Generalized weakness, No focal deficits      Sensory - Decreased to LT in BLE  Psychiatric - Mood depressed, Affect Flat  Skin - Bilateral LE venous stasis changes, Left medial great toe bruise, Right lateral 5th MT bruise, Groin rashes  Wounds - None    FUNCTIONAL PROGRESS  Gait - 60' RW min A, 4x 6" steps min A  ADLs - Toileting max A, UE Dress min A  Transfers - sit-to-stand min A  Functional transfer - Toilet min A    RECENT LABS                        9.9    6.85  )-----------( 318      ( 30 Jan 2017 07:16 )             29.2     30 Jan 2017 07:16    140    |  100    |  21.0   ----------------------------<  171    4.0     |  26.0   |  0.81     Ca    9.0        30 Jan 2017 07:16  Phos  3.7       28 Jan 2017 11:35  Mg     1.8       28 Jan 2017 11:35    Vitamin D 1/23 - 13    FS Last 24HRS  169 (30 Jan 2017 07:43)  232 (29 Jan 2017 21:00)  279 (29 Jan 2017 17:00)  314 (29 Jan 2017 12:00)    RADIOLOGY & OTHER RESULTS  Left Foot XR (1/27)  1.    No  acute  fracture  or  dislocation  of  the  left  foot.  2.    No  definite  radiographic  evidence  of  osteomyelitis.    Scrotal U/S (1/27)  1.      Right  hemiscrotum:    Epididymitis.  2.      Left  hemiscrotum:    Epididymoorchitis.    1.7  cm  abscess  within  the  epididymal  tail.    CURRENT MEDICATIONS  MEDICATIONS  (STANDING):  aspirin enteric coated 81milliGRAM(s) Oral daily  amLODIPine   Tablet 10milliGRAM(s) Oral daily  carbidopa/levodopa  25/100 1Tablet(s) Oral three times a day  atorvastatin 20milliGRAM(s) Oral at bedtime  timolol 0.5% Solution 1Drop(s) Both EYES daily  heparin  Injectable 5000Unit(s) SubCutaneous every 12 hours  ammonium lactate 12% Lotion 1Application(s) Topical two times a day  furosemide    Tablet 40milliGRAM(s) Oral daily  potassium chloride    Tablet ER 20milliEquivalent(s) Oral daily  insulin lispro (HumaLOG) corrective regimen sliding scale  SubCutaneous Before meals and at bedtime  nystatin Powder 1Application(s) Topical three times a day  finasteride 5milliGRAM(s) Oral daily  melatonin. 9milliGRAM(s) Oral at bedtime  ferrous    sulfate 325milliGRAM(s) Oral two times a day with meals  Nephro-nate 1Tablet(s) Oral daily  polyethylene glycol 3350 17Gram(s) Oral daily  ergocalciferol 32102Gxog(s) Oral every week  tamsulosin 0.8milliGRAM(s) Oral at bedtime  freetext medication  - 1Application(s) Topical daily  ciprofloxacin     Tablet 500milliGRAM(s) Oral every 12 hours  saccharomyces boulardii 500milliGRAM(s) Oral two times a day  insulin glargine Injectable (LANTUS) 22Unit(s) SubCutaneous at bedtime  metFORMIN 500milliGRAM(s) Oral two times a day with meals    MEDICATIONS  (PRN):  acetaminophen   Tablet 650milliGRAM(s) Oral every 6 hours PRN For Temp greater than 38 C (100.4 F)  acetaminophen   Tablet. 650milliGRAM(s) Oral every 6 hours PRN Mild Pain (1 - 3)  sodium biphosphate Rectal Enema 1Enema Rectal daily PRN Constipation  bisacodyl Suppository 10milliGRAM(s) Rectal daily PRN Constipation    ASSESSMENT & PLAN  76M with multiple CVAs now with functional deficits    HTN - Amlodipine, Lasix + KCl  DM2 - Metformin 500mg BID (increaseed from 500mg 1/29), Lantus 22 U (decreased from 25 U 1/26), ISS  CVA - Lipitor, Aspirin  Anemia - Iron, Epogen x1 (1/22)  Candiduria - Diflucan (1/27-1/29)  Left epididymo-orchitis - Cipro + Florastor (1/27-2/3)  Diabetic Foot Ulcers - Iodoform dressings  Parkinson's Disease - Sinemet  Sleep - Melatonin 9mg   Glaucoma - Timolol   GI/Bowel Management - Miralax, Dulcolax PRN, Fleet PRN   Management - Toilet Q2, Bladder Scan/IC Q6, Proscar 5mg (1/18), Flomax 0.8mg (increased from 0.4mg 1/25)  Skin - Turn Q2, Nystatin to groin, Lac-Hydrin to LE  Pain - Tylenol PRN  DVT PPX - Heparin Q12, TEDs, SCDs  Diet - Cardiac, CC/Thins, Glucerna TID, Nephrovite, Vit D    Continue comprehensive acute rehab program consisting of 3hrs/day of OT/PT/SLP.

## 2017-01-30 NOTE — PROGRESS NOTE ADULT - SUBJECTIVE AND OBJECTIVE BOX
CC : S/P syncopal episode this am , patient was placed on toilet bowl by nurse , c/o lightheadedness and LOC for couple min , no seizure activity , came back to usual state right after . No cp , no palpitation , no other complaints. Now AAOX2 ,       HPI:  76M was admitted to [facility] on 16 after not feeling well for a few days. Workup showed his FS was >1100 and in DKA. Course was complicated by Tinea in the groin, lethargy on  where a brain MRI showed acute bilateral supratentorial and infratentorial infarcts with mild petechial hemorrhage in right occipital lobe. Source was found to be from PFO on JESUS MANUEL done . Course was further complicated by an acute Left LE DVT s/p IVCF on . Admitted to acute rehab on .Treated for UTI, healy cathater for retention removed few days ago ,  on straight catheter prn , found to have L scrotum hard and tender , seen by  - on abx for epididymitis  / orchitis . S/P  Syncopal episode 2 days  , likely vasovagal .    PAST MEDICAL & SURGICAL HISTORY:    High cholesterol  Diabetes  Hypertension  S/P hip hemiarthroplasty: right hip repair 2014  No significant past surgical history      MEDICATIONS  (STANDING):    aspirin enteric coated 81milliGRAM(s) Oral daily  amLODIPine   Tablet 10milliGRAM(s) Oral daily  carbidopa/levodopa  25/100 1Tablet(s) Oral three times a day  atorvastatin 20milliGRAM(s) Oral at bedtime  timolol 0.5% Solution 1Drop(s) Both EYES daily  heparin  Injectable 5000Unit(s) SubCutaneous every 12 hours  ammonium lactate 12% Lotion 1Application(s) Topical two times a day  furosemide    Tablet 40milliGRAM(s) Oral daily  potassium chloride    Tablet ER 20milliEquivalent(s) Oral daily  insulin lispro (HumaLOG) corrective regimen sliding scale  SubCutaneous Before meals and at bedtime  nystatin Powder 1Application(s) Topical three times a day  finasteride 5milliGRAM(s) Oral daily  melatonin. 9milliGRAM(s) Oral at bedtime  ferrous    sulfate 325milliGRAM(s) Oral two times a day with meals  Nephro-nate 1Tablet(s) Oral daily  polyethylene glycol 3350 17Gram(s) Oral daily  ergocalciferol 90443Yrfe(s) Oral every week  tamsulosin 0.8milliGRAM(s) Oral at bedtime  freetext medication  - 1Application(s) Topical daily  ciprofloxacin     Tablet 500milliGRAM(s) Oral every 12 hours  saccharomyces boulardii 500milliGRAM(s) Oral two times a day  insulin glargine Injectable (LANTUS) 22Unit(s) SubCutaneous at bedtime  metFORMIN 500milliGRAM(s) Oral two times a day with meals    MEDICATIONS  (PRN):  acetaminophen   Tablet 650milliGRAM(s) Oral every 6 hours PRN For Temp greater than 38 C (100.4 F)  acetaminophen   Tablet. 650milliGRAM(s) Oral every 6 hours PRN Mild Pain (1 - 3)  sodium biphosphate Rectal Enema 1Enema Rectal daily PRN Constipation  bisacodyl Suppository 10milliGRAM(s) Rectal daily PRN Constipation      LABS:                          9.9    6.85  )-----------( 318      ( 2017 07:16 )             29.2     2017 07:16    140    |  100    |  21.0   ----------------------------<  171    4.0     |  26.0   |  0.81     Ca    9.0        2017 07:16  Phos  3.7       2017 11:35  Mg     1.8       2017 11:35        Urinalysis Basic - ( 2017 13:16 )    Color: Yellow / Appearance: very cloudy / S.015 / pH: x  Gluc: x / Ketone: Trace  / Bili: Negative / Urobili: Negative mg/dL   Blood: x / Protein: 100 mg/dL / Nitrite: Negative   Leuk Esterase: Moderate / RBC: 6-10 /HPF / WBC >50   Sq Epi: x / Non Sq Epi: Few / Bacteria: Occasional        RADIOLOGY & ADDITIONAL TESTS:      REVIEW OF SYSTEMS:    CONSTITUTIONAL: No fever, weight loss, or fatigue  EYES: No eye pain, visual disturbances, or discharge  ENMT:  No difficulty hearing, tinnitus, vertigo; No sinus or throat pain  NECK: No pain or stiffness  RESPIRATORY: No cough, wheezing, chills or hemoptysis; No shortness of breath  CARDIOVASCULAR: No chest pain, palpitations, dizziness, or leg swelling  GASTROINTESTINAL: No abdominal or epigastric pain. No nausea, vomiting, or hematemesis; No diarrhea or constipation. No melena or hematochezia.  GENITOURINARY: No dysuria, frequency, hematuria, or incontinence  NEUROLOGICAL: No headaches, memory loss, loss of strength, numbness, or tremors  SKIN: No itching, burning, rashes, or lesions   LYMPH NODES: No enlarged glands  ENDOCRINE: No heat or cold intolerance; No hair loss  MUSCULOSKELETAL:   PSYCHIATRIC: No depression, anxiety, mood swings, or difficulty sleeping  HEME/LYMPH: No easy bruising, or bleeding gums  ALLERGY AND IMMUNOLOGIC: No hives or eczema    Vital Signs Last 24 Hrs  T(C): 36.1, Max: 36.5 ( @ 20:15)  T(F): 97, Max: 97.7 ( @ 20:15)  HR: 74 (70 - 74)  BP: 155/83 (125/80 - 155/83)  BP(mean): --  RR: 18 (16 - 18)  SpO2: 96% (95% - 96%)  PHYSICAL EXAM:    GENERAL: NAD, well-groomed, well-developed  HEAD:  Atraumatic, Normocephalic  EYES: EOMI, PERRLA, conjunctiva and sclera clear  NECK: Supple, No JVD, Normal thyroid  NERVOUS SYSTEM:  Alert & Oriented X3, no focal deficit  CHEST/LUNG: CTA b/l ,  no  rales, rhonchi, wheezing, or rubs  HEART: Regular rate and rhythm; No murmurs, rubs, or gallops  ABDOMEN: Soft, Nontender, Nondistended; Bowel sounds present  EXTREMITIES:  2+ Peripheral Pulses, No clubbing, cyanosis, or edema  LYMPH: No lymphadenopathy noted  SKIN: No rashes or lesions CC : no BM X 3 days , dysuria , scrotal pain      HPI:  76M was admitted to [facility] on 16 after not feeling well for a few days. Workup showed his FS was >1100 and in DKA. Course was complicated by Tinea in the groin, lethargy on  where a brain MRI showed acute bilateral supratentorial and infratentorial infarcts with mild petechial hemorrhage in right occipital lobe. Source was found to be from PFO on JESUS MANUEL done . Course was further complicated by an acute Left LE DVT s/p IVCF on . Admitted to acute rehab on .Treated for UTI, healy cathater for retention removed few days ago ,  on straight catheter prn , found to have L scrotum hard and tender , seen by  - on abx for epididymitis  / orchitis . S/P  Syncopal episode 2 days  , likely vasovagal . Today AAOX 2    PAST MEDICAL & SURGICAL HISTORY:    High cholesterol  Diabetes  Hypertension  S/P hip hemiarthroplasty: right hip repair 2014  No significant past surgical history      MEDICATIONS  (STANDING):    aspirin enteric coated 81milliGRAM(s) Oral daily  amLODIPine   Tablet 10milliGRAM(s) Oral daily  carbidopa/levodopa  25/100 1Tablet(s) Oral three times a day  atorvastatin 20milliGRAM(s) Oral at bedtime  timolol 0.5% Solution 1Drop(s) Both EYES daily  heparin  Injectable 5000Unit(s) SubCutaneous every 12 hours  ammonium lactate 12% Lotion 1Application(s) Topical two times a day  furosemide    Tablet 40milliGRAM(s) Oral daily  potassium chloride    Tablet ER 20milliEquivalent(s) Oral daily  insulin lispro (HumaLOG) corrective regimen sliding scale  SubCutaneous Before meals and at bedtime  nystatin Powder 1Application(s) Topical three times a day  finasteride 5milliGRAM(s) Oral daily  melatonin. 9milliGRAM(s) Oral at bedtime  ferrous    sulfate 325milliGRAM(s) Oral two times a day with meals  Nephro-nate 1Tablet(s) Oral daily  polyethylene glycol 3350 17Gram(s) Oral daily  ergocalciferol 07475Fsqc(s) Oral every week  tamsulosin 0.8milliGRAM(s) Oral at bedtime  freetext medication  - 1Application(s) Topical daily  ciprofloxacin     Tablet 500milliGRAM(s) Oral every 12 hours  saccharomyces boulardii 500milliGRAM(s) Oral two times a day  insulin glargine Injectable (LANTUS) 22Unit(s) SubCutaneous at bedtime  metFORMIN 500milliGRAM(s) Oral two times a day with meals    MEDICATIONS  (PRN):  acetaminophen   Tablet 650milliGRAM(s) Oral every 6 hours PRN For Temp greater than 38 C (100.4 F)  acetaminophen   Tablet. 650milliGRAM(s) Oral every 6 hours PRN Mild Pain (1 - 3)  sodium biphosphate Rectal Enema 1Enema Rectal daily PRN Constipation  bisacodyl Suppository 10milliGRAM(s) Rectal daily PRN Constipation      LABS:                          9.9    6.85  )-----------( 318      ( 2017 07:16 )             29.2     2017 07:16    140    |  100    |  21.0   ----------------------------<  171    4.0     |  26.0   |  0.81     Ca    9.0        2017 07:16  Phos  3.7       2017 11:35  Mg     1.8       2017 11:35        Urinalysis Basic - ( 2017 13:16 )    Color: Yellow / Appearance: very cloudy / S.015 / pH: x  Gluc: x / Ketone: Trace  / Bili: Negative / Urobili: Negative mg/dL   Blood: x / Protein: 100 mg/dL / Nitrite: Negative   Leuk Esterase: Moderate / RBC: 6-10 /HPF / WBC >50   Sq Epi: x / Non Sq Epi: Few / Bacteria: Occasional    REVIEW OF SYSTEMS:    CONSTITUTIONAL: No fever, weight loss, or fatigue  EYES: No eye pain, visual disturbances, or discharge  ENMT:  No difficulty hearing, tinnitus, vertigo; No sinus or throat pain  NECK: No pain or stiffness  RESPIRATORY: No cough, wheezing, chills or hemoptysis; No shortness of breath  CARDIOVASCULAR: No chest pain, palpitations, dizziness, or leg swelling  GASTROINTESTINAL: No abdominal or epigastric pain. No nausea, vomiting, or hematemesis; constipation+ . No melena or hematochezia.  GENITOURINARY: dysuria+ , frequency+ , no  hematuria, scrotal pain +  NEUROLOGICAL: No headaches, memory loss, loss of strength, numbness, or tremors  SKIN: No itching, burning, rashes, or lesions   LYMPH NODES: No enlarged glands  ENDOCRINE: No heat or cold intolerance; No hair loss  MUSCULOSKELETAL: no joint pain or swelling  PSYCHIATRIC: No depression, anxiety, mood swings, or difficulty sleeping  HEME/LYMPH: No easy bruising, or bleeding gums  ALLERGY AND IMMUNOLOGIC: No hives or eczema    Vital Signs Last 24 Hrs  T(C): 36.1, Max: 36.5 ( @ 20:15)  T(F): 97, Max: 97.7 ( @ 20:15)  HR: 74 (70 - 74)  BP: 155/83 (125/80 - 155/83)  BP(mean): --  RR: 18 (16 - 18)  SpO2: 96% (95% - 96%)  PHYSICAL EXAM:    GENERAL: NAD, well-groomed, well-developed  HEAD:  Atraumatic, Normocephalic  EYES: EOMI, PERRLA, conjunctiva and sclera clear  NECK: Supple, No JVD, Normal thyroid  NERVOUS SYSTEM:  Alert & Oriented X3, no focal deficit  CHEST/LUNG: CTA b/l ,  no  rales, rhonchi, wheezing, or rubs  HEART: Regular rate and rhythm; No murmurs, rubs, or gallops  ABDOMEN: Soft, Nontender, Nondistended; Bowel sounds present  EXTREMITIES:  2+ Peripheral Pulses, No clubbing, cyanosis,  L LE  edema 1+ , b/l LE chronic changes  LYMPH: No lymphadenopathy noted  SKIN: No rashes or lesions  Scrotum - L  swelling + /erythema+ , tenderness + - examined in presence of Dr Blanco

## 2017-01-30 NOTE — PROGRESS NOTE ADULT - ASSESSMENT
This is 76Y M with PMH of DM, HTN, admitted for DKA and AMS, admitted to ICU, started on insulin drip, anion gap closed, off insulin drip, on Lantus 22 units. He was also noted to foul smelling discharge from groin, seen bu surgery, looks fungal infection, started on Nystatin powder, improving. MRI brain showed b/l stroke with mild petechial hemorrhage in right occipital lobe. JESUS MANUEL - Large PFO. acute DVT in LLE - not a good candidate for AC per Neuro and Cardio - s/p - IVC filter placement. S/P Ledesma cath placement , feeling better , c/o b/l LE itching /pain  had constipation that resolved, noted to have scrotal palpable hard area on the left , u/s reviewed by  - L epididymitis/orchitis , no abscess , s/p syncopal episode , likely vasovagal .      Problem/Plan - 1:  ·  Problem: Diabetes.  Plan: sliding scale , Lantus 22 units QHS , Metformin 500mg BID    Problem/Plan - 2:  ·  Problem: Scrotal fullness/ tenderness , secondary to L epididymitis/orchitis  Plan: on PO abx as per     Problem/Plan - 3:  ·  Problem: Urinary retention.  Plan: continue flomax / proscar  straight cath as needed.     Problem/Plan - 4:  ·  Problem: CVA (cerebral vascular accident).  Plan: an aspirin/statin /  continue rehab.     Problem/Plan - 5:  ·  Problem: Vitamin D deficiency.  Plan: ergocalciferol weekly for 12 weeks.     Problem/Plan - 6:  Problem: Essential hypertension. Plan: controlled on current regimen ,     Problem/Plan - 7:  ·  Problem: Anemia.  Plan: stable.     Problem/Plan -8: Syncopal episode - likely vasovagal , will observe This is 76Y M with PMH of DM, HTN, admitted for DKA and AMS, admitted to ICU, started on insulin drip, anion gap closed, off insulin drip, on Lantus 22 units. He was also noted to foul smelling discharge from groin, seen bu surgery, looks fungal infection, started on Nystatin powder, improving. MRI brain showed b/l stroke with mild petechial hemorrhage in right occipital lobe. JESUS MANUEL - Large PFO. acute DVT in LLE - not a good candidate for AC per Neuro and Cardio - s/p - IVC filter placement. S/P Ledesma cath placement , feeling better , c/o b/l LE itching /pain  had constipation that resolved, noted to have scrotal palpable hard area on the left , u/s reviewed by  - L epididymitis/orchitis , no abscess , s/p syncopal episode , likely vasovagal .      Problem/Plan - 1:  ·  Problem: Diabetes.  Plan: sliding scale , Lantus 22 units QHS , Metformin 500mg BID    Problem/Plan - 2:  ·  Problem: Scrotal fullness/ tenderness , secondary to L epididymitis/orchitis  Plan: on PO abx as per     Problem/Plan - 3:  ·  Problem: Urinary retention.  Plan: continue flomax / proscar  straight cath as needed.     Problem/Plan - 4:  ·  Problem: CVA (cerebral vascular accident).  Plan: an aspirin/statin /  continue rehab.     Problem/Plan - 5:  ·  Problem: Vitamin D deficiency.  Plan: ergocalciferol weekly for 12 weeks.     Problem/Plan - 6:  Problem: Essential hypertension. Plan: controlled on current regimen , ACE on fold secondary to ARF , may consider to start small dose of ACE if real function remains stable    Problem/Plan - 7:  ·  Problem: Anemia.  Plan: stable.     Problem/Plan -8: Syncopal episode - likely vasovagal , will observe This is 76Y M with PMH of DM, HTN, admitted for DKA and AMS, admitted to ICU, started on insulin drip, anion gap closed, off insulin drip, on Lantus 22 units. He was also noted to foul smelling discharge from groin, seen bu surgery, looks fungal infection, started on Nystatin powder, improving. MRI brain showed b/l stroke with mild petechial hemorrhage in right occipital lobe. JESUS MANUEL - Large PFO. acute DVT in LLE - not a good candidate for AC per Neuro and Cardio - s/p - IVC filter placement. S/P Ledesma cath placement , feeling better , c/o b/l LE itching /pain  had constipation that resolved, noted to have scrotal palpable hard area on the left , u/s reviewed by  - L epididymitis/orchitis , no abscess , s/p syncopal episode , likely vasovagal .      Problem/Plan - 1:  ·  Problem: Diabetes.  Plan: sliding scale , Lantus 22 units QHS , Metformin 500mg BID    Problem/Plan - 2:  ·  Problem: Scrotal fullness/ tenderness , secondary to L epididymitis/orchitis  Plan: on PO abx as per     Problem/Plan - 3:  ·  Problem: Urinary retention.  Plan: continue flomax / proscar  straight cath as needed , may need to go home with Ledesma cath    Problem/Plan - 4:  ·  Problem: CVA (cerebral vascular accident).  Plan: an aspirin/statin /  continue rehab.     Problem/Plan - 5:  ·  Problem: Vitamin D deficiency.  Plan: ergocalciferol weekly for 12 weeks.     Problem/Plan - 6:  Problem: Essential hypertension. Plan: controlled on current regimen , ACE on fold secondary to ARF , may consider to start small dose of ACE if renal  function remains stable in 2-4 wks    Problem/Plan - 7:  ·  Problem: Anemia.  Plan: stable.     Problem/Plan -8: Syncopal episode - likely vasovagal , will observe    Problem/Plan - 9 ; Hyponatremia resolved This is 76Y M with PMH of DM, HTN, admitted for DKA and AMS, admitted to ICU, started on insulin drip, anion gap closed, off insulin drip, on Lantus 22 units. He was also noted to foul smelling discharge from groin, seen bu surgery, looks fungal infection, started on Nystatin powder, improving. MRI brain showed b/l stroke with mild petechial hemorrhage in right occipital lobe. JESUS MANUEL - Large PFO. acute DVT in LLE - not a good candidate for AC per Neuro and Cardio - s/p - IVC filter placement. S/P Ledesma cath placement , feeling better , c/o b/l LE itching /pain  had constipation that resolved, noted to have scrotal palpable hard area on the left , u/s reviewed by  - L epididymitis/orchitis , no abscess , s/p syncopal episode , likely vasovagal .      Problem/Plan - 1:  ·  Problem: Diabetes.  Plan: sliding scale , Lantus 22 units QHS , Metformin 500mg BID    Problem/Plan - 2:  ·  Problem: Scrotal fullness/ tenderness , secondary to L epididymitis/orchitis  Plan: on PO abx as per     Problem/Plan - 3:  ·  Problem: Urinary retention.  Plan: continue flomax / proscar  straight cath as needed , may need to go home with Ledesma cath    Problem/Plan - 4:  ·  Problem: CVA (cerebral vascular accident).  Plan: an aspirin/statin /  continue rehab.     Problem/Plan - 5:  ·  Problem: Vitamin D deficiency.  Plan: ergocalciferol weekly for 12 weeks.     Problem/Plan - 6:  Problem: Essential hypertension. Plan: controlled on current regimen , ACE on fold secondary to ARF , may consider to start small dose of ACE if renal  function remains stable in 2-4 wks    Problem/Plan - 7:  ·  Problem: Anemia.  Plan: stable.     Problem/Plan -8: Syncopal episode - likely vasovagal , will observe    Problem/Plan - 9 ; Hyponatremia resolved     Problem/Plan -10: Constipation , continue Miralax , add Senna / Colace

## 2017-01-31 PROCEDURE — 99232 SBSQ HOSP IP/OBS MODERATE 35: CPT | Mod: GC

## 2017-01-31 PROCEDURE — 99233 SBSQ HOSP IP/OBS HIGH 50: CPT

## 2017-01-31 RX ORDER — SENNA PLUS 8.6 MG/1
2 TABLET ORAL AT BEDTIME
Qty: 0 | Refills: 0 | Status: DISCONTINUED | OUTPATIENT
Start: 2017-01-31 | End: 2017-01-31

## 2017-01-31 RX ORDER — DOCUSATE SODIUM 100 MG
100 CAPSULE ORAL THREE TIMES A DAY
Qty: 0 | Refills: 0 | Status: DISCONTINUED | OUTPATIENT
Start: 2017-01-31 | End: 2017-01-31

## 2017-01-31 RX ADMIN — HEPARIN SODIUM 5000 UNIT(S): 5000 INJECTION INTRAVENOUS; SUBCUTANEOUS at 05:13

## 2017-01-31 RX ADMIN — Medication 500 MILLIGRAM(S): at 17:05

## 2017-01-31 RX ADMIN — Medication 500 MILLIGRAM(S): at 05:14

## 2017-01-31 RX ADMIN — Medication 1 APPLICATION(S): at 17:47

## 2017-01-31 RX ADMIN — CARBIDOPA AND LEVODOPA 1 TABLET(S): 25; 100 TABLET ORAL at 13:02

## 2017-01-31 RX ADMIN — NYSTATIN CREAM 1 APPLICATION(S): 100000 CREAM TOPICAL at 21:27

## 2017-01-31 RX ADMIN — METFORMIN HYDROCHLORIDE 500 MILLIGRAM(S): 850 TABLET ORAL at 09:27

## 2017-01-31 RX ADMIN — TAMSULOSIN HYDROCHLORIDE 0.8 MILLIGRAM(S): 0.4 CAPSULE ORAL at 21:27

## 2017-01-31 RX ADMIN — CARBIDOPA AND LEVODOPA 1 TABLET(S): 25; 100 TABLET ORAL at 21:27

## 2017-01-31 RX ADMIN — Medication 325 MILLIGRAM(S): at 17:05

## 2017-01-31 RX ADMIN — Medication 1 TABLET(S): at 12:19

## 2017-01-31 RX ADMIN — Medication 10: at 17:13

## 2017-01-31 RX ADMIN — NYSTATIN CREAM 1 APPLICATION(S): 100000 CREAM TOPICAL at 13:02

## 2017-01-31 RX ADMIN — HEPARIN SODIUM 5000 UNIT(S): 5000 INJECTION INTRAVENOUS; SUBCUTANEOUS at 17:05

## 2017-01-31 RX ADMIN — Medication 1 APPLICATION(S): at 05:14

## 2017-01-31 RX ADMIN — METFORMIN HYDROCHLORIDE 500 MILLIGRAM(S): 850 TABLET ORAL at 17:05

## 2017-01-31 RX ADMIN — NYSTATIN CREAM 1 APPLICATION(S): 100000 CREAM TOPICAL at 05:14

## 2017-01-31 RX ADMIN — ATORVASTATIN CALCIUM 20 MILLIGRAM(S): 80 TABLET, FILM COATED ORAL at 21:27

## 2017-01-31 RX ADMIN — ERGOCALCIFEROL 50000 UNIT(S): 1.25 CAPSULE ORAL at 12:19

## 2017-01-31 RX ADMIN — Medication 9 MILLIGRAM(S): at 21:27

## 2017-01-31 RX ADMIN — Medication 1 DROP(S): at 12:19

## 2017-01-31 RX ADMIN — Medication 325 MILLIGRAM(S): at 08:07

## 2017-01-31 RX ADMIN — Medication 20 MILLIEQUIVALENT(S): at 12:19

## 2017-01-31 RX ADMIN — AMLODIPINE BESYLATE 10 MILLIGRAM(S): 2.5 TABLET ORAL at 05:14

## 2017-01-31 RX ADMIN — Medication 6: at 21:26

## 2017-01-31 RX ADMIN — FINASTERIDE 5 MILLIGRAM(S): 5 TABLET, FILM COATED ORAL at 12:19

## 2017-01-31 RX ADMIN — INSULIN GLARGINE 22 UNIT(S): 100 INJECTION, SOLUTION SUBCUTANEOUS at 21:26

## 2017-01-31 RX ADMIN — Medication 500 MILLIGRAM(S): at 17:04

## 2017-01-31 RX ADMIN — Medication 40 MILLIGRAM(S): at 05:14

## 2017-01-31 RX ADMIN — Medication 650 MILLIGRAM(S): at 15:13

## 2017-01-31 RX ADMIN — Medication 81 MILLIGRAM(S): at 12:19

## 2017-01-31 RX ADMIN — CARBIDOPA AND LEVODOPA 1 TABLET(S): 25; 100 TABLET ORAL at 05:14

## 2017-01-31 NOTE — PROGRESS NOTE ADULT - PROBLEM SELECTOR PLAN 1
resolved
Continue with straight cath.  Continue flomax 0.8 mg and finasteride.  If patient is unable to void and he is discharged, then discharge with healy.
DODGE PLACED.  URINE CLEAR AND YELLOW  CONTINUE DODGE 7-10 DAYS.  CONSIDER FINASTERIDE IN ADDITION TO FLOMAX.  CAN CONSIDER DITROPAN IF HIS URETHRAL DISCOMFORT IS NOT CONTROLLED BY LIDOCAINE, HOWEVER WILL WORSEN HIS RETENTION AND MAY MAKE HIM CONFUSED.
Left epididymo-orchitis .  Treat with cipro and scrotal elevation.  No clinical abscess.
on antibiotics.  elevate and remove diaper to allow circulation to prevent secondary skin infection.
on aspirin , continue rehab  , stable
sliding scale , lantus, metformin adjust increase dose of lantus as needed
was going to do void trial tomorrow, however urine has the appearance of yeast/fungal balls.  will send UA and gram stain tonight.  hold off on TOV until results are available.
Epididymo-orchitis . Maintain antibiotics and scrotal elevation when supine
Maintain Ledesma, Finasteride, Flomax.  Avoid constipation.
an aspirin , continue rehab
flomax    continue healy    will follow    the patient wants to keep the healy forenow
on apsirin , stable
voiding well

## 2017-01-31 NOTE — PROGRESS NOTE ADULT - PROBLEM SELECTOR PLAN 2
on vantin.  healy out monday morning for JAVY
COLACE BID  GIVE PRN DULC  IMPROVEMENT OF CONSTIPATION WILL IMPROVE URINARY EMPTYING
Continue with abx.  Scrotal elevation.  Swelling will take several weeks to improve.
consider urology reevaluation and US of scrotum
resolved
stable continue current therapy
ergocalciferol
stable continue current therapy
48

## 2017-01-31 NOTE — PROGRESS NOTE ADULT - PROBLEM SELECTOR PROBLEM 1
CVA (cerebral vascular accident)
Diabetes
Other constipation
Epididymitis, left
Retention of urine
Scrotal fullness
Urinary retention
Urinary retention
CVA (cerebral vascular accident)
CVA (cerebral vascular accident)
Retention of urine
Scrotal fullness
Urinary retention
Urinary retention

## 2017-01-31 NOTE — PROGRESS NOTE ADULT - SUBJECTIVE AND OBJECTIVE BOX
HISTORY OF PRESENT ILLNESS  76M was admitted to [facility] on 12/31/16 after not feeling well for a few days. Workup showed his FS was >1100 and in DKA. Course was complicated by Tinea in the groin, lethargy on 1/2 where a brain MRI showed acute bilateral supratentorial and infratentorial infarcts with mild petechial hemorrhage in right occipital lobe. Source was found to be from PFO on JESUS MANUEL done 1/9. Course was further complicated by an acute Left LE DVT s/p IVCF on 1/11. Admitted to acute rehab on 1/13.    PMHx - HLD, DM 2, HTN, Parkinson's Disease    TODAY'S SUBJECTIVE & REVIEW OF SYMPTOMS  [ ] Constitutional WNL          [X] Cardio WNL              [X] Resp WNL           [X] GI WNL                          [ ]  WNL                   [X] Heme WNL              [X] Endo WNL                      [X] Skin WNL                 [X] MSK WNL            [X] Neuro WNL                     [ ] Cognitive WNL           [X] Psych WNL    No acute events overnight.  Now voiding on own throughout, not requiring any catheterization.  Reports that his legs are much better and no longer bother him.  +Constipation, no BM past couple of days; Will add senna + colace as patient is on iron, continue with PRN bowel meds.  Denies pain, no other complaints.    VITAL SIGNS:  T(C): 36.2  T(F): 97.2, Max: 98.4 (01-30 @ 20:30)  HR: 80 (78 - 80)  BP: 132/77 (132/77 - 158/80)  RR:  (18 - 18)  SpO2:  (95% - 98%)  Wt(kg): --    PHYSICAL EXAM  Constitutional - NAD, Comfortable  HEENT - NCAT, EOMI  Neck - Supple, No limited ROM  Chest - CTA bilaterally, No wheeze, No rhonchi, No crackles  Cardiovascular - RRR, S1S2, No murmurs  Abdomen - BS+, Soft, NTND  Extremities - Improved BLE edema - 1+  Neurologic Exam -     Cognitive - Awake, Alert, AAO to self, place     Communication - Fluent, No dysarthria     Cranial Nerves - CN 2-12 grossly intact     Motor -     Generalized weakness, No focal deficits      Sensory - Decreased to LT in BLE  Psychiatric - Mood depressed, Affect Flat  Skin - Bilateral LE venous stasis changes, Left medial great toe bruise, Right lateral 5th MT bruise, Groin rashes  Wounds - None    FUNCTIONAL PROGRESS  Gait - 50' x2 + 30' x2 RW min A, 4x 6" steps min A  ADLs - UE Dress min A, LE Dress total A  Transfers - sit-to-stand S at arms length  Functional transfer - Wet Shower min A    RECENT LABS                        9.9    6.85  )-----------( 318      ( 30 Jan 2017 07:16 )             29.2     30 Jan 2017 07:16    140    |  100    |  21.0   ----------------------------<  171    4.0     |  26.0   |  0.81     Ca    9.0        30 Jan 2017 07:16  Phos  3.7       28 Jan 2017 11:35  Mg     1.8       28 Jan 2017 11:35    Vitamin D 1/23 - 13    FS Last 24HRS  75 (31 Jan 2017 08:03)  238 (30 Jan 2017 21:10)  301 (30 Jan 2017 16:52)  286 (30 Jan 2017 12:22)    RADIOLOGY & OTHER RESULTS  Left Foot XR (1/27)  1.    No  acute  fracture  or  dislocation  of  the  left  foot.  2.    No  definite  radiographic  evidence  of  osteomyelitis.    Scrotal U/S (1/27)  1.      Right  hemiscrotum:    Epididymitis.  2.      Left  hemiscrotum:    Epididymoorchitis.    1.7  cm  abscess  within  the  epididymal  tail.    CURRENT MEDICATIONS  MEDICATIONS  (STANDING):  aspirin enteric coated 81milliGRAM(s) Oral daily  amLODIPine   Tablet 10milliGRAM(s) Oral daily  carbidopa/levodopa  25/100 1Tablet(s) Oral three times a day  atorvastatin 20milliGRAM(s) Oral at bedtime  timolol 0.5% Solution 1Drop(s) Both EYES daily  heparin  Injectable 5000Unit(s) SubCutaneous every 12 hours  ammonium lactate 12% Lotion 1Application(s) Topical two times a day  furosemide    Tablet 40milliGRAM(s) Oral daily  potassium chloride    Tablet ER 20milliEquivalent(s) Oral daily  insulin lispro (HumaLOG) corrective regimen sliding scale  SubCutaneous Before meals and at bedtime  nystatin Powder 1Application(s) Topical three times a day  finasteride 5milliGRAM(s) Oral daily  melatonin. 9milliGRAM(s) Oral at bedtime  ferrous    sulfate 325milliGRAM(s) Oral two times a day with meals  Nephro-nate 1Tablet(s) Oral daily  polyethylene glycol 3350 17Gram(s) Oral daily  ergocalciferol 37387Duei(s) Oral every week  tamsulosin 0.8milliGRAM(s) Oral at bedtime  freetext medication  - 1Application(s) Topical daily  ciprofloxacin     Tablet 500milliGRAM(s) Oral every 12 hours  saccharomyces boulardii 500milliGRAM(s) Oral two times a day  insulin glargine Injectable (LANTUS) 22Unit(s) SubCutaneous at bedtime  metFORMIN 500milliGRAM(s) Oral two times a day with meals  senna 2Tablet(s) Oral at bedtime  docusate sodium 100milliGRAM(s) Oral three times a day    MEDICATIONS  (PRN):  acetaminophen   Tablet 650milliGRAM(s) Oral every 6 hours PRN For Temp greater than 38 C (100.4 F)  acetaminophen   Tablet. 650milliGRAM(s) Oral every 6 hours PRN Mild Pain (1 - 3)  sodium biphosphate Rectal Enema 1Enema Rectal daily PRN Constipation  bisacodyl Suppository 10milliGRAM(s) Rectal daily PRN Constipation    ASSESSMENT & PLAN  76M with multiple CVAs now with functional deficits    HTN - Amlodipine, Lasix + KCl  DM2 - Metformin 500mg BID (increaseed from 500mg 1/29), Lantus 22 U (decreased from 25 U 1/26), ISS  CVA - Lipitor, Aspirin  Anemia - Iron, Epogen x1 (1/22)  Candiduria - Diflucan (1/27-1/29)  Left epididymo-orchitis - Cipro + Florastor (1/27-2/3)  Diabetic Foot Ulcers - Iodoform dressings  Parkinson's Disease - Sinemet  Sleep - Melatonin 9mg   Glaucoma - Timolol   GI/Bowel Management - Senna (1/31), Colace (1/31), Miralax, Dulcolax PRN, Fleet PRN   Management - Toilet Q2, Bladder Scan/IC Q6, Proscar 5mg (1/18), Flomax 0.8mg (increased from 0.4mg 1/25)  Skin - Turn Q2, Nystatin to groin, Lac-Hydrin to LE  Pain - Tylenol PRN  DVT PPX - Heparin Q12, TEDs, SCDs  Diet - Cardiac, CC/Thins, Glucerna TID, Nephrovite, Vit D    Continue comprehensive acute rehab program consisting of 3hrs/day of OT/PT/SLP. HISTORY OF PRESENT ILLNESS  76M was admitted to [facility] on 12/31/16 after not feeling well for a few days. Workup showed his FS was >1100 and in DKA. Course was complicated by Tinea in the groin, lethargy on 1/2 where a brain MRI showed acute bilateral supratentorial and infratentorial infarcts with mild petechial hemorrhage in right occipital lobe. Source was found to be from PFO on JESUS MANUEL done 1/9. Course was further complicated by an acute Left LE DVT s/p IVCF on 1/11. Admitted to acute rehab on 1/13. He was treated for Candiduria with Diflucan (1/27-1/29).     PMHx - HLD, DM 2, HTN, Parkinson's Disease      TODAY'S SUBJECTIVE & REVIEW OF SYMPTOMS  [ ] Constitutional WNL          [X] Cardio WNL              [X] Resp WNL           [X] GI WNL                          [ ]  WNL                   [X] Heme WNL              [X] Endo WNL                      [X] Skin WNL                 [X] MSK WNL            [X] Neuro WNL                     [ ] Cognitive WNL           [X] Psych WNL    No acute events overnight.  Now voiding on own throughout, not requiring any catheterization.  Reports that his legs are much better and no longer bother him.  +Constipation, no BM past couple of days; Will add senna + colace as patient is on iron, continue with PRN bowel meds.  Denies pain, no other complaints.      VITAL SIGNS:  T(C): 36.2  T(F): 97.2, Max: 98.4 (01-30 @ 20:30)  HR: 80 (78 - 80)  BP: 132/77 (132/77 - 158/80)  RR:  (18 - 18)  SpO2:  (95% - 98%)  Wt(kg): --      PHYSICAL EXAM  Constitutional - NAD, Comfortable  HEENT - NCAT, EOMI  Neck - Supple, No limited ROM  Chest - CTA bilaterally, No wheeze, No rhonchi, No crackles  Cardiovascular - RRR, S1S2, No murmurs  Abdomen - BS+, Soft, NTND  Extremities - Improved BLE edema   Neurologic Exam -     Cognitive - Awake, Alert, AAO to self, place     Communication - Fluent, No dysarthria     Cranial Nerves - CN 2-12 grossly intact     Motor -     Generalized weakness, No focal deficits      Sensory - Decreased to LT in BLE  Psychiatric - Mood depressed, Affect Flat  Skin - Bilateral LE venous stasis changes, Groin rashes  Wounds - Left LE - Diabetic foot ulcerations - Improved    FUNCTIONAL PROGRESS  Gait - 50' x2 + 30' x2 RW min A, 4x 6" steps min A  ADLs - UE Dress min A, LE Dress total A  Transfers - sit-to-stand S at arms length  Functional transfer - Wet Shower min A    RECENT LABS                        9.9    6.85  )-----------( 318      ( 30 Jan 2017 07:16 )             29.2     30 Jan 2017 07:16    140    |  100    |  21.0   ----------------------------<  171    4.0     |  26.0   |  0.81     Ca    9.0        30 Jan 2017 07:16  Phos  3.7       28 Jan 2017 11:35  Mg     1.8       28 Jan 2017 11:35    Vitamin D 1/23 - 13    FS Last 24HRS  75 (31 Jan 2017 08:03)  238 (30 Jan 2017 21:10)  301 (30 Jan 2017 16:52)  286 (30 Jan 2017 12:22)    RADIOLOGY & OTHER RESULTS  Left Foot XR (1/27)  1.    No  acute  fracture  or  dislocation  of  the  left  foot.  2.    No  definite  radiographic  evidence  of  osteomyelitis.    Scrotal U/S (1/27)  1.      Right  hemiscrotum:    Epididymitis.  2.      Left  hemiscrotum:    Epididymoorchitis.    1.7  cm  abscess  within  the  epididymal  tail.    CURRENT MEDICATIONS  MEDICATIONS  (STANDING):  aspirin enteric coated 81milliGRAM(s) Oral daily  amLODIPine   Tablet 10milliGRAM(s) Oral daily  carbidopa/levodopa  25/100 1Tablet(s) Oral three times a day  atorvastatin 20milliGRAM(s) Oral at bedtime  timolol 0.5% Solution 1Drop(s) Both EYES daily  heparin  Injectable 5000Unit(s) SubCutaneous every 12 hours  ammonium lactate 12% Lotion 1Application(s) Topical two times a day  furosemide    Tablet 40milliGRAM(s) Oral daily  potassium chloride    Tablet ER 20milliEquivalent(s) Oral daily  insulin lispro (HumaLOG) corrective regimen sliding scale  SubCutaneous Before meals and at bedtime  nystatin Powder 1Application(s) Topical three times a day  finasteride 5milliGRAM(s) Oral daily  melatonin. 9milliGRAM(s) Oral at bedtime  ferrous    sulfate 325milliGRAM(s) Oral two times a day with meals  Nephro-nate 1Tablet(s) Oral daily  polyethylene glycol 3350 17Gram(s) Oral daily  ergocalciferol 36549Rwvd(s) Oral every week  tamsulosin 0.8milliGRAM(s) Oral at bedtime  freetext medication  - 1Application(s) Topical daily  ciprofloxacin     Tablet 500milliGRAM(s) Oral every 12 hours  saccharomyces boulardii 500milliGRAM(s) Oral two times a day  insulin glargine Injectable (LANTUS) 22Unit(s) SubCutaneous at bedtime  metFORMIN 500milliGRAM(s) Oral two times a day with meals  senna 2Tablet(s) Oral at bedtime  docusate sodium 100milliGRAM(s) Oral three times a day    MEDICATIONS  (PRN):  acetaminophen   Tablet 650milliGRAM(s) Oral every 6 hours PRN For Temp greater than 38 C (100.4 F)  acetaminophen   Tablet. 650milliGRAM(s) Oral every 6 hours PRN Mild Pain (1 - 3)  sodium biphosphate Rectal Enema 1Enema Rectal daily PRN Constipation  bisacodyl Suppository 10milliGRAM(s) Rectal daily PRN Constipation      ASSESSMENT & PLAN  76M with multiple CVAs now with functional deficits  HTN - Amlodipine, Lasix + KCl  DM2 - Metformin 500mg BID (increased from 500mg 1/29), Lantus, ISS  CVA - Lipitor, Aspirin  Anemia - Improved, Iron  Left epididymo-orchitis - Cipro + Florastor (1/27-2/3)  Diabetic Foot Ulcers - Iodoform dressings  Parkinson's Disease - Sinemet  Sleep - Melatonin 9mg   Glaucoma - Timolol   GI/Bowel Management - Dulcolax PRN, Fleet PRN   Management - Toilet Q2, Bladder Scan/IC Q6, Proscar, Flomax   Skin - Turn Q2, Nystatin to groin, Lac-Hydrin to LE  Pain - Tylenol PRN  DVT PPX - Heparin Q12, TEDs, SCDs  Diet - Cardiac, CC/Thins, Glucerna TID, Nephrovite, Vit D    Continue comprehensive acute rehab program consisting of 3hrs/day of OT/PT/SLP.

## 2017-01-31 NOTE — PROGRESS NOTE ADULT - SUBJECTIVE AND OBJECTIVE BOX
CC : no BM X 3 days , dysuria , scrotal pain      HPI:  76M was admitted to [facility] on 12/31/16 after not feeling well for a few days. Workup showed his FS was >1100 and in DKA. Course was complicated by Tinea in the groin, lethargy on 1/2 where a brain MRI showed acute bilateral supratentorial and infratentorial infarcts with mild petechial hemorrhage in right occipital lobe. Source was found to be from PFO on JESUS MANUEL done 1/9. Course was further complicated by an acute Left LE DVT s/p IVCF on 1/11. Admitted to acute rehab on 1/13.Treated for UTI, healy cathater for retention removed few days ago ,  on straight catheter prn , found to have L scrotum hard and tender , seen by  - on abx for epididymitis  / orchitis . S/P  Syncopal episode 2 days  , likely vasovagal . Today AAOX 2    PAST MEDICAL & SURGICAL HISTORY:  High cholesterol  Diabetes  Hypertension  S/P hip hemiarthroplasty: right hip repair june 2014  No significant past surgical history      MEDICATIONS  (STANDING):  aspirin enteric coated 81milliGRAM(s) Oral daily  amLODIPine   Tablet 10milliGRAM(s) Oral daily  carbidopa/levodopa  25/100 1Tablet(s) Oral three times a day  atorvastatin 20milliGRAM(s) Oral at bedtime  timolol 0.5% Solution 1Drop(s) Both EYES daily  heparin  Injectable 5000Unit(s) SubCutaneous every 12 hours  ammonium lactate 12% Lotion 1Application(s) Topical two times a day  furosemide    Tablet 40milliGRAM(s) Oral daily  potassium chloride    Tablet ER 20milliEquivalent(s) Oral daily  insulin lispro (HumaLOG) corrective regimen sliding scale  SubCutaneous Before meals and at bedtime  nystatin Powder 1Application(s) Topical three times a day  finasteride 5milliGRAM(s) Oral daily  melatonin. 9milliGRAM(s) Oral at bedtime  ferrous    sulfate 325milliGRAM(s) Oral two times a day with meals  Nephro-nate 1Tablet(s) Oral daily  polyethylene glycol 3350 17Gram(s) Oral daily  ergocalciferol 24899Rqha(s) Oral every week  tamsulosin 0.8milliGRAM(s) Oral at bedtime  freetext medication  - 1Application(s) Topical daily  ciprofloxacin     Tablet 500milliGRAM(s) Oral every 12 hours  saccharomyces boulardii 500milliGRAM(s) Oral two times a day  insulin glargine Injectable (LANTUS) 22Unit(s) SubCutaneous at bedtime  metFORMIN 500milliGRAM(s) Oral two times a day with meals    MEDICATIONS  (PRN):  acetaminophen   Tablet 650milliGRAM(s) Oral every 6 hours PRN For Temp greater than 38 C (100.4 F)  acetaminophen   Tablet. 650milliGRAM(s) Oral every 6 hours PRN Mild Pain (1 - 3)  sodium biphosphate Rectal Enema 1Enema Rectal daily PRN Constipation  bisacodyl Suppository 10milliGRAM(s) Rectal daily PRN Constipation      LABS:                          9.9    6.85  )-----------( 318      ( 30 Jan 2017 07:16 )             29.2     30 Jan 2017 07:16    140    |  100    |  21.0   ----------------------------<  171    4.0     |  26.0   |  0.81     Ca    9.0        30 Jan 2017 07:16            RADIOLOGY & ADDITIONAL TESTS:      REVIEW OF SYSTEMS:    CONSTITUTIONAL: No fever, weight loss, or fatigue  EYES: No eye pain, visual disturbances, or discharge  ENMT:  No difficulty hearing, tinnitus, vertigo; No sinus or throat pain  NECK: No pain or stiffness  RESPIRATORY: No cough, wheezing, chills or hemoptysis; No shortness of breath  CARDIOVASCULAR: No chest pain, palpitations, dizziness, or leg swelling  GASTROINTESTINAL: No abdominal or epigastric pain. No nausea, vomiting, or hematemesis; No diarrhea or constipation. No melena or hematochezia.  GENITOURINARY: No dysuria, frequency, hematuria, or incontinence  NEUROLOGICAL: No headaches, memory loss, loss of strength, numbness, or tremors  SKIN: No itching, burning, rashes, or lesions   LYMPH NODES: No enlarged glands  ENDOCRINE: No heat or cold intolerance; No hair loss  MUSCULOSKELETAL:   PSYCHIATRIC: No depression, anxiety, mood swings, or difficulty sleeping  HEME/LYMPH: No easy bruising, or bleeding gums  ALLERGY AND IMMUNOLOGIC: No hives or eczema    Vital Signs Last 24 Hrs  T(C): 36.2, Max: 36.9 (01-30 @ 20:30)  T(F): 97.2, Max: 98.4 (01-30 @ 20:30)  HR: 80 (78 - 84)  BP: 132/77 (119/73 - 158/80)  BP(mean): --  RR: 18 (18 - 18)  SpO2: 95% (95% - 98%)  PHYSICAL EXAM:    GENERAL: NAD, well-groomed, well-developed  HEAD:  Atraumatic, Normocephalic  EYES: EOMI, PERRLA, conjunctiva and sclera clear  NECK: Supple, No JVD, Normal thyroid  NERVOUS SYSTEM:  Alert & Oriented X3, no focal deficit  CHEST/LUNG: CTA b/l ,  no  rales, rhonchi, wheezing, or rubs  HEART: Regular rate and rhythm; No murmurs, rubs, or gallops  ABDOMEN: Soft, Nontender, Nondistended; Bowel sounds present  EXTREMITIES:  2+ Peripheral Pulses, No clubbing, cyanosis, or edema  LYMPH: No lymphadenopathy noted  SKIN: No rashes or lesions CC : no BM X 3 days , dysuria , scrotal pain      HPI:  76M was admitted to [facility] on 12/31/16 after not feeling well for a few days. Workup showed his FS was >1100 and in DKA. Course was complicated by Tinea in the groin, lethargy on 1/2 where a brain MRI showed acute bilateral supratentorial and infratentorial infarcts with mild petechial hemorrhage in right occipital lobe. Source was found to be from PFO on JESUS MANUEL done 1/9. Course was further complicated by an acute Left LE DVT s/p IVCF on 1/11. Admitted to acute rehab on 1/13.Treated for UTI, healy cathater for retention removed few days ago ,  on straight catheter prn , found to have L scrotum hard and tender , seen by  - on abx for epididymitis  / orchitis . S/P  Syncopal episode 2 days  , likely vasovagal . Today AAOX 2, feeling better, no BM for few days, urinating with out straight  cath     PAST MEDICAL & SURGICAL HISTORY:  High cholesterol  Diabetes  Hypertension  S/P hip hemiarthroplasty: right hip repair june 2014  No significant past surgical history      MEDICATIONS  (STANDING):  aspirin enteric coated 81milliGRAM(s) Oral daily  amLODIPine   Tablet 10milliGRAM(s) Oral daily  carbidopa/levodopa  25/100 1Tablet(s) Oral three times a day  atorvastatin 20milliGRAM(s) Oral at bedtime  timolol 0.5% Solution 1Drop(s) Both EYES daily  heparin  Injectable 5000Unit(s) SubCutaneous every 12 hours  ammonium lactate 12% Lotion 1Application(s) Topical two times a day  furosemide    Tablet 40milliGRAM(s) Oral daily  potassium chloride    Tablet ER 20milliEquivalent(s) Oral daily  insulin lispro (HumaLOG) corrective regimen sliding scale  SubCutaneous Before meals and at bedtime  nystatin Powder 1Application(s) Topical three times a day  finasteride 5milliGRAM(s) Oral daily  melatonin. 9milliGRAM(s) Oral at bedtime  ferrous    sulfate 325milliGRAM(s) Oral two times a day with meals  Nephro-nate 1Tablet(s) Oral daily  polyethylene glycol 3350 17Gram(s) Oral daily  ergocalciferol 26606Xatv(s) Oral every week  tamsulosin 0.8milliGRAM(s) Oral at bedtime  freetext medication  - 1Application(s) Topical daily  ciprofloxacin     Tablet 500milliGRAM(s) Oral every 12 hours  saccharomyces boulardii 500milliGRAM(s) Oral two times a day  insulin glargine Injectable (LANTUS) 22Unit(s) SubCutaneous at bedtime  metFORMIN 500milliGRAM(s) Oral two times a day with meals    MEDICATIONS  (PRN):  acetaminophen   Tablet 650milliGRAM(s) Oral every 6 hours PRN For Temp greater than 38 C (100.4 F)  acetaminophen   Tablet. 650milliGRAM(s) Oral every 6 hours PRN Mild Pain (1 - 3)  sodium biphosphate Rectal Enema 1Enema Rectal daily PRN Constipation  bisacodyl Suppository 10milliGRAM(s) Rectal daily PRN Constipation      LABS:                          9.9    6.85  )-----------( 318      ( 30 Jan 2017 07:16 )             29.2     30 Jan 2017 07:16    140    |  100    |  21.0   ----------------------------<  171    4.0     |  26.0   |  0.81     Ca    9.0        30 Jan 2017 07:16            ROS:  Constipation , dysuria / scrotal pain - but better , all other systems reviewed and are negative    Vital Signs Last 24 Hrs    T(C): 36.2, Max: 36.9 (01-30 @ 20:30)  T(F): 97.2, Max: 98.4 (01-30 @ 20:30)  HR: 80 (78 - 84)  BP: 132/77 (119/73 - 158/80)  BP(mean): --  RR: 18 (18 - 18)  SpO2: 95% (95% - 98%)    PHYSICAL EXAM:    GENERAL: NAD, well-groomed, well-developed  HEAD:  Atraumatic, Normocephalic  EYES: EOMI, PERRLA, conjunctiva and sclera clear  NECK: Supple, No JVD, Normal thyroid  NERVOUS SYSTEM:  Alert & Oriented X2, no focal deficit  CHEST/LUNG: CTA b/l ,  no  rales, rhonchi, wheezing, or rubs  HEART: Regular rate and rhythm; No murmurs, rubs, or gallops  ABDOMEN: Soft, Nontender, Nondistended; Bowel sounds present  EXTREMITIES:  2+ Peripheral Pulses, No clubbing, cyanosis, or edema, chronic venous changes+  LYMPH: No lymphadenopathy noted  SKIN: No rashes or lesions  Scrotum - L scrotal erythema/ hardness +

## 2017-01-31 NOTE — PROGRESS NOTE ADULT - ASSESSMENT
This is 76Y M with PMH of DM, HTN, admitted for DKA and AMS, admitted to ICU, started on insulin drip, anion gap closed, off insulin drip, on Lantus 22 units. He was also noted to foul smelling discharge from groin, seen bu surgery, looks fungal infection, started on Nystatin powder, improving. MRI brain showed b/l stroke with mild petechial hemorrhage in right occipital lobe. JESUS MANUEL - Large PFO. acute DVT in LLE - not a good candidate for AC per Neuro and Cardio - s/p - IVC filter placement. S/P Ledesma cath placement , feeling better , c/o b/l LE itching /pain  had constipation that resolved, noted to have scrotal palpable hard area on the left , u/s reviewed by  - L epididymitis/orchitis  - on PO abx , no abscess , s/p syncopal episode , likely vasovagal .      Problem/Plan - 1:  ·  Problem: Diabetes.  Plan: sliding scale , Lantus 22 units QHS , Metformin 500mg BID    Problem/Plan - 2:  ·  Problem: Scrotal fullness/ tenderness , secondary to L epididymitis/orchitis  Plan: on PO abx as per     Problem/Plan - 3:  ·  Problem: Urinary retention.  Plan: continue flomax / proscar  straight cath as needed , may need to go home with Ledesma cath    Problem/Plan - 4:  ·  Problem: CVA (cerebral vascular accident).  Plan: an aspirin/statin /  continue rehab.     Problem/Plan - 5:  ·  Problem: Vitamin D deficiency.  Plan: ergocalciferol weekly for 12 weeks.     Problem/Plan - 6:  Problem: Essential hypertension. Plan: controlled on current regimen , ACE on fold secondary to ARF , may consider to start small dose of ACE if renal  function remains stable in 2-4 wks    Problem/Plan - 7:  ·  Problem: Anemia.  Plan: stable.     Problem/Plan -8: Syncopal episode - likely vasovagal , will observe    Problem/Plan - 9 ; Hyponatremia resolved     Problem/Plan -10: Constipation , continue Miralax , add Senna / Colace This is 76Y M with PMH of DM, HTN, admitted for DKA and AMS, admitted to ICU, started on insulin drip, anion gap closed, off insulin drip, on Lantus 22 units. He was also noted to foul smelling discharge from groin, seen bu surgery, looks fungal infection, started on Nystatin powder, improving. MRI brain showed b/l stroke with mild petechial hemorrhage in right occipital lobe. JESUS MANUEL - Large PFO. acute DVT in LLE - not a good candidate for AC per Neuro and Cardio - s/p - IVC filter placement. S/P Ledesma cath placement , feeling better , c/o b/l LE itching /pain  had constipation that resolved, noted to have scrotal palpable hard area on the left , u/s reviewed by  - L epididymitis/orchitis  - on PO abx , no abscess , s/p syncopal episode , likely vasovagal . Doing better , able to urinate , constipated.      Problem/Plan - 1:  ·  Problem: Diabetes.  Plan: sliding scale , Lantus 22 units QHS , Metformin 500mg BID    Problem/Plan - 2:  ·  Problem: Scrotal fullness/ tenderness , secondary to L epididymitis/orchitis  Plan: on PO abx as per     Problem/Plan - 3:  ·  Problem: Urinary retention.  Plan: continue flomax / proscar , able to urinate   Problem/Plan - 4:  ·  Problem: CVA (cerebral vascular accident).  Plan: an aspirin/statin /  continue rehab.     Problem/Plan - 5:  ·  Problem: Vitamin D deficiency.  Plan: ergocalciferol weekly for 12 weeks.     Problem/Plan - 6:  Problem: Essential hypertension. Plan: controlled on current regimen , ACE on fold secondary to ARF , may consider to start small dose of ACE if renal  function remains stable in 2-4 wks, follow up with PMD in 1-2 wks    Problem/Plan - 7:  ·  Problem: Anemia , likely of chronic dz  Plan: stable , continue PO Iron    Problem/Plan -8: Syncopal episode - likely vasovagal , will observe    Problem/Plan - 9 ; Hyponatremia resolved     Problem/Plan -10: Constipation , continue Miralax , add Senna / Colace

## 2017-01-31 NOTE — PROGRESS NOTE ADULT - PROBLEM SELECTOR PROBLEM 2
Essential hypertension
Scrotal fullness
Urinary tract infection associated with catheterization of urinary tract, unspecified indwelling urinary catheter type, subsequent encounter
Epididymitis, left
Other constipation
Retention of urine
Essential hypertension
Vitamin D deficiency

## 2017-02-01 PROCEDURE — 99232 SBSQ HOSP IP/OBS MODERATE 35: CPT

## 2017-02-01 RX ORDER — TIMOLOL 0.5 %
1 DROPS OPHTHALMIC (EYE)
Qty: 1 | Refills: 0 | OUTPATIENT
Start: 2017-02-01 | End: 2017-03-03

## 2017-02-01 RX ORDER — CARBIDOPA AND LEVODOPA 25; 100 MG/1; MG/1
1 TABLET ORAL
Qty: 90 | Refills: 0 | OUTPATIENT
Start: 2017-02-01 | End: 2017-03-03

## 2017-02-01 RX ORDER — INSULIN GLARGINE 100 [IU]/ML
22 INJECTION, SOLUTION SUBCUTANEOUS
Qty: 1 | Refills: 0 | OUTPATIENT
Start: 2017-02-01 | End: 2017-03-03

## 2017-02-01 RX ORDER — ATORVASTATIN CALCIUM 80 MG/1
1 TABLET, FILM COATED ORAL
Qty: 30 | Refills: 0 | OUTPATIENT
Start: 2017-02-01 | End: 2017-03-03

## 2017-02-01 RX ORDER — ACETAMINOPHEN 500 MG
2 TABLET ORAL
Qty: 0 | Refills: 0 | COMMUNITY
Start: 2017-02-01

## 2017-02-01 RX ORDER — SACCHAROMYCES BOULARDII 250 MG
1 POWDER IN PACKET (EA) ORAL
Qty: 20 | Refills: 0 | OUTPATIENT
Start: 2017-02-01 | End: 2017-02-11

## 2017-02-01 RX ORDER — FUROSEMIDE 40 MG
1 TABLET ORAL
Qty: 30 | Refills: 0 | OUTPATIENT
Start: 2017-02-01 | End: 2017-03-03

## 2017-02-01 RX ORDER — AMLODIPINE BESYLATE 2.5 MG/1
1 TABLET ORAL
Qty: 30 | Refills: 0 | OUTPATIENT
Start: 2017-02-01 | End: 2017-03-03

## 2017-02-01 RX ORDER — ERGOCALCIFEROL 1.25 MG/1
1 CAPSULE ORAL
Qty: 10 | Refills: 0 | OUTPATIENT
Start: 2017-02-01 | End: 2017-03-03

## 2017-02-01 RX ORDER — FINASTERIDE 5 MG/1
1 TABLET, FILM COATED ORAL
Qty: 30 | Refills: 0 | OUTPATIENT
Start: 2017-02-01 | End: 2017-03-03

## 2017-02-01 RX ORDER — POTASSIUM CHLORIDE 20 MEQ
1 PACKET (EA) ORAL
Qty: 30 | Refills: 0 | OUTPATIENT
Start: 2017-02-01 | End: 2017-03-03

## 2017-02-01 RX ORDER — FERROUS SULFATE 325(65) MG
1 TABLET ORAL
Qty: 60 | Refills: 0 | OUTPATIENT
Start: 2017-02-01 | End: 2017-03-03

## 2017-02-01 RX ORDER — ASPIRIN/CALCIUM CARB/MAGNESIUM 324 MG
1 TABLET ORAL
Qty: 30 | Refills: 0 | OUTPATIENT
Start: 2017-02-01 | End: 2017-03-03

## 2017-02-01 RX ORDER — TAMSULOSIN HYDROCHLORIDE 0.4 MG/1
2 CAPSULE ORAL
Qty: 60 | Refills: 0 | OUTPATIENT
Start: 2017-02-01 | End: 2017-03-03

## 2017-02-01 RX ORDER — METFORMIN HYDROCHLORIDE 850 MG/1
1 TABLET ORAL
Qty: 60 | Refills: 0 | OUTPATIENT
Start: 2017-02-01 | End: 2017-03-03

## 2017-02-01 RX ORDER — CIPROFLOXACIN LACTATE 400MG/40ML
1 VIAL (ML) INTRAVENOUS
Qty: 10 | Refills: 0 | OUTPATIENT
Start: 2017-02-01 | End: 2017-02-06

## 2017-02-01 RX ORDER — ASPIRIN/CALCIUM CARB/MAGNESIUM 324 MG
2 TABLET ORAL
Qty: 30 | Refills: 0 | COMMUNITY
Start: 2017-02-01 | End: 2017-03-03

## 2017-02-01 RX ADMIN — Medication 500 MILLIGRAM(S): at 17:26

## 2017-02-01 RX ADMIN — CARBIDOPA AND LEVODOPA 1 TABLET(S): 25; 100 TABLET ORAL at 06:04

## 2017-02-01 RX ADMIN — Medication 500 MILLIGRAM(S): at 06:03

## 2017-02-01 RX ADMIN — HEPARIN SODIUM 5000 UNIT(S): 5000 INJECTION INTRAVENOUS; SUBCUTANEOUS at 06:04

## 2017-02-01 RX ADMIN — Medication 1 APPLICATION(S): at 18:29

## 2017-02-01 RX ADMIN — ATORVASTATIN CALCIUM 20 MILLIGRAM(S): 80 TABLET, FILM COATED ORAL at 21:58

## 2017-02-01 RX ADMIN — Medication 325 MILLIGRAM(S): at 17:25

## 2017-02-01 RX ADMIN — Medication 20 MILLIEQUIVALENT(S): at 13:06

## 2017-02-01 RX ADMIN — INSULIN GLARGINE 22 UNIT(S): 100 INJECTION, SOLUTION SUBCUTANEOUS at 21:56

## 2017-02-01 RX ADMIN — Medication 4: at 21:56

## 2017-02-01 RX ADMIN — NYSTATIN CREAM 1 APPLICATION(S): 100000 CREAM TOPICAL at 06:04

## 2017-02-01 RX ADMIN — TAMSULOSIN HYDROCHLORIDE 0.8 MILLIGRAM(S): 0.4 CAPSULE ORAL at 21:56

## 2017-02-01 RX ADMIN — Medication 81 MILLIGRAM(S): at 13:06

## 2017-02-01 RX ADMIN — CARBIDOPA AND LEVODOPA 1 TABLET(S): 25; 100 TABLET ORAL at 21:58

## 2017-02-01 RX ADMIN — AMLODIPINE BESYLATE 10 MILLIGRAM(S): 2.5 TABLET ORAL at 06:04

## 2017-02-01 RX ADMIN — Medication 1 DROP(S): at 13:07

## 2017-02-01 RX ADMIN — METFORMIN HYDROCHLORIDE 500 MILLIGRAM(S): 850 TABLET ORAL at 17:25

## 2017-02-01 RX ADMIN — Medication 1 TABLET(S): at 13:06

## 2017-02-01 RX ADMIN — Medication 1 APPLICATION(S): at 06:03

## 2017-02-01 RX ADMIN — CARBIDOPA AND LEVODOPA 1 TABLET(S): 25; 100 TABLET ORAL at 13:07

## 2017-02-01 RX ADMIN — Medication 325 MILLIGRAM(S): at 09:05

## 2017-02-01 RX ADMIN — NYSTATIN CREAM 1 APPLICATION(S): 100000 CREAM TOPICAL at 21:56

## 2017-02-01 RX ADMIN — Medication 500 MILLIGRAM(S): at 17:27

## 2017-02-01 RX ADMIN — Medication 6: at 17:25

## 2017-02-01 RX ADMIN — Medication 8: at 13:05

## 2017-02-01 RX ADMIN — Medication 40 MILLIGRAM(S): at 06:05

## 2017-02-01 RX ADMIN — Medication 500 MILLIGRAM(S): at 06:04

## 2017-02-01 RX ADMIN — FINASTERIDE 5 MILLIGRAM(S): 5 TABLET, FILM COATED ORAL at 15:53

## 2017-02-01 RX ADMIN — METFORMIN HYDROCHLORIDE 500 MILLIGRAM(S): 850 TABLET ORAL at 09:05

## 2017-02-01 RX ADMIN — NYSTATIN CREAM 1 APPLICATION(S): 100000 CREAM TOPICAL at 15:54

## 2017-02-01 RX ADMIN — HEPARIN SODIUM 5000 UNIT(S): 5000 INJECTION INTRAVENOUS; SUBCUTANEOUS at 17:28

## 2017-02-01 RX ADMIN — Medication 9 MILLIGRAM(S): at 21:57

## 2017-02-01 NOTE — PROGRESS NOTE ADULT - SUBJECTIVE AND OBJECTIVE BOX
CC : no complaints this am , had BM yesterday , urinating on his own without difficulty , no scrotal pain      HPI:  76M was admitted to [facility] on 12/31/16 after not feeling well for a few days. Workup showed his FS was >1100 and in DKA. Course was complicated by Tinea in the groin, lethargy on 1/2 where a brain MRI showed acute bilateral supratentorial and infratentorial infarcts with mild petechial hemorrhage in right occipital lobe. Source was found to be from PFO on JESUS MANUEL done 1/9. Course was further complicated by an acute Left LE DVT s/p IVCF on 1/11. Admitted to acute rehab on 1/13.Treated for UTI, healy cathater for retention - now removed and patient urinating well ,   found to have L scrotum hard and tender , seen by  - on abx for epididymitis  / orchitis . Today doing well , no complaints , had BM yesterday.    PAST MEDICAL & SURGICAL HISTORY:  High cholesterol  Diabetes  Hypertension  S/P hip hemiarthroplasty: right hip repair june 2014  No significant past surgical history      MEDICATIONS  (STANDING):  aspirin enteric coated 81milliGRAM(s) Oral daily  amLODIPine   Tablet 10milliGRAM(s) Oral daily  carbidopa/levodopa  25/100 1Tablet(s) Oral three times a day  atorvastatin 20milliGRAM(s) Oral at bedtime  timolol 0.5% Solution 1Drop(s) Both EYES daily  heparin  Injectable 5000Unit(s) SubCutaneous every 12 hours  ammonium lactate 12% Lotion 1Application(s) Topical two times a day  furosemide    Tablet 40milliGRAM(s) Oral daily  potassium chloride    Tablet ER 20milliEquivalent(s) Oral daily  insulin lispro (HumaLOG) corrective regimen sliding scale  SubCutaneous Before meals and at bedtime  nystatin Powder 1Application(s) Topical three times a day  finasteride 5milliGRAM(s) Oral daily  melatonin. 9milliGRAM(s) Oral at bedtime  ferrous    sulfate 325milliGRAM(s) Oral two times a day with meals  Nephro-nate 1Tablet(s) Oral daily  ergocalciferol [id number](s) Oral every week  tamsulosin 0.8milliGRAM(s) Oral at bedtime  freetext medication  - 1Application(s) Topical daily  ciprofloxacin     Tablet 500milliGRAM(s) Oral every 12 hours  saccharomyces boulardii 500milliGRAM(s) Oral two times a day  insulin glargine Injectable (LANTUS) 22Unit(s) SubCutaneous at bedtime  metFORMIN 500milliGRAM(s) Oral two times a day with meals    MEDICATIONS  (PRN):  acetaminophen   Tablet 650milliGRAM(s) Oral every 6 hours PRN For Temp greater than 38 C (100.4 F)  acetaminophen   Tablet. 650milliGRAM(s) Oral every 6 hours PRN Mild Pain (1 - 3)  sodium biphosphate Rectal Enema 1Enema Rectal daily PRN Constipation  bisacodyl Suppository 10milliGRAM(s) Rectal daily PRN Constipation      REVIEW OF SYSTEMS:    Constipation - had BM  Scrotal pain - resolved  Urinary retention /dysuria - resolved  All other systems reviewed and are negative    Vital Signs Last 24 Hrs  T(C): 36.9, Max: 36.9 (02-01 @ 05:43)  T(F): 98.5, Max: 98.5 (02-01 @ 05:43)  HR: 79 (79 - 83)  BP: 120/64 (120/64 - 128/68)  BP(mean): --  RR: 20 (18 - 20)  SpO2: 95% (95% - 96%)  PHYSICAL EXAM:    GENERAL: NAD, well-groomed, well-developed  HEAD:  Atraumatic, Normocephalic  EYES: EOMI, PERRLA, conjunctiva and sclera clear  NECK: Supple, No JVD, Normal thyroid  NERVOUS SYSTEM:  Alert & Oriented X3, no focal deficit  CHEST/LUNG: CTA b/l ,  no  rales, rhonchi, wheezing, or rubs  HEART: Regular rate and rhythm; No murmurs, rubs, or gallops  ABDOMEN: Soft, Nontender, Nondistended; Bowel sounds present  EXTREMITIES:  2+ Peripheral Pulses, No clubbing, cyanosis, or edema  LYMPH: No lymphadenopathy noted  SKIN: No rashes or lesions

## 2017-02-01 NOTE — PROGRESS NOTE ADULT - ASSESSMENT
This is 76Y M with PMH of DM, HTN, admitted for DKA and AMS, admitted to ICU, started on insulin drip, anion gap closed, off insulin drip, on Lantus 22 units. He was also noted to foul smelling discharge from groin, seen bu surgery, looks fungal infection, started on Nystatin powder, improving. MRI brain showed b/l stroke with mild petechial hemorrhage in right occipital lobe. JESUS MANUEL - Large PFO. acute DVT in LLE - not a good candidate for AC per Neuro and Cardio - s/p - IVC filter placement. S/P Ledesma cath placement , feeling better , c/o b/l LE itching /pain  had constipation that resolved, noted to have scrotal palpable hard area on the left , u/s reviewed by  - L epididymitis/orchitis  - on PO abx , no abscess , s/p syncopal episode , likely vasovagal . Doing better , able to urinate , had BM.      Problem/Plan - 1:  ·  Problem: Diabetes.  Plan: sliding scale , Lantus 22 units QHS , Metformin 500mg BID , ADA diet    Problem/Plan - 2:  ·  Problem: Scrotal fullness/ tenderness /  epididymitis/orchitis  , likely caused by catheter Plan: on PO abx as per  , recommend to continue abx for total 10 days, follow up with urologist after abx finished .    Problem/Plan - 3:  ·  Problem: Urinary retention.  Plan: continue flomax / proscar , able to urinate , no need for Ledesma at this time , follow up with urology as outpatient    Problem/Plan - 4:  ·  Problem: CVA (cerebral vascular accident).  Plan: continue  aspirin/statin /  continue rehab.     Problem/Plan - 5:  ·  Problem: Vitamin D deficiency.  Plan: ergocalciferol weekly for 12 weeks.     Problem/Plan - 6:  Problem: Essential hypertension. Plan: controlled on current regimen , ACE on fold secondary to ARF , may consider to start small dose of ACE if renal  function remains stable in 2-4 wks, follow up with PMD in 1-2 wks    Problem/Plan - 7:  ·  Problem: Anemia , likely of chronic dz  Plan: stable , continue PO Iron    Problem/Plan -8: Syncopal episode - likely vasovagal , will observe    Problem/Plan - 9 ; Hyponatremia resolved     Problem/Plan -10: Constipation , continue Miralax , add Senna / Colace

## 2017-02-01 NOTE — PROGRESS NOTE ADULT - SUBJECTIVE AND OBJECTIVE BOX
HISTORY OF PRESENT ILLNESS  76M was admitted to [facility] on 12/31/16 after not feeling well for a few days. Workup showed his FS was >1100 and in DKA. Course was complicated by Tinea in the groin, lethargy on 1/2 where a brain MRI showed acute bilateral supratentorial and infratentorial infarcts with mild petechial hemorrhage in right occipital lobe. Source was found to be from PFO on JESUS MANUEL done 1/9. Course was further complicated by an acute Left LE DVT s/p IVCF on 1/11. Admitted to acute rehab on 1/13. He was treated for Candiduria with Diflucan (1/27-1/29).     PMHx - HLD, DM 2, HTN, Parkinson's Disease      TODAY'S SUBJECTIVE & REVIEW OF SYMPTOMS  [X] Constitutional WNL          [X] Cardio WNL              [X] Resp WNL           [X] GI WNL                          [X]  WNL                   [X] Heme WNL              [X] Endo WNL                      [X] Skin WNL                 [X] MSK WNL            [ ] Neuro WNL                     [ ] Cognitive WNL           [X] Psych WNL    Patient doing well. Denies pain. Slept well.  Looking forward to going home tomorrow.   No abdominal pain or constipation. Urinating on own.      VITAL SIGNS:  T(C): 36.9  T(F): 98.5, Max: 98.5 (02-01 @ 05:43)  HR: 79 (79 - 83)  BP: 120/64 (120/64 - 128/68)  RR:  (18 - 20)  SpO2:  (95% - 96%)  Wt(kg): --      PHYSICAL EXAM  Constitutional - NAD, Comfortable  HEENT - NCAT, EOMI  Neck - Supple, No limited ROM  Chest - CTA bilaterally, No wheeze, No rhonchi, No crackles  Cardiovascular - RRR, S1S2, No murmurs  Abdomen - BS+, Soft, NTND  Extremities - Improved BLE edema   Neurologic Exam -     Cognitive - Awake, Alert, AAO to self, place     Communication - Fluent, No dysarthria     Cranial Nerves - CN 2-12 grossly intact     Motor -     Generalized weakness, No focal deficits      Sensory - Decreased to LT in BLE  Psychiatric - Mood depressed, Affect Flat  Skin - Bilateral LE venous stasis changes, Groin rashes  Wounds - Left LE - Diabetic foot ulcerations - Improved    FUNCTIONAL PROGRESS  Gait - 50' x2 + 30' x2 RW min A, 4x 6" steps min A  ADLs - UE Dress min A, LE Dress total A  Transfers - sit-to-stand S at arms length  Functional transfer - Wet Shower min A      RECENT LABS                        9.9    6.85  )-----------( 318      ( 30 Jan 2017 07:16 )             29.2     30 Jan 2017 07:16    140    |  100    |  21.0   ----------------------------<  171    4.0     |  26.0   |  0.81     Ca    9.0        30 Jan 2017 07:16  Phos  3.7       28 Jan 2017 11:35  Mg     1.8       28 Jan 2017 11:35    Vitamin D 1/23 - 13    FS Last 24HRS  75 (31 Jan 2017 08:03)  238 (30 Jan 2017 21:10)  301 (30 Jan 2017 16:52)  286 (30 Jan 2017 12:22)    RADIOLOGY & OTHER RESULTS  Left Foot XR (1/27)  1.    No  acute  fracture  or  dislocation  of  the  left  foot.  2.    No  definite  radiographic  evidence  of  osteomyelitis.    Scrotal U/S (1/27)  1.      Right  hemiscrotum:    Epididymitis.  2.      Left  hemiscrotum:    Epididymoorchitis.    1.7  cm  abscess  within  the  epididymal  tail.      CURRENT MEDICATIONS  MEDICATIONS  (STANDING):  aspirin enteric coated 81milliGRAM(s) Oral daily  amLODIPine   Tablet 10milliGRAM(s) Oral daily  carbidopa/levodopa  25/100 1Tablet(s) Oral three times a day  atorvastatin 20milliGRAM(s) Oral at bedtime  timolol 0.5% Solution 1Drop(s) Both EYES daily  heparin  Injectable 5000Unit(s) SubCutaneous every 12 hours  ammonium lactate 12% Lotion 1Application(s) Topical two times a day  furosemide    Tablet 40milliGRAM(s) Oral daily  potassium chloride    Tablet ER 20milliEquivalent(s) Oral daily  insulin lispro (HumaLOG) corrective regimen sliding scale  SubCutaneous Before meals and at bedtime  nystatin Powder 1Application(s) Topical three times a day  finasteride 5milliGRAM(s) Oral daily  melatonin. 9milliGRAM(s) Oral at bedtime  ferrous    sulfate 325milliGRAM(s) Oral two times a day with meals  Nephro-nate 1Tablet(s) Oral daily  ergocalciferol [id number](s) Oral every week  tamsulosin 0.8milliGRAM(s) Oral at bedtime  freetext medication  - 1Application(s) Topical daily  ciprofloxacin     Tablet 500milliGRAM(s) Oral every 12 hours  saccharomyces boulardii 500milliGRAM(s) Oral two times a day  insulin glargine Injectable (LANTUS) 22Unit(s) SubCutaneous at bedtime  metFORMIN 500milliGRAM(s) Oral two times a day with meals    MEDICATIONS  (PRN):  acetaminophen   Tablet 650milliGRAM(s) Oral every 6 hours PRN For Temp greater than 38 C (100.4 F)  acetaminophen   Tablet. 650milliGRAM(s) Oral every 6 hours PRN Mild Pain (1 - 3)  sodium biphosphate Rectal Enema 1Enema Rectal daily PRN Constipation  bisacodyl Suppository 10milliGRAM(s) Rectal daily PRN Constipation      ASSESSMENT & PLAN  76M with multiple CVAs now with functional deficits  HTN - Amlodipine, Lasix + KCl  DM2 - Metformin 500mg BID (increased from 500mg 1/29), Lantus, ISS  CVA - Lipitor, Aspirin  Anemia - Improved, Iron  Left epididymo-orchitis - Cipro + Florastor (1/27-2/3)  Diabetic Foot Ulcers - Iodoform dressings  Parkinson's Disease - Sinemet  Sleep - Melatonin 9mg   Glaucoma - Timolol   GI/Bowel Management - Dulcolax PRN, Fleet PRN   Management - Toilet Q2, Proscar, Flomax   Skin - Turn Q2, Nystatin to groin, Lac-Hydrin to LE  Pain - Tylenol PRN  DVT PPX - Heparin Q12, TEDs, SCDs  Diet - Cardiac, CC/Thins, Glucerna TID, Nephrovite, Vit D    Continue comprehensive acute rehab program consisting of 3hrs/day of OT/PT/SLP.

## 2017-02-02 VITALS
DIASTOLIC BLOOD PRESSURE: 77 MMHG | TEMPERATURE: 98 F | HEART RATE: 80 BPM | RESPIRATION RATE: 18 BRPM | SYSTOLIC BLOOD PRESSURE: 132 MMHG | OXYGEN SATURATION: 98 %

## 2017-02-02 PROCEDURE — 99238 HOSP IP/OBS DSCHRG MGMT 30/<: CPT

## 2017-02-02 RX ADMIN — METFORMIN HYDROCHLORIDE 500 MILLIGRAM(S): 850 TABLET ORAL at 08:34

## 2017-02-02 RX ADMIN — AMLODIPINE BESYLATE 10 MILLIGRAM(S): 2.5 TABLET ORAL at 05:30

## 2017-02-02 RX ADMIN — NYSTATIN CREAM 1 APPLICATION(S): 100000 CREAM TOPICAL at 14:27

## 2017-02-02 RX ADMIN — Medication 500 MILLIGRAM(S): at 05:30

## 2017-02-02 RX ADMIN — Medication 40 MILLIGRAM(S): at 05:30

## 2017-02-02 RX ADMIN — Medication 81 MILLIGRAM(S): at 12:31

## 2017-02-02 RX ADMIN — Medication 1 DROP(S): at 12:33

## 2017-02-02 RX ADMIN — Medication 2: at 08:34

## 2017-02-02 RX ADMIN — Medication 325 MILLIGRAM(S): at 08:34

## 2017-02-02 RX ADMIN — Medication 1 TABLET(S): at 12:31

## 2017-02-02 RX ADMIN — Medication 4: at 12:31

## 2017-02-02 RX ADMIN — Medication 20 MILLIEQUIVALENT(S): at 12:31

## 2017-02-02 RX ADMIN — FINASTERIDE 5 MILLIGRAM(S): 5 TABLET, FILM COATED ORAL at 12:31

## 2017-02-02 RX ADMIN — CARBIDOPA AND LEVODOPA 1 TABLET(S): 25; 100 TABLET ORAL at 05:30

## 2017-02-02 RX ADMIN — HEPARIN SODIUM 5000 UNIT(S): 5000 INJECTION INTRAVENOUS; SUBCUTANEOUS at 05:29

## 2017-02-02 RX ADMIN — NYSTATIN CREAM 1 APPLICATION(S): 100000 CREAM TOPICAL at 05:30

## 2017-02-02 RX ADMIN — Medication 1 APPLICATION(S): at 05:29

## 2017-02-02 RX ADMIN — CARBIDOPA AND LEVODOPA 1 TABLET(S): 25; 100 TABLET ORAL at 14:27

## 2017-02-02 NOTE — PROGRESS NOTE ADULT - PROVIDER SPECIALTY LIST ADULT
Hospitalist
Rehab Medicine
Urology

## 2017-02-02 NOTE — PROGRESS NOTE ADULT - I WAS PHYSICALLY PRESENT FOR THE KEY PORTIONS OF THE EVALUATION AND MANAGEMENT (E/M) SERVICE PROVIDED.  I AGREE WITH THE ABOVE HISTORY, PHYSICAL, AND PLAN WHICH I HAVE REVIEWED AND EDITED WHERE APPROPRIATE
Statement Selected

## 2017-02-02 NOTE — PROGRESS NOTE ADULT - ATTENDING COMMENTS
DC home with home care today.  Follow up with podiatry for ongoing foot care.  Follow up with PCP for ongoing medical management.
patient w vasovagal episode- rapid response called- now doing better- will f/u labs  Discussed w medicine team who is on board with work up.
Addendum - DC antibiotics, UCX negative. Came back as Candida 7K.
Agree with above.  Cont comprehensive rehab.  F/U urine c&S, bladder scans
Continue rehab program.  Patient continues to have difficulty with transfers.   Will continue to monitor progress.
Continue rehab program. SW to discuss with family regarding safe return to home.
Dr Alvarez will take over on 2/21
No overnight events. As per family, plan is to get him home. Wife states she provided significant assist prior to the CVAs.   Continue rehab program.
Patient improved medically. DC antibiotics.  No overnight events. Continue rehab program.
Patient medically stable , will no longer follow , please recall PRN .
Patient continues to struggle with rehab, progress is limited by ongoing fluctuation of his transfers.   Will have family training, but may need ENE.  Will continue to monitor for progress.  Urology waiting for urine results to determine plan with Ledesma.
Will remove Ledesma today. Urine is clear.   Patient continues to need significant assist with transfers.  May not progress to supervision level. Family training this week.
Change antibiotics from IV to PO, patient vitals are normal and WBC is WNL.  Increase Lantus as patient has required more coverage.   Appreciate medicine involvement.  Needs significant assist in therapy at this time. Discussed with team about current functional ability.
continue bowel regimen for constipation prevention
repeat labs as per PM&R

## 2017-02-02 NOTE — PROGRESS NOTE ADULT - SUBJECTIVE AND OBJECTIVE BOX
HISTORY OF PRESENT ILLNESS  76M was admitted to [facility] on 12/31/16 after not feeling well for a few days. Workup showed his FS was >1100 and in DKA. Course was complicated by Tinea in the groin, lethargy on 1/2 where a brain MRI showed acute bilateral supratentorial and infratentorial infarcts with mild petechial hemorrhage in right occipital lobe. Source was found to be from PFO on JESUS MANUEL done 1/9. Course was further complicated by an acute Left LE DVT s/p IVCF on 1/11. Admitted to acute rehab on 1/13. He was treated for Candiduria with Diflucan (1/27-1/29).     PMHx - HLD, DM 2, HTN, Parkinson's Disease      TODAY'S SUBJECTIVE & REVIEW OF SYMPTOMS  [X] Constitutional WNL          [X] Cardio WNL              [X] Resp WNL           [X] GI WNL                          [X]  WNL                   [X] Heme WNL              [X] Endo WNL                      [X] Skin WNL                 [X] MSK WNL            [ ] Neuro WNL                     [ ] Cognitive WNL           [X] Psych WNL    No acute events overnight.  Feels well this am, slept well.  Urinating on own.  Excited to go home today.    VITAL SIGNS:  T(C): 36.7  T(F): 98, Max: 98.3 (02-01 @ 11:59)  HR: 56 (56 - 85)  BP: 122/58 (122/58 - 143/82)  RR:  (16 - 18)  SpO2:  (95% - 99%)  Wt(kg): --    PHYSICAL EXAM  Constitutional - NAD, Comfortable  HEENT - NCAT, EOMI  Neck - Supple, No limited ROM  Chest - CTA bilaterally, No wheeze, No rhonchi, No crackles  Cardiovascular - RRR, S1S2, No murmurs  Abdomen - BS+, Soft, NTND  Extremities - Improved BLE edema   Neurologic Exam -     Cognitive - Awake, Alert, AAO to self, place     Communication - Fluent, No dysarthria     Cranial Nerves - CN 2-12 grossly intact     Motor -     Generalized weakness, No focal deficits      Sensory - Decreased to LT in BLE  Psychiatric - Mood depressed, Affect Flat  Skin - Bilateral LE venous stasis changes, Groin rashes  Wounds - Left LE - Diabetic foot ulcerations - Improved    FUNCTIONAL PROGRESS  Gait - 50' RW min A, 4x 6" steps min A  ADLs - UE Dress S, LE Dress max A  Transfers - sit-to-stand S at arms length  Functional transfer - Wet Shower S at arms length      RECENT LABS                      9.9    6.85  )-----------( 318      ( 30 Jan 2017 07:16 )             29.2     30 Jan 2017 07:16    140    |  100    |  21.0   ----------------------------<  171    4.0     |  26.0   |  0.81     Ca    9.0        30 Jan 2017 07:16  Phos  3.7       28 Jan 2017 11:35  Mg     1.8       28 Jan 2017 11:35    Vitamin D 1/23 - 13    FS Last 24HRS  153 (02 Feb 2017 07:25)  259 (01 Feb 2017 16:39)  303 (01 Feb 2017 11:59)    RADIOLOGY & OTHER RESULTS  Left Foot XR (1/27)  1.    No  acute  fracture  or  dislocation  of  the  left  foot.  2.    No  definite  radiographic  evidence  of  osteomyelitis.    Scrotal U/S (1/27)  1.      Right  hemiscrotum:    Epididymitis.  2.      Left  hemiscrotum:    Epididymoorchitis.    1.7  cm  abscess  within  the  epididymal  tail.    CURRENT MEDICATIONS  MEDICATIONS  (STANDING):  aspirin enteric coated 81milliGRAM(s) Oral daily  amLODIPine   Tablet 10milliGRAM(s) Oral daily  carbidopa/levodopa  25/100 1Tablet(s) Oral three times a day  atorvastatin 20milliGRAM(s) Oral at bedtime  timolol 0.5% Solution 1Drop(s) Both EYES daily  heparin  Injectable 5000Unit(s) SubCutaneous every 12 hours  ammonium lactate 12% Lotion 1Application(s) Topical two times a day  furosemide    Tablet 40milliGRAM(s) Oral daily  potassium chloride    Tablet ER 20milliEquivalent(s) Oral daily  insulin lispro (HumaLOG) corrective regimen sliding scale  SubCutaneous Before meals and at bedtime  nystatin Powder 1Application(s) Topical three times a day  finasteride 5milliGRAM(s) Oral daily  melatonin. 9milliGRAM(s) Oral at bedtime  ferrous    sulfate 325milliGRAM(s) Oral two times a day with meals  Nephro-nate 1Tablet(s) Oral daily  ergocalciferol [id number](s) Oral every week  tamsulosin 0.8milliGRAM(s) Oral at bedtime  freetext medication  - 1Application(s) Topical daily  ciprofloxacin     Tablet 500milliGRAM(s) Oral every 12 hours  saccharomyces boulardii 500milliGRAM(s) Oral two times a day  insulin glargine Injectable (LANTUS) 22Unit(s) SubCutaneous at bedtime  metFORMIN 500milliGRAM(s) Oral two times a day with meals    MEDICATIONS  (PRN):  acetaminophen   Tablet 650milliGRAM(s) Oral every 6 hours PRN For Temp greater than 38 C (100.4 F)  acetaminophen   Tablet. 650milliGRAM(s) Oral every 6 hours PRN Mild Pain (1 - 3)  sodium biphosphate Rectal Enema 1Enema Rectal daily PRN Constipation  bisacodyl Suppository 10milliGRAM(s) Rectal daily PRN Constipation    ASSESSMENT & PLAN  76M with multiple CVAs now with functional deficits    HTN - Amlodipine, Lasix + KCl  DM2 - Metformin 500mg BID (increased from 500mg 1/29), Lantus, ISS  CVA - Lipitor, Aspirin  Anemia - Improved, Iron  Left epididymo-orchitis - Cipro + Florastor (1/27-2/3)  Diabetic Foot Ulcers - Iodoform dressings  Parkinson's Disease - Sinemet  Sleep - Melatonin 9mg   Glaucoma - Timolol   GI/Bowel Management - Dulcolax PRN, Fleet PRN   Management - Toilet Q2, Proscar, Flomax   Skin - Turn Q2, Nystatin to groin, Lac-Hydrin to LE  Pain - Tylenol PRN  DVT PPX - Heparin Q12, TEDs, SCDs  Diet - Cardiac, CC/Thins, Glucerna TID, Nephrovite, Vit D    Continue comprehensive acute rehab program consisting of 3hrs/day of OT/PT/SLP.

## 2017-02-03 PROCEDURE — 97116 GAIT TRAINING THERAPY: CPT

## 2017-02-03 PROCEDURE — 85027 COMPLETE CBC AUTOMATED: CPT

## 2017-02-03 PROCEDURE — 84134 ASSAY OF PREALBUMIN: CPT

## 2017-02-03 PROCEDURE — 36415 COLL VENOUS BLD VENIPUNCTURE: CPT

## 2017-02-03 PROCEDURE — 80048 BASIC METABOLIC PNL TOTAL CA: CPT

## 2017-02-03 PROCEDURE — 73630 X-RAY EXAM OF FOOT: CPT

## 2017-02-03 PROCEDURE — 84466 ASSAY OF TRANSFERRIN: CPT

## 2017-02-03 PROCEDURE — 80053 COMPREHEN METABOLIC PANEL: CPT

## 2017-02-03 PROCEDURE — 97530 THERAPEUTIC ACTIVITIES: CPT

## 2017-02-03 PROCEDURE — 97535 SELF CARE MNGMENT TRAINING: CPT

## 2017-02-03 PROCEDURE — 97750 PHYSICAL PERFORMANCE TEST: CPT

## 2017-02-03 PROCEDURE — 97110 THERAPEUTIC EXERCISES: CPT

## 2017-02-03 PROCEDURE — 76870 US EXAM SCROTUM: CPT

## 2017-02-03 PROCEDURE — 92523 SPEECH SOUND LANG COMPREHEN: CPT

## 2017-02-03 PROCEDURE — 92610 EVALUATE SWALLOWING FUNCTION: CPT

## 2017-02-03 PROCEDURE — 83550 IRON BINDING TEST: CPT

## 2017-02-03 PROCEDURE — 84100 ASSAY OF PHOSPHORUS: CPT

## 2017-02-03 PROCEDURE — 82728 ASSAY OF FERRITIN: CPT

## 2017-02-03 PROCEDURE — 87086 URINE CULTURE/COLONY COUNT: CPT

## 2017-02-03 PROCEDURE — 84443 ASSAY THYROID STIM HORMONE: CPT

## 2017-02-03 PROCEDURE — 82746 ASSAY OF FOLIC ACID SERUM: CPT

## 2017-02-03 PROCEDURE — 76000 FLUOROSCOPY <1 HR PHYS/QHP: CPT

## 2017-02-03 PROCEDURE — C1769: CPT

## 2017-02-03 PROCEDURE — 97163 PT EVAL HIGH COMPLEX 45 MIN: CPT

## 2017-02-03 PROCEDURE — 81001 URINALYSIS AUTO W/SCOPE: CPT

## 2017-02-03 PROCEDURE — 82607 VITAMIN B-12: CPT

## 2017-02-03 PROCEDURE — C1894: CPT

## 2017-02-03 PROCEDURE — 83735 ASSAY OF MAGNESIUM: CPT

## 2017-02-03 PROCEDURE — 82306 VITAMIN D 25 HYDROXY: CPT

## 2017-02-03 PROCEDURE — 97167 OT EVAL HIGH COMPLEX 60 MIN: CPT

## 2017-02-03 PROCEDURE — C1880: CPT

## 2017-02-03 PROCEDURE — 76942 ECHO GUIDE FOR BIOPSY: CPT

## 2017-02-03 PROCEDURE — G0515: CPT

## 2017-02-03 PROCEDURE — 97112 NEUROMUSCULAR REEDUCATION: CPT

## 2017-02-08 ENCOUNTER — APPOINTMENT (OUTPATIENT)
Dept: FAMILY MEDICINE | Facility: CLINIC | Age: 77
End: 2017-02-08

## 2017-02-13 RX ORDER — ACETAMINOPHEN 325 MG/1
325 TABLET, FILM COATED ORAL EVERY 6 HOURS
Refills: 0 | Status: ACTIVE | COMMUNITY
Start: 2017-02-13

## 2017-02-15 ENCOUNTER — APPOINTMENT (OUTPATIENT)
Dept: PHYSICAL MEDICINE AND REHAB | Facility: CLINIC | Age: 77
End: 2017-02-15

## 2017-02-15 ENCOUNTER — APPOINTMENT (OUTPATIENT)
Dept: FAMILY MEDICINE | Facility: CLINIC | Age: 77
End: 2017-02-15

## 2017-02-15 VITALS
DIASTOLIC BLOOD PRESSURE: 79 MMHG | BODY MASS INDEX: 26.48 KG/M2 | HEIGHT: 72 IN | SYSTOLIC BLOOD PRESSURE: 138 MMHG | WEIGHT: 195.5 LBS | HEART RATE: 90 BPM

## 2017-02-15 DIAGNOSIS — R26.81 UNSTEADINESS ON FEET: ICD-10-CM

## 2017-02-15 DIAGNOSIS — L97.519 TYPE 1 DIABETES MELLITUS WITH FOOT ULCER: ICD-10-CM

## 2017-02-15 DIAGNOSIS — E78.00 PURE HYPERCHOLESTEROLEMIA, UNSPECIFIED: ICD-10-CM

## 2017-02-15 DIAGNOSIS — Z86.79 PERSONAL HISTORY OF OTHER DISEASES OF THE CIRCULATORY SYSTEM: ICD-10-CM

## 2017-02-15 DIAGNOSIS — N45.2 ORCHITIS: ICD-10-CM

## 2017-02-15 DIAGNOSIS — Z78.9 OTHER SPECIFIED HEALTH STATUS: ICD-10-CM

## 2017-02-15 DIAGNOSIS — Z86.39 PERSONAL HISTORY OF OTHER ENDOCRINE, NUTRITIONAL AND METABOLIC DISEASE: ICD-10-CM

## 2017-02-15 DIAGNOSIS — Q21.1 ATRIAL SEPTAL DEFECT: ICD-10-CM

## 2017-02-15 DIAGNOSIS — Z79.01 ENCOUNTER FOR THERAPEUTIC DRUG LVL MONITORING: ICD-10-CM

## 2017-02-15 DIAGNOSIS — Z51.81 ENCOUNTER FOR THERAPEUTIC DRUG LVL MONITORING: ICD-10-CM

## 2017-02-15 DIAGNOSIS — E10.621 TYPE 1 DIABETES MELLITUS WITH FOOT ULCER: ICD-10-CM

## 2017-02-15 RX ORDER — INSULIN GLARGINE 100 [IU]/ML
100 INJECTION, SOLUTION SUBCUTANEOUS
Refills: 0 | Status: DISCONTINUED | COMMUNITY
Start: 2017-02-13 | End: 2017-02-15

## 2017-02-16 PROBLEM — N45.2 ORCHITIS: Status: ACTIVE | Noted: 2017-02-16

## 2017-02-16 PROBLEM — Q21.1 PATENT FORAMEN OVALE: Status: ACTIVE | Noted: 2017-02-16

## 2017-02-16 PROBLEM — E10.621 DIABETIC ULCER OF RIGHT FOOT ASSOCIATED WITH TYPE 1 DIABETES MELLITUS: Status: ACTIVE | Noted: 2017-02-16

## 2017-02-16 LAB — GLUCOSE BLDC GLUCOMTR-MCNC: 384

## 2017-02-22 ENCOUNTER — APPOINTMENT (OUTPATIENT)
Dept: FAMILY MEDICINE | Facility: CLINIC | Age: 77
End: 2017-02-22

## 2017-03-15 RX ORDER — PHENOL 1.4 %
325 AEROSOL, SPRAY (ML) MUCOUS MEMBRANE TWICE DAILY
Refills: 0 | Status: DISCONTINUED | COMMUNITY
Start: 2017-02-13 | End: 2017-03-15

## 2017-03-16 DIAGNOSIS — R53.82 CHRONIC FATIGUE, UNSPECIFIED: ICD-10-CM

## 2017-07-11 ENCOUNTER — EMERGENCY (EMERGENCY)
Facility: HOSPITAL | Age: 77
LOS: 1 days | Discharge: HOME | End: 2017-07-11
Attending: EMERGENCY MEDICINE
Payer: COMMERCIAL

## 2017-07-11 VITALS
WEIGHT: 177.91 LBS | TEMPERATURE: 98 F | OXYGEN SATURATION: 98 % | HEIGHT: 68 IN | RESPIRATION RATE: 18 BRPM | DIASTOLIC BLOOD PRESSURE: 93 MMHG | HEART RATE: 77 BPM | SYSTOLIC BLOOD PRESSURE: 181 MMHG

## 2017-07-11 VITALS
HEART RATE: 78 BPM | OXYGEN SATURATION: 98 % | SYSTOLIC BLOOD PRESSURE: 165 MMHG | DIASTOLIC BLOOD PRESSURE: 89 MMHG | RESPIRATION RATE: 18 BRPM | TEMPERATURE: 98 F

## 2017-07-11 LAB
ALBUMIN SERPL ELPH-MCNC: 3.4 G/DL — SIGNIFICANT CHANGE UP (ref 3.3–5.2)
ALP SERPL-CCNC: 75 U/L — SIGNIFICANT CHANGE UP (ref 40–120)
ALT FLD-CCNC: 5 U/L — SIGNIFICANT CHANGE UP
ANION GAP SERPL CALC-SCNC: 15 MMOL/L — SIGNIFICANT CHANGE UP (ref 5–17)
APPEARANCE UR: ABNORMAL
AST SERPL-CCNC: 15 U/L — SIGNIFICANT CHANGE UP
BACTERIA # UR AUTO: ABNORMAL
BASOPHILS # BLD AUTO: 0 K/UL — SIGNIFICANT CHANGE UP (ref 0–0.2)
BASOPHILS NFR BLD AUTO: 0.6 % — SIGNIFICANT CHANGE UP (ref 0–2)
BILIRUB SERPL-MCNC: 0.2 MG/DL — LOW (ref 0.4–2)
BILIRUB UR-MCNC: NEGATIVE — SIGNIFICANT CHANGE UP
BUN SERPL-MCNC: 20 MG/DL — SIGNIFICANT CHANGE UP (ref 8–20)
CALCIUM SERPL-MCNC: 8.8 MG/DL — SIGNIFICANT CHANGE UP (ref 8.6–10.2)
CHLORIDE SERPL-SCNC: 97 MMOL/L — LOW (ref 98–107)
CO2 SERPL-SCNC: 25 MMOL/L — SIGNIFICANT CHANGE UP (ref 22–29)
COLOR SPEC: ABNORMAL
CREAT SERPL-MCNC: 0.98 MG/DL — SIGNIFICANT CHANGE UP (ref 0.5–1.3)
DIFF PNL FLD: ABNORMAL
EOSINOPHIL # BLD AUTO: 0.6 K/UL — HIGH (ref 0–0.5)
EOSINOPHIL NFR BLD AUTO: 6.5 % — HIGH (ref 0–5)
EPI CELLS # UR: SIGNIFICANT CHANGE UP
GLUCOSE SERPL-MCNC: 161 MG/DL — HIGH (ref 70–115)
GLUCOSE UR QL: 50 MG/DL
HCT VFR BLD CALC: 35.2 % — LOW (ref 42–52)
HGB BLD-MCNC: 11.9 G/DL — LOW (ref 14–18)
KETONES UR-MCNC: ABNORMAL
LACTATE BLDV-MCNC: 1 MMOL/L — SIGNIFICANT CHANGE UP (ref 0.5–2)
LEUKOCYTE ESTERASE UR-ACNC: ABNORMAL
LYMPHOCYTES # BLD AUTO: 1.8 K/UL — SIGNIFICANT CHANGE UP (ref 1–4.8)
LYMPHOCYTES # BLD AUTO: 20.6 % — SIGNIFICANT CHANGE UP (ref 20–55)
MAGNESIUM SERPL-MCNC: 2 MG/DL — SIGNIFICANT CHANGE UP (ref 1.6–2.6)
MCHC RBC-ENTMCNC: 31.1 PG — HIGH (ref 27–31)
MCHC RBC-ENTMCNC: 33.8 G/DL — SIGNIFICANT CHANGE UP (ref 32–36)
MCV RBC AUTO: 91.9 FL — SIGNIFICANT CHANGE UP (ref 80–94)
MONOCYTES # BLD AUTO: 0.7 K/UL — SIGNIFICANT CHANGE UP (ref 0–0.8)
MONOCYTES NFR BLD AUTO: 7.6 % — SIGNIFICANT CHANGE UP (ref 3–10)
NEUTROPHILS # BLD AUTO: 5.6 K/UL — SIGNIFICANT CHANGE UP (ref 1.8–8)
NEUTROPHILS NFR BLD AUTO: 64.5 % — SIGNIFICANT CHANGE UP (ref 37–73)
NITRITE UR-MCNC: POSITIVE
PH UR: 6 — SIGNIFICANT CHANGE UP (ref 5–8)
PHOSPHATE SERPL-MCNC: 3.3 MG/DL — SIGNIFICANT CHANGE UP (ref 2.4–4.7)
PLATELET # BLD AUTO: 298 K/UL — SIGNIFICANT CHANGE UP (ref 150–400)
POTASSIUM SERPL-MCNC: 4.5 MMOL/L — SIGNIFICANT CHANGE UP (ref 3.5–5.3)
POTASSIUM SERPL-SCNC: 4.5 MMOL/L — SIGNIFICANT CHANGE UP (ref 3.5–5.3)
PROT SERPL-MCNC: 6.8 G/DL — SIGNIFICANT CHANGE UP (ref 6.6–8.7)
PROT UR-MCNC: 500 MG/DL
RBC # BLD: 3.83 M/UL — LOW (ref 4.6–6.2)
RBC # FLD: 14.5 % — SIGNIFICANT CHANGE UP (ref 11–15.6)
RBC CASTS # UR COMP ASSIST: >50 /HPF (ref 0–4)
SODIUM SERPL-SCNC: 137 MMOL/L — SIGNIFICANT CHANGE UP (ref 135–145)
SP GR SPEC: 1.02 — SIGNIFICANT CHANGE UP (ref 1.01–1.02)
UROBILINOGEN FLD QL: 1 MG/DL
WBC # BLD: 8.7 K/UL — SIGNIFICANT CHANGE UP (ref 4.8–10.8)
WBC # FLD AUTO: 8.7 K/UL — SIGNIFICANT CHANGE UP (ref 4.8–10.8)
WBC UR QL: >50

## 2017-07-11 PROCEDURE — 96374 THER/PROPH/DIAG INJ IV PUSH: CPT

## 2017-07-11 PROCEDURE — 99284 EMERGENCY DEPT VISIT MOD MDM: CPT | Mod: 25

## 2017-07-11 PROCEDURE — 81001 URINALYSIS AUTO W/SCOPE: CPT

## 2017-07-11 PROCEDURE — 93005 ELECTROCARDIOGRAM TRACING: CPT

## 2017-07-11 PROCEDURE — 76870 US EXAM SCROTUM: CPT

## 2017-07-11 PROCEDURE — 36415 COLL VENOUS BLD VENIPUNCTURE: CPT

## 2017-07-11 PROCEDURE — 87086 URINE CULTURE/COLONY COUNT: CPT

## 2017-07-11 PROCEDURE — 83735 ASSAY OF MAGNESIUM: CPT

## 2017-07-11 PROCEDURE — 87040 BLOOD CULTURE FOR BACTERIA: CPT

## 2017-07-11 PROCEDURE — 85027 COMPLETE CBC AUTOMATED: CPT

## 2017-07-11 PROCEDURE — 76870 US EXAM SCROTUM: CPT | Mod: 26

## 2017-07-11 PROCEDURE — 80053 COMPREHEN METABOLIC PANEL: CPT

## 2017-07-11 PROCEDURE — 93010 ELECTROCARDIOGRAM REPORT: CPT

## 2017-07-11 PROCEDURE — 99284 EMERGENCY DEPT VISIT MOD MDM: CPT | Mod: GC

## 2017-07-11 PROCEDURE — 87186 SC STD MICRODIL/AGAR DIL: CPT

## 2017-07-11 PROCEDURE — 76770 US EXAM ABDO BACK WALL COMP: CPT

## 2017-07-11 PROCEDURE — 84100 ASSAY OF PHOSPHORUS: CPT

## 2017-07-11 PROCEDURE — 76770 US EXAM ABDO BACK WALL COMP: CPT | Mod: 26

## 2017-07-11 PROCEDURE — 83605 ASSAY OF LACTIC ACID: CPT

## 2017-07-11 RX ORDER — CEFTRIAXONE 500 MG/1
1 INJECTION, POWDER, FOR SOLUTION INTRAMUSCULAR; INTRAVENOUS ONCE
Qty: 0 | Refills: 0 | Status: COMPLETED | OUTPATIENT
Start: 2017-07-11 | End: 2017-07-11

## 2017-07-11 RX ORDER — CEFPODOXIME PROXETIL 100 MG
1 TABLET ORAL
Qty: 20 | Refills: 0 | OUTPATIENT
Start: 2017-07-11 | End: 2017-07-21

## 2017-07-11 RX ADMIN — CEFTRIAXONE 100 GRAM(S): 500 INJECTION, POWDER, FOR SOLUTION INTRAMUSCULAR; INTRAVENOUS at 19:45

## 2017-07-11 NOTE — ED ADULT NURSE REASSESSMENT NOTE - NS ED NURSE REASSESS COMMENT FT1
Report received from offgoing RN, chart as noted, pt baseline mental status resting on stretcher, #20G IV noted to Right AC, #18G IV noted to Left AC, sites asymp. Pt in no apparent distress @ this time, will continue to monitor

## 2017-07-11 NOTE — ED ADULT NURSE NOTE - OBJECTIVE STATEMENT
pt was send to the ED from an adult day care for 15 lb weight gain since admission 6/27/17 and had gross hematuria.  pt is A/Ox3.

## 2017-07-11 NOTE — ED ADULT NURSE NOTE - DISCHARGE TEACHING
performed by RN with pt, pt verbalized understanding with no questions or concerns for RN @ this time

## 2017-07-11 NOTE — ED PROVIDER NOTE - ATTENDING CONTRIBUTION TO CARE
76 yo M presents to ED from adult day care after he was noted to have blood in diaper and dark urine.  Pt with hx of recurrent UTI and seen by Urology/Dr. Carolina.  Pt denies any assoc fever, abd pain or N/V. On exam pt afebrile, non-toxic appearing, Cor Reg, Lungs clear, Abd soft without tenderness.  UA as noted.  Rx Rocephin.  Signed out pending labs, US and re-eval after IV Abx and final disposition

## 2017-07-12 PROBLEM — Z98.890 HISTORY OF PROSTATE SURGERY: Status: ACTIVE | Noted: 2017-07-12

## 2017-07-16 LAB
CULTURE RESULTS: SIGNIFICANT CHANGE UP
CULTURE RESULTS: SIGNIFICANT CHANGE UP
SPECIMEN SOURCE: SIGNIFICANT CHANGE UP
SPECIMEN SOURCE: SIGNIFICANT CHANGE UP

## 2017-07-17 ENCOUNTER — APPOINTMENT (OUTPATIENT)
Dept: FAMILY MEDICINE | Facility: CLINIC | Age: 77
End: 2017-07-17

## 2017-07-17 DIAGNOSIS — Z98.890 OTHER SPECIFIED POSTPROCEDURAL STATES: ICD-10-CM

## 2017-07-19 ENCOUNTER — APPOINTMENT (OUTPATIENT)
Dept: FAMILY MEDICINE | Facility: CLINIC | Age: 77
End: 2017-07-19

## 2017-07-19 VITALS
HEIGHT: 72 IN | HEART RATE: 90 BPM | SYSTOLIC BLOOD PRESSURE: 150 MMHG | DIASTOLIC BLOOD PRESSURE: 70 MMHG | WEIGHT: 209 LBS | BODY MASS INDEX: 28.31 KG/M2

## 2017-07-19 RX ORDER — POTASSIUM CHLORIDE 1500 MG/1
20 TABLET, FILM COATED, EXTENDED RELEASE ORAL
Qty: 90 | Refills: 1 | Status: DISCONTINUED | COMMUNITY
Start: 2017-02-13 | End: 2017-07-19

## 2017-07-19 RX ORDER — HUMAN INSULIN 100 [IU]/ML
100 INJECTION, SOLUTION SUBCUTANEOUS
Refills: 0 | Status: DISCONTINUED | COMMUNITY
Start: 2017-02-13 | End: 2017-07-19

## 2017-08-05 ENCOUNTER — INPATIENT (INPATIENT)
Facility: HOSPITAL | Age: 77
LOS: 9 days | Discharge: HOME | DRG: 293 | End: 2017-08-15
Attending: INTERNAL MEDICINE | Admitting: EMERGENCY MEDICINE
Payer: COMMERCIAL

## 2017-08-05 VITALS
TEMPERATURE: 98 F | HEART RATE: 93 BPM | HEIGHT: 71 IN | RESPIRATION RATE: 22 BRPM | WEIGHT: 233.91 LBS | OXYGEN SATURATION: 93 % | SYSTOLIC BLOOD PRESSURE: 135 MMHG | DIASTOLIC BLOOD PRESSURE: 78 MMHG

## 2017-08-05 DIAGNOSIS — E11.8 TYPE 2 DIABETES MELLITUS WITH UNSPECIFIED COMPLICATIONS: ICD-10-CM

## 2017-08-05 DIAGNOSIS — I10 ESSENTIAL (PRIMARY) HYPERTENSION: ICD-10-CM

## 2017-08-05 DIAGNOSIS — I50.9 HEART FAILURE, UNSPECIFIED: ICD-10-CM

## 2017-08-05 DIAGNOSIS — N40.0 BENIGN PROSTATIC HYPERPLASIA WITHOUT LOWER URINARY TRACT SYMPTOMS: ICD-10-CM

## 2017-08-05 DIAGNOSIS — D64.9 ANEMIA, UNSPECIFIED: ICD-10-CM

## 2017-08-05 LAB
ALBUMIN SERPL ELPH-MCNC: 3.2 G/DL — LOW (ref 3.3–5.2)
ALP SERPL-CCNC: 79 U/L — SIGNIFICANT CHANGE UP (ref 40–120)
ALT FLD-CCNC: 4 U/L — SIGNIFICANT CHANGE UP
ANION GAP SERPL CALC-SCNC: 14 MMOL/L — SIGNIFICANT CHANGE UP (ref 5–17)
APPEARANCE UR: CLEAR — SIGNIFICANT CHANGE UP
APTT BLD: 27.4 SEC — LOW (ref 27.5–37.4)
AST SERPL-CCNC: 18 U/L — SIGNIFICANT CHANGE UP
BACTERIA # UR AUTO: ABNORMAL
BASE EXCESS BLDA CALC-SCNC: 1.9 MMOL/L — SIGNIFICANT CHANGE UP (ref -3–3)
BASOPHILS # BLD AUTO: 0.1 K/UL — SIGNIFICANT CHANGE UP (ref 0–0.2)
BASOPHILS NFR BLD AUTO: 0.9 % — SIGNIFICANT CHANGE UP (ref 0–2)
BILIRUB SERPL-MCNC: 0.2 MG/DL — LOW (ref 0.4–2)
BILIRUB UR-MCNC: NEGATIVE — SIGNIFICANT CHANGE UP
BUN SERPL-MCNC: 18 MG/DL — SIGNIFICANT CHANGE UP (ref 8–20)
CALCIUM SERPL-MCNC: 8.7 MG/DL — SIGNIFICANT CHANGE UP (ref 8.6–10.2)
CHLORIDE SERPL-SCNC: 96 MMOL/L — LOW (ref 98–107)
CK SERPL-CCNC: 76 U/L — SIGNIFICANT CHANGE UP (ref 30–200)
CO2 SERPL-SCNC: 22 MMOL/L — SIGNIFICANT CHANGE UP (ref 22–29)
COLOR SPEC: YELLOW — SIGNIFICANT CHANGE UP
CREAT SERPL-MCNC: 0.86 MG/DL — SIGNIFICANT CHANGE UP (ref 0.5–1.3)
DIFF PNL FLD: ABNORMAL
EOSINOPHIL # BLD AUTO: 0.2 K/UL — SIGNIFICANT CHANGE UP (ref 0–0.5)
EOSINOPHIL NFR BLD AUTO: 3 % — SIGNIFICANT CHANGE UP (ref 0–5)
EPI CELLS # UR: SIGNIFICANT CHANGE UP
GAS PNL BLDA: SIGNIFICANT CHANGE UP
GLUCOSE SERPL-MCNC: 423 MG/DL — HIGH (ref 70–115)
GLUCOSE UR QL: 1000 MG/DL
HCO3 BLDA-SCNC: 26 MMOL/L — SIGNIFICANT CHANGE UP (ref 20–26)
HCT VFR BLD CALC: 31.8 % — LOW (ref 42–52)
HGB BLD-MCNC: 10.8 G/DL — LOW (ref 14–18)
HOROWITZ INDEX BLDA+IHG-RTO: SIGNIFICANT CHANGE UP
INR BLD: 0.9 RATIO — SIGNIFICANT CHANGE UP (ref 0.88–1.16)
KETONES UR-MCNC: ABNORMAL
LEUKOCYTE ESTERASE UR-ACNC: NEGATIVE — SIGNIFICANT CHANGE UP
LYMPHOCYTES # BLD AUTO: 1.1 K/UL — SIGNIFICANT CHANGE UP (ref 1–4.8)
LYMPHOCYTES # BLD AUTO: 15.8 % — LOW (ref 20–55)
MCHC RBC-ENTMCNC: 30.9 PG — SIGNIFICANT CHANGE UP (ref 27–31)
MCHC RBC-ENTMCNC: 34 G/DL — SIGNIFICANT CHANGE UP (ref 32–36)
MCV RBC AUTO: 91.1 FL — SIGNIFICANT CHANGE UP (ref 80–94)
MONOCYTES # BLD AUTO: 0.7 K/UL — SIGNIFICANT CHANGE UP (ref 0–0.8)
MONOCYTES NFR BLD AUTO: 10.8 % — HIGH (ref 3–10)
NEUTROPHILS # BLD AUTO: 4.7 K/UL — SIGNIFICANT CHANGE UP (ref 1.8–8)
NEUTROPHILS NFR BLD AUTO: 69.4 % — SIGNIFICANT CHANGE UP (ref 37–73)
NITRITE UR-MCNC: NEGATIVE — SIGNIFICANT CHANGE UP
NT-PROBNP SERPL-SCNC: 488 PG/ML — HIGH (ref 0–300)
PCO2 BLDA: 37 MMHG — SIGNIFICANT CHANGE UP (ref 35–45)
PH BLDA: 7.45 — SIGNIFICANT CHANGE UP (ref 7.35–7.45)
PH UR: 6 — SIGNIFICANT CHANGE UP (ref 5–8)
PLATELET # BLD AUTO: 233 K/UL — SIGNIFICANT CHANGE UP (ref 150–400)
PO2 BLDA: 66 MMHG — LOW (ref 83–108)
POTASSIUM SERPL-MCNC: 4.7 MMOL/L — SIGNIFICANT CHANGE UP (ref 3.5–5.3)
POTASSIUM SERPL-SCNC: 4.7 MMOL/L — SIGNIFICANT CHANGE UP (ref 3.5–5.3)
PROT SERPL-MCNC: 6.6 G/DL — SIGNIFICANT CHANGE UP (ref 6.6–8.7)
PROT UR-MCNC: 500 MG/DL
PROTHROM AB SERPL-ACNC: 9.9 SEC — SIGNIFICANT CHANGE UP (ref 9.8–12.7)
RBC # BLD: 3.49 M/UL — LOW (ref 4.6–6.2)
RBC # FLD: 14.9 % — SIGNIFICANT CHANGE UP (ref 11–15.6)
RBC CASTS # UR COMP ASSIST: SIGNIFICANT CHANGE UP /HPF (ref 0–4)
SAO2 % BLDA: 94 % — LOW (ref 95–99)
SODIUM SERPL-SCNC: 132 MMOL/L — LOW (ref 135–145)
SP GR SPEC: 1.01 — SIGNIFICANT CHANGE UP (ref 1.01–1.02)
TROPONIN T SERPL-MCNC: 0.03 NG/ML — SIGNIFICANT CHANGE UP (ref 0–0.06)
UROBILINOGEN FLD QL: NEGATIVE MG/DL — SIGNIFICANT CHANGE UP
WBC # BLD: 6.8 K/UL — SIGNIFICANT CHANGE UP (ref 4.8–10.8)
WBC # FLD AUTO: 6.8 K/UL — SIGNIFICANT CHANGE UP (ref 4.8–10.8)
WBC UR QL: SIGNIFICANT CHANGE UP

## 2017-08-05 PROCEDURE — 93010 ELECTROCARDIOGRAM REPORT: CPT

## 2017-08-05 PROCEDURE — 93970 EXTREMITY STUDY: CPT | Mod: 26

## 2017-08-05 PROCEDURE — 99285 EMERGENCY DEPT VISIT HI MDM: CPT

## 2017-08-05 PROCEDURE — 71010: CPT | Mod: 26

## 2017-08-05 RX ORDER — FERROUS SULFATE 325(65) MG
325 TABLET ORAL DAILY
Qty: 0 | Refills: 0 | Status: DISCONTINUED | OUTPATIENT
Start: 2017-08-05 | End: 2017-08-15

## 2017-08-05 RX ORDER — CARBIDOPA AND LEVODOPA 25; 100 MG/1; MG/1
1 TABLET ORAL THREE TIMES A DAY
Qty: 0 | Refills: 0 | Status: DISCONTINUED | OUTPATIENT
Start: 2017-08-05 | End: 2017-08-15

## 2017-08-05 RX ORDER — AMLODIPINE BESYLATE 2.5 MG/1
10 TABLET ORAL DAILY
Qty: 0 | Refills: 0 | Status: DISCONTINUED | OUTPATIENT
Start: 2017-08-05 | End: 2017-08-06

## 2017-08-05 RX ORDER — NITROGLYCERIN 6.5 MG
1 CAPSULE, EXTENDED RELEASE ORAL ONCE
Qty: 0 | Refills: 0 | Status: COMPLETED | OUTPATIENT
Start: 2017-08-05 | End: 2017-08-05

## 2017-08-05 RX ORDER — LISINOPRIL 2.5 MG/1
10 TABLET ORAL DAILY
Qty: 0 | Refills: 0 | Status: DISCONTINUED | OUTPATIENT
Start: 2017-08-05 | End: 2017-08-06

## 2017-08-05 RX ORDER — GLUCAGON INJECTION, SOLUTION 0.5 MG/.1ML
1 INJECTION, SOLUTION SUBCUTANEOUS ONCE
Qty: 0 | Refills: 0 | Status: DISCONTINUED | OUTPATIENT
Start: 2017-08-05 | End: 2017-08-15

## 2017-08-05 RX ORDER — DEXTROSE 50 % IN WATER 50 %
25 SYRINGE (ML) INTRAVENOUS ONCE
Qty: 0 | Refills: 0 | Status: DISCONTINUED | OUTPATIENT
Start: 2017-08-05 | End: 2017-08-15

## 2017-08-05 RX ORDER — FUROSEMIDE 40 MG
40 TABLET ORAL ONCE
Qty: 0 | Refills: 0 | Status: COMPLETED | OUTPATIENT
Start: 2017-08-05 | End: 2017-08-05

## 2017-08-05 RX ORDER — SODIUM CHLORIDE 9 MG/ML
3 INJECTION INTRAMUSCULAR; INTRAVENOUS; SUBCUTANEOUS ONCE
Qty: 0 | Refills: 0 | Status: COMPLETED | OUTPATIENT
Start: 2017-08-05 | End: 2017-08-05

## 2017-08-05 RX ORDER — FUROSEMIDE 40 MG
40 TABLET ORAL
Qty: 0 | Refills: 0 | Status: DISCONTINUED | OUTPATIENT
Start: 2017-08-05 | End: 2017-08-12

## 2017-08-05 RX ORDER — TAMSULOSIN HYDROCHLORIDE 0.4 MG/1
0.4 CAPSULE ORAL AT BEDTIME
Qty: 0 | Refills: 0 | Status: DISCONTINUED | OUTPATIENT
Start: 2017-08-05 | End: 2017-08-15

## 2017-08-05 RX ORDER — INSULIN GLARGINE 100 [IU]/ML
20 INJECTION, SOLUTION SUBCUTANEOUS EVERY MORNING
Qty: 0 | Refills: 0 | Status: DISCONTINUED | OUTPATIENT
Start: 2017-08-05 | End: 2017-08-06

## 2017-08-05 RX ORDER — FINASTERIDE 5 MG/1
1 TABLET, FILM COATED ORAL
Qty: 0 | Refills: 0 | COMMUNITY

## 2017-08-05 RX ORDER — POTASSIUM CHLORIDE 20 MEQ
20 PACKET (EA) ORAL
Qty: 0 | Refills: 0 | Status: DISCONTINUED | OUTPATIENT
Start: 2017-08-05 | End: 2017-08-07

## 2017-08-05 RX ORDER — ATORVASTATIN CALCIUM 80 MG/1
20 TABLET, FILM COATED ORAL AT BEDTIME
Qty: 0 | Refills: 0 | Status: DISCONTINUED | OUTPATIENT
Start: 2017-08-05 | End: 2017-08-15

## 2017-08-05 RX ORDER — SODIUM CHLORIDE 9 MG/ML
1000 INJECTION, SOLUTION INTRAVENOUS
Qty: 0 | Refills: 0 | Status: DISCONTINUED | OUTPATIENT
Start: 2017-08-05 | End: 2017-08-15

## 2017-08-05 RX ORDER — TIMOLOL 0.5 %
1 DROPS OPHTHALMIC (EYE) DAILY
Qty: 0 | Refills: 0 | Status: DISCONTINUED | OUTPATIENT
Start: 2017-08-05 | End: 2017-08-15

## 2017-08-05 RX ORDER — FINASTERIDE 5 MG/1
5 TABLET, FILM COATED ORAL DAILY
Qty: 0 | Refills: 0 | Status: DISCONTINUED | OUTPATIENT
Start: 2017-08-05 | End: 2017-08-15

## 2017-08-05 RX ORDER — DEXTROSE 50 % IN WATER 50 %
12.5 SYRINGE (ML) INTRAVENOUS ONCE
Qty: 0 | Refills: 0 | Status: DISCONTINUED | OUTPATIENT
Start: 2017-08-05 | End: 2017-08-15

## 2017-08-05 RX ORDER — DEXTROSE 50 % IN WATER 50 %
1 SYRINGE (ML) INTRAVENOUS ONCE
Qty: 0 | Refills: 0 | Status: DISCONTINUED | OUTPATIENT
Start: 2017-08-05 | End: 2017-08-15

## 2017-08-05 RX ORDER — ASPIRIN/CALCIUM CARB/MAGNESIUM 324 MG
162 TABLET ORAL DAILY
Qty: 0 | Refills: 0 | Status: DISCONTINUED | OUTPATIENT
Start: 2017-08-05 | End: 2017-08-15

## 2017-08-05 RX ORDER — CADEXOMER IODINE 0.9 %
1 PADS, MEDICATED (EA) TOPICAL
Qty: 0 | Refills: 0 | COMMUNITY

## 2017-08-05 RX ORDER — INSULIN LISPRO 100/ML
VIAL (ML) SUBCUTANEOUS
Qty: 0 | Refills: 0 | Status: DISCONTINUED | OUTPATIENT
Start: 2017-08-05 | End: 2017-08-15

## 2017-08-05 RX ORDER — ASPIRIN/CALCIUM CARB/MAGNESIUM 324 MG
325 TABLET ORAL ONCE
Qty: 0 | Refills: 0 | Status: COMPLETED | OUTPATIENT
Start: 2017-08-05 | End: 2017-08-05

## 2017-08-05 RX ADMIN — Medication 40 MILLIGRAM(S): at 18:15

## 2017-08-05 RX ADMIN — Medication 325 MILLIGRAM(S): at 18:15

## 2017-08-05 RX ADMIN — Medication: at 23:42

## 2017-08-05 RX ADMIN — SODIUM CHLORIDE 3 MILLILITER(S): 9 INJECTION INTRAMUSCULAR; INTRAVENOUS; SUBCUTANEOUS at 18:18

## 2017-08-05 RX ADMIN — ATORVASTATIN CALCIUM 20 MILLIGRAM(S): 80 TABLET, FILM COATED ORAL at 23:37

## 2017-08-05 RX ADMIN — Medication 1 INCH(S): at 18:15

## 2017-08-05 NOTE — ED ADULT NURSE NOTE - OBJECTIVE STATEMENT
Pt axox3 stating he was sent here by his day care nurse for swelling in his bilateral lower ext. edema and a weight gain of 30lbs this month. Pt denies any sob or chest pain and is a poor  historian along with his wife. Pt has wheezing in lower lobes, respirations are equal and unlabored. s1s2 regular, +3 edema pitting in b/l ankles.

## 2017-08-05 NOTE — ED PROVIDER NOTE - MEDICAL DECISION MAKING DETAILS
The patient presents with bilateral leg swelling and mild SOB and CXR showing pulmonary edema.  Will admit for further evaluation

## 2017-08-05 NOTE — ED ADULT NURSE NOTE - PMH
Diabetes    DVT (deep venous thrombosis)    High cholesterol    Hypertension    Parkinsons    Stroke  2016

## 2017-08-05 NOTE — ED PROVIDER NOTE - CHPI ED SYMPTOMS NEG
no nausea/no fever/no chills/no back pain/no syncope/no diaphoresis/no chest pain/no dizziness/no vomiting

## 2017-08-05 NOTE — ED PROVIDER NOTE - OBJECTIVE STATEMENT
The patient is a 77 year old male presents to ED complaining of B/L leg swelling and gained 35 pounds over one month. No CP, Mild SOB, Mild orthopnea, Mild cough, No chill, No fever, No back pain. The patient is a 77 year old male presents to ED complaining of B/L leg swelling and gained 35 pounds over two weeks. No CP, Mild SOB, Mild orthopnea, Mild cough, No chill, No fever, No back pain.

## 2017-08-05 NOTE — ED PROVIDER NOTE - CARE PLAN
Principal Discharge DX:	Acute CHF  Secondary Diagnosis:	High cholesterol  Secondary Diagnosis:	Hypertension

## 2017-08-05 NOTE — H&P ADULT - HISTORY OF PRESENT ILLNESS
76 y/o male with h/o CHF, DM II, HTN, parkinson's disease, was referred by nurse at the  center because patient was noticed to have swollen legs and gained about 30 lb in the last 2 weeks. also accu check was 422 mg/dl. patient denies SOB or chest pain. no palpitation or orthopnea. Labs shows Hgb: 10.8. Glu: 423.

## 2017-08-05 NOTE — CONSULT NOTE ADULT - SUBJECTIVE AND OBJECTIVE BOX
Patient is a 77y old  Male who presents with a chief complaint of edema and 30 lbs weight gain    HPI:  77 year old male with a history of IRDM, HTN, hyperlipidemia, Parkinson's, and prostate problems who in the past month has gained 30 lbs and comes in with bilateral lower extremity edema.  He denies any shortness of breath or chest pain.  He has 2 pillow orthopnea but no PND.  He has no previous history of cardiac dysfunction.  He has not had any MI's or CVA's.  There is no history of PCI or CABG.  He has a cough but no fevers and no phlegm production.    PAST MEDICAL & SURGICAL HISTORY:  Stroke: 2016  DVT (deep venous thrombosis)  Parkinsons  High cholesterol  Diabetes  Hypertension  S/P hip hemiarthroplasty: right hip repair 2014        Allergies    No Known Allergies    Intolerances        MEDICATIONS  (STANDING):  Atorvastatin 20mg Qhs  Amlodipine 10mg QD  Flomax 0.4mg BID  Carbidopa/Levodopa 25mg/100mg TID  Finasteride 5mg daily  Novolog for coverage  Humulin N 32 units QAM  Vit D    MEDICATIONS  (PRN):        FAMILY HISTORY:  No significant family history      SOCIAL HISTORY:    CIGARETTES: Quit 35 years ago    ALCOHOL: None    REVIEW OF SYSTEMS:  CONSTITUTIONAL: No fever, weight loss, or fatigue  EYES: No eye pain, visual disturbances, or discharge  ENMT:  No difficulty hearing, tinnitus, vertigo; No sinus or throat pain  NECK: No pain or stiffness  RESPIRATORY: Mild cough.  No wheezing, chills or hemoptysis; No Shortness of Breath  CARDIOVASCULAR: No chest pain, palpitations, passing out, dizziness.  GASTROINTESTINAL: No abdominal or epigastric pain. No nausea, vomiting, or hematemesis; No diarrhea or constipation. No melena or hematochezia.  GENITOURINARY: No dysuria, frequency, hematuria, or incontinence  NEUROLOGICAL: No headaches, memory loss, loss of strength, numbness, or tremors  SKIN: No itching, burning, rashes, or lesions   LYMPH Nodes: No enlarged glands  ENDOCRINE: No heat or cold intolerance; No hair loss  MUSCULOSKELETAL: No joint pain or swelling; No muscle, back, or extremity pain  PSYCHIATRIC: No depression, anxiety, mood swings, or difficulty sleeping  HEME/LYMPH: No easy bruising, or bleeding gums  ALLERY AND IMMUNOLOGIC: No hives or eczema	    Vital Signs Last 24 Hrs  T(C): 36.7 (05 Aug 2017 15:57), Max: 36.7 (05 Aug 2017 15:57)  T(F): 98.1 (05 Aug 2017 15:57), Max: 98.1 (05 Aug 2017 15:57)  HR: 96 (05 Aug 2017 18:18) (93 - 96)  BP: 173/75 (05 Aug 2017 18:18) (135/78 - 173/75)  BP(mean): --  RR: 20 (05 Aug 2017 18:18) (20 - 22)  SpO2: 98% (05 Aug 2017 18:18) (93% - 98%)    Daily Height in cm: 180.34 (05 Aug 2017 15:57)    Daily     I&O's Detail      PHYSICAL EXAM:  Appearance: Normal, well nourished	  HEENT:   Normal oral mucosa, PERRL, EOMI, sclera non-icteric	  Lymphatic: No cervical lymphadenopathy  Cardiovascular: Normal S1 S2, No JVD, -21/6 systolic murmur LUSB, No carotid bruits.  Respiratory: Lungs clear to auscultation	  Psychiatry: A & O x 3, Mood & affect appropriate  Gastrointestinal:  Soft, Non-tender, + BS, no bruits	  Skin: No rashes, No ecchymoses, No cyanosis  Neurologic: Grossly non-focal with full strength in all four extremities  Extremities: Normal range of motion, No clubbing, cyanosis. 3+ pitting edema  Vascular: Peripheral pulses palpable 2+ bilaterally      INTERPRETATION OF TELEMETRY:    ECG:  NSR.  Nonspecific ST-T abnormalities    LABS:                        10.8   6.8   )-----------( 233      ( 05 Aug 2017 16:52 )             31.8     08-05    132<L>  |  96<L>  |  18.0  ----------------------------<  423<H>  4.7   |  22.0  |  0.86    Ca    8.7      05 Aug 2017 16:52    TPro  6.6  /  Alb  3.2<L>  /  TBili  0.2<L>  /  DBili  x   /  AST  18  /  ALT  4   /  AlkPhos  79  08-05    CARDIAC MARKERS ( 05 Aug 2017 16:52 )  x     / 0.03 ng/mL / 76 U/L / x     / x          PT/INR - ( 05 Aug 2017 16:52 )   PT: 9.9 sec;   INR: 0.90 ratio         PTT - ( 05 Aug 2017 16:52 )  PTT:27.4 sec  Urinalysis Basic - ( 05 Aug 2017 20:00 )    Color: Yellow / Appearance: Clear / S.010 / pH: x  Gluc: x / Ketone: Trace  / Bili: Negative / Urobili: Negative mg/dL   Blood: x / Protein: 500 mg/dL / Nitrite: Negative   Leuk Esterase: Negative / RBC: x / WBC x   Sq Epi: x / Non Sq Epi: x / Bacteria: x      I&O's Summary    BNPSerum Pro-Brain Natriuretic Peptide: 488 pg/mL ( @ 16:52)    RADIOLOGY & ADDITIONAL STUDIES:    Assessment:  77 year old male with a history of IRDM, HTN, hyperlipidemia, Parkinson's, and prostate problems who in the past month has gained 30 lbs and comes in with bilateral lower extremity edema.  He denies any shortness of breath or chest pain.  He has 2 pillow orthopnea but no PND.       Plan:  Admit to telemetry  IV Lasix for diuresis along with potassium supplementation  Add ACE inhibitor instead of Amlodipine  Check echo  Further recommendations pending echo results

## 2017-08-05 NOTE — ED ADULT NURSE REASSESSMENT NOTE - NS ED NURSE REASSESS COMMENT FT1
Pt finger stick 414, spoke with md asencio, Pt to be given 6 units of lispro. Report given to Jade TALAVERA of [address] [place] [address]

## 2017-08-05 NOTE — ED ADULT TRIAGE NOTE - CHIEF COMPLAINT QUOTE
weigth gain over past month, states was weighed today and had 20lb weight gain, denies sob or cp, bilateral lower extremity edema present

## 2017-08-06 DIAGNOSIS — G20 PARKINSON'S DISEASE: ICD-10-CM

## 2017-08-06 LAB
ANION GAP SERPL CALC-SCNC: 14 MMOL/L — SIGNIFICANT CHANGE UP (ref 5–17)
BUN SERPL-MCNC: 15 MG/DL — SIGNIFICANT CHANGE UP (ref 8–20)
CALCIUM SERPL-MCNC: 8.7 MG/DL — SIGNIFICANT CHANGE UP (ref 8.6–10.2)
CHLORIDE SERPL-SCNC: 96 MMOL/L — LOW (ref 98–107)
CO2 SERPL-SCNC: 26 MMOL/L — SIGNIFICANT CHANGE UP (ref 22–29)
CREAT SERPL-MCNC: 0.93 MG/DL — SIGNIFICANT CHANGE UP (ref 0.5–1.3)
GLUCOSE SERPL-MCNC: 331 MG/DL — HIGH (ref 70–115)
HBA1C BLD-MCNC: 8.4 % — HIGH (ref 4–5.6)
HCT VFR BLD CALC: 32 % — LOW (ref 42–52)
HGB BLD-MCNC: 10.8 G/DL — LOW (ref 14–18)
MAGNESIUM SERPL-MCNC: 1.8 MG/DL — SIGNIFICANT CHANGE UP (ref 1.6–2.6)
MCHC RBC-ENTMCNC: 30.9 PG — SIGNIFICANT CHANGE UP (ref 27–31)
MCHC RBC-ENTMCNC: 34.1 G/DL — SIGNIFICANT CHANGE UP (ref 32–36)
MCV RBC AUTO: 90.7 FL — SIGNIFICANT CHANGE UP (ref 80–94)
PHOSPHATE SERPL-MCNC: 3.2 MG/DL — SIGNIFICANT CHANGE UP (ref 2.4–4.7)
PLATELET # BLD AUTO: 226 K/UL — SIGNIFICANT CHANGE UP (ref 150–400)
POTASSIUM SERPL-MCNC: 3.8 MMOL/L — SIGNIFICANT CHANGE UP (ref 3.5–5.3)
POTASSIUM SERPL-SCNC: 3.8 MMOL/L — SIGNIFICANT CHANGE UP (ref 3.5–5.3)
RBC # BLD: 3.53 M/UL — LOW (ref 4.6–6.2)
RBC # FLD: 15 % — SIGNIFICANT CHANGE UP (ref 11–15.6)
SODIUM SERPL-SCNC: 136 MMOL/L — SIGNIFICANT CHANGE UP (ref 135–145)
WBC # BLD: 6.3 K/UL — SIGNIFICANT CHANGE UP (ref 4.8–10.8)
WBC # FLD AUTO: 6.3 K/UL — SIGNIFICANT CHANGE UP (ref 4.8–10.8)

## 2017-08-06 PROCEDURE — 99233 SBSQ HOSP IP/OBS HIGH 50: CPT

## 2017-08-06 RX ORDER — LISINOPRIL 2.5 MG/1
20 TABLET ORAL DAILY
Qty: 0 | Refills: 0 | Status: DISCONTINUED | OUTPATIENT
Start: 2017-08-06 | End: 2017-08-13

## 2017-08-06 RX ORDER — INSULIN GLARGINE 100 [IU]/ML
25 INJECTION, SOLUTION SUBCUTANEOUS AT BEDTIME
Qty: 0 | Refills: 0 | Status: DISCONTINUED | OUTPATIENT
Start: 2017-08-06 | End: 2017-08-07

## 2017-08-06 RX ORDER — INSULIN LISPRO 100/ML
7 VIAL (ML) SUBCUTANEOUS
Qty: 0 | Refills: 0 | Status: DISCONTINUED | OUTPATIENT
Start: 2017-08-06 | End: 2017-08-15

## 2017-08-06 RX ORDER — ENOXAPARIN SODIUM 100 MG/ML
40 INJECTION SUBCUTANEOUS DAILY
Qty: 0 | Refills: 0 | Status: DISCONTINUED | OUTPATIENT
Start: 2017-08-06 | End: 2017-08-15

## 2017-08-06 RX ADMIN — INSULIN GLARGINE 25 UNIT(S): 100 INJECTION, SOLUTION SUBCUTANEOUS at 22:00

## 2017-08-06 RX ADMIN — LISINOPRIL 10 MILLIGRAM(S): 2.5 TABLET ORAL at 05:13

## 2017-08-06 RX ADMIN — INSULIN GLARGINE 20 UNIT(S): 100 INJECTION, SOLUTION SUBCUTANEOUS at 08:43

## 2017-08-06 RX ADMIN — ENOXAPARIN SODIUM 40 MILLIGRAM(S): 100 INJECTION SUBCUTANEOUS at 21:59

## 2017-08-06 RX ADMIN — TAMSULOSIN HYDROCHLORIDE 0.4 MILLIGRAM(S): 0.4 CAPSULE ORAL at 21:59

## 2017-08-06 RX ADMIN — Medication 3: at 08:25

## 2017-08-06 RX ADMIN — CARBIDOPA AND LEVODOPA 1 TABLET(S): 25; 100 TABLET ORAL at 05:13

## 2017-08-06 RX ADMIN — Medication 162 MILLIGRAM(S): at 13:59

## 2017-08-06 RX ADMIN — Medication 7 UNIT(S): at 17:30

## 2017-08-06 RX ADMIN — FINASTERIDE 5 MILLIGRAM(S): 5 TABLET, FILM COATED ORAL at 12:32

## 2017-08-06 RX ADMIN — Medication 1 INCH(S): at 05:18

## 2017-08-06 RX ADMIN — Medication 4: at 17:30

## 2017-08-06 RX ADMIN — CARBIDOPA AND LEVODOPA 1 TABLET(S): 25; 100 TABLET ORAL at 21:59

## 2017-08-06 RX ADMIN — Medication 40 MILLIGRAM(S): at 17:29

## 2017-08-06 RX ADMIN — AMLODIPINE BESYLATE 10 MILLIGRAM(S): 2.5 TABLET ORAL at 05:13

## 2017-08-06 RX ADMIN — Medication 1 DROP(S): at 13:59

## 2017-08-06 RX ADMIN — Medication 20 MILLIEQUIVALENT(S): at 05:13

## 2017-08-06 RX ADMIN — Medication 5: at 13:58

## 2017-08-06 RX ADMIN — ATORVASTATIN CALCIUM 20 MILLIGRAM(S): 80 TABLET, FILM COATED ORAL at 21:59

## 2017-08-06 RX ADMIN — Medication 40 MILLIGRAM(S): at 05:13

## 2017-08-06 RX ADMIN — CARBIDOPA AND LEVODOPA 1 TABLET(S): 25; 100 TABLET ORAL at 15:54

## 2017-08-06 RX ADMIN — Medication 325 MILLIGRAM(S): at 12:31

## 2017-08-06 RX ADMIN — Medication 20 MILLIEQUIVALENT(S): at 17:31

## 2017-08-06 NOTE — PROGRESS NOTE ADULT - ASSESSMENT
78 y/o male with CHF, DM, HTN, BPH, and anemia 76 y/o male with chronic CHF, DM, HTN, BPH, parkinsons, and anemia c/w acute on chronic diastolic CHF

## 2017-08-06 NOTE — PATIENT PROFILE ADULT. - PAIN SCALE PREFERRED, PROFILE
[facility]    [address]    [facility] [address]    Phone:  [phone]       Thank You for choosing us for your health care visit. We are glad to serve you and happy to provide you with this summary of your visit. Please help us to ensure we have accurate records. If you find anything that needs to be changed, please let our staff know as soon as possible.          Your Demographic Information     Patient Name Sex Jos Willard Male 2014       Ethnic Group Patient Race    Unknown White      Your Visit Details     Date & Time Provider Department    2017 9:30 AM Chel Covington MD [facility]      We Ordered or Performed the Following     AEROSOL INHALATION ACUTE       Conditions Discussed Today or Order-Related Diagnoses        Comments    Wheezing    -  Primary       Your Vitals Were     Temp Weight                98.7 °F (37.1 °C) (Axillary) 16 lb 14 oz (7.654 kg) (13 %, Z= -1.15)*        *Growth percentiles are based on WHO (Boys, 0-2 years) data.      Medications Prescribed or Re-Ordered Today     albuterol 108 (90 Base) MCG/ACT inhaler    Sig - Route: Inhale 2 puffs into the lungs every 4 hours as needed for Shortness of Breath or Wheezing. - Inhalation    Class: Eprescribe    Pharmacy: [id number] [address] Ph #: [phone]    Spacer/Aero-Holding Chambers (AEROCHAMBER WITH MASK, SMALL)    Sig: USe with albuterol    Class: Eprescrib    Pharmacy: [id number] [address] Ph #: [phone]      Your Current Medications Are        Disp Refills Start End    albuterol 108 (90 Base) MCG/ACT inhaler 1 Inhaler 12 2017     Sig - Route: Inhale 2 puffs into the lungs every 4 hours as needed for Shortness of Breath or Wheezing. - Inhalation    Class: Eprescribe    Spacer/Aero-Holding Chambers (AEROCHAMBER WITH MASK, SMALL) 1 each 0 2017     Sig: USe with  albuterol    Class: Eprescribe      Allergies     No Known Allergies      Immunizations History as of 5/17/2017     Name Date    DTaP/HIB/IPV 3/13/2017, 2016    DTaP/Hep B/IPV 1/6/2017    HIB 1/6/2017    Hepatitis B Child 2016, 2016  1:15 AM    INFLUENZA QUADRIVALENT 3/13/2017    Pneumococcal Conjugate 13 Valent 3/13/2017, 1/6/2017, 2016    Rotavirus - Pentavalent 3/13/2017, 1/6/2017, 2016      Problem List as of 5/17/2017     SGA (small for gestational age)              Patient Instructions    HOW TO USE AEROCHAMBER WITH A MASK    1) shake the albuterol  2) place in end of aerochamber  3) Place on face with a good seal  4) Push MDI (Metered Dose Inhaler or puffer)  5) let child take 6-7 good breaths while MDI is on face  6) take down; wait  and repeat x1     Rx:  2 puffs every 4 hrs for 2 days; then 2 puffs 3x a day till cold resolved or till follow-up with MD.          numerical 0-10

## 2017-08-06 NOTE — PROGRESS NOTE ADULT - SUBJECTIVE AND OBJECTIVE BOX
INTERVAL HISTORY:    Feels worse  Now complaining of a cough  No fevers  No chest pain      	  MEDICATIONS:  furosemide   Injectable 40 milliGRAM(s) IV Push two times a day  tamsulosin 0.4 milliGRAM(s) Oral at bedtime  lisinopril 20 milliGRAM(s) Oral daily        carbidopa/levodopa  25/100 1 Tablet(s) Oral three times a day      finasteride 5 milliGRAM(s) Oral daily  atorvastatin 20 milliGRAM(s) Oral at bedtime  insulin glargine Injectable (LANTUS) 20 Unit(s) SubCutaneous every morning  insulin lispro (HumaLOG) corrective regimen sliding scale   SubCutaneous three times a day before meals  dextrose Gel 1 Dose(s) Oral once PRN  dextrose 50% Injectable 12.5 Gram(s) IV Push once  dextrose 50% Injectable 25 Gram(s) IV Push once  dextrose 50% Injectable 25 Gram(s) IV Push once  glucagon  Injectable 1 milliGRAM(s) IntraMuscular once PRN    potassium chloride   Powder 20 milliEquivalent(s) Oral two times a day  aspirin enteric coated 162 milliGRAM(s) Oral daily  ferrous    sulfate 325 milliGRAM(s) Oral daily  timolol 0.5% Solution 1 Drop(s) Both EYES daily  dextrose 5%. 1000 milliLiter(s) IV Continuous <Continuous>        PHYSICAL EXAM:    T(C): 36.8 (17 @ 08:10), Max: 37.2 (17 @ 00:15)  HR: 84 (17 @ 08:10) (84 - 98)  BP: 129/64 (17 @ 08:10) (129/64 - 173/75)  RR: 19 (17 @ 08:10) (17 - 22)  SpO2: 94% (17 @ 05:10) (93% - 98%)  Wt(kg): --    I&O's Summary    05 Aug 2017 07:  -  06 Aug 2017 07:00  --------------------------------------------------------  IN: 100 mL / OUT: 1575 mL / NET: -1475 mL    06 Aug 2017 07:01  -  06 Aug 2017 10:13  --------------------------------------------------------  IN: 0 mL / OUT: 800 mL / NET: -800 mL        Daily Height in cm: 180.34 (05 Aug 2017 15:57)    Daily Weight in k.9 (06 Aug 2017 05:52)    Appearance: Normal	  HEENT:   Normal oral mucosa, PERRL, EOMI	  Lymphatic: No lymphadenopathy  Cardiovascular: Normal S1 S2, No JVD, No murmurs, 2-3+ edema  Respiratory: Lungs clear to auscultation	  Psychiatry: A & O x 3, Mood & affect appropriate  Gastrointestinal:  Soft, Non-tender, + BS	  Skin: No rashes, No ecchymoses, No cyanosis  Neurologic: Non-focal  Extremities: Normal range of motion, No clubbing, cyanosis   Vascular: Peripheral pulses palpable 2+ bilaterally                          10.8   6.8   )-----------( 233      ( 05 Aug 2017 16:52 )             31.8         132<L>  |  96<L>  |  18.0  ----------------------------<  423<H>  4.7   |  22.0  |  0.86    Ca    8.7      05 Aug 2017 16:52    TPro  6.6  /  Alb  3.2<L>  /  TBili  0.2<L>  /  DBili  x   /  AST  18  /  ALT  4   /  AlkPhos  79      proBNP: Serum Pro-Brain Natriuretic Peptide: 488 pg/mL ( @ 16:52)    Lipid Profile:   HgA1c: Hemoglobin A1C, Whole Blood: 8.4 % ( @ 07:06)      ASSESSMENT/PLAN: 	    One month history of 30lbs weight gain  Continue IV Lasix  Will discontinue Norvasc and push Lisinopril up as Norvasc may be contributing to edema  Echo

## 2017-08-06 NOTE — PROGRESS NOTE ADULT - PROBLEM SELECTOR PLAN 1
c/w IV lasix.  agree with d/c norvasc due to edema  Increase lisinopril  Echo  Cardiology consult appreciated.   c/w telemetry   I & O  Daily weights acute on chronic diastolic CHF  c/w IV lasix.  agree with d/c norvasc due to edema  Increase lisinopril  Echo  Cardiology consult appreciated.   c/w telemetry   I & O  Daily weights

## 2017-08-06 NOTE — PROGRESS NOTE ADULT - SUBJECTIVE AND OBJECTIVE BOX
CHIEF COMPLAINT/INTERVAL HISTORY:    Patient is a 77y old  Male who presents with a chief complaint of swollen legs and Hyperglycemia (05 Aug 2017 22:21)      HPI:  78 y/o male with h/o CHF, DM II, HTN, parkinson's disease, was referred by nurse at the  center because patient was noticed to have swollen legs and gained about 30 lb in the last 2 weeks. also accu check was 422 mg/dl. patient denies SOB or chest pain. no palpitation or orthopnea. Labs shows Hgb: 10.8. Glu: 423. (05 Aug 2017 22:21)      SUBJECTIVE & OBJECTIVE: Pt seen and examined at bedside. Denies any SOB, orthopnea, PND, CP. c/o LE edema & 30 lb weight gain in 1 month. No hx of COPD. Smoker in the past    ICU Vital Signs Last 24 Hrs  T(C): 36.8 (06 Aug 2017 08:10), Max: 37.2 (06 Aug 2017 00:15)  T(F): 98.2 (06 Aug 2017 08:10), Max: 99 (06 Aug 2017 00:15)  HR: 84 (06 Aug 2017 08:10) (84 - 98)  BP: 129/64 (06 Aug 2017 08:10) (129/64 - 173/75)  BP(mean): --  ABP: --  ABP(mean): --  RR: 19 (06 Aug 2017 08:10) (17 - 22)  SpO2: 94% (06 Aug 2017 05:10) (93% - 98%)        MEDICATIONS  (STANDING):  furosemide   Injectable 40 milliGRAM(s) IV Push two times a day  potassium chloride   Powder 20 milliEquivalent(s) Oral two times a day  finasteride 5 milliGRAM(s) Oral daily  aspirin enteric coated 162 milliGRAM(s) Oral daily  tamsulosin 0.4 milliGRAM(s) Oral at bedtime  atorvastatin 20 milliGRAM(s) Oral at bedtime  carbidopa/levodopa  25/100 1 Tablet(s) Oral three times a day  ferrous    sulfate 325 milliGRAM(s) Oral daily  timolol 0.5% Solution 1 Drop(s) Both EYES daily  insulin glargine Injectable (LANTUS) 20 Unit(s) SubCutaneous every morning  insulin lispro (HumaLOG) corrective regimen sliding scale   SubCutaneous three times a day before meals  dextrose 5%. 1000 milliLiter(s) (50 mL/Hr) IV Continuous <Continuous>  dextrose 50% Injectable 12.5 Gram(s) IV Push once  dextrose 50% Injectable 25 Gram(s) IV Push once  dextrose 50% Injectable 25 Gram(s) IV Push once  lisinopril 20 milliGRAM(s) Oral daily  enoxaparin Injectable 40 milliGRAM(s) SubCutaneous daily    MEDICATIONS  (PRN):  dextrose Gel 1 Dose(s) Oral once PRN Blood Glucose LESS THAN 70 milliGRAM(s)/deciliter  glucagon  Injectable 1 milliGRAM(s) IntraMuscular once PRN Glucose LESS THAN 70 milligrams/deciliter      LABS:                        10.8   6.3   )-----------( 226      ( 06 Aug 2017 10:36 )             32.0     08-06    136  |  96<L>  |  15.0  ----------------------------<  331<H>  3.8   |  26.0  |  0.93    Ca    8.7      06 Aug 2017 10:36  Phos  3.2     08-  Mg     1.8     08-06    TPro  6.6  /  Alb  3.2<L>  /  TBili  0.2<L>  /  DBili  x   /  AST  18  /  ALT  4   /  AlkPhos  79  08-05    PT/INR - ( 05 Aug 2017 16:52 )   PT: 9.9 sec;   INR: 0.90 ratio         PTT - ( 05 Aug 2017 16:52 )  PTT:27.4 sec  Urinalysis Basic - ( 05 Aug 2017 20:00 )    Color: Yellow / Appearance: Clear / S.010 / pH: x  Gluc: x / Ketone: Trace  / Bili: Negative / Urobili: Negative mg/dL   Blood: x / Protein: 500 mg/dL / Nitrite: Negative   Leuk Esterase: Negative / RBC: 0-2 /HPF / WBC 0-2   Sq Epi: x / Non Sq Epi: x / Bacteria: x        CAPILLARY BLOOD GLUCOSE  276 (06 Aug 2017 08:23)  414 (05 Aug 2017 23:22)  388 (05 Aug 2017 16:51)          RECENT CULTURES:      RADIOLOGY & ADDITIONAL TESTS:      PHYSICAL EXAM:    GENERAL: NAD, well-groomed, well-developed  HEAD:  Atraumatic, Normocephalic  EYES: EOMI, PERRLA, conjunctiva and sclera clear  ENMT: Moist mucous membranes  NECK: Supple, No JVD  NERVOUS SYSTEM:  Alert & Oriented X3, non focal  CHEST/LUNG: b/l rhonchi  HEART: Regular rate and rhythm; No murmurs, rubs, or gallops  ABDOMEN: Soft, Nontender, Nondistended; Bowel sounds present  EXTREMITIES:  2+ Peripheral Pulses, 3+ b/l LE edema, chronic skin changes & healed wounds

## 2017-08-07 LAB
ANION GAP SERPL CALC-SCNC: 12 MMOL/L — SIGNIFICANT CHANGE UP (ref 5–17)
BASOPHILS # BLD AUTO: 0 K/UL — SIGNIFICANT CHANGE UP (ref 0–0.2)
BASOPHILS NFR BLD AUTO: 0.4 % — SIGNIFICANT CHANGE UP (ref 0–2)
BUN SERPL-MCNC: 21 MG/DL — HIGH (ref 8–20)
CALCIUM SERPL-MCNC: 8.7 MG/DL — SIGNIFICANT CHANGE UP (ref 8.6–10.2)
CHLORIDE SERPL-SCNC: 101 MMOL/L — SIGNIFICANT CHANGE UP (ref 98–107)
CO2 SERPL-SCNC: 28 MMOL/L — SIGNIFICANT CHANGE UP (ref 22–29)
CREAT SERPL-MCNC: 1 MG/DL — SIGNIFICANT CHANGE UP (ref 0.5–1.3)
EOSINOPHIL # BLD AUTO: 0.3 K/UL — SIGNIFICANT CHANGE UP (ref 0–0.5)
EOSINOPHIL NFR BLD AUTO: 6.2 % — HIGH (ref 0–5)
GLUCOSE SERPL-MCNC: 52 MG/DL — LOW (ref 70–115)
HCT VFR BLD CALC: 31.2 % — LOW (ref 42–52)
HGB BLD-MCNC: 10.5 G/DL — LOW (ref 14–18)
LYMPHOCYTES # BLD AUTO: 1.5 K/UL — SIGNIFICANT CHANGE UP (ref 1–4.8)
LYMPHOCYTES # BLD AUTO: 28.1 % — SIGNIFICANT CHANGE UP (ref 20–55)
MAGNESIUM SERPL-MCNC: 1.8 MG/DL — SIGNIFICANT CHANGE UP (ref 1.8–2.6)
MCHC RBC-ENTMCNC: 30.4 PG — SIGNIFICANT CHANGE UP (ref 27–31)
MCHC RBC-ENTMCNC: 33.7 G/DL — SIGNIFICANT CHANGE UP (ref 32–36)
MCV RBC AUTO: 90.4 FL — SIGNIFICANT CHANGE UP (ref 80–94)
MONOCYTES # BLD AUTO: 0.8 K/UL — SIGNIFICANT CHANGE UP (ref 0–0.8)
MONOCYTES NFR BLD AUTO: 14.9 % — HIGH (ref 3–10)
NEUTROPHILS # BLD AUTO: 2.7 K/UL — SIGNIFICANT CHANGE UP (ref 1.8–8)
NEUTROPHILS NFR BLD AUTO: 50.2 % — SIGNIFICANT CHANGE UP (ref 37–73)
PHOSPHATE SERPL-MCNC: 3.7 MG/DL — SIGNIFICANT CHANGE UP (ref 2.4–4.7)
PLATELET # BLD AUTO: 229 K/UL — SIGNIFICANT CHANGE UP (ref 150–400)
POTASSIUM SERPL-MCNC: 3.2 MMOL/L — LOW (ref 3.5–5.3)
POTASSIUM SERPL-SCNC: 3.2 MMOL/L — LOW (ref 3.5–5.3)
RBC # BLD: 3.45 M/UL — LOW (ref 4.6–6.2)
RBC # FLD: 14.9 % — SIGNIFICANT CHANGE UP (ref 11–15.6)
SODIUM SERPL-SCNC: 141 MMOL/L — SIGNIFICANT CHANGE UP (ref 135–145)
WBC # BLD: 5.4 K/UL — SIGNIFICANT CHANGE UP (ref 4.8–10.8)
WBC # FLD AUTO: 5.4 K/UL — SIGNIFICANT CHANGE UP (ref 4.8–10.8)

## 2017-08-07 PROCEDURE — 99233 SBSQ HOSP IP/OBS HIGH 50: CPT

## 2017-08-07 RX ORDER — POTASSIUM CHLORIDE 20 MEQ
40 PACKET (EA) ORAL EVERY 4 HOURS
Qty: 0 | Refills: 0 | Status: COMPLETED | OUTPATIENT
Start: 2017-08-07 | End: 2017-08-07

## 2017-08-07 RX ORDER — DEXTROSE 50 % IN WATER 50 %
1 SYRINGE (ML) INTRAVENOUS ONCE
Qty: 0 | Refills: 0 | Status: COMPLETED | OUTPATIENT
Start: 2017-08-07 | End: 2017-08-07

## 2017-08-07 RX ORDER — INSULIN GLARGINE 100 [IU]/ML
16 INJECTION, SOLUTION SUBCUTANEOUS AT BEDTIME
Qty: 0 | Refills: 0 | Status: DISCONTINUED | OUTPATIENT
Start: 2017-08-07 | End: 2017-08-09

## 2017-08-07 RX ORDER — POTASSIUM CHLORIDE 20 MEQ
40 PACKET (EA) ORAL EVERY 4 HOURS
Qty: 0 | Refills: 0 | Status: DISCONTINUED | OUTPATIENT
Start: 2017-08-07 | End: 2017-08-07

## 2017-08-07 RX ORDER — DEXTROSE 50 % IN WATER 50 %
1 SYRINGE (ML) INTRAVENOUS ONCE
Qty: 0 | Refills: 0 | Status: DISCONTINUED | OUTPATIENT
Start: 2017-08-07 | End: 2017-08-07

## 2017-08-07 RX ORDER — POTASSIUM CHLORIDE 20 MEQ
40 PACKET (EA) ORAL
Qty: 0 | Refills: 0 | Status: DISCONTINUED | OUTPATIENT
Start: 2017-08-07 | End: 2017-08-14

## 2017-08-07 RX ADMIN — Medication 325 MILLIGRAM(S): at 11:02

## 2017-08-07 RX ADMIN — Medication 7 UNIT(S): at 17:56

## 2017-08-07 RX ADMIN — ATORVASTATIN CALCIUM 20 MILLIGRAM(S): 80 TABLET, FILM COATED ORAL at 21:10

## 2017-08-07 RX ADMIN — Medication 7 UNIT(S): at 12:51

## 2017-08-07 RX ADMIN — Medication 1 DROP(S): at 11:02

## 2017-08-07 RX ADMIN — Medication 40 MILLIEQUIVALENT(S): at 21:09

## 2017-08-07 RX ADMIN — Medication 40 MILLIGRAM(S): at 06:10

## 2017-08-07 RX ADMIN — Medication 162 MILLIGRAM(S): at 11:01

## 2017-08-07 RX ADMIN — Medication 20 MILLIEQUIVALENT(S): at 06:10

## 2017-08-07 RX ADMIN — Medication 1 DOSE(S): at 08:05

## 2017-08-07 RX ADMIN — Medication 40 MILLIGRAM(S): at 17:16

## 2017-08-07 RX ADMIN — LISINOPRIL 20 MILLIGRAM(S): 2.5 TABLET ORAL at 06:11

## 2017-08-07 RX ADMIN — INSULIN GLARGINE 16 UNIT(S): 100 INJECTION, SOLUTION SUBCUTANEOUS at 21:10

## 2017-08-07 RX ADMIN — CARBIDOPA AND LEVODOPA 1 TABLET(S): 25; 100 TABLET ORAL at 06:09

## 2017-08-07 RX ADMIN — CARBIDOPA AND LEVODOPA 1 TABLET(S): 25; 100 TABLET ORAL at 14:36

## 2017-08-07 RX ADMIN — Medication 1 DOSE(S): at 08:30

## 2017-08-07 RX ADMIN — Medication 1: at 17:56

## 2017-08-07 RX ADMIN — ENOXAPARIN SODIUM 40 MILLIGRAM(S): 100 INJECTION SUBCUTANEOUS at 11:01

## 2017-08-07 RX ADMIN — Medication 3: at 12:51

## 2017-08-07 RX ADMIN — FINASTERIDE 5 MILLIGRAM(S): 5 TABLET, FILM COATED ORAL at 11:02

## 2017-08-07 RX ADMIN — Medication 40 MILLIEQUIVALENT(S): at 12:52

## 2017-08-07 RX ADMIN — TAMSULOSIN HYDROCHLORIDE 0.4 MILLIGRAM(S): 0.4 CAPSULE ORAL at 21:10

## 2017-08-07 RX ADMIN — CARBIDOPA AND LEVODOPA 1 TABLET(S): 25; 100 TABLET ORAL at 21:10

## 2017-08-07 RX ADMIN — Medication 40 MILLIEQUIVALENT(S): at 11:01

## 2017-08-07 NOTE — PROGRESS NOTE ADULT - ASSESSMENT
76 y/o male with chronic CHF, DM, HTN, BPH, parkinsons, and anemia c/w acute on chronic diastolic CHF

## 2017-08-07 NOTE — PROVIDER CONTACT NOTE (OTHER) - SITUATION
BS 58 this am  Glucose gel given Repeat 60 Another gel given + OJ  Repeat 72 breakfast and Oj given  Repeat 125

## 2017-08-07 NOTE — PROGRESS NOTE ADULT - SUBJECTIVE AND OBJECTIVE BOX
Cardiology Follow-up    SHARON FELDMAN was seen and examined. No events overnight. The patient denies any chest pain, SOB, palpitations, PND or abdominal pain. He denies orthopnea but he never tries to lay flat. He was hypoglycemic earlier this morning.    PROBLEM LIST:  Parkinsons  Benign prostatic hyperplasia, presence of lower urinary tract symptoms unspecified  Anemia, unspecified type  Type 2 diabetes mellitus with complication, with long-term current use of insulin  Essential hypertension  Acute congestive heart failure, unspecified congestive heart failure type    PAST MEDICAL HISTORY:  Stroke  DVT (deep venous thrombosis)  Parkinsons  High cholesterol  Diabetes  Hypertension    PAST SURGICAL HISTORY:  S/P hip hemiarthroplasty  No significant past surgical history    MEDICATIONS  (STANDING):  furosemide   Injectable 40 milliGRAM(s) IV Push two times a day  potassium chloride   Powder 20 milliEquivalent(s) Oral two times a day  finasteride 5 milliGRAM(s) Oral daily  aspirin enteric coated 162 milliGRAM(s) Oral daily  tamsulosin 0.4 milliGRAM(s) Oral at bedtime  atorvastatin 20 milliGRAM(s) Oral at bedtime  carbidopa/levodopa  25/100 1 Tablet(s) Oral three times a day  ferrous    sulfate 325 milliGRAM(s) Oral daily  timolol 0.5% Solution 1 Drop(s) Both EYES daily  insulin lispro (HumaLOG) corrective regimen sliding scale   SubCutaneous three times a day before meals  dextrose 5%. 1000 milliLiter(s) (50 mL/Hr) IV Continuous <Continuous>  dextrose 50% Injectable 12.5 Gram(s) IV Push once  dextrose 50% Injectable 25 Gram(s) IV Push once  dextrose 50% Injectable 25 Gram(s) IV Push once  lisinopril 20 milliGRAM(s) Oral daily  enoxaparin Injectable 40 milliGRAM(s) SubCutaneous daily  insulin lispro Injectable (HumaLOG) 7 Unit(s) SubCutaneous three times a day with meals  insulin glargine Injectable (LANTUS) 25 Unit(s) SubCutaneous at bedtime    MEDICATIONS  (PRN):  dextrose Gel 1 Dose(s) Oral once PRN Blood Glucose LESS THAN 70 milliGRAM(s)/deciliter  glucagon  Injectable 1 milliGRAM(s) IntraMuscular once PRN Glucose LESS THAN 70 milligrams/deciliter    ALLERGIES:  No Known Allergies    I&O's Summary    06 Aug 2017 07:01  -  07 Aug 2017 07:00  --------------------------------------------------------  IN: 480 mL / OUT: 2725 mL / NET: -2245 mL      PHYSICAL EXAM:  T(F): 98 (17 @ 07:45), Max: 98.1 (17 @ 16:38)  HR: 58 (17 @ 07:45) (58 - 80)  BP: 140/86 (17 @ 07:45) (114/65 - 150/80)  RR: 17 (17 @ 07:45) (16 - 17)  SpO2: 99% (17 @ 06:07) (95% - 99%)  Wt(kg): --  Weight (kg): 106.1 ( @ 15:57)  Telemetry: Sinus, HR 70s-90s, occasional PVCs  General: comfortable, in NAD  Heart: +S1S2, RRR, no murmurs, no rubs, no gallops  Lungs: inspiratory crackles at the bases bilaterall, inspiratory and expiratory wheezes bilaterally, no rhonchi  Abdomen: soft, non-tender, non-distended, + bowel sounds  Extremities: no clubbing, no cyanosis, 2+ bilateral lower extremity edema  Neuro: A&O x3    LABS:    Troponin T, Serum: 0.03 ng/mL ( @ 16:52)        CBC:            10.5   5.4   >-----------< 229     @ 06:50            31.2               10.8   6.3   >-----------< 226     @ 10:36            32.0               10.8   6.8   >-----------< 233     @ 16:52            31.8       CHEMISTRY:   141   |  101    |  21.0   ----------------------------< 52       @ 06:50    3.2   |  28.0   |  1.00     eGFR non-  73     eGFR   85      136   |  96     |  15.0   ----------------------------< 331      @ 10:36    3.8   |  26.0   |  0.93     eGFR non-  80     eGFR   93      132   |  96     |  18.0   ----------------------------< 423      @ 16:52    4.7   |  22.0   |  0.86     eGFR non-  85     eGFR African American  98       Magnesium, Serum: 1.8 mg/dL ( @ 06:50)  Magnesium, Serum: 1.8 mg/dL ( @ 10:36)    TPro --    / Alb 3.2   / TBili 0.2   / DBili --    / AST 18    / ALT 4     / AlkPhos --      @ 16:52    PT/INR - ( 05 Aug 2017 16:52 )   PT: 9.9 sec;   INR: 0.90 ratio         PTT - ( 05 Aug 2017 16:52 )  PTT:27.4 sec  URINALYSIS: ( @ 20:00)  Color Yellow / pH 6.0   / Sp Gr 1.010 / LE <<16 / Nit Negative / Prot 500   / Blood Trace / Uro Negative / WBC 0-2   / RBC 0-2   / Bacteria --       RADIOLOGY & ADDITIONAL TESTS:  Venous duplex: No evidence of bilateral lower extremity deep venous thrombosis.    CXR: Small right pleural effusion with bilateral perihilar opacities, probably   pulmonary edema.    Echo:   1. Left ventricular ejection fraction, by visual estimation, is 55 to   60%.   2. Spectral Doppler shows pseudonormal pattern of left ventricular   myocardial filling (Grade II diastolic dysfunction).   3. Sclerotic aortic valve with normal opening.   4. Thickening and calcification of the anterior and posterior mitral   valve leaflets.   5. Dilatation of the sinuses of Valsalva.      ASSESSMENT:  SHARON FELDMAN is a 77y old male with a history of IDDM (poorly controlled) with diabetic neuropathy,  essential hypertension, pure hypercholesterolemia, aortic root dilatation (4.4 cm by Echo), aortic atherosclerosis who presented with acute diastolic heart failure.    Please permit me to suggest the followin. He still has significant edema. Continue Lasix 40 mg IV 2x daily. He denies SOB however his CXR is consistent with CHF and he has grade 2 diastolic dysfunction on Echo, which was previously grade 1 on an out-patient Echo. Keep K>4.0, Mg>2.0. I will replete both.

## 2017-08-07 NOTE — ADVANCED PRACTICE NURSE CONSULT - RECOMMEDATIONS
continue diabetes self management education  pls lower lantus to 20 units tonight due to hypoglycemia

## 2017-08-07 NOTE — PROGRESS NOTE ADULT - SUBJECTIVE AND OBJECTIVE BOX
Patient: SHARON FELDMAN [id number] 77y Male                 Internal Medicine Hospitalist Progress Note - Dr. Ismael Stafford    CHIEF COMPLAINT/INTERVAL HISTORY:  Patient is a 77y old  Male who presents with a chief complaint of swollen legs and Hyperglycemia (05 Aug 2017 22:21)      HPI:  76 y/o male with h/o CHF, DM II, HTN, parkinson's disease, was referred by nurse at the  center because patient was noticed to have swollen legs and gained about 30 lb in the last 2 weeks. also accu check was 422 mg/dl. patient denies SOB or chest pain. no palpitation or orthopnea. Labs shows Hgb: 10.8. Glu: 423. (05 Aug 2017 22:21)  Admitted for CHF exacerbation. Seen by cardiology, started on diuretics.      ____________________PHYSICAL EXAM:  Vitals reviewed as indicated below  GENERAL:  NAD Alert and Oriented x 3   HEENT: NCAT  CARDIOVASCULAR:  S1, S2  LUNGS: coarse BS b/l  ABDOMEN:  soft, (-) tenderness, (-) distension, (+) bowel sounds, (-) guarding, (-) rebound (-) rigidity  EXTREMITIES:  no cyanosis / clubbin.  + edema.   ____________________    BACKGROUND:  HEALTH ISSUES - PROBLEM Dx:  Parkinsons: Parkinsons  Benign prostatic hyperplasia, presence of lower urinary tract symptoms unspecified: Benign prostatic hyperplasia, presence of lower urinary tract symptoms unspecified  Anemia, unspecified type: Anemia, unspecified type  Type 2 diabetes mellitus with complication, with long-term current use of insulin: Type 2 diabetes mellitus with complication, with long-term current use of insulin  Essential hypertension: Essential hypertension  Acute congestive heart failure, unspecified congestive heart failure type: Acute congestive heart failure, unspecified congestive heart failure type        MEDICATIONS  (STANDING):  furosemide   Injectable 40 milliGRAM(s) IV Push two times a day  finasteride 5 milliGRAM(s) Oral daily  aspirin enteric coated 162 milliGRAM(s) Oral daily  tamsulosin 0.4 milliGRAM(s) Oral at bedtime  atorvastatin 20 milliGRAM(s) Oral at bedtime  carbidopa/levodopa  25/100 1 Tablet(s) Oral three times a day  ferrous    sulfate 325 milliGRAM(s) Oral daily  timolol 0.5% Solution 1 Drop(s) Both EYES daily  insulin lispro (HumaLOG) corrective regimen sliding scale   SubCutaneous three times a day before meals  dextrose 5%. 1000 milliLiter(s) (50 mL/Hr) IV Continuous <Continuous>  dextrose 50% Injectable 12.5 Gram(s) IV Push once  dextrose 50% Injectable 25 Gram(s) IV Push once  dextrose 50% Injectable 25 Gram(s) IV Push once  lisinopril 20 milliGRAM(s) Oral daily  enoxaparin Injectable 40 milliGRAM(s) SubCutaneous daily  insulin lispro Injectable (HumaLOG) 7 Unit(s) SubCutaneous three times a day with meals  insulin glargine Injectable (LANTUS) 25 Unit(s) SubCutaneous at bedtime  potassium chloride   Powder 40 milliEquivalent(s) Oral two times a day    MEDICATIONS  (PRN):  dextrose Gel 1 Dose(s) Oral once PRN Blood Glucose LESS THAN 70 milliGRAM(s)/deciliter  glucagon  Injectable 1 milliGRAM(s) IntraMuscular once PRN Glucose LESS THAN 70 milligrams/deciliter    Allergies    No Known Allergies    Intolerances      PAST MEDICAL & SURGICAL HISTORY:  Stroke: 2016  DVT (deep venous thrombosis)  Parkinsons  High cholesterol  Diabetes  Hypertension  S/P hip hemiarthroplasty: right hip repair 2014  No significant past surgical history      VITALS:  Vital Signs Last 24 Hrs  T(C): 36.7 (07 Aug 2017 07:45), Max: 36.7 (06 Aug 2017 16:38)  T(F): 98 (07 Aug 2017 07:45), Max: 98.1 (06 Aug 2017 16:38)  HR: 58 (07 Aug 2017 07:45) (58 - 76)  BP: 140/86 (07 Aug 2017 07:45) (114/65 - 150/80)  BP(mean): --  RR: 17 (07 Aug 2017 07:45) (16 - 17)  SpO2: 99% (07 Aug 2017 06:07) (95% - 99%) Daily     Daily Weight in k (07 Aug 2017 06:02)  CAPILLARY BLOOD GLUCOSE  280 (07 Aug 2017 11:30)  125 (07 Aug 2017 09:28)  72 (07 Aug 2017 08:57)  60 (07 Aug 2017 08:34)  58 (07 Aug 2017 08:00)  191 (06 Aug 2017 21:57)  315 (06 Aug 2017 17:24)        I&O's Summary    06 Aug 2017 07:01  -  07 Aug 2017 07:00  --------------------------------------------------------  IN: 480 mL / OUT: 2725 mL / NET: -2245 mL        LABS:                        10.5   5.4   )-----------( 229      ( 07 Aug 2017 06:50 )             31.2     08-07    141  |  101  |  21.0<H>  ----------------------------<  52<L>  3.2<L>   |  28.0  |  1.00    Ca    8.7      07 Aug 2017 06:50  Phos  3.7     08-  Mg     1.8     08-07    TPro  6.6  /  Alb  3.2<L>  /  TBili  0.2<L>  /  DBili  x   /  AST  18  /  ALT  4   /  AlkPhos  79  08-05    PT/INR - ( 05 Aug 2017 16:52 )   PT: 9.9 sec;   INR: 0.90 ratio         PTT - ( 05 Aug 2017 16:52 )  PTT:27.4 sec  RECENT CULTURES:      LIVER FUNCTIONS - ( 05 Aug 2017 16:52 )  Alb: 3.2 g/dL / Pro: 6.6 g/dL / ALK PHOS: 79 U/L / ALT: 4 U/L / AST: 18 U/L / GGT: x           Urinalysis Basic - ( 05 Aug 2017 20:00 )    Color: Yellow / Appearance: Clear / S.010 / pH: x  Gluc: x / Ketone: Trace  / Bili: Negative / Urobili: Negative mg/dL   Blood: x / Protein: 500 mg/dL / Nitrite: Negative   Leuk Esterase: Negative / RBC: 0-2 /HPF / WBC 0-2   Sq Epi: x / Non Sq Epi: x / Bacteria: x      CARDIAC MARKERS ( 05 Aug 2017 16:52 )  x     / 0.03 ng/mL / 76 U/L / x     / x

## 2017-08-08 PROCEDURE — 99233 SBSQ HOSP IP/OBS HIGH 50: CPT

## 2017-08-08 RX ORDER — LANOLIN ALCOHOL/MO/W.PET/CERES
6 CREAM (GRAM) TOPICAL ONCE
Qty: 0 | Refills: 0 | Status: COMPLETED | OUTPATIENT
Start: 2017-08-08 | End: 2017-08-08

## 2017-08-08 RX ADMIN — CARBIDOPA AND LEVODOPA 1 TABLET(S): 25; 100 TABLET ORAL at 21:35

## 2017-08-08 RX ADMIN — Medication 6 MILLIGRAM(S): at 22:41

## 2017-08-08 RX ADMIN — CARBIDOPA AND LEVODOPA 1 TABLET(S): 25; 100 TABLET ORAL at 06:12

## 2017-08-08 RX ADMIN — Medication 1: at 18:49

## 2017-08-08 RX ADMIN — Medication 7 UNIT(S): at 12:53

## 2017-08-08 RX ADMIN — TAMSULOSIN HYDROCHLORIDE 0.4 MILLIGRAM(S): 0.4 CAPSULE ORAL at 21:35

## 2017-08-08 RX ADMIN — LISINOPRIL 20 MILLIGRAM(S): 2.5 TABLET ORAL at 06:12

## 2017-08-08 RX ADMIN — Medication 162 MILLIGRAM(S): at 12:46

## 2017-08-08 RX ADMIN — Medication 40 MILLIEQUIVALENT(S): at 06:12

## 2017-08-08 RX ADMIN — Medication 3: at 12:53

## 2017-08-08 RX ADMIN — Medication 40 MILLIEQUIVALENT(S): at 18:49

## 2017-08-08 RX ADMIN — Medication 325 MILLIGRAM(S): at 12:46

## 2017-08-08 RX ADMIN — Medication 40 MILLIGRAM(S): at 06:12

## 2017-08-08 RX ADMIN — ATORVASTATIN CALCIUM 20 MILLIGRAM(S): 80 TABLET, FILM COATED ORAL at 21:35

## 2017-08-08 RX ADMIN — Medication 1 DROP(S): at 18:49

## 2017-08-08 RX ADMIN — ENOXAPARIN SODIUM 40 MILLIGRAM(S): 100 INJECTION SUBCUTANEOUS at 12:45

## 2017-08-08 RX ADMIN — FINASTERIDE 5 MILLIGRAM(S): 5 TABLET, FILM COATED ORAL at 12:51

## 2017-08-08 RX ADMIN — Medication 7 UNIT(S): at 18:49

## 2017-08-08 RX ADMIN — INSULIN GLARGINE 16 UNIT(S): 100 INJECTION, SOLUTION SUBCUTANEOUS at 21:35

## 2017-08-08 RX ADMIN — Medication 40 MILLIGRAM(S): at 18:46

## 2017-08-08 NOTE — PROGRESS NOTE ADULT - SUBJECTIVE AND OBJECTIVE BOX
Patient: SHARON FELDMAN [id number] 77y Male                 Internal Medicine Hospitalist Progress Note - Dr. Ismael Stafford    CHIEF COMPLAINT/INTERVAL HISTORY:  Patient is a 77y old  Male who presents with a chief complaint of swollen legs and Hyperglycemia (05 Aug 2017 22:21)      HPI:  78 y/o male with h/o CHF, DM II, HTN, parkinson's disease, was referred by nurse at the  center because patient was noticed to have swollen legs and gained about 30 lb in the last 2 weeks. also accu check was 422 mg/dl. patient denies SOB or chest pain. no palpitation or orthopnea. Labs shows Hgb: 10.8. Glu: 423. (05 Aug 2017 22:21)  Admitted for CHF exacerbation. Seen by cardiology, started on diuretics.      Denies complaints.  No chest pain, SOB, palpitations.      ____________________PHYSICAL EXAM:  Vitals reviewed as indicated below  GENERAL:  NAD Alert and Oriented x 3   HEENT: NCAT  CARDIOVASCULAR:  S1, S2  LUNGS: coarse BS b/l  ABDOMEN:  soft, (-) tenderness, (-) distension, (+) bowel sounds, (-) guarding, (-) rebound (-) rigidity  EXTREMITIES:  no cyanosis / clubbin.  + edema.   ____________________      MEDICATIONS:  furosemide   Injectable 40 milliGRAM(s) IV Push two times a day  finasteride 5 milliGRAM(s) Oral daily  aspirin enteric coated 162 milliGRAM(s) Oral daily  tamsulosin 0.4 milliGRAM(s) Oral at bedtime  atorvastatin 20 milliGRAM(s) Oral at bedtime  carbidopa/levodopa  25/100 1 Tablet(s) Oral three times a day  ferrous    sulfate 325 milliGRAM(s) Oral daily  timolol 0.5% Solution 1 Drop(s) Both EYES daily  insulin lispro (HumaLOG) corrective regimen sliding scale   SubCutaneous three times a day before meals  dextrose 5%. 1000 milliLiter(s) IV Continuous <Continuous>  dextrose Gel 1 Dose(s) Oral once PRN  dextrose 50% Injectable 12.5 Gram(s) IV Push once  dextrose 50% Injectable 25 Gram(s) IV Push once  dextrose 50% Injectable 25 Gram(s) IV Push once  glucagon  Injectable 1 milliGRAM(s) IntraMuscular once PRN  lisinopril 20 milliGRAM(s) Oral daily  enoxaparin Injectable 40 milliGRAM(s) SubCutaneous daily  insulin lispro Injectable (HumaLOG) 7 Unit(s) SubCutaneous three times a day with meals  potassium chloride   Powder 40 milliEquivalent(s) Oral two times a day  insulin glargine Injectable (LANTUS) 16 Unit(s) SubCutaneous at bedtime      VITALS:  Vital Signs Last 24 Hrs  T(C): 36.9 (08 Aug 2017 09:42), Max: 37.1 (07 Aug 2017 21:03)  T(F): 98.4 (08 Aug 2017 09:42), Max: 98.7 (07 Aug 2017 21:03)  HR: 86 (08 Aug 2017 09:42) (74 - 86)  BP: 108/65 (08 Aug 2017 09:42) (108/65 - 148/70)  BP(mean): --  RR: 18 (08 Aug 2017 09:42) (18 - 81)  SpO2: 92% (08 Aug 2017 06:10) (91% - 97%) Daily     Daily Weight in k.3 (08 Aug 2017 00:49)  CAPILLARY BLOOD GLUCOSE  255 (08 Aug 2017 11:38)  119 (08 Aug 2017 07:49)  186 (07 Aug 2017 20:48)  179 (07 Aug 2017 17:09)        I&O's Summary    07 Aug 2017 07:  -  08 Aug 2017 07:00  --------------------------------------------------------  IN: 480 mL / OUT: 2565 mL / NET: -2085 mL    08 Aug 2017 07:  -  08 Aug 2017 15:30  --------------------------------------------------------  IN: 480 mL / OUT: 750 mL / NET: -270 mL        LABS:                        10.5   5.4   )-----------( 229      ( 07 Aug 2017 06:50 )             31.2     08    141  |  101  |  21.0<H>  ----------------------------<  52<L>  3.2<L>   |  28.0  |  1.00    Ca    8.7      07 Aug 2017 06:50  Phos  3.7     08-07  Mg     1.8     -        RECENT CULTURES:

## 2017-08-08 NOTE — PROGRESS NOTE ADULT - SUBJECTIVE AND OBJECTIVE BOX
Cardiology Follow-up    SHARON FELDMAN was seen and examined. No events overnight. The patient denies any chest pain, SOB, palpitations, orthopnea, PND or abdominal pain.He has been up in a chair with no problems.    PROBLEM LIST:  Parkinsons  Benign prostatic hyperplasia, presence of lower urinary tract symptoms unspecified  Anemia, unspecified type  Type 2 diabetes mellitus with complication, with long-term current use of insulin  Essential hypertension  Acute congestive heart failure, unspecified congestive heart failure type    PAST MEDICAL HISTORY:  Stroke  DVT (deep venous thrombosis)  Parkinsons  High cholesterol  Diabetes  Hypertension    PAST SURGICAL HISTORY:  S/P hip hemiarthroplasty  No significant past surgical history    MEDICATIONS  (STANDING):  furosemide   Injectable 40 milliGRAM(s) IV Push two times a day  finasteride 5 milliGRAM(s) Oral daily  aspirin enteric coated 162 milliGRAM(s) Oral daily  tamsulosin 0.4 milliGRAM(s) Oral at bedtime  atorvastatin 20 milliGRAM(s) Oral at bedtime  carbidopa/levodopa  25/100 1 Tablet(s) Oral three times a day  ferrous    sulfate 325 milliGRAM(s) Oral daily  timolol 0.5% Solution 1 Drop(s) Both EYES daily  insulin lispro (HumaLOG) corrective regimen sliding scale   SubCutaneous three times a day before meals  dextrose 5%. 1000 milliLiter(s) (50 mL/Hr) IV Continuous <Continuous>  dextrose 50% Injectable 12.5 Gram(s) IV Push once  dextrose 50% Injectable 25 Gram(s) IV Push once  dextrose 50% Injectable 25 Gram(s) IV Push once  lisinopril 20 milliGRAM(s) Oral daily  enoxaparin Injectable 40 milliGRAM(s) SubCutaneous daily  insulin lispro Injectable (HumaLOG) 7 Unit(s) SubCutaneous three times a day with meals  potassium chloride   Powder 40 milliEquivalent(s) Oral two times a day  insulin glargine Injectable (LANTUS) 16 Unit(s) SubCutaneous at bedtime    MEDICATIONS  (PRN):  dextrose Gel 1 Dose(s) Oral once PRN Blood Glucose LESS THAN 70 milliGRAM(s)/deciliter  glucagon  Injectable 1 milliGRAM(s) IntraMuscular once PRN Glucose LESS THAN 70 milligrams/deciliter    ALLERGIES:  No Known Allergies    PHYSICAL EXAM:  T(C): 36.7 (17 @ 06:10), Max: 37.1 (17 @ 21:03)  T(F): 98.1 (17 @ 06:10), Max: 98.7 (17 @ 21:03)  HR: 77 (17 @ 06:10) (74 - 92)  BP: 130/58 (17 @ 06:10) (130/58 - 148/70)  RR: 81 (17 @ 06:10) (18 - 81)  SpO2: 92% (17 @ 06:10) (91% - 99%)  Wt(kg): --  I&O's Summary    07 Aug 2017 07:01  -  08 Aug 2017 07:00  --------------------------------------------------------  IN: 480 mL / OUT: 2565 mL / NET: -2085 mL      Telemetry: sinus rhythm  General: comfortable, in NAD  Heart: +S1S2, RRR, no murmurs, no rubs, no gallops  Lungs: clear to auscultation bilaterally, no wheezes, no rales, no rhonchi  Abdomen: soft, non-tender, non-distended, + bowel sounds  Extremities: no clubbing, no cyanosis, 1+ bilateral pedal edema  Neuro: A&O x3    LABS:    Troponin T, Serum: 0.03 ng/mL ( @ 16:52)                            10.5   5.4   )-----------( 229      ( 07 Aug 2017 06:50 )             31.2     08-07    141  |  101  |  21.0<H>  ----------------------------<  52<L>  3.2<L>   |  28.0  |  1.00    Ca    8.7      07 Aug 2017 06:50  Phos  3.7     -  Mg     1.8             RADIOLOGY & ADDITIONAL TESTS:    ASSESSMENT:  SHARON FELDMAN is a 77y old male with a history of essential hypertension,IDDM,Parkinson's disease,hyperlipidemia who presented with shortness of breath.He has acute on chronic diastolic CHF.    Please permit me to suggest the followin. He has been diuresing well,will keep on IV Lasix another day,then can switch to po Torsemide  2.Increase activity as tolerated  3.Maintain K+>4.0 and Mg+>2.0

## 2017-08-08 NOTE — PROGRESS NOTE ADULT - PROBLEM SELECTOR PLAN 1
acute on chronic diastolic CHF  Cardiology followup appreciated.  c/w IV lasix.  Likely change to po in am.

## 2017-08-09 LAB
ANION GAP SERPL CALC-SCNC: 13 MMOL/L — SIGNIFICANT CHANGE UP (ref 5–17)
BUN SERPL-MCNC: 27 MG/DL — HIGH (ref 8–20)
CALCIUM SERPL-MCNC: 8.4 MG/DL — LOW (ref 8.6–10.2)
CHLORIDE SERPL-SCNC: 99 MMOL/L — SIGNIFICANT CHANGE UP (ref 98–107)
CO2 SERPL-SCNC: 27 MMOL/L — SIGNIFICANT CHANGE UP (ref 22–29)
CREAT SERPL-MCNC: 1.15 MG/DL — SIGNIFICANT CHANGE UP (ref 0.5–1.3)
GLUCOSE SERPL-MCNC: 129 MG/DL — HIGH (ref 70–115)
HCT VFR BLD CALC: 29.9 % — LOW (ref 42–52)
HGB BLD-MCNC: 9.8 G/DL — LOW (ref 14–18)
MAGNESIUM SERPL-MCNC: 1.8 MG/DL — SIGNIFICANT CHANGE UP (ref 1.6–2.6)
MCHC RBC-ENTMCNC: 29.9 PG — SIGNIFICANT CHANGE UP (ref 27–31)
MCHC RBC-ENTMCNC: 32.8 G/DL — SIGNIFICANT CHANGE UP (ref 32–36)
MCV RBC AUTO: 91.2 FL — SIGNIFICANT CHANGE UP (ref 80–94)
PHOSPHATE SERPL-MCNC: 4.7 MG/DL — SIGNIFICANT CHANGE UP (ref 2.4–4.7)
PLATELET # BLD AUTO: 251 K/UL — SIGNIFICANT CHANGE UP (ref 150–400)
POTASSIUM SERPL-MCNC: 4 MMOL/L — SIGNIFICANT CHANGE UP (ref 3.5–5.3)
POTASSIUM SERPL-SCNC: 4 MMOL/L — SIGNIFICANT CHANGE UP (ref 3.5–5.3)
RBC # BLD: 3.28 M/UL — LOW (ref 4.6–6.2)
RBC # FLD: 14.9 % — SIGNIFICANT CHANGE UP (ref 11–15.6)
SODIUM SERPL-SCNC: 139 MMOL/L — SIGNIFICANT CHANGE UP (ref 135–145)
WBC # BLD: 5.5 K/UL — SIGNIFICANT CHANGE UP (ref 4.8–10.8)
WBC # FLD AUTO: 5.5 K/UL — SIGNIFICANT CHANGE UP (ref 4.8–10.8)

## 2017-08-09 PROCEDURE — 99233 SBSQ HOSP IP/OBS HIGH 50: CPT

## 2017-08-09 RX ORDER — POTASSIUM CHLORIDE 20 MEQ
40 PACKET (EA) ORAL ONCE
Qty: 0 | Refills: 0 | Status: COMPLETED | OUTPATIENT
Start: 2017-08-09 | End: 2017-08-09

## 2017-08-09 RX ORDER — MAGNESIUM SULFATE 500 MG/ML
2 VIAL (ML) INJECTION ONCE
Qty: 0 | Refills: 0 | Status: COMPLETED | OUTPATIENT
Start: 2017-08-09 | End: 2017-08-09

## 2017-08-09 RX ORDER — LANOLIN ALCOHOL/MO/W.PET/CERES
6 CREAM (GRAM) TOPICAL AT BEDTIME
Qty: 0 | Refills: 0 | Status: DISCONTINUED | OUTPATIENT
Start: 2017-08-09 | End: 2017-08-15

## 2017-08-09 RX ADMIN — Medication 1 DROP(S): at 11:26

## 2017-08-09 RX ADMIN — Medication 162 MILLIGRAM(S): at 11:26

## 2017-08-09 RX ADMIN — FINASTERIDE 5 MILLIGRAM(S): 5 TABLET, FILM COATED ORAL at 11:26

## 2017-08-09 RX ADMIN — Medication 7 UNIT(S): at 09:11

## 2017-08-09 RX ADMIN — Medication 40 MILLIGRAM(S): at 06:25

## 2017-08-09 RX ADMIN — Medication 40 MILLIEQUIVALENT(S): at 11:30

## 2017-08-09 RX ADMIN — CARBIDOPA AND LEVODOPA 1 TABLET(S): 25; 100 TABLET ORAL at 22:54

## 2017-08-09 RX ADMIN — ENOXAPARIN SODIUM 40 MILLIGRAM(S): 100 INJECTION SUBCUTANEOUS at 22:55

## 2017-08-09 RX ADMIN — Medication 40 MILLIEQUIVALENT(S): at 17:15

## 2017-08-09 RX ADMIN — Medication 40 MILLIGRAM(S): at 17:15

## 2017-08-09 RX ADMIN — CARBIDOPA AND LEVODOPA 1 TABLET(S): 25; 100 TABLET ORAL at 06:25

## 2017-08-09 RX ADMIN — ATORVASTATIN CALCIUM 20 MILLIGRAM(S): 80 TABLET, FILM COATED ORAL at 22:55

## 2017-08-09 RX ADMIN — Medication 1: at 13:39

## 2017-08-09 RX ADMIN — Medication 40 MILLIEQUIVALENT(S): at 06:25

## 2017-08-09 RX ADMIN — LISINOPRIL 20 MILLIGRAM(S): 2.5 TABLET ORAL at 06:25

## 2017-08-09 RX ADMIN — Medication 6 MILLIGRAM(S): at 22:00

## 2017-08-09 RX ADMIN — Medication 325 MILLIGRAM(S): at 11:26

## 2017-08-09 RX ADMIN — Medication 7 UNIT(S): at 17:15

## 2017-08-09 RX ADMIN — Medication 1: at 17:15

## 2017-08-09 RX ADMIN — Medication 50 GRAM(S): at 11:29

## 2017-08-09 RX ADMIN — TAMSULOSIN HYDROCHLORIDE 0.4 MILLIGRAM(S): 0.4 CAPSULE ORAL at 22:55

## 2017-08-09 RX ADMIN — Medication 7 UNIT(S): at 13:39

## 2017-08-09 RX ADMIN — CARBIDOPA AND LEVODOPA 1 TABLET(S): 25; 100 TABLET ORAL at 13:39

## 2017-08-09 NOTE — PROGRESS NOTE ADULT - SUBJECTIVE AND OBJECTIVE BOX
Patient: SHARON FELDMAN [id number] 77y Male                 Internal Medicine Hospitalist Progress Note - Dr. Ismael Stafford    CHIEF COMPLAINT/INTERVAL HISTORY:  Patient is a 77y old  Male who presents with a chief complaint of swollen legs and Hyperglycemia (05 Aug 2017 22:21)      HPI:  76 y/o male with h/o CHF, DM II, HTN, parkinson's disease, was referred by nurse at the  center because patient was noticed to have swollen legs and gained about 30 lb in the last 2 weeks. also accu check was 422 mg/dl. patient denies SOB or chest pain. no palpitation or orthopnea. Labs shows Hgb: 10.8. Glu: 423. (05 Aug 2017 22:21)  Admitted for CHF exacerbation. Seen by cardiology, started on diuretics.      No SOB.  No Chest pain, palpitations.  No additional complaints.    ____________________PHYSICAL EXAM:  Vitals reviewed as indicated below  GENERAL:  NAD Alert and Oriented x 3   HEENT: NCAT  CARDIOVASCULAR:  S1, S2  LUNGS: coarse BS b/l, improving  ABDOMEN:  soft, (-) tenderness, (-) distension, (+) bowel sounds, (-) guarding, (-) rebound (-) rigidity  EXTREMITIES:  no cyanosis / clubbing.  + edema.   ____________________      MEDICATIONS:  furosemide   Injectable 40 milliGRAM(s) IV Push two times a day  finasteride 5 milliGRAM(s) Oral daily  aspirin enteric coated 162 milliGRAM(s) Oral daily  tamsulosin 0.4 milliGRAM(s) Oral at bedtime  atorvastatin 20 milliGRAM(s) Oral at bedtime  carbidopa/levodopa  25/100 1 Tablet(s) Oral three times a day  ferrous    sulfate 325 milliGRAM(s) Oral daily  timolol 0.5% Solution 1 Drop(s) Both EYES daily  insulin lispro (HumaLOG) corrective regimen sliding scale   SubCutaneous three times a day before meals  dextrose 5%. 1000 milliLiter(s) IV Continuous <Continuous>  dextrose Gel 1 Dose(s) Oral once PRN  dextrose 50% Injectable 12.5 Gram(s) IV Push once  dextrose 50% Injectable 25 Gram(s) IV Push once  dextrose 50% Injectable 25 Gram(s) IV Push once  glucagon  Injectable 1 milliGRAM(s) IntraMuscular once PRN  lisinopril 20 milliGRAM(s) Oral daily  enoxaparin Injectable 40 milliGRAM(s) SubCutaneous daily  insulin lispro Injectable (HumaLOG) 7 Unit(s) SubCutaneous three times a day with meals  potassium chloride   Powder 40 milliEquivalent(s) Oral two times a day  insulin glargine Injectable (LANTUS) 16 Unit(s) SubCutaneous at bedtime      VITALS:  Vital Signs Last 24 Hrs  T(C): 36.8 (09 Aug 2017 09:56), Max: 36.8 (08 Aug 2017 16:25)  T(F): 98.3 (09 Aug 2017 09:56), Max: 98.3 (08 Aug 2017 16:25)  HR: 85 (09 Aug 2017 09:56) (73 - 85)  BP: 125/77 (09 Aug 2017 09:56) (125/77 - 140/72)  BP(mean): --  RR: 16 (09 Aug 2017 09:56) (16 - 16)  SpO2: 93% (09 Aug 2017 09:56) (93% - 95%) Daily     Daily Weight in k.3 (09 Aug 2017 04:02)  CAPILLARY BLOOD GLUCOSE  137 (09 Aug 2017 08:12)  127 (09 Aug 2017 04:02)  48 (09 Aug 2017 03:28)  259 (08 Aug 2017 20:33)  188 (08 Aug 2017 16:25)        I&O's Summary    08 Aug 2017 07:  -  09 Aug 2017 07:00  --------------------------------------------------------  IN: 1440 mL / OUT: 3940 mL / NET: -2500 mL    09 Aug 2017 07:  -  09 Aug 2017 11:53  --------------------------------------------------------  IN: 290 mL / OUT: 700 mL / NET: -410 mL        LABS:                        9.8    5.5   )-----------( 251      ( 09 Aug 2017 06:15 )             29.9     08-09    139  |  99  |  27.0<H>  ----------------------------<  129<H>  4.0   |  27.0  |  1.15    Ca    8.4<L>      09 Aug 2017 06:15  Phos  4.7     08-  Mg     1.8     -        RECENT CULTURES: Patient: SHARON FELDMAN [id number] 77y Male                 Internal Medicine Hospitalist Progress Note - Dr. Ismael Stafford    CHIEF COMPLAINT/INTERVAL HISTORY:  Patient is a 77y old  Male who presents with a chief complaint of swollen legs and Hyperglycemia (05 Aug 2017 22:21)      HPI:  76 y/o male with h/o CHF, DM II, HTN, parkinson's disease, was referred by nurse at the  center because patient was noticed to have swollen legs and gained about 30 lb in the last 2 weeks. also accu check was 422 mg/dl. patient denies SOB or chest pain. no palpitation or orthopnea. Labs shows Hgb: 10.8. Glu: 423. (05 Aug 2017 22:21)  Admitted for CHF exacerbation. Seen by cardiology, started on diuretics.      Hypoglycemic episode noted.  No SOB.  No Chest pain, palpitations.  No additional complaints.    ____________________PHYSICAL EXAM:  Vitals reviewed as indicated below  GENERAL:  NAD Alert and Oriented x 3   HEENT: NCAT  CARDIOVASCULAR:  S1, S2  LUNGS: coarse BS b/l, improving  ABDOMEN:  soft, (-) tenderness, (-) distension, (+) bowel sounds, (-) guarding, (-) rebound (-) rigidity  EXTREMITIES:  no cyanosis / clubbing.  + edema.   ____________________      MEDICATIONS:  furosemide   Injectable 40 milliGRAM(s) IV Push two times a day  finasteride 5 milliGRAM(s) Oral daily  aspirin enteric coated 162 milliGRAM(s) Oral daily  tamsulosin 0.4 milliGRAM(s) Oral at bedtime  atorvastatin 20 milliGRAM(s) Oral at bedtime  carbidopa/levodopa  25/100 1 Tablet(s) Oral three times a day  ferrous    sulfate 325 milliGRAM(s) Oral daily  timolol 0.5% Solution 1 Drop(s) Both EYES daily  insulin lispro (HumaLOG) corrective regimen sliding scale   SubCutaneous three times a day before meals  dextrose 5%. 1000 milliLiter(s) IV Continuous <Continuous>  dextrose Gel 1 Dose(s) Oral once PRN  dextrose 50% Injectable 12.5 Gram(s) IV Push once  dextrose 50% Injectable 25 Gram(s) IV Push once  dextrose 50% Injectable 25 Gram(s) IV Push once  glucagon  Injectable 1 milliGRAM(s) IntraMuscular once PRN  lisinopril 20 milliGRAM(s) Oral daily  enoxaparin Injectable 40 milliGRAM(s) SubCutaneous daily  insulin lispro Injectable (HumaLOG) 7 Unit(s) SubCutaneous three times a day with meals  potassium chloride   Powder 40 milliEquivalent(s) Oral two times a day  insulin glargine Injectable (LANTUS) 16 Unit(s) SubCutaneous at bedtime      VITALS:  Vital Signs Last 24 Hrs  T(C): 36.8 (09 Aug 2017 09:56), Max: 36.8 (08 Aug 2017 16:25)  T(F): 98.3 (09 Aug 2017 09:56), Max: 98.3 (08 Aug 2017 16:25)  HR: 85 (09 Aug 2017 09:56) (73 - 85)  BP: 125/77 (09 Aug 2017 09:56) (125/77 - 140/72)  BP(mean): --  RR: 16 (09 Aug 2017 09:56) (16 - 16)  SpO2: 93% (09 Aug 2017 09:56) (93% - 95%) Daily     Daily Weight in k.3 (09 Aug 2017 04:02)  CAPILLARY BLOOD GLUCOSE  137 (09 Aug 2017 08:12)  127 (09 Aug 2017 04:02)  48 (09 Aug 2017 03:28)  259 (08 Aug 2017 20:33)  188 (08 Aug 2017 16:25)        I&O's Summary    08 Aug 2017 07  -  09 Aug 2017 07:00  --------------------------------------------------------  IN: 1440 mL / OUT: 3940 mL / NET: -2500 mL    09 Aug 2017 07:  -  09 Aug 2017 11:53  --------------------------------------------------------  IN: 290 mL / OUT: 700 mL / NET: -410 mL        LABS:                        9.8    5.5   )-----------( 251      ( 09 Aug 2017 06:15 )             29.9     08-09    139  |  99  |  27.0<H>  ----------------------------<  129<H>  4.0   |  27.0  |  1.15    Ca    8.4<L>      09 Aug 2017 06:15  Phos  4.7     -  Mg     1.8     -        RECENT CULTURES:

## 2017-08-09 NOTE — PROGRESS NOTE ADULT - SUBJECTIVE AND OBJECTIVE BOX
Cardiology Follow-up    SHARON FELDMAN was seen and examined. No events overnight. The patient denies any chest pain, SOB, palpitations, PND or abdominal pain. He denies orthopnea but he never tries to lay flat.    PROBLEM LIST:  Parkinsons  Benign prostatic hyperplasia, presence of lower urinary tract symptoms unspecified  Anemia, unspecified type  Type 2 diabetes mellitus with complication, with long-term current use of insulin  Essential hypertension  Acute congestive heart failure, unspecified congestive heart failure type    PAST MEDICAL HISTORY:  Stroke  DVT (deep venous thrombosis)  Parkinsons  High cholesterol  Diabetes  Hypertension    PAST SURGICAL HISTORY:  S/P hip hemiarthroplasty  No significant past surgical history    MEDICATIONS  (STANDING):  furosemide   Injectable 40 milliGRAM(s) IV Push two times a day  finasteride 5 milliGRAM(s) Oral daily  aspirin enteric coated 162 milliGRAM(s) Oral daily  tamsulosin 0.4 milliGRAM(s) Oral at bedtime  atorvastatin 20 milliGRAM(s) Oral at bedtime  carbidopa/levodopa  25/100 1 Tablet(s) Oral three times a day  ferrous    sulfate 325 milliGRAM(s) Oral daily  timolol 0.5% Solution 1 Drop(s) Both EYES daily  insulin lispro (HumaLOG) corrective regimen sliding scale   SubCutaneous three times a day before meals  dextrose 5%. 1000 milliLiter(s) (50 mL/Hr) IV Continuous <Continuous>  dextrose 50% Injectable 12.5 Gram(s) IV Push once  dextrose 50% Injectable 25 Gram(s) IV Push once  dextrose 50% Injectable 25 Gram(s) IV Push once  lisinopril 20 milliGRAM(s) Oral daily  enoxaparin Injectable 40 milliGRAM(s) SubCutaneous daily  insulin lispro Injectable (HumaLOG) 7 Unit(s) SubCutaneous three times a day with meals  potassium chloride   Powder 40 milliEquivalent(s) Oral two times a day  insulin glargine Injectable (LANTUS) 16 Unit(s) SubCutaneous at bedtime    MEDICATIONS  (PRN):  dextrose Gel 1 Dose(s) Oral once PRN Blood Glucose LESS THAN 70 milliGRAM(s)/deciliter  glucagon  Injectable 1 milliGRAM(s) IntraMuscular once PRN Glucose LESS THAN 70 milligrams/deciliter    ALLERGIES:  No Known Allergies    I&O's Summary    08 Aug 2017 07:01  -  09 Aug 2017 07:00  --------------------------------------------------------  IN: 1440 mL / OUT: 3940 mL / NET: -2500 mL    09 Aug 2017 07:01  -  09 Aug 2017 09:37  --------------------------------------------------------  IN: 0 mL / OUT: 100 mL / NET: -100 mL      PHYSICAL EXAM:  T(F): 98.3 (17 @ 06:18), Max: 98.4 (17 @ 09:42)  HR: 73 (17 @ 06:18) (73 - 86)  BP: 138/79 (17 @ 06:18) (108/65 - 140/72)  RR: 16 (17 @ 06:18) (16 - 18)  SpO2: 95% (17 @ 06:18) (94% - 95%)  Wt(kg): --  Weight (kg): 106.1 ( @ 15:57)  Telemetry: Sinus, HR 60s-90s  General: comfortable, in NAD  Heart: +S1S2, RRR, no murmurs, no rubs, no gallops  Lungs: mild inspiratory and expiratory wheezes bilaterally, no rhonchi, no rales  Abdomen: soft, non-tender, non-distended, + bowel sounds  Extremities: no clubbing, no cyanosis, 2+ bilateral lower extremity edema  Neuro: A&O x3      LABS:          CBC:            9.8    5.5   >-----------< 251    0809 @ 06:15            29.9               10.5   5.4   >-----------< 229     @ 06:50            31.2               10.8   6.3   >-----------< 226    0806 @ 10:36            32.0               10.8   6.8   >-----------< 233    08-05 @ 16:52            31.8       CHEMISTRY:   139   |  99     |  27.0   ----------------------------< 129      @ 06:15    4.0   |  27.0   |  1.15     eGFR non-  62     eGFR   72      141   |  101    |  21.0   ----------------------------< 52      08 @ 06:50    3.2   |  28.0   |  1.00     eGFR non-  73     eGFR   85      136   |  96     |  15.0   ----------------------------< 331     0806 @ 10:36    3.8   |  26.0   |  0.93     eGFR non-  80     eGFR   93      132   |  96     |  18.0   ----------------------------< 423      @ 16:52    4.7   |  22.0   |  0.86     eGFR non-  85     eGFR African American  98       Magnesium, Serum: 1.8 mg/dL ( @ 06:15)    TPro --    / Alb 3.2   / TBili 0.2   / DBili --    / AST 18    / ALT 4     / AlkPhos --      @ 16:52      URINALYSIS: ( @ 20:00)  Color Yellow / pH 6.0   / Sp Gr 1.010 / LE <<16 / Nit Negative / Prot 500   / Blood Trace / Uro Negative / WBC 0-2   / RBC 0-2   / Bacteria --       RADIOLOGY & ADDITIONAL TESTS:  Echo:   1. Left ventricular ejection fraction, by visual estimation, is 55 to   60%.   2. Spectral Doppler shows pseudonormal pattern of left ventricular   myocardial filling (Grade II diastolic dysfunction).   3. Sclerotic aortic valve with normal opening.   4. Thickening and calcification of the anterior and posterior mitral   valve leaflets.   5. Dilatation of the sinuses of Valsalva.    ASSESSMENT:  SHARON FELDMAN is a 77y old male with a history of IDDM (poorly controlled) with diabetic neuropathy,  essential hypertension, pure hypercholesterolemia, aortic root dilatation (4.4 cm by Echo), aortic atherosclerosis who presented with acute diastolic heart failure.    Please permit me to suggest the followin. He still has significant edema although his lung exam has improved somewhat. Continue Lasix 40 mg IV 2x daily. Keep K>4.0, Mg>2.0. I will replete both.

## 2017-08-09 NOTE — PROGRESS NOTE ADULT - PROBLEM SELECTOR PLAN 1
acute on chronic diastolic CHF  Cardiology followup appreciated.  c/w IV lasix.  d/w patient and wife.

## 2017-08-10 PROCEDURE — 99233 SBSQ HOSP IP/OBS HIGH 50: CPT

## 2017-08-10 RX ORDER — INSULIN GLARGINE 100 [IU]/ML
6 INJECTION, SOLUTION SUBCUTANEOUS AT BEDTIME
Qty: 0 | Refills: 0 | Status: DISCONTINUED | OUTPATIENT
Start: 2017-08-10 | End: 2017-08-12

## 2017-08-10 RX ADMIN — Medication 6: at 12:52

## 2017-08-10 RX ADMIN — Medication 40 MILLIGRAM(S): at 17:51

## 2017-08-10 RX ADMIN — FINASTERIDE 5 MILLIGRAM(S): 5 TABLET, FILM COATED ORAL at 12:51

## 2017-08-10 RX ADMIN — Medication 7 UNIT(S): at 12:52

## 2017-08-10 RX ADMIN — Medication 7 UNIT(S): at 17:51

## 2017-08-10 RX ADMIN — Medication 40 MILLIEQUIVALENT(S): at 05:21

## 2017-08-10 RX ADMIN — ATORVASTATIN CALCIUM 20 MILLIGRAM(S): 80 TABLET, FILM COATED ORAL at 22:06

## 2017-08-10 RX ADMIN — Medication 1 DROP(S): at 12:52

## 2017-08-10 RX ADMIN — LISINOPRIL 20 MILLIGRAM(S): 2.5 TABLET ORAL at 05:21

## 2017-08-10 RX ADMIN — Medication 325 MILLIGRAM(S): at 12:51

## 2017-08-10 RX ADMIN — CARBIDOPA AND LEVODOPA 1 TABLET(S): 25; 100 TABLET ORAL at 12:51

## 2017-08-10 RX ADMIN — Medication 162 MILLIGRAM(S): at 12:52

## 2017-08-10 RX ADMIN — Medication 40 MILLIGRAM(S): at 05:21

## 2017-08-10 RX ADMIN — Medication 5: at 09:06

## 2017-08-10 RX ADMIN — CARBIDOPA AND LEVODOPA 1 TABLET(S): 25; 100 TABLET ORAL at 05:21

## 2017-08-10 RX ADMIN — Medication 7 UNIT(S): at 09:06

## 2017-08-10 RX ADMIN — INSULIN GLARGINE 6 UNIT(S): 100 INJECTION, SOLUTION SUBCUTANEOUS at 22:06

## 2017-08-10 RX ADMIN — Medication 6 MILLIGRAM(S): at 22:05

## 2017-08-10 RX ADMIN — Medication 2: at 17:51

## 2017-08-10 RX ADMIN — Medication 40 MILLIEQUIVALENT(S): at 17:51

## 2017-08-10 RX ADMIN — CARBIDOPA AND LEVODOPA 1 TABLET(S): 25; 100 TABLET ORAL at 22:06

## 2017-08-10 RX ADMIN — TAMSULOSIN HYDROCHLORIDE 0.4 MILLIGRAM(S): 0.4 CAPSULE ORAL at 22:06

## 2017-08-10 RX ADMIN — ENOXAPARIN SODIUM 40 MILLIGRAM(S): 100 INJECTION SUBCUTANEOUS at 22:05

## 2017-08-10 NOTE — PROGRESS NOTE ADULT - SUBJECTIVE AND OBJECTIVE BOX
Patient: SHARON FELDMAN [id number] 77y Male                 Internal Medicine Hospitalist Progress Note - Dr. Ismael Stafford    CHIEF COMPLAINT/INTERVAL HISTORY:  Patient is a 77y old  Male who presents with a chief complaint of swollen legs and Hyperglycemia (05 Aug 2017 22:21)      HPI:  78 y/o male with h/o CHF, DM II, HTN, parkinson's disease, was referred by nurse at the  center because patient was noticed to have swollen legs and gained about 30 lb in the last 2 weeks. also accu check was 422 mg/dl. patient denies SOB or chest pain. no palpitation or orthopnea. Labs shows Hgb: 10.8. Glu: 423. (05 Aug 2017 22:21)  Admitted for CHF exacerbation. Seen by cardiology, started on diuretics.      Hyperglycemia noted.  No SOB.  No Chest pain, palpitations.  Reports feeling better.  No additional complaints.    ____________________PHYSICAL EXAM:  Vitals reviewed as indicated below  GENERAL:  NAD Alert and Oriented x 3   HEENT: NCAT  CARDIOVASCULAR:  S1, S2  LUNGS: coarse BS b/l, improving  ABDOMEN:  soft, (-) tenderness, (-) distension, (+) bowel sounds, (-) guarding, (-) rebound (-) rigidity  EXTREMITIES:  no cyanosis / clubbing.  + edema.   ____________________    MEDICATIONS:  furosemide   Injectable 40 milliGRAM(s) IV Push two times a day  finasteride 5 milliGRAM(s) Oral daily  aspirin enteric coated 162 milliGRAM(s) Oral daily  tamsulosin 0.4 milliGRAM(s) Oral at bedtime  atorvastatin 20 milliGRAM(s) Oral at bedtime  carbidopa/levodopa  25/100 1 Tablet(s) Oral three times a day  ferrous    sulfate 325 milliGRAM(s) Oral daily  timolol 0.5% Solution 1 Drop(s) Both EYES daily  insulin lispro (HumaLOG) corrective regimen sliding scale   SubCutaneous three times a day before meals  dextrose 5%. 1000 milliLiter(s) IV Continuous <Continuous>  dextrose Gel 1 Dose(s) Oral once PRN  dextrose 50% Injectable 12.5 Gram(s) IV Push once  dextrose 50% Injectable 25 Gram(s) IV Push once  dextrose 50% Injectable 25 Gram(s) IV Push once  glucagon  Injectable 1 milliGRAM(s) IntraMuscular once PRN  lisinopril 20 milliGRAM(s) Oral daily  enoxaparin Injectable 40 milliGRAM(s) SubCutaneous daily  insulin lispro Injectable (HumaLOG) 7 Unit(s) SubCutaneous three times a day with meals  potassium chloride   Powder 40 milliEquivalent(s) Oral two times a day  melatonin 6 milliGRAM(s) Oral at bedtime PRN  insulin glargine Injectable (LANTUS) 6 Unit(s) SubCutaneous at bedtime      VITALS:  Vital Signs Last 24 Hrs  T(C): 36.7 (10 Aug 2017 12:49), Max: 36.7 (09 Aug 2017 22:53)  T(F): 98 (10 Aug 2017 12:49), Max: 98.1 (09 Aug 2017 22:53)  HR: 88 (10 Aug 2017 12:49) (73 - 88)  BP: 100/64 (10 Aug 2017 12:49) (98/60 - 153/80)  BP(mean): --  RR: 21 (10 Aug 2017 12:49) (16 - 23)  SpO2: 93% (10 Aug 2017 12:49) (93% - 99%) Daily     Daily Weight in k (10 Aug 2017 08:13)  CAPILLARY BLOOD GLUCOSE  402 (10 Aug 2017 11:59)  377 (10 Aug 2017 08:13)  147 (09 Aug 2017 22:53)  153 (09 Aug 2017 16:35)        I&O's Summary    09 Aug 2017 07:  -  10 Aug 2017 07:00  --------------------------------------------------------  IN: 530 mL / OUT: 2275 mL / NET: -1745 mL    10 Aug 2017 07:  -  10 Aug 2017 13:35  --------------------------------------------------------  IN: 240 mL / OUT: 700 mL / NET: -460 mL        LABS:                        9.8    5.5   )-----------( 251      ( 09 Aug 2017 06:15 )             29.9     08-09    139  |  99  |  27.0<H>  ----------------------------<  129<H>  4.0   |  27.0  |  1.15    Ca    8.4<L>      09 Aug 2017 06:15  Phos  4.7     -  Mg     1.8     -        RECENT CULTURES:

## 2017-08-10 NOTE — PROGRESS NOTE ADULT - SUBJECTIVE AND OBJECTIVE BOX
Cardiology Follow-up    SHARON FELDMAN was seen and examined. No events overnight. The patient denies any chest pain, SOB, palpitations, orthopnea, PND or abdominal pain.He has been up in a chair with no problems.    PROBLEM LIST:  Parkinsons  Benign prostatic hyperplasia, presence of lower urinary tract symptoms unspecified  Anemia, unspecified type  Type 2 diabetes mellitus with complication, with long-term current use of insulin  Essential hypertension  Acute congestive heart failure, unspecified congestive heart failure type    PAST MEDICAL HISTORY:  Stroke  DVT (deep venous thrombosis)  Parkinsons  High cholesterol  Diabetes  Hypertension    PAST SURGICAL HISTORY:  S/P hip hemiarthroplasty  No significant past surgical history    MEDICATIONS  (STANDING):  furosemide   Injectable 40 milliGRAM(s) IV Push two times a day  finasteride 5 milliGRAM(s) Oral daily  aspirin enteric coated 162 milliGRAM(s) Oral daily  tamsulosin 0.4 milliGRAM(s) Oral at bedtime  atorvastatin 20 milliGRAM(s) Oral at bedtime  carbidopa/levodopa  25/100 1 Tablet(s) Oral three times a day  ferrous    sulfate 325 milliGRAM(s) Oral daily  timolol 0.5% Solution 1 Drop(s) Both EYES daily  insulin lispro (HumaLOG) corrective regimen sliding scale   SubCutaneous three times a day before meals  dextrose 5%. 1000 milliLiter(s) (50 mL/Hr) IV Continuous <Continuous>  dextrose 50% Injectable 12.5 Gram(s) IV Push once  dextrose 50% Injectable 25 Gram(s) IV Push once  dextrose 50% Injectable 25 Gram(s) IV Push once  lisinopril 20 milliGRAM(s) Oral daily  enoxaparin Injectable 40 milliGRAM(s) SubCutaneous daily  insulin lispro Injectable (HumaLOG) 7 Unit(s) SubCutaneous three times a day with meals  potassium chloride   Powder 40 milliEquivalent(s) Oral two times a day    MEDICATIONS  (PRN):  dextrose Gel 1 Dose(s) Oral once PRN Blood Glucose LESS THAN 70 milliGRAM(s)/deciliter  glucagon  Injectable 1 milliGRAM(s) IntraMuscular once PRN Glucose LESS THAN 70 milligrams/deciliter  melatonin 6 milliGRAM(s) Oral at bedtime PRN Insomnia    ALLERGIES:  No Known Allergies    PHYSICAL EXAM:  T(C): 36.6 (08-10-17 @ 05:45), Max: 36.9 (17 @ 12:00)  T(F): 97.9 (08-10-17 @ 05:45), Max: 98.4 (17 @ 12:00)  HR: 78 (08-10-17 @ 05:45) (73 - 86)  BP: 130/86 (08-10-17 @ 05:45) (125/77 - 153/80)  RR: 18 (08-10-17 @ 05:45) (16 - 18)  SpO2: 97% (08-10-17 @ 05:45) (93% - 99%)  Wt(kg): --  I&O's Summary    09 Aug 2017 07:01  -  10 Aug 2017 07:00  --------------------------------------------------------  IN: 530 mL / OUT: 2275 mL / NET: -1745 mL      Telemetry: sinus rhythm  General: comfortable, in NAD  Heart: +S1S2, RRR, no murmurs, no rubs, no gallops  Lungs: mild expiratory wheezes,no rales  Abdomen: soft, non-tender, non-distended, + bowel sounds  Extremities: no clubbing, no cyanosis,1+ pedal edema  Neuro: A&O x3    LABS:                              9.8    5.5   )-----------( 251      ( 09 Aug 2017 06:15 )             29.9     08-09    139  |  99  |  27.0<H>  ----------------------------<  129<H>  4.0   |  27.0  |  1.15    Ca    8.4<L>      09 Aug 2017 06:15  Phos  4.7     -  Mg     1.8     08-09        RADIOLOGY & ADDITIONAL TESTS:    ASSESSMENT:  SHARON FELDMAN is a 77y old male with a history of essential hypertension,IDDM,Parkinson's disease,hyperlipidemia who presented with shortness of breath and acute on chronic diastolic CHF.He still has significant pedal edema.    Please permit me to suggest the followin. Will give a dose of Metolazone today and follow renal function,continue with IV Lasix  2.Increase activity as tolerated  3.Continue to follow H & h which has been dropping slowly

## 2017-08-10 NOTE — PROGRESS NOTE ADULT - ASSESSMENT
78 y/o male with chronic CHF, DM, HTN, BPH, parkinsons, and anemia c/w acute on chronic diastolic CHF

## 2017-08-11 LAB
ANION GAP SERPL CALC-SCNC: 14 MMOL/L — SIGNIFICANT CHANGE UP (ref 5–17)
BUN SERPL-MCNC: 39 MG/DL — HIGH (ref 8–20)
CALCIUM SERPL-MCNC: 8.9 MG/DL — SIGNIFICANT CHANGE UP (ref 8.6–10.2)
CHLORIDE SERPL-SCNC: 93 MMOL/L — LOW (ref 98–107)
CO2 SERPL-SCNC: 27 MMOL/L — SIGNIFICANT CHANGE UP (ref 22–29)
CREAT SERPL-MCNC: 1.18 MG/DL — SIGNIFICANT CHANGE UP (ref 0.5–1.3)
GLUCOSE SERPL-MCNC: 328 MG/DL — HIGH (ref 70–115)
HCT VFR BLD CALC: 33.1 % — LOW (ref 42–52)
HGB BLD-MCNC: 10.8 G/DL — LOW (ref 14–18)
MCHC RBC-ENTMCNC: 30.3 PG — SIGNIFICANT CHANGE UP (ref 27–31)
MCHC RBC-ENTMCNC: 32.6 G/DL — SIGNIFICANT CHANGE UP (ref 32–36)
MCV RBC AUTO: 93 FL — SIGNIFICANT CHANGE UP (ref 80–94)
PLATELET # BLD AUTO: 261 K/UL — SIGNIFICANT CHANGE UP (ref 150–400)
POTASSIUM SERPL-MCNC: 4.8 MMOL/L — SIGNIFICANT CHANGE UP (ref 3.5–5.3)
POTASSIUM SERPL-SCNC: 4.8 MMOL/L — SIGNIFICANT CHANGE UP (ref 3.5–5.3)
RBC # BLD: 3.56 M/UL — LOW (ref 4.6–6.2)
RBC # FLD: 14.4 % — SIGNIFICANT CHANGE UP (ref 11–15.6)
SODIUM SERPL-SCNC: 134 MMOL/L — LOW (ref 135–145)
WBC # BLD: 7.3 K/UL — SIGNIFICANT CHANGE UP (ref 4.8–10.8)
WBC # FLD AUTO: 7.3 K/UL — SIGNIFICANT CHANGE UP (ref 4.8–10.8)

## 2017-08-11 PROCEDURE — 99233 SBSQ HOSP IP/OBS HIGH 50: CPT

## 2017-08-11 RX ADMIN — CARBIDOPA AND LEVODOPA 1 TABLET(S): 25; 100 TABLET ORAL at 21:18

## 2017-08-11 RX ADMIN — CARBIDOPA AND LEVODOPA 1 TABLET(S): 25; 100 TABLET ORAL at 14:09

## 2017-08-11 RX ADMIN — FINASTERIDE 5 MILLIGRAM(S): 5 TABLET, FILM COATED ORAL at 14:09

## 2017-08-11 RX ADMIN — LISINOPRIL 20 MILLIGRAM(S): 2.5 TABLET ORAL at 05:54

## 2017-08-11 RX ADMIN — Medication 325 MILLIGRAM(S): at 14:09

## 2017-08-11 RX ADMIN — Medication 6 MILLIGRAM(S): at 21:19

## 2017-08-11 RX ADMIN — Medication 1 DROP(S): at 14:09

## 2017-08-11 RX ADMIN — CARBIDOPA AND LEVODOPA 1 TABLET(S): 25; 100 TABLET ORAL at 05:54

## 2017-08-11 RX ADMIN — Medication 7 UNIT(S): at 14:09

## 2017-08-11 RX ADMIN — Medication 6: at 14:10

## 2017-08-11 RX ADMIN — ATORVASTATIN CALCIUM 20 MILLIGRAM(S): 80 TABLET, FILM COATED ORAL at 21:19

## 2017-08-11 RX ADMIN — Medication 3: at 18:35

## 2017-08-11 RX ADMIN — Medication 7 UNIT(S): at 09:47

## 2017-08-11 RX ADMIN — Medication 40 MILLIGRAM(S): at 05:53

## 2017-08-11 RX ADMIN — ENOXAPARIN SODIUM 40 MILLIGRAM(S): 100 INJECTION SUBCUTANEOUS at 14:09

## 2017-08-11 RX ADMIN — Medication 7 UNIT(S): at 18:34

## 2017-08-11 RX ADMIN — Medication 40 MILLIEQUIVALENT(S): at 05:53

## 2017-08-11 RX ADMIN — INSULIN GLARGINE 6 UNIT(S): 100 INJECTION, SOLUTION SUBCUTANEOUS at 21:19

## 2017-08-11 RX ADMIN — TAMSULOSIN HYDROCHLORIDE 0.4 MILLIGRAM(S): 0.4 CAPSULE ORAL at 21:19

## 2017-08-11 RX ADMIN — Medication 40 MILLIGRAM(S): at 18:34

## 2017-08-11 RX ADMIN — Medication 5: at 09:46

## 2017-08-11 RX ADMIN — Medication 40 MILLIEQUIVALENT(S): at 18:38

## 2017-08-11 RX ADMIN — Medication 162 MILLIGRAM(S): at 14:09

## 2017-08-11 NOTE — CHART NOTE - NSCHARTNOTEFT_GEN_A_CORE
Aware pt with hgba1c 8.5%.  Pt is overall poor historian and unable to provide all details of diabetes self management.  Pt states that his wife manages his diabetes by providing insulin injections via pen and monitors his BG a few times daily.  Pt states his number are usually high.  Pt consumes breakfast and dinner in which he receives insulin, but does not consume lunch or inject at that time.  Pt reports hypoglycemic episodes in the past and states that he treats with orange juice or Glucerna.  Reviewed s/s and correct treatment for hypoglycemia, and encouraged pt not to skip lunch if possible.  Pt listened, but unsure how much pt was able to retain.  Literature left at bedside and pt will show his wife.  RD CDE to remain available.    Recommendation:  Continue with diabetes self management education with pt and wife when available.

## 2017-08-11 NOTE — PROGRESS NOTE ADULT - SUBJECTIVE AND OBJECTIVE BOX
Patient: SHARON FELDMAN [id number] 77y Male                 Internal Medicine Hospitalist Progress Note - Dr. Ismael Stafford    CHIEF COMPLAINT/INTERVAL HISTORY:  Patient is a 77y old  Male who presents with a chief complaint of swollen legs and Hyperglycemia (05 Aug 2017 22:21)      HPI:  78 y/o male with h/o CHF, DM II, HTN, parkinson's disease, was referred by nurse at the  center because patient was noticed to have swollen legs and gained about 30 lb in the last 2 weeks. also accu check was 422 mg/dl. patient denies SOB or chest pain. no palpitation or orthopnea. Labs shows Hgb: 10.8. Glu: 423. (05 Aug 2017 22:21)  Admitted for CHF exacerbation. Seen by cardiology, started on diuretics.      No SOB.  No Chest pain, palpitations.  Ambulating with PT.  No additional complaints.    ____________________PHYSICAL EXAM:  Vitals reviewed as indicated below  GENERAL:  NAD Alert and Oriented x 3   HEENT: NCAT  CARDIOVASCULAR:  S1, S2  LUNGS: coarse BS b/l, improving  ABDOMEN:  soft, (-) tenderness, (-) distension, (+) bowel sounds, (-) guarding, (-) rebound (-) rigidity  EXTREMITIES:  no cyanosis / clubbing.  + edema.   ____________________      MEDICATIONS:  furosemide   Injectable 40 milliGRAM(s) IV Push two times a day  finasteride 5 milliGRAM(s) Oral daily  aspirin enteric coated 162 milliGRAM(s) Oral daily  tamsulosin 0.4 milliGRAM(s) Oral at bedtime  atorvastatin 20 milliGRAM(s) Oral at bedtime  carbidopa/levodopa  25/100 1 Tablet(s) Oral three times a day  ferrous    sulfate 325 milliGRAM(s) Oral daily  timolol 0.5% Solution 1 Drop(s) Both EYES daily  insulin lispro (HumaLOG) corrective regimen sliding scale   SubCutaneous three times a day before meals  dextrose 5%. 1000 milliLiter(s) IV Continuous <Continuous>  dextrose Gel 1 Dose(s) Oral once PRN  dextrose 50% Injectable 12.5 Gram(s) IV Push once  dextrose 50% Injectable 25 Gram(s) IV Push once  dextrose 50% Injectable 25 Gram(s) IV Push once  glucagon  Injectable 1 milliGRAM(s) IntraMuscular once PRN  lisinopril 20 milliGRAM(s) Oral daily  enoxaparin Injectable 40 milliGRAM(s) SubCutaneous daily  insulin lispro Injectable (HumaLOG) 7 Unit(s) SubCutaneous three times a day with meals  potassium chloride   Powder 40 milliEquivalent(s) Oral two times a day  melatonin 6 milliGRAM(s) Oral at bedtime PRN  insulin glargine Injectable (LANTUS) 6 Unit(s) SubCutaneous at bedtime      VITALS:  Vital Signs Last 24 Hrs  T(C): 36.8 (11 Aug 2017 10:21), Max: 37.2 (11 Aug 2017 05:47)  T(F): 98.2 (11 Aug 2017 10:21), Max: 99 (11 Aug 2017 05:47)  HR: 88 (11 Aug 2017 10:21) (72 - 88)  BP: 125/67 (11 Aug 2017 10:21) (112/66 - 128/52)  BP(mean): --  RR: 16 (11 Aug 2017 10:21) (16 - 22)  SpO2: 96% (11 Aug 2017 05:47) (94% - 96%) Daily     Daily Weight in k.8 (11 Aug 2017 10:13)  CAPILLARY BLOOD GLUCOSE  442 (11 Aug 2017 11:55)  472 (11 Aug 2017 11:17)  354 (11 Aug 2017 07:26)  206 (10 Aug 2017 20:07)  201 (10 Aug 2017 17:49)  201 (10 Aug 2017 17:32)        I&O's Summary    10 Aug 2017 07:  -  11 Aug 2017 07:00  --------------------------------------------------------  IN: 480 mL / OUT: 2640 mL / NET: -2160 mL    11 Aug 2017 07:  -  11 Aug 2017 14:22  --------------------------------------------------------  IN: 240 mL / OUT: 550 mL / NET: -310 mL        LABS:                        10.8   7.3   )-----------( 261      ( 11 Aug 2017 06:54 )             33.1     08-11    134<L>  |  93<L>  |  39.0<H>  ----------------------------<  328<H>  4.8   |  27.0  |  1.18    Ca    8.9      11 Aug 2017 06:54

## 2017-08-11 NOTE — DIETITIAN INITIAL EVALUATION ADULT. - OTHER INFO
Pt reports good po intake at meals, no complaints at this time.  Pt states that his wife manages his care at home and is not really an appropriate candidate for nutrition education at this time.

## 2017-08-11 NOTE — PROGRESS NOTE ADULT - SUBJECTIVE AND OBJECTIVE BOX
Cardiology Follow-up    SHARON FELDMAN was seen and examined. No events overnight. The patient denies any chest pain, SOB, palpitations or abdominal pain.    PROBLEM LIST:  Parkinsons  Benign prostatic hyperplasia, presence of lower urinary tract symptoms unspecified  Anemia, unspecified type  Type 2 diabetes mellitus with complication, with long-term current use of insulin  Essential hypertension  Acute congestive heart failure, unspecified congestive heart failure type    PAST MEDICAL HISTORY:  Stroke  DVT (deep venous thrombosis)  Parkinsons  High cholesterol  Diabetes  Hypertension    PAST SURGICAL HISTORY:  S/P hip hemiarthroplasty  No significant past surgical history    MEDICATIONS  (STANDING):  furosemide   Injectable 40 milliGRAM(s) IV Push two times a day  finasteride 5 milliGRAM(s) Oral daily  aspirin enteric coated 162 milliGRAM(s) Oral daily  tamsulosin 0.4 milliGRAM(s) Oral at bedtime  atorvastatin 20 milliGRAM(s) Oral at bedtime  carbidopa/levodopa  25/100 1 Tablet(s) Oral three times a day  ferrous    sulfate 325 milliGRAM(s) Oral daily  timolol 0.5% Solution 1 Drop(s) Both EYES daily  insulin lispro (HumaLOG) corrective regimen sliding scale   SubCutaneous three times a day before meals  dextrose 5%. 1000 milliLiter(s) (50 mL/Hr) IV Continuous <Continuous>  dextrose 50% Injectable 12.5 Gram(s) IV Push once  dextrose 50% Injectable 25 Gram(s) IV Push once  dextrose 50% Injectable 25 Gram(s) IV Push once  lisinopril 20 milliGRAM(s) Oral daily  enoxaparin Injectable 40 milliGRAM(s) SubCutaneous daily  insulin lispro Injectable (HumaLOG) 7 Unit(s) SubCutaneous three times a day with meals  potassium chloride   Powder 40 milliEquivalent(s) Oral two times a day  insulin glargine Injectable (LANTUS) 6 Unit(s) SubCutaneous at bedtime  metolazone 2.5 milliGRAM(s) Oral once    MEDICATIONS  (PRN):  dextrose Gel 1 Dose(s) Oral once PRN Blood Glucose LESS THAN 70 milliGRAM(s)/deciliter  glucagon  Injectable 1 milliGRAM(s) IntraMuscular once PRN Glucose LESS THAN 70 milligrams/deciliter  melatonin 6 milliGRAM(s) Oral at bedtime PRN Insomnia    ALLERGIES:  No Known Allergies    I&O's Summary    10 Aug 2017 07:01  -  11 Aug 2017 07:00  --------------------------------------------------------  IN: 480 mL / OUT: 2640 mL / NET: -2160 mL      PHYSICAL EXAM:  T(F): 99 (17 @ 05:47), Max: 99 (17 @ 05:47)  HR: 88 (17:47) (72 - 88)  BP: 124/70 (17 05:47) (98/60 - 128/52)  RR: 16 (17 05:47) (16 - 22)  SpO2: 96% (17 05:47) (93% - 96%)  Wt(kg): --  Weight (kg): 94 (08-10 @ 08:13)  Telemetry: Sinus, HR 70s-80s  General: comfortable, in NAD  Heart: +S1S2, RRR, no murmurs, no rubs, no gallops  Lungs: mild inspiratory crackles at the left base, slight inspiratory wheezes, no rhonchi  Abdomen: soft, non-tender, non-distended, + bowel sounds  Extremities: no clubbing, no cyanosis, 2+ bilateral lower extremity edema although mildly improved  Neuro: A&O x3      LABS:      CBC:            10.8   7.3   >-----------< 261     @ 06:54            33.1               9.8    5.5   >-----------< 251     @ 06:15            29.9               10.5   5.4   >-----------< 229     @ 06:50            31.2               10.8   6.3   >-----------< 226    08-06 @ 10:36            32.0       CHEMISTRY:   134   |  93     |  39.0   ----------------------------< 328      @ 06:54    4.8   |  27.0   |  1.18     eGFR non-  60     eGFR   70      139   |  99     |  27.0   ----------------------------< 129     08-09 @ 06:15    4.0   |  27.0   |  1.15     eGFR non-  62     eGFR   72      141   |  101    |  21.0   ----------------------------< 52      0807 @ 06:50    3.2   |  28.0   |  1.00     eGFR non-  73     eGFR   85      136   |  96     |  15.0   ----------------------------< 331     08-06 @ 10:36    3.8   |  26.0   |  0.93     eGFR non-  80     eGFR   93             URINALYSIS: ( @ 20:00)  Color Yellow / pH 6.0   / Sp Gr 1.010 / LE <<16 / Nit Negative / Prot 500   / Blood Trace / Uro Negative / WBC 0-2   / RBC 0-2   / Bacteria --       RADIOLOGY & ADDITIONAL TESTS:  Echo:   1. Left ventricular ejection fraction, by visual estimation, is 55 to   60%.   2. Spectral Doppler shows pseudonormal pattern of left ventricular   myocardial filling (Grade II diastolic dysfunction).   3. Sclerotic aortic valve with normal opening.   4. Thickening and calcification of the anterior and posterior mitral   valve leaflets.   5. Dilatation of the sinuses of Valsalva.        ASSESSMENT:  SHARON FELDMAN is a 77y old male with a history of IDDM (poorly controlled) with diabetic neuropathy,  essential hypertension, pure hypercholesterolemia, aortic root dilatation (4.4 cm by Echo), aortic atherosclerosis who presented with acute diastolic heart failure.    Please permit me to suggest the followin. He still has significant edema which is improving slowly. I will order another dose of metolazone 2.5 mg today. Continue Lasix 40 mg IV 2x daily for now. Keep K>4.0, Mg>2.0.   2. He desaturated to the 70s overnight. I suspect that he probably has sleep apnea, which should be corrected in the setting of CHF. Recommend Pulmonary consult so that he can more readily be set up for a sleep study upon discharge.

## 2017-08-12 LAB
ANION GAP SERPL CALC-SCNC: 16 MMOL/L — SIGNIFICANT CHANGE UP (ref 5–17)
BUN SERPL-MCNC: 42 MG/DL — HIGH (ref 8–20)
CALCIUM SERPL-MCNC: 8.9 MG/DL — SIGNIFICANT CHANGE UP (ref 8.6–10.2)
CHLORIDE SERPL-SCNC: 95 MMOL/L — LOW (ref 98–107)
CO2 SERPL-SCNC: 27 MMOL/L — SIGNIFICANT CHANGE UP (ref 22–29)
CREAT SERPL-MCNC: 1.27 MG/DL — SIGNIFICANT CHANGE UP (ref 0.5–1.3)
GLUCOSE SERPL-MCNC: 209 MG/DL — HIGH (ref 70–115)
HCT VFR BLD CALC: 32.3 % — LOW (ref 42–52)
HGB BLD-MCNC: 10.8 G/DL — LOW (ref 14–18)
MAGNESIUM SERPL-MCNC: 2 MG/DL — SIGNIFICANT CHANGE UP (ref 1.6–2.6)
MCHC RBC-ENTMCNC: 30.8 PG — SIGNIFICANT CHANGE UP (ref 27–31)
MCHC RBC-ENTMCNC: 33.4 G/DL — SIGNIFICANT CHANGE UP (ref 32–36)
MCV RBC AUTO: 92 FL — SIGNIFICANT CHANGE UP (ref 80–94)
PLATELET # BLD AUTO: 267 K/UL — SIGNIFICANT CHANGE UP (ref 150–400)
POTASSIUM SERPL-MCNC: 4.4 MMOL/L — SIGNIFICANT CHANGE UP (ref 3.5–5.3)
POTASSIUM SERPL-SCNC: 4.4 MMOL/L — SIGNIFICANT CHANGE UP (ref 3.5–5.3)
RBC # BLD: 3.51 M/UL — LOW (ref 4.6–6.2)
RBC # FLD: 14.2 % — SIGNIFICANT CHANGE UP (ref 11–15.6)
SODIUM SERPL-SCNC: 138 MMOL/L — SIGNIFICANT CHANGE UP (ref 135–145)
WBC # BLD: 8.2 K/UL — SIGNIFICANT CHANGE UP (ref 4.8–10.8)
WBC # FLD AUTO: 8.2 K/UL — SIGNIFICANT CHANGE UP (ref 4.8–10.8)

## 2017-08-12 PROCEDURE — 99233 SBSQ HOSP IP/OBS HIGH 50: CPT

## 2017-08-12 RX ORDER — INSULIN GLARGINE 100 [IU]/ML
10 INJECTION, SOLUTION SUBCUTANEOUS AT BEDTIME
Qty: 0 | Refills: 0 | Status: DISCONTINUED | OUTPATIENT
Start: 2017-08-12 | End: 2017-08-15

## 2017-08-12 RX ADMIN — Medication 7 UNIT(S): at 12:51

## 2017-08-12 RX ADMIN — Medication 3: at 12:50

## 2017-08-12 RX ADMIN — Medication 40 MILLIEQUIVALENT(S): at 06:03

## 2017-08-12 RX ADMIN — Medication 2: at 17:39

## 2017-08-12 RX ADMIN — Medication 3: at 08:46

## 2017-08-12 RX ADMIN — Medication 6 MILLIGRAM(S): at 23:50

## 2017-08-12 RX ADMIN — ATORVASTATIN CALCIUM 20 MILLIGRAM(S): 80 TABLET, FILM COATED ORAL at 23:51

## 2017-08-12 RX ADMIN — Medication 40 MILLIEQUIVALENT(S): at 17:41

## 2017-08-12 RX ADMIN — TAMSULOSIN HYDROCHLORIDE 0.4 MILLIGRAM(S): 0.4 CAPSULE ORAL at 23:51

## 2017-08-12 RX ADMIN — FINASTERIDE 5 MILLIGRAM(S): 5 TABLET, FILM COATED ORAL at 13:22

## 2017-08-12 RX ADMIN — Medication 7 UNIT(S): at 17:39

## 2017-08-12 RX ADMIN — CARBIDOPA AND LEVODOPA 1 TABLET(S): 25; 100 TABLET ORAL at 13:22

## 2017-08-12 RX ADMIN — Medication 40 MILLIGRAM(S): at 06:03

## 2017-08-12 RX ADMIN — Medication 7 UNIT(S): at 08:48

## 2017-08-12 RX ADMIN — Medication 162 MILLIGRAM(S): at 13:22

## 2017-08-12 RX ADMIN — Medication 20 MILLIGRAM(S): at 17:41

## 2017-08-12 RX ADMIN — CARBIDOPA AND LEVODOPA 1 TABLET(S): 25; 100 TABLET ORAL at 23:50

## 2017-08-12 RX ADMIN — LISINOPRIL 20 MILLIGRAM(S): 2.5 TABLET ORAL at 06:03

## 2017-08-12 RX ADMIN — INSULIN GLARGINE 10 UNIT(S): 100 INJECTION, SOLUTION SUBCUTANEOUS at 23:51

## 2017-08-12 RX ADMIN — Medication 1 DROP(S): at 13:21

## 2017-08-12 RX ADMIN — ENOXAPARIN SODIUM 40 MILLIGRAM(S): 100 INJECTION SUBCUTANEOUS at 13:21

## 2017-08-12 RX ADMIN — Medication 325 MILLIGRAM(S): at 13:21

## 2017-08-12 RX ADMIN — CARBIDOPA AND LEVODOPA 1 TABLET(S): 25; 100 TABLET ORAL at 06:03

## 2017-08-12 NOTE — PROGRESS NOTE ADULT - SUBJECTIVE AND OBJECTIVE BOX
Patient: SHARON FELDMAN [id number] 77y Male                 Internal Medicine Hospitalist Progress Note - Dr. Ismael Stafford    CHIEF COMPLAINT/INTERVAL HISTORY:  Patient is a 77y old  Male who presents with a chief complaint of swollen legs and Hyperglycemia (05 Aug 2017 22:21)      HPI:  78 y/o male with h/o CHF, DM II, HTN, parkinson's disease, was referred by nurse at the  center because patient was noticed to have swollen legs and gained about 30 lb in the last 2 weeks. also accu check was 422 mg/dl. patient denies SOB or chest pain. no palpitation or orthopnea. Labs shows Hgb: 10.8. Glu: 423. (05 Aug 2017 22:21)  Admitted for CHF exacerbation. Seen by cardiology, started on diuretics.      No SOB.  No Chest pain, palpitations.  Ambulating with PT.  No additional complaints.    ____________________PHYSICAL EXAM:  Vitals reviewed as indicated below  GENERAL:  NAD Alert and Oriented x 3   HEENT: NCAT  CARDIOVASCULAR:  S1, S2  LUNGS: coarse BS b/l, improving  ABDOMEN:  soft, (-) tenderness, (-) distension, (+) bowel sounds, (-) guarding, (-) rebound (-) rigidity  EXTREMITIES:  no cyanosis / clubbing.  + edema.   ____________________      MEDICATIONS:  finasteride 5 milliGRAM(s) Oral daily  aspirin enteric coated 162 milliGRAM(s) Oral daily  tamsulosin 0.4 milliGRAM(s) Oral at bedtime  atorvastatin 20 milliGRAM(s) Oral at bedtime  carbidopa/levodopa  25/100 1 Tablet(s) Oral three times a day  ferrous    sulfate 325 milliGRAM(s) Oral daily  timolol 0.5% Solution 1 Drop(s) Both EYES daily  insulin lispro (HumaLOG) corrective regimen sliding scale   SubCutaneous three times a day before meals  dextrose 5%. 1000 milliLiter(s) IV Continuous <Continuous>  dextrose Gel 1 Dose(s) Oral once PRN  dextrose 50% Injectable 12.5 Gram(s) IV Push once  dextrose 50% Injectable 25 Gram(s) IV Push once  dextrose 50% Injectable 25 Gram(s) IV Push once  glucagon  Injectable 1 milliGRAM(s) IntraMuscular once PRN  lisinopril 20 milliGRAM(s) Oral daily  enoxaparin Injectable 40 milliGRAM(s) SubCutaneous daily  insulin lispro Injectable (HumaLOG) 7 Unit(s) SubCutaneous three times a day with meals  potassium chloride   Powder 40 milliEquivalent(s) Oral two times a day  melatonin 6 milliGRAM(s) Oral at bedtime PRN  insulin glargine Injectable (LANTUS) 6 Unit(s) SubCutaneous at bedtime  torsemide 20 milliGRAM(s) Oral two times a day      VITALS:  Vital Signs Last 24 Hrs  T(C): 36.6 (12 Aug 2017 15:32), Max: 36.6 (11 Aug 2017 19:54)  T(F): 97.8 (12 Aug 2017 15:32), Max: 97.9 (11 Aug 2017 19:54)  HR: 69 (12 Aug 2017 15:32) (69 - 82)  BP: 111/71 (12 Aug 2017 15:32) (110/68 - 140/80)  BP(mean): --  RR: 18 (12 Aug 2017 15:32) (16 - 18)  SpO2: 98% (12 Aug 2017 13:20) (95% - 98%) Daily     Daily Weight in k.7 (12 Aug 2017 04:40)  CAPILLARY BLOOD GLUCOSE  281 (12 Aug 2017 13:20)  267 (12 Aug 2017 08:22)  233 (11 Aug 2017 19:38)  272 (11 Aug 2017 17:30)        I&O's Summary    11 Aug 2017 07:  -  12 Aug 2017 07:00  --------------------------------------------------------  IN: 720 mL / OUT: 2560 mL / NET: -1840 mL    12 Aug 2017 07:01  -  12 Aug 2017 16:10  --------------------------------------------------------  IN: 0 mL / OUT: 100 mL / NET: -100 mL        LABS:                        10.8   8.2   )-----------( 267      ( 12 Aug 2017 06:35 )             32.3     08-12    138  |  95<L>  |  42.0<H>  ----------------------------<  209<H>  4.4   |  27.0  |  1.27    Ca    8.9      12 Aug 2017 06:35  Mg     2.0

## 2017-08-12 NOTE — PROGRESS NOTE ADULT - SUBJECTIVE AND OBJECTIVE BOX
Cardiology Follow-up    SHARON FELDMAN was seen and examined. No events overnight. The patient denies any chest pain, SOB, palpitations, orthopnea, PND or abdominal pain.His leg edema is slowly improving.    PROBLEM LIST:  Parkinsons  Benign prostatic hyperplasia, presence of lower urinary tract symptoms unspecified  Anemia, unspecified type  Type 2 diabetes mellitus with complication, with long-term current use of insulin  Essential hypertension  Acute congestive heart failure, unspecified congestive heart failure type    PAST MEDICAL HISTORY:  Stroke  DVT (deep venous thrombosis)  Parkinsons  High cholesterol  Diabetes  Hypertension    PAST SURGICAL HISTORY:  S/P hip hemiarthroplasty  No significant past surgical history    MEDICATIONS  (STANDING):  furosemide   Injectable 40 milliGRAM(s) IV Push two times a day  finasteride 5 milliGRAM(s) Oral daily  aspirin enteric coated 162 milliGRAM(s) Oral daily  tamsulosin 0.4 milliGRAM(s) Oral at bedtime  atorvastatin 20 milliGRAM(s) Oral at bedtime  carbidopa/levodopa  25/100 1 Tablet(s) Oral three times a day  ferrous    sulfate 325 milliGRAM(s) Oral daily  timolol 0.5% Solution 1 Drop(s) Both EYES daily  insulin lispro (HumaLOG) corrective regimen sliding scale   SubCutaneous three times a day before meals  dextrose 5%. 1000 milliLiter(s) (50 mL/Hr) IV Continuous <Continuous>  dextrose 50% Injectable 12.5 Gram(s) IV Push once  dextrose 50% Injectable 25 Gram(s) IV Push once  dextrose 50% Injectable 25 Gram(s) IV Push once  lisinopril 20 milliGRAM(s) Oral daily  enoxaparin Injectable 40 milliGRAM(s) SubCutaneous daily  insulin lispro Injectable (HumaLOG) 7 Unit(s) SubCutaneous three times a day with meals  potassium chloride   Powder 40 milliEquivalent(s) Oral two times a day  insulin glargine Injectable (LANTUS) 6 Unit(s) SubCutaneous at bedtime    MEDICATIONS  (PRN):  dextrose Gel 1 Dose(s) Oral once PRN Blood Glucose LESS THAN 70 milliGRAM(s)/deciliter  glucagon  Injectable 1 milliGRAM(s) IntraMuscular once PRN Glucose LESS THAN 70 milligrams/deciliter  melatonin 6 milliGRAM(s) Oral at bedtime PRN Insomnia    ALLERGIES:  No Known Allergies    PHYSICAL EXAM:  T(C): 36.6 (17 @ 05:58), Max: 36.8 (17 @ 10:21)  T(F): 97.8 (17 @ 05:58), Max: 98.2 (17 @ 10:21)  HR: 72 (17 @ 05:58) (67 - 88)  BP: 134/72 (17 @ 05:58) (125/67 - 140/80)  RR: 16 (17 @ 05:58) (16 - 17)  SpO2: 95% (17 @ 05:58) (95% - 97%)  Wt(kg): --  I&O's Summary    11 Aug 2017 07:01  -  12 Aug 2017 07:00  --------------------------------------------------------  IN: 720 mL / OUT: 2560 mL / NET: -1840 mL      Telemetry: sinus rhythm  General: comfortable, in NAD  Heart: +S1S2, RRR, no murmurs, no rubs, no gallops  Lungs: clear to auscultation bilaterally, no wheezes, no rales, no rhonchi  Abdomen: soft, non-tender, non-distended, + bowel sounds  Extremities: no clubbing, no cyanosis, 1+ pedal edema edema  Neuro: A&O x3    LABS:                              10.8   8.2   )-----------( 267      ( 12 Aug 2017 06:35 )             32.3         138  |  95<L>  |  42.0<H>  ----------------------------<  209<H>  4.4   |  27.0  |  1.27    Ca    8.9      12 Aug 2017 06:35  Mg     2.0             RADIOLOGY & ADDITIONAL TESTS:    ASSESSMENT:  SHARON FELDMAN is a 77y old male with a history of essential hypertension,IDDM,Parkinson's disease,hyperlipidemia who presented with shortness of breath,leg edema.He has acute on chronic diastolic CHF.    Please permit me to suggest the followin. His I & O has been negative,will switch to po Torsemide and follow renal function  2.Increase activity out of bed  3.Strict salt restriction

## 2017-08-13 LAB
ANION GAP SERPL CALC-SCNC: 14 MMOL/L — SIGNIFICANT CHANGE UP (ref 5–17)
BUN SERPL-MCNC: 46 MG/DL — HIGH (ref 8–20)
CALCIUM SERPL-MCNC: 8.9 MG/DL — SIGNIFICANT CHANGE UP (ref 8.6–10.2)
CHLORIDE SERPL-SCNC: 93 MMOL/L — LOW (ref 98–107)
CO2 SERPL-SCNC: 28 MMOL/L — SIGNIFICANT CHANGE UP (ref 22–29)
CREAT SERPL-MCNC: 1.44 MG/DL — HIGH (ref 0.5–1.3)
GLUCOSE SERPL-MCNC: 208 MG/DL — HIGH (ref 70–115)
HCT VFR BLD CALC: 32.7 % — LOW (ref 42–52)
HGB BLD-MCNC: 10.7 G/DL — LOW (ref 14–18)
MCHC RBC-ENTMCNC: 30.4 PG — SIGNIFICANT CHANGE UP (ref 27–31)
MCHC RBC-ENTMCNC: 32.7 G/DL — SIGNIFICANT CHANGE UP (ref 32–36)
MCV RBC AUTO: 92.9 FL — SIGNIFICANT CHANGE UP (ref 80–94)
PLATELET # BLD AUTO: 268 K/UL — SIGNIFICANT CHANGE UP (ref 150–400)
POTASSIUM SERPL-MCNC: 4.5 MMOL/L — SIGNIFICANT CHANGE UP (ref 3.5–5.3)
POTASSIUM SERPL-SCNC: 4.5 MMOL/L — SIGNIFICANT CHANGE UP (ref 3.5–5.3)
RBC # BLD: 3.52 M/UL — LOW (ref 4.6–6.2)
RBC # FLD: 14.4 % — SIGNIFICANT CHANGE UP (ref 11–15.6)
SODIUM SERPL-SCNC: 135 MMOL/L — SIGNIFICANT CHANGE UP (ref 135–145)
WBC # BLD: 8.4 K/UL — SIGNIFICANT CHANGE UP (ref 4.8–10.8)
WBC # FLD AUTO: 8.4 K/UL — SIGNIFICANT CHANGE UP (ref 4.8–10.8)

## 2017-08-13 PROCEDURE — 99233 SBSQ HOSP IP/OBS HIGH 50: CPT

## 2017-08-13 RX ORDER — SODIUM CHLORIDE 9 MG/ML
250 INJECTION INTRAMUSCULAR; INTRAVENOUS; SUBCUTANEOUS ONCE
Qty: 0 | Refills: 0 | Status: COMPLETED | OUTPATIENT
Start: 2017-08-13 | End: 2017-08-13

## 2017-08-13 RX ORDER — LISINOPRIL 2.5 MG/1
2.5 TABLET ORAL DAILY
Qty: 0 | Refills: 0 | Status: DISCONTINUED | OUTPATIENT
Start: 2017-08-13 | End: 2017-08-14

## 2017-08-13 RX ADMIN — Medication 40 MILLIEQUIVALENT(S): at 17:33

## 2017-08-13 RX ADMIN — LISINOPRIL 20 MILLIGRAM(S): 2.5 TABLET ORAL at 06:36

## 2017-08-13 RX ADMIN — Medication 1 DROP(S): at 11:30

## 2017-08-13 RX ADMIN — Medication 40 MILLIEQUIVALENT(S): at 06:37

## 2017-08-13 RX ADMIN — FINASTERIDE 5 MILLIGRAM(S): 5 TABLET, FILM COATED ORAL at 11:30

## 2017-08-13 RX ADMIN — Medication 325 MILLIGRAM(S): at 11:29

## 2017-08-13 RX ADMIN — SODIUM CHLORIDE 250 MILLILITER(S): 9 INJECTION INTRAMUSCULAR; INTRAVENOUS; SUBCUTANEOUS at 10:07

## 2017-08-13 RX ADMIN — Medication 7 UNIT(S): at 12:38

## 2017-08-13 RX ADMIN — CARBIDOPA AND LEVODOPA 1 TABLET(S): 25; 100 TABLET ORAL at 06:36

## 2017-08-13 RX ADMIN — ENOXAPARIN SODIUM 40 MILLIGRAM(S): 100 INJECTION SUBCUTANEOUS at 11:30

## 2017-08-13 RX ADMIN — Medication 7 UNIT(S): at 17:32

## 2017-08-13 RX ADMIN — Medication 162 MILLIGRAM(S): at 11:29

## 2017-08-13 RX ADMIN — Medication 3: at 12:37

## 2017-08-13 RX ADMIN — ATORVASTATIN CALCIUM 20 MILLIGRAM(S): 80 TABLET, FILM COATED ORAL at 21:36

## 2017-08-13 RX ADMIN — Medication 20 MILLIGRAM(S): at 06:36

## 2017-08-13 RX ADMIN — INSULIN GLARGINE 10 UNIT(S): 100 INJECTION, SOLUTION SUBCUTANEOUS at 21:36

## 2017-08-13 RX ADMIN — Medication 6 MILLIGRAM(S): at 21:35

## 2017-08-13 RX ADMIN — Medication 7 UNIT(S): at 08:46

## 2017-08-13 RX ADMIN — CARBIDOPA AND LEVODOPA 1 TABLET(S): 25; 100 TABLET ORAL at 21:35

## 2017-08-13 RX ADMIN — Medication 2: at 08:46

## 2017-08-13 RX ADMIN — TAMSULOSIN HYDROCHLORIDE 0.4 MILLIGRAM(S): 0.4 CAPSULE ORAL at 21:36

## 2017-08-13 RX ADMIN — CARBIDOPA AND LEVODOPA 1 TABLET(S): 25; 100 TABLET ORAL at 13:27

## 2017-08-13 NOTE — PROGRESS NOTE ADULT - ATTENDING COMMENTS
[facility] d/c home 8/9/17 d/w patient.  Option of [facility] offered.  He wished to return home.
Possible d/c in am discussed.
DVT ppx

## 2017-08-13 NOTE — PROGRESS NOTE ADULT - ASSESSMENT
78 y/o male with chronic CHF, DM, HTN, BPH, parkinsons, and anemia c/w acute on chronic diastolic CHF    > Hypotension - attributed to overdiuresis.  Had recently been changed to po.  Decreased ACEI.  S/p IVF.

## 2017-08-13 NOTE — PROGRESS NOTE ADULT - SUBJECTIVE AND OBJECTIVE BOX
Cardiology Follow-up    SHARON FELDMAN was seen and examined. No events overnight. The patient denies any chest pain, SOB, palpitations, orthopnea, PND or abdominal pain.His leg edema is improved.    PROBLEM LIST:  Parkinsons  Benign prostatic hyperplasia, presence of lower urinary tract symptoms unspecified  Anemia, unspecified type  Type 2 diabetes mellitus with complication, with long-term current use of insulin  Essential hypertension  Acute congestive heart failure, unspecified congestive heart failure type    PAST MEDICAL HISTORY:  Stroke  DVT (deep venous thrombosis)  Parkinsons  High cholesterol  Diabetes  Hypertension    PAST SURGICAL HISTORY:  S/P hip hemiarthroplasty  No significant past surgical history    MEDICATIONS  (STANDING):  finasteride 5 milliGRAM(s) Oral daily  aspirin enteric coated 162 milliGRAM(s) Oral daily  tamsulosin 0.4 milliGRAM(s) Oral at bedtime  atorvastatin 20 milliGRAM(s) Oral at bedtime  carbidopa/levodopa  25/100 1 Tablet(s) Oral three times a day  ferrous    sulfate 325 milliGRAM(s) Oral daily  timolol 0.5% Solution 1 Drop(s) Both EYES daily  insulin lispro (HumaLOG) corrective regimen sliding scale   SubCutaneous three times a day before meals  dextrose 5%. 1000 milliLiter(s) (50 mL/Hr) IV Continuous <Continuous>  dextrose 50% Injectable 12.5 Gram(s) IV Push once  dextrose 50% Injectable 25 Gram(s) IV Push once  dextrose 50% Injectable 25 Gram(s) IV Push once  lisinopril 20 milliGRAM(s) Oral daily  enoxaparin Injectable 40 milliGRAM(s) SubCutaneous daily  insulin lispro Injectable (HumaLOG) 7 Unit(s) SubCutaneous three times a day with meals  potassium chloride   Powder 40 milliEquivalent(s) Oral two times a day  insulin glargine Injectable (LANTUS) 10 Unit(s) SubCutaneous at bedtime  torsemide 10 milliGRAM(s) Oral two times a day    MEDICATIONS  (PRN):  dextrose Gel 1 Dose(s) Oral once PRN Blood Glucose LESS THAN 70 milliGRAM(s)/deciliter  glucagon  Injectable 1 milliGRAM(s) IntraMuscular once PRN Glucose LESS THAN 70 milligrams/deciliter  melatonin 6 milliGRAM(s) Oral at bedtime PRN Insomnia    ALLERGIES:  No Known Allergies    PHYSICAL EXAM:  T(C): 36.8 (17 @ 06:32), Max: 36.8 (17 @ 06:32)  T(F): 98.2 (17 @ 06:32), Max: 98.2 (17 @ 06:32)  HR: 76 (17 @ 06:32) (69 - 82)  BP: 130/64 (17 @ 06:32) (110/68 - 130/64)  RR: 18 (17 @ 06:32) (17 - 18)  SpO2: 97% (17 @ 06:32) (95% - 98%)  Wt(kg): --  I&O's Summary    12 Aug 2017 07:01  -  13 Aug 2017 07:00  --------------------------------------------------------  IN: 0 mL / OUT: 950 mL / NET: -950 mL      Telemetry: sinus rhythm  General: comfortable, in NAD  Heart: +S1S2, RRR, no murmurs, no rubs, no gallops  Lungs: clear to auscultation bilaterally, no wheezes, no rales, no rhonchi  Abdomen: soft, non-tender, non-distended, + bowel sounds  Extremities: no clubbing, no cyanosis, no edema  Neuro: A&O x3    LABS:                              10.7   8.4   )-----------( 268      ( 13 Aug 2017 06:30 )             32.7         135  |  93<L>  |  46.0<H>  ----------------------------<  208<H>  4.5   |  28.0  |  1.44<H>    Ca    8.9      13 Aug 2017 06:30  Mg     2.0             RADIOLOGY & ADDITIONAL TESTS:    ASSESSMENT:  SHARON FELDMAN is a 77y old male with a history of essential hypertension,Parkinson's disease,CVA,IDDM,hyperlipidemia who presented with shortness of breath and edema.Hid edema is improved since admission.    Please permit me to suggest the followin. Will decrease Torsemide to 10mg bid as his creatinine is elevated  2.Strict salt restriction  3.Continue to follow labs

## 2017-08-13 NOTE — PROGRESS NOTE ADULT - SUBJECTIVE AND OBJECTIVE BOX
Patient: SHARON FELDMAN [id number] 77y Male                 Internal Medicine Hospitalist Progress Note - Dr. Ismael Stafford    CHIEF COMPLAINT/INTERVAL HISTORY:  Patient is a 77y old  Male who presents with a chief complaint of swollen legs and Hyperglycemia (05 Aug 2017 22:21)      HPI:  78 y/o male with h/o CHF, DM II, HTN, parkinson's disease, was referred by nurse at the  center because patient was noticed to have swollen legs and gained about 30 lb in the last 2 weeks. also accu check was 422 mg/dl. patient denies SOB or chest pain. no palpitation or orthopnea. Labs shows Hgb: 10.8. Glu: 423. (05 Aug 2017 22:21)  Admitted for CHF exacerbation. Seen by cardiology, started on diuretics.      Hypotension noted, responded to IVF.  Denies dizziness / lightheadedness.  No chest pain / sob / palp.     ____________________PHYSICAL EXAM:  Vitals reviewed as indicated below  GENERAL:  NAD Alert and Oriented x 3   HEENT: NCAT  CARDIOVASCULAR:  S1, S2  LUNGS: coarse BS b/l, improving  ABDOMEN:  soft, (-) tenderness, (-) distension, (+) bowel sounds, (-) guarding, (-) rebound (-) rigidity  EXTREMITIES:  no cyanosis / clubbing.  1+ edema, improving.   ____________________      MEDICATIONS:  finasteride 5 milliGRAM(s) Oral daily  aspirin enteric coated 162 milliGRAM(s) Oral daily  tamsulosin 0.4 milliGRAM(s) Oral at bedtime  atorvastatin 20 milliGRAM(s) Oral at bedtime  carbidopa/levodopa  25/100 1 Tablet(s) Oral three times a day  ferrous    sulfate 325 milliGRAM(s) Oral daily  timolol 0.5% Solution 1 Drop(s) Both EYES daily  insulin lispro (HumaLOG) corrective regimen sliding scale   SubCutaneous three times a day before meals  dextrose 5%. 1000 milliLiter(s) IV Continuous <Continuous>  dextrose Gel 1 Dose(s) Oral once PRN  dextrose 50% Injectable 12.5 Gram(s) IV Push once  dextrose 50% Injectable 25 Gram(s) IV Push once  dextrose 50% Injectable 25 Gram(s) IV Push once  glucagon  Injectable 1 milliGRAM(s) IntraMuscular once PRN  enoxaparin Injectable 40 milliGRAM(s) SubCutaneous daily  insulin lispro Injectable (HumaLOG) 7 Unit(s) SubCutaneous three times a day with meals  potassium chloride   Powder 40 milliEquivalent(s) Oral two times a day  melatonin 6 milliGRAM(s) Oral at bedtime PRN  insulin glargine Injectable (LANTUS) 10 Unit(s) SubCutaneous at bedtime  torsemide 10 milliGRAM(s) Oral two times a day  lisinopril 2.5 milliGRAM(s) Oral daily      VITALS:  Vital Signs Last 24 Hrs  T(C): 36.6 (13 Aug 2017 09:57), Max: 36.8 (13 Aug 2017 06:32)  T(F): 97.9 (13 Aug 2017 09:57), Max: 98.2 (13 Aug 2017 06:32)  HR: 76 (13 Aug 2017 11:23) (70 - 80)  BP: 104/58 (13 Aug 2017 11:23) (82/52 - 130/64)  BP(mean): --  RR: 18 (13 Aug 2017 11:23) (18 - 18)  SpO2: 96% (13 Aug 2017 11:23) (91% - 97%) Daily     Daily Weight in k.1 (13 Aug 2017 01:13)  CAPILLARY BLOOD GLUCOSE  253 (13 Aug 2017 11:23)  217 (13 Aug 2017 07:19)  251 (12 Aug 2017 20:03)  233 (12 Aug 2017 16:31)        I&O's Summary    12 Aug 2017 07:  -  13 Aug 2017 07:00  --------------------------------------------------------  IN: 0 mL / OUT: 950 mL / NET: -950 mL    13 Aug 2017 07:  -  13 Aug 2017 15:33  --------------------------------------------------------  IN: 0 mL / OUT: 300 mL / NET: -300 mL        LABS:                        10.7   8.4   )-----------( 268      ( 13 Aug 2017 06:30 )             32.7     08-13    135  |  93<L>  |  46.0<H>  ----------------------------<  208<H>  4.5   |  28.0  |  1.44<H>    Ca    8.9      13 Aug 2017 06:30  Mg     2.0     -12

## 2017-08-14 LAB
ALBUMIN SERPL ELPH-MCNC: 3 G/DL
ALP BLD-CCNC: 64 U/L
ALT SERPL-CCNC: 4 U/L
ANION GAP SERPL CALC-SCNC: 12 MMOL/L — SIGNIFICANT CHANGE UP (ref 5–17)
ANION GAP SERPL CALC-SCNC: 14 MMOL/L
AST SERPL-CCNC: 16 U/L
BILIRUB SERPL-MCNC: <0.2 MG/DL
BUN SERPL-MCNC: 25 MG/DL
BUN SERPL-MCNC: 40 MG/DL — HIGH (ref 8–20)
CALCIUM SERPL-MCNC: 9 MG/DL
CALCIUM SERPL-MCNC: 9.1 MG/DL — SIGNIFICANT CHANGE UP (ref 8.6–10.2)
CHLORIDE SERPL-SCNC: 100 MMOL/L
CHLORIDE SERPL-SCNC: 98 MMOL/L — SIGNIFICANT CHANGE UP (ref 98–107)
CO2 SERPL-SCNC: 25 MMOL/L
CO2 SERPL-SCNC: 29 MMOL/L — SIGNIFICANT CHANGE UP (ref 22–29)
CREAT SERPL-MCNC: 1.14 MG/DL
CREAT SERPL-MCNC: 1.31 MG/DL — HIGH (ref 0.5–1.3)
GLUCOSE SERPL-MCNC: 133 MG/DL — HIGH (ref 70–115)
GLUCOSE SERPL-MCNC: 374 MG/DL
HBA1C MFR BLD HPLC: 8.8 %
HCT VFR BLD CALC: 32.2 % — LOW (ref 42–52)
HGB BLD-MCNC: 10.8 G/DL — LOW (ref 14–18)
MAGNESIUM SERPL-MCNC: 2.2 MG/DL — SIGNIFICANT CHANGE UP (ref 1.6–2.6)
MCHC RBC-ENTMCNC: 30.5 PG — SIGNIFICANT CHANGE UP (ref 27–31)
MCHC RBC-ENTMCNC: 33.5 G/DL — SIGNIFICANT CHANGE UP (ref 32–36)
MCV RBC AUTO: 91 FL — SIGNIFICANT CHANGE UP (ref 80–94)
PLATELET # BLD AUTO: 293 K/UL — SIGNIFICANT CHANGE UP (ref 150–400)
POTASSIUM SERPL-MCNC: 4.9 MMOL/L — SIGNIFICANT CHANGE UP (ref 3.5–5.3)
POTASSIUM SERPL-SCNC: 4.4 MMOL/L
POTASSIUM SERPL-SCNC: 4.9 MMOL/L — SIGNIFICANT CHANGE UP (ref 3.5–5.3)
PROT SERPL-MCNC: 6.2 G/DL
RBC # BLD: 3.54 M/UL — LOW (ref 4.6–6.2)
RBC # FLD: 14.2 % — SIGNIFICANT CHANGE UP (ref 11–15.6)
SODIUM SERPL-SCNC: 139 MMOL/L
SODIUM SERPL-SCNC: 139 MMOL/L — SIGNIFICANT CHANGE UP (ref 135–145)
WBC # BLD: 6.8 K/UL — SIGNIFICANT CHANGE UP (ref 4.8–10.8)
WBC # FLD AUTO: 6.8 K/UL — SIGNIFICANT CHANGE UP (ref 4.8–10.8)

## 2017-08-14 PROCEDURE — 99233 SBSQ HOSP IP/OBS HIGH 50: CPT

## 2017-08-14 RX ORDER — POTASSIUM CHLORIDE 20 MEQ
20 PACKET (EA) ORAL
Qty: 0 | Refills: 0 | Status: DISCONTINUED | OUTPATIENT
Start: 2017-08-14 | End: 2017-08-15

## 2017-08-14 RX ADMIN — INSULIN GLARGINE 10 UNIT(S): 100 INJECTION, SOLUTION SUBCUTANEOUS at 21:26

## 2017-08-14 RX ADMIN — CARBIDOPA AND LEVODOPA 1 TABLET(S): 25; 100 TABLET ORAL at 15:16

## 2017-08-14 RX ADMIN — LISINOPRIL 2.5 MILLIGRAM(S): 2.5 TABLET ORAL at 05:34

## 2017-08-14 RX ADMIN — Medication 1 DROP(S): at 12:08

## 2017-08-14 RX ADMIN — ENOXAPARIN SODIUM 40 MILLIGRAM(S): 100 INJECTION SUBCUTANEOUS at 12:08

## 2017-08-14 RX ADMIN — Medication 2: at 17:33

## 2017-08-14 RX ADMIN — CARBIDOPA AND LEVODOPA 1 TABLET(S): 25; 100 TABLET ORAL at 05:33

## 2017-08-14 RX ADMIN — Medication 7 UNIT(S): at 12:08

## 2017-08-14 RX ADMIN — CARBIDOPA AND LEVODOPA 1 TABLET(S): 25; 100 TABLET ORAL at 21:26

## 2017-08-14 RX ADMIN — ATORVASTATIN CALCIUM 20 MILLIGRAM(S): 80 TABLET, FILM COATED ORAL at 21:26

## 2017-08-14 RX ADMIN — Medication 3: at 12:08

## 2017-08-14 RX ADMIN — FINASTERIDE 5 MILLIGRAM(S): 5 TABLET, FILM COATED ORAL at 12:08

## 2017-08-14 RX ADMIN — TAMSULOSIN HYDROCHLORIDE 0.4 MILLIGRAM(S): 0.4 CAPSULE ORAL at 21:26

## 2017-08-14 RX ADMIN — Medication 7 UNIT(S): at 17:32

## 2017-08-14 RX ADMIN — Medication 10 MILLIGRAM(S): at 05:33

## 2017-08-14 RX ADMIN — Medication 1: at 08:54

## 2017-08-14 RX ADMIN — Medication 325 MILLIGRAM(S): at 12:08

## 2017-08-14 RX ADMIN — Medication 162 MILLIGRAM(S): at 12:08

## 2017-08-14 RX ADMIN — Medication 6 MILLIGRAM(S): at 21:26

## 2017-08-14 RX ADMIN — Medication 10 MILLIGRAM(S): at 17:33

## 2017-08-14 RX ADMIN — Medication 7 UNIT(S): at 08:54

## 2017-08-14 RX ADMIN — Medication 40 MILLIEQUIVALENT(S): at 05:34

## 2017-08-14 NOTE — PROGRESS NOTE ADULT - ASSESSMENT
78 y/o male with chronic CHF, DM, HTN, BPH, parkinsons, and anemia c/w acute on chronic diastolic CHF    > Hypotension - attributed to overdiuresis.  d/c'd ACEI.  Hold off on IVF.    > Nocturnal hypoxia - appears to be clinically stable.  I discussed with pulmonary - pt to have outpatient workup with [facility] on discharge.  Pt in agreement.

## 2017-08-14 NOTE — PROGRESS NOTE ADULT - SUBJECTIVE AND OBJECTIVE BOX
Patient: SHARON FELDMAN [id number] 77y Male                 Internal Medicine Hospitalist Progress Note - Dr. Ismael Stafford    CHIEF COMPLAINT/INTERVAL HISTORY:  Patient is a 77y old  Male who presents with a chief complaint of swollen legs and Hyperglycemia (05 Aug 2017 22:21)      HPI:  76 y/o male with h/o CHF, DM II, HTN, parkinson's disease, was referred by nurse at the  center because patient was noticed to have swollen legs and gained about 30 lb in the last 2 weeks. also accu check was 422 mg/dl. patient denies SOB or chest pain. no palpitation or orthopnea. Labs shows Hgb: 10.8. Glu: 423. (05 Aug 2017 22:21)  Admitted for CHF exacerbation. Seen by cardiology, started on diuretics.      Hypotension noted again this am, now improved.  Pt denies dizziness / lightheadedness.  No chest pain / sob / palp.     ____________________PHYSICAL EXAM:  Vitals reviewed as indicated below  GENERAL:  NAD Alert and Oriented x 3   HEENT: NCAT  CARDIOVASCULAR:  S1, S2  LUNGS: coarse BS b/l, improving  ABDOMEN:  soft, (-) tenderness, (-) distension, (+) bowel sounds, (-) guarding, (-) rebound (-) rigidity  EXTREMITIES:  no cyanosis / clubbing.  1+ edema, improving.   ____________________        MEDICATIONS:  finasteride 5 milliGRAM(s) Oral daily  aspirin enteric coated 162 milliGRAM(s) Oral daily  tamsulosin 0.4 milliGRAM(s) Oral at bedtime  atorvastatin 20 milliGRAM(s) Oral at bedtime  carbidopa/levodopa  25/100 1 Tablet(s) Oral three times a day  ferrous    sulfate 325 milliGRAM(s) Oral daily  timolol 0.5% Solution 1 Drop(s) Both EYES daily  insulin lispro (HumaLOG) corrective regimen sliding scale   SubCutaneous three times a day before meals  dextrose 5%. 1000 milliLiter(s) IV Continuous <Continuous>  dextrose Gel 1 Dose(s) Oral once PRN  dextrose 50% Injectable 12.5 Gram(s) IV Push once  dextrose 50% Injectable 25 Gram(s) IV Push once  dextrose 50% Injectable 25 Gram(s) IV Push once  glucagon  Injectable 1 milliGRAM(s) IntraMuscular once PRN  enoxaparin Injectable 40 milliGRAM(s) SubCutaneous daily  insulin lispro Injectable (HumaLOG) 7 Unit(s) SubCutaneous three times a day with meals  melatonin 6 milliGRAM(s) Oral at bedtime PRN  insulin glargine Injectable (LANTUS) 10 Unit(s) SubCutaneous at bedtime  torsemide 10 milliGRAM(s) Oral two times a day  potassium chloride   Powder 20 milliEquivalent(s) Oral two times a day      VITALS:  Vital Signs Last 24 Hrs  T(C): 36.7 (14 Aug 2017 12:06), Max: 36.9 (13 Aug 2017 21:34)  T(F): 98 (14 Aug 2017 12:06), Max: 98.4 (13 Aug 2017 21:34)  HR: 80 (14 Aug 2017 12:06) (69 - 150)  BP: 98/52 (14 Aug 2017 12:06) (80/48 - 116/70)  BP(mean): --  RR: 17 (14 Aug 2017 12:06) (16 - 18)  SpO2: 99% (14 Aug 2017 12:06) (95% - 99%) Daily     Daily Weight in k.3 (14 Aug 2017 00:08)  CAPILLARY BLOOD GLUCOSE  280 (14 Aug 2017 10:15)  165 (14 Aug 2017 08:51)  138 (13 Aug 2017 19:56)  133 (13 Aug 2017 17:08)        I&O's Summary    13 Aug 2017 07:  -  14 Aug 2017 07:00  --------------------------------------------------------  IN: 0 mL / OUT: 2145 mL / NET: -2145 mL    14 Aug 2017 07:  -  14 Aug 2017 13:20  --------------------------------------------------------  IN: 0 mL / OUT: 100 mL / NET: -100 mL        LABS:                        10.8   6.8   )-----------( 293      ( 14 Aug 2017 06:53 )             32.2     08-    139  |  98  |  40.0<H>  ----------------------------<  133<H>  4.9   |  29.0  |  1.31<H>    Ca    9.1      14 Aug 2017 06:53  Mg     2.2

## 2017-08-14 NOTE — PROGRESS NOTE ADULT - SUBJECTIVE AND OBJECTIVE BOX
Cardiology Follow-up    SHARON FELDMAN was seen and examined. Overnight, he had a brief run of PAT to the 150s. He was asymptomatic during this. The patient denies any chest pain, SOB, palpitations, orthopnea, PND or abdominal pain.    PROBLEM LIST:  Parkinsons  Benign prostatic hyperplasia, presence of lower urinary tract symptoms unspecified  Anemia, unspecified type  Type 2 diabetes mellitus with complication, with long-term current use of insulin  Essential hypertension  Acute congestive heart failure, unspecified congestive heart failure type    PAST MEDICAL HISTORY:  Stroke  DVT (deep venous thrombosis)  Parkinsons  High cholesterol  Diabetes  Hypertension    PAST SURGICAL HISTORY:  S/P hip hemiarthroplasty  No significant past surgical history    MEDICATIONS  (STANDING):  finasteride 5 milliGRAM(s) Oral daily  aspirin enteric coated 162 milliGRAM(s) Oral daily  tamsulosin 0.4 milliGRAM(s) Oral at bedtime  atorvastatin 20 milliGRAM(s) Oral at bedtime  carbidopa/levodopa  25/100 1 Tablet(s) Oral three times a day  ferrous    sulfate 325 milliGRAM(s) Oral daily  timolol 0.5% Solution 1 Drop(s) Both EYES daily  insulin lispro (HumaLOG) corrective regimen sliding scale   SubCutaneous three times a day before meals  dextrose 5%. 1000 milliLiter(s) (50 mL/Hr) IV Continuous <Continuous>  dextrose 50% Injectable 12.5 Gram(s) IV Push once  dextrose 50% Injectable 25 Gram(s) IV Push once  dextrose 50% Injectable 25 Gram(s) IV Push once  enoxaparin Injectable 40 milliGRAM(s) SubCutaneous daily  insulin lispro Injectable (HumaLOG) 7 Unit(s) SubCutaneous three times a day with meals  insulin glargine Injectable (LANTUS) 10 Unit(s) SubCutaneous at bedtime  torsemide 10 milliGRAM(s) Oral two times a day  lisinopril 2.5 milliGRAM(s) Oral daily  potassium chloride   Powder 20 milliEquivalent(s) Oral two times a day    MEDICATIONS  (PRN):  dextrose Gel 1 Dose(s) Oral once PRN Blood Glucose LESS THAN 70 milliGRAM(s)/deciliter  glucagon  Injectable 1 milliGRAM(s) IntraMuscular once PRN Glucose LESS THAN 70 milligrams/deciliter  melatonin 6 milliGRAM(s) Oral at bedtime PRN Insomnia    ALLERGIES:  No Known Allergies    I&O's Summary    13 Aug 2017 07:01  -  14 Aug 2017 07:00  --------------------------------------------------------  IN: 0 mL / OUT: 2145 mL / NET: -2145 mL    14 Aug 2017 07:  -  14 Aug 2017 08:29  --------------------------------------------------------  IN: 0 mL / OUT: 100 mL / NET: -100 mL      PHYSICAL EXAM:  T(F): 97.9 (17 @ 05:30), Max: 98.4 (17 @ 21:34)  HR: 69 (17 @ 05:30) (69 - 150)  BP: 106/58 (17 @ 05:30) (82/52 - 116/70)  RR: 16 (17 @ 05:30) (16 - 18)  SpO2: 96% (17 @ 05:30) (91% - 97%)  Wt(kg): --  Weight (kg): 94 (08-10 @ 08:13)  Telemetry: Sinus, HR 60s-80s, occasional APCs, brief PAT to the 150s  General: comfortable, in NAD  Heart: +S1S2, RRR, no murmurs, no rubs, no gallops  Lungs: mild inspiratory crackles at the left base, slight inspiratory wheezes, no rhonchi  Abdomen: soft, non-tender, non-distended, + bowel sounds  Extremities: no clubbing, no cyanosis, 1+ bilateral lower extremity edema   Neuro: A&O x3      LABS:          CBC:            10.8   6.8   >-----------< 293    14 @ 06:53            32.2               10.7   8.4   >-----------< 268     @ 06:30            32.7               10.8   8.2   >-----------< 267    08-12 @ 06:35            32.3               10.8   7.3   >-----------< 261    08-11 @ 06:54            33.1       CHEMISTRY:   139   |  98     |  40.0   ----------------------------< 133     08-14 @ 06:53    4.9   |  29.0   |  1.31     eGFR non-  53     eGFR   61      135   |  93     |  46.0   ----------------------------< 208     08-13 @ 06:30    4.5   |  28.0   |  1.44     eGFR non-  47     eGFR   55      138   |  95     |  42.0   ----------------------------< 209     08-12 @ 06:35    4.4   |  27.0   |  1.27     eGFR non-  55     eGFR   64      134   |  93     |  39.0   ----------------------------< 328     08-11 @ 06:54    4.8   |  27.0   |  1.18     eGFR non-  60     eGFR African American  70       Magnesium, Serum: 2.2 mg/dL ( @ 06:53)        URINALYSIS: ( @ 20:00)  Color Yellow / pH 6.0   / Sp Gr 1.010 / LE <<16 / Nit Negative / Prot 500   / Blood Trace / Uro Negative / WBC 0-2   / RBC 0-2   / Bacteria --       RADIOLOGY & ADDITIONAL TESTS:  Echo:   1. Left ventricular ejection fraction, by visual estimation, is 55 to   60%.   2. Spectral Doppler shows pseudonormal pattern of left ventricular   myocardial filling (Grade II diastolic dysfunction).   3. Sclerotic aortic valve with normal opening.   4. Thickening and calcification of the anterior and posterior mitral   valve leaflets.   5. Dilatation of the sinuses of Valsalva.    ASSESSMENT:  SHARON FELDMAN is a 77y old male with a history of IDDM (poorly controlled) with diabetic neuropathy,  essential hypertension, pure hypercholesterolemia, aortic root dilatation (4.4 cm by Echo), aortic atherosclerosis who presented with acute diastolic heart failure.    Please permit me to suggest the followin. His SOB and edema are much improved. His creatinine had bumped with diuresis so torsemide was decreased yesterday to 10 mg 2x daily. He was also hypotensive yesterday and was given a fluid bolus. His creatinine subsequently improved from 1.44 to 1.31. Continue the current dose of torsemide. I decreased KCl to 20 mEq 2x daily. He should go home on this. Keep K>4.0, Mg>2.0.   2. He has been desaturating overnight to the 70s-80s. He probably has sleep apnea, which should be corrected in the setting of CHF. Recommend out-patient Pulmonary consult and sleep study.  3. The patient is cleared from a cardiac viewpoint for discharge home.

## 2017-08-14 NOTE — PROGRESS NOTE ADULT - PROBLEM SELECTOR PLAN 6
c/w carbidopa/levodopa

## 2017-08-15 ENCOUNTER — TRANSCRIPTION ENCOUNTER (OUTPATIENT)
Age: 77
End: 2017-08-15

## 2017-08-15 VITALS
OXYGEN SATURATION: 93 % | SYSTOLIC BLOOD PRESSURE: 135 MMHG | RESPIRATION RATE: 18 BRPM | DIASTOLIC BLOOD PRESSURE: 74 MMHG | TEMPERATURE: 98 F | HEART RATE: 73 BPM

## 2017-08-15 LAB
ANION GAP SERPL CALC-SCNC: 15 MMOL/L — SIGNIFICANT CHANGE UP (ref 5–17)
BUN SERPL-MCNC: 36 MG/DL — HIGH (ref 8–20)
CALCIUM SERPL-MCNC: 8.7 MG/DL — SIGNIFICANT CHANGE UP (ref 8.6–10.2)
CHLORIDE SERPL-SCNC: 98 MMOL/L — SIGNIFICANT CHANGE UP (ref 98–107)
CO2 SERPL-SCNC: 26 MMOL/L — SIGNIFICANT CHANGE UP (ref 22–29)
CREAT SERPL-MCNC: 1.18 MG/DL — SIGNIFICANT CHANGE UP (ref 0.5–1.3)
GLUCOSE SERPL-MCNC: 116 MG/DL — HIGH (ref 70–115)
HCT VFR BLD CALC: 31.4 % — LOW (ref 42–52)
HGB BLD-MCNC: 10.2 G/DL — LOW (ref 14–18)
MCHC RBC-ENTMCNC: 30.5 PG — SIGNIFICANT CHANGE UP (ref 27–31)
MCHC RBC-ENTMCNC: 32.5 G/DL — SIGNIFICANT CHANGE UP (ref 32–36)
MCV RBC AUTO: 94 FL — SIGNIFICANT CHANGE UP (ref 80–94)
PLATELET # BLD AUTO: 264 K/UL — SIGNIFICANT CHANGE UP (ref 150–400)
POTASSIUM SERPL-MCNC: 4.2 MMOL/L — SIGNIFICANT CHANGE UP (ref 3.5–5.3)
POTASSIUM SERPL-SCNC: 4.2 MMOL/L — SIGNIFICANT CHANGE UP (ref 3.5–5.3)
RBC # BLD: 3.34 M/UL — LOW (ref 4.6–6.2)
RBC # FLD: 14.3 % — SIGNIFICANT CHANGE UP (ref 11–15.6)
SODIUM SERPL-SCNC: 139 MMOL/L — SIGNIFICANT CHANGE UP (ref 135–145)
WBC # BLD: 5.9 K/UL — SIGNIFICANT CHANGE UP (ref 4.8–10.8)
WBC # FLD AUTO: 5.9 K/UL — SIGNIFICANT CHANGE UP (ref 4.8–10.8)

## 2017-08-15 PROCEDURE — 83735 ASSAY OF MAGNESIUM: CPT

## 2017-08-15 PROCEDURE — 83036 HEMOGLOBIN GLYCOSYLATED A1C: CPT

## 2017-08-15 PROCEDURE — 85610 PROTHROMBIN TIME: CPT

## 2017-08-15 PROCEDURE — 84100 ASSAY OF PHOSPHORUS: CPT

## 2017-08-15 PROCEDURE — 83880 ASSAY OF NATRIURETIC PEPTIDE: CPT

## 2017-08-15 PROCEDURE — 97530 THERAPEUTIC ACTIVITIES: CPT

## 2017-08-15 PROCEDURE — 71045 X-RAY EXAM CHEST 1 VIEW: CPT

## 2017-08-15 PROCEDURE — 99285 EMERGENCY DEPT VISIT HI MDM: CPT | Mod: 25

## 2017-08-15 PROCEDURE — 97110 THERAPEUTIC EXERCISES: CPT

## 2017-08-15 PROCEDURE — 85730 THROMBOPLASTIN TIME PARTIAL: CPT

## 2017-08-15 PROCEDURE — 82550 ASSAY OF CK (CPK): CPT

## 2017-08-15 PROCEDURE — 97116 GAIT TRAINING THERAPY: CPT

## 2017-08-15 PROCEDURE — 80048 BASIC METABOLIC PNL TOTAL CA: CPT

## 2017-08-15 PROCEDURE — 85027 COMPLETE CBC AUTOMATED: CPT

## 2017-08-15 PROCEDURE — 93005 ELECTROCARDIOGRAM TRACING: CPT

## 2017-08-15 PROCEDURE — 93306 TTE W/DOPPLER COMPLETE: CPT

## 2017-08-15 PROCEDURE — 81001 URINALYSIS AUTO W/SCOPE: CPT

## 2017-08-15 PROCEDURE — 99233 SBSQ HOSP IP/OBS HIGH 50: CPT

## 2017-08-15 PROCEDURE — 97163 PT EVAL HIGH COMPLEX 45 MIN: CPT

## 2017-08-15 PROCEDURE — 36600 WITHDRAWAL OF ARTERIAL BLOOD: CPT

## 2017-08-15 PROCEDURE — 82803 BLOOD GASES ANY COMBINATION: CPT

## 2017-08-15 PROCEDURE — 36415 COLL VENOUS BLD VENIPUNCTURE: CPT

## 2017-08-15 PROCEDURE — 93970 EXTREMITY STUDY: CPT

## 2017-08-15 PROCEDURE — 84484 ASSAY OF TROPONIN QUANT: CPT

## 2017-08-15 PROCEDURE — 80053 COMPREHEN METABOLIC PANEL: CPT

## 2017-08-15 PROCEDURE — 96374 THER/PROPH/DIAG INJ IV PUSH: CPT

## 2017-08-15 RX ORDER — ATORVASTATIN CALCIUM 80 MG/1
1 TABLET, FILM COATED ORAL
Qty: 0 | Refills: 0 | COMMUNITY
Start: 2017-08-15

## 2017-08-15 RX ORDER — POTASSIUM CHLORIDE 20 MEQ
1 PACKET (EA) ORAL
Qty: 0 | Refills: 0 | COMMUNITY
Start: 2017-08-15

## 2017-08-15 RX ORDER — INSULIN GLARGINE 100 [IU]/ML
10 INJECTION, SOLUTION SUBCUTANEOUS
Qty: 0 | Refills: 0 | COMMUNITY
Start: 2017-08-15

## 2017-08-15 RX ORDER — CARBIDOPA AND LEVODOPA 25; 100 MG/1; MG/1
1 TABLET ORAL
Qty: 0 | Refills: 0 | COMMUNITY
Start: 2017-08-15

## 2017-08-15 RX ORDER — FINASTERIDE 5 MG/1
1 TABLET, FILM COATED ORAL
Qty: 0 | Refills: 0 | COMMUNITY
Start: 2017-08-15

## 2017-08-15 RX ORDER — ASPIRIN/CALCIUM CARB/MAGNESIUM 324 MG
2 TABLET ORAL
Qty: 0 | Refills: 0 | COMMUNITY
Start: 2017-08-15

## 2017-08-15 RX ORDER — POTASSIUM CHLORIDE 20 MEQ
1 PACKET (EA) ORAL
Qty: 40 | Refills: 0 | OUTPATIENT
Start: 2017-08-15 | End: 2017-09-04

## 2017-08-15 RX ORDER — TAMSULOSIN HYDROCHLORIDE 0.4 MG/1
1 CAPSULE ORAL
Qty: 0 | Refills: 0 | COMMUNITY

## 2017-08-15 RX ORDER — TAMSULOSIN HYDROCHLORIDE 0.4 MG/1
1 CAPSULE ORAL
Qty: 0 | Refills: 0 | COMMUNITY
Start: 2017-08-15

## 2017-08-15 RX ADMIN — Medication 325 MILLIGRAM(S): at 11:26

## 2017-08-15 RX ADMIN — Medication 7 UNIT(S): at 12:32

## 2017-08-15 RX ADMIN — Medication 10 MILLIGRAM(S): at 17:19

## 2017-08-15 RX ADMIN — Medication 10 MILLIGRAM(S): at 05:07

## 2017-08-15 RX ADMIN — CARBIDOPA AND LEVODOPA 1 TABLET(S): 25; 100 TABLET ORAL at 05:07

## 2017-08-15 RX ADMIN — FINASTERIDE 5 MILLIGRAM(S): 5 TABLET, FILM COATED ORAL at 11:26

## 2017-08-15 RX ADMIN — Medication 1 DROP(S): at 11:25

## 2017-08-15 RX ADMIN — Medication 162 MILLIGRAM(S): at 12:31

## 2017-08-15 RX ADMIN — Medication 1: at 08:59

## 2017-08-15 RX ADMIN — Medication 7 UNIT(S): at 08:59

## 2017-08-15 RX ADMIN — ENOXAPARIN SODIUM 40 MILLIGRAM(S): 100 INJECTION SUBCUTANEOUS at 11:25

## 2017-08-15 RX ADMIN — CARBIDOPA AND LEVODOPA 1 TABLET(S): 25; 100 TABLET ORAL at 14:12

## 2017-08-15 RX ADMIN — Medication 20 MILLIEQUIVALENT(S): at 05:07

## 2017-08-15 NOTE — DISCHARGE NOTE ADULT - PLAN OF CARE
stable for discharge It is important to see your primary physician as well as the physicians noted below within the next week to perform a comprehensive medical review.  Call their offices for an appointment as soon as you leave the hospital.  If you do not have a primary physician, contact the [facility] at [place] [phone] located on [address] [place] [address].  Your medical issues appear to be stable at this time, but if your symptoms recur or worsen, contact your physicians and/or return to the hospital if necessary.  If you encounter any issues or questions with your medication, call your physicians before stopping the medication.  Do not drive.  Limit your diet to 2 grams of sodium daily.

## 2017-08-15 NOTE — DISCHARGE NOTE ADULT - SECONDARY DIAGNOSIS.
Type 2 diabetes mellitus without complication, without long-term current use of insulin Essential hypertension High cholesterol Parkinsons

## 2017-08-15 NOTE — DISCHARGE NOTE ADULT - HOSPITAL COURSE
Patient: SHARON FELDAMN [id number]                 Internal Medicine Hospitalist Discharge Note - Dr. Ismael Stafford    CHIEF COMPLAINT/INTERVAL HISTORY:  Patient is a 77y old  Male who presents with a chief complaint of swollen legs and Hyperglycemia (05 Aug 2017 22:21)      HPI:  76 y/o male with h/o CHF, DM II, HTN, parkinson's disease, was referred by nurse at the  center because patient was noticed to have swollen legs and gained about 30 lb in the last 2 weeks. also accu check was 422 mg/dl. patient denies SOB or chest pain. no palpitation or orthopnea. Labs shows Hgb: 10.8. Glu: 423. (05 Aug 2017 22:21)  Admitted for CHF exacerbation. Seen by cardiology, started on diuretics.      Hypotension noted again this am, now improved.  Pt denies dizziness / lightheadedness.  No chest pain / sob / palp.     ____________________PHYSICAL EXAM:  Vitals reviewed as indicated below  GENERAL:  NAD Alert and Oriented x 3   HEENT: NCAT  CARDIOVASCULAR:  S1, S2  LUNGS: coarse BS b/l, improving  ABDOMEN:  soft, (-) tenderness, (-) distension, (+) bowel sounds, (-) guarding, (-) rebound (-) rigidity  EXTREMITIES:  no cyanosis / clubbing.  1+ edema, improving.   ____________________      VITALS:  Vital Signs Last 24 Hrs  T(C): 36.9 (15 Aug 2017 11:37), Max: 36.9 (15 Aug 2017 11:37)  T(F): 98.4 (15 Aug 2017 11:37), Max: 98.4 (15 Aug 2017 11:37)  HR: 85 (15 Aug 2017 11:37) (63 - 85)  BP: 100/56 (15 Aug 2017 11:37) (100/56 - 153/75)  BP(mean): --  RR: 18 (15 Aug 2017 11:37) (16 - 18)  SpO2: 98% (15 Aug 2017 05:06) (98% - 99%) Daily     Daily Weight in k.3 (15 Aug 2017 09:35)  CAPILLARY BLOOD GLUCOSE  150 (15 Aug 2017 11:37)  183 (15 Aug 2017 08:20)  161 (14 Aug 2017 21:25)  214 (14 Aug 2017 16:58)        I&O's Summary    14 Aug 2017 07:01  -  15 Aug 2017 07:00  --------------------------------------------------------  IN: 0 mL / OUT: 1355 mL / NET: -1355 mL    15 Aug 2017 07:01  -  15 Aug 2017 13:36  --------------------------------------------------------  IN: 0 mL / OUT: 400 mL / NET: -400 mL        LABS:                        10.2   5.9   )-----------( 264      ( 15 Aug 2017 06:43 )             31.4     08-15    139  |  98  |  36.0<H>  ----------------------------<  116<H>  4.2   |  26.0  |  1.18    Ca    8.7      15 Aug 2017 06:43  Mg     2.2     08-14            76 y/o male with chronic CHF, DM, HTN, BPH, parkinsons, and anemia c/w acute on chronic diastolic CHF  > Hypotension - BP stable, off ACEI / Antihypertensives.      > Nocturnal hypoxia - appears to be clinically stable.  I discussed with pulmonary - pt to have outpatient workup with [facility] on discharge.  Pt in agreement.     ·  Problem: Acute on chronic diastolic CHF  Cardiology followup appreciated.  c/w diuretics, transitioned to po.     ·  Problem: Essential hypertension.  Plan: Now hypotensive.  Holding ACEI.     ·  Problem: Type 2 diabetes mellitus with complication, with long-term current use of insulin.  Plan: Recurrent hypoglycemia now hyperglycemic.  Increase Lantus.  Continue Insulin coverage.     ·  Problem: Anemia, unspecified type.  Plan: ferrous sulfate.     ·  Problem: Benign prostatic hyperplasia, presence of lower urinary tract symptoms unspecified.  Plan: proscar & flomax.     Problem: Parkinsons. Plan: c/w carbidopa/levodopa.  Disposition: stable for discharge.  Outpatient followup as above.  Patient was advised to return if they experience any recurrence or worsening of symptoms.  Total time spent on discharge was 35 minutes.  -Ismael Stafford D.O., Hospitalist, [facility] Patient: SHARON FELDMAN [id number]                 Internal Medicine Hospitalist Discharge Note - Dr. Ismael Stafford    CHIEF COMPLAINT/INTERVAL HISTORY:  Patient is a 77y old  Male who presents with a chief complaint of swollen legs and Hyperglycemia (05 Aug 2017 22:21)      HPI:  76 y/o male with h/o CHF, DM II, HTN, parkinson's disease, was referred by nurse at the  center because patient was noticed to have swollen legs and gained about 30 lb in the last 2 weeks. also accu check was 422 mg/dl. patient denies SOB or chest pain. no palpitation or orthopnea. Labs shows Hgb: 10.8. Glu: 423. (05 Aug 2017 22:21)  Admitted for CHF exacerbation. Seen by cardiology, started on diuretics.      Hypotension noted again this am, now improved.  Pt denies dizziness / lightheadedness.  No chest pain / sob / palp.     ____________________PHYSICAL EXAM:  Vitals reviewed as indicated below  GENERAL:  NAD Alert and Oriented x 3   HEENT: NCAT  CARDIOVASCULAR:  S1, S2  LUNGS: coarse BS b/l, improving  ABDOMEN:  soft, (-) tenderness, (-) distension, (+) bowel sounds, (-) guarding, (-) rebound (-) rigidity  EXTREMITIES:  no cyanosis / clubbing.  1+ edema, improving.   ____________________      VITALS:  Vital Signs Last 24 Hrs  T(C): 36.9 (15 Aug 2017 11:37), Max: 36.9 (15 Aug 2017 11:37)  T(F): 98.4 (15 Aug 2017 11:37), Max: 98.4 (15 Aug 2017 11:37)  HR: 85 (15 Aug 2017 11:37) (63 - 85)  BP: 100/56 (15 Aug 2017 11:37) (100/56 - 153/75)  BP(mean): --  RR: 18 (15 Aug 2017 11:37) (16 - 18)  SpO2: 98% (15 Aug 2017 05:06) (98% - 99%) Daily     Daily Weight in k.3 (15 Aug 2017 09:35)  CAPILLARY BLOOD GLUCOSE  150 (15 Aug 2017 11:37)  183 (15 Aug 2017 08:20)  161 (14 Aug 2017 21:25)  214 (14 Aug 2017 16:58)        I&O's Summary    14 Aug 2017 07:01  -  15 Aug 2017 07:00  --------------------------------------------------------  IN: 0 mL / OUT: 1355 mL / NET: -1355 mL    15 Aug 2017 07:01  -  15 Aug 2017 13:36  --------------------------------------------------------  IN: 0 mL / OUT: 400 mL / NET: -400 mL        LABS:                        10.2   5.9   )-----------( 264      ( 15 Aug 2017 06:43 )             31.4     08-15    139  |  98  |  36.0<H>  ----------------------------<  116<H>  4.2   |  26.0  |  1.18    Ca    8.7      15 Aug 2017 06:43  Mg     2.2     08-14            76 y/o male with chronic CHF, DM, HTN, BPH, parkinsons, and anemia c/w acute on chronic diastolic CHF  > Hypotension - BP stable, off ACEI / Antihypertensives.      > Nocturnal hypoxia - appears to be clinically stable.  I discussed with pulmonary - pt to have outpatient workup with [facility] on discharge.  Pt in agreement.     ·  Problem: Acute on chronic diastolic CHF  Cardiology followup appreciated.  c/w diuretics, transitioned to po.     ·  Problem: Essential hypertension.  Plan: Now hypotensive.  Holding ACEI.     ·  Problem: Type 2 diabetes mellitus with complication, with long-term current use of insulin.  Plan: Recurrent hypoglycemia now hyperglycemic.  Increase Lantus.  Continue Insulin coverage.     ·  Problem: Anemia, unspecified type.  Plan: ferrous sulfate.     ·  Problem: Benign prostatic hyperplasia, presence of lower urinary tract symptoms unspecified.  Plan: proscar & flomax.     Problem: Parkinsons. Plan: c/w carbidopa/levodopa.  Disposition: stable for discharge.  Outpatient followup as above.  Patient was advised to return if they experience any recurrence or worsening of symptoms.  Total time spent on discharge was 35 minutes.  -Ismael Stafford D.O., Hospitalist, [facility]  Discharge d/w wife [phone]

## 2017-08-15 NOTE — PROGRESS NOTE ADULT - SUBJECTIVE AND OBJECTIVE BOX
Cardiology Follow-up    SHARON FELDMAN was seen and examined. No events overnight. The patient denies any chest pain, SOB, palpitations, orthopnea, PND or abdominal pain.His creatinine is down to 1.1.    PROBLEM LIST:  Parkinsons  Benign prostatic hyperplasia, presence of lower urinary tract symptoms unspecified  Anemia, unspecified type  Type 2 diabetes mellitus with complication, with long-term current use of insulin  Essential hypertension  Acute congestive heart failure, unspecified congestive heart failure type    PAST MEDICAL HISTORY:  Stroke  DVT (deep venous thrombosis)  Parkinsons  High cholesterol  Diabetes  Hypertension    PAST SURGICAL HISTORY:  S/P hip hemiarthroplasty  No significant past surgical history    MEDICATIONS  (STANDING):  finasteride 5 milliGRAM(s) Oral daily  aspirin enteric coated 162 milliGRAM(s) Oral daily  tamsulosin 0.4 milliGRAM(s) Oral at bedtime  atorvastatin 20 milliGRAM(s) Oral at bedtime  carbidopa/levodopa  25/100 1 Tablet(s) Oral three times a day  ferrous    sulfate 325 milliGRAM(s) Oral daily  timolol 0.5% Solution 1 Drop(s) Both EYES daily  insulin lispro (HumaLOG) corrective regimen sliding scale   SubCutaneous three times a day before meals  dextrose 5%. 1000 milliLiter(s) (50 mL/Hr) IV Continuous <Continuous>  dextrose 50% Injectable 12.5 Gram(s) IV Push once  dextrose 50% Injectable 25 Gram(s) IV Push once  dextrose 50% Injectable 25 Gram(s) IV Push once  enoxaparin Injectable 40 milliGRAM(s) SubCutaneous daily  insulin lispro Injectable (HumaLOG) 7 Unit(s) SubCutaneous three times a day with meals  insulin glargine Injectable (LANTUS) 10 Unit(s) SubCutaneous at bedtime  torsemide 10 milliGRAM(s) Oral two times a day  potassium chloride   Powder 20 milliEquivalent(s) Oral two times a day    MEDICATIONS  (PRN):  dextrose Gel 1 Dose(s) Oral once PRN Blood Glucose LESS THAN 70 milliGRAM(s)/deciliter  glucagon  Injectable 1 milliGRAM(s) IntraMuscular once PRN Glucose LESS THAN 70 milligrams/deciliter  melatonin 6 milliGRAM(s) Oral at bedtime PRN Insomnia    ALLERGIES:  No Known Allergies    PHYSICAL EXAM:  T(C): 36.7 (08-15-17 @ 05:06), Max: 36.7 (17 @ 12:06)  T(F): 98 (08-15-17 @ 05:06), Max: 98.1 (17 @ 21:25)  HR: 74 (08-15-17 @ 05:06) (63 - 80)  BP: 102/62 (08-15-17 @ 05:06) (80/48 - 153/75)  RR: 16 (08-15-17 @ 05:06) (16 - 18)  SpO2: 98% (08-15-17 @ 05:06) (95% - 99%)  Wt(kg): --  I&O's Summary    14 Aug 2017 07:  -  15 Aug 2017 07:00  --------------------------------------------------------  IN: 0 mL / OUT: 1355 mL / NET: -1355 mL    15 Aug 2017 07:  -  15 Aug 2017 07:58  --------------------------------------------------------  IN: 0 mL / OUT: 400 mL / NET: -400 mL      Telemetry: sinus rhythm  General: comfortable, in NAD  Heart: +S1S2, RRR, no murmurs, no rubs, no gallops  Lungs: mild expiratory wheezes,no rales  Abdomen: soft, non-tender, non-distended, + bowel sounds  Extremities: no clubbing, no cyanosis, trace pedal edema  Neuro: A&O x3    LABS:                              10.2   5.9   )-----------( 264      ( 15 Aug 2017 06:43 )             31.4     08-15    139  |  98  |  36.0<H>  ----------------------------<  116<H>  4.2   |  26.0  |  1.18    Ca    8.7      15 Aug 2017 06:43  Mg     2.2             RADIOLOGY & ADDITIONAL TESTS:    ASSESSMENT:  SHARON FELDMAN is a 77y old male with a history of essential hypertension,IDDM,Parkinson's,hyperlipidemia who presented with shortness of breath and leg edema.His acute on chronic diastolic CHF is improved.    Please permit me to suggest the followin. Will leave on Torsemide 10mg bid which he is tolerating  2.Continue to follow renal function as an outpatient  3.He is cleared for discharge from a cardiac standpoint

## 2017-08-15 NOTE — DISCHARGE NOTE ADULT - CARE PROVIDERS DIRECT ADDRESSES
[email or web address]

## 2017-08-15 NOTE — DISCHARGE NOTE ADULT - CARE PLAN
Principal Discharge DX:	CHF (congestive heart failure), NYHA class II, acute on chronic, diastolic  Goal:	stable for discharge  Instructions for follow-up, activity and diet:	It is important to see your primary physician as well as the physicians noted below within the next week to perform a comprehensive medical review.  Call their offices for an appointment as soon as you leave the hospital.  If you do not have a primary physician, contact the [facility] [place] [phone] located on [address] [place] [address], [place] [address].  Your medical issues appear to be stable at this time, but if your symptoms recur or worsen, contact your physicians and/or return to the hospital if necessary.  If you encounter any issues or questions with your medication, call your physicians before stopping the medication.  Do not drive.  Limit your diet to 2 grams of sodium daily.  Secondary Diagnosis:	Type 2 diabetes mellitus without complication, without long-term current use of insulin  Secondary Diagnosis:	Essential hypertension  Secondary Diagnosis:	High cholesterol  Secondary Diagnosis:	Parkinsons Principal Discharge DX:	CHF (congestive heart failure), NYHA class II, acute on chronic, diastolic  Goal:	stable for discharge  Instructions for follow-up, activity and diet:	It is important to see your primary physician as well as the physicians noted below within the next week to perform a comprehensive medical review.  Call their offices for an appointment as soon as you leave the hospital.  If you do not have a primary physician, contact the [facility] [place] [phone] located on [address] [place] [address], [place] [address].  Your medical issues appear to be stable at this time, but if your symptoms recur or worsen, contact your physicians and/or return to the hospital if necessary.  If you encounter any issues or questions with your medication, call your physicians before stopping the medication.  Do not drive.  Limit your diet to 2 grams of sodium daily.  Secondary Diagnosis:	Type 2 diabetes mellitus without complication, without long-term current use of insulin  Secondary Diagnosis:	Essential hypertension  Secondary Diagnosis:	High cholesterol  Secondary Diagnosis:	Parkinsons Principal Discharge DX:	CHF (congestive heart failure), NYHA class II, acute on chronic, diastolic  Goal:	stable for discharge  Instructions for follow-up, activity and diet:	It is important to see your primary physician as well as the physicians noted below within the next week to perform a comprehensive medical review.  Call their offices for an appointment as soon as you leave the hospital.  If you do not have a primary physician, contact the [facility] [place] [phone] located on [address] [place] [address], [place] [address].  Your medical issues appear to be stable at this time, but if your symptoms recur or worsen, contact your physicians and/or return to the hospital if necessary.  If you encounter any issues or questions with your medication, call your physicians before stopping the medication.  Do not drive.  Limit your diet to 2 grams of sodium daily.  Secondary Diagnosis:	Type 2 diabetes mellitus without complication, without long-term current use of insulin  Secondary Diagnosis:	Essential hypertension  Secondary Diagnosis:	High cholesterol  Secondary Diagnosis:	Parkinsons Principal Discharge DX:	CHF (congestive heart failure), NYHA class II, acute on chronic, diastolic  Goal:	stable for discharge  Instructions for follow-up, activity and diet:	It is important to see your primary physician as well as the physicians noted below within the next week to perform a comprehensive medical review.  Call their offices for an appointment as soon as you leave the hospital.  If you do not have a primary physician, contact the [facility] [place] [phone] located on [address] [place] [address], [place] [address].  Your medical issues appear to be stable at this time, but if your symptoms recur or worsen, contact your physicians and/or return to the hospital if necessary.  If you encounter any issues or questions with your medication, call your physicians before stopping the medication.  Do not drive.  Limit your diet to 2 grams of sodium daily.  Secondary Diagnosis:	Type 2 diabetes mellitus without complication, without long-term current use of insulin  Secondary Diagnosis:	Essential hypertension  Secondary Diagnosis:	High cholesterol  Secondary Diagnosis:	Parkinsons

## 2017-08-15 NOTE — DISCHARGE NOTE ADULT - COMMUNITY RESOURCES
[facility] [address] [place] [phone]   [phone]   Tue/Thur/Sat 9am-3pm

## 2017-08-15 NOTE — DISCHARGE NOTE ADULT - MEDICATION SUMMARY - MEDICATIONS TO TAKE
I will START or STAY ON the medications listed below when I get home from the hospital:    aspirin 81 mg oral delayed release tablet  -- 2 tab(s) by mouth once a day  -- Indication: For Stroke    carbidopa-levodopa 25 mg-100 mg oral tablet  -- 1 tab(s) by mouth 3 times a day  -- Indication: For Parkinsons    torsemide 10 mg oral tablet  -- 1 tab(s) by mouth 2 times a day  -- Indication: For Acute CHF    ferrous sulfate 325 mg (65 mg elemental iron) oral delayed release tablet  -- 1 tab(s) by mouth 2 times a day  -- Indication: For Anemia, unspecified type    potassium chloride 20 mEq oral powder for reconstitution  -- 1 packet(s) by mouth 2 times a day  -- Indication: For Supplement    timolol maleate 0.5% ophthalmic solution  -- 1 drop(s) to each affected eye once a day  -- Indication: For Glaucoma    Drisdol 50,000 intl units (1.25 mg) oral capsule  -- 1 cap(s) by mouth once a week  -- Indication: For Supplement I will START or STAY ON the medications listed below when I get home from the hospital:    finasteride 5 mg oral tablet  -- 1 tab(s) by mouth once a day  -- Indication: For BPH    aspirin 81 mg oral delayed release tablet  -- 2 tab(s) by mouth once a day  -- Indication: For Stroke    tamsulosin 0.4 mg oral capsule  -- 1 cap(s) by mouth once a day  -- Indication: For BPH    insulin glargine 100 units/mL subcutaneous solution  -- 10 unit(s) subcutaneous once a day  -- Indication: For Diabetes    atorvastatin 20 mg oral tablet  -- 1 tab(s) by mouth once a day  -- Indication: For HLD    carbidopa-levodopa 25 mg-100 mg oral tablet  -- 1 tab(s) by mouth 3 times a day  -- Indication: For Parkinsons    torsemide 10 mg oral tablet  -- 1 tab(s) by mouth 2 times a day  -- Indication: For CHF (congestive heart failure), NYHA class II, acute on chronic, diastolic    ferrous sulfate 325 mg (65 mg elemental iron) oral delayed release tablet  -- 1 tab(s) by mouth 2 times a day  -- Indication: For Anemia, unspecified type    potassium chloride 20 mEq oral powder for reconstitution  -- 1 packet(s) by mouth 2 times a day  -- Indication: For Supplement    timolol maleate 0.5% ophthalmic solution  -- 1 drop(s) to each affected eye once a day  -- Indication: For Glaucoma    Drisdol 50,000 intl units (1.25 mg) oral capsule  -- 1 cap(s) by mouth once a week  -- Indication: For Supplement

## 2017-08-15 NOTE — DISCHARGE NOTE ADULT - PATIENT PORTAL LINK FT
“You can access the [employer] Patient Portal, offered by [facility], by registering with the following website: [email or web address]”

## 2017-08-15 NOTE — DISCHARGE NOTE ADULT - CARE PROVIDER_API CALL
Rey Rader), Cardiology; Internal Medicine  [address] [place] [address]  Phone: [phone]  Fax: [phone] Rey Rader), Cardiology; Internal Medicine  [address] [place] [address]  Phone: [phone]  Fax: [phone]    Beni Waterman (), Family Medicine  [address] [place] [address]  Phone: [phone]  Fax: [phone]

## 2017-08-18 PROBLEM — I63.9 CEREBRAL INFARCTION, UNSPECIFIED: Chronic | Status: ACTIVE | Noted: 2017-08-05

## 2017-08-19 ENCOUNTER — INPATIENT (INPATIENT)
Facility: HOSPITAL | Age: 77
LOS: 4 days | Discharge: NURSING, REHAB OR LONG-TERM CARE | DRG: 312 | End: 2017-08-24
Admitting: HOSPITALIST
Payer: COMMERCIAL

## 2017-08-19 VITALS
HEART RATE: 75 BPM | SYSTOLIC BLOOD PRESSURE: 126 MMHG | OXYGEN SATURATION: 9 % | DIASTOLIC BLOOD PRESSURE: 72 MMHG | RESPIRATION RATE: 18 BRPM | HEIGHT: 68 IN | TEMPERATURE: 98 F

## 2017-08-19 PROCEDURE — 72125 CT NECK SPINE W/O DYE: CPT | Mod: 26

## 2017-08-19 PROCEDURE — 70450 CT HEAD/BRAIN W/O DYE: CPT | Mod: 26

## 2017-08-19 PROCEDURE — 93010 ELECTROCARDIOGRAM REPORT: CPT

## 2017-08-19 NOTE — ED PROVIDER NOTE - OBJECTIVE STATEMENT
78 y/o male with PMHx DM, DVT, HTN, stroke (2016) and Parkinson's presents to the ED via EMS for evaluation of fall injury with questionable LOC. Pt uses walker to ambulate. Pt reports weakness and nausea. Pt was evaluated in the ED 4 days ago, states he "gained 30 lbs in water." Per family pt had edema at that time, gained 35 lbs in two weeks, and went into cardiac arrest when pt was admitted, pt was d/c 4 days ago. Family gave 10 unites of insulin at 1700 today. Family found pt hunched over sink, when pt fell sideways hitting left side of head, was initially not responding to verbal stimuli. Denies diaphoresis, chest pain, and any other acute symptoms and complaints at this time.

## 2017-08-19 NOTE — ED ADULT NURSE NOTE - PSYCHOSOCIAL WDL
awake and alert, baseline mental status, normal mood and affect, no apparent risk to self or others.

## 2017-08-19 NOTE — ED PROVIDER NOTE - NS ED ROS FT
no weight change, no fever or chills  no rash, no bruises  no visual changes no eye discharge  no cough cold or congestion,   no sob, no chest pain  no orthopnea, no pnd  no abd pain, + nausea, no vomiting, no diarrhea  no hematuria, no change in urinary habits  no joint pain, no deformity  +syncope, no headache, no paresthesia

## 2017-08-19 NOTE — ED ADULT TRIAGE NOTE - CHIEF COMPLAINT QUOTE
pt shekhar from home, ems states that patient syncopized and fell onto floor. patient denies any head, chest pain, no obvious signs of bleeding, Dr. Smith at bedside for evaluation.

## 2017-08-19 NOTE — ED ADULT NURSE NOTE - OBJECTIVE STATEMENT
Pt awake and alert, per family pt baseline mental status. family states pt had unwitnessed fall @ home, unknown LOC. pt reports feeling weak and nauseous, states he felt dizzy and "fell on bathroom sink hitting head." no deformities, abrasions or bruising noted. pt denies headache or dizziness at this time. pt denies recent fever, chills, abd pain, n/v/d. pt awaiting results will continue to monitor.

## 2017-08-19 NOTE — ED PROVIDER NOTE - CHPI ED SYMPTOMS POS
Patient informed Alk phos enzymes are all fine.  Patient states she took meloxicam for 2 days but there were no relief in her joint pain so she started taking ibuprofen.  Patient states she has been taking 800mg ibuprofen every 12 hours as needed for pain with relief.  Patient has an appointment with Dr Franklin on 5/31.  Dr Pelaez updated.      NAUSEA/SYNCOPE

## 2017-08-20 DIAGNOSIS — I63.9 CEREBRAL INFARCTION, UNSPECIFIED: ICD-10-CM

## 2017-08-20 DIAGNOSIS — E78.00 PURE HYPERCHOLESTEROLEMIA, UNSPECIFIED: ICD-10-CM

## 2017-08-20 DIAGNOSIS — E11.9 TYPE 2 DIABETES MELLITUS WITHOUT COMPLICATIONS: ICD-10-CM

## 2017-08-20 DIAGNOSIS — R55 SYNCOPE AND COLLAPSE: ICD-10-CM

## 2017-08-20 DIAGNOSIS — I10 ESSENTIAL (PRIMARY) HYPERTENSION: ICD-10-CM

## 2017-08-20 DIAGNOSIS — I50.32 CHRONIC DIASTOLIC (CONGESTIVE) HEART FAILURE: ICD-10-CM

## 2017-08-20 LAB
ALBUMIN SERPL ELPH-MCNC: 3.3 G/DL — SIGNIFICANT CHANGE UP (ref 3.3–5.2)
ALP SERPL-CCNC: 82 U/L — SIGNIFICANT CHANGE UP (ref 40–120)
ALT FLD-CCNC: 5 U/L — SIGNIFICANT CHANGE UP
ANION GAP SERPL CALC-SCNC: 17 MMOL/L — SIGNIFICANT CHANGE UP (ref 5–17)
APTT BLD: 27.1 SEC — LOW (ref 27.5–37.4)
AST SERPL-CCNC: 16 U/L — SIGNIFICANT CHANGE UP
BASOPHILS # BLD AUTO: 0 K/UL — SIGNIFICANT CHANGE UP (ref 0–0.2)
BASOPHILS NFR BLD AUTO: 0.4 % — SIGNIFICANT CHANGE UP (ref 0–2)
BILIRUB SERPL-MCNC: 0.1 MG/DL — LOW (ref 0.4–2)
BUN SERPL-MCNC: 27 MG/DL — HIGH (ref 8–20)
CALCIUM SERPL-MCNC: 9.4 MG/DL — SIGNIFICANT CHANGE UP (ref 8.6–10.2)
CHLORIDE SERPL-SCNC: 96 MMOL/L — LOW (ref 98–107)
CO2 SERPL-SCNC: 26 MMOL/L — SIGNIFICANT CHANGE UP (ref 22–29)
CREAT SERPL-MCNC: 1.13 MG/DL — SIGNIFICANT CHANGE UP (ref 0.5–1.3)
EOSINOPHIL # BLD AUTO: 0.1 K/UL — SIGNIFICANT CHANGE UP (ref 0–0.5)
EOSINOPHIL NFR BLD AUTO: 0.6 % — SIGNIFICANT CHANGE UP (ref 0–6)
ETHANOL SERPL-MCNC: <10 MG/DL — SIGNIFICANT CHANGE UP
GLUCOSE SERPL-MCNC: 164 MG/DL — HIGH (ref 70–115)
HCT VFR BLD CALC: 34.9 % — LOW (ref 42–52)
HGB BLD-MCNC: 11.6 G/DL — LOW (ref 14–18)
INR BLD: 0.93 RATIO — SIGNIFICANT CHANGE UP (ref 0.88–1.16)
LYMPHOCYTES # BLD AUTO: 1.3 K/UL — SIGNIFICANT CHANGE UP (ref 1–4.8)
LYMPHOCYTES # BLD AUTO: 9.2 % — LOW (ref 20–55)
MAGNESIUM SERPL-MCNC: 1.9 MG/DL — SIGNIFICANT CHANGE UP (ref 1.6–2.6)
MCHC RBC-ENTMCNC: 30.5 PG — SIGNIFICANT CHANGE UP (ref 27–31)
MCHC RBC-ENTMCNC: 33.2 G/DL — SIGNIFICANT CHANGE UP (ref 32–36)
MCV RBC AUTO: 91.8 FL — SIGNIFICANT CHANGE UP (ref 80–94)
MONOCYTES # BLD AUTO: 1 K/UL — HIGH (ref 0–0.8)
MONOCYTES NFR BLD AUTO: 7.3 % — SIGNIFICANT CHANGE UP (ref 3–10)
NEUTROPHILS # BLD AUTO: 11.6 K/UL — HIGH (ref 1.8–8)
NEUTROPHILS NFR BLD AUTO: 82.2 % — HIGH (ref 37–73)
NT-PROBNP SERPL-SCNC: 334 PG/ML — HIGH (ref 0–300)
PLATELET # BLD AUTO: 299 K/UL — SIGNIFICANT CHANGE UP (ref 150–400)
POTASSIUM SERPL-MCNC: 3.8 MMOL/L — SIGNIFICANT CHANGE UP (ref 3.5–5.3)
POTASSIUM SERPL-SCNC: 3.8 MMOL/L — SIGNIFICANT CHANGE UP (ref 3.5–5.3)
PROT SERPL-MCNC: 6.7 G/DL — SIGNIFICANT CHANGE UP (ref 6.6–8.7)
PROTHROM AB SERPL-ACNC: 10.2 SEC — SIGNIFICANT CHANGE UP (ref 9.8–12.7)
RBC # BLD: 3.8 M/UL — LOW (ref 4.6–6.2)
RBC # FLD: 13.7 % — SIGNIFICANT CHANGE UP (ref 11–15.6)
SODIUM SERPL-SCNC: 139 MMOL/L — SIGNIFICANT CHANGE UP (ref 135–145)
TROPONIN T SERPL-MCNC: 0.06 NG/ML — SIGNIFICANT CHANGE UP (ref 0–0.06)
TROPONIN T SERPL-MCNC: 0.08 NG/ML — HIGH (ref 0–0.06)
TSH SERPL-MCNC: 3.2 UIU/ML — SIGNIFICANT CHANGE UP (ref 0.27–4.2)
URATE SERPL-MCNC: 6 MG/DL — SIGNIFICANT CHANGE UP (ref 3.4–7)
WBC # BLD: 14 K/UL — HIGH (ref 4.8–10.8)
WBC # FLD AUTO: 14 K/UL — HIGH (ref 4.8–10.8)

## 2017-08-20 PROCEDURE — 71010: CPT | Mod: 26

## 2017-08-20 PROCEDURE — 73560 X-RAY EXAM OF KNEE 1 OR 2: CPT | Mod: 26,50

## 2017-08-20 PROCEDURE — 99233 SBSQ HOSP IP/OBS HIGH 50: CPT

## 2017-08-20 PROCEDURE — 99285 EMERGENCY DEPT VISIT HI MDM: CPT | Mod: 25

## 2017-08-20 PROCEDURE — 99223 1ST HOSP IP/OBS HIGH 75: CPT

## 2017-08-20 RX ORDER — DEXTROSE 50 % IN WATER 50 %
1 SYRINGE (ML) INTRAVENOUS ONCE
Qty: 0 | Refills: 0 | Status: DISCONTINUED | OUTPATIENT
Start: 2017-08-20 | End: 2017-08-24

## 2017-08-20 RX ORDER — INSULIN LISPRO 100/ML
6 VIAL (ML) SUBCUTANEOUS ONCE
Qty: 0 | Refills: 0 | Status: COMPLETED | OUTPATIENT
Start: 2017-08-20 | End: 2017-08-20

## 2017-08-20 RX ORDER — DEXTROSE 50 % IN WATER 50 %
25 SYRINGE (ML) INTRAVENOUS ONCE
Qty: 0 | Refills: 0 | Status: DISCONTINUED | OUTPATIENT
Start: 2017-08-20 | End: 2017-08-24

## 2017-08-20 RX ORDER — ACETAMINOPHEN 500 MG
650 TABLET ORAL EVERY 6 HOURS
Qty: 0 | Refills: 0 | Status: DISCONTINUED | OUTPATIENT
Start: 2017-08-20 | End: 2017-08-24

## 2017-08-20 RX ORDER — INSULIN LISPRO 100/ML
VIAL (ML) SUBCUTANEOUS
Qty: 0 | Refills: 0 | Status: DISCONTINUED | OUTPATIENT
Start: 2017-08-20 | End: 2017-08-24

## 2017-08-20 RX ORDER — SODIUM CHLORIDE 9 MG/ML
1000 INJECTION, SOLUTION INTRAVENOUS
Qty: 0 | Refills: 0 | Status: DISCONTINUED | OUTPATIENT
Start: 2017-08-20 | End: 2017-08-24

## 2017-08-20 RX ORDER — POTASSIUM CHLORIDE 20 MEQ
20 PACKET (EA) ORAL DAILY
Qty: 0 | Refills: 0 | Status: DISCONTINUED | OUTPATIENT
Start: 2017-08-21 | End: 2017-08-21

## 2017-08-20 RX ORDER — TIMOLOL 0.5 %
1 DROPS OPHTHALMIC (EYE) DAILY
Qty: 0 | Refills: 0 | Status: DISCONTINUED | OUTPATIENT
Start: 2017-08-20 | End: 2017-08-24

## 2017-08-20 RX ORDER — TAMSULOSIN HYDROCHLORIDE 0.4 MG/1
0.4 CAPSULE ORAL AT BEDTIME
Qty: 0 | Refills: 0 | Status: DISCONTINUED | OUTPATIENT
Start: 2017-08-20 | End: 2017-08-24

## 2017-08-20 RX ORDER — ATORVASTATIN CALCIUM 80 MG/1
20 TABLET, FILM COATED ORAL AT BEDTIME
Qty: 0 | Refills: 0 | Status: DISCONTINUED | OUTPATIENT
Start: 2017-08-20 | End: 2017-08-24

## 2017-08-20 RX ORDER — POTASSIUM CHLORIDE 20 MEQ
20 PACKET (EA) ORAL
Qty: 0 | Refills: 0 | Status: DISCONTINUED | OUTPATIENT
Start: 2017-08-20 | End: 2017-08-20

## 2017-08-20 RX ORDER — INSULIN GLARGINE 100 [IU]/ML
10 INJECTION, SOLUTION SUBCUTANEOUS AT BEDTIME
Qty: 0 | Refills: 0 | Status: DISCONTINUED | OUTPATIENT
Start: 2017-08-20 | End: 2017-08-24

## 2017-08-20 RX ORDER — DEXTROSE 50 % IN WATER 50 %
12.5 SYRINGE (ML) INTRAVENOUS ONCE
Qty: 0 | Refills: 0 | Status: DISCONTINUED | OUTPATIENT
Start: 2017-08-20 | End: 2017-08-24

## 2017-08-20 RX ORDER — GLUCAGON INJECTION, SOLUTION 0.5 MG/.1ML
1 INJECTION, SOLUTION SUBCUTANEOUS ONCE
Qty: 0 | Refills: 0 | Status: DISCONTINUED | OUTPATIENT
Start: 2017-08-20 | End: 2017-08-24

## 2017-08-20 RX ORDER — CARBIDOPA AND LEVODOPA 25; 100 MG/1; MG/1
1 TABLET ORAL THREE TIMES A DAY
Qty: 0 | Refills: 0 | Status: DISCONTINUED | OUTPATIENT
Start: 2017-08-20 | End: 2017-08-24

## 2017-08-20 RX ORDER — FINASTERIDE 5 MG/1
5 TABLET, FILM COATED ORAL DAILY
Qty: 0 | Refills: 0 | Status: DISCONTINUED | OUTPATIENT
Start: 2017-08-20 | End: 2017-08-24

## 2017-08-20 RX ORDER — FERROUS SULFATE 325(65) MG
325 TABLET ORAL
Qty: 0 | Refills: 0 | Status: DISCONTINUED | OUTPATIENT
Start: 2017-08-20 | End: 2017-08-24

## 2017-08-20 RX ORDER — ASPIRIN/CALCIUM CARB/MAGNESIUM 324 MG
162 TABLET ORAL DAILY
Qty: 0 | Refills: 0 | Status: DISCONTINUED | OUTPATIENT
Start: 2017-08-20 | End: 2017-08-24

## 2017-08-20 RX ADMIN — ATORVASTATIN CALCIUM 20 MILLIGRAM(S): 80 TABLET, FILM COATED ORAL at 22:04

## 2017-08-20 RX ADMIN — Medication 6: at 16:43

## 2017-08-20 RX ADMIN — INSULIN GLARGINE 10 UNIT(S): 100 INJECTION, SOLUTION SUBCUTANEOUS at 22:03

## 2017-08-20 RX ADMIN — CARBIDOPA AND LEVODOPA 1 TABLET(S): 25; 100 TABLET ORAL at 14:52

## 2017-08-20 RX ADMIN — Medication 650 MILLIGRAM(S): at 09:44

## 2017-08-20 RX ADMIN — Medication 650 MILLIGRAM(S): at 10:32

## 2017-08-20 RX ADMIN — Medication 650 MILLIGRAM(S): at 22:03

## 2017-08-20 RX ADMIN — Medication 162 MILLIGRAM(S): at 12:35

## 2017-08-20 RX ADMIN — Medication 10: at 12:44

## 2017-08-20 RX ADMIN — TAMSULOSIN HYDROCHLORIDE 0.4 MILLIGRAM(S): 0.4 CAPSULE ORAL at 22:04

## 2017-08-20 RX ADMIN — Medication 325 MILLIGRAM(S): at 16:44

## 2017-08-20 RX ADMIN — Medication 1 DROP(S): at 12:36

## 2017-08-20 RX ADMIN — CARBIDOPA AND LEVODOPA 1 TABLET(S): 25; 100 TABLET ORAL at 22:04

## 2017-08-20 NOTE — H&P ADULT - ASSESSMENT
77 yr old male with hypertension, diastolic CHF, stroke, diabetes mellitus, hyperlipidemia, Parkinson's admitted with complaints of syncope. Labs and imaging reviewed. Recent discharge after CHF exacerbation.

## 2017-08-20 NOTE — ED ADULT NURSE REASSESSMENT NOTE - NS ED NURSE REASSESS COMMENT FT1
Assumed care of pt. pt at CT, will assess when pt returns to ED.
per MD, pt to be admitted for elevated trop. pt lying comfortably, denies any pain or discomfort. pt awaiting admitting MD consult. will continue to monitor.

## 2017-08-20 NOTE — CONSULT NOTE ADULT - SUBJECTIVE AND OBJECTIVE BOX
Cardiology Consult    CHIEF COMPLAINT: Syncope and collapse    HISTORY OF PRESENT ILLNESS: SHARON FELDMAN is a 77y old male with a history of IDDM (poorly controlled) with diabetic neuropathy,  essential hypertension, pure hypercholesterolemia, aortic root dilatation (4.4 cm by Echo) and aortic atherosclerosis who was recently hospitalized with acute diastolic heart failure with significant lower extremity edema. He was aggressively diuresed and was discharged home. He was feeling much better and was able to walk up stairs much easier however yesterday, he ate some soup and stood up and walked into the kitchen and the next thing he remembered, he was on the floor with his wife trying to wake him up. There was no warning sign and he does not remember contact with the floor. He has WHITE which is better than it had been. He denies chest pain, palpitations, orthopnea, PND or abdominal pain. His only complaint at this time is knee pain.      PROBLEM LIST:    PAST MEDICAL HISTORY:  Chronic diastolic heart failure  Stroke  DVT (deep venous thrombosis)  Parkinsons  High cholesterol  Diabetes  Hypertension    PAST SURGICAL HISTORY:  S/P hip hemiarthroplasty  No significant past surgical history    MEDICATIONS  (STANDING):  finasteride 5 milliGRAM(s) Oral daily  aspirin enteric coated 162 milliGRAM(s) Oral daily  tamsulosin 0.4 milliGRAM(s) Oral at bedtime  atorvastatin 20 milliGRAM(s) Oral at bedtime  carbidopa/levodopa  25/100 1 Tablet(s) Oral three times a day  torsemide 10 milliGRAM(s) Oral two times a day  ferrous    sulfate 325 milliGRAM(s) Oral two times a day with meals  timolol 0.5% Solution 1 Drop(s) Both EYES daily  potassium chloride   Powder 20 milliEquivalent(s) Oral two times a day  insulin glargine Injectable (LANTUS) 10 Unit(s) SubCutaneous at bedtime  insulin lispro (HumaLOG) corrective regimen sliding scale   SubCutaneous three times a day before meals  dextrose 5%. 1000 milliLiter(s) (50 mL/Hr) IV Continuous <Continuous>  dextrose 50% Injectable 12.5 Gram(s) IV Push once  dextrose 50% Injectable 25 Gram(s) IV Push once  dextrose 50% Injectable 25 Gram(s) IV Push once    MEDICATIONS  (PRN):  acetaminophen   Tablet 650 milliGRAM(s) Oral every 6 hours PRN For Temp greater than 38 C (100.4 F)  acetaminophen   Tablet. 650 milliGRAM(s) Oral every 6 hours PRN Moderate Pain (4 - 6)  dextrose Gel 1 Dose(s) Oral once PRN Blood Glucose LESS THAN 70 milliGRAM(s)/deciliter  glucagon  Injectable 1 milliGRAM(s) IntraMuscular once PRN Glucose LESS THAN 70 milligrams/deciliter    ALLERGIES:  No Known Allergies    SOCIAL HISTORY:  Quit smoking 35 years ago  Denies alcohol use    FAMILY HISTORY:  No significant family history    REVIEW OF SYSTEMS:  Constitutional: no fatigue, no fevers/chills, no weight change  HEENT: no visual disturbances, no hearing loss  Cardiac: no chest pain, no palpitations, no orthopnea, no PND, edema is much improved  Respiratory: + WHITE but improved, no cough  GI: no abdominal pain, no blood in the stool, no N/V, no diarrhea  Urological: no dysuria, no hematuria  Hematological: no easy bruising or bleeding  Musculoskeletal: + knee pain, no back pain  Neurological: no headaches, + syncope  Psychiatric: no anxiety, no depression  Skin: no rashes    PHYSICAL EXAM:      T(C): 36.5 (17 @ 07:33), Max: 36.9 (17 @ 06:42)  T(F): 97.7 (17 @ 07:33), Max: 98.4 (17 @ 06:42)  HR: 72 (17 @ 07:33) (72 - 86)  BP: 149/74 (17 @ 07:33) (126/72 - 149/74)  RR: 14 (17 @ 07:33) (14 - 19)  SpO2: 93% (17 @ 07:33) (9% - 96%)  Wt(kg): --  Telemetry: Sinus rhythm  General: comfortable, in NAD  HEENT: EOMI, normocephalic, atraumatic  Neck: no JVD, no carotid bruits  Heart: +S1S2, RRR, no murmurs, no rubs, no gallops  Lungs: decreased breath sounds at the right base, rare inspiratory and expiratory wheezes, no rales, no rhonchi  Abdomen: soft, non-tender, non-distended, + bowel sounds  Extremities: no clubbing, no cyanosis, trace bilateral lower extremity edema (much improved)  Vascular: 2+ dorsalis pedis pulses bilaterally  Neuro: A&O x3    EKG: Sinus rhythm, HR 71, normal axis, mildly prolonged QT, nonspecific T wave abnormalities in the anterior leads    LABS:  Serum Pro-Brain Natriuretic Peptide: 334 pg/mL (17 @ 01:31)  Serum Pro-Brain Natriuretic Peptide: 488 pg/mL (17 @ 16:52)    Troponin T, Serum: 0.06 ng/mL (17 @ 12:04)  Troponin T, Serum: 0.08 ng/mL (17 @ 01:31)  Troponin T, Serum: 0.03 ng/mL (17 @ 16:52)        CBC:            11.6   14.0  >-----------< 299     @ :31            34.9         CHEMISTRY:   139   |  96     |  27.0   ----------------------------< 164     :31    3.8   |  26.0   |  1.13     eGFR non-  63     eGFR African American  73       Magnesium, Serum: 1.9 mg/dL ( 01:31)    TPro --    / Alb 3.3   / TBili 0.1   / DBili --    / AST 16    / ALT 5     / AlkPhos 82      @ 01:31    COAGS:  PT/INR - ( 20 Aug 2017 01:31 )   PT: 10.2 sec;   INR: 0.93 ratio         PTT - ( 20 Aug 2017 01:31 )  PTT:27.1 sec  URINALYSIS: ( @ 20:00)  Color Yellow / pH 6.0   / Sp Gr 1.010 / LE <<52 / Nit Negative / Prot 500   / Blood Trace / Uro Negative / WBC 0-2   / RBC 0-2   / Bacteria --       RADIOLOGY & ADDITIONAL TESTS:  CXR: The lungs demonstrate right basilar subsegmental atelectasis The heart   and mediastinum appear intact.  Significant dextroscoliosis of the   thoracic spine is noted. Posttraumatic deformity of the left humerus is   noted.    Echo (17):   1. Left ventricular ejection fraction, by visual estimation, is 55 to   60%.   2. Spectral Doppler shows pseudonormal pattern of left ventricular   myocardial filling (Grade II diastolic dysfunction).   3. Sclerotic aortic valve with normal opening.   4. Thickening and calcification of the anterior and posterior mitral   valve leaflets.   5. Dilatation of the sinuses of Valsalva.      ASSESSMENT:  SHARON FELDMAN is a 77y old male with a history of IDDM (poorly controlled) with diabetic neuropathy,  essential hypertension, pure hypercholesterolemia, aortic root dilatation (4.4 cm by Echo), aortic atherosclerosis and recent acute diastolic heart failure who presents with syncope and a positive troponin.    Please permit me to suggest the followin. His CHF and edema appear to be much improved however I suspect that his syncopal episode was secondary to orthostatic hypotension from overdiuresis. I will decrease torsemide to 10 mg daily and KCl to 20 mEq daily and order orthostatic vitals q6 hours. Keep K>4.0, Mg>2.0.   2. No need to repeat another Echo at this time. It is reasonable to check his carotids.  3. Monitor on telemetry.  4. PT should see him tomorrow. If he is not orthostatic and walks ok for PT, he can possibly be discharged home tomorrow. Cardiology Consult    CHIEF COMPLAINT: Syncope and collapse    HISTORY OF PRESENT ILLNESS: SHARON FELDMAN is a 77y old male with a history of IDDM (poorly controlled) with diabetic neuropathy,  essential hypertension, pure hypercholesterolemia, aortic root dilatation (4.4 cm by Echo) and aortic atherosclerosis who was recently hospitalized with acute diastolic heart failure with significant lower extremity edema. He was aggressively diuresed and was discharged home. He was feeling much better and was able to walk up stairs much easier however yesterday, he ate some soup and stood up and walked into the kitchen and the next thing he remembered, he was on the floor with his wife trying to wake him up. There was no warning sign and he does not remember contact with the floor. He has WHITE which is better than it had been. He denies chest pain, palpitations, orthopnea, PND or abdominal pain. His only complaint at this time is knee pain.      PROBLEM LIST:    PAST MEDICAL HISTORY:  Chronic diastolic heart failure  Stroke  DVT (deep venous thrombosis)  Parkinsons  High cholesterol  Diabetes  Hypertension    PAST SURGICAL HISTORY:  S/P hip hemiarthroplasty  No significant past surgical history    MEDICATIONS  (STANDING):  finasteride 5 milliGRAM(s) Oral daily  aspirin enteric coated 162 milliGRAM(s) Oral daily  tamsulosin 0.4 milliGRAM(s) Oral at bedtime  atorvastatin 20 milliGRAM(s) Oral at bedtime  carbidopa/levodopa  25/100 1 Tablet(s) Oral three times a day  torsemide 10 milliGRAM(s) Oral two times a day  ferrous    sulfate 325 milliGRAM(s) Oral two times a day with meals  timolol 0.5% Solution 1 Drop(s) Both EYES daily  potassium chloride   Powder 20 milliEquivalent(s) Oral two times a day  insulin glargine Injectable (LANTUS) 10 Unit(s) SubCutaneous at bedtime  insulin lispro (HumaLOG) corrective regimen sliding scale   SubCutaneous three times a day before meals  dextrose 5%. 1000 milliLiter(s) (50 mL/Hr) IV Continuous <Continuous>  dextrose 50% Injectable 12.5 Gram(s) IV Push once  dextrose 50% Injectable 25 Gram(s) IV Push once  dextrose 50% Injectable 25 Gram(s) IV Push once    MEDICATIONS  (PRN):  acetaminophen   Tablet 650 milliGRAM(s) Oral every 6 hours PRN For Temp greater than 38 C (100.4 F)  acetaminophen   Tablet. 650 milliGRAM(s) Oral every 6 hours PRN Moderate Pain (4 - 6)  dextrose Gel 1 Dose(s) Oral once PRN Blood Glucose LESS THAN 70 milliGRAM(s)/deciliter  glucagon  Injectable 1 milliGRAM(s) IntraMuscular once PRN Glucose LESS THAN 70 milligrams/deciliter    ALLERGIES:  No Known Allergies    SOCIAL HISTORY:  Quit smoking 35 years ago  Denies alcohol use    FAMILY HISTORY:  No significant family history    REVIEW OF SYSTEMS:  Constitutional: no fatigue, no fevers/chills, no weight change  HEENT: no visual disturbances, no hearing loss  Cardiac: no chest pain, no palpitations, no orthopnea, no PND, edema is much improved  Respiratory: + WHITE but improved, no cough  GI: no abdominal pain, no blood in the stool, no N/V, no diarrhea  Urological: no dysuria, no hematuria  Hematological: no easy bruising or bleeding  Musculoskeletal: + knee pain, no back pain  Neurological: no headaches, + syncope  Psychiatric: no anxiety, no depression  Skin: no rashes    PHYSICAL EXAM:      T(C): 36.5 (17 @ 07:33), Max: 36.9 (17 @ 06:42)  T(F): 97.7 (17 @ 07:33), Max: 98.4 (17 @ 06:42)  HR: 72 (17 @ 07:33) (72 - 86)  BP: 149/74 (17 @ 07:33) (126/72 - 149/74)  RR: 14 (17 @ 07:33) (14 - 19)  SpO2: 93% (17 @ 07:33) (9% - 96%)  Wt(kg): --  Telemetry: Sinus rhythm  General: comfortable, in NAD  HEENT: EOMI, normocephalic, atraumatic  Neck: no JVD, no carotid bruits  Heart: +S1S2, RRR, no murmurs, no rubs, no gallops  Lungs: decreased breath sounds at the right base, rare inspiratory and expiratory wheezes, no rales, no rhonchi  Abdomen: soft, non-tender, non-distended, + bowel sounds  Extremities: no clubbing, no cyanosis, trace bilateral lower extremity edema (much improved)  Vascular: 2+ dorsalis pedis pulses bilaterally  Neuro: A&O x3    EKG: Sinus rhythm, HR 71, normal axis, mildly prolonged QT, nonspecific T wave abnormalities in the anterior leads    LABS:  Serum Pro-Brain Natriuretic Peptide: 334 pg/mL (17 @ 01:31)  Serum Pro-Brain Natriuretic Peptide: 488 pg/mL (17 @ 16:52)    Troponin T, Serum: 0.06 ng/mL (17 @ 12:04)  Troponin T, Serum: 0.08 ng/mL (17 @ 01:31)  Troponin T, Serum: 0.03 ng/mL (17 @ 16:52)        CBC:            11.6   14.0  >-----------< 299     @ :31            34.9         CHEMISTRY:   139   |  96     |  27.0   ----------------------------< 164     :31    3.8   |  26.0   |  1.13     eGFR non-  63     eGFR African American  73       Magnesium, Serum: 1.9 mg/dL ( 01:31)    TPro --    / Alb 3.3   / TBili 0.1   / DBili --    / AST 16    / ALT 5     / AlkPhos 82      @ 01:31    COAGS:  PT/INR - ( 20 Aug 2017 01:31 )   PT: 10.2 sec;   INR: 0.93 ratio         PTT - ( 20 Aug 2017 01:31 )  PTT:27.1 sec  URINALYSIS: ( @ 20:00)  Color Yellow / pH 6.0   / Sp Gr 1.010 / LE <<52 / Nit Negative / Prot 500   / Blood Trace / Uro Negative / WBC 0-2   / RBC 0-2   / Bacteria --       RADIOLOGY & ADDITIONAL TESTS:  CXR: The lungs demonstrate right basilar subsegmental atelectasis The heart   and mediastinum appear intact.  Significant dextroscoliosis of the   thoracic spine is noted. Posttraumatic deformity of the left humerus is   noted.    Echo (17):   1. Left ventricular ejection fraction, by visual estimation, is 55 to   60%.   2. Spectral Doppler shows pseudonormal pattern of left ventricular   myocardial filling (Grade II diastolic dysfunction).   3. Sclerotic aortic valve with normal opening.   4. Thickening and calcification of the anterior and posterior mitral   valve leaflets.   5. Dilatation of the sinuses of Valsalva.      ASSESSMENT:  SHARON FELDMAN is a 77y old male with a history of IDDM (poorly controlled) with diabetic neuropathy,  essential hypertension, pure hypercholesterolemia, aortic root dilatation (4.4 cm by Echo), aortic atherosclerosis and recent acute diastolic heart failure who presents with syncope and a positive troponin.    Please permit me to suggest the followin. His CHF and edema appear to be much improved however I suspect that his syncopal episode was secondary to orthostatic hypotension from overdiuresis. I will decrease torsemide to 10 mg daily and KCl to 20 mEq daily and order orthostatic vitals q6 hours. Keep K>4.0, Mg>2.0.   2. No need to repeat another Echo at this time. It is reasonable to check his carotids.  3. Monitor on telemetry.  4. Troponin was minimally elevated and peaked at 0.08. I doubt that this is ACS since he is asymptomatic. It is probably related to the fall. I will check a CPK since rhabdomyolysis can cause an elevated troponin.  5. PT should see him tomorrow. If he is not orthostatic and walks ok for PT, he can possibly be discharged home tomorrow.

## 2017-08-20 NOTE — H&P ADULT - RS GEN PE MLT RESP DETAILS PC
breath sounds equal/good air movement/respirations non-labored/airway patent/clear to auscultation bilaterally

## 2017-08-20 NOTE — H&P ADULT - HISTORY OF PRESENT ILLNESS
77 yr old male with hypertension, diastolic CHF, stroke, diabetes mellitus, hyperlipidemia, Parkinson's admitted with complaints of syncope. He was recently discharged from the hospital for CHF and was started on Torsemide. He was doing well until yesterday when he lost consciousness after dinner. Reports falling and having LOC for about 5 mins before he regained consciousness He denies chest pain, dizziness, palpitations prior to the episode. C/o knee pain. No fever, chills. Has normal appetite, no bladder/bowel issues.    PMD: [place]

## 2017-08-20 NOTE — H&P ADULT - PROBLEM SELECTOR PLAN 1
Monitor on telemetry, likely dehydration. Recent ECHO noted. Adjust Torsemide as per cardiology. Check MRA neck as abnormal carotid US in Jan 2017. Orthostasis. Check knee Xrays.

## 2017-08-21 ENCOUNTER — APPOINTMENT (OUTPATIENT)
Dept: FAMILY MEDICINE | Facility: CLINIC | Age: 77
End: 2017-08-21

## 2017-08-21 DIAGNOSIS — G20 PARKINSON'S DISEASE: ICD-10-CM

## 2017-08-21 DIAGNOSIS — I95.1 ORTHOSTATIC HYPOTENSION: ICD-10-CM

## 2017-08-21 DIAGNOSIS — N40.0 BENIGN PROSTATIC HYPERPLASIA WITHOUT LOWER URINARY TRACT SYMPTOMS: ICD-10-CM

## 2017-08-21 DIAGNOSIS — Z29.9 ENCOUNTER FOR PROPHYLACTIC MEASURES, UNSPECIFIED: ICD-10-CM

## 2017-08-21 LAB
ANION GAP SERPL CALC-SCNC: 13 MMOL/L — SIGNIFICANT CHANGE UP (ref 5–17)
BASOPHILS # BLD AUTO: 0.1 K/UL — SIGNIFICANT CHANGE UP (ref 0–0.2)
BASOPHILS NFR BLD AUTO: 0.6 % — SIGNIFICANT CHANGE UP (ref 0–2)
BUN SERPL-MCNC: 20 MG/DL — SIGNIFICANT CHANGE UP (ref 8–20)
CALCIUM SERPL-MCNC: 8.7 MG/DL — SIGNIFICANT CHANGE UP (ref 8.6–10.2)
CHLORIDE SERPL-SCNC: 98 MMOL/L — SIGNIFICANT CHANGE UP (ref 98–107)
CK SERPL-CCNC: 62 U/L — SIGNIFICANT CHANGE UP (ref 30–200)
CO2 SERPL-SCNC: 26 MMOL/L — SIGNIFICANT CHANGE UP (ref 22–29)
CREAT SERPL-MCNC: 0.96 MG/DL — SIGNIFICANT CHANGE UP (ref 0.5–1.3)
EOSINOPHIL # BLD AUTO: 0.4 K/UL — SIGNIFICANT CHANGE UP (ref 0–0.5)
EOSINOPHIL NFR BLD AUTO: 3.2 % — SIGNIFICANT CHANGE UP (ref 0–6)
GLUCOSE SERPL-MCNC: 232 MG/DL — HIGH (ref 70–115)
HCT VFR BLD CALC: 33.6 % — LOW (ref 42–52)
HGB BLD-MCNC: 11.1 G/DL — LOW (ref 14–18)
INR BLD: 1.01 RATIO — SIGNIFICANT CHANGE UP (ref 0.88–1.16)
LYMPHOCYTES # BLD AUTO: 1.5 K/UL — SIGNIFICANT CHANGE UP (ref 1–4.8)
LYMPHOCYTES # BLD AUTO: 14.3 % — LOW (ref 20–55)
MAGNESIUM SERPL-MCNC: 1.7 MG/DL — LOW (ref 1.8–2.6)
MCHC RBC-ENTMCNC: 30.2 PG — SIGNIFICANT CHANGE UP (ref 27–31)
MCHC RBC-ENTMCNC: 33 G/DL — SIGNIFICANT CHANGE UP (ref 32–36)
MCV RBC AUTO: 91.6 FL — SIGNIFICANT CHANGE UP (ref 80–94)
MONOCYTES # BLD AUTO: 0.9 K/UL — HIGH (ref 0–0.8)
MONOCYTES NFR BLD AUTO: 8.5 % — SIGNIFICANT CHANGE UP (ref 3–10)
NEUTROPHILS # BLD AUTO: 7.9 K/UL — SIGNIFICANT CHANGE UP (ref 1.8–8)
NEUTROPHILS NFR BLD AUTO: 73.3 % — HIGH (ref 37–73)
PHOSPHATE SERPL-MCNC: 2.6 MG/DL — SIGNIFICANT CHANGE UP (ref 2.4–4.7)
PLATELET # BLD AUTO: 290 K/UL — SIGNIFICANT CHANGE UP (ref 150–400)
POTASSIUM SERPL-MCNC: 3.7 MMOL/L — SIGNIFICANT CHANGE UP (ref 3.5–5.3)
POTASSIUM SERPL-SCNC: 3.7 MMOL/L — SIGNIFICANT CHANGE UP (ref 3.5–5.3)
PROTHROM AB SERPL-ACNC: 11.1 SEC — SIGNIFICANT CHANGE UP (ref 9.8–12.7)
RBC # BLD: 3.67 M/UL — LOW (ref 4.6–6.2)
RBC # FLD: 13.7 % — SIGNIFICANT CHANGE UP (ref 11–15.6)
SODIUM SERPL-SCNC: 137 MMOL/L — SIGNIFICANT CHANGE UP (ref 135–145)
WBC # BLD: 10.8 K/UL — SIGNIFICANT CHANGE UP (ref 4.8–10.8)
WBC # FLD AUTO: 10.8 K/UL — SIGNIFICANT CHANGE UP (ref 4.8–10.8)

## 2017-08-21 PROCEDURE — 99233 SBSQ HOSP IP/OBS HIGH 50: CPT

## 2017-08-21 RX ORDER — POTASSIUM CHLORIDE 20 MEQ
10 PACKET (EA) ORAL
Qty: 0 | Refills: 0 | Status: DISCONTINUED | OUTPATIENT
Start: 2017-08-21 | End: 2017-08-24

## 2017-08-21 RX ORDER — MAGNESIUM SULFATE 500 MG/ML
2 VIAL (ML) INJECTION ONCE
Qty: 0 | Refills: 0 | Status: COMPLETED | OUTPATIENT
Start: 2017-08-21 | End: 2017-08-21

## 2017-08-21 RX ORDER — HEPARIN SODIUM 5000 [USP'U]/ML
5000 INJECTION INTRAVENOUS; SUBCUTANEOUS EVERY 12 HOURS
Qty: 0 | Refills: 0 | Status: DISCONTINUED | OUTPATIENT
Start: 2017-08-21 | End: 2017-08-24

## 2017-08-21 RX ADMIN — Medication 10 MILLIGRAM(S): at 05:01

## 2017-08-21 RX ADMIN — Medication 8: at 11:33

## 2017-08-21 RX ADMIN — CARBIDOPA AND LEVODOPA 1 TABLET(S): 25; 100 TABLET ORAL at 05:01

## 2017-08-21 RX ADMIN — Medication 4: at 09:03

## 2017-08-21 RX ADMIN — Medication 10 MILLIEQUIVALENT(S): at 18:20

## 2017-08-21 RX ADMIN — INSULIN GLARGINE 10 UNIT(S): 100 INJECTION, SOLUTION SUBCUTANEOUS at 22:34

## 2017-08-21 RX ADMIN — CARBIDOPA AND LEVODOPA 1 TABLET(S): 25; 100 TABLET ORAL at 17:18

## 2017-08-21 RX ADMIN — Medication 162 MILLIGRAM(S): at 11:33

## 2017-08-21 RX ADMIN — CARBIDOPA AND LEVODOPA 1 TABLET(S): 25; 100 TABLET ORAL at 22:33

## 2017-08-21 RX ADMIN — FINASTERIDE 5 MILLIGRAM(S): 5 TABLET, FILM COATED ORAL at 11:33

## 2017-08-21 RX ADMIN — Medication 650 MILLIGRAM(S): at 00:19

## 2017-08-21 RX ADMIN — HEPARIN SODIUM 5000 UNIT(S): 5000 INJECTION INTRAVENOUS; SUBCUTANEOUS at 18:23

## 2017-08-21 RX ADMIN — Medication 325 MILLIGRAM(S): at 18:20

## 2017-08-21 RX ADMIN — Medication 50 GRAM(S): at 11:29

## 2017-08-21 RX ADMIN — TAMSULOSIN HYDROCHLORIDE 0.4 MILLIGRAM(S): 0.4 CAPSULE ORAL at 22:33

## 2017-08-21 RX ADMIN — Medication 6: at 18:17

## 2017-08-21 RX ADMIN — Medication 1 DROP(S): at 18:19

## 2017-08-21 RX ADMIN — ATORVASTATIN CALCIUM 20 MILLIGRAM(S): 80 TABLET, FILM COATED ORAL at 22:36

## 2017-08-21 RX ADMIN — Medication 325 MILLIGRAM(S): at 09:04

## 2017-08-21 NOTE — PHYSICAL THERAPY INITIAL EVALUATION ADULT - PLANNED THERAPY INTERVENTIONS, PT EVAL
neuromuscular re-education/bed mobility training/gait training/strengthening/transfer training/balance training

## 2017-08-21 NOTE — PHYSICAL THERAPY INITIAL EVALUATION ADULT - IMPAIRED TRANSFERS: SIT/STAND, REHAB EVAL
decreased ROM/impaired postural control/pain/narrow base of support/impaired balance/decreased strength/impaired motor control/abnormal muscle tone

## 2017-08-21 NOTE — PHYSICAL THERAPY INITIAL EVALUATION ADULT - IMPAIRMENTS FOUND, PT EVAL
gait, locomotion, and balance/decreased midline orientation/poor safety awareness/muscle strength/posture

## 2017-08-21 NOTE — PHYSICAL THERAPY INITIAL EVALUATION ADULT - ADDITIONAL COMMENTS
Pt lives in a private home with his spouse. 5 steps to enter with handrails, 6 steps inside with handrails. Pt was ambulatory PTA but needed assist and RW for functional mobilty and ADLs. Pt owns RW.

## 2017-08-21 NOTE — PROGRESS NOTE ADULT - PROBLEM SELECTOR PLAN 2
Cardio consult appreciated   CHF improved and stable Cardio consult appreciated   Holding Torsemide for now. Will reevaluate pts status in am  CHF improved and stable  Observe for any signs of fluid overload Repeat orthostatics this afternoon  Hold Torsemide for now  Will reevaluate pts status as a whole tomorrow regarding Torsemide need/dose and orthostatics tx.   Assistance with getting out of bed

## 2017-08-21 NOTE — PHYSICAL THERAPY INITIAL EVALUATION ADULT - TRANSFER SAFETY CONCERNS NOTED: SIT/STAND, REHAB EVAL
decreased balance during turns/decreased weight-shifting ability/decreased safety awareness/losing balance

## 2017-08-21 NOTE — PHYSICAL THERAPY INITIAL EVALUATION ADULT - IMPAIRED TRANSFERS: BED/CHAIR, REHAB EVAL
impaired balance/pain/impaired postural control/impaired coordination/decreased ROM/decreased strength

## 2017-08-21 NOTE — PROGRESS NOTE ADULT - PROBLEM SELECTOR PLAN 3
HISS  ACCU checks  consistent carb diet Cardio consult appreciated   Holding Torsemide for now. Will reevaluate pts status in am  CHF improved and stable  Observe for any signs of fluid overload

## 2017-08-21 NOTE — PROGRESS NOTE ADULT - SUBJECTIVE AND OBJECTIVE BOX
Cardiology Follow-up    SHARON FELDMAN was seen and examined. No events overnight. The patient denies any chest pain, SOB, palpitations, orthopnea, PND or abdominal pain.    PROBLEM LIST:  Stroke  Diabetes  Chronic diastolic congestive heart failure  High cholesterol  Hypertension  Syncope    PAST MEDICAL HISTORY:  Stroke  DVT (deep venous thrombosis)  Parkinsons  High cholesterol  Diabetes  Hypertension    PAST SURGICAL HISTORY:  S/P hip hemiarthroplasty  No significant past surgical history    MEDICATIONS  (STANDING):  finasteride 5 milliGRAM(s) Oral daily  aspirin enteric coated 162 milliGRAM(s) Oral daily  tamsulosin 0.4 milliGRAM(s) Oral at bedtime  atorvastatin 20 milliGRAM(s) Oral at bedtime  carbidopa/levodopa  25/100 1 Tablet(s) Oral three times a day  ferrous    sulfate 325 milliGRAM(s) Oral two times a day with meals  timolol 0.5% Solution 1 Drop(s) Both EYES daily  insulin glargine Injectable (LANTUS) 10 Unit(s) SubCutaneous at bedtime  insulin lispro (HumaLOG) corrective regimen sliding scale   SubCutaneous three times a day before meals  dextrose 5%. 1000 milliLiter(s) (50 mL/Hr) IV Continuous <Continuous>  dextrose 50% Injectable 12.5 Gram(s) IV Push once  dextrose 50% Injectable 25 Gram(s) IV Push once  dextrose 50% Injectable 25 Gram(s) IV Push once  torsemide 10 milliGRAM(s) Oral daily  magnesium sulfate  IVPB 2 Gram(s) IV Intermittent once  potassium chloride    Tablet ER 10 milliEquivalent(s) Oral two times a day    MEDICATIONS  (PRN):  acetaminophen   Tablet 650 milliGRAM(s) Oral every 6 hours PRN For Temp greater than 38 C (100.4 F)  acetaminophen   Tablet. 650 milliGRAM(s) Oral every 6 hours PRN Moderate Pain (4 - 6)  dextrose Gel 1 Dose(s) Oral once PRN Blood Glucose LESS THAN 70 milliGRAM(s)/deciliter  glucagon  Injectable 1 milliGRAM(s) IntraMuscular once PRN Glucose LESS THAN 70 milligrams/deciliter    ALLERGIES:  No Known Allergies    I&O's Summary    PHYSICAL EXAM:  T(F): 99.1 (17 @ 08:21), Max: 99.1 (17 @ 08:21)  HR: 74 (17 @ 08:21) (74 - 83)  BP: 150/75 (17 @ 08:21) (150/75 - 187/82)  RR: 18 (17 @ 08:21) (18 - 18)  SpO2: 93% (17 @ 08:21) (93% - 99%)  Wt(kg): --    Telemetry: ***  General: comfortable, in NAD  Heart: +S1S2, RRR, no murmurs, no rubs, no gallops  Lungs: clear to auscultation bilaterally, no wheezes, no rales, no rhonchi  Abdomen: soft, non-tender, non-distended, + bowel sounds  Extremities: no clubbing, no cyanosis, no edema  Neuro: A&O x3    LABS:    Troponin T, Serum: 0.06 ng/mL ( @ 12:04)  Troponin T, Serum: 0.08 ng/mL ( @ 01:31)        CBC:            11.1   10.8  >-----------< 290     @ 08:06            33.6               11.6   14.0  >-----------< 299     @ 01:31            34.9       CHEMISTRY:   137   |  98     |  20.0   ----------------------------< 232      @ 08:06    3.7   |  26.0   |  0.96     eGFR non-  77     eGFR   89      139   |  96     |  27.0   ----------------------------< 164      @ 01:31    3.8   |  26.0   |  1.13     eGFR non-  63     eGFR African American  73       Magnesium, Serum: 1.7 mg/dL ( @ 08:06)    TPro --    / Alb 3.3   / TBili 0.1   / DBili --    / AST 16    / ALT 5     / AlkPhos --      @ 01:31    PT/INR - ( 21 Aug 2017 08:06 )   PT: 11.1 sec;   INR: 1.01 ratio         PTT - ( 20 Aug 2017 01:31 )  PTT:27.1 sec    RADIOLOGY & ADDITIONAL TESTS:  ***    ASSESSMENT:  SHARON FELDMAN is a 77y old male with a history of IDDM (poorly controlled) with diabetic neuropathy,  essential hypertension, pure hypercholesterolemia, aortic root dilatation (4.4 cm by Echo), aortic atherosclerosis and recent acute diastolic heart failure who presents with syncope and a positive troponin.    Please permit me to suggest the followin. His CHF and edema appear to be much improved however I suspect that his syncopal episode was secondary to orthostatic hypotension from overdiuresis. I decreased torsemide to 10 mg daily and ordered orthostatic vitals however the patient refused them last night. Please check them today. Keep K>4.0, Mg>2.0. I replaced magnesium and increased KCl back to 2x daily.  2. No need to repeat another Echo at this time.   3. Awaiting an MRA of the carotids because he previously had a carotid duplex that suggested an intracranial carotid stenosis.  4. No events on telemetry that would explain syncope.  5. Troponin was minimally elevated and peaked at 0.08. I doubt that this is ACS since he is asymptomatic. CPK was normal.  6. Awaiting PT evaluation. Provided that he is not orthostatic, his MRA is ok and he is able to ambulate with PT, he can be discharged today. Cardiology Follow-up    SHARON FELDMAN was seen and examined. No events overnight. The patient denies any chest pain, SOB, palpitations, orthopnea, PND or abdominal pain.    PROBLEM LIST:  Stroke  Diabetes  Chronic diastolic congestive heart failure  High cholesterol  Hypertension  Syncope    PAST MEDICAL HISTORY:  Stroke  DVT (deep venous thrombosis)  Parkinsons  High cholesterol  Diabetes  Hypertension    PAST SURGICAL HISTORY:  S/P hip hemiarthroplasty  No significant past surgical history    MEDICATIONS  (STANDING):  finasteride 5 milliGRAM(s) Oral daily  aspirin enteric coated 162 milliGRAM(s) Oral daily  tamsulosin 0.4 milliGRAM(s) Oral at bedtime  atorvastatin 20 milliGRAM(s) Oral at bedtime  carbidopa/levodopa  25/100 1 Tablet(s) Oral three times a day  ferrous    sulfate 325 milliGRAM(s) Oral two times a day with meals  timolol 0.5% Solution 1 Drop(s) Both EYES daily  insulin glargine Injectable (LANTUS) 10 Unit(s) SubCutaneous at bedtime  insulin lispro (HumaLOG) corrective regimen sliding scale   SubCutaneous three times a day before meals  dextrose 5%. 1000 milliLiter(s) (50 mL/Hr) IV Continuous <Continuous>  dextrose 50% Injectable 12.5 Gram(s) IV Push once  dextrose 50% Injectable 25 Gram(s) IV Push once  dextrose 50% Injectable 25 Gram(s) IV Push once  torsemide 10 milliGRAM(s) Oral daily  magnesium sulfate  IVPB 2 Gram(s) IV Intermittent once  potassium chloride    Tablet ER 10 milliEquivalent(s) Oral two times a day    MEDICATIONS  (PRN):  acetaminophen   Tablet 650 milliGRAM(s) Oral every 6 hours PRN For Temp greater than 38 C (100.4 F)  acetaminophen   Tablet. 650 milliGRAM(s) Oral every 6 hours PRN Moderate Pain (4 - 6)  dextrose Gel 1 Dose(s) Oral once PRN Blood Glucose LESS THAN 70 milliGRAM(s)/deciliter  glucagon  Injectable 1 milliGRAM(s) IntraMuscular once PRN Glucose LESS THAN 70 milligrams/deciliter    ALLERGIES:  No Known Allergies    I&O's Summary    PHYSICAL EXAM:  T(F): 99.1 (17 @ 08:21), Max: 99.1 (17 @ 08:21)  HR: 74 (17 @ 08:21) (74 - 83)  BP: 150/75 (17 @ 08:21) (150/75 - 187/82)  RR: 18 (17 @ 08:21) (18 - 18)  SpO2: 93% (17 @ 08:21) (93% - 99%)  Wt(kg): --    Telemetry: Sinus, HR 70s-80s  General: comfortable, in NAD  Heart: +S1S2, RRR, no murmurs, no rubs, no gallops  Lungs: clear to auscultation bilaterally, no wheezes, no rales, no rhonchi  Abdomen: soft, non-tender, non-distended, + bowel sounds  Extremities: no clubbing, no cyanosis, no edema  Neuro: A&O x3    LABS:    Troponin T, Serum: 0.06 ng/mL ( @ 12:04)  Troponin T, Serum: 0.08 ng/mL ( @ 01:31)        CBC:            11.1   10.8  >-----------< 290     @ 08:06            33.6               11.6   14.0  >-----------< 299     @ 01:31            34.9       CHEMISTRY:   137   |  98     |  20.0   ----------------------------< 232      @ 08:06    3.7   |  26.0   |  0.96     eGFR non-  77     eGFR   89      139   |  96     |  27.0   ----------------------------< 164      @ 01:31    3.8   |  26.0   |  1.13     eGFR non-  63     eGFR African American  73       Magnesium, Serum: 1.7 mg/dL ( @ 08:06)    TPro --    / Alb 3.3   / TBili 0.1   / DBili --    / AST 16    / ALT 5     / AlkPhos --      @ 01:31    PT/INR - ( 21 Aug 2017 08:06 )   PT: 11.1 sec;   INR: 1.01 ratio         PTT - ( 20 Aug 2017 01:31 )  PTT:27.1 sec    RADIOLOGY & ADDITIONAL TESTS:  None    ASSESSMENT:  SHARON FELDMAN is a 77y old male with a history of IDDM (poorly controlled) with diabetic neuropathy,  essential hypertension, pure hypercholesterolemia, aortic root dilatation (4.4 cm by Echo), aortic atherosclerosis and recent acute diastolic heart failure who presents with syncope and a positive troponin.    Please permit me to suggest the followin. His CHF and edema appear to be much improved however I suspect that his syncopal episode was secondary to orthostatic hypotension from overdiuresis. I decreased torsemide to 10 mg daily and ordered orthostatic vitals however the patient refused them last night. Please check them today. Keep K>4.0, Mg>2.0. I replaced magnesium and increased KCl back to 2x daily.  2. No need to repeat another Echo at this time.   3. Awaiting an MRA of the carotids because he previously had a carotid duplex that suggested an intracranial carotid stenosis.  4. No events on telemetry that would explain syncope.  5. Troponin was minimally elevated and peaked at 0.08. I doubt that this is ACS since he is asymptomatic. CPK was normal.  6. Awaiting PT evaluation. Provided that he is not orthostatic, his MRA is ok and he is able to ambulate with PT, he can be discharged today.

## 2017-08-21 NOTE — PHYSICAL THERAPY INITIAL EVALUATION ADULT - IMPAIRMENTS CONTRIBUTING IMPAIRED BED MOBILITY, REHAB EVAL
decreased flexibility/decreased ROM/impaired postural control/pain/impaired balance/impaired motor control/decreased strength/abnormal muscle tone

## 2017-08-21 NOTE — PROGRESS NOTE ADULT - SUBJECTIVE AND OBJECTIVE BOX
77 yr old male with hypertension, diastolic CHF, stroke, diabetes mellitus, hyperlipidemia and parkinson's admitted with complaints of syncope. As per pt, pt was standing and all of a sudden fell down, "blacked out" and woke up 5 mins later. Reports being found by his wife. Denies experiencing any symptoms pre fall. As per pt, lives at home with his wife and ambulates with walker. He was recently discharged from the hospital for CHF and was started on Torsemide. Fall scans in ED negative.     Pt seen and examined at bedside. Laying in bed, ANO x 3, NAD, eating breakfast in bed. Pt states feeling well, no new complaints today.  ROS negative.    PMD: Fink    INTERVAL HPI/OVERNIGHT EVENTS: No events over night.     MEDICATIONS  (STANDING):  finasteride 5 milliGRAM(s) Oral daily  aspirin enteric coated 162 milliGRAM(s) Oral daily  tamsulosin 0.4 milliGRAM(s) Oral at bedtime  atorvastatin 20 milliGRAM(s) Oral at bedtime  carbidopa/levodopa  25/100 1 Tablet(s) Oral three times a day  ferrous    sulfate 325 milliGRAM(s) Oral two times a day with meals  timolol 0.5% Solution 1 Drop(s) Both EYES daily  insulin glargine Injectable (LANTUS) 10 Unit(s) SubCutaneous at bedtime  insulin lispro (HumaLOG) corrective regimen sliding scale   SubCutaneous three times a day before meals  dextrose 5%. 1000 milliLiter(s) (50 mL/Hr) IV Continuous <Continuous>  dextrose 50% Injectable 12.5 Gram(s) IV Push once  dextrose 50% Injectable 25 Gram(s) IV Push once  dextrose 50% Injectable 25 Gram(s) IV Push once  potassium chloride    Tablet ER 10 milliEquivalent(s) Oral two times a day    MEDICATIONS  (PRN):  acetaminophen   Tablet 650 milliGRAM(s) Oral every 6 hours PRN For Temp greater than 38 C (100.4 F)  acetaminophen   Tablet. 650 milliGRAM(s) Oral every 6 hours PRN Moderate Pain (4 - 6)  dextrose Gel 1 Dose(s) Oral once PRN Blood Glucose LESS THAN 70 milliGRAM(s)/deciliter  glucagon  Injectable 1 milliGRAM(s) IntraMuscular once PRN Glucose LESS THAN 70 milligrams/deciliter      Allergies: No Known Allergies    PMH: CVA  DVt  Parkinsons  HLD  HTN  DM  s/p hip hemiarthroplasty    REVIEW OF SYSTEMS:  CONSTITUTIONAL: No fever, weight loss, or fatigue  RESPIRATORY: No cough, wheezing, chills or hemoptysis; No shortness of breath  CARDIOVASCULAR: No chest pain, palpitations, dizziness, or leg swelling  GASTROINTESTINAL: No abdominal or epigastric pain. No nausea, vomiting, or hematemesis; No diarrhea or constipation. No melena or hematochezia.  GENITOURINARY: No dysuria, frequency, hematuria, or incontinence  NEUROLOGICAL: No headaches, memory loss, loss of strength, numbness, or tremors  MUSCULOSKELETAL: +knee pain    Vital Signs Last 24 Hrs  T(C): 37.3 (21 Aug 2017 08:21), Max: 37.3 (21 Aug 2017 08:21)  T(F): 99.1 (21 Aug 2017 08:21), Max: 99.1 (21 Aug 2017 08:21)  HR: 74 (21 Aug 2017 08:21) (74 - 83)  BP: 150/75 (21 Aug 2017 08:21) (150/75 - 187/82)  BP(mean): --  RR: 18 (21 Aug 2017 08:21) (18 - 18)  SpO2: 93% (21 Aug 2017 08:21) (93% - 99%)    PHYSICAL EXAM:  GENERAL: NAD, well-groomed, well-developed  HEAD:  Atraumatic, Normocephalic  NERVOUS SYSTEM:  Alert & Oriented X3, Good concentration; No focal deficits   CHEST/LUNG: Clear to auscultation bilaterally; No rales, rhonchi, wheezing, or rubs  HEART: RRR; No murmurs, rubs, or gallops  ABDOMEN: Soft, Nontender, Nondistended; Bowel sounds present  EXTREMITIES:  2+ Peripheral Pulses, No clubbing, cyanosis, or edema    LABS:                        11.1   10.8  )-----------( 290      ( 21 Aug 2017 08:06 )             33.6     21 Aug 2017 08:06    137    |  98     |  20.0   ----------------------------<  232    3.7     |  26.0   |  0.96     Ca    8.7        21 Aug 2017 08:06  Phos  2.6       21 Aug 2017 08:06  Mg     1.7       21 Aug 2017 08:06      PT/INR - ( 21 Aug 2017 08:06 )   PT: 11.1 sec;   INR: 1.01 ratio         PTT - ( 20 Aug 2017 01:31 )  PTT:27.1 sec        CAPILLARY BLOOD GLUCOSE  225 (21 Aug 2017 08:06)  329 (20 Aug 2017 22:30)          RADIOLOGY & ADDITIONAL TESTS:    Imaging Personally Reviewed:  [ ] YES     Consultant(s) Notes Reviewed:      Care Discussed with Consultants/Other Providers:

## 2017-08-21 NOTE — PHYSICAL THERAPY INITIAL EVALUATION ADULT - RANGE OF MOTION EXAMINATION, REHAB EVAL
bilateral upper extremity ROM was WFL (within functional limits)/deficits as listed below/bilateral hips rigid, lacking flexion

## 2017-08-21 NOTE — PROGRESS NOTE ADULT - ASSESSMENT
77 yr old male with hypertension, diastolic CHF, stroke, diabetes mellitus, hyperlipidemia, Parkinson's admitted with complaints of syncope. Recent discharge after CHF exacerbation, was put on torsemide - previously on lasix. In ED, fall scans negative, Labs and imaging reviewed. Likely syncopal episodes is secondary to orthostatics hypotension worsened by overdiuresis with torsemide  + Orthostatics documented in ED. Recent echo on 08/06 with EF 50-55%, grade II diastolic HF. Hx of abnormal carotid US in Jan 2017, MRA neck ordered. Cardio consult appreciated - Torsemide decreased to 10mg, KCL 20 mg QD, no repeat echo needed, follow up orthostatics.

## 2017-08-21 NOTE — PHYSICAL THERAPY INITIAL EVALUATION ADULT - PERTINENT HX OF CURRENT PROBLEM, REHAB EVAL
77 yr old male with hypertension, diastolic CHF, stroke, diabetes mellitus, hyperlipidemia, Parkinson's. Pt was recently discharged from the hospital for CHF and was started on Torsemide. Reports falling and having LOC for about 5 mins before he regained consciousness. Pt admitted for Syncope

## 2017-08-22 LAB
ALBUMIN SERPL ELPH-MCNC: 2.8 G/DL — LOW (ref 3.3–5.2)
ALP SERPL-CCNC: 77 U/L — SIGNIFICANT CHANGE UP (ref 40–120)
ALT FLD-CCNC: <4 U/L — SIGNIFICANT CHANGE UP
ANION GAP SERPL CALC-SCNC: 14 MMOL/L — SIGNIFICANT CHANGE UP (ref 5–17)
APPEARANCE UR: CLEAR — SIGNIFICANT CHANGE UP
AST SERPL-CCNC: 9 U/L — SIGNIFICANT CHANGE UP
BILIRUB SERPL-MCNC: 0.3 MG/DL — LOW (ref 0.4–2)
BILIRUB UR-MCNC: NEGATIVE — SIGNIFICANT CHANGE UP
BUN SERPL-MCNC: 16 MG/DL — SIGNIFICANT CHANGE UP (ref 8–20)
CALCIUM SERPL-MCNC: 8.6 MG/DL — SIGNIFICANT CHANGE UP (ref 8.6–10.2)
CHLORIDE SERPL-SCNC: 98 MMOL/L — SIGNIFICANT CHANGE UP (ref 98–107)
CK SERPL-CCNC: 43 U/L — SIGNIFICANT CHANGE UP (ref 30–200)
CO2 SERPL-SCNC: 24 MMOL/L — SIGNIFICANT CHANGE UP (ref 22–29)
COLOR SPEC: YELLOW — SIGNIFICANT CHANGE UP
CREAT SERPL-MCNC: 0.91 MG/DL — SIGNIFICANT CHANGE UP (ref 0.5–1.3)
DIFF PNL FLD: ABNORMAL
GLUCOSE SERPL-MCNC: 154 MG/DL — HIGH (ref 70–115)
GLUCOSE UR QL: 250 MG/DL
KETONES UR-MCNC: NEGATIVE — SIGNIFICANT CHANGE UP
LEUKOCYTE ESTERASE UR-ACNC: NEGATIVE — SIGNIFICANT CHANGE UP
MAGNESIUM SERPL-MCNC: 2 MG/DL — SIGNIFICANT CHANGE UP (ref 1.6–2.6)
NITRITE UR-MCNC: NEGATIVE — SIGNIFICANT CHANGE UP
PH UR: 6.5 — SIGNIFICANT CHANGE UP (ref 5–8)
POTASSIUM SERPL-MCNC: 3.7 MMOL/L — SIGNIFICANT CHANGE UP (ref 3.5–5.3)
POTASSIUM SERPL-SCNC: 3.7 MMOL/L — SIGNIFICANT CHANGE UP (ref 3.5–5.3)
PROT SERPL-MCNC: 6.2 G/DL — LOW (ref 6.6–8.7)
PROT UR-MCNC: 500 MG/DL
RBC CASTS # UR COMP ASSIST: SIGNIFICANT CHANGE UP /HPF (ref 0–4)
SODIUM SERPL-SCNC: 136 MMOL/L — SIGNIFICANT CHANGE UP (ref 135–145)
SP GR SPEC: 1.01 — SIGNIFICANT CHANGE UP (ref 1.01–1.02)
UROBILINOGEN FLD QL: NEGATIVE MG/DL — SIGNIFICANT CHANGE UP
WBC UR QL: NEGATIVE — SIGNIFICANT CHANGE UP

## 2017-08-22 PROCEDURE — 70547 MR ANGIOGRAPHY NECK W/O DYE: CPT | Mod: 26

## 2017-08-22 PROCEDURE — 99233 SBSQ HOSP IP/OBS HIGH 50: CPT

## 2017-08-22 RX ORDER — SODIUM CHLORIDE 9 MG/ML
1000 INJECTION INTRAMUSCULAR; INTRAVENOUS; SUBCUTANEOUS
Qty: 0 | Refills: 0 | Status: DISCONTINUED | OUTPATIENT
Start: 2017-08-22 | End: 2017-08-24

## 2017-08-22 RX ADMIN — Medication 8: at 13:27

## 2017-08-22 RX ADMIN — HEPARIN SODIUM 5000 UNIT(S): 5000 INJECTION INTRAVENOUS; SUBCUTANEOUS at 11:12

## 2017-08-22 RX ADMIN — Medication 10 MILLIEQUIVALENT(S): at 17:35

## 2017-08-22 RX ADMIN — FINASTERIDE 5 MILLIGRAM(S): 5 TABLET, FILM COATED ORAL at 11:13

## 2017-08-22 RX ADMIN — SODIUM CHLORIDE 70 MILLILITER(S): 9 INJECTION INTRAMUSCULAR; INTRAVENOUS; SUBCUTANEOUS at 19:31

## 2017-08-22 RX ADMIN — Medication 650 MILLIGRAM(S): at 01:45

## 2017-08-22 RX ADMIN — CARBIDOPA AND LEVODOPA 1 TABLET(S): 25; 100 TABLET ORAL at 13:28

## 2017-08-22 RX ADMIN — Medication 162 MILLIGRAM(S): at 11:13

## 2017-08-22 RX ADMIN — ATORVASTATIN CALCIUM 20 MILLIGRAM(S): 80 TABLET, FILM COATED ORAL at 21:08

## 2017-08-22 RX ADMIN — Medication 325 MILLIGRAM(S): at 11:13

## 2017-08-22 RX ADMIN — Medication 325 MILLIGRAM(S): at 17:35

## 2017-08-22 RX ADMIN — TAMSULOSIN HYDROCHLORIDE 0.4 MILLIGRAM(S): 0.4 CAPSULE ORAL at 21:08

## 2017-08-22 RX ADMIN — HEPARIN SODIUM 5000 UNIT(S): 5000 INJECTION INTRAVENOUS; SUBCUTANEOUS at 21:08

## 2017-08-22 RX ADMIN — Medication 10 MILLIEQUIVALENT(S): at 06:49

## 2017-08-22 RX ADMIN — CARBIDOPA AND LEVODOPA 1 TABLET(S): 25; 100 TABLET ORAL at 21:08

## 2017-08-22 RX ADMIN — INSULIN GLARGINE 10 UNIT(S): 100 INJECTION, SOLUTION SUBCUTANEOUS at 21:08

## 2017-08-22 RX ADMIN — Medication 650 MILLIGRAM(S): at 01:14

## 2017-08-22 RX ADMIN — CARBIDOPA AND LEVODOPA 1 TABLET(S): 25; 100 TABLET ORAL at 06:48

## 2017-08-22 RX ADMIN — Medication 6: at 17:35

## 2017-08-22 RX ADMIN — Medication 1 DROP(S): at 11:12

## 2017-08-22 NOTE — PROGRESS NOTE ADULT - SUBJECTIVE AND OBJECTIVE BOX
INTERVAL HPI/OVERNIGHT EVENTS:    CC: syncope, diastolic CHF, carotid stenosis, Parkinson's disease, diabetes mellitus    No overnight events, denies complaints. Discussed with patient regarding ENE, in agreement. Awaiting MRA.     Vital Signs Last 24 Hrs  T(C): 36.6 (22 Aug 2017 10:10), Max: 37.1 (22 Aug 2017 00:45)  T(F): 97.9 (22 Aug 2017 10:10), Max: 98.8 (22 Aug 2017 00:45)  HR: 85 (22 Aug 2017 11:09) (75 - 90)  BP: 106/58 (22 Aug 2017 11:09) (106/58 - 169/79)  BP(mean): --  RR: 18 (22 Aug 2017 11:09) (16 - 18)  SpO2: 95% (22 Aug 2017 11:09) (94% - 95%)    PHYSICAL EXAM:    GENERAL: Not in distress, alert, sitting in chair.  CHEST/LUNG: b/l air entry  HEART: Regular   ABDOMEN: Soft, BS+  EXTREMITIES:  No edema, tenderness    MEDICATIONS  (STANDING):  finasteride 5 milliGRAM(s) Oral daily  aspirin enteric coated 162 milliGRAM(s) Oral daily  tamsulosin 0.4 milliGRAM(s) Oral at bedtime  atorvastatin 20 milliGRAM(s) Oral at bedtime  carbidopa/levodopa  25/100 1 Tablet(s) Oral three times a day  ferrous    sulfate 325 milliGRAM(s) Oral two times a day with meals  timolol 0.5% Solution 1 Drop(s) Both EYES daily  insulin glargine Injectable (LANTUS) 10 Unit(s) SubCutaneous at bedtime  insulin lispro (HumaLOG) corrective regimen sliding scale   SubCutaneous three times a day before meals  dextrose 5%. 1000 milliLiter(s) (50 mL/Hr) IV Continuous <Continuous>  dextrose 50% Injectable 12.5 Gram(s) IV Push once  dextrose 50% Injectable 25 Gram(s) IV Push once  dextrose 50% Injectable 25 Gram(s) IV Push once  potassium chloride    Tablet ER 10 milliEquivalent(s) Oral two times a day  heparin  Injectable 5000 Unit(s) SubCutaneous every 12 hours    MEDICATIONS  (PRN):  acetaminophen   Tablet 650 milliGRAM(s) Oral every 6 hours PRN For Temp greater than 38 C (100.4 F)  acetaminophen   Tablet. 650 milliGRAM(s) Oral every 6 hours PRN Moderate Pain (4 - 6)  dextrose Gel 1 Dose(s) Oral once PRN Blood Glucose LESS THAN 70 milliGRAM(s)/deciliter  glucagon  Injectable 1 milliGRAM(s) IntraMuscular once PRN Glucose LESS THAN 70 milligrams/deciliter      Allergies    No Known Allergies    Intolerances          LABS:                          11.1   10.8  )-----------( 290      ( 21 Aug 2017 08:06 )             33.6     08-    136  |  98  |  16.0  ----------------------------<  154<H>  3.7   |  24.0  |  0.91    Ca    8.6      22 Aug 2017 07:25  Phos  2.6     -  Mg     2.0         TPro  6.2<L>  /  Alb  2.8<L>  /  TBili  0.3<L>  /  DBili  x   /  AST  9   /  ALT  <4  /  AlkPhos  77  08-22    PT/INR - ( 21 Aug 2017 08:06 )   PT: 11.1 sec;   INR: 1.01 ratio           Urinalysis Basic - ( 22 Aug 2017 02:26 )    Color: Yellow / Appearance: Clear / S.010 / pH: x  Gluc: x / Ketone: Negative  / Bili: Negative / Urobili: Negative mg/dL   Blood: x / Protein: 500 mg/dL / Nitrite: Negative   Leuk Esterase: Negative / RBC: 0-2 /HPF / WBC Negative   Sq Epi: x / Non Sq Epi: x / Bacteria: x        RADIOLOGY & ADDITIONAL TESTS:

## 2017-08-22 NOTE — PROGRESS NOTE ADULT - SUBJECTIVE AND OBJECTIVE BOX
Cardiology Follow-up    SHARON FELDMAN was seen and examined. No events overnight. The patient denies any chest pain, SOB, palpitations, orthopnea, PND or abdominal pain.He does have orthostatic BP changes.No further syncope is noted.    PROBLEM LIST:  Orthostatic hypotension  Prophylactic measure  BPH (benign prostatic hyperplasia)  Parkinsons  Stroke  Diabetes  Chronic diastolic congestive heart failure  High cholesterol  Hypertension  Syncope    PAST MEDICAL HISTORY:  Stroke  DVT (deep venous thrombosis)  Parkinsons  High cholesterol  Diabetes  Hypertension    PAST SURGICAL HISTORY:  S/P hip hemiarthroplasty  No significant past surgical history    MEDICATIONS  (STANDING):  finasteride 5 milliGRAM(s) Oral daily  aspirin enteric coated 162 milliGRAM(s) Oral daily  tamsulosin 0.4 milliGRAM(s) Oral at bedtime  atorvastatin 20 milliGRAM(s) Oral at bedtime  carbidopa/levodopa  25/100 1 Tablet(s) Oral three times a day  ferrous    sulfate 325 milliGRAM(s) Oral two times a day with meals  timolol 0.5% Solution 1 Drop(s) Both EYES daily  insulin glargine Injectable (LANTUS) 10 Unit(s) SubCutaneous at bedtime  insulin lispro (HumaLOG) corrective regimen sliding scale   SubCutaneous three times a day before meals  dextrose 5%. 1000 milliLiter(s) (50 mL/Hr) IV Continuous <Continuous>  dextrose 50% Injectable 12.5 Gram(s) IV Push once  dextrose 50% Injectable 25 Gram(s) IV Push once  dextrose 50% Injectable 25 Gram(s) IV Push once  potassium chloride    Tablet ER 10 milliEquivalent(s) Oral two times a day  heparin  Injectable 5000 Unit(s) SubCutaneous every 12 hours    MEDICATIONS  (PRN):  acetaminophen   Tablet 650 milliGRAM(s) Oral every 6 hours PRN For Temp greater than 38 C (100.4 F)  acetaminophen   Tablet. 650 milliGRAM(s) Oral every 6 hours PRN Moderate Pain (4 - 6)  dextrose Gel 1 Dose(s) Oral once PRN Blood Glucose LESS THAN 70 milliGRAM(s)/deciliter  glucagon  Injectable 1 milliGRAM(s) IntraMuscular once PRN Glucose LESS THAN 70 milligrams/deciliter    ALLERGIES:  No Known Allergies    PHYSICAL EXAM:  T(C): 37.1 (17 @ 06:22), Max: 37.3 (17 @ 08:21)  T(F): 98.8 (17 @ 06:22), Max: 99.1 (17 @ 08:21)  HR: 90 (17 @ 06:22) (74 - 90)  BP: 169/79 (17 @ 06:22) (144/80 - 175/85)  RR: 18 (17 @ 06:22) (16 - 18)  SpO2: 95% (17 @ 06:22) (93% - 95%)  Wt(kg): --  I&O's Summary    21 Aug 2017 07:01  -  22 Aug 2017 07:00  --------------------------------------------------------  IN: 0 mL / OUT: 200 mL / NET: -200 mL      Telemetry: sinus rhythm  General: comfortable, in NAD  Heart: +S1S2, RRR, no murmurs, no rubs, no gallops  Lungs: clear to auscultation bilaterally, no wheezes, no rales, no rhonchi  Abdomen: soft, non-tender, non-distended, + bowel sounds  Extremities: no clubbing, no cyanosis, no edema  Neuro:sleepy    LABS:    Troponin T, Serum: 0.06 ng/mL ( @ 12:04)  Troponin T, Serum: 0.08 ng/mL ( @ 01:31)                            11.1   10.8  )-----------( 290      ( 21 Aug 2017 08:06 )             33.6         137  |  98  |  20.0  ----------------------------<  232<H>  3.7   |  26.0  |  0.96    Ca    8.7      21 Aug 2017 08:06  Phos  2.6       Mg     1.7           PT/INR - ( 21 Aug 2017 08:06 )   PT: 11.1 sec;   INR: 1.01 ratio           RADIOLOGY & ADDITIONAL TESTS:    ASSESSMENT:  SHARON FELDMAN is a 77y old male with a history of chronic diastolic CHF,essential hypertension,hyperlipidemia,IDDM,Parkinson's who presented with a syncopal episode.His syncope was probably due to his orthostasis and deconditioning.    Please permit me to suggest the followin. I am hesitant to start Midodrine as his resting BP is on the high side of 160/80 range.Will try LISA hose and follow BP  2.He is scheduled for an MRA of the carotids today  3.Continue with physical therapy

## 2017-08-22 NOTE — PROGRESS NOTE ADULT - ASSESSMENT
77 yr old male with hypertension, diastolic CHF, stroke, diabetes mellitus, hyperlipidemia, Parkinson's admitted with complaints of syncope. He was recently discharged after CHF exacerbation. Cardiology was consulted, advised to adjust Torsemide dose, as syncope could be likely secondary to dehydration. MRA neck was ordered given abnormal carotid US in the past. ECHO was not repeated as he had a recent ECHO showed diastolic CHF. PT evaluation requested, advised ENE.

## 2017-08-23 ENCOUNTER — TRANSCRIPTION ENCOUNTER (OUTPATIENT)
Age: 77
End: 2017-08-23

## 2017-08-23 PROCEDURE — 99233 SBSQ HOSP IP/OBS HIGH 50: CPT

## 2017-08-23 RX ORDER — INSULIN LISPRO 100/ML
2 VIAL (ML) SUBCUTANEOUS ONCE
Qty: 0 | Refills: 0 | Status: COMPLETED | OUTPATIENT
Start: 2017-08-23 | End: 2017-08-23

## 2017-08-23 RX ADMIN — CARBIDOPA AND LEVODOPA 1 TABLET(S): 25; 100 TABLET ORAL at 21:19

## 2017-08-23 RX ADMIN — CARBIDOPA AND LEVODOPA 1 TABLET(S): 25; 100 TABLET ORAL at 05:58

## 2017-08-23 RX ADMIN — FINASTERIDE 5 MILLIGRAM(S): 5 TABLET, FILM COATED ORAL at 11:18

## 2017-08-23 RX ADMIN — Medication 10 MILLIEQUIVALENT(S): at 18:34

## 2017-08-23 RX ADMIN — HEPARIN SODIUM 5000 UNIT(S): 5000 INJECTION INTRAVENOUS; SUBCUTANEOUS at 11:17

## 2017-08-23 RX ADMIN — CARBIDOPA AND LEVODOPA 1 TABLET(S): 25; 100 TABLET ORAL at 18:34

## 2017-08-23 RX ADMIN — Medication 162 MILLIGRAM(S): at 11:18

## 2017-08-23 RX ADMIN — Medication 1 DROP(S): at 11:18

## 2017-08-23 RX ADMIN — INSULIN GLARGINE 10 UNIT(S): 100 INJECTION, SOLUTION SUBCUTANEOUS at 21:19

## 2017-08-23 RX ADMIN — Medication 10: at 12:05

## 2017-08-23 RX ADMIN — Medication 10 MILLIEQUIVALENT(S): at 05:58

## 2017-08-23 RX ADMIN — Medication 325 MILLIGRAM(S): at 08:32

## 2017-08-23 RX ADMIN — Medication 325 MILLIGRAM(S): at 18:34

## 2017-08-23 RX ADMIN — Medication 6: at 18:33

## 2017-08-23 RX ADMIN — ATORVASTATIN CALCIUM 20 MILLIGRAM(S): 80 TABLET, FILM COATED ORAL at 21:19

## 2017-08-23 RX ADMIN — HEPARIN SODIUM 5000 UNIT(S): 5000 INJECTION INTRAVENOUS; SUBCUTANEOUS at 21:18

## 2017-08-23 RX ADMIN — Medication 2 UNIT(S): at 21:19

## 2017-08-23 RX ADMIN — TAMSULOSIN HYDROCHLORIDE 0.4 MILLIGRAM(S): 0.4 CAPSULE ORAL at 21:19

## 2017-08-23 NOTE — DISCHARGE NOTE ADULT - MEDICATION SUMMARY - MEDICATIONS TO TAKE
I will START or STAY ON the medications listed below when I get home from the hospital:    finasteride 5 mg oral tablet  -- 1 tab(s) by mouth once a day  -- Indication: For BPH (benign prostatic hyperplasia)    aspirin 81 mg oral delayed release tablet  -- 2 tab(s) by mouth once a day  -- Indication: For Chronic diastolic congestive heart failure    tamsulosin 0.4 mg oral capsule  -- 1 cap(s) by mouth once a day  -- Indication: For BPH (benign prostatic hyperplasia)    insulin glargine 100 units/mL subcutaneous solution  -- 10 unit(s) subcutaneous once a day  -- Indication: For Diabetes    atorvastatin 20 mg oral tablet  -- 1 tab(s) by mouth once a day  -- Indication: For High cholesterol    carbidopa-levodopa 25 mg-100 mg oral tablet  -- 1 tab(s) by mouth 3 times a day  -- Indication: For Parkinsons    ferrous sulfate 325 mg (65 mg elemental iron) oral delayed release tablet  -- 1 tab(s) by mouth 2 times a day  -- Indication: For anemia    potassium chloride 20 mEq oral powder for reconstitution  -- 1 packet(s) by mouth 2 times a day  -- Indication: For Supplement    timolol maleate 0.5% ophthalmic solution  -- 1 drop(s) to each affected eye once a day  -- Indication: For topical    Drisdol 50,000 intl units (1.25 mg) oral capsule  -- 1 cap(s) by mouth once a week  -- Indication: For Supplement I will START or STAY ON the medications listed below when I get home from the hospital:    finasteride 5 mg oral tablet  -- 1 tab(s) by mouth once a day  -- Indication: For BPH (benign prostatic hyperplasia)    aspirin 81 mg oral delayed release tablet  -- 2 tab(s) by mouth once a day  -- Indication: For Chronic diastolic congestive heart failure    tamsulosin 0.4 mg oral capsule  -- 1 cap(s) by mouth once a day  -- Indication: For BPH (benign prostatic hyperplasia)    insulin glargine 100 units/mL subcutaneous solution  -- 10 unit(s) subcutaneous once a day  -- Indication: For Diabetes    atorvastatin 20 mg oral tablet  -- 1 tab(s) by mouth once a day  -- Indication: For High cholesterol    carbidopa-levodopa 25 mg-100 mg oral tablet  -- 1 tab(s) by mouth 3 times a day  -- Indication: For Parkinsons    amLODIPine 5 mg oral tablet  -- 1 tab(s) by mouth once a day  -- Indication: For Hypertension    ferrous sulfate 325 mg (65 mg elemental iron) oral delayed release tablet  -- 1 tab(s) by mouth 2 times a day  -- Indication: For anemia    potassium chloride 20 mEq oral powder for reconstitution  -- 1 packet(s) by mouth 2 times a day  -- Indication: For Supplement    timolol maleate 0.5% ophthalmic solution  -- 1 drop(s) to each affected eye once a day  -- Indication: For topical    Drisdol 50,000 intl units (1.25 mg) oral capsule  -- 1 cap(s) by mouth once a week  -- Indication: For Supplement

## 2017-08-23 NOTE — PROGRESS NOTE ADULT - SUBJECTIVE AND OBJECTIVE BOX
INTERVAL HPI/OVERNIGHT EVENTS:    CC: syncope secondary to dehydration, diastolic CHF, Parkinson's disease, diabetes mellitus    No overnight events, denies complaints.     Vital Signs Last 24 Hrs  T(C): 37.1 (23 Aug 2017 09:41), Max: 37.1 (23 Aug 2017 09:41)  T(F): 98.8 (23 Aug 2017 09:41), Max: 98.8 (23 Aug 2017 09:41)  HR: 88 (23 Aug 2017 09:41) (79 - 88)  BP: 124/76 (22 Aug 2017 20:50) (124/76 - 160/80)  BP(mean): --  RR: 17 (23 Aug 2017 09:41) (16 - 18)  SpO2: 93% (23 Aug 2017 09:41) (93% - 96%)    PHYSICAL EXAM:    GENERAL: Not in distress, alert  CHEST/LUNG: b/l air entry, clear  HEART: Regular   ABDOMEN: Soft, BS+  EXTREMITIES:  No edema, tenderness    MEDICATIONS  (STANDING):  finasteride 5 milliGRAM(s) Oral daily  aspirin enteric coated 162 milliGRAM(s) Oral daily  tamsulosin 0.4 milliGRAM(s) Oral at bedtime  atorvastatin 20 milliGRAM(s) Oral at bedtime  carbidopa/levodopa  25/100 1 Tablet(s) Oral three times a day  ferrous    sulfate 325 milliGRAM(s) Oral two times a day with meals  timolol 0.5% Solution 1 Drop(s) Both EYES daily  insulin glargine Injectable (LANTUS) 10 Unit(s) SubCutaneous at bedtime  insulin lispro (HumaLOG) corrective regimen sliding scale   SubCutaneous three times a day before meals  dextrose 5%. 1000 milliLiter(s) (50 mL/Hr) IV Continuous <Continuous>  dextrose 50% Injectable 12.5 Gram(s) IV Push once  dextrose 50% Injectable 25 Gram(s) IV Push once  dextrose 50% Injectable 25 Gram(s) IV Push once  potassium chloride    Tablet ER 10 milliEquivalent(s) Oral two times a day  heparin  Injectable 5000 Unit(s) SubCutaneous every 12 hours  sodium chloride 0.9%. 1000 milliLiter(s) (70 mL/Hr) IV Continuous <Continuous>    MEDICATIONS  (PRN):  acetaminophen   Tablet 650 milliGRAM(s) Oral every 6 hours PRN For Temp greater than 38 C (100.4 F)  acetaminophen   Tablet. 650 milliGRAM(s) Oral every 6 hours PRN Moderate Pain (4 - 6)  dextrose Gel 1 Dose(s) Oral once PRN Blood Glucose LESS THAN 70 milliGRAM(s)/deciliter  glucagon  Injectable 1 milliGRAM(s) IntraMuscular once PRN Glucose LESS THAN 70 milligrams/deciliter      Allergies    No Known Allergies    Intolerances          LABS:          136  |  98  |  16.0  ----------------------------<  154<H>  3.7   |  24.0  |  0.91    Ca    8.6      22 Aug 2017 07:25  Mg     2.0         TPro  6.2<L>  /  Alb  2.8<L>  /  TBili  0.3<L>  /  DBili  x   /  AST  9   /  ALT  <4  /  AlkPhos  77        Urinalysis Basic - ( 22 Aug 2017 02:26 )    Color: Yellow / Appearance: Clear / S.010 / pH: x  Gluc: x / Ketone: Negative  / Bili: Negative / Urobili: Negative mg/dL   Blood: x / Protein: 500 mg/dL / Nitrite: Negative   Leuk Esterase: Negative / RBC: 0-2 /HPF / WBC Negative   Sq Epi: x / Non Sq Epi: x / Bacteria: x        RADIOLOGY & ADDITIONAL TESTS:

## 2017-08-23 NOTE — PROGRESS NOTE ADULT - SUBJECTIVE AND OBJECTIVE BOX
Cardiology Follow-up    SHARON FELDMAN was seen and examined. He was orthostatic yesterday. The patient denies any chest pain, SOB, palpitations, orthopnea, PND or abdominal pain.    PROBLEM LIST:  Orthostatic hypotension  Prophylactic measure  BPH (benign prostatic hyperplasia)  Parkinsons  Stroke  Diabetes  Chronic diastolic congestive heart failure  High cholesterol  Hypertension  Syncope    PAST MEDICAL HISTORY:  Stroke  DVT (deep venous thrombosis)  Parkinsons  High cholesterol  Diabetes  Hypertension    PAST SURGICAL HISTORY:  S/P hip hemiarthroplasty  No significant past surgical history    MEDICATIONS  (STANDING):  finasteride 5 milliGRAM(s) Oral daily  aspirin enteric coated 162 milliGRAM(s) Oral daily  tamsulosin 0.4 milliGRAM(s) Oral at bedtime  atorvastatin 20 milliGRAM(s) Oral at bedtime  carbidopa/levodopa  25/100 1 Tablet(s) Oral three times a day  ferrous    sulfate 325 milliGRAM(s) Oral two times a day with meals  timolol 0.5% Solution 1 Drop(s) Both EYES daily  insulin glargine Injectable (LANTUS) 10 Unit(s) SubCutaneous at bedtime  insulin lispro (HumaLOG) corrective regimen sliding scale   SubCutaneous three times a day before meals  dextrose 5%. 1000 milliLiter(s) (50 mL/Hr) IV Continuous <Continuous>  dextrose 50% Injectable 12.5 Gram(s) IV Push once  dextrose 50% Injectable 25 Gram(s) IV Push once  dextrose 50% Injectable 25 Gram(s) IV Push once  potassium chloride    Tablet ER 10 milliEquivalent(s) Oral two times a day  heparin  Injectable 5000 Unit(s) SubCutaneous every 12 hours  sodium chloride 0.9%. 1000 milliLiter(s) (70 mL/Hr) IV Continuous <Continuous>    MEDICATIONS  (PRN):  acetaminophen   Tablet 650 milliGRAM(s) Oral every 6 hours PRN For Temp greater than 38 C (100.4 F)  acetaminophen   Tablet. 650 milliGRAM(s) Oral every 6 hours PRN Moderate Pain (4 - 6)  dextrose Gel 1 Dose(s) Oral once PRN Blood Glucose LESS THAN 70 milliGRAM(s)/deciliter  glucagon  Injectable 1 milliGRAM(s) IntraMuscular once PRN Glucose LESS THAN 70 milligrams/deciliter    ALLERGIES:  No Known Allergies    I&O's Summary    22 Aug 2017 07:01  -  23 Aug 2017 07:00  --------------------------------------------------------  IN: 960 mL / OUT: 400 mL / NET: 560 mL      PHYSICAL EXAM:  T(F): 98.5 (17 @ 20:50), Max: 98.6 (17 @ 16:40)  HR: 85 (17 @ 20:50) (75 - 85)  BP: 124/76 (17 @ 20:50) (106/58 - 160/80)  RR: 18 (17 @ 20:50) (17 - 18)  SpO2: 93% (17 @ 20:50) (93% - 95%)  Wt(kg): --    Telemetry: Sinus, HR 70s-80s  General: comfortable, in NAD  Heart: +S1S2, RRR, no murmurs, no rubs, no gallops  Lungs: occasional wheezes, no rales, no rhonchi  Abdomen: soft, non-tender, non-distended, + bowel sounds  Extremities: no clubbing, no cyanosis, no edema  Neuro: A&O x3      LABS:    Troponin T, Serum: 0.06 ng/mL ( @ 12:04)        CBC:            11.1   10.8  >-----------< 290    08-21 @ 08:06            33.6               11.6   14.0  >-----------< 299    08-20 @ 01:31            34.9       CHEMISTRY:   136   |  98     |  16.0   ----------------------------< 154     08-22 @ 07:25    3.7   |  24.0   |  0.91     eGFR non-  82     eGFR   95      137   |  98     |  20.0   ----------------------------< 232     08-21 @ 08:06    3.7   |  26.0   |  0.96     eGFR non-  77     eGFR   89      139   |  96     |  27.0   ----------------------------< 164      @ 01:31    3.8   |  26.0   |  1.13     eGFR non-  63     eGFR   73         TPro --    / Alb 2.8   / TBili 0.3   / DBili --    / AST 9     / ALT <4    / AlkPhos --      @ 07:25  TPro --    / Alb 3.3   / TBili 0.1   / DBili --    / AST 16    / ALT 5     / AlkPhos --      @ 01:31      URINALYSIS: ( @ 02:26)  Color Yellow / pH 6.5   / Sp Gr 1.010 / LE <<16 / Nit Negative / Prot 500   / Blood Trace / Uro Negative / WBC Negative / RBC 0-2   / Bacteria --       RADIOLOGY & ADDITIONAL TESTS:  MRA Neck: Unremarkable noncontrast MRA of the neck.    CXR 17:   The lungs demonstrate right basilar subsegmental atelectasis. The heart   and mediastinum appear intact.  Significant dextroscoliosis of the   thoracic spine is noted. Posttraumatic deformity of the left humerus is   noted.      ASSESSMENT:  SHARON FELDMAN is a 77y old male with a history of IDDM (poorly controlled) with diabetic neuropathy,  essential hypertension, pure hypercholesterolemia, aortic root dilatation (4.4 cm by Echo), aortic atherosclerosis and recent acute diastolic heart failure who presents with syncope and a positive troponin. He was found to have orthostatic hypotension.    Please permit me to suggest the followin. His CHF and edema appear to be much improved however I suspect that his syncopal episode was secondary to orthostatic hypotension from overdiuresis. I initially decreased torsemide to 10 mg daily but he was still orthostatic yesterday so torsemide was discontinued and LISA hose were ordered. Since his BP has been in the 160s while supine and seated, I would prefer to not give him midodrine. It is possible that he has an autonomic neuropathy from his poorly controlled diabetes or possibly from his Parkinson's.  2. MRA of the neck was unremarkable.  3. No events on telemetry that would explain syncope.  4. Troponin was minimally elevated and peaked at 0.08. This was likely secondary to the fall, not ACS.  5. Once his orthostatic hypotension has improved (although he still may be mildly orthostatic), he may be discharged hopefully to rehab.

## 2017-08-23 NOTE — DISCHARGE NOTE ADULT - PLAN OF CARE
Avoid recurrent symptoms. Hold Torsemide, assess clinically, monitor BP. May resume low dose Torsemide in future if orthostasis remains negative. Cardiology follow up. Continue home medications. Monitor blood sugars, continue home medications. Stable, off Torsemide, cardiology follow up. Continue statin.

## 2017-08-23 NOTE — DISCHARGE NOTE ADULT - MEDICATION SUMMARY - MEDICATIONS TO STOP TAKING
I will STOP taking the medications listed below when I get home from the hospital:    torsemide 10 mg oral tablet  -- 1 tab(s) by mouth 2 times a day

## 2017-08-23 NOTE — PROGRESS NOTE ADULT - ASSESSMENT
77 yr old male with hypertension, diastolic CHF, stroke, diabetes mellitus, hyperlipidemia, Parkinson's admitted with complaints of syncope. He was recently discharged after CHF exacerbation. Cardiology was consulted, advised to adjust Torsemide dose, as syncope could be likely secondary to dehydration. MRA neck was ordered given abnormal carotid US in the past. ECHO was not repeated as he had a recent ECHO showed diastolic CHF. PT evaluation requested, advised [facility]. His orthostasis was positive, hence initially Torsemide dose was lowered but given persistently being positive, it was discontinued. He was given gentle IV hydration with improvement in symptoms. He improved clinically.

## 2017-08-23 NOTE — DISCHARGE NOTE ADULT - CARE PROVIDER_API CALL
Rey Rader), Cardiology; Internal Medicine  [address] [place] [address]  Phone: [phone]  Fax: [phone]

## 2017-08-23 NOTE — DISCHARGE NOTE ADULT - CARE PLAN
Principal Discharge DX:	Syncope  Goal:	Avoid recurrent symptoms.  Instructions for follow-up, activity and diet:	Hold Torsemide, assess clinically, monitor BP. May resume low dose Torsemide in future if orthostasis remains negative. Cardiology follow up.  Secondary Diagnosis:	Parkinsons  Instructions for follow-up, activity and diet:	Continue home medications.  Secondary Diagnosis:	Hypertension  Instructions for follow-up, activity and diet:	Continue home medications.  Secondary Diagnosis:	Diabetes  Instructions for follow-up, activity and diet:	Monitor blood sugars, continue home medications.  Secondary Diagnosis:	Chronic diastolic congestive heart failure  Instructions for follow-up, activity and diet:	Stable, off Torsemide, cardiology follow up.  Secondary Diagnosis:	Stroke  Instructions for follow-up, activity and diet:	Continue statin.  Secondary Diagnosis:	High cholesterol  Instructions for follow-up, activity and diet:	Continue statin.

## 2017-08-23 NOTE — DISCHARGE NOTE ADULT - HOSPITAL COURSE
77 yr old male with hypertension, diastolic CHF, stroke, diabetes mellitus, hyperlipidemia, Parkinson's admitted with complaints of syncope. He was recently discharged after CHF exacerbation. Cardiology was consulted, advised to adjust Torsemide dose, as syncope could be likely secondary to dehydration. MRA neck was ordered given abnormal carotid US in the past. ECHO was not repeated as he had a recent ECHO showed diastolic CHF. PT evaluation requested, advised [facility]. His orthostasis was positive, hence initially Torsemide dose was lowered but given persistently being positive, it was discontinued. He was given gentle IV hydration with improvement in symptoms. MRA neck was unremarkable. He improved clinically. He is stable for discharge to [facility].    Spent > 35 mins in discharge plan and documentation.

## 2017-08-23 NOTE — DISCHARGE NOTE ADULT - ADDITIONAL INSTRUCTIONS
Continue medications as instructed, follow up with cardiology in 1 week. Monitor BP. Return to ED should symptoms recur/worsen.

## 2017-08-23 NOTE — DISCHARGE NOTE ADULT - PATIENT PORTAL LINK FT
“You can access the [facility] Patient Portal, offered by [facility], by registering with the following website: [email or web address]”

## 2017-08-24 VITALS — DIASTOLIC BLOOD PRESSURE: 60 MMHG | SYSTOLIC BLOOD PRESSURE: 110 MMHG

## 2017-08-24 PROCEDURE — 85610 PROTHROMBIN TIME: CPT

## 2017-08-24 PROCEDURE — 36415 COLL VENOUS BLD VENIPUNCTURE: CPT

## 2017-08-24 PROCEDURE — 93005 ELECTROCARDIOGRAM TRACING: CPT

## 2017-08-24 PROCEDURE — 84550 ASSAY OF BLOOD/URIC ACID: CPT

## 2017-08-24 PROCEDURE — 80048 BASIC METABOLIC PNL TOTAL CA: CPT

## 2017-08-24 PROCEDURE — 70450 CT HEAD/BRAIN W/O DYE: CPT

## 2017-08-24 PROCEDURE — 81001 URINALYSIS AUTO W/SCOPE: CPT

## 2017-08-24 PROCEDURE — 84484 ASSAY OF TROPONIN QUANT: CPT

## 2017-08-24 PROCEDURE — 85730 THROMBOPLASTIN TIME PARTIAL: CPT

## 2017-08-24 PROCEDURE — 83735 ASSAY OF MAGNESIUM: CPT

## 2017-08-24 PROCEDURE — 80053 COMPREHEN METABOLIC PANEL: CPT

## 2017-08-24 PROCEDURE — 84443 ASSAY THYROID STIM HORMONE: CPT

## 2017-08-24 PROCEDURE — 71045 X-RAY EXAM CHEST 1 VIEW: CPT

## 2017-08-24 PROCEDURE — 97116 GAIT TRAINING THERAPY: CPT

## 2017-08-24 PROCEDURE — 80307 DRUG TEST PRSMV CHEM ANLYZR: CPT

## 2017-08-24 PROCEDURE — 70547 MR ANGIOGRAPHY NECK W/O DYE: CPT

## 2017-08-24 PROCEDURE — 99239 HOSP IP/OBS DSCHRG MGMT >30: CPT

## 2017-08-24 PROCEDURE — 97163 PT EVAL HIGH COMPLEX 45 MIN: CPT

## 2017-08-24 PROCEDURE — 72125 CT NECK SPINE W/O DYE: CPT

## 2017-08-24 PROCEDURE — 84100 ASSAY OF PHOSPHORUS: CPT

## 2017-08-24 PROCEDURE — 97110 THERAPEUTIC EXERCISES: CPT

## 2017-08-24 PROCEDURE — 82550 ASSAY OF CK (CPK): CPT

## 2017-08-24 PROCEDURE — 99285 EMERGENCY DEPT VISIT HI MDM: CPT | Mod: 25

## 2017-08-24 PROCEDURE — 73560 X-RAY EXAM OF KNEE 1 OR 2: CPT

## 2017-08-24 PROCEDURE — 97530 THERAPEUTIC ACTIVITIES: CPT

## 2017-08-24 PROCEDURE — 83880 ASSAY OF NATRIURETIC PEPTIDE: CPT

## 2017-08-24 PROCEDURE — 85027 COMPLETE CBC AUTOMATED: CPT

## 2017-08-24 RX ORDER — AMLODIPINE BESYLATE 2.5 MG/1
5 TABLET ORAL DAILY
Qty: 0 | Refills: 0 | Status: DISCONTINUED | OUTPATIENT
Start: 2017-08-24 | End: 2017-08-24

## 2017-08-24 RX ORDER — AMLODIPINE BESYLATE 2.5 MG/1
1 TABLET ORAL
Qty: 0 | Refills: 0 | COMMUNITY
Start: 2017-08-24

## 2017-08-24 RX ADMIN — Medication 6: at 12:17

## 2017-08-24 RX ADMIN — Medication 10 MILLIEQUIVALENT(S): at 06:17

## 2017-08-24 RX ADMIN — Medication 325 MILLIGRAM(S): at 09:26

## 2017-08-24 RX ADMIN — CARBIDOPA AND LEVODOPA 1 TABLET(S): 25; 100 TABLET ORAL at 11:57

## 2017-08-24 RX ADMIN — CARBIDOPA AND LEVODOPA 1 TABLET(S): 25; 100 TABLET ORAL at 06:17

## 2017-08-24 RX ADMIN — Medication 6: at 09:25

## 2017-08-24 RX ADMIN — Medication 1 DROP(S): at 11:57

## 2017-08-24 RX ADMIN — HEPARIN SODIUM 5000 UNIT(S): 5000 INJECTION INTRAVENOUS; SUBCUTANEOUS at 11:18

## 2017-08-24 RX ADMIN — Medication 162 MILLIGRAM(S): at 11:57

## 2017-08-24 NOTE — PROGRESS NOTE ADULT - PROBLEM SELECTOR PLAN 1
Awaiting MRA neck, adjusted Torsemide dosing.
MRA neck was negative. BP better. Hold Torsemide.
MRA neck was negative. BP better. Hold Torsemide. BP stable.
Syncopal episode likely secondary to orthostatic hypotension worsened from overdiuresis  + orthostatics in ED  Fall scans negative  Knee xray negative  Cardio consult appreciated - Torsemide decreased to 10mg QD, Keep K>4.0, Mg>2.0.    Follow up MRA of the carotids  Monitor on Tele   Trop trending down, likely elevated from fall. Follow up CPK   Follow up PT evaluation

## 2017-08-24 NOTE — PROGRESS NOTE ADULT - ATTENDING COMMENTS
Plan of care discussed with patient and RN.
Plan of care discussed with patient and RN. STable for discharge.
Stable for discharge to [facility], spoke with patient, RN and social work.
Pt is seen and examined, discussed with Isabel. This is 77Y M admitted for syncope, h/o carotid artery stenosis, also noted to have positive orthostatic, will hold Torsemide, repeat orthostatic vitals, CTA neck, PT eval,  cardiology eval appreciated  c/w with rest of home medication

## 2017-08-24 NOTE — PROGRESS NOTE ADULT - SUBJECTIVE AND OBJECTIVE BOX
INTERVAL HPI/OVERNIGHT EVENTS:    CC: diastolic CHF, syncope secondary to dehydration, hypertension, h/o CVA    No overnight events, denies complaints.     Vital Signs Last 24 Hrs  T(C): 37.2 (24 Aug 2017 10:18), Max: 37.3 (23 Aug 2017 21:13)  T(F): 99 (24 Aug 2017 10:18), Max: 99.2 (23 Aug 2017 21:13)  HR: 76 (24 Aug 2017 10:18) (68 - 76)  BP: 110/60 (24 Aug 2017 11:26) (110/60 - 174/84)  BP(mean): --  RR: 20 (24 Aug 2017 10:18) (16 - 20)  SpO2: 96% (24 Aug 2017 08:42) (96% - 97%)    PHYSICAL EXAM:    GENERAL: Not in distress, alert  CHEST/LUNG: b/l air entry, clear  HEART: Regular   ABDOMEN: Soft, BS+  EXTREMITIES:  No edema, tenderness    MEDICATIONS  (STANDING):  finasteride 5 milliGRAM(s) Oral daily  aspirin enteric coated 162 milliGRAM(s) Oral daily  tamsulosin 0.4 milliGRAM(s) Oral at bedtime  atorvastatin 20 milliGRAM(s) Oral at bedtime  carbidopa/levodopa  25/100 1 Tablet(s) Oral three times a day  ferrous    sulfate 325 milliGRAM(s) Oral two times a day with meals  timolol 0.5% Solution 1 Drop(s) Both EYES daily  insulin glargine Injectable (LANTUS) 10 Unit(s) SubCutaneous at bedtime  insulin lispro (HumaLOG) corrective regimen sliding scale   SubCutaneous three times a day before meals  dextrose 5%. 1000 milliLiter(s) (50 mL/Hr) IV Continuous <Continuous>  dextrose 50% Injectable 12.5 Gram(s) IV Push once  dextrose 50% Injectable 25 Gram(s) IV Push once  dextrose 50% Injectable 25 Gram(s) IV Push once  potassium chloride    Tablet ER 10 milliEquivalent(s) Oral two times a day  heparin  Injectable 5000 Unit(s) SubCutaneous every 12 hours  amLODIPine   Tablet 5 milliGRAM(s) Oral daily    MEDICATIONS  (PRN):  acetaminophen   Tablet 650 milliGRAM(s) Oral every 6 hours PRN For Temp greater than 38 C (100.4 F)  acetaminophen   Tablet. 650 milliGRAM(s) Oral every 6 hours PRN Moderate Pain (4 - 6)  dextrose Gel 1 Dose(s) Oral once PRN Blood Glucose LESS THAN 70 milliGRAM(s)/deciliter  glucagon  Injectable 1 milliGRAM(s) IntraMuscular once PRN Glucose LESS THAN 70 milligrams/deciliter      Allergies    No Known Allergies    Intolerances          LABS:                  RADIOLOGY & ADDITIONAL TESTS:

## 2017-08-24 NOTE — PROGRESS NOTE ADULT - SUBJECTIVE AND OBJECTIVE BOX
Cardiology Follow-up    SHARON FELDMAN was seen and examined. No events overnight. The patient denies any chest pain, SOB, palpitations, orthopnea, PND or abdominal pain. He wants to go home.    PROBLEM LIST:  Orthostatic hypotension  Prophylactic measure  BPH (benign prostatic hyperplasia)  Parkinsons  Stroke  Diabetes  Chronic diastolic congestive heart failure  High cholesterol  Hypertension  Syncope    PAST MEDICAL HISTORY:  Stroke  DVT (deep venous thrombosis)  Parkinsons  High cholesterol  Diabetes  Hypertension    PAST SURGICAL HISTORY:  S/P hip hemiarthroplasty  No significant past surgical history    MEDICATIONS  (STANDING):  finasteride 5 milliGRAM(s) Oral daily  aspirin enteric coated 162 milliGRAM(s) Oral daily  tamsulosin 0.4 milliGRAM(s) Oral at bedtime  atorvastatin 20 milliGRAM(s) Oral at bedtime  carbidopa/levodopa  25/100 1 Tablet(s) Oral three times a day  ferrous    sulfate 325 milliGRAM(s) Oral two times a day with meals  timolol 0.5% Solution 1 Drop(s) Both EYES daily  insulin glargine Injectable (LANTUS) 10 Unit(s) SubCutaneous at bedtime  insulin lispro (HumaLOG) corrective regimen sliding scale   SubCutaneous three times a day before meals  dextrose 5%. 1000 milliLiter(s) (50 mL/Hr) IV Continuous <Continuous>  dextrose 50% Injectable 12.5 Gram(s) IV Push once  dextrose 50% Injectable 25 Gram(s) IV Push once  dextrose 50% Injectable 25 Gram(s) IV Push once  potassium chloride    Tablet ER 10 milliEquivalent(s) Oral two times a day  heparin  Injectable 5000 Unit(s) SubCutaneous every 12 hours  sodium chloride 0.9%. 1000 milliLiter(s) (70 mL/Hr) IV Continuous <Continuous>    MEDICATIONS  (PRN):  acetaminophen   Tablet 650 milliGRAM(s) Oral every 6 hours PRN For Temp greater than 38 C (100.4 F)  acetaminophen   Tablet. 650 milliGRAM(s) Oral every 6 hours PRN Moderate Pain (4 - 6)  dextrose Gel 1 Dose(s) Oral once PRN Blood Glucose LESS THAN 70 milliGRAM(s)/deciliter  glucagon  Injectable 1 milliGRAM(s) IntraMuscular once PRN Glucose LESS THAN 70 milligrams/deciliter    ALLERGIES:  No Known Allergies    I&O's Summary    23 Aug 2017 07:01  -  24 Aug 2017 07:00  --------------------------------------------------------  IN: 480 mL / OUT: 1175 mL / NET: -695 mL      PHYSICAL EXAM:  T(F): 98.8 (17 @ 06:16), Max: 99.2 (17 @ 21:13)  HR: 68 (17 @ 08:42) (68 - 88)  BP: 160/80 (17 @ 06:16) (116/58 - 160/80)  RR: 20 (17 @ 08:42) (16 - 20)  SpO2: 96% (17 @ 08:42) (93% - 97%)  Wt(kg): --    Telemetry: Sinus, HR 60s-80s, rare APCs  General: comfortable, in NAD  Heart: +S1S2, RRR, no murmurs, no rubs, no gallops  Lungs: occasional wheezes, no rales, no rhonchi  Abdomen: soft, non-tender, non-distended, + bowel sounds  Extremities: no clubbing, no cyanosis, no edema  Neuro: A&O x3      LABS:          CBC:            11.1   10.8  >-----------< 290    08-21 @ 08:06            33.6               11.6   14.0  >-----------< 299    08-20 @ 01:31            34.9       CHEMISTRY:   136   |  98     |  16.0   ----------------------------< 154     08-22 @ 07:25    3.7   |  24.0   |  0.91     eGFR non-  82     eGFR   95      137   |  98     |  20.0   ----------------------------< 232     08-21 @ 08:06    3.7   |  26.0   |  0.96     eGFR non-  77     eGFR   89      139   |  96     |  27.0   ----------------------------< 164      @ 01:31    3.8   |  26.0   |  1.13     eGFR non-  63     eGFR   73         TPro --    / Alb 2.8   / TBili 0.3   / DBili --    / AST 9     / ALT <4    / AlkPhos --      @ 07:25  TPro --    / Alb 3.3   / TBili 0.1   / DBili --    / AST 16    / ALT 5     / AlkPhos --      @ 01:31      URINALYSIS: ( @ 02:26)  Color Yellow / pH 6.5   / Sp Gr 1.010 / LE <<16 / Nit Negative / Prot 500   / Blood Trace / Uro Negative / WBC Negative / RBC 0-2   / Bacteria --       RADIOLOGY & ADDITIONAL TESTS:  MRA Neck: Unremarkable noncontrast MRA of the neck.      ASSESSMENT:  SHARON FELDMAN is a 77y old male with a history of IDDM (poorly controlled) with diabetic neuropathy,  essential hypertension, pure hypercholesterolemia, aortic root dilatation (4.4 cm by Echo), aortic atherosclerosis and recent acute diastolic heart failure who presents with syncope and a positive troponin. He was found to have orthostatic hypotension.    Please permit me to suggest the followin. His CHF and edema appear to be much improved. I suspect that his syncopal episode was secondary to orthostatic hypotension from overdiuresis. Despite receiving some IV fluid and torsemide being discontinued, he does not appear to be in any significant failure at this time.  2. MRA of the neck was unremarkable.  3. No events on telemetry that would explain syncope.  4. Troponin was minimally elevated and peaked at 0.08. This was likely secondary to the fall, not ACS.  5. His orthostatic hypotension has improved with IV fluid and discontinuing torsemide. He is cleared from a  cardiac viewpoint for discharge. Awaiting authorization for rehab.

## 2017-09-26 ENCOUNTER — EMERGENCY (EMERGENCY)
Facility: HOSPITAL | Age: 77
LOS: 1 days | End: 2017-09-26
Attending: EMERGENCY MEDICINE | Admitting: EMERGENCY MEDICINE
Payer: COMMERCIAL

## 2017-09-26 VITALS
OXYGEN SATURATION: 99 % | HEIGHT: 67 IN | SYSTOLIC BLOOD PRESSURE: 190 MMHG | DIASTOLIC BLOOD PRESSURE: 85 MMHG | HEART RATE: 63 BPM | RESPIRATION RATE: 16 BRPM | WEIGHT: 149.91 LBS | TEMPERATURE: 98 F

## 2017-09-26 PROCEDURE — 93010 ELECTROCARDIOGRAM REPORT: CPT

## 2017-09-26 PROCEDURE — 99284 EMERGENCY DEPT VISIT MOD MDM: CPT

## 2017-09-26 RX ORDER — INSULIN HUMAN 100 [IU]/ML
5 INJECTION, SOLUTION SUBCUTANEOUS ONCE
Qty: 0 | Refills: 0 | Status: COMPLETED | OUTPATIENT
Start: 2017-09-26 | End: 2017-09-26

## 2017-09-26 RX ORDER — SODIUM CHLORIDE 9 MG/ML
3 INJECTION INTRAMUSCULAR; INTRAVENOUS; SUBCUTANEOUS ONCE
Qty: 0 | Refills: 0 | Status: COMPLETED | OUTPATIENT
Start: 2017-09-26 | End: 2017-09-26

## 2017-09-26 RX ORDER — HYDRALAZINE HCL 50 MG
5 TABLET ORAL ONCE
Qty: 0 | Refills: 0 | Status: COMPLETED | OUTPATIENT
Start: 2017-09-26 | End: 2017-09-26

## 2017-09-26 RX ORDER — METOPROLOL TARTRATE 50 MG
5 TABLET ORAL ONCE
Qty: 0 | Refills: 0 | Status: COMPLETED | OUTPATIENT
Start: 2017-09-26 | End: 2017-09-26

## 2017-09-26 RX ORDER — INSULIN HUMAN 100 [IU]/ML
3 INJECTION, SOLUTION SUBCUTANEOUS ONCE
Qty: 0 | Refills: 0 | Status: COMPLETED | OUTPATIENT
Start: 2017-09-26 | End: 2017-09-26

## 2017-09-26 RX ADMIN — INSULIN HUMAN 3 UNIT(S): 100 INJECTION, SOLUTION SUBCUTANEOUS at 18:31

## 2017-09-26 RX ADMIN — Medication 10 MILLIGRAM(S): at 16:32

## 2017-09-26 RX ADMIN — SODIUM CHLORIDE 3 MILLILITER(S): 9 INJECTION INTRAMUSCULAR; INTRAVENOUS; SUBCUTANEOUS at 14:59

## 2017-09-26 RX ADMIN — INSULIN HUMAN 5 UNIT(S): 100 INJECTION, SOLUTION SUBCUTANEOUS at 15:08

## 2017-09-26 RX ADMIN — Medication 5 MILLIGRAM(S): at 18:31

## 2017-09-26 RX ADMIN — Medication 5 MILLIGRAM(S): at 15:08

## 2017-09-26 NOTE — ED PROVIDER NOTE - MEDICAL DECISION MAKING DETAILS
PT SENT FROM [facility] FOR HTN AND HIGH BLOOD GLUCOSE. ASYMPTOMATIC. ATTEMPTING TO CLARIFY MED LIST. TREATING BOTH ENTITIES AT THIS TIME AND WILL OBSERVE/FOLLOW PT SENT FROM [facility] FOR HTN AND HIGH BLOOD GLUCOSE. ASYMPTOMATIC. ATTEMPTING TO CLARIFY MED LIST. TREATING BOTH ENTITIES AT THIS TIME AND WILL OBSERVE/FOLLOW.

## 2017-09-26 NOTE — ED ADULT NURSE NOTE - OBJECTIVE STATEMENT
Patient arrived to the ED by EMS from [facility]. Patient states that he was sent here for high blood pressure and high blood sugar. Patient states that he has no pain or complaints at this time. Patient states that he get insulin during the day but was not given his insulin this afternoon. Patient states that he did take his medication this morning.

## 2017-09-26 NOTE — ED PROVIDER NOTE - SHIFT CHANGE DETAILS
ADMIT FOR DM AND HTN  WIFE HOSPITALIZED UPSTAIRS  FAMILY UNABLE TO CARE FOR PATIENT  MD MORALES AND CARDIO CHAD

## 2017-09-26 NOTE — ED PROVIDER NOTE - PROGRESS NOTE DETAILS
Hoang: received sign out on patient earlier this morning; have assessed patient multiple times today, continues to feel well with no specific complaints; +taking PO and taking meds without difficulty, awake, alert, calm and cooperative, wife is normal full time caregiver; wife is being d/c'd today from hospital in good condition, social work has been in touch with wife upstairs on [address]; she is eager and willing to taker her  home and will re-assume responsibilty for him; patient to be d/c'd home with wife later this afternoon, daughter to come to give them ride home

## 2017-09-26 NOTE — CONSULT NOTE ADULT - SUBJECTIVE AND OBJECTIVE BOX
Cardiology Consult    CHIEF COMPLAINT:   HISTORY OF PRESENT ILLNESS: SHARON FELDMAN is a 77y old male with a history of essential hypertension,hyperlipidemia,IDDM,PI,Parkinson's disease who presents with elevated BP and glucose noted in day care today.He denies chest pain,shortness of breath or palpitations and has been compliant with his meds.    PROBLEM LIST:    PAST MEDICAL HISTORY:  Stroke  DVT (deep venous thrombosis)  Parkinsons  High cholesterol  Diabetes  Hypertension    PAST SURGICAL HISTORY:  S/P hip hemiarthroplasty  No significant past surgical history    MEDICATIONS  (STANDING):  sodium chloride 0.9% lock flush 3 milliLiter(s) IV Push once  insulin regular  human recombinant. 5 Unit(s) IV Push once  torsemide 10 milliGRAM(s) Oral Once  metoprolol Injectable 5 milliGRAM(s) IV Push Once    MEDICATIONS  (PRN):    ALLERGIES:  No Known Allergies    SOCIAL HISTORY:  Tobacco: quit in   Alcohol: no  Drugs: no    FAMILY HISTORY:  No significant family history    REVIEW OF SYSTEMS:  Constitutional: no fatigue, no fevers/chills, no weight change  HEENT: no visual disturbances, no hearing loss  Cardiac: no chest pain, no palpitations, no orthopnea, no PND  Respiratory: no shortness of breath, no cough  GI: no abdominal pain, no blood in the stool, no N/V, no diarrhea  Urological: no dysuria, no hematuria  Hematological: no easy bruising or bleeding  Musculoskeletal: no joint pain, no back pain  Neurological: no headaches, no syncope  Psychiatric: no anxiety, no depression  Skin: no rashes    PHYSICAL EXAM:    T(C): 36.6 (17 @ 13:14), Max: 36.6 (17 @ 13:14)  T(F): 97.8 (17 @ 13:14), Max: 97.8 (17 @ 13:14)  HR: 63 (17 @ 13:14) (63 - 63)  BP: 190/85 (17 @ 13:14) (190/85 - 190/85)  RR: 16 (17 @ 13:14) (16 - 16)  SpO2: 99% (17 @ 13:14) (99% - 99%)  Wt(kg): --  Telemetry: sinus rhythm  General: comfortable, in NAD  HEENT: EOMI, normocephalic, atraumatic  Neck: no JVD, no carotid bruits  Heart: +S1S2, RRR, 1/6 systolic murmur at the LSB, no rubs, no gallops  Lungs: clear to auscultation bilaterally, no wheezes, no rales, no rhonchi  Abdomen: soft, non-tender, non-distended, + bowel sounds  Extremities: no clubbing, no cyanosis, no edema  Vascular: 2+ dorsalis pedis pulses bilaterally  Neuro: A&O x3    EKG: sinus rhythm    LABS:                  RADIOLOGY & ADDITIONAL TESTS:    ASSESSMENT:  SHARON FELDMAN is a 77y old male with a history of essential hypertension,hyperlipidemia,IDDM,PI,Parkinson's disease who presented with hypertension and hyperglycemia.He is presently resting comfortably in the ER.    Please permit me to suggest the followin. His Demadex and Toprol will be resumed and insulin given in the ER to lower his glucose to a reasonable level.Discussed with  in ER.He should be able to be discharged and follow up in the office  2.To continue with his home meds

## 2017-09-26 NOTE — CHART NOTE - NSCHARTNOTEFT_GEN_A_CORE
TRENTON Note - received call from pt DTR regarding care for pt. pt sole caregiver is spouse who is in [place]. pt can not be left home alone IDDM and HTN unable to take meds properly or care for self. pt resides at home with spouse, pt attends [facility] 3x per week. D-D transport provided by [facility].  Pt was in [facility] (d/c from [facility] 8/24/17 went to [place] - was in for 2 weeks or so) recently and family wonders if perhaps he can go back till his spouse is well enough (1 or 2 weeks) to care for him. Family otherwise lives out of [place] and is unavailable to care for pt. Dtr [phone] Son lives nearby but works full time and is not available to care for pt. Discussed with Dr. Davison and pt to stay overnight till some plan can be arranged AM.

## 2017-09-26 NOTE — ED ADULT TRIAGE NOTE - CHIEF COMPLAINT QUOTE
pt comes to ed from adult day care for incidental finding htn/hyperglycemia. patient has no complaints. states compliant with meds. bs [phone] in triage.

## 2017-09-26 NOTE — ED PROVIDER NOTE - CHPI ED SYMPTOMS NEG
no dizziness/no pain/no back pain/no decreased eating/drinking/no vomiting/no fever/no headache/no loss of consciousness/no nausea/no chills

## 2017-09-26 NOTE — ED PROVIDER NOTE - OBJECTIVE STATEMENT
ASYMPTOMATIC PT SENT FROM DAY PROGRAM FOR ELEVATED BP AND BLOOD GLUCOSE. PT STATES HE IS COMPLAINT ON HIS MEDS AND DIIABETIC EATING PLAN. ALSO SAYS WIFE IS IN HOSPITAL AT THIS TIME AS A PATIENT. PATIENT HAS NO PAIN, HEADACHE, EXCESSIVE THIRST OR URINATION. PATIENT HAD RECENT HOSPITALIZATION  AND BP MEDS "DECREASED " AS PER DISCHARGE NOTE. PATIENT DOES NOT KNOW MEDS. WIFE SAYS SHE SHE DOES NOT KNOW MEDS( [address] CALLED AND HER RN ASKED HER AND THEN CALLED ME IN ER) DR MORALES PMD AHS NOT SEEN PT SINCE JULY SO HE DOES NOT HAVE UTD INFORMATION. CALL OUT TO DR BONILLA HIS CARDIOLOGIST TO CLARIFY MED LIST  MED HX Diabetes    DVT (deep venous thrombosis)    High cholesterol    Hypertension    Parkinsons    Stroke  2016

## 2017-09-27 VITALS
HEART RATE: 66 BPM | SYSTOLIC BLOOD PRESSURE: 142 MMHG | OXYGEN SATURATION: 97 % | DIASTOLIC BLOOD PRESSURE: 70 MMHG | RESPIRATION RATE: 18 BRPM | TEMPERATURE: 98 F

## 2017-09-27 LAB
ALBUMIN SERPL ELPH-MCNC: 3.3 G/DL — SIGNIFICANT CHANGE UP (ref 3.3–5.2)
ALP SERPL-CCNC: 82 U/L — SIGNIFICANT CHANGE UP (ref 40–120)
ALT FLD-CCNC: 9 U/L — SIGNIFICANT CHANGE UP
ANION GAP SERPL CALC-SCNC: 22 MMOL/L — HIGH (ref 5–17)
APPEARANCE UR: CLEAR — SIGNIFICANT CHANGE UP
APTT BLD: 27.4 SEC — LOW (ref 27.5–37.4)
AST SERPL-CCNC: 10 U/L — SIGNIFICANT CHANGE UP
BASOPHILS # BLD AUTO: 0 K/UL — SIGNIFICANT CHANGE UP (ref 0–0.2)
BASOPHILS NFR BLD AUTO: 0.8 % — SIGNIFICANT CHANGE UP (ref 0–2)
BILIRUB SERPL-MCNC: 0.4 MG/DL — SIGNIFICANT CHANGE UP (ref 0.4–2)
BILIRUB UR-MCNC: NEGATIVE — SIGNIFICANT CHANGE UP
BUN SERPL-MCNC: 25 MG/DL — HIGH (ref 8–20)
CALCIUM SERPL-MCNC: 8.9 MG/DL — SIGNIFICANT CHANGE UP (ref 8.6–10.2)
CHLORIDE SERPL-SCNC: 94 MMOL/L — LOW (ref 98–107)
CO2 SERPL-SCNC: 17 MMOL/L — LOW (ref 22–29)
COLOR SPEC: YELLOW — SIGNIFICANT CHANGE UP
CREAT SERPL-MCNC: 1.09 MG/DL — SIGNIFICANT CHANGE UP (ref 0.5–1.3)
DIFF PNL FLD: ABNORMAL
EOSINOPHIL # BLD AUTO: 0.2 K/UL — SIGNIFICANT CHANGE UP (ref 0–0.5)
EOSINOPHIL NFR BLD AUTO: 2.4 % — SIGNIFICANT CHANGE UP (ref 0–5)
EPI CELLS # UR: SIGNIFICANT CHANGE UP
GLUCOSE SERPL-MCNC: 425 MG/DL — HIGH (ref 70–115)
GLUCOSE UR QL: 1000 MG/DL
HCT VFR BLD CALC: 33.6 % — LOW (ref 42–52)
HGB BLD-MCNC: 11.4 G/DL — LOW (ref 14–18)
INR BLD: 0.9 RATIO — SIGNIFICANT CHANGE UP (ref 0.88–1.16)
KETONES UR-MCNC: ABNORMAL
LEUKOCYTE ESTERASE UR-ACNC: NEGATIVE — SIGNIFICANT CHANGE UP
LYMPHOCYTES # BLD AUTO: 1.1 K/UL — SIGNIFICANT CHANGE UP (ref 1–4.8)
LYMPHOCYTES # BLD AUTO: 18.3 % — LOW (ref 20–55)
MAGNESIUM SERPL-MCNC: 1.9 MG/DL — SIGNIFICANT CHANGE UP (ref 1.6–2.6)
MCHC RBC-ENTMCNC: 30.9 PG — SIGNIFICANT CHANGE UP (ref 27–31)
MCHC RBC-ENTMCNC: 33.9 G/DL — SIGNIFICANT CHANGE UP (ref 32–36)
MCV RBC AUTO: 91.1 FL — SIGNIFICANT CHANGE UP (ref 80–94)
MONOCYTES # BLD AUTO: 0.3 K/UL — SIGNIFICANT CHANGE UP (ref 0–0.8)
MONOCYTES NFR BLD AUTO: 5.4 % — SIGNIFICANT CHANGE UP (ref 3–10)
NEUTROPHILS # BLD AUTO: 4.6 K/UL — SIGNIFICANT CHANGE UP (ref 1.8–8)
NEUTROPHILS NFR BLD AUTO: 72.9 % — SIGNIFICANT CHANGE UP (ref 37–73)
NITRITE UR-MCNC: NEGATIVE — SIGNIFICANT CHANGE UP
PH UR: 5 — SIGNIFICANT CHANGE UP (ref 5–8)
PLATELET # BLD AUTO: 232 K/UL — SIGNIFICANT CHANGE UP (ref 150–400)
POTASSIUM SERPL-MCNC: 4.8 MMOL/L — SIGNIFICANT CHANGE UP (ref 3.5–5.3)
POTASSIUM SERPL-SCNC: 4.8 MMOL/L — SIGNIFICANT CHANGE UP (ref 3.5–5.3)
PROT SERPL-MCNC: 6.6 G/DL — SIGNIFICANT CHANGE UP (ref 6.6–8.7)
PROT UR-MCNC: 100 MG/DL
PROTHROM AB SERPL-ACNC: 9.9 SEC — SIGNIFICANT CHANGE UP (ref 9.8–12.7)
RBC # BLD: 3.69 M/UL — LOW (ref 4.6–6.2)
RBC # FLD: 14.6 % — SIGNIFICANT CHANGE UP (ref 11–15.6)
SODIUM SERPL-SCNC: 133 MMOL/L — LOW (ref 135–145)
SP GR SPEC: 1.02 — SIGNIFICANT CHANGE UP (ref 1.01–1.02)
UROBILINOGEN FLD QL: NEGATIVE MG/DL — SIGNIFICANT CHANGE UP
WBC # BLD: 6.2 K/UL — SIGNIFICANT CHANGE UP (ref 4.8–10.8)
WBC # FLD AUTO: 6.2 K/UL — SIGNIFICANT CHANGE UP (ref 4.8–10.8)
WBC UR QL: SIGNIFICANT CHANGE UP

## 2017-09-27 PROCEDURE — 93005 ELECTROCARDIOGRAM TRACING: CPT

## 2017-09-27 PROCEDURE — 80053 COMPREHEN METABOLIC PANEL: CPT

## 2017-09-27 PROCEDURE — 96376 TX/PRO/DX INJ SAME DRUG ADON: CPT

## 2017-09-27 PROCEDURE — 85730 THROMBOPLASTIN TIME PARTIAL: CPT

## 2017-09-27 PROCEDURE — 83735 ASSAY OF MAGNESIUM: CPT

## 2017-09-27 PROCEDURE — 82962 GLUCOSE BLOOD TEST: CPT | Mod: 25

## 2017-09-27 PROCEDURE — 36415 COLL VENOUS BLD VENIPUNCTURE: CPT

## 2017-09-27 PROCEDURE — 96374 THER/PROPH/DIAG INJ IV PUSH: CPT

## 2017-09-27 PROCEDURE — 85027 COMPLETE CBC AUTOMATED: CPT

## 2017-09-27 PROCEDURE — 96372 THER/PROPH/DIAG INJ SC/IM: CPT | Mod: XU

## 2017-09-27 PROCEDURE — 99284 EMERGENCY DEPT VISIT MOD MDM: CPT | Mod: 25

## 2017-09-27 PROCEDURE — 85610 PROTHROMBIN TIME: CPT

## 2017-09-27 PROCEDURE — 81001 URINALYSIS AUTO W/SCOPE: CPT

## 2017-09-27 PROCEDURE — 96375 TX/PRO/DX INJ NEW DRUG ADDON: CPT

## 2017-09-27 RX ORDER — CARBIDOPA AND LEVODOPA 25; 100 MG/1; MG/1
1 TABLET ORAL ONCE
Qty: 0 | Refills: 0 | Status: COMPLETED | OUTPATIENT
Start: 2017-09-27 | End: 2017-09-27

## 2017-09-27 RX ORDER — ASPIRIN/CALCIUM CARB/MAGNESIUM 324 MG
81 TABLET ORAL DAILY
Qty: 0 | Refills: 0 | Status: DISCONTINUED | OUTPATIENT
Start: 2017-09-27 | End: 2017-09-30

## 2017-09-27 RX ORDER — CARBIDOPA AND LEVODOPA 25; 100 MG/1; MG/1
1 TABLET ORAL ONCE
Qty: 0 | Refills: 0 | Status: DISCONTINUED | OUTPATIENT
Start: 2017-09-27 | End: 2017-09-30

## 2017-09-27 RX ORDER — INSULIN HUMAN 100 [IU]/ML
6 INJECTION, SOLUTION SUBCUTANEOUS ONCE
Qty: 0 | Refills: 0 | Status: DISCONTINUED | OUTPATIENT
Start: 2017-09-27 | End: 2017-09-30

## 2017-09-27 RX ORDER — INSULIN HUMAN 100 [IU]/ML
12 INJECTION, SOLUTION SUBCUTANEOUS ONCE
Qty: 0 | Refills: 0 | Status: COMPLETED | OUTPATIENT
Start: 2017-09-27 | End: 2017-09-27

## 2017-09-27 RX ORDER — SODIUM CHLORIDE 9 MG/ML
500 INJECTION INTRAMUSCULAR; INTRAVENOUS; SUBCUTANEOUS ONCE
Qty: 0 | Refills: 0 | Status: COMPLETED | OUTPATIENT
Start: 2017-09-27 | End: 2017-09-27

## 2017-09-27 RX ADMIN — CARBIDOPA AND LEVODOPA 1 TABLET(S): 25; 100 TABLET ORAL at 09:51

## 2017-09-27 RX ADMIN — Medication 81 MILLIGRAM(S): at 09:50

## 2017-09-27 RX ADMIN — INSULIN HUMAN 12 UNIT(S): 100 INJECTION, SOLUTION SUBCUTANEOUS at 12:18

## 2017-09-27 RX ADMIN — SODIUM CHLORIDE 500 MILLILITER(S): 9 INJECTION INTRAMUSCULAR; INTRAVENOUS; SUBCUTANEOUS at 12:22

## 2017-09-27 NOTE — ED ADULT NURSE REASSESSMENT NOTE - COMFORT CARE
plan of care explained/repositioned/side rails up/wait time explained
meal provided
repositioned/wait time explained/plan of care explained/warm blanket provided/side rails up
repositioned/side rails up/plan of care explained/wait time explained

## 2017-09-27 NOTE — CHART NOTE - NSCHARTNOTEFT_GEN_A_CORE
SOCIAL WORK NOTE:  THIS WORKER RECEIVED NOTIFICATION THAT PATIENT'S WIFE BEING DISCHARGED FROM 4 TODAY AND IS ABLE BODIED AND WILLING TO TAKE HER  HOME WITH HER TODAY. SHE IS BEING DISCHARGED HOME WITHOUT NEEDS.  PLACED CALL TO THEIR DAUGHTER # [phone] AND MADE HER AWARE. SHE WAS IN AGREEMENT FOR BOTH HER PARENTS TO BE DISCHARGED HOME TODAY. AWAITNG D/C TIME FOR PATIENT WIFE ON [address] [place]

## 2017-09-27 NOTE — PROVIDER CONTACT NOTE (OTHER) - ACTION/TREATMENT ORDERED:
PT Goals( to achieve in 2 weeks);Pt independent with bed mobility. Pt mod. independent with transfers.  Pt mod I with amb with RW X 200 feet

## 2017-09-27 NOTE — PHYSICAL THERAPY INITIAL EVALUATION ADULT - ADDITIONAL COMMENTS
Pt lives with wife in a [place] with 6 steps to enter.  Amb with RW, reports that wife assists with ADLs and self care.

## 2017-09-27 NOTE — ED ADULT NURSE REASSESSMENT NOTE - NS ED NURSE REASSESS COMMENT FT1
Pt evaluated by Cardio, as per MD should be discharged to home. Will await for further disposition information, Pt aware of plan of care.
Dr. Davison aware of repeat BS and BP, patient to be discharged home.
Patient continues to rest in bed at this time, NAD noted. Patient denies any needs.
Assumed pt care at this time. Pt sleeping in stretcher, respirations even & unlabored. As per prior shift RN pt was unable to sleep all night so did not wake pt, will reassess later in the AM.
Pt medicated per MD orders and breakfast tray provided. Pt tolerating breakfast well. Offers no complaints at this time.
Pt A&O offers no complaint at this time. Pt resting comfortably, VSS, no signs of distress at this time, is awaiting SW admit/ placement because care giver is hospitalized, Safety maintained, call bell in reach.
Report received from day RN Pt A&Ox4 offers no complaint at this time. Pt resting comfortably, VSS, no signs of distress at this time, Safety maintained, SW consulted for home care, call bell in reach.
Pt A&O offers no complaint at this time. Pt resting comfortably, VSS, no signs of distress at this time, SW to see in am, Safety maintained, call bell in reach.

## 2017-09-27 NOTE — PROVIDER CONTACT NOTE (OTHER) - ASSESSMENT
Pt with no c/o pain before, during or after RX.  Pt will benefit from PT to maximize functional independence.  Will continue to follow.  Pt left supine in chair in no apparent distress and call bell within reach. Nurse aware.

## 2017-09-27 NOTE — ED ADULT NURSE REASSESSMENT NOTE - GENERAL PATIENT STATE
family/SO at bedside/comfortable appearance
resting/sleeping/comfortable appearance
comfortable appearance
comfortable appearance
comfortable appearance/resting/sleeping
comfortable appearance

## 2017-10-02 ENCOUNTER — APPOINTMENT (OUTPATIENT)
Dept: FAMILY MEDICINE | Facility: CLINIC | Age: 77
End: 2017-10-02
Payer: MEDICARE

## 2017-10-02 VITALS
DIASTOLIC BLOOD PRESSURE: 58 MMHG | HEIGHT: 72 IN | BODY MASS INDEX: 27.36 KG/M2 | WEIGHT: 202 LBS | SYSTOLIC BLOOD PRESSURE: 108 MMHG

## 2017-10-02 DIAGNOSIS — I69.90 UNSPECIFIED SEQUELAE OF UNSPECIFIED CEREBROVASCULAR DISEASE: ICD-10-CM

## 2017-10-02 DIAGNOSIS — N45.1 EPIDIDYMITIS: ICD-10-CM

## 2017-10-02 PROCEDURE — 99214 OFFICE O/P EST MOD 30 MIN: CPT | Mod: 25

## 2017-10-02 PROCEDURE — 36415 COLL VENOUS BLD VENIPUNCTURE: CPT

## 2017-10-02 RX ORDER — INSULIN ASPART 100 [IU]/ML
100 INJECTION, SOLUTION INTRAVENOUS; SUBCUTANEOUS
Qty: 1 | Refills: 1 | Status: DISCONTINUED | COMMUNITY
Start: 2017-07-19 | End: 2017-10-02

## 2017-10-02 RX ORDER — TAMSULOSIN HYDROCHLORIDE 0.4 MG/1
0.4 CAPSULE ORAL
Refills: 0 | Status: DISCONTINUED | COMMUNITY
End: 2017-10-02

## 2017-10-02 RX ORDER — POTASSIUM CHLORIDE 750 MG/1
10 TABLET, FILM COATED, EXTENDED RELEASE ORAL TWICE DAILY
Refills: 0 | Status: DISCONTINUED | COMMUNITY
End: 2017-10-02

## 2017-10-02 RX ORDER — INSULIN HUMAN 100 [IU]/ML
100 INJECTION, SUSPENSION SUBCUTANEOUS DAILY
Qty: 1 | Refills: 1 | Status: DISCONTINUED | COMMUNITY
Start: 2017-07-19 | End: 2017-10-02

## 2017-10-02 RX ORDER — CEFPODOXIME PROXETIL 200 MG/1
200 TABLET, FILM COATED ORAL
Qty: 14 | Refills: 0 | Status: DISCONTINUED | COMMUNITY
Start: 2017-07-19 | End: 2017-10-02

## 2017-10-05 ENCOUNTER — RX RENEWAL (OUTPATIENT)
Age: 77
End: 2017-10-05

## 2017-10-05 LAB
ALBUMIN SERPL ELPH-MCNC: 3.2 G/DL
ALP BLD-CCNC: 72 U/L
ALT SERPL-CCNC: <4 U/L
ANION GAP SERPL CALC-SCNC: 16 MMOL/L
AST SERPL-CCNC: 12 U/L
BILIRUB SERPL-MCNC: 0.2 MG/DL
BUN SERPL-MCNC: 27 MG/DL
CALCIUM SERPL-MCNC: 9.1 MG/DL
CHLORIDE SERPL-SCNC: 97 MMOL/L
CO2 SERPL-SCNC: 22 MMOL/L
CREAT SERPL-MCNC: 1.21 MG/DL
GLUCOSE SERPL-MCNC: 291 MG/DL
HBA1C MFR BLD HPLC: 10.3 %
POTASSIUM SERPL-SCNC: 5.2 MMOL/L
PROT SERPL-MCNC: 6.2 G/DL
SODIUM SERPL-SCNC: 135 MMOL/L

## 2017-10-09 NOTE — PATIENT PROFILE ADULT. - PRESSURE ULCER(S)
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
no

## 2017-10-23 ENCOUNTER — APPOINTMENT (OUTPATIENT)
Dept: FAMILY MEDICINE | Facility: CLINIC | Age: 77
End: 2017-10-23

## 2017-10-25 ENCOUNTER — MEDICATION RENEWAL (OUTPATIENT)
Age: 77
End: 2017-10-25

## 2017-10-27 ENCOUNTER — MEDICATION RENEWAL (OUTPATIENT)
Age: 77
End: 2017-10-27

## 2017-11-05 NOTE — PROGRESS NOTE ADULT - ASSESSMENT
Nurse is in This is 76Y M with PMH of DM, HTN, admitted for DKA and AMS, admitted to ICU, started on insulin drip, anion gap closed, off insulin drip, on Lantus 22 units. He was also noted to foul smelling discharge from groin, seen bu surgery, looks fungal infection, started on Nystatin powder, improving. MRI brain showed b/l stroke with mild petechial hemorrhage in right occipital lobe. JESUS MANUEL - Large PFO. acute DVT in LLE - not a good candidate for AC per Neuro and Cardio - s/p - IVC filter placement. S/P Ledesma cath placement , feeling better , c/o b/l LE itching /pain  had constipation that resolved

## 2017-11-13 ENCOUNTER — APPOINTMENT (OUTPATIENT)
Dept: FAMILY MEDICINE | Facility: CLINIC | Age: 77
End: 2017-11-13
Payer: MEDICARE

## 2017-11-13 ENCOUNTER — LABORATORY RESULT (OUTPATIENT)
Age: 77
End: 2017-11-13

## 2017-11-13 VITALS
OXYGEN SATURATION: 98 % | HEIGHT: 72 IN | WEIGHT: 200 LBS | HEART RATE: 76 BPM | SYSTOLIC BLOOD PRESSURE: 120 MMHG | BODY MASS INDEX: 27.09 KG/M2 | DIASTOLIC BLOOD PRESSURE: 60 MMHG | RESPIRATION RATE: 14 BRPM

## 2017-11-13 DIAGNOSIS — Z86.718 PERSONAL HISTORY OF OTHER VENOUS THROMBOSIS AND EMBOLISM: ICD-10-CM

## 2017-11-13 DIAGNOSIS — S42.213A UNSPECIFIED DISPLACED FRACTURE OF SURGICAL NECK OF UNSPECIFIED HUMERUS, INITIAL ENCOUNTER FOR CLOSED FRACTURE: ICD-10-CM

## 2017-11-13 DIAGNOSIS — E11.65 TYPE 2 DIABETES MELLITUS WITH DIABETIC POLYNEUROPATHY: ICD-10-CM
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
## 2017-11-13 DIAGNOSIS — Z87.440 PERSONAL HISTORY OF URINARY (TRACT) INFECTIONS: ICD-10-CM

## 2017-11-13 DIAGNOSIS — E11.42 TYPE 2 DIABETES MELLITUS WITH DIABETIC POLYNEUROPATHY: ICD-10-CM

## 2017-11-13 PROCEDURE — 82962 GLUCOSE BLOOD TEST: CPT

## 2017-11-13 PROCEDURE — 36415 COLL VENOUS BLD VENIPUNCTURE: CPT

## 2017-11-13 PROCEDURE — 99214 OFFICE O/P EST MOD 30 MIN: CPT | Mod: 25

## 2017-11-13 RX ORDER — TIMOLOL MALEATE 2.5 MG/ML
0.25 SOLUTION/ DROPS OPHTHALMIC
Refills: 0 | Status: DISCONTINUED | COMMUNITY
End: 2017-11-13

## 2017-11-14 PROBLEM — E11.42 POORLY CONTROLLED TYPE 2 DIABETES MELLITUS WITH PERIPHERAL NEUROPATHY: Status: ACTIVE | Noted: 2017-02-15

## 2017-11-14 LAB — GLUCOSE BLDC GLUCOMTR-MCNC: 564

## 2017-11-25 ENCOUNTER — TRANSCRIPTION ENCOUNTER (OUTPATIENT)
Age: 77
End: 2017-11-25

## 2017-11-25 ENCOUNTER — CLINICAL ADVICE (OUTPATIENT)
Age: 77
End: 2017-11-25

## 2017-12-01 ENCOUNTER — APPOINTMENT (OUTPATIENT)
Dept: FAMILY MEDICINE | Facility: CLINIC | Age: 77
End: 2017-12-01
Payer: MEDICARE

## 2017-12-01 VITALS
BODY MASS INDEX: 28.58 KG/M2 | HEART RATE: 70 BPM | DIASTOLIC BLOOD PRESSURE: 80 MMHG | RESPIRATION RATE: 14 BRPM | OXYGEN SATURATION: 97 % | HEIGHT: 72 IN | WEIGHT: 211 LBS | SYSTOLIC BLOOD PRESSURE: 150 MMHG

## 2017-12-01 DIAGNOSIS — Z86.73 PERSONAL HISTORY OF TRANSIENT ISCHEMIC ATTACK (TIA), AND CEREBRAL INFARCTION W/OUT RESIDUAL DEFICITS: ICD-10-CM

## 2017-12-01 DIAGNOSIS — E87.5 HYPERKALEMIA: ICD-10-CM

## 2017-12-01 PROCEDURE — 99214 OFFICE O/P EST MOD 30 MIN: CPT

## 2017-12-18 ENCOUNTER — APPOINTMENT (OUTPATIENT)
Dept: FAMILY MEDICINE | Facility: CLINIC | Age: 77
End: 2017-12-18
Payer: MEDICARE

## 2017-12-18 ENCOUNTER — NON-APPOINTMENT (OUTPATIENT)
Age: 77
End: 2017-12-18

## 2017-12-18 VITALS
HEART RATE: 74 BPM | HEIGHT: 72 IN | BODY MASS INDEX: 29.53 KG/M2 | WEIGHT: 218 LBS | DIASTOLIC BLOOD PRESSURE: 72 MMHG | SYSTOLIC BLOOD PRESSURE: 125 MMHG

## 2017-12-18 DIAGNOSIS — F32.9 MAJOR DEPRESSIVE DISORDER, SINGLE EPISODE, UNSPECIFIED: ICD-10-CM

## 2017-12-18 DIAGNOSIS — R63.5 ABNORMAL WEIGHT GAIN: ICD-10-CM

## 2017-12-18 DIAGNOSIS — R60.9 EDEMA, UNSPECIFIED: ICD-10-CM

## 2017-12-18 PROCEDURE — 99214 OFFICE O/P EST MOD 30 MIN: CPT | Mod: 25

## 2017-12-18 PROCEDURE — 36415 COLL VENOUS BLD VENIPUNCTURE: CPT

## 2017-12-18 PROCEDURE — 93000 ELECTROCARDIOGRAM COMPLETE: CPT

## 2017-12-18 RX ORDER — INSULIN HUMAN 100 [IU]/ML
100 INJECTION, SUSPENSION SUBCUTANEOUS
Qty: 3 | Refills: 1 | Status: DISCONTINUED | COMMUNITY
Start: 2017-10-02 | End: 2017-12-18

## 2017-12-19 LAB
ALBUMIN SERPL ELPH-MCNC: 3.1 G/DL
ALP BLD-CCNC: 85 U/L
ALT SERPL-CCNC: 13 U/L
ANION GAP SERPL CALC-SCNC: 13 MMOL/L
AST SERPL-CCNC: 23 U/L
BILIRUB SERPL-MCNC: 0.2 MG/DL
BUN SERPL-MCNC: 25 MG/DL
CALCIUM SERPL-MCNC: 9 MG/DL
CHLORIDE SERPL-SCNC: 98 MMOL/L
CO2 SERPL-SCNC: 21 MMOL/L
CREAT SERPL-MCNC: 1.25 MG/DL
GLUCOSE SERPL-MCNC: 244 MG/DL
POTASSIUM SERPL-SCNC: 4.8 MMOL/L
PROT SERPL-MCNC: 6.3 G/DL
SODIUM SERPL-SCNC: 132 MMOL/L

## 2017-12-20 ENCOUNTER — OTHER (OUTPATIENT)
Age: 77
End: 2017-12-20

## 2018-01-02 ENCOUNTER — INPATIENT (INPATIENT)
Facility: HOSPITAL | Age: 78
LOS: 8 days | Discharge: NURSING, REHAB OR LONG-TERM CARE | DRG: 551 | End: 2018-01-11
Attending: SURGERY | Admitting: SURGERY
Payer: COMMERCIAL

## 2018-01-02 VITALS
TEMPERATURE: 97 F | HEART RATE: 60 BPM | RESPIRATION RATE: 25 BRPM | WEIGHT: 202.38 LBS | OXYGEN SATURATION: 100 % | DIASTOLIC BLOOD PRESSURE: 79 MMHG | SYSTOLIC BLOOD PRESSURE: 166 MMHG | HEIGHT: 72 IN

## 2018-01-02 DIAGNOSIS — Z90.79 ACQUIRED ABSENCE OF OTHER GENITAL ORGAN(S): Chronic | ICD-10-CM

## 2018-01-02 DIAGNOSIS — Z96.7 PRESENCE OF OTHER BONE AND TENDON IMPLANTS: Chronic | ICD-10-CM

## 2018-01-02 DIAGNOSIS — S12.9XXA FRACTURE OF NECK, UNSPECIFIED, INITIAL ENCOUNTER: ICD-10-CM

## 2018-01-02 LAB
ALBUMIN SERPL ELPH-MCNC: 3 G/DL — LOW (ref 3.3–5.2)
ALP SERPL-CCNC: 86 U/L — SIGNIFICANT CHANGE UP (ref 40–120)
ALT FLD-CCNC: 7 U/L — SIGNIFICANT CHANGE UP
AMYLASE P1 CFR SERPL: 58 U/L — SIGNIFICANT CHANGE UP (ref 36–128)
ANION GAP SERPL CALC-SCNC: 20 MMOL/L — HIGH (ref 5–17)
APTT BLD: 25.6 SEC — LOW (ref 27.5–37.4)
AST SERPL-CCNC: 17 U/L — SIGNIFICANT CHANGE UP
BASOPHILS # BLD AUTO: 0.1 K/UL — SIGNIFICANT CHANGE UP (ref 0–0.2)
BASOPHILS NFR BLD AUTO: 1 % — SIGNIFICANT CHANGE UP (ref 0–2)
BILIRUB SERPL-MCNC: <0.2 MG/DL — LOW (ref 0.4–2)
BUN SERPL-MCNC: 27 MG/DL — HIGH (ref 8–20)
CALCIUM SERPL-MCNC: 9 MG/DL — SIGNIFICANT CHANGE UP (ref 8.6–10.2)
CHLORIDE SERPL-SCNC: 97 MMOL/L — LOW (ref 98–107)
CO2 SERPL-SCNC: 20 MMOL/L — LOW (ref 22–29)
CREAT SERPL-MCNC: 1.21 MG/DL — SIGNIFICANT CHANGE UP (ref 0.5–1.3)
EOSINOPHIL # BLD AUTO: 0.3 K/UL — SIGNIFICANT CHANGE UP (ref 0–0.5)
EOSINOPHIL NFR BLD AUTO: 5.3 % — HIGH (ref 0–5)
ETHANOL SERPL-MCNC: 11 MG/DL — SIGNIFICANT CHANGE UP
GLUCOSE SERPL-MCNC: 208 MG/DL — HIGH (ref 70–115)
HCT VFR BLD CALC: 34.2 % — LOW (ref 42–52)
HGB BLD-MCNC: 11.4 G/DL — LOW (ref 14–18)
INR BLD: 0.9 RATIO — SIGNIFICANT CHANGE UP (ref 0.88–1.16)
LACTATE BLDV-MCNC: 3.3 MMOL/L — HIGH (ref 0.5–2)
LIDOCAIN IGE QN: 13 U/L — LOW (ref 22–51)
LYMPHOCYTES # BLD AUTO: 1.6 K/UL — SIGNIFICANT CHANGE UP (ref 1–4.8)
LYMPHOCYTES # BLD AUTO: 27.5 % — SIGNIFICANT CHANGE UP (ref 20–55)
MCHC RBC-ENTMCNC: 31.3 PG — HIGH (ref 27–31)
MCHC RBC-ENTMCNC: 33.3 G/DL — SIGNIFICANT CHANGE UP (ref 32–36)
MCV RBC AUTO: 94 FL — SIGNIFICANT CHANGE UP (ref 80–94)
MONOCYTES # BLD AUTO: 0.5 K/UL — SIGNIFICANT CHANGE UP (ref 0–0.8)
MONOCYTES NFR BLD AUTO: 8 % — SIGNIFICANT CHANGE UP (ref 3–10)
NEUTROPHILS # BLD AUTO: 3.5 K/UL — SIGNIFICANT CHANGE UP (ref 1.8–8)
NEUTROPHILS NFR BLD AUTO: 57.9 % — SIGNIFICANT CHANGE UP (ref 37–73)
PLATELET # BLD AUTO: 265 K/UL — SIGNIFICANT CHANGE UP (ref 150–400)
POTASSIUM SERPL-MCNC: 4.8 MMOL/L — SIGNIFICANT CHANGE UP (ref 3.5–5.3)
POTASSIUM SERPL-SCNC: 4.8 MMOL/L — SIGNIFICANT CHANGE UP (ref 3.5–5.3)
PROT SERPL-MCNC: 6.5 G/DL — LOW (ref 6.6–8.7)
PROTHROM AB SERPL-ACNC: 9.9 SEC — SIGNIFICANT CHANGE UP (ref 9.8–12.7)
RBC # BLD: 3.64 M/UL — LOW (ref 4.6–6.2)
RBC # FLD: 13.4 % — SIGNIFICANT CHANGE UP (ref 11–15.6)
SODIUM SERPL-SCNC: 137 MMOL/L — SIGNIFICANT CHANGE UP (ref 135–145)
TROPONIN T SERPL-MCNC: 0.04 NG/ML — SIGNIFICANT CHANGE UP (ref 0–0.06)
TYPE + AB SCN PNL BLD: SIGNIFICANT CHANGE UP
WBC # BLD: 6 K/UL — SIGNIFICANT CHANGE UP (ref 4.8–10.8)
WBC # FLD AUTO: 6 K/UL — SIGNIFICANT CHANGE UP (ref 4.8–10.8)

## 2018-01-02 PROCEDURE — 72141 MRI NECK SPINE W/O DYE: CPT | Mod: 26

## 2018-01-02 PROCEDURE — 76377 3D RENDER W/INTRP POSTPROCES: CPT | Mod: 26

## 2018-01-02 PROCEDURE — 73200 CT UPPER EXTREMITY W/O DYE: CPT | Mod: 26,LT

## 2018-01-02 PROCEDURE — 70553 MRI BRAIN STEM W/O & W/DYE: CPT | Mod: 26

## 2018-01-02 PROCEDURE — 99222 1ST HOSP IP/OBS MODERATE 55: CPT

## 2018-01-02 PROCEDURE — 99223 1ST HOSP IP/OBS HIGH 75: CPT

## 2018-01-02 PROCEDURE — 70450 CT HEAD/BRAIN W/O DYE: CPT | Mod: 26

## 2018-01-02 PROCEDURE — 73070 X-RAY EXAM OF ELBOW: CPT | Mod: 26,LT

## 2018-01-02 PROCEDURE — 72125 CT NECK SPINE W/O DYE: CPT | Mod: 26

## 2018-01-02 RX ORDER — BACITRACIN ZINC 500 UNIT/G
1 OINTMENT IN PACKET (EA) TOPICAL
Qty: 0 | Refills: 0 | Status: DISCONTINUED | OUTPATIENT
Start: 2018-01-02 | End: 2018-01-11

## 2018-01-02 RX ORDER — SODIUM CHLORIDE 9 MG/ML
1000 INJECTION, SOLUTION INTRAVENOUS
Qty: 0 | Refills: 0 | Status: DISCONTINUED | OUTPATIENT
Start: 2018-01-02 | End: 2018-01-03

## 2018-01-02 RX ORDER — SODIUM CHLORIDE 9 MG/ML
1000 INJECTION INTRAMUSCULAR; INTRAVENOUS; SUBCUTANEOUS ONCE
Qty: 0 | Refills: 0 | Status: COMPLETED | OUTPATIENT
Start: 2018-01-02 | End: 2018-01-02

## 2018-01-02 RX ORDER — HYDROMORPHONE HYDROCHLORIDE 2 MG/ML
0.5 INJECTION INTRAMUSCULAR; INTRAVENOUS; SUBCUTANEOUS EVERY 4 HOURS
Qty: 0 | Refills: 0 | Status: DISCONTINUED | OUTPATIENT
Start: 2018-01-02 | End: 2018-01-05

## 2018-01-02 RX ORDER — ACETAMINOPHEN 500 MG
1000 TABLET ORAL ONCE
Qty: 0 | Refills: 0 | Status: COMPLETED | OUTPATIENT
Start: 2018-01-02 | End: 2018-01-02

## 2018-01-02 RX ADMIN — HYDROMORPHONE HYDROCHLORIDE 0.5 MILLIGRAM(S): 2 INJECTION INTRAMUSCULAR; INTRAVENOUS; SUBCUTANEOUS at 21:30

## 2018-01-02 RX ADMIN — SODIUM CHLORIDE 1000 MILLILITER(S): 9 INJECTION INTRAMUSCULAR; INTRAVENOUS; SUBCUTANEOUS at 21:47

## 2018-01-02 RX ADMIN — Medication 1000 MILLIGRAM(S): at 21:30

## 2018-01-02 RX ADMIN — HYDROMORPHONE HYDROCHLORIDE 0.5 MILLIGRAM(S): 2 INJECTION INTRAMUSCULAR; INTRAVENOUS; SUBCUTANEOUS at 21:45

## 2018-01-02 RX ADMIN — Medication 400 MILLIGRAM(S): at 21:12

## 2018-01-02 NOTE — H&P ADULT - NSHPPHYSICALEXAM_GEN_ALL_CORE
NAD  RRR  CTAB  ABD S/NT/ND  L elbow tednerness  no spinal tenderness stepoffs NAD  L clotted blood in nostril, no active lbeeding, no pharyngeal bleeding  RRR  CTAB  ABD S/NT/ND  L elbow tednerness  no spinal tenderness stepoffs  extremities no gross deformities  2+ radial/dp pulses  skin clean dry intact NAD  GCS 15, LATIF, non focal.  JAMAR  NO JVD  L clotted blood in nostril, no active bleeding, no pharyngeal bleeding  RRR  CTAB  ABD S/NT/ND  L elbow tenderness  no spinal tenderness stepoffs  Abdomen is soft, non tender, non distended.  pelvis stable  extremities no gross deformities  2+ radial/dp pulses  skin clean dry intact

## 2018-01-02 NOTE — CONSULT NOTE ADULT - SUBJECTIVE AND OBJECTIVE BOX
Patient is a 77y old  Male who presents with a chief complaint of trauma alert - 77M s/p fall and syncope (02 Jan 2018 19:09)      HPI:  77 M hx parkinson's BIB BLS s/p fall on baby asa, found down by wife for unknown amount of time. Then witnessed syncope, with witnessed fall and  +head strike witnessed by wife. slowed responses after second fall with altered   mental status in the field GCS 13 per EMTs, epistaxis which reseolved withotu packing. Presented to ED GCS=15, Denies cp/sob/n/v/f/c.       Inital vitals: 151/68 62 18 97.5 oral 96% RA 211lb 6ft      PAST MEDICAL:   Parkinson disease    SURGICAL HISTORY:      SOCIAL HISTORY: + EtOH twice weekly,  +  tobacco 35 yrs ago 1ppd x's 10 yrs ,  - drugs    FAMILY HISTORY:  No pertinent family history in first degree relatives      ROS:  CONSTITUTIONAL: No fever, weight loss, or fatigue  EYES: No eye pain, visual disturbances, or discharge  ENMT:  No difficulty hearing, tinnitus, vertigo; No sinus or throat pain  NECK: No pain or stiffness  BREASTS: No pain, masses, or nipple discharge  RESPIRATORY: No cough, wheezing, chills or hemoptysis; No shortness of breath  CARDIOVASCULAR: No chest pain, palpitations, dizziness, or leg swelling  GASTROINTESTINAL: No abdominal or epigastric pain. No nausea, vomiting, or hematemesis; No diarrhea or constipation. No melena or hematochezia.  GENITOURINARY: No dysuria, frequency, hematuria, or incontinence  NEUROLOGICAL: No headaches, memory loss, loss of strength, numbness, or tremors  SKIN: No itching, burning, rashes, or lesions   LYMPH NODES: No enlarged glands  ENDOCRINE: No heat or cold intolerance; No hair loss  MUSCULOSKELETAL: No joint pain or swelling; No muscle, back, or extremity pain  PSYCHIATRIC: No depression, anxiety, mood swings, or difficulty sleeping  HEME/LYMPH: No easy bruising, or bleeding gums  ALLERY AND IMMUNOLOGIC: No hives or eczema      MEDICATIONS  (STANDING):  sodium chloride 0.9% Bolus 1000 milliLiter(s) IV Bolus once    Allergies: No Known Allergies    Vital Signs Last 24 Hrs  T(C): --  T(F): --  HR: --  BP: --  BP(mean): --  RR: --  SpO2: --    PHYSICAL EXAM:  GENERAL: NAD, well-groomed, well-developed  HEAD:  Atraumatic, Normocephalic  EYES: EOMI, PERRLA, conjunctiva and sclera clear  ENMT: No tonsillar erythema, exudates, or enlargement; Moist mucous membranes, Good dentition, No lesions  NECK: Supple, No JVD, Normal thyroid  NERVOUS SYSTEM:  Alert & Oriented X3, Good concentration; Motor Strength 5/5 B/L upper and lower extremities; DTRs 2+ intact and symmetric  CHEST/LUNG: Clear to percussion bilaterally; No rales, rhonchi, wheezing, or rubs  HEART: Regular rate and rhythm; No murmurs, rubs, or gallops  ABDOMEN: Soft, Nontender, Nondistended; Bowel sounds present  EXTREMITIES:  2+ Peripheral Pulses, No clubbing, cyanosis, or edema  LYMPH: No lymphadenopathy noted  SKIN: No rashes or lesions      RADIOLOGY & ADDITIONAL STUDIES:                          11.4   6.0   )-----------( 265      ( 02 Jan 2018 19:21 )             34.2     01-02    137  |  97<L>  |  27.0<H>  ----------------------------<  208<H>  4.8   |  20.0<L>  |  1.21    Ca    9.0      02 Jan 2018 19:21    TPro  6.5<L>  /  Alb  3.0<L>  /  TBili  <0.2<L>  /  DBili  x   /  AST  17  /  ALT  7   /  AlkPhos  86  01-02    PT/INR - ( 02 Jan 2018 19:21 )   PT: 9.9 sec;   INR: 0.90 ratio         PTT - ( 02 Jan 2018 19:21 )  PTT:25.6 sec Patient is a 77y old  Male who presents with a chief complaint of trauma alert - 77M s/p fall and syncope (02 Jan 2018 19:09)      HPI:  77 M hx parkinson's BIB BLS s/p fall on baby asa, found down by wife for unknown amount of time. Then witnessed syncope, with witnessed fall and  +head strike witnessed by wife. slowed responses after second fall with altered   mental status in the field GCS 13 per EMTs, epistaxis which reseolved withotu packing. Presented to ED GCS=15, Denies cp/sob/n/v/f/c.       Inital vitals: 151/68 62 18 97.5 oral 96% RA 211lb 6ft      PAST MEDICAL:   Parkinson disease    SURGICAL HISTORY:      SOCIAL HISTORY: + EtOH twice weekly,  +  tobacco 35 yrs ago 1ppd x's 10 yrs ,  - drugs    FAMILY HISTORY:  No pertinent family history in first degree relatives      ROS:  CONSTITUTIONAL: No fever, weight loss, or fatigue  EYES: No eye pain, visual disturbances, or discharge  ENMT:  No difficulty hearing, tinnitus, vertigo; No sinus or throat pain  NECK: No pain or stiffness  BREASTS: No pain, masses, or nipple discharge  RESPIRATORY: No cough, wheezing, chills or hemoptysis; No shortness of breath  CARDIOVASCULAR: No chest pain, palpitations, dizziness, or leg swelling  GASTROINTESTINAL: No abdominal or epigastric pain. No nausea, vomiting, or hematemesis; No diarrhea or constipation. No melena or hematochezia.  GENITOURINARY: No dysuria, frequency, hematuria, or incontinence  NEUROLOGICAL: No headaches, memory loss, loss of strength, numbness, or tremors  SKIN: No itching, burning, rashes, or lesions   LYMPH NODES: No enlarged glands  ENDOCRINE: No heat or cold intolerance; No hair loss  MUSCULOSKELETAL: No joint pain or swelling; No muscle, back, or extremity pain  PSYCHIATRIC: No depression, anxiety, mood swings, or difficulty sleeping  HEME/LYMPH: No easy bruising, or bleeding gums  ALLERY AND IMMUNOLOGIC: No hives or eczema      MEDICATIONS  (STANDING):  sodium chloride 0.9% Bolus 1000 milliLiter(s) IV Bolus once    Allergies: No Known Allergies    Vital Signs Last 24 Hrs  T(C): --  T(F): --  HR: --  BP: --  BP(mean): --  RR: --  SpO2: --    PHYSICAL EXAM:  GENERAL: NAD, well-groomed, well-developed  HEAD:  Atraumatic, Normocephalic  EYES: EOMI, PERRLA, conjunctiva and sclera clear  ENMT: No tonsillar erythema, exudates, or enlargement; Moist mucous membranes, Good dentition, No lesions  NECK: Supple, No JVD, Normal thyroid  NERVOUS SYSTEM:  Alert & Oriented X3, Good concentration; Motor Strength 5/5 B/L upper and lower extremities; DTRs 2+ intact and symmetric  CHEST/LUNG: Clear to percussion bilaterally; No rales, rhonchi, wheezing, or rubs  HEART: Regular rate and rhythm; No murmurs, rubs, or gallops  ABDOMEN: Soft, Nontender, Nondistended; Bowel sounds present  EXTREMITIES:  2+ Peripheral Pulses, No clubbing, cyanosis, or edema  LYMPH: No lymphadenopathy noted  SKIN: No rashes or lesions      RADIOLOGY & ADDITIONAL STUDIES:  CT HEAD: The ventricles are of normal size and contour. No masses are   identified. There is no shift of midline structures. No abnormal   calcifications are seen. There is no evidence of intracerebral or extra   axial bleed. The calvarium demonstrates no abnormality. The paranasal   sinuses and mastoid air cells are unremarkable. There is a large arachnoid   cyst involving the right temporoparietal region. There is mild compression   of the right lateral ventricle but no significant shift of the midline   structures. This arachnoid cyst measures 4.2 cm in maximal thickness. This   cyst extends inferior to the right temporal lobe as well. There are diffuse   involutional changes. There is evidence of an infarct involving the right   occipital lobe of uncertain age. No evidence of associated bleed at this   site.     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 No evidence of associated   hemorrhage.     Large right temporal parietal arachnoid cyst..       CT CERVICAL SPINE:   There is a vertically oriented nondisplaced fracture through the   right para midline region of the body of the C2 vertebral body without   displacement of fracture fragments.     There is a fracture through the right lamina of C7.     Alignment of the cervical spine is normal. No definite evidence of   perivertebral soft tissue swelling or hematoma. Facet joints demonstrate   normal alignment without evidence of dislocation. Spinous processes are   intact. There are degenerative changes in the mid cervical spine most   pronounced at the C5-C6 level.     Impression:   Acute fracture through the right para midline region of the body of C2 as   well as an acute fracture through the right lamina at C7.     If not contraindicated, MRI would be helpful for further evaluation of the   cervical spine and cervical cord.                           11.4   6.0   )-----------( 265      ( 02 Jan 2018 19:21 )             34.2     01-02    137  |  97<L>  |  27.0<H>  ----------------------------<  208<H>  4.8   |  20.0<L>  |  1.21    Ca    9.0      02 Jan 2018 19:21    TPro  6.5<L>  /  Alb  3.0<L>  /  TBili  <0.2<L>  /  DBili  x   /  AST  17  /  ALT  7   /  AlkPhos  86  01-02    PT/INR - ( 02 Jan 2018 19:21 )   PT: 9.9 sec;   INR: 0.90 ratio         PTT - ( 02 Jan 2018 19:21 )  PTT:25.6 sec CC: FALL X'S 2 PER WIFE, 2ND ONE WITNESSED AS SYNCOPE FIRST  SOURCE: PATIENT AND PATIENTS WIFE    HPI:  77 M hx parkinson's BIB BLS s/p fall on baby asa, found down by wife for unknown amount of time. Then witnessed syncope, with SECOND  fall, +head strike witnessed by wife. Slowed responses after second fall with altered  mental status in the field GCS 13 per EMTs, epistaxis which resolved without packing. Presented to ED GCS=15, Denies cp/sob/n/v/f/c. DENIES AND MOTOR OR SENSORY CHANGES, C/O NECK AND LEFT ELBOW PAIN.       Inital vitals: 151/68 62 18 97.5 oral 96% RA 211lb 6ft      PAST MEDICAL:   Parkinson disease, HTN, DM, BPH, R CATATACT, CHOLESTEROL, HEARING DEFECT, LEGALLY BLIND    SURGICAL HISTORY:  R HIP ORIF 7 YRS AGO, PROSTATE SURG 5 YRS AGO       SOCIAL HISTORY: + EtOH twice weekly,  +  tobacco 35 yrs ago 1ppd x's 10 yrs ,  - drugs    FAMILY HISTORY:  MOTHER AND SISTER W HX MI,  FATHER:  CVA      ROS:  CONSTITUTIONAL: No fever, weight loss, or fatigue  EYES: No eye pain, visual disturbances, or discharge  ENMT:  No difficulty hearing, tinnitus, vertigo; No sinus or throat pain  NECK: No pain or stiffness  BREASTS: No pain, masses, or nipple discharge  RESPIRATORY: No cough, wheezing, chills or hemoptysis; No shortness of breath  CARDIOVASCULAR: No chest pain, palpitations, dizziness, or leg swelling  GASTROINTESTINAL: No abdominal or epigastric pain. No nausea, vomiting, or hematemesis; No diarrhea or constipation. No melena or hematochezia.  GENITOURINARY: No dysuria, frequency, hematuria, or incontinence  NEUROLOGICAL: No headaches, memory loss, loss of strength, numbness, or tremors  SKIN: No itching, burning, rashes, or lesions   LYMPH NODES: No enlarged glands  ENDOCRINE: No heat or cold intolerance; No hair loss  MUSCULOSKELETAL: No joint pain or swelling; No muscle, back, or extremity pain  PSYCHIATRIC: No depression, anxiety, mood swings, or difficulty sleeping  HEME/LYMPH: No easy bruising, or bleeding gums  ALLERY AND IMMUNOLOGIC: No hives or eczema      MEDICATIONS HOME: AMLODIDPINE, CARBIDOPA LEVO, CARVEDILOL, ATORVASTATIN, FINASTERIDE, LASIX, LISINOPRIL, ESCITALOPRAM, ASA 81, KCL, TAMSULOSIN  sodium chloride 0.9% Bolus 1000 milliLiter(s) IV Bolus once    Allergies: No Known Allergies    Vital Signs Last 24 Hrs  T(C): --  T(F): --  HR: --  BP: --  BP(mean): --  RR: --  SpO2: --    PHYSICAL EXAM:  GENERAL: NAD, well-groomed, well-developed  HEAD:  LEFT FRONTOPARIETAL SUPERFICAIL LACERATION/ABRASION,  Normocephalic  EYES: EOMI, PERRLA, conjunctiva and sclera clear  ENMT: DRY BLOOD AT NARES  NECK: D COLLAR IN PLACE, No JVD, NO POINT TENDERNESS  NERVOUS SYSTEM:  Alert & Oriented X3, Good concentration; PERRLA, EOMI, CRANIAL NERVES 2-12 INTACT,  Motor Strength 5/5 B/L upper and lower extremities EXCEPT LUE WITH ELBOW FX/SPLINT  SENSORY INTACT ALL  CHEST/LUNG: NO STERNAL OR RIB TENDERNESS,   Clear  bilaterally; No rales, rhonchi, wheezing, or rubs  HEART: Regular rate and rhythm; No murmurs, rubs, or gallops  ABDOMEN: Soft, Nontender, Nondistended; Bowel sounds present  EXTREMITIES:  2+ RADIAL/ DP  Pulses, No clubbing, cyanosis, or edema  LYMPH: No lymphadenopathy noted  SKIN: No rashes or lesions      RADIOLOGY & ADDITIONAL STUDIES:  CT HEAD: The ventricles are of normal size and contour. No masses are   identified. There is no shift of midline structures. No abnormal   calcifications are seen. There is no evidence of intracerebral or extra   axial bleed. The calvarium demonstrates no abnormality. The paranasal   sinuses and mastoid air cells are unremarkable. There is a large arachnoid   cyst involving the right temporoparietal region. There is mild compression   of the right lateral ventricle but no significant shift of the midline   structures. This arachnoid cyst measures 4.2 cm in maximal thickness. This   cyst extends inferior to the right temporal lobe as well. There are diffuse   involutional changes. There is evidence of an infarct involving the right   occipital lobe of uncertain age. No evidence of associated bleed at this   site.     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 No evidence of associated   hemorrhage.     Large right temporal parietal arachnoid cyst..       CT CERVICAL SPINE:   There is a vertically oriented nondisplaced fracture through the   right para midline region of the body of the C2 vertebral body without   displacement of fracture fragments.     There is a fracture through the right lamina of C7.     Alignment of the cervical spine is normal. No definite evidence of   perivertebral soft tissue swelling or hematoma. Facet joints demonstrate   normal alignment without evidence of dislocation. Spinous processes are   intact. There are degenerative changes in the mid cervical spine most   pronounced at the C5-C6 level.     Impression:   Acute fracture through the right para midline region of the body of C2 as   well as an acute fracture through the right lamina at C7.     If not contraindicated, MRI would be helpful for further evaluation of the   cervical spine and cervical cord.                           11.4   6.0   )-----------( 265      ( 02 Jan 2018 19:21 )             34.2     01-02    137  |  97<L>  |  27.0<H>  ----------------------------<  208<H>  4.8   |  20.0<L>  |  1.21    Ca    9.0      02 Jan 2018 19:21    TPro  6.5<L>  /  Alb  3.0<L>  /  TBili  <0.2<L>  /  DBili  x   /  AST  17  /  ALT  7   /  AlkPhos  86  01-02    PT/INR - ( 02 Jan 2018 19:21 )   PT: 9.9 sec;   INR: 0.90 ratio         PTT - ( 02 Jan 2018 19:21 )  PTT:25.6 sec

## 2018-01-02 NOTE — PATIENT PROFILE ADULT. - PMH
BPH (benign prostatic hyperplasia)    HTN (hypertension)    Legally blind    Parkinson disease    Type 2 diabetes mellitus

## 2018-01-02 NOTE — ED PROVIDER NOTE - OBJECTIVE STATEMENT
78 y/o M with hx of parkinsons,htn bph was in chair had syncopal episode and fell out of chair hitting head wife reported changes in MS since fall EMS requested trauma alert reported gcs 13 and thinners "asa" 78 y/o M with hx of parkinsons,htn bph was in chair had syncopal episode and fell out of chair hitting head wife reported changes in MS since fall EMS requested trauma alert reported gcs 13 and thinners "asa" fall reported earlier in the day as well, unclear when pt had last known well, hx of memory problems as well as parkinsons

## 2018-01-02 NOTE — H&P ADULT - ASSESSMENT
77M s/p syncopal fall x 2 with head strike and +LOC  -neurochecks q1hr  -f/u CT head  -f/u CT Cspine - 2 C-spinal frx  -clear C collar if possible  -f/u labs  -gentle IVF  -NPO  -pain control  -dvt ppx - scd  -HOB 30 degrees  -monitor vitals  -hold anticoagulation (aspirin)  -admit to SICU 77M s/p syncopal fall x 2 with head strike and +LOC  -neurochecks q1hr  -f/u CT head  -f/u CT Cspine - 2 C-spinal frx  -clear C collar if possible  -f/u labs  -gentle IVF  -NPO  -pain control  -dvt ppx - scd  -HOB 30 degrees  -monitor vitals  -hold anticoagulation (aspirin)  -admit to SICU  -f/u ekg troponins 77M s/p syncopal fall x 2 with head strike and +LOC  -neurochecks q1hr  -f/u CT head  -f/u CT Cspine - 2 C-spinal frx  -clear C collar if possible  -f/u labs  -gentle IVF  -NPO  -pain control  -dvt ppx - scd  -HOB 30 degrees  -monitor vitals  -hold aspirin  -admit to SICU  -f/u ekg troponins

## 2018-01-02 NOTE — CONSULT NOTE ADULT - ASSESSMENT
IMP: IMP: Large right temporal parietal arachnoid cyst.. IMP: Large right temporal parietal arachnoid cyst...   No traumatic brain injury   Incidental finding  not cause of current issues

## 2018-01-02 NOTE — CONSULT NOTE ADULT - ATTENDING COMMENTS
PLAN: PLAN: Discussed with Dr Fry  mri brain w & w/o contrast tomorrow is ok  q1h neuro cks  ct head if any neuro status change

## 2018-01-02 NOTE — CONSULT NOTE ADULT - ATTENDING COMMENTS
Orthopedic Trauma Attending Note:    Agree with above PA note. F/U CT to determine displacement.    Fer Sung MD  Orthopedic Trauma

## 2018-01-02 NOTE — ED ADULT TRIAGE NOTE - CHIEF COMPLAINT QUOTE
PT BIBA s/p fall from chair,  ++ LOC, pt GCS 13 per EMS. pt with laceration to forehead, bleeding controlled. Pt on two 81mg ASA daily per wife. Code trauma B activated

## 2018-01-02 NOTE — CONSULT NOTE ADULT - SUBJECTIVE AND OBJECTIVE BOX
Pt Name: TATIANA MARIE    MRN: [id number]      Patient is a 77y Male presenting to the emergency department with a chief complaint of trauma  Patient is a 77y old  Male who presents with a chief complaint of trauma alert - 77M s/p fall and syncope (02 Jan 2018 19:09).  Patient tripped earlier today while using walker, than wife states patient was eating dinner when he had syncope and fell from chair hitting head.  Patient seen by trauma, CT of neck reveals C2 vertebral body and C7 lamina fx.  patient denies any weakness or numbness to extremities, patient is c/o left elbow pain   .    HEALTH ISSUES - PROBLEM Dx: C2 Vertebral body and C7 lamina fx       .      REVIEW OF SYSTEMS      General: no fevers, no chills	    Skin/Breast:  	  Ophthalmologic:  	  ENMT:	    Respiratory and Thorax: no dyspnea  	  Cardiovascular:	no chest pain     Gastrointestinal:	    Genitourinary:	    Musculoskeletal:	 left elbow pain    Neurological:	no numbness, no weakness     Psychiatric:	    Hematology/Lymphatics:	    Endocrine:	    Allergic/Immunologic:	    ROS is otherwise negative.    PAST MEDICAL & SURGICAL HISTORY:  PAST MEDICAL & SURGICAL HISTORY:  Parkinson disease      Allergies: No Known Allergies      Medications: sodium chloride 0.9% Bolus 1000 milliLiter(s) IV Bolus once      FAMILY HISTORY:  No pertinent family history in first degree relatives  : non-contributory    Social History: denies drug use    Ambulation: Walking independently With Walker                        11.4   6.0   )-----------( 265      ( 02 Jan 2018 19:21 )             34.2     01-02    137  |  97<L>  |  27.0<H>  ----------------------------<  208<H>  4.8   |  20.0<L>  |  1.21    Ca    9.0      02 Jan 2018 19:21    TPro  6.5<L>  /  Alb  3.0<L>  /  TBili  <0.2<L>  /  DBili  x   /  AST  17  /  ALT  7   /  AlkPhos  86  01-02      PHYSICAL EXAM:    Vital Signs Last 24 Hrs  T(C): --  T(F): --  HR: --  BP: --  BP(mean): --  RR: --  SpO2: --  Daily     Daily     Appearance: Alert, responsive, in no acute distress.    Skin: no rash on visible skin. Skin is clean, dry and intact. No bleeding. No abrasions. No ulcerations.    Vascular: 2+ distal pulses. Cap refill < 2 sec. No signs of venous insuffiency or stasis. No extremity ulcerations. No cyanosis.    Musculoskeletal:         Left Upper Extremity: Atraumatic with normal alignment NROM. No crepitus. No positive tenderness to posterior elbow       Right Upper Extremity: Atraumatic with normal alignment NROM. No crepitus. No bony tenderness.        Left Lower Extremity: Atraumatic with normal alignment NROM. No crepitus. No bony tenderness.        Right Lower Extremity: Atraumatic with normal alignment NROM. No crepitus. No bony tenderness.     Neurological: Sensation is grossly intact to light touch. No focal deficits or weaknesses found.    Pathologic reflexes: neg Corley,  neg Clonus            Reflexes:   Biceps, Bracioradialis, Patella, Achilles intact            Motor exam: [  ]          [ ] Upper extremity              Bi(c5)  WE(c6)  EE(c7)   FF(c8)                                                R         5/5        5/5        5/5       5/5                                               L          5/5        5/5        5/5       5/5         [ ] Lower extremeity          HF(l2)   KE(l3)    TA(l4)   EHL(l5)  GS(s1)                                                 R        5/5        5/5        5/5       5/5         5/5                                               L         5/5        5/5       5/5       5/5          5/5    Imaging Studies: Ct reveals C2 vertebral body and C7 lamina fx     A/P:  Pt is a 77y old Male who presents with a chief complaint of trauma alert - 77M s/p fall and syncope (02 Jan 2018 19:09)   found to have C2 vertebral body and C7 lamina fx    Case Discussed with Dr. Britton    PLAN:  * Pain control  * MRI ordered  * Treatment plan to be finalized after review of pending imaging studies  *Bedrest  * will get xray of elbow, general ortho to handle if fx

## 2018-01-02 NOTE — H&P ADULT - ATTENDING COMMENTS
Patient seen and examined on arrival.  GCS 15 ABCDE intact.  Scanned evidenced age undetermined occipital stroke and large right subarachnoid cyst with no midline shift. C2 body and C7 lamina fracture.  NO other injuries identified on my review of images.  plan to consult neurosurgery/spine surgery and have neurology to evaluate for age undetermined stoke, as well as syncopal work up.

## 2018-01-02 NOTE — H&P ADULT - HISTORY OF PRESENT ILLNESS
77 M hx parkinsons BIB BLS s/p fall on baby asa, found down by wife for unknown amount of time. trauma activation was not called. during dinner lost consciousness, +headstrike +witnessed LOC prior to fall and continued after fall per wife. slowed responses after second fall. altered mental status in the field GCS 13 per EMTs, epistaxis which reseolved withotu packing. Denies cp/sob/n/v/f/c.    A airway intact, speaking full sentences, C-collar intact  B equal breath soudns b/l, symmetric chest rise  C b/l R/DP pulses intact  D GCS15, pupils 3mm b/l and reactive, moving all 4s  E fully exposed, no gross deformities or bleeding, covered with blankets    Inital vitals:    CXR negative for hemoPTX or acute frx preliminarily    Secondary survey remarkable for L elbow tenderness, 2.5cm nonbleeding laceration on frontotemporal scalp. 77 M hx parkinsons BIB BLS s/p fall on baby asa, found down by wife for unknown amount of time. trauma activation was not called. during dinner lost consciousness, +headstrike +witnessed LOC prior to fall and continued after fall per wife. slowed responses after second fall. altered mental status in the field GCS 13 per EMTs, epistaxis which reseolved withotu packing. Denies cp/sob/n/v/f/c.    A airway intact, speaking full sentences, C-collar intact  B equal breath soudns b/l, symmetric chest rise  C b/l R/DP pulses intact  D GCS15, pupils 3mm b/l and reactive, moving all 4s  E fully exposed, no gross deformities or bleeding, covered with blankets    Inital vitals: 151/68 62 18 97.5 oral 96% RA 211lb 6ft    CXR negative for hemoPTX or acute frx preliminarily    Secondary survey remarkable for L elbow tenderness, 2.5cm nonbleeding laceration on frontotemporal scalp.

## 2018-01-02 NOTE — CONSULT NOTE ADULT - SUBJECTIVE AND OBJECTIVE BOX
Pt Name: TATIANA MARIE    MRN: [id number]      Patient is a 77y Male presenting to the emergency department with a chief complaint of syncope and fall   Patient is a 77y old  Male who presents with a chief complaint of trauma alert - 77M s/p fall and syncope (02 Jan 2018 19:09).  Patient fell earlier today while using walker, states no injury, than while eating dinner had syncope landing on head.  Patient was trauma alert, Ct found cervical fx, during exam by ortho spine patient was noted to have left elbow pain.  Xrays reveal positive distal humerus fx   .    HEALTH ISSUES - PROBLEM Dx: Left distal humerus fx       .      REVIEW OF SYSTEMS      General:	no fevers    Skin/Breast:  	  Ophthalmologic:  	  ENMT:	    Respiratory and Thorax: no dyspnea   	  Cardiovascular:	no chest pain     Gastrointestinal:	    Genitourinary:	    Musculoskeletal:	 Cervical fx/left elbow pain     Neurological:	no weakness, no numbness    Psychiatric:	    Hematology/Lymphatics:	    Endocrine:	    Allergic/Immunologic:	    ROS is otherwise negative.    PAST MEDICAL & SURGICAL HISTORY:  PAST MEDICAL & SURGICAL HISTORY:  Parkinson disease      Allergies: No Known Allergies      Medications: sodium chloride 0.9% Bolus 1000 milliLiter(s) IV Bolus once      FAMILY HISTORY:  No pertinent family history in first degree relatives  : non-contributory    Social History: denies drug abuse    Ambulation: Walking independently  With Walker                            11.4   6.0   )-----------( 265      ( 02 Jan 2018 19:21 )             34.2     01-02    137  |  97<L>  |  27.0<H>  ----------------------------<  208<H>  4.8   |  20.0<L>  |  1.21    Ca    9.0      02 Jan 2018 19:21    TPro  6.5<L>  /  Alb  3.0<L>  /  TBili  <0.2<L>  /  DBili  x   /  AST  17  /  ALT  7   /  AlkPhos  86  01-02      PHYSICAL EXAM:    Vital Signs Last 24 Hrs  T(C): --  T(F): --  HR: --  BP: --  BP(mean): --  RR: --  SpO2: --  Daily     Daily     Appearance: Alert, responsive, in no acute distress.    Cervical spine in C collar    Neurological: Sensation is grossly intact to light touch. 5/5 motor function of all extremities. No focal deficits or weaknesses found.    Skin: no rash on visible skin. Skin is clean, dry and intact. No bleeding. No abrasions. No ulcerations.    Vascular: 2+ distal pulses. Cap refill < 2 sec. No signs of venous insuffiency or stasis. No extremity ulcerations. No cyanosis.    Musculoskeletal:         Left Upper Extremity: Left elbow, no swelling, no open wounds, positive posterior tenderness, ROM intact with pain, no further tenderness to upper extremity      imaging studies positive distal humerus fx     Case discussed with Dr. Sung who gave plan     SPLINTING   PROCEDURE NOTE: Splinting    Performed by:  Edmundo Chan PA-C     Indication: Left elbow fx     The Left elbow was appropriately positioned. A ortho glass splint was applied. Distally, the extremity was neurovascular intact following the procedure. The patient tolerated the procedure well.        A/P:  Pt is a 77y Male  who presents with a chief complaint of trauma alert - 77M s/p fall and syncope (02 Jan 2018 19:09)   found to have Left distal humerus fx     PLAN:   1. splint, NWB LUE  2. pain control   3 CT left upper extremity

## 2018-01-03 ENCOUNTER — APPOINTMENT (OUTPATIENT)
Dept: FAMILY MEDICINE | Facility: CLINIC | Age: 78
End: 2018-01-03

## 2018-01-03 DIAGNOSIS — G20 PARKINSON'S DISEASE: ICD-10-CM

## 2018-01-03 DIAGNOSIS — I10 ESSENTIAL (PRIMARY) HYPERTENSION: ICD-10-CM

## 2018-01-03 DIAGNOSIS — H54.8 LEGAL BLINDNESS, AS DEFINED IN USA: ICD-10-CM

## 2018-01-03 DIAGNOSIS — I63.512 CEREBRAL INFARCTION DUE TO UNSPECIFIED OCCLUSION OR STENOSIS OF LEFT MIDDLE CEREBRAL ARTERY: ICD-10-CM

## 2018-01-03 DIAGNOSIS — S12.191A OTHER NONDISPLACED FRACTURE OF SECOND CERVICAL VERTEBRA, INITIAL ENCOUNTER FOR CLOSED FRACTURE: ICD-10-CM

## 2018-01-03 DIAGNOSIS — R55 SYNCOPE AND COLLAPSE: ICD-10-CM

## 2018-01-03 DIAGNOSIS — I63.431 CEREBRAL INFARCTION DUE TO EMBOLISM OF RIGHT POSTERIOR CEREBRAL ARTERY: ICD-10-CM

## 2018-01-03 LAB
ANION GAP SERPL CALC-SCNC: 15 MMOL/L — SIGNIFICANT CHANGE UP (ref 5–17)
ANISOCYTOSIS BLD QL: SLIGHT — SIGNIFICANT CHANGE UP
BASOPHILS # BLD AUTO: 0 K/UL — SIGNIFICANT CHANGE UP (ref 0–0.2)
BASOPHILS NFR BLD AUTO: 1 % — SIGNIFICANT CHANGE UP (ref 0–2)
BUN SERPL-MCNC: 28 MG/DL — HIGH (ref 8–20)
CALCIUM SERPL-MCNC: 9 MG/DL — SIGNIFICANT CHANGE UP (ref 8.6–10.2)
CHLORIDE SERPL-SCNC: 94 MMOL/L — LOW (ref 98–107)
CO2 SERPL-SCNC: 25 MMOL/L — SIGNIFICANT CHANGE UP (ref 22–29)
CREAT SERPL-MCNC: 1.24 MG/DL — SIGNIFICANT CHANGE UP (ref 0.5–1.3)
EOSINOPHIL # BLD AUTO: 0.1 K/UL — SIGNIFICANT CHANGE UP (ref 0–0.5)
EOSINOPHIL NFR BLD AUTO: 1 % — SIGNIFICANT CHANGE UP (ref 0–6)
GLUCOSE BLDC GLUCOMTR-MCNC: 325 MG/DL — HIGH (ref 70–99)
GLUCOSE BLDC GLUCOMTR-MCNC: 338 MG/DL — HIGH (ref 70–99)
GLUCOSE BLDC GLUCOMTR-MCNC: 363 MG/DL — HIGH (ref 70–99)
GLUCOSE SERPL-MCNC: 387 MG/DL — HIGH (ref 70–115)
HCT VFR BLD CALC: 36.8 % — LOW (ref 42–52)
HGB BLD-MCNC: 11.8 G/DL — LOW (ref 14–18)
LACTATE SERPL-SCNC: 1.2 MMOL/L — SIGNIFICANT CHANGE UP (ref 0.5–2)
LYMPHOCYTES # BLD AUTO: 1 K/UL — SIGNIFICANT CHANGE UP (ref 1–4.8)
LYMPHOCYTES # BLD AUTO: 8 % — LOW (ref 20–55)
MACROCYTES BLD QL: SLIGHT — SIGNIFICANT CHANGE UP
MAGNESIUM SERPL-MCNC: 2.3 MG/DL — SIGNIFICANT CHANGE UP (ref 1.6–2.6)
MCHC RBC-ENTMCNC: 30.2 PG — SIGNIFICANT CHANGE UP (ref 27–31)
MCHC RBC-ENTMCNC: 32.1 G/DL — SIGNIFICANT CHANGE UP (ref 32–36)
MCV RBC AUTO: 94.1 FL — HIGH (ref 80–94)
MICROCYTES BLD QL: SLIGHT — SIGNIFICANT CHANGE UP
MONOCYTES # BLD AUTO: 0.5 K/UL — SIGNIFICANT CHANGE UP (ref 0–0.8)
MONOCYTES NFR BLD AUTO: 4 % — SIGNIFICANT CHANGE UP (ref 3–10)
NEUTROPHILS # BLD AUTO: 10.2 K/UL — HIGH (ref 1.8–8)
NEUTROPHILS NFR BLD AUTO: 85 % — HIGH (ref 37–73)
NEUTS BAND # BLD: 1 % — SIGNIFICANT CHANGE UP (ref 0–8)
PHOSPHATE SERPL-MCNC: 3.8 MG/DL — SIGNIFICANT CHANGE UP (ref 2.4–4.7)
PLAT MORPH BLD: NORMAL — SIGNIFICANT CHANGE UP
PLATELET # BLD AUTO: 271 K/UL — SIGNIFICANT CHANGE UP (ref 150–400)
POIKILOCYTOSIS BLD QL AUTO: SLIGHT — SIGNIFICANT CHANGE UP
POTASSIUM SERPL-MCNC: 5.3 MMOL/L — SIGNIFICANT CHANGE UP (ref 3.5–5.3)
POTASSIUM SERPL-SCNC: 5.3 MMOL/L — SIGNIFICANT CHANGE UP (ref 3.5–5.3)
RBC # BLD: 3.91 M/UL — LOW (ref 4.6–6.2)
RBC # FLD: 13.2 % — SIGNIFICANT CHANGE UP (ref 11–15.6)
RBC BLD AUTO: ABNORMAL
SODIUM SERPL-SCNC: 134 MMOL/L — LOW (ref 135–145)
TROPONIN T SERPL-MCNC: 0.04 NG/ML — SIGNIFICANT CHANGE UP (ref 0–0.06)
WBC # BLD: 11.8 K/UL — HIGH (ref 4.8–10.8)
WBC # FLD AUTO: 11.8 K/UL — HIGH (ref 4.8–10.8)

## 2018-01-03 PROCEDURE — 93306 TTE W/DOPPLER COMPLETE: CPT | Mod: 26

## 2018-01-03 PROCEDURE — 99232 SBSQ HOSP IP/OBS MODERATE 35: CPT

## 2018-01-03 PROCEDURE — 93010 ELECTROCARDIOGRAM REPORT: CPT

## 2018-01-03 PROCEDURE — 93880 EXTRACRANIAL BILAT STUDY: CPT | Mod: 26

## 2018-01-03 PROCEDURE — 99223 1ST HOSP IP/OBS HIGH 75: CPT | Mod: 57

## 2018-01-03 PROCEDURE — 22310 CLOSED TX VERT FX W/O MANJ: CPT

## 2018-01-03 PROCEDURE — 99223 1ST HOSP IP/OBS HIGH 75: CPT

## 2018-01-03 RX ORDER — ESCITALOPRAM OXALATE 10 MG/1
10 TABLET, FILM COATED ORAL DAILY
Qty: 0 | Refills: 0 | Status: DISCONTINUED | OUTPATIENT
Start: 2018-01-03 | End: 2018-01-11

## 2018-01-03 RX ORDER — ACETAMINOPHEN 500 MG
1000 TABLET ORAL ONCE
Qty: 0 | Refills: 0 | Status: COMPLETED | OUTPATIENT
Start: 2018-01-03 | End: 2018-01-03

## 2018-01-03 RX ORDER — DEXTROSE 50 % IN WATER 50 %
25 SYRINGE (ML) INTRAVENOUS ONCE
Qty: 0 | Refills: 0 | Status: DISCONTINUED | OUTPATIENT
Start: 2018-01-03 | End: 2018-01-11

## 2018-01-03 RX ORDER — AMLODIPINE BESYLATE 2.5 MG/1
10 TABLET ORAL DAILY
Qty: 0 | Refills: 0 | Status: DISCONTINUED | OUTPATIENT
Start: 2018-01-04 | End: 2018-01-11

## 2018-01-03 RX ORDER — AMLODIPINE BESYLATE 2.5 MG/1
5 TABLET ORAL ONCE
Qty: 0 | Refills: 0 | Status: COMPLETED | OUTPATIENT
Start: 2018-01-03 | End: 2018-01-03

## 2018-01-03 RX ORDER — AMLODIPINE BESYLATE 2.5 MG/1
5 TABLET ORAL DAILY
Qty: 0 | Refills: 0 | Status: DISCONTINUED | OUTPATIENT
Start: 2018-01-03 | End: 2018-01-03

## 2018-01-03 RX ORDER — SODIUM CHLORIDE 9 MG/ML
1000 INJECTION, SOLUTION INTRAVENOUS
Qty: 0 | Refills: 0 | Status: DISCONTINUED | OUTPATIENT
Start: 2018-01-03 | End: 2018-01-11

## 2018-01-03 RX ORDER — INSULIN LISPRO 100/ML
VIAL (ML) SUBCUTANEOUS
Qty: 0 | Refills: 0 | Status: DISCONTINUED | OUTPATIENT
Start: 2018-01-03 | End: 2018-01-04

## 2018-01-03 RX ORDER — INSULIN LISPRO 100/ML
3 VIAL (ML) SUBCUTANEOUS ONCE
Qty: 0 | Refills: 0 | Status: COMPLETED | OUTPATIENT
Start: 2018-01-03 | End: 2018-01-03

## 2018-01-03 RX ORDER — CARVEDILOL PHOSPHATE 80 MG/1
6.25 CAPSULE, EXTENDED RELEASE ORAL EVERY 12 HOURS
Qty: 0 | Refills: 0 | Status: DISCONTINUED | OUTPATIENT
Start: 2018-01-03 | End: 2018-01-11

## 2018-01-03 RX ORDER — DEXTROSE 50 % IN WATER 50 %
1 SYRINGE (ML) INTRAVENOUS ONCE
Qty: 0 | Refills: 0 | Status: DISCONTINUED | OUTPATIENT
Start: 2018-01-03 | End: 2018-01-11

## 2018-01-03 RX ORDER — TIMOLOL 0.5 %
1 DROPS OPHTHALMIC (EYE) EVERY 6 HOURS
Qty: 0 | Refills: 0 | Status: DISCONTINUED | OUTPATIENT
Start: 2018-01-03 | End: 2018-01-11

## 2018-01-03 RX ORDER — GLUCAGON INJECTION, SOLUTION 0.5 MG/.1ML
1 INJECTION, SOLUTION SUBCUTANEOUS ONCE
Qty: 0 | Refills: 0 | Status: DISCONTINUED | OUTPATIENT
Start: 2018-01-03 | End: 2018-01-11

## 2018-01-03 RX ORDER — LISINOPRIL 2.5 MG/1
5 TABLET ORAL DAILY
Qty: 0 | Refills: 0 | Status: DISCONTINUED | OUTPATIENT
Start: 2018-01-03 | End: 2018-01-11

## 2018-01-03 RX ORDER — TAMSULOSIN HYDROCHLORIDE 0.4 MG/1
0.8 CAPSULE ORAL AT BEDTIME
Qty: 0 | Refills: 0 | Status: DISCONTINUED | OUTPATIENT
Start: 2018-01-03 | End: 2018-01-11

## 2018-01-03 RX ORDER — ATORVASTATIN CALCIUM 80 MG/1
20 TABLET, FILM COATED ORAL AT BEDTIME
Qty: 0 | Refills: 0 | Status: DISCONTINUED | OUTPATIENT
Start: 2018-01-03 | End: 2018-01-11

## 2018-01-03 RX ORDER — INSULIN GLARGINE 100 [IU]/ML
24 INJECTION, SOLUTION SUBCUTANEOUS AT BEDTIME
Qty: 0 | Refills: 0 | Status: DISCONTINUED | OUTPATIENT
Start: 2018-01-03 | End: 2018-01-06

## 2018-01-03 RX ORDER — CARBIDOPA AND LEVODOPA 25; 100 MG/1; MG/1
1 TABLET ORAL EVERY 8 HOURS
Qty: 0 | Refills: 0 | Status: DISCONTINUED | OUTPATIENT
Start: 2018-01-03 | End: 2018-01-11

## 2018-01-03 RX ORDER — TAMSULOSIN HYDROCHLORIDE 0.4 MG/1
0.4 CAPSULE ORAL AT BEDTIME
Qty: 0 | Refills: 0 | Status: DISCONTINUED | OUTPATIENT
Start: 2018-01-03 | End: 2018-01-03

## 2018-01-03 RX ORDER — FUROSEMIDE 40 MG
10 TABLET ORAL ONCE
Qty: 0 | Refills: 0 | Status: COMPLETED | OUTPATIENT
Start: 2018-01-03 | End: 2018-01-03

## 2018-01-03 RX ORDER — DEXTROSE 50 % IN WATER 50 %
12.5 SYRINGE (ML) INTRAVENOUS ONCE
Qty: 0 | Refills: 0 | Status: DISCONTINUED | OUTPATIENT
Start: 2018-01-03 | End: 2018-01-11

## 2018-01-03 RX ORDER — FINASTERIDE 5 MG/1
5 TABLET, FILM COATED ORAL DAILY
Qty: 0 | Refills: 0 | Status: DISCONTINUED | OUTPATIENT
Start: 2018-01-03 | End: 2018-01-11

## 2018-01-03 RX ORDER — HEPARIN SODIUM 5000 [USP'U]/ML
5000 INJECTION INTRAVENOUS; SUBCUTANEOUS EVERY 8 HOURS
Qty: 0 | Refills: 0 | Status: DISCONTINUED | OUTPATIENT
Start: 2018-01-03 | End: 2018-01-11

## 2018-01-03 RX ORDER — ENOXAPARIN SODIUM 100 MG/ML
40 INJECTION SUBCUTANEOUS DAILY
Qty: 0 | Refills: 0 | Status: DISCONTINUED | OUTPATIENT
Start: 2018-01-03 | End: 2018-01-03

## 2018-01-03 RX ORDER — SODIUM CHLORIDE 9 MG/ML
1000 INJECTION INTRAMUSCULAR; INTRAVENOUS; SUBCUTANEOUS
Qty: 0 | Refills: 0 | Status: DISCONTINUED | OUTPATIENT
Start: 2018-01-03 | End: 2018-01-03

## 2018-01-03 RX ADMIN — HYDROMORPHONE HYDROCHLORIDE 0.5 MILLIGRAM(S): 2 INJECTION INTRAMUSCULAR; INTRAVENOUS; SUBCUTANEOUS at 20:44

## 2018-01-03 RX ADMIN — AMLODIPINE BESYLATE 5 MILLIGRAM(S): 2.5 TABLET ORAL at 05:37

## 2018-01-03 RX ADMIN — HEPARIN SODIUM 5000 UNIT(S): 5000 INJECTION INTRAVENOUS; SUBCUTANEOUS at 15:15

## 2018-01-03 RX ADMIN — HEPARIN SODIUM 5000 UNIT(S): 5000 INJECTION INTRAVENOUS; SUBCUTANEOUS at 20:45

## 2018-01-03 RX ADMIN — SODIUM CHLORIDE 75 MILLILITER(S): 9 INJECTION INTRAMUSCULAR; INTRAVENOUS; SUBCUTANEOUS at 05:47

## 2018-01-03 RX ADMIN — Medication 1 DROP(S): at 12:24

## 2018-01-03 RX ADMIN — CARBIDOPA AND LEVODOPA 1 TABLET(S): 25; 100 TABLET ORAL at 11:08

## 2018-01-03 RX ADMIN — FINASTERIDE 5 MILLIGRAM(S): 5 TABLET, FILM COATED ORAL at 11:09

## 2018-01-03 RX ADMIN — TAMSULOSIN HYDROCHLORIDE 0.8 MILLIGRAM(S): 0.4 CAPSULE ORAL at 20:48

## 2018-01-03 RX ADMIN — Medication 400 MILLIGRAM(S): at 03:12

## 2018-01-03 RX ADMIN — CARBIDOPA AND LEVODOPA 1 TABLET(S): 25; 100 TABLET ORAL at 20:45

## 2018-01-03 RX ADMIN — HYDROMORPHONE HYDROCHLORIDE 0.5 MILLIGRAM(S): 2 INJECTION INTRAMUSCULAR; INTRAVENOUS; SUBCUTANEOUS at 06:00

## 2018-01-03 RX ADMIN — Medication 1000 MILLIGRAM(S): at 03:30

## 2018-01-03 RX ADMIN — Medication 10: at 11:09

## 2018-01-03 RX ADMIN — HYDROMORPHONE HYDROCHLORIDE 0.5 MILLIGRAM(S): 2 INJECTION INTRAMUSCULAR; INTRAVENOUS; SUBCUTANEOUS at 21:14

## 2018-01-03 RX ADMIN — LISINOPRIL 5 MILLIGRAM(S): 2.5 TABLET ORAL at 11:08

## 2018-01-03 RX ADMIN — HYDROMORPHONE HYDROCHLORIDE 0.5 MILLIGRAM(S): 2 INJECTION INTRAMUSCULAR; INTRAVENOUS; SUBCUTANEOUS at 00:35

## 2018-01-03 RX ADMIN — Medication 1 DROP(S): at 18:49

## 2018-01-03 RX ADMIN — Medication 1 APPLICATION(S): at 05:37

## 2018-01-03 RX ADMIN — ESCITALOPRAM OXALATE 10 MILLIGRAM(S): 10 TABLET, FILM COATED ORAL at 11:10

## 2018-01-03 RX ADMIN — HYDROMORPHONE HYDROCHLORIDE 0.5 MILLIGRAM(S): 2 INJECTION INTRAMUSCULAR; INTRAVENOUS; SUBCUTANEOUS at 17:00

## 2018-01-03 RX ADMIN — HYDROMORPHONE HYDROCHLORIDE 0.5 MILLIGRAM(S): 2 INJECTION INTRAMUSCULAR; INTRAVENOUS; SUBCUTANEOUS at 00:20

## 2018-01-03 RX ADMIN — INSULIN GLARGINE 24 UNIT(S): 100 INJECTION, SOLUTION SUBCUTANEOUS at 20:49

## 2018-01-03 RX ADMIN — CARVEDILOL PHOSPHATE 6.25 MILLIGRAM(S): 80 CAPSULE, EXTENDED RELEASE ORAL at 18:49

## 2018-01-03 RX ADMIN — ATORVASTATIN CALCIUM 20 MILLIGRAM(S): 80 TABLET, FILM COATED ORAL at 20:45

## 2018-01-03 RX ADMIN — Medication 10 MILLIGRAM(S): at 11:08

## 2018-01-03 RX ADMIN — Medication 1 DROP(S): at 23:18

## 2018-01-03 RX ADMIN — Medication 8: at 18:48

## 2018-01-03 RX ADMIN — Medication 1 APPLICATION(S): at 18:48

## 2018-01-03 RX ADMIN — AMLODIPINE BESYLATE 5 MILLIGRAM(S): 2.5 TABLET ORAL at 11:09

## 2018-01-03 RX ADMIN — HYDROMORPHONE HYDROCHLORIDE 0.5 MILLIGRAM(S): 2 INJECTION INTRAMUSCULAR; INTRAVENOUS; SUBCUTANEOUS at 16:01

## 2018-01-03 RX ADMIN — HYDROMORPHONE HYDROCHLORIDE 0.5 MILLIGRAM(S): 2 INJECTION INTRAMUSCULAR; INTRAVENOUS; SUBCUTANEOUS at 05:45

## 2018-01-03 RX ADMIN — SODIUM CHLORIDE 50 MILLILITER(S): 9 INJECTION, SOLUTION INTRAVENOUS at 00:25

## 2018-01-03 RX ADMIN — HYDROMORPHONE HYDROCHLORIDE 0.5 MILLIGRAM(S): 2 INJECTION INTRAMUSCULAR; INTRAVENOUS; SUBCUTANEOUS at 10:15

## 2018-01-03 RX ADMIN — Medication 3 UNIT(S): at 08:22

## 2018-01-03 RX ADMIN — HYDROMORPHONE HYDROCHLORIDE 0.5 MILLIGRAM(S): 2 INJECTION INTRAMUSCULAR; INTRAVENOUS; SUBCUTANEOUS at 09:58

## 2018-01-03 NOTE — PROGRESS NOTE ADULT - SUBJECTIVE AND OBJECTIVE BOX
Pt Name: TATIANA MARIE    MRN: [id number]      Patient is a 77y Male presenting to the emergency department with a chief complaint of neck pain, no upper or lower extremity paresthesia or upper/lower extremity weakness.  LORENE negative.  left elbow pain.  Patient is a 77y old  Male who presents with a chief complaint of trauma alert - 77M s/p fall and syncope (2018 19:09)      PAST MEDICAL & SURGICAL HISTORY:  PAST MEDICAL & SURGICAL HISTORY:  Legally blind  BPH (benign prostatic hyperplasia)  Type 2 diabetes mellitus  HTN (hypertension)  Parkinson disease  S/P ORIF (open reduction internal fixation) fracture: right hip  S/P TURP      Allergies: No Known Allergies      Medications: atorvastatin 20 milliGRAM(s) Oral at bedtime  BACItracin   Ointment 1 Application(s) Topical two times a day  carbidopa/levodopa  25/100 1 Tablet(s) Oral every 8 hours  carvedilol 6.25 milliGRAM(s) Oral every 12 hours  dextrose 5%. 1000 milliLiter(s) IV Continuous <Continuous>  dextrose 50% Injectable 12.5 Gram(s) IV Push once  dextrose 50% Injectable 25 Gram(s) IV Push once  dextrose 50% Injectable 25 Gram(s) IV Push once  dextrose Gel 1 Dose(s) Oral once PRN  escitalopram 10 milliGRAM(s) Oral daily  finasteride 5 milliGRAM(s) Oral daily  glucagon  Injectable 1 milliGRAM(s) IntraMuscular once PRN  HYDROmorphone  Injectable 0.5 milliGRAM(s) IV Push every 4 hours PRN  insulin lispro (HumaLOG) corrective regimen sliding scale   SubCutaneous three times a day before meals  lisinopril 5 milliGRAM(s) Oral daily  metolazone 5 milliGRAM(s) Oral daily  tamsulosin 0.8 milliGRAM(s) Oral at bedtime  timolol 0.5% Solution 1 Drop(s) Both EYES every 6 hours      FAMILY HISTORY:  No pertinent family history in first degree relatives  : non-contributory    Social History:     Ambulation: Walking independently [ ] With Cane [ ] With Walker [ ]  Bedbound [ ]                           11.8   11.8  )-----------( 271      ( 2018 04:57 )             36.8     01-03    134<L>  |  94<L>  |  28.0<H>  ----------------------------<  387<H>  5.3   |  25.0  |  1.24    Ca    9.0      2018 04:57  Phos  3.8       Mg     2.3         TPro  6.5<L>  /  Alb  3.0<L>  /  TBili  <0.2<L>  /  DBili  x   /  AST  17  /  ALT  7   /  AlkPhos  86        PHYSICAL EXAM:    Vital Signs Last 24 Hrs  T(C): 36.8 (2018 08:00), Max: 36.8 (2018 08:00)  T(F): 98.2 (2018 08:00), Max: 98.2 (2018 08:00)  HR: 79 (2018 10:00) (60 - 83)  BP: 152/72 (2018 10:00) (145/68 - 166/79)  BP(mean): 104 (2018 10:00) (97 - 114)  RR: 11 (2018 10:00) (8 - 25)  SpO2: 97% (2018 10:00) (92% - 100%)  Daily Height in cm: 182.88 (2018 20:30)    Daily Weight in k.8 (2018 20:30)    Appearance: Alert, responsive, in no acute distress.    Skin: no rash on visible skin. Skin is clean, dry and intact proximal and distal to cast of left upper extremity. No bleeding.  positive abrasion of bridge of nose.     Vascular: 2+ distal pulses. Cap refill < 2 sec. No signs of venous insuffiency or stasis. No extremity ulcerations. No cyanosis.    Musculoskeletal:    no tenderness to palpation about the chest, thoracic, lumbar spine       Left Upper Extremity: well maintained in cast. FROM digits.        Right Upper Extremity: Atraumatic with normal alignment NROM. No crepitus. No bony tenderness.        Left Lower Extremity: Atraumatic with normal alignment NROM. No crepitus. No bony tenderness.        Right Lower Extremity: Atraumatic with normal alignment NROM. No crepitus. No bony tenderness.     Neurological: Sensation is grossly intact to light touch of upper and lower extremities manually (left upper extremity examined proximal and distal to cast). No focal deficits or weaknesses found.    Pathologic reflexes: no Corley,  no  Clonus            Reflexes:   Biceps, Bracioradialis, Triceps on the right, left unable to examine secondary to being maintained in cast, Patella, Achilles intact  bilaterally          Imaging Studies:    EXAM:  CT CERVICAL SPINE                         EXAM:  CT BRAIN                          PROCEDURE DATE:  2018          INTERPRETATION:  CT BRAIN WITHOUT CONTRAST:    History: Head and neck injury. The patient fell..    Date and time of exam: 2018 7:22 PM.    Comparison exam: No prior exam.    Technique: Sections were obtained through the brain without IV contrast.    Findings: The ventricles are of normal size and contour.  No masses are   identified.  There is no shift of midline structures.  No abnormal   calcifications  are seen.  There is no evidence of intracerebral or extra   axial bleed. The calvarium demonstrates no abnormality.  The paranasal   sinuses and mastoid air cells are unremarkable. There is a large   arachnoid cyst involving the right temporoparietal region. There is mild   compression of the right lateral ventricle but no significant shift of   the midline structures. This arachnoid cyst measures 4.2 cm in maximal   thickness. This cyst extends inferior to the right temporal lobe as well.   There are diffuse involutional changes. There is evidence of an infarct   involving the right occipital lobe of uncertain age. No evidence of   associated bleed at this site.    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 No evidence of associated   hemorrhage.    Large right temporal parietal arachnoid cyst..          CT CERVICAL SPINE:    Technique: Thin sections were obtained and reviewed in axial, coronal,   and sagittal planes.    Findings: There is a vertically oriented nondisplaced fracture through   the right para midline region of the body of the C2 vertebral body   without displacement of fracture fragments.    There is a fracture through the right lamina of C7.    Alignment of the cervical spine is normal. No definite evidence of   perivertebral soft tissue swelling or hematoma. Facet joints demonstrate   normal alignment without evidence of dislocation. Spinous processes are   intact. There are degenerative changes in the mid cervical spine most   pronounced at the C5-C6 level.    Impression:   Acute fracture through the right para midline region of the body of C2 as   well as an acute fracture through the right lamina at C7.    If not contraindicated, MRI would be helpful for further evaluation of   the cervical spine and cervical cord.       EXAM:  MR SPINE CERVICAL                          PROCEDURE DATE:  2018          INTERPRETATION:  Preliminary report provided by Ra Toussaint M.D.    [facility] RADIOLOGIST    EXAM:    MR Cervical Spine Without Intravenous Contrast    CLINICAL HISTORY:    77 years old, male; Screening exam; Trauma, FX    TECHNIQUE:    Magnetic resonance images of the cervical spine without intravenous   contrast   in multiple planes.    COMPARISON:    No relevant prior studies available.    FINDINGS:    Vertebrae:  Unremarkable.  No acute fracture.    Spinal cord:  Unremarkable.  Normal signal.    Soft tissues:  Unremarkable.     DISCS/SPINAL CANAL/NEURAL FORAMINA:    C2-C3:  Unremarkable.  No significant disc disease.  No stenosis.    C3-C4:  There is a 1 mm disc posterior no stenosis.    C4-C5:  There is a 1 mm central disc bulge. There is mild right   uncinate   hypertrophy with mild/moderate right neuroforaminal stenosis.    C5-C6:  There is moderate disc narrowing with a 2 mm diffuse disc   bulge.   There is right uncinate hypertrophy with moderate to severe right   neuroforaminal stenosis. No central stenosis.    C6-C7:  There is no disc herniation or stenosis. There is a small   ventral   osteophyte.    C7-T1:  There is again a small ventral osteophyte with no disc   herniation or   stenosis.    IMPRESSION:         Multilevel disc bulges or protrusions with some areas of stenosis as   detailed   above. No cord contusion, compression fracture, central stenosis or other   acute   traumatic injuries seen. There is no abnormal signal on STIR images to   represent ligamentous injury.      A/P:  Pt is a  77y Male with Patient is a 77y old  Male who presents with a chief complaint of  Cervical pain - 77M s/p fall and syncope (2018 19:09)   found to have C2 vertebral body fracture, right C7 lamina fracture, no spinal cord involvement.        PLAN:  * Pain control per primary team  * Non operative treatment.  Cervical collar with OOB, can be removed when in bed and for hygeine.    * PT WBAT bilateral lower extremities and right upper extremity, when cleared by trauma team regarding syncopal episode  Ortho will follow while patient is in house

## 2018-01-03 NOTE — PROGRESS NOTE ADULT - SUBJECTIVE AND OBJECTIVE BOX
INTERVAL HPI/OVERNIGHT EVENTS/SUBJECTIVE: no new issues since admission    ICU Vital Signs Last 24 Hrs  T(C): 36.6 (03 Jan 2018 12:00), Max: 36.8 (03 Jan 2018 08:00)  T(F): 97.9 (03 Jan 2018 12:00), Max: 98.2 (03 Jan 2018 08:00)  HR: 79 (03 Jan 2018 10:00) (60 - 83)  BP: 152/72 (03 Jan 2018 10:00) (145/68 - 166/79)  BP(mean): 104 (03 Jan 2018 10:00) (97 - 114)  ABP: --  ABP(mean): --  RR: 11 (03 Jan 2018 10:00) (8 - 25)  SpO2: 97% (03 Jan 2018 10:00) (92% - 100%)      I&O's Detail    02 Jan 2018 07:01  -  03 Jan 2018 07:00  --------------------------------------------------------  IN:    0.9% NaCl: 1000 mL    multiple electrolytes Injection Type 1multiple electrolytes Injection Type 1: 300 mL    sodium chloride 0.9%: 150 mL    Solution: 200 mL  Total IN: 1650 mL    OUT:    Indwelling Catheter - Urethral: 1010 mL  Total OUT: 1010 mL    Total NET: 640 mL      03 Jan 2018 07:01  -  03 Jan 2018 13:19  --------------------------------------------------------  IN:    sodium chloride 0.9%: 75 mL  Total IN: 75 mL    OUT:    Indwelling Catheter - Urethral: 550 mL  Total OUT: 550 mL    Total NET: -475 mL                MEDICATIONS  (STANDING):  atorvastatin 20 milliGRAM(s) Oral at bedtime  BACItracin   Ointment 1 Application(s) Topical two times a day  carbidopa/levodopa  25/100 1 Tablet(s) Oral every 8 hours  carvedilol 6.25 milliGRAM(s) Oral every 12 hours  dextrose 5%. 1000 milliLiter(s) (50 mL/Hr) IV Continuous <Continuous>  dextrose 50% Injectable 12.5 Gram(s) IV Push once  dextrose 50% Injectable 25 Gram(s) IV Push once  dextrose 50% Injectable 25 Gram(s) IV Push once  escitalopram 10 milliGRAM(s) Oral daily  finasteride 5 milliGRAM(s) Oral daily  heparin  Injectable 5000 Unit(s) SubCutaneous every 8 hours  insulin glargine Injectable (LANTUS) 24 Unit(s) SubCutaneous at bedtime  insulin lispro (HumaLOG) corrective regimen sliding scale   SubCutaneous three times a day before meals  lisinopril 5 milliGRAM(s) Oral daily  metolazone 5 milliGRAM(s) Oral daily  tamsulosin 0.8 milliGRAM(s) Oral at bedtime  timolol 0.5% Solution 1 Drop(s) Both EYES every 6 hours    MEDICATIONS  (PRN):  dextrose Gel 1 Dose(s) Oral once PRN Blood Glucose LESS THAN 70 milliGRAM(s)/deciliter  glucagon  Injectable 1 milliGRAM(s) IntraMuscular once PRN Glucose LESS THAN 70 milligrams/deciliter  HYDROmorphone  Injectable 0.5 milliGRAM(s) IV Push every 4 hours PRN moderate to severe pain      NUTRITION/IVF:     CENTRAL LINE:  LOCATION:   DATE INSERTED:  CVP:  SCVO2:    DODGE:   DATE INSERTED:    A-LINE:    LOCATION:   DATE INSERTED:   SVV:  CO/CI:     CHEST TUBE:  LOCATION:  DATE INSERTED: OUTPUT/24 HRS:  SUCTION/WATER SEAL:     NG/OG TUBE:  DATE INSERTED:  OUTPUT/24 HRS:    MISC:     PHYSICAL EXAM:    Gen: appears in no acute distress    Eyes: perrla, eomi, non-icteric    Neurological: a / o x 3, no focal motor or sensory deficits, confirmed by dr Vila of neurology. CAM -. RASS 0 - 1    ENMT: scalp abrasion and facial abrasion, has left paracervical spine tenderness    Neck: c spine tender on head turning to left    Pulmonary: lungs clear bilaterally     Cardiovascular: RRR no MRG,     Gastrointestinal: Abd soft non-tender,     Genitourinary: no problems voiding    Back: non tender    Extremities: FROM,     Skin: warm and dry    Musculoskeletal:          LABS:  CBC Full  -  ( 03 Jan 2018 04:57 )  WBC Count : 11.8 K/uL  Hemoglobin : 11.8 g/dL  Hematocrit : 36.8 %  Platelet Count - Automated : 271 K/uL  Mean Cell Volume : 94.1 fl  Mean Cell Hemoglobin : 30.2 pg  Mean Cell Hemoglobin Concentration : 32.1 g/dL  Auto Neutrophil # : 10.2 K/uL  Auto Lymphocyte # : 1.0 K/uL  Auto Monocyte # : 0.5 K/uL  Auto Eosinophil # : 0.1 K/uL  Auto Basophil # : 0.0 K/uL  Auto Neutrophil % : 85.0 %  Auto Lymphocyte % : 8.0 %  Auto Monocyte % : 4.0 %  Auto Eosinophil % : 1.0 %  Auto Basophil % : 1.0 %    01-03    134<L>  |  94<L>  |  28.0<H>  ----------------------------<  387<H>  5.3   |  25.0  |  1.24    Ca    9.0      03 Jan 2018 04:57  Phos  3.8     01-03  Mg     2.3     01-03    TPro  6.5<L>  /  Alb  3.0<L>  /  TBili  <0.2<L>  /  DBili  x   /  AST  17  /  ALT  7   /  AlkPhos  86  01-02    PT/INR - ( 02 Jan 2018 19:21 )   PT: 9.9 sec;   INR: 0.90 ratio         PTT - ( 02 Jan 2018 19:21 )  PTT:25.6 sec    RECENT CULTURES:      LIVER FUNCTIONS - ( 02 Jan 2018 19:21 )  Alb: 3.0 g/dL / Pro: 6.5 g/dL / ALK PHOS: 86 U/L / ALT: 7 U/L / AST: 17 U/L / GGT: x           CARDIAC MARKERS ( 03 Jan 2018 04:57 )  x     / 0.04 ng/mL / x     / x     / x      CARDIAC MARKERS ( 02 Jan 2018 19:21 )  x     / 0.04 ng/mL / x     / x     / x          CAPILLARY BLOOD GLUCOSE      RADIOLOGY & ADDITIONAL STUDIES:    ASSESSMENT/PLAN:  77yMale presenting with:    Neuro:    HEENT:    CV:    Pulm:    GI/Nutrition:    /Renal:    ID:    Lines/Tubes:    Endo:    Skin:    Proph:    Dispo:      CRITICAL CARE TIME SPENT:

## 2018-01-03 NOTE — CONSULT NOTE ADULT - ASSESSMENT
The patient is a 77y Male with Parkinson's disease, new syncope and late acute/early subacute CVA on MRI.

## 2018-01-03 NOTE — CONSULT NOTE ADULT - SUBJECTIVE AND OBJECTIVE BOX
Cardiology Consult    CHIEF COMPLAINT: Syncope    HISTORY OF PRESENT ILLNESS: TATIANA MARIE (Rafael Mojica) is a 77y old male with a history of Parkinson's, chronic diastolic heart failure, IDDM (poorly controlled) with diabetic neuropathy,  essential hypertension, pure hypercholesterolemia, aortic root dilatation (4.4 cm by Echo) and aortic atherosclerosis who presented with syncope. He was walking with his walker and had a mechanical fall without LOC. EMS came and helped him up and he had no complaints at that time. Several hours later, he was eating with his wife and suddenly slumped over to the right side and fell off his stool, hitting his head on a molding and also hitting the floor. He has no memory of these events but they were witnessed by his wife. He was found to have a fracture of C2 and C7, a mildly displaced distal humerus fracture and a late acute/late subacute stroke. He is currently complaining of pain in the left side of his neck and also in his left arm but otherwise denies chest pain, SOB, palpitations, orthopnea, PND or abdominal pain.    PROBLEM LIST:  Syncope and collapse  Cerebrovascular accident (CVA) due to embolism of right posterior cerebral artery  Cerebrovascular accident (CVA) due to occlusion of left middle cerebral artery  Hypertension, unspecified type  Parkinson disease  Legally blind  Other closed nondisplaced fracture of second cervical vertebra, initial encounter    PAST MEDICAL HISTORY:  Legally blind  BPH (benign prostatic hyperplasia)  Type 2 diabetes mellitus  HTN (hypertension)  Parkinson disease    PAST SURGICAL HISTORY:  S/P ORIF (open reduction internal fixation) fracture  S/P TURP    MEDICATIONS  (STANDING):  atorvastatin 20 milliGRAM(s) Oral at bedtime  BACItracin   Ointment 1 Application(s) Topical two times a day  carbidopa/levodopa  25/100 1 Tablet(s) Oral every 8 hours  carvedilol 6.25 milliGRAM(s) Oral every 12 hours  dextrose 5%. 1000 milliLiter(s) (50 mL/Hr) IV Continuous <Continuous>  dextrose 50% Injectable 12.5 Gram(s) IV Push once  dextrose 50% Injectable 25 Gram(s) IV Push once  dextrose 50% Injectable 25 Gram(s) IV Push once  escitalopram 10 milliGRAM(s) Oral daily  finasteride 5 milliGRAM(s) Oral daily  heparin  Injectable 5000 Unit(s) SubCutaneous every 8 hours  insulin glargine Injectable (LANTUS) 24 Unit(s) SubCutaneous at bedtime  insulin lispro (HumaLOG) corrective regimen sliding scale   SubCutaneous three times a day before meals  lisinopril 5 milliGRAM(s) Oral daily  metolazone 5 milliGRAM(s) Oral daily  tamsulosin 0.8 milliGRAM(s) Oral at bedtime  timolol 0.5% Solution 1 Drop(s) Both EYES every 6 hours    MEDICATIONS  (PRN):  dextrose Gel 1 Dose(s) Oral once PRN Blood Glucose LESS THAN 70 milliGRAM(s)/deciliter  glucagon  Injectable 1 milliGRAM(s) IntraMuscular once PRN Glucose LESS THAN 70 milligrams/deciliter  HYDROmorphone  Injectable 0.5 milliGRAM(s) IV Push every 4 hours PRN moderate to severe pain    ALLERGIES:  No Known Allergies    SOCIAL HISTORY:  Tobacco: ***   Alcohol: ***  Drugs: ***    FAMILY HISTORY:  No pertinent family history in first degree relatives    REVIEW OF SYSTEMS:  Constitutional: no fatigue, no fevers/chills, no weight change  HEENT: no visual disturbances, no hearing loss  Cardiac: no chest pain, no palpitations, no orthopnea, no PND  Respiratory: no shortness of breath, no cough  GI: no abdominal pain, no blood in the stool, no N/V, no diarrhea  Urological: no dysuria, no hematuria  Hematological: no easy bruising or bleeding  Musculoskeletal: no joint pain, no back pain  Neurological: no headaches, no syncope  Psychiatric: no anxiety, no depression  Skin: no rashes    PHYSICAL EXAM:     @ : @ 07:00  --------------------------------------------------------  IN: 1650 mL / OUT: 1010 mL / NET: 640 mL     @ : @ 19:39  --------------------------------------------------------  IN: 75 mL / OUT: 700 mL / NET: -625 mL      Weight (kg): 91.8 ( @ 20:30)  T(C): 37.2 (18 @ 16:17), Max: 37.2 (18 @ 16:17)  T(F): 98.9 (18 @ 16:17), Max: 98.9 (18 @ 16:17)  HR: 74 (18 @ 16:17) (60 - 83)  BP: 139/70 (18 @ 16:17) (119/59 - 166/79)  RR: 18 (18 @ 16:17) (8 - 25)  SpO2: 96% (18 @ 16:17) (92% - 100%)  Wt(kg): --    General: comfortable, in NAD  HEENT: EOMI, normocephalic, atraumatic  Neck: no JVD, no carotid bruits  Heart: +S1S2, RRR, 1/6 systolic murmur at the right 2nd ICS, 2/6 systolic murmur at the LLSB, no rubs, no gallops  Lungs: clear to auscultation bilaterally, no wheezes, no rales, no rhonchi  Abdomen: soft, non-tender, non-distended, + bowel sounds  Extremities: no clubbing, no cyanosis, trace RLE edema  Vascular: 2+ dorsalis pedis pulses bilaterally  Neuro: A&O x3      EKG: Sinus rhythm with an APC, HR 78, normal axis, no ST-T wave abnormalities    LABS:    Troponin T, Serum: 0.04 ng/mL (18 @ 04:57)  Troponin T, Serum: 0.04 ng/mL (18 @ 19:21)        CBC:            11.8   11.8  >-----------< 271     @ 04:57            36.8                 11.4   6.0   >-----------< 265     @ 19:21            34.2   	      CHEMISTRY:   134   |  94     |  28.0   ----------------------------< 387      04:57    5.3   |  25.0   |  1.24     eGFR non-  57     eGFR   66      137   |  97     |  27.0   ----------------------------< 208      @ 19:21    4.8   |  20.0   |  1.21     eGFR non-  58     eGFR African American  67       Magnesium, Serum: 2.3 mg/dL ( @ 04:57)    TPro --    / Alb 3.0   / TBili <0.2  / DBili --    / AST 17    / ALT 7     / AlkPhos 86      @ 19:21    COAGS:  PT/INR - ( 2018 19:21 )   PT: 9.9 sec;   INR: 0.90 ratio         PTT - ( 2018 19:21 )  PTT:25.6 sec    RADIOLOGY & ADDITIONAL TESTS:  Carotid duplex:  1.  Right carotid system:  No hemodynamically significant internal   carotid artery stenosis is found.          2.  Left carotid system:  No hemodynamically significant internal carotid   artery stenosis is found.        CT Left Humerus: Mildly displaced transcondylar fracture of the distal humerus.    MRI Head:   1. Late acute to early subacute right occipital infarct, demonstrating   gyriform parenchymal enhancement short term follow-up in 4-6 weeks is   suggested to ensure stability.  2. right middle cranial fossa anterior temporal arachnoid cyst.  3. chronic small vessel ischemic changes.  4. Severe bilateral mastoid inflammation/effusions.  5 scattered chronic mucosal disease of paranasal sinuses.    CT C-Spine:  Acute fracture through the right para midline region of the body of C2 as   well as an acute fracture through the right lamina at C7.  If not contraindicated, MRI would be helpful for further evaluation of   the cervical spine and cervical cord.    CT Head:  Right occipital lobe infarct of uncertain age. No evidence of associated   hemorrhage.  Large right temporal parietal arachnoid cyst.    CXR:  There are chronic appearing degenerative interstitial changes   predominantly involving the mid and upper lung zones. No lobar   consolidation.  There are no pleural effusion. Cardiac and mediastinal   structures are within normal limits.    There is osteopenia, severe   dextroscoliosis, and degenerative changes.     ASSESSMENT:  TATIANA MARIE (Rafael Mojica) is a 77y old male with a history of Parkinson's, chronic diastolic heart failure, IDDM (poorly controlled) with diabetic neuropathy,  essential hypertension, pure hypercholesterolemia, aortic root dilatation (4.4 cm by Echo) and aortic atherosclerosis who presented with syncope. He was walking with his walker and had a mechanical fall without LOC. EMS came and helped him up and he had no complaints at that time. Several hours later, he was eating with his wife and suddenly slumped over to the right side and fell off his stool, hitting his head on a molding and also hitting the floor. He has no memory of these events but they were witnessed by his wife. He was found to have a fracture of C2 and C7, a mildly displaced distal humerus fracture and a late acute/late subacute stroke.     Please permit me to suggest the followin. The patient presented with sudden onset syncope. He should be on a cardiac monitor, which I have requested.  2. Carotid duplex showed no cause of syncope or stroke.  3. Awaiting an Echo for evaluation for a cause of syncope or stroke.  4. I would defer to Neurology, however I don't think a stroke like this would have made him syncopize plus the timing based on the images sounds like it should have happened earlier than this event.  5. He does not clinically appear to be in any significant CHF at this time however he has a history of significant edema for which he has been taking torsemide 20 mg 2x daily at home. Since he is also on metolazone 5 mg daily (not started by me), I will put him for now on torsemide 20 mg once daily and we can follow his renal function and edema.

## 2018-01-03 NOTE — CONSULT NOTE ADULT - SUBJECTIVE AND OBJECTIVE BOX
[facility]                                     Neurology at [place]                                 Julito Hill, & Tom                                  [address]                                        [place], [place], [address]                                             [phone]    HISTORY:    The patient is a 77y Male who had episode of syncope at dinner table witnessed by wife.  He does not remember much surrounding the syncopal event, but does not think he had prodromal dizziness. He did have some confusion after this event.  He had a fall earlier in the day as well that was not witnessed.  He has a history of Parkinson's disease, on sinemet tid, unknown outside neurologist.  He was found to have possible CVA on CT head and this was confirmed to be late acute/early subacute CVA in right occipital lobe.  He has long history of problems with vision on his left side due to an eye problem, but he can not remember what it is.    PAST MEDICAL & SURGICAL HISTORY:  Legally blind  BPH (benign prostatic hyperplasia)  Type 2 diabetes mellitus  HTN (hypertension)  Parkinson disease  S/P ORIF (open reduction internal fixation) fracture: right hip  S/P TURP      MEDICATIONS  (STANDING):  atorvastatin 20 milliGRAM(s) Oral at bedtime  BACItracin   Ointment 1 Application(s) Topical two times a day  carbidopa/levodopa  25/100 1 Tablet(s) Oral every 8 hours  carvedilol 6.25 milliGRAM(s) Oral every 12 hours  dextrose 5%. 1000 milliLiter(s) (50 mL/Hr) IV Continuous <Continuous>  dextrose 50% Injectable 12.5 Gram(s) IV Push once  dextrose 50% Injectable 25 Gram(s) IV Push once  dextrose 50% Injectable 25 Gram(s) IV Push once  escitalopram 10 milliGRAM(s) Oral daily  finasteride 5 milliGRAM(s) Oral daily  heparin  Injectable 5000 Unit(s) SubCutaneous every 8 hours  insulin glargine Injectable (LANTUS) 24 Unit(s) SubCutaneous at bedtime  insulin lispro (HumaLOG) corrective regimen sliding scale   SubCutaneous three times a day before meals  lisinopril 5 milliGRAM(s) Oral daily  metolazone 5 milliGRAM(s) Oral daily  tamsulosin 0.8 milliGRAM(s) Oral at bedtime  timolol 0.5% Solution 1 Drop(s) Both EYES every 6 hours    MEDICATIONS  (PRN):  dextrose Gel 1 Dose(s) Oral once PRN Blood Glucose LESS THAN 70 milliGRAM(s)/deciliter  glucagon  Injectable 1 milliGRAM(s) IntraMuscular once PRN Glucose LESS THAN 70 milligrams/deciliter  HYDROmorphone  Injectable 0.5 milliGRAM(s) IV Push every 4 hours PRN moderate to severe pain      Allergies    No Known Allergies    Intolerances    SOCIAL HISTORY:  denies tob/ excess etoh/ drugs    FAMILY HISTORY:  No pertinent family history in first degree relatives      ROS:  left visual field cut  The patient denies fevers or weight changes.  Denies headache or dizziness.  Denies chest pain.  Denies shortness of breath.  Denies abdominal pain, nausea, or vomiting.  Denies change in urinary pattern.  Denies rash.  Denies recent mood changes.    Exam:  Vital Signs Last 24 Hrs  T(C): 36.6 (03 Jan 2018 12:00), Max: 36.8 (03 Jan 2018 08:00)  T(F): 97.9 (03 Jan 2018 12:00), Max: 98.2 (03 Jan 2018 08:00)  HR: 79 (03 Jan 2018 10:00) (60 - 83)  BP: 152/72 (03 Jan 2018 10:00) (145/68 - 166/79)  BP(mean): 104 (03 Jan 2018 10:00) (97 - 114)  RR: 11 (03 Jan 2018 10:00) (8 - 25)  SpO2: 97% (03 Jan 2018 10:00) (92% - 100%)  General: NAD    Mental status: The patient is awake, alert, and fully oriented. There is no aphasia.    Cranial nerves: . pupils are miotic, left sided visual field deficit to confrontation (HH). Extraocular motion is full with no nystagmus. There is no ptosis. Facial sensation is intact. Facial musculature is symmetric. Palate elevates symmetrically. Tongue is midline.    Motor: There is normal bulk and tone. No tremor, no cogwheeling  There is no focal weakness on exam    Sensation: Intact to light touch ion 4 ext    Reflexes: 1+ throughout and plantar responses are flexor.    Cerebellar: There is no dysmetria on finger to nose testing.    LABS:                         11.8   11.8  )-----------( 271      ( 03 Jan 2018 04:57 )             36.8       01-03    134<L>  |  94<L>  |  28.0<H>  ----------------------------<  387<H>  5.3   |  25.0  |  1.24    Ca    9.0      03 Jan 2018 04:57  Phos  3.8     01-03  Mg     2.3     01-03    TPro  6.5<L>  /  Alb  3.0<L>  /  TBili  <0.2<L>  /  DBili  x   /  AST  17  /  ALT  7   /  AlkPhos  86  01-02      PT/INR - ( 02 Jan 2018 19:21 )   PT: 9.9 sec;   INR: 0.90 ratio         PTT - ( 02 Jan 2018 19:21 )  PTT:25.6 sec    RADIOLOGY & ADDITIONAL STUDIES:  CT head hypodensity right occipital lobe, large right temporal arachnoid cyst no blood  MRI brain- late acute/early subacute right occipital lobe CVA, large arachnoid cyst, right parietal-temporal lobe  MRI c-spine - no fracture, cord compression or contusion,  mild degenerative changes

## 2018-01-04 LAB
ANION GAP SERPL CALC-SCNC: 12 MMOL/L — SIGNIFICANT CHANGE UP (ref 5–17)
BASOPHILS # BLD AUTO: 0 K/UL — SIGNIFICANT CHANGE UP (ref 0–0.2)
BASOPHILS NFR BLD AUTO: 0.5 % — SIGNIFICANT CHANGE UP (ref 0–2)
BUN SERPL-MCNC: 29 MG/DL — HIGH (ref 8–20)
CALCIUM SERPL-MCNC: 8.9 MG/DL — SIGNIFICANT CHANGE UP (ref 8.6–10.2)
CHLORIDE SERPL-SCNC: 98 MMOL/L — SIGNIFICANT CHANGE UP (ref 98–107)
CO2 SERPL-SCNC: 27 MMOL/L — SIGNIFICANT CHANGE UP (ref 22–29)
CREAT SERPL-MCNC: 1.2 MG/DL — SIGNIFICANT CHANGE UP (ref 0.5–1.3)
EOSINOPHIL # BLD AUTO: 0.1 K/UL — SIGNIFICANT CHANGE UP (ref 0–0.5)
EOSINOPHIL NFR BLD AUTO: 0.9 % — SIGNIFICANT CHANGE UP (ref 0–5)
GLUCOSE BLDC GLUCOMTR-MCNC: 190 MG/DL — HIGH (ref 70–99)
GLUCOSE BLDC GLUCOMTR-MCNC: 230 MG/DL — HIGH (ref 70–99)
GLUCOSE BLDC GLUCOMTR-MCNC: 242 MG/DL — HIGH (ref 70–99)
GLUCOSE BLDC GLUCOMTR-MCNC: 279 MG/DL — HIGH (ref 70–99)
GLUCOSE SERPL-MCNC: 293 MG/DL — HIGH (ref 70–115)
HBA1C BLD-MCNC: 10 % — HIGH (ref 4–5.6)
HCT VFR BLD CALC: 31.7 % — LOW (ref 42–52)
HGB BLD-MCNC: 10.4 G/DL — LOW (ref 14–18)
LYMPHOCYTES # BLD AUTO: 1.2 K/UL — SIGNIFICANT CHANGE UP (ref 1–4.8)
LYMPHOCYTES # BLD AUTO: 15.7 % — LOW (ref 20–55)
MAGNESIUM SERPL-MCNC: 2.4 MG/DL — SIGNIFICANT CHANGE UP (ref 1.6–2.6)
MCHC RBC-ENTMCNC: 31 PG — SIGNIFICANT CHANGE UP (ref 27–31)
MCHC RBC-ENTMCNC: 32.8 G/DL — SIGNIFICANT CHANGE UP (ref 32–36)
MCV RBC AUTO: 94.6 FL — HIGH (ref 80–94)
MONOCYTES # BLD AUTO: 0.8 K/UL — SIGNIFICANT CHANGE UP (ref 0–0.8)
MONOCYTES NFR BLD AUTO: 9.6 % — SIGNIFICANT CHANGE UP (ref 3–10)
NEUTROPHILS # BLD AUTO: 5.7 K/UL — SIGNIFICANT CHANGE UP (ref 1.8–8)
NEUTROPHILS NFR BLD AUTO: 73.2 % — HIGH (ref 37–73)
PHOSPHATE SERPL-MCNC: 3.5 MG/DL — SIGNIFICANT CHANGE UP (ref 2.4–4.7)
PLATELET # BLD AUTO: 260 K/UL — SIGNIFICANT CHANGE UP (ref 150–400)
POTASSIUM SERPL-MCNC: 4.5 MMOL/L — SIGNIFICANT CHANGE UP (ref 3.5–5.3)
POTASSIUM SERPL-SCNC: 4.5 MMOL/L — SIGNIFICANT CHANGE UP (ref 3.5–5.3)
RBC # BLD: 3.35 M/UL — LOW (ref 4.6–6.2)
RBC # FLD: 14 % — SIGNIFICANT CHANGE UP (ref 11–15.6)
SODIUM SERPL-SCNC: 137 MMOL/L — SIGNIFICANT CHANGE UP (ref 135–145)
WBC # BLD: 7.8 K/UL — SIGNIFICANT CHANGE UP (ref 4.8–10.8)
WBC # FLD AUTO: 7.8 K/UL — SIGNIFICANT CHANGE UP (ref 4.8–10.8)

## 2018-01-04 PROCEDURE — 99232 SBSQ HOSP IP/OBS MODERATE 35: CPT | Mod: 57

## 2018-01-04 PROCEDURE — 99231 SBSQ HOSP IP/OBS SF/LOW 25: CPT

## 2018-01-04 PROCEDURE — 95819 EEG AWAKE AND ASLEEP: CPT | Mod: 26

## 2018-01-04 PROCEDURE — 99232 SBSQ HOSP IP/OBS MODERATE 35: CPT

## 2018-01-04 PROCEDURE — 24530 CLTX SPRCNDYLR HUMERAL FX WO: CPT | Mod: LT

## 2018-01-04 RX ORDER — ASPIRIN/CALCIUM CARB/MAGNESIUM 324 MG
81 TABLET ORAL DAILY
Qty: 0 | Refills: 0 | Status: DISCONTINUED | OUTPATIENT
Start: 2018-01-04 | End: 2018-01-11

## 2018-01-04 RX ORDER — INSULIN HUMAN 100 [IU]/ML
INJECTION, SOLUTION SUBCUTANEOUS
Qty: 0 | Refills: 0 | Status: DISCONTINUED | OUTPATIENT
Start: 2018-01-04 | End: 2018-01-05

## 2018-01-04 RX ORDER — ACETAMINOPHEN 500 MG
650 TABLET ORAL EVERY 6 HOURS
Qty: 0 | Refills: 0 | Status: DISCONTINUED | OUTPATIENT
Start: 2018-01-04 | End: 2018-01-05

## 2018-01-04 RX ADMIN — Medication 1 APPLICATION(S): at 05:37

## 2018-01-04 RX ADMIN — Medication 81 MILLIGRAM(S): at 17:20

## 2018-01-04 RX ADMIN — LISINOPRIL 5 MILLIGRAM(S): 2.5 TABLET ORAL at 05:36

## 2018-01-04 RX ADMIN — HYDROMORPHONE HYDROCHLORIDE 0.5 MILLIGRAM(S): 2 INJECTION INTRAMUSCULAR; INTRAVENOUS; SUBCUTANEOUS at 20:10

## 2018-01-04 RX ADMIN — CARBIDOPA AND LEVODOPA 1 TABLET(S): 25; 100 TABLET ORAL at 12:55

## 2018-01-04 RX ADMIN — HYDROMORPHONE HYDROCHLORIDE 0.5 MILLIGRAM(S): 2 INJECTION INTRAMUSCULAR; INTRAVENOUS; SUBCUTANEOUS at 14:30

## 2018-01-04 RX ADMIN — INSULIN HUMAN: 100 INJECTION, SOLUTION SUBCUTANEOUS at 17:23

## 2018-01-04 RX ADMIN — HYDROMORPHONE HYDROCHLORIDE 0.5 MILLIGRAM(S): 2 INJECTION INTRAMUSCULAR; INTRAVENOUS; SUBCUTANEOUS at 19:55

## 2018-01-04 RX ADMIN — CARVEDILOL PHOSPHATE 6.25 MILLIGRAM(S): 80 CAPSULE, EXTENDED RELEASE ORAL at 05:37

## 2018-01-04 RX ADMIN — HYDROMORPHONE HYDROCHLORIDE 0.5 MILLIGRAM(S): 2 INJECTION INTRAMUSCULAR; INTRAVENOUS; SUBCUTANEOUS at 01:30

## 2018-01-04 RX ADMIN — AMLODIPINE BESYLATE 10 MILLIGRAM(S): 2.5 TABLET ORAL at 05:36

## 2018-01-04 RX ADMIN — HEPARIN SODIUM 5000 UNIT(S): 5000 INJECTION INTRAVENOUS; SUBCUTANEOUS at 22:12

## 2018-01-04 RX ADMIN — Medication 1 DROP(S): at 22:12

## 2018-01-04 RX ADMIN — TAMSULOSIN HYDROCHLORIDE 0.8 MILLIGRAM(S): 0.4 CAPSULE ORAL at 19:55

## 2018-01-04 RX ADMIN — INSULIN GLARGINE 24 UNIT(S): 100 INJECTION, SOLUTION SUBCUTANEOUS at 19:54

## 2018-01-04 RX ADMIN — FINASTERIDE 5 MILLIGRAM(S): 5 TABLET, FILM COATED ORAL at 12:55

## 2018-01-04 RX ADMIN — Medication 1 APPLICATION(S): at 17:20

## 2018-01-04 RX ADMIN — HYDROMORPHONE HYDROCHLORIDE 0.5 MILLIGRAM(S): 2 INJECTION INTRAMUSCULAR; INTRAVENOUS; SUBCUTANEOUS at 06:07

## 2018-01-04 RX ADMIN — HEPARIN SODIUM 5000 UNIT(S): 5000 INJECTION INTRAVENOUS; SUBCUTANEOUS at 14:15

## 2018-01-04 RX ADMIN — Medication 6: at 08:47

## 2018-01-04 RX ADMIN — HYDROMORPHONE HYDROCHLORIDE 0.5 MILLIGRAM(S): 2 INJECTION INTRAMUSCULAR; INTRAVENOUS; SUBCUTANEOUS at 09:56

## 2018-01-04 RX ADMIN — CARBIDOPA AND LEVODOPA 1 TABLET(S): 25; 100 TABLET ORAL at 05:37

## 2018-01-04 RX ADMIN — Medication 1 DROP(S): at 17:20

## 2018-01-04 RX ADMIN — CARBIDOPA AND LEVODOPA 1 TABLET(S): 25; 100 TABLET ORAL at 19:57

## 2018-01-04 RX ADMIN — ESCITALOPRAM OXALATE 10 MILLIGRAM(S): 10 TABLET, FILM COATED ORAL at 12:55

## 2018-01-04 RX ADMIN — HYDROMORPHONE HYDROCHLORIDE 0.5 MILLIGRAM(S): 2 INJECTION INTRAMUSCULAR; INTRAVENOUS; SUBCUTANEOUS at 02:00

## 2018-01-04 RX ADMIN — HYDROMORPHONE HYDROCHLORIDE 0.5 MILLIGRAM(S): 2 INJECTION INTRAMUSCULAR; INTRAVENOUS; SUBCUTANEOUS at 14:14

## 2018-01-04 RX ADMIN — HEPARIN SODIUM 5000 UNIT(S): 5000 INJECTION INTRAVENOUS; SUBCUTANEOUS at 05:37

## 2018-01-04 RX ADMIN — Medication 1 DROP(S): at 05:37

## 2018-01-04 RX ADMIN — CARVEDILOL PHOSPHATE 6.25 MILLIGRAM(S): 80 CAPSULE, EXTENDED RELEASE ORAL at 17:20

## 2018-01-04 RX ADMIN — Medication 20 MILLIGRAM(S): at 05:37

## 2018-01-04 RX ADMIN — INSULIN HUMAN: 100 INJECTION, SOLUTION SUBCUTANEOUS at 12:57

## 2018-01-04 RX ADMIN — HYDROMORPHONE HYDROCHLORIDE 0.5 MILLIGRAM(S): 2 INJECTION INTRAMUSCULAR; INTRAVENOUS; SUBCUTANEOUS at 09:37

## 2018-01-04 RX ADMIN — ATORVASTATIN CALCIUM 20 MILLIGRAM(S): 80 TABLET, FILM COATED ORAL at 19:55

## 2018-01-04 RX ADMIN — HYDROMORPHONE HYDROCHLORIDE 0.5 MILLIGRAM(S): 2 INJECTION INTRAMUSCULAR; INTRAVENOUS; SUBCUTANEOUS at 05:37

## 2018-01-04 RX ADMIN — Medication 1 DROP(S): at 12:56

## 2018-01-04 NOTE — PROGRESS NOTE ADULT - SUBJECTIVE AND OBJECTIVE BOX
[facility]                                     Neurology at [place]                                 Julito Hill & Tom                                  [address]                                        [place], [place], [address]                                             [phone]      Vital signs:  T(C): 36.7 (01-04-18 @ 08:48), Max: 37.2 (01-03-18 @ 16:17)  HR: 68 (01-04-18 @ 08:48) (68 - 76)  BP: 152/78 (01-04-18 @ 08:48) (139/70 - 153/78)  RR: 18 (01-04-18 @ 08:48) (17 - 18)  SpO2: 96% (01-04-18 @ 08:48) (92% - 97%)  Wt(kg): --    Exam:    No new complaints.  Awake and alert.  speech language intact  Pupils react.  Face symmetric smile and sensation  hearing symmetric  tongue ML  5/5 power in 4 ext  tone normal  no drift  intact FT x 4 ext  no dysmetria on FTN    EEG no seizures  carotid duplex no sig stenosis

## 2018-01-04 NOTE — PROGRESS NOTE ADULT - ASSESSMENT
77M s/p syncopal fall with undetermined occipital stroke and large right subarachnoid cyst with no midline shift. C2 body and C7 lamina fracture  -neurochecks  -clear C collar   -syncopal workup   -neurology: NTD re: cyst  -pain control  -f/u MRA  -ortho recs  * Non operative treatment.  Cervical collar with OOB, can be removed when in bed and for hygeine.  will continue to follow if neuro status declines  * NWB LUE- maintain splint/ sling  * PT WBAT bilateral lower extremities and right upper extremity  -cardiology: torsemide, monitor renal fn  -Pt/OT: acute rehab  -dvt ppx - scd  -HOB 30 degrees  -monitor vitals

## 2018-01-04 NOTE — OCCUPATIONAL THERAPY INITIAL EVALUATION ADULT - RANGE OF MOTION EXAMINATION, UPPER EXTREMITY
Right UE Passive ROM was WFL  (within functional limits)/Right UE Active ROM was WFL (within functional limits)/Left UE NWB orders and sling in use

## 2018-01-04 NOTE — PROGRESS NOTE ADULT - SUBJECTIVE AND OBJECTIVE BOX
Acute Care Surgery /Trauma PROGRESS NOTE:     SUBJECTIVE: Pt seen and examined at bedside. Blood sugar elevated to 300s, HbA1c 10.0 no prior DM dx. Otherwsie pain well controlled. Tolerating diet, no n/v. Voiding well. Passing flatus, normal BM. Denies f/c/sob/cp. PT/OT. EEG, carotid duplex, negative    Vital Signs Last 24 Hrs  T(C): 36.8 (04 Jan 2018 19:55), Max: 37 (03 Jan 2018 23:46)  T(F): 98.2 (04 Jan 2018 19:55), Max: 98.6 (03 Jan 2018 23:46)  HR: 58 (04 Jan 2018 19:55) (58 - 76)  BP: 138/79 (04 Jan 2018 19:55) (110/66 - 152/78)  BP(mean): --  RR: 18 (04 Jan 2018 19:55) (17 - 18)  SpO2: 100% (04 Jan 2018 19:55) (92% - 100%)    OBJECTIVE:  NAD  NC/AT  RRR  No respiratory distress  Abd soft, nondistended, nontender, no guarding or rebound. No peritoneal signs.  No pedal edema    I&O's Detail    03 Jan 2018 07:01  -  04 Jan 2018 07:00  --------------------------------------------------------  IN:    sodium chloride 0.9%: 75 mL  Total IN: 75 mL    OUT:    Indwelling Catheter - Urethral: 1100 mL  Total OUT: 1100 mL    Total NET: -1025 mL      04 Jan 2018 07:01  -  04 Jan 2018 20:02  --------------------------------------------------------  IN:    Oral Fluid: 120 mL  Total IN: 120 mL    OUT:  Total OUT: 0 mL    Total NET: 120 mL          MEDICATIONS  (STANDING):  amLODIPine   Tablet 10 milliGRAM(s) Oral daily  aspirin  chewable 81 milliGRAM(s) Oral daily  atorvastatin 20 milliGRAM(s) Oral at bedtime  BACItracin   Ointment 1 Application(s) Topical two times a day  carbidopa/levodopa  25/100 1 Tablet(s) Oral every 8 hours  carvedilol 6.25 milliGRAM(s) Oral every 12 hours  dextrose 5%. 1000 milliLiter(s) (50 mL/Hr) IV Continuous <Continuous>  dextrose 50% Injectable 12.5 Gram(s) IV Push once  dextrose 50% Injectable 25 Gram(s) IV Push once  dextrose 50% Injectable 25 Gram(s) IV Push once  escitalopram 10 milliGRAM(s) Oral daily  finasteride 5 milliGRAM(s) Oral daily  heparin  Injectable 5000 Unit(s) SubCutaneous every 8 hours  insulin glargine Injectable (LANTUS) 24 Unit(s) SubCutaneous at bedtime  insulin regular  human corrective regimen sliding scale   SubCutaneous three times a day before meals  lisinopril 5 milliGRAM(s) Oral daily  metolazone 5 milliGRAM(s) Oral daily  tamsulosin 0.8 milliGRAM(s) Oral at bedtime  timolol 0.5% Solution 1 Drop(s) Both EYES every 6 hours  torsemide 20 milliGRAM(s) Oral daily    MEDICATIONS  (PRN):  acetaminophen   Tablet. 650 milliGRAM(s) Oral every 6 hours PRN Mild Pain (1 - 3)  dextrose Gel 1 Dose(s) Oral once PRN Blood Glucose LESS THAN 70 milliGRAM(s)/deciliter  glucagon  Injectable 1 milliGRAM(s) IntraMuscular once PRN Glucose LESS THAN 70 milligrams/deciliter  HYDROmorphone  Injectable 0.5 milliGRAM(s) IV Push every 4 hours PRN moderate to severe pain      LABS:                        10.4   7.8   )-----------( 260      ( 04 Jan 2018 07:18 )             31.7     01-04    137  |  98  |  29.0<H>  ----------------------------<  293<H>  4.5   |  27.0  |  1.20    Ca    8.9      04 Jan 2018 07:18  Phos  3.5     01-04  Mg     2.4     01-04              RADIOLOGY & ADDITIONAL STUDIES:

## 2018-01-04 NOTE — PHYSICAL THERAPY INITIAL EVALUATION ADULT - ADDITIONAL COMMENTS
per patient he ambulates with either a RW or a cane at home and a history of falls. Pt has steps to enter the home and steps to the bedroom.

## 2018-01-04 NOTE — PROGRESS NOTE ADULT - SUBJECTIVE AND OBJECTIVE BOX
Pt Name: TATIANA MARIE    MRN: [id number]    Patient is a 76 yo M s/p fall and syncopal episode found to have C2 vertebral body fracture, right C7 lamina fracture without spinal cord involvement and Left distal humerus fracture  Patient seen resting in chair at bedside   Denies any loss of bowel or bladder function or any other acute motor/ sensory changes overnight  Patient participating in PT  No other complaint    PAST MEDICAL & SURGICAL HISTORY:  Legally blind  BPH (benign prostatic hyperplasia)  Type 2 diabetes mellitus  HTN (hypertension)  Parkinson disease  S/P ORIF (open reduction internal fixation) fracture: right hip  S/P TURP    Allergies: No Known Allergies    Medications: atorvastatin 20 milliGRAM(s) Oral at bedtime  BACItracin   Ointment 1 Application(s) Topical two times a day  carbidopa/levodopa  25/100 1 Tablet(s) Oral every 8 hours  carvedilol 6.25 milliGRAM(s) Oral every 12 hours  dextrose 5%. 1000 milliLiter(s) IV Continuous <Continuous>  dextrose 50% Injectable 12.5 Gram(s) IV Push once  dextrose 50% Injectable 25 Gram(s) IV Push once  dextrose 50% Injectable 25 Gram(s) IV Push once  dextrose Gel 1 Dose(s) Oral once PRN  escitalopram 10 milliGRAM(s) Oral daily  finasteride 5 milliGRAM(s) Oral daily  glucagon  Injectable 1 milliGRAM(s) IntraMuscular once PRN  HYDROmorphone  Injectable 0.5 milliGRAM(s) IV Push every 4 hours PRN  insulin lispro (HumaLOG) corrective regimen sliding scale   SubCutaneous three times a day before meals  lisinopril 5 milliGRAM(s) Oral daily  metolazone 5 milliGRAM(s) Oral daily  tamsulosin 0.8 milliGRAM(s) Oral at bedtime  timolol 0.5% Solution 1 Drop(s) Both EYES every 6 hours                          10.4   7.8   )-----------( 260      ( 04 Jan 2018 07:18 )             31.7     01-04    137  |  98  |  29.0<H>  ----------------------------<  293<H>  4.5   |  27.0  |  1.20    Ca    8.9      04 Jan 2018 07:18  Phos  3.5     01-04  Mg     2.4     01-04    TPro  6.5<L>  /  Alb  3.0<L>  /  TBili  <0.2<L>  /  DBili  x   /  AST  17  /  ALT  7   /  AlkPhos  86  01-02    PHYSICAL EXAM:    Vital Signs Last 24 Hrs  T(C): 36.7 (04 Jan 2018 08:48), Max: 37.2 (03 Jan 2018 16:17)  T(F): 98 (04 Jan 2018 08:48), Max: 98.9 (03 Jan 2018 16:17)  HR: 68 (04 Jan 2018 08:48) (68 - 77)  BP: 152/78 (04 Jan 2018 08:48) (119/59 - 153/78)  BP(mean): 82 (03 Jan 2018 12:00) (82 - 82)  RR: 18 (04 Jan 2018 08:48) (14 - 18)  SpO2: 96% (04 Jan 2018 08:48) (92% - 97%)    Appearance: Alert, responsive, in no acute distress.    Skin: no rash on visible skin. Skin is clean, dry and intact proximal and distal to cast of left upper extremity. No bleeding.  positive abrasion of bridge of nose.     Vascular: 2+ distal pulses. Cap refill < 2 sec. No signs of venous insufficiency or stasis. No extremity ulcerations. No cyanosis.    Musculoskeletal:    no tenderness to palpation about the chest, thoracic, lumbar spine       Left Upper Extremity: well maintained in cast. FROM digits.        Right Upper Extremity: Atraumatic with normal alignment NROM. No crepitus. No bony tenderness.        Left Lower Extremity: Atraumatic with normal alignment NROM. No crepitus. No bony tenderness.        Right Lower Extremity: Atraumatic with normal alignment NROM. No crepitus. No bony tenderness.     Neurological: Sensation is grossly intact to light touch of upper and lower extremities manually (left upper extremity examined proximal and distal to cast). No focal deficits or weaknesses found.    Pathologic reflexes: no Corley,  no  Clonus            Reflexes:   Biceps, Brachioradialis, Triceps on the right, left unable to examine secondary to being maintained in cast, Patella, Achilles intact  bilaterally          CT CERVICAL SPINE:    Technique: Thin sections were obtained and reviewed in axial, coronal,   and sagittal planes.    Findings: There is a vertically oriented nondisplaced fracture through   the right para midline region of the body of the C2 vertebral body   without displacement of fracture fragments.    There is a fracture through the right lamina of C7.    Alignment of the cervical spine is normal. No definite evidence of   perivertebral soft tissue swelling or hematoma. Facet joints demonstrate   normal alignment without evidence of dislocation. Spinous processes are   intact. There are degenerative changes in the mid cervical spine most   pronounced at the C5-C6 level.    Impression:   Acute fracture through the right para midline region of the body of C2 as   well as an acute fracture through the right lamina at C7.    If not contraindicated, MRI would be helpful for further evaluation of   the cervical spine and cervical cord.    Cervical MRI IMPRESSION:           Multilevel disc bulges or protrusions with some areas of stenosis as   detailed   above. No cord contusion, compression fracture, central stenosis or other   acute   traumatic injuries seen. There is no abnormal signal on STIR images to   represent ligamentous injury.    A:  77M s/p fall and syncope found to have C2 vertebral body fracture, right C7 lamina fracture, no spinal cord involvement and Left distal humerus fracture    PLAN:  * Pain control per primary team  * Non operative treatment.  Cervical collar with OOB, can be removed when in bed and for hygeine.    * NWB LUE- maintain splint/ sling  * PT WBAT bilateral lower extremities and right upper extremity, when cleared by trauma team regarding syncopal episode  * Ortho will continue to follow while patient is in house  * Neuro/ cardiology notes appreciated  * continue care per primary team Pt Name: TATIANA MARIE    MRN: [id number]    Patient is a 76 yo M s/p fall and syncopal episode found to have C2 vertebral body fracture, right C7 lamina fracture without spinal cord involvement and Left distal humerus fracture  Patient seen resting in chair at bedside   Denies any loss of bowel or bladder function or any other acute motor/ sensory changes overnight  Patient participating in PT  No other complaint    PAST MEDICAL & SURGICAL HISTORY:  Legally blind  BPH (benign prostatic hyperplasia)  Type 2 diabetes mellitus  HTN (hypertension)  Parkinson disease  S/P ORIF (open reduction internal fixation) fracture: right hip  S/P TURP    Allergies: No Known Allergies    Medications: atorvastatin 20 milliGRAM(s) Oral at bedtime  BACItracin   Ointment 1 Application(s) Topical two times a day  carbidopa/levodopa  25/100 1 Tablet(s) Oral every 8 hours  carvedilol 6.25 milliGRAM(s) Oral every 12 hours  dextrose 5%. 1000 milliLiter(s) IV Continuous <Continuous>  dextrose 50% Injectable 12.5 Gram(s) IV Push once  dextrose 50% Injectable 25 Gram(s) IV Push once  dextrose 50% Injectable 25 Gram(s) IV Push once  dextrose Gel 1 Dose(s) Oral once PRN  escitalopram 10 milliGRAM(s) Oral daily  finasteride 5 milliGRAM(s) Oral daily  glucagon  Injectable 1 milliGRAM(s) IntraMuscular once PRN  HYDROmorphone  Injectable 0.5 milliGRAM(s) IV Push every 4 hours PRN  insulin lispro (HumaLOG) corrective regimen sliding scale   SubCutaneous three times a day before meals  lisinopril 5 milliGRAM(s) Oral daily  metolazone 5 milliGRAM(s) Oral daily  tamsulosin 0.8 milliGRAM(s) Oral at bedtime  timolol 0.5% Solution 1 Drop(s) Both EYES every 6 hours                          10.4   7.8   )-----------( 260      ( 04 Jan 2018 07:18 )             31.7     01-04    137  |  98  |  29.0<H>  ----------------------------<  293<H>  4.5   |  27.0  |  1.20    Ca    8.9      04 Jan 2018 07:18  Phos  3.5     01-04  Mg     2.4     01-04    TPro  6.5<L>  /  Alb  3.0<L>  /  TBili  <0.2<L>  /  DBili  x   /  AST  17  /  ALT  7   /  AlkPhos  86  01-02    PHYSICAL EXAM:    Vital Signs Last 24 Hrs  T(C): 36.7 (04 Jan 2018 08:48), Max: 37.2 (03 Jan 2018 16:17)  T(F): 98 (04 Jan 2018 08:48), Max: 98.9 (03 Jan 2018 16:17)  HR: 68 (04 Jan 2018 08:48) (68 - 77)  BP: 152/78 (04 Jan 2018 08:48) (119/59 - 153/78)  BP(mean): 82 (03 Jan 2018 12:00) (82 - 82)  RR: 18 (04 Jan 2018 08:48) (14 - 18)  SpO2: 96% (04 Jan 2018 08:48) (92% - 97%)    Appearance: Alert, responsive, in no acute distress.    Skin: no rash on visible skin. Skin is clean, dry and intact proximal and distal to cast of left upper extremity. No bleeding.  positive abrasion of bridge of nose.     Vascular: 2+ distal pulses. Cap refill < 2 sec. No signs of venous insufficiency or stasis. No extremity ulcerations. No cyanosis.    Musculoskeletal:    no tenderness to palpation about the chest, thoracic, lumbar spine       Left Upper Extremity: well maintained in cast. FROM digits.        Right Upper Extremity: Atraumatic with normal alignment NROM. No crepitus. No bony tenderness.        Left Lower Extremity: Atraumatic with normal alignment NROM. No crepitus. No bony tenderness.        Right Lower Extremity: Atraumatic with normal alignment NROM. No crepitus. No bony tenderness.     Neurological: Sensation is grossly intact to light touch of upper and lower extremities manually (left upper extremity examined proximal and distal to cast). No focal deficits or weaknesses found.    Pathologic reflexes: no Corley,  no  Clonus            Reflexes:   Biceps, Brachioradialis, Triceps on the right, left unable to examine secondary to being maintained in cast, Patella, Achilles intact  bilaterally          CT CERVICAL SPINE:    Technique: Thin sections were obtained and reviewed in axial, coronal,   and sagittal planes.    Findings: There is a vertically oriented nondisplaced fracture through   the right para midline region of the body of the C2 vertebral body   without displacement of fracture fragments.    There is a fracture through the right lamina of C7.    Alignment of the cervical spine is normal. No definite evidence of   perivertebral soft tissue swelling or hematoma. Facet joints demonstrate   normal alignment without evidence of dislocation. Spinous processes are   intact. There are degenerative changes in the mid cervical spine most   pronounced at the C5-C6 level.    Impression:   Acute fracture through the right para midline region of the body of C2 as   well as an acute fracture through the right lamina at C7.    If not contraindicated, MRI would be helpful for further evaluation of   the cervical spine and cervical cord.    Cervical MRI IMPRESSION:           Multilevel disc bulges or protrusions with some areas of stenosis as   detailed   above. No cord contusion, compression fracture, central stenosis or other   acute   traumatic injuries seen. There is no abnormal signal on STIR images to   represent ligamentous injury.    A:  77M s/p fall and syncope found to have C2 vertebral body fracture, right C7 lamina fracture, no spinal cord involvement and Left distal humerus fracture    PLAN:  * Pain control per primary team  * Non operative treatment.  Cervical collar with OOB, can be removed when in bed and for hygeine.  will continue to follow if neuro status declines  * NWB LUE- maintain splint/ sling  * PT WBAT bilateral lower extremities and right upper extremity, when cleared by trauma team regarding syncopal episode  * Ortho will continue to follow while patient is in house  * Neuro/ cardiology notes appreciated  * continue care per primary team

## 2018-01-04 NOTE — PHYSICAL THERAPY INITIAL EVALUATION ADULT - GENERAL OBSERVATIONS, REHAB EVAL
Pt rec'd in room sitting upright in bed breathing 02 via NC, pt with C-collar and left UE sling, catheter in place

## 2018-01-05 ENCOUNTER — TRANSCRIPTION ENCOUNTER (OUTPATIENT)
Age: 78
End: 2018-01-05

## 2018-01-05 LAB
ANION GAP SERPL CALC-SCNC: 13 MMOL/L — SIGNIFICANT CHANGE UP (ref 5–17)
BASOPHILS # BLD AUTO: 0 K/UL — SIGNIFICANT CHANGE UP (ref 0–0.2)
BASOPHILS NFR BLD AUTO: 0.4 % — SIGNIFICANT CHANGE UP (ref 0–2)
BUN SERPL-MCNC: 36 MG/DL — HIGH (ref 8–20)
CALCIUM SERPL-MCNC: 9.3 MG/DL — SIGNIFICANT CHANGE UP (ref 8.6–10.2)
CHLORIDE SERPL-SCNC: 96 MMOL/L — LOW (ref 98–107)
CO2 SERPL-SCNC: 29 MMOL/L — SIGNIFICANT CHANGE UP (ref 22–29)
CREAT SERPL-MCNC: 1.29 MG/DL — SIGNIFICANT CHANGE UP (ref 0.5–1.3)
EOSINOPHIL # BLD AUTO: 0.2 K/UL — SIGNIFICANT CHANGE UP (ref 0–0.5)
EOSINOPHIL NFR BLD AUTO: 2.3 % — SIGNIFICANT CHANGE UP (ref 0–5)
GLUCOSE BLDC GLUCOMTR-MCNC: 113 MG/DL — HIGH (ref 70–99)
GLUCOSE BLDC GLUCOMTR-MCNC: 116 MG/DL — HIGH (ref 70–99)
GLUCOSE BLDC GLUCOMTR-MCNC: 184 MG/DL — HIGH (ref 70–99)
GLUCOSE BLDC GLUCOMTR-MCNC: 188 MG/DL — HIGH (ref 70–99)
GLUCOSE SERPL-MCNC: 107 MG/DL — SIGNIFICANT CHANGE UP (ref 70–115)
HCT VFR BLD CALC: 33.2 % — LOW (ref 42–52)
HGB BLD-MCNC: 10.9 G/DL — LOW (ref 14–18)
LYMPHOCYTES # BLD AUTO: 1.4 K/UL — SIGNIFICANT CHANGE UP (ref 1–4.8)
LYMPHOCYTES # BLD AUTO: 18 % — LOW (ref 20–55)
MAGNESIUM SERPL-MCNC: 2.2 MG/DL — SIGNIFICANT CHANGE UP (ref 1.6–2.6)
MCHC RBC-ENTMCNC: 31.1 PG — HIGH (ref 27–31)
MCHC RBC-ENTMCNC: 32.8 G/DL — SIGNIFICANT CHANGE UP (ref 32–36)
MCV RBC AUTO: 94.9 FL — HIGH (ref 80–94)
MONOCYTES # BLD AUTO: 0.8 K/UL — SIGNIFICANT CHANGE UP (ref 0–0.8)
MONOCYTES NFR BLD AUTO: 10.7 % — HIGH (ref 3–10)
NEUTROPHILS # BLD AUTO: 5.4 K/UL — SIGNIFICANT CHANGE UP (ref 1.8–8)
NEUTROPHILS NFR BLD AUTO: 68.5 % — SIGNIFICANT CHANGE UP (ref 37–73)
PLATELET # BLD AUTO: 253 K/UL — SIGNIFICANT CHANGE UP (ref 150–400)
POTASSIUM SERPL-MCNC: 3.9 MMOL/L — SIGNIFICANT CHANGE UP (ref 3.5–5.3)
POTASSIUM SERPL-SCNC: 3.9 MMOL/L — SIGNIFICANT CHANGE UP (ref 3.5–5.3)
RBC # BLD: 3.5 M/UL — LOW (ref 4.6–6.2)
RBC # FLD: 13.8 % — SIGNIFICANT CHANGE UP (ref 11–15.6)
SODIUM SERPL-SCNC: 138 MMOL/L — SIGNIFICANT CHANGE UP (ref 135–145)
WBC # BLD: 7.8 K/UL — SIGNIFICANT CHANGE UP (ref 4.8–10.8)
WBC # FLD AUTO: 7.8 K/UL — SIGNIFICANT CHANGE UP (ref 4.8–10.8)

## 2018-01-05 PROCEDURE — 99232 SBSQ HOSP IP/OBS MODERATE 35: CPT

## 2018-01-05 PROCEDURE — 99223 1ST HOSP IP/OBS HIGH 75: CPT

## 2018-01-05 RX ORDER — INSULIN LISPRO 100/ML
2 VIAL (ML) SUBCUTANEOUS
Qty: 0 | Refills: 0 | Status: DISCONTINUED | OUTPATIENT
Start: 2018-01-05 | End: 2018-01-11

## 2018-01-05 RX ORDER — INSULIN LISPRO 100/ML
VIAL (ML) SUBCUTANEOUS AT BEDTIME
Qty: 0 | Refills: 0 | Status: DISCONTINUED | OUTPATIENT
Start: 2018-01-05 | End: 2018-01-11

## 2018-01-05 RX ORDER — HYDROMORPHONE HYDROCHLORIDE 2 MG/ML
1 INJECTION INTRAMUSCULAR; INTRAVENOUS; SUBCUTANEOUS ONCE
Qty: 0 | Refills: 0 | Status: DISCONTINUED | OUTPATIENT
Start: 2018-01-05 | End: 2018-01-05

## 2018-01-05 RX ORDER — INSULIN LISPRO 100/ML
VIAL (ML) SUBCUTANEOUS
Qty: 0 | Refills: 0 | Status: DISCONTINUED | OUTPATIENT
Start: 2018-01-05 | End: 2018-01-11

## 2018-01-05 RX ORDER — ACETAMINOPHEN 500 MG
650 TABLET ORAL EVERY 6 HOURS
Qty: 0 | Refills: 0 | Status: DISCONTINUED | OUTPATIENT
Start: 2018-01-05 | End: 2018-01-11

## 2018-01-05 RX ADMIN — HEPARIN SODIUM 5000 UNIT(S): 5000 INJECTION INTRAVENOUS; SUBCUTANEOUS at 05:04

## 2018-01-05 RX ADMIN — Medication 1 DROP(S): at 23:03

## 2018-01-05 RX ADMIN — Medication 1 DROP(S): at 13:02

## 2018-01-05 RX ADMIN — HYDROMORPHONE HYDROCHLORIDE 0.5 MILLIGRAM(S): 2 INJECTION INTRAMUSCULAR; INTRAVENOUS; SUBCUTANEOUS at 00:32

## 2018-01-05 RX ADMIN — HYDROMORPHONE HYDROCHLORIDE 1 MILLIGRAM(S): 2 INJECTION INTRAMUSCULAR; INTRAVENOUS; SUBCUTANEOUS at 19:49

## 2018-01-05 RX ADMIN — Medication 1 DROP(S): at 04:46

## 2018-01-05 RX ADMIN — CARBIDOPA AND LEVODOPA 1 TABLET(S): 25; 100 TABLET ORAL at 13:01

## 2018-01-05 RX ADMIN — Medication 650 MILLIGRAM(S): at 06:21

## 2018-01-05 RX ADMIN — Medication 81 MILLIGRAM(S): at 13:02

## 2018-01-05 RX ADMIN — Medication 1 DROP(S): at 17:47

## 2018-01-05 RX ADMIN — INSULIN HUMAN 4: 100 INJECTION, SOLUTION SUBCUTANEOUS at 13:03

## 2018-01-05 RX ADMIN — HYDROMORPHONE HYDROCHLORIDE 0.5 MILLIGRAM(S): 2 INJECTION INTRAMUSCULAR; INTRAVENOUS; SUBCUTANEOUS at 01:02

## 2018-01-05 RX ADMIN — Medication 1 APPLICATION(S): at 17:47

## 2018-01-05 RX ADMIN — CARBIDOPA AND LEVODOPA 1 TABLET(S): 25; 100 TABLET ORAL at 04:47

## 2018-01-05 RX ADMIN — HYDROMORPHONE HYDROCHLORIDE 1 MILLIGRAM(S): 2 INJECTION INTRAMUSCULAR; INTRAVENOUS; SUBCUTANEOUS at 19:34

## 2018-01-05 RX ADMIN — Medication 650 MILLIGRAM(S): at 13:31

## 2018-01-05 RX ADMIN — Medication 650 MILLIGRAM(S): at 13:01

## 2018-01-05 RX ADMIN — Medication 650 MILLIGRAM(S): at 17:46

## 2018-01-05 RX ADMIN — Medication 650 MILLIGRAM(S): at 06:20

## 2018-01-05 RX ADMIN — FINASTERIDE 5 MILLIGRAM(S): 5 TABLET, FILM COATED ORAL at 13:02

## 2018-01-05 RX ADMIN — INSULIN GLARGINE 24 UNIT(S): 100 INJECTION, SOLUTION SUBCUTANEOUS at 23:03

## 2018-01-05 RX ADMIN — HYDROMORPHONE HYDROCHLORIDE 0.5 MILLIGRAM(S): 2 INJECTION INTRAMUSCULAR; INTRAVENOUS; SUBCUTANEOUS at 09:09

## 2018-01-05 RX ADMIN — Medication 650 MILLIGRAM(S): at 23:03

## 2018-01-05 RX ADMIN — CARBIDOPA AND LEVODOPA 1 TABLET(S): 25; 100 TABLET ORAL at 22:10

## 2018-01-05 RX ADMIN — HYDROMORPHONE HYDROCHLORIDE 0.5 MILLIGRAM(S): 2 INJECTION INTRAMUSCULAR; INTRAVENOUS; SUBCUTANEOUS at 04:46

## 2018-01-05 RX ADMIN — HYDROMORPHONE HYDROCHLORIDE 0.5 MILLIGRAM(S): 2 INJECTION INTRAMUSCULAR; INTRAVENOUS; SUBCUTANEOUS at 09:30

## 2018-01-05 RX ADMIN — Medication 2: at 18:07

## 2018-01-05 RX ADMIN — Medication 2 UNIT(S): at 18:07

## 2018-01-05 RX ADMIN — HYDROMORPHONE HYDROCHLORIDE 0.5 MILLIGRAM(S): 2 INJECTION INTRAMUSCULAR; INTRAVENOUS; SUBCUTANEOUS at 04:49

## 2018-01-05 RX ADMIN — Medication 650 MILLIGRAM(S): at 18:15

## 2018-01-05 RX ADMIN — Medication 650 MILLIGRAM(S): at 23:33

## 2018-01-05 RX ADMIN — LISINOPRIL 5 MILLIGRAM(S): 2.5 TABLET ORAL at 04:46

## 2018-01-05 RX ADMIN — TAMSULOSIN HYDROCHLORIDE 0.8 MILLIGRAM(S): 0.4 CAPSULE ORAL at 22:10

## 2018-01-05 RX ADMIN — ESCITALOPRAM OXALATE 10 MILLIGRAM(S): 10 TABLET, FILM COATED ORAL at 13:02

## 2018-01-05 RX ADMIN — AMLODIPINE BESYLATE 10 MILLIGRAM(S): 2.5 TABLET ORAL at 04:46

## 2018-01-05 RX ADMIN — ATORVASTATIN CALCIUM 20 MILLIGRAM(S): 80 TABLET, FILM COATED ORAL at 22:10

## 2018-01-05 RX ADMIN — CARVEDILOL PHOSPHATE 6.25 MILLIGRAM(S): 80 CAPSULE, EXTENDED RELEASE ORAL at 04:47

## 2018-01-05 RX ADMIN — Medication 1 APPLICATION(S): at 04:46

## 2018-01-05 RX ADMIN — HEPARIN SODIUM 5000 UNIT(S): 5000 INJECTION INTRAVENOUS; SUBCUTANEOUS at 15:16

## 2018-01-05 RX ADMIN — Medication 20 MILLIGRAM(S): at 04:46

## 2018-01-05 RX ADMIN — HEPARIN SODIUM 5000 UNIT(S): 5000 INJECTION INTRAVENOUS; SUBCUTANEOUS at 22:09

## 2018-01-05 NOTE — DISCHARGE NOTE ADULT - PATIENT PORTAL LINK FT
“You can access the Follow[facility] Patient Portal, offered by [facility], by registering with the following website: [email or web address]”

## 2018-01-05 NOTE — DISCHARGE NOTE ADULT - PLAN OF CARE
Improved function and pain control * follow-up with Dr Sung for humerus fracture in 1-2 weeks.  * continue use of sling and splint  * NWB of the LEFT UE  * Follow-up with Dr Britton in 1-2 weeks for cervical fractures  * Collar when out of bed - may be removed for washing, eating and when in bed    *Pain control as needed Follow up: Please call and make an appointment with DR. PEARSON 1-2 weeks after discharge. Also, please call and make an appointment with your primary care physician as per your usual schedule.   Activity: Please remain NON-WEIGHT BEARING of left upper extremity with splint and sling in place  Diet: May continue regular diet.  Medications: Please take all home medications as prescribed by your primary care doctor. Pain medication has been prescribed for you. Please, take it as it has been prescribed, do not drive or operate heavy machinery while taking narcotics.  You are encouraged to take over-the-counter tylenol and/or ibuprofen for pain relief when you feel your pain no longer warrants the use of narcotic pain medications.  Wound Care: Please, keep wound site clean and dry. You may shower, but do not bathe and keep splint dry when showering.  Patient is advised to RETURN TO THE EMERGENCY DEPARTMENT for any of the following - worsening pain, fever/chills, nausea/vomiting, altered mental status, chest pain, shortness of breath, or any other new / worsening symptom. Follow-up: Please call and make an appointment with DR. OWENS 1-2 weeks after discharge  Activity: Cervical collar must be worn when patient is out of bed - it may be removed for washing, eating, and when patient is in bed. Please continue medications as listed above and call to make a follow-up appointment with DR. PACHECO (neurology) after discharge. Please continue insulin doses as listed above, frequent blood glucose monitoring, and call to make a follow-up appointment with DR. GUZMAN (endocrinology) after discharge. Please call and make an appointment with neurosurgery for further surveillance and management - Dr. Trinidad Fry, [phone], [address], [place] [address]. After multiple episodes of urinary retention during your hospital stay and initiation of medications to assist with this problem, a DODGE CATHETER remains in place. Dodge may be removed and repeat trails of void performed at the discretion of your providers at rehab. After multiple episodes of urinary retention during your hospital stay and initiation of medications to assist with this problem, a DODGE CATHETER remains in place. Dodge may be removed and repeat trails of void performed at the discretion of your providers at rehab.  Recommend urology follow up.

## 2018-01-05 NOTE — DISCHARGE NOTE ADULT - ADDITIONAL INSTRUCTIONS
Ortho recs:  Patient is to follow-up outpatient with Dr. Britton in 10 days. Patient to follow-up with Dr. Sung in 2 weeks. Patient is to maintain splint and sling LUE until seen in office. Patient is NON weight bearing left upper extremity. Patient is weight bearing as tolerated on b/l LE. DVTP as per primary team.

## 2018-01-05 NOTE — PROGRESS NOTE ADULT - SUBJECTIVE AND OBJECTIVE BOX
Cardiology Follow-up    CRITICAL [place] was seen and examined. No events overnight. The patient denies any chest pain, SOB, palpitations, orthopnea, PND or abdominal pain.He is up in a chair,having physical therapy,no dizziness or further syncope noted.    PROBLEM LIST:  Syncope and collapse  Cerebrovascular accident (CVA) due to embolism of right posterior cerebral artery  Cerebrovascular accident (CVA) due to occlusion of left middle cerebral artery  Hypertension, unspecified type  Parkinson disease  Legally blind  Other closed nondisplaced fracture of second cervical vertebra, initial encounter    PAST MEDICAL HISTORY:  Legally blind  BPH (benign prostatic hyperplasia)  Type 2 diabetes mellitus  HTN (hypertension)  Parkinson disease    PAST SURGICAL HISTORY:  S/P ORIF (open reduction internal fixation) fracture  S/P TURP    MEDICATIONS  (STANDING):  acetaminophen   Tablet. 650 milliGRAM(s) Oral every 6 hours  amLODIPine   Tablet 10 milliGRAM(s) Oral daily  aspirin  chewable 81 milliGRAM(s) Oral daily  atorvastatin 20 milliGRAM(s) Oral at bedtime  BACItracin   Ointment 1 Application(s) Topical two times a day  carbidopa/levodopa  25/100 1 Tablet(s) Oral every 8 hours  carvedilol 6.25 milliGRAM(s) Oral every 12 hours  dextrose 5%. 1000 milliLiter(s) (50 mL/Hr) IV Continuous <Continuous>  dextrose 50% Injectable 12.5 Gram(s) IV Push once  dextrose 50% Injectable 25 Gram(s) IV Push once  dextrose 50% Injectable 25 Gram(s) IV Push once  escitalopram 10 milliGRAM(s) Oral daily  finasteride 5 milliGRAM(s) Oral daily  heparin  Injectable 5000 Unit(s) SubCutaneous every 8 hours  insulin glargine Injectable (LANTUS) 24 Unit(s) SubCutaneous at bedtime  insulin regular  human corrective regimen sliding scale   SubCutaneous three times a day before meals  lisinopril 5 milliGRAM(s) Oral daily  metolazone 5 milliGRAM(s) Oral daily  tamsulosin 0.8 milliGRAM(s) Oral at bedtime  timolol 0.5% Solution 1 Drop(s) Both EYES every 6 hours  torsemide 20 milliGRAM(s) Oral daily    MEDICATIONS  (PRN):  dextrose Gel 1 Dose(s) Oral once PRN Blood Glucose LESS THAN 70 milliGRAM(s)/deciliter  glucagon  Injectable 1 milliGRAM(s) IntraMuscular once PRN Glucose LESS THAN 70 milligrams/deciliter    ALLERGIES:  No Known Allergies    PHYSICAL EXAM:  T(C): 36.8 (18 @ 08:44), Max: 36.8 (18 @ 19:55)  T(F): 98.2 (18 @ 08:44), Max: 98.3 (18 @ 04:07)  HR: 62 (18 @ 08:44) (58 - 81)  BP: 114/68 (18 @ 08:44) (110/66 - 138/79)  RR: 18 (18 @ 08:44) (17 - 18)  SpO2: 100% (18 @ 08:44) (95% - 100%)  Wt(kg): --  I&O's Summary    2018 07:  -  2018 07:00  --------------------------------------------------------  IN: 120 mL / OUT: 1175 mL / NET: -1055 mL    2018 07:  -  2018 13:57  --------------------------------------------------------  IN: 0 mL / OUT: 650 mL / NET: -650 mL      Telemetry: sinus rhythm,PAC's  General: comfortable, in NAD,neck brace worn  Heart: +S1S2, RRR, 2/6 systolic murmur at the LLSB, no rubs, no gallops  Lungs: clear to auscultation bilaterally, no wheezes, no rales, no rhonchi  Abdomen: soft, non-tender, non-distended, + bowel sounds  Extremities: no clubbing, no cyanosis, no edema  Neuro: A&O x3    LABS:    Troponin T, Serum: 0.04 ng/mL ( @ 04:57)  Troponin T, Serum: 0.04 ng/mL ( @ 19:21)                            10.9   7.8   )-----------( 253      ( 2018 07:08 )             33.2     -    138  |  96<L>  |  36.0<H>  ----------------------------<  107  3.9   |  29.0  |  1.29    Ca    9.3      2018 07:08  Phos  3.5     -  Mg     2.2             RADIOLOGY & ADDITIONAL TESTS:    ASSESSMENT:  TATIANA MARIE is a 77y old male with a history of essential hypertension,IDDM,hyperlipidemia,Parkinson's,legally blind,CVA,cervical vertebral disease who presented with syncope.His appetite has been poor.    Please permit me to suggest the followin. Will check echo results  2.Continue to increase activity with physical therapy  3.Continue with current meds

## 2018-01-05 NOTE — DISCHARGE NOTE ADULT - MEDICATION SUMMARY - MEDICATIONS TO TAKE
I will START or STAY ON the medications listed below when I get home from the hospital:    finasteride 5 mg oral tablet  -- 1 tab(s) by mouth once a day  -- Indication: For BPH (benign prostatic hyperplasia)    celecoxib 100 mg oral capsule  -- 1 cap(s) by mouth 2 times a day (with meals)  -- Indication: For Pain    acetaminophen 325 mg oral tablet  -- 2 tab(s) by mouth every 6 hours  -- Indication: For Pain    oxyCODONE 10 mg oral tablet  -- 1 tab(s) by mouth every 4 hours, As needed, Severe Pain (7 - 10)  -- Indication: For Pain    oxyCODONE 5 mg oral tablet  -- 1 tab(s) by mouth every 4 hours, As needed, Moderate Pain (4 - 6)  -- Indication: For Pain    aspirin 81 mg oral tablet, chewable  -- 1 tab(s) by mouth once a day  -- Indication: For 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    lisinopril 5 mg oral tablet  -- 1 tab(s) by mouth once a day  -- Indication: For HTN (hypertension)    tamsulosin 0.4 mg oral capsule  -- 2 cap(s) by mouth once a day (at bedtime)  -- Indication: For BPH (benign prostatic hyperplasia)    escitalopram 10 mg oral tablet  -- 1 tab(s) by mouth once a day  -- Indication: For Parkinson disease    insulin glargine  -- 18 unit(s) subcutaneous once a day (at bedtime)  -- Indication: For Type 2 diabetes mellitus    insulin lispro 100 units/mL subcutaneous solution  --  subcutaneous 3 times a day (before meals); 2 Unit(s) if Glucose 151 - 200  4 Unit(s) if Glucose 201 - 250  6 Unit(s) if Glucose 251 - 300  8 Unit(s) if Glucose 301 - 350  10 Unit(s) if Glucose 351 - 400  12 Unit(s) if Glucose GREATER THAN 400  -- Indication: For Type 2 diabetes mellitus    insulin lispro 100 units/mL subcutaneous solution  --  subcutaneous once a day (at bedtime); 0 Unit(s) if Glucose 0 - 250  1 Unit(s) if Glucose 251 - 300  2 Unit(s) if Glucose 301 - 350  3 Unit(s) if Glucose 351 - 400  4 Unit(s) if Glucose GREATER THAN 400  -- Indication: For Type 2 diabetes mellitus    insulin lispro 100 units/mL subcutaneous solution  -- 2 unit(s) subcutaneous 3 times a day (with meals)  -- Indication: For Type 2 diabetes mellitus    atorvastatin 20 mg oral tablet  -- 1 tab(s) by mouth once a day (at bedtime)  -- Indication: For Cerebrovascular accident (CVA) due to embolism of right posterior cerebral artery    carbidopa-levodopa 25 mg-100 mg oral tablet  -- 1 tab(s) by mouth every 8 hours  -- Indication: For Parkinson disease    carvedilol 6.25 mg oral tablet  -- 1 tab(s) by mouth every 12 hours  -- Indication: For Hypertension, unspecified type    amLODIPine 10 mg oral tablet  -- 1 tab(s) by mouth once a day  -- Indication: For Hypertension, unspecified type    bacitracin 500 units/g topical ointment  -- 1 application on skin 2 times a day  -- Indication: For Wound care    metOLazone 5 mg oral tablet  -- 1 tab(s) by mouth once a day  -- Indication: For HTN (hypertension)    torsemide 20 mg oral tablet  -- 1 tab(s) by mouth once a day  -- Indication: For HTN (hypertension)    docusate sodium 100 mg oral capsule  -- 1 cap(s) by mouth once a day  -- Indication: For Constipation    senna oral tablet  -- 1 tab(s) by mouth once a day  -- Indication: For Constipation    timolol maleate 0.5% ophthalmic solution  -- 1 drop(s) to each affected eye every 6 hours  -- Indication: For Ophthalamic I will START or STAY ON the medications listed below when I get home from the hospital:    finasteride 5 mg oral tablet  -- 1 tab(s) by mouth once a day  -- Indication: For BPH (benign prostatic hyperplasia)    celecoxib 100 mg oral capsule  -- 1 cap(s) by mouth 2 times a day (with meals)  -- Indication: For Pain    acetaminophen 325 mg oral tablet  -- 2 tab(s) by mouth every 6 hours  -- Indication: For Pain    traMADol 50 mg oral tablet  -- 0.5 tab(s) by mouth every 4 hours, As needed, Moderate Pain (4 - 6)  -- Indication: For Pain    traMADol 50 mg oral tablet  -- 1 tab(s) by mouth every 4 hours, As needed, Severe Pain (7 - 10)  -- Indication: For Pain    aspirin 81 mg oral tablet, chewable  -- 1 tab(s) by mouth once a day  -- Indication: For Cerebrovascular accident (CVA) due to embolism of right posterior cerebral artery    lisinopril 5 mg oral tablet  -- 1 tab(s) by mouth once a day  -- Indication: For HTN (hypertension)    tamsulosin 0.4 mg oral capsule  -- 2 cap(s) by mouth once a day (at bedtime)  -- Indication: For BPH (benign prostatic hyperplasia)    escitalopram 10 mg oral tablet  -- 1 tab(s) by mouth once a day  -- Indication: For Parkinson disease    insulin glargine  -- 18 unit(s) subcutaneous once a day (at bedtime)  -- Indication: For Type 2 diabetes mellitus    insulin lispro 100 units/mL subcutaneous solution  --  subcutaneous 3 times a day (before meals); 2 Unit(s) if Glucose 151 - 200  4 Unit(s) if Glucose 201 - 250  6 Unit(s) if Glucose 251 - 300  8 Unit(s) if Glucose 301 - 350  10 Unit(s) if Glucose 351 - 400  12 Unit(s) if Glucose GREATER THAN 400  -- Indication: For Type 2 diabetes mellitus    insulin lispro 100 units/mL subcutaneous solution  --  subcutaneous once a day (at bedtime); 0 Unit(s) if Glucose 0 - 250  1 Unit(s) if Glucose 251 - 300  2 Unit(s) if Glucose 301 - 350  3 Unit(s) if Glucose 351 - 400  4 Unit(s) if Glucose GREATER THAN 400  -- Indication: For Type 2 diabetes mellitus    insulin lispro 100 units/mL subcutaneous solution  -- 2 unit(s) subcutaneous 3 times a day (with meals)  -- Indication: For Type 2 diabetes mellitus    atorvastatin 20 mg oral tablet  -- 1 tab(s) by mouth once a day (at bedtime)  -- Indication: For Cerebrovascular accident (CVA) due to embolism of right posterior cerebral artery    carbidopa-levodopa 25 mg-100 mg oral tablet  -- 1 tab(s) by mouth every 8 hours  -- Indication: For Parkinson disease    carvedilol 6.25 mg oral tablet  -- 1 tab(s) by mouth every 12 hours  -- Indication: For Hypertension, unspecified type    amLODIPine 10 mg oral tablet  -- 1 tab(s) by mouth once a day  -- Indication: For Hypertension, unspecified type    bacitracin 500 units/g topical ointment  -- 1 application on skin 2 times a day  -- Indication: For Wound care    lidocaine 5% topical film  -- Apply on skin to affected area once a day. Patch may remain in place for up to 12 hours in any 24-hour period.  * External Use Only *  -- Indication: For Pain    metOLazone 5 mg oral tablet  -- 1 tab(s) by mouth once a day  -- Indication: For HTN (hypertension)    torsemide 20 mg oral tablet  -- 1 tab(s) by mouth once a day  -- Indication: For HTN (hypertension)    docusate sodium 100 mg oral capsule  -- 1 cap(s) by mouth once a day  -- Indication: For Constipation    senna oral tablet  -- 1 tab(s) by mouth once a day  -- Indication: For Constipation    timolol maleate 0.5% ophthalmic solution  -- 1 drop(s) to each affected eye every 6 hours  -- Indication: For Ophthalamic I will START or STAY ON the medications listed below when I get home from the hospital:    finasteride 5 mg oral tablet  -- 1 tab(s) by mouth once a day  -- Indication: For BPH (benign prostatic hyperplasia)    celecoxib 100 mg oral capsule  -- 1 cap(s) by mouth 2 times a day (with meals)  -- Indication: For Pain    acetaminophen 325 mg oral tablet  -- 2 tab(s) by mouth every 6 hours  -- Indication: For Pain    aspirin 81 mg oral tablet, chewable  -- 1 tab(s) by mouth once a day  -- Indication: For Cerebrovascular accident (CVA) due to embolism of right posterior cerebral artery    oxyCODONE 10 mg oral tablet  -- 1 tab(s) by mouth every 4 hours, As needed, Severe Pain (7 - 10)  -- Indication: For Pain    oxyCODONE 5 mg oral tablet  -- 1 tab(s) by mouth every 4 hours, As needed, Moderate Pain (4 - 6)  -- Indication: For Pain    lisinopril 5 mg oral tablet  -- 1 tab(s) by mouth once a day  -- Indication: For HTN (hypertension)    tamsulosin 0.4 mg oral capsule  -- 2 cap(s) by mouth once a day (at bedtime)  -- Indication: For BPH (benign prostatic hyperplasia)    escitalopram 10 mg oral tablet  -- 1 tab(s) by mouth once a day  -- Indication: For Parkinson disease    insulin glargine  -- 18 unit(s) subcutaneous once a day (at bedtime)  -- Indication: For Type 2 diabetes mellitus    insulin lispro 100 units/mL subcutaneous solution  --  subcutaneous 3 times a day (before meals); 2 Unit(s) if Glucose 151 - 200  4 Unit(s) if Glucose 201 - 250  6 Unit(s) if Glucose 251 - 300  8 Unit(s) if Glucose 301 - 350  10 Unit(s) if Glucose 351 - 400  12 Unit(s) if Glucose GREATER THAN 400  -- Indication: For Type 2 diabetes mellitus    insulin lispro 100 units/mL subcutaneous solution  --  subcutaneous once a day (at bedtime); 0 Unit(s) if Glucose 0 - 250  1 Unit(s) if Glucose 251 - 300  2 Unit(s) if Glucose 301 - 350  3 Unit(s) if Glucose 351 - 400  4 Unit(s) if Glucose GREATER THAN 400  -- Indication: For Type 2 diabetes mellitus    insulin lispro 100 units/mL subcutaneous solution  -- 2 unit(s) subcutaneous 3 times a day (with meals)  -- Indication: For Type 2 diabetes mellitus    atorvastatin 20 mg oral tablet  -- 1 tab(s) by mouth once a day (at bedtime)  -- Indication: For Cerebrovascular accident (CVA) due to embolism of right posterior cerebral artery    carbidopa-levodopa 25 mg-100 mg oral tablet  -- 1 tab(s) by mouth every 8 hours  -- Indication: For Parkinson disease    carvedilol 6.25 mg oral tablet  -- 1 tab(s) by mouth every 12 hours  -- Indication: For Hypertension, unspecified type    amLODIPine 10 mg oral tablet  -- 1 tab(s) by mouth once a day  -- Indication: For Hypertension, unspecified type    bacitracin 500 units/g topical ointment  -- 1 application on skin 2 times a day  -- Indication: For Wound care    lidocaine 5% topical film  -- Apply on skin to affected area once a day. Patch may remain in place for up to 12 hours in any 24-hour period.  * External Use Only *  -- Indication: For Pain    metOLazone 5 mg oral tablet  -- 1 tab(s) by mouth once a day  -- Indication: For HTN (hypertension)    torsemide 20 mg oral tablet  -- 1 tab(s) by mouth once a day  -- Indication: For HTN (hypertension)    docusate sodium 100 mg oral capsule  -- 1 cap(s) by mouth once a day  -- Indication: For Constipation    senna oral tablet  -- 1 tab(s) by mouth once a day  -- Indication: For Constipation    timolol maleate 0.5% ophthalmic solution  -- 1 drop(s) to each affected eye every 6 hours  -- Indication: For Ophthalamic

## 2018-01-05 NOTE — PROGRESS NOTE ADULT - SUBJECTIVE AND OBJECTIVE BOX
Patient seen and examined at bedside. comfortable in bed. c/o mild pain left arm. Denies acute motor/sensory deficits. no other orthopedic complaints.    Vital Signs Last 24 Hrs  T(C): 36.8 (05 Jan 2018 04:07), Max: 36.8 (04 Jan 2018 19:55)  T(F): 98.3 (05 Jan 2018 04:07), Max: 98.3 (05 Jan 2018 04:07)  HR: 66 (05 Jan 2018 04:07) (58 - 81)  BP: 122/71 (05 Jan 2018 04:07) (110/66 - 152/78)  BP(mean): --  RR: 17 (05 Jan 2018 04:07) (17 - 18)  SpO2: 95% (05 Jan 2018 04:07) (95% - 100%)    LUE: posterior splint noted, in place. sling noted, in place. + ROM phalanges. + thumb abduction. Ext warm, cap refill brisk. SILT  RUE: SILT.  strength in tact. FROM phalanges. + ROM elbow. Ext warm, cap refill brisk  LE: SILT. + strong dorsi/plantarflexion B/L. + SLR B/L. Ext warm, cap refill brisk. calf soft NT B/L.                          10.9   7.8   )-----------( 253      ( 05 Jan 2018 07:08 )             33.2     A/P: 77 y.o M with C2, C7 fx, left distal humerus fx  - WBAT B/L LE  - NWB LUE  - cont splint and sling LUE  - DVTP as per primary team  - cont care primary team

## 2018-01-05 NOTE — PROGRESS NOTE ADULT - ASSESSMENT
77M s/p syncopal fall with undetermined occipital stroke and large right subarachnoid cyst with no midline shift. C2 body and C7 lamina fracture  -neurochecks  -C collar when OOB per ortho  -syncopal workup   -neurology: NTD re: cyst  -pain control  -f/u MRA  -ortho recs  * Non operative treatment.  Cervical collar with OOB, can be removed when in bed and for hygeine.  will continue to follow if neuro status declines  * NWB LUE- maintain splint/ sling  * PT WBAT bilateral lower extremities and right upper extremity  -cardiology: torsemide, monitor renal fn  -Pt/OT: acute rehab  -PMNR: acute rehab  -dvt ppx - scd  -HOB 30 degrees  -monitor vitals 77M s/p syncopal fall with undetermined occipital stroke and large right subarachnoid cyst with no midline shift. C2 body and C7 lamina fracture  -neurochecks  -C collar when OOB per ortho  -syncopal workup   -neurology: NTD re: cyst  -pain control  -f/u MRA  -ortho recs  * Non operative treatment.  Cervical collar with OOB, can be removed when in bed and for hygeine.  will continue to follow if neuro status declines  * NWB LUE- maintain splint/ sling  * PT WBAT bilateral lower extremities and right upper extremity  -cardiology: torsemide, monitor renal fn  -Pt/OT: acute rehab  -PMNR: acute rehab  -dvt ppx - scd  -HOB 30 degrees  -monitor vitals  -SW for acute rehab placement, dispo planning

## 2018-01-05 NOTE — CONSULT NOTE ADULT - SUBJECTIVE AND OBJECTIVE BOX
HPI:  Patient is a 77 year old male with hx of Type 2 DM, HTN, BPH and Parkinson's Disease admitted to [facility] on 2/218 after syncopal Event.  Patient states he was eating dinner when he had sudden loss of consciousness and was found on the floor by his wife.  He had no prodrome of CP or headache.  He woke up spontaneously minutes later without intervention when EMS arrived and was brought to ED.  Imaging revealed a occipital infarct, C2 and C7 fracture and left humerus fracture.    The patient reports he was diagnosed with DM at age 47 and has been on Lantus and humalog insulin for years, but cannot recall his doses.  Labs here show he is moderately uncontrolled with A1c of 10%.  He denies any associated retinopathy or neuropathy.      PAST MEDICAL & SURGICAL HISTORY:  Legally blind  BPH (benign prostatic hyperplasia)  Type 2 diabetes mellitus  HTN (hypertension)  Parkinson disease  S/P ORIF (open reduction internal fixation) fracture: right hip  S/P TURP    Allergies  No Known Allergies    MEDICATIONS  (STANDING):  acetaminophen   Tablet. 650 milliGRAM(s) Oral every 6 hours  amLODIPine   Tablet 10 milliGRAM(s) Oral daily  aspirin  chewable 81 milliGRAM(s) Oral daily  atorvastatin 20 milliGRAM(s) Oral at bedtime  BACItracin   Ointment 1 Application(s) Topical two times a day  carbidopa/levodopa  25/100 1 Tablet(s) Oral every 8 hours  carvedilol 6.25 milliGRAM(s) Oral every 12 hours  dextrose 5%. 1000 milliLiter(s) (50 mL/Hr) IV Continuous <Continuous>  dextrose 50% Injectable 12.5 Gram(s) IV Push once  dextrose 50% Injectable 25 Gram(s) IV Push once  dextrose 50% Injectable 25 Gram(s) IV Push once  escitalopram 10 milliGRAM(s) Oral daily  finasteride 5 milliGRAM(s) Oral daily  heparin  Injectable 5000 Unit(s) SubCutaneous every 8 hours  insulin glargine Injectable (LANTUS) 24 Unit(s) SubCutaneous at bedtime  insulin regular  human corrective regimen sliding scale   SubCutaneous three times a day before meals  lisinopril 5 milliGRAM(s) Oral daily  metolazone 5 milliGRAM(s) Oral daily  tamsulosin 0.8 milliGRAM(s) Oral at bedtime  timolol 0.5% Solution 1 Drop(s) Both EYES every 6 hours  torsemide 20 milliGRAM(s) Oral daily    SH:  retired, no tobacco use    FH:  father had DM    ROS:  as above, otherwise 10 point ROS negative.    Vital Signs Last 24 Hrs  T(C): 36.8 (05 Jan 2018 08:44), Max: 36.8 (04 Jan 2018 19:55)  T(F): 98.2 (05 Jan 2018 08:44), Max: 98.3 (05 Jan 2018 04:07)  HR: 62 (05 Jan 2018 08:44) (58 - 81)  BP: 114/68 (05 Jan 2018 08:44) (110/66 - 138/79)  RR: 18 (05 Jan 2018 08:44) (17 - 18)  SpO2: 100% (05 Jan 2018 08:44) (95% - 100%)  PE:  Gen: elderly male, NAD  HEENT:  sclera anicteric, EOMI,  MMM  Neck:  no thyromegaly, no LAD  CV:  nl S1 + S2, RRR, no m/r/g  Resp:  nl respiratory effort, CTA b/l  GI/ Abd: soft, NT/ ND, BS +  Neuro:  No tremor, sensation intact to light touch on b/l feet  MS:  no c/c/e, nl muscle tone  Skin:  no acanthosis, no rashes    LABS:  Hemoglobin A1C, Whole Blood: 10.0 % (01.04.18 @ 07:18)    01-05    138  |  96<L>  |  36.0<H>  ----------------------------<  107  3.9   |  29.0  |  1.29    Ca    9.3      05 Jan 2018 07:08  Phos  3.5     01-04  Mg     2.2     01-05                          10.9   7.8   )-----------( 253      ( 05 Jan 2018 07:08 )             33.2   CAPILLARY BLOOD GLUCOSE      POCT Blood Glucose.: 184 mg/dL (05 Jan 2018 13:00)  POCT Blood Glucose.: 116 mg/dL (05 Jan 2018 08:06)  POCT Blood Glucose.: 190 mg/dL (04 Jan 2018 19:53)  POCT Blood Glucose.: 230 mg/dL (04 Jan 2018 17:22)

## 2018-01-05 NOTE — CONSULT NOTE ADULT - CONSULT REASON
C2 vertebral body fx/C7 lamina fx
Diabetes Management
Left distal humerus fx
Syncope
syncope, fall, Parkinson's disease
Large right temporal parietal arachnoid cyst..

## 2018-01-05 NOTE — DISCHARGE NOTE ADULT - PROVIDER TOKENS
TOKEN:'[id number]:'[id number]' TOKEN:'[id number]:'[id number]:'[id number]:'[id number]'

## 2018-01-05 NOTE — PROGRESS NOTE ADULT - SUBJECTIVE AND OBJECTIVE BOX
Acute Care Surgery /Trauma PROGRESS NOTE:     SUBJECTIVE: Pt seen and examined at bedside. No acute overnight events. Pain well controlled. Tolerating diet, no n/v. Ledesma dc at midnight, feels need to void but bladder scanned and straight cathed 650cc in AM. Passing flatus, normal BM. Denies f/c/sob/cp. Ambulatory OOB. fingersticks improving.    Vital Signs Last 24 Hrs  T(C): 36.8 (05 Jan 2018 08:00), Max: 37.2 (05 Jan 2018 00:12)  T(F): 98.2 (05 Jan 2018 08:00), Max: 99 (05 Jan 2018 00:12)  HR: 89 (05 Jan 2018 08:00) (81 - 89)  BP: 121/71 (05 Jan 2018 08:00) (102/64 - 127/69)  BP(mean): --  RR: 18 (05 Jan 2018 08:00) (18 - 18)  SpO2: 98% (05 Jan 2018 08:00) (95% - 98%)    OBJECTIVE:  NAD  NC/AT  RRR  No respiratory distress  Abd soft, nondistended, nontender, no guarding or rebound. No peritoneal signs.  No pedal edema  GCS 15, moving all 4s no focal deficits    I&O's Detail    04 Jan 2018 07:01  -  05 Jan 2018 07:00  --------------------------------------------------------  IN:    Oral Fluid: 600 mL  Total IN: 600 mL    OUT:    Colostomy: 100 mL    VAC (Vacuum Assisted Closure) System: 200 mL    Voided: 900 mL  Total OUT: 1200 mL    Total NET: -600 mL          MEDICATIONS  (STANDING):  acetaminophen   Tablet. 975 milliGRAM(s) Oral every 8 hours  ALBUTerol    90 MICROgram(s) HFA Inhaler 1 Puff(s) Inhalation every 4 hours  ALBUTerol    90 MICROgram(s) HFA Inhaler 1 Puff(s) Inhalation every 4 hours  ascorbic acid 500 milliGRAM(s) Oral daily  cloNIDine Patch 0.1 mG/24Hr(s) 1 patch Topical every 7 days  docusate sodium 100 milliGRAM(s) Oral daily  enoxaparin Injectable 40 milliGRAM(s) SubCutaneous every 24 hours  fentaNYL   Patch  12 MICROgram(s)/Hr 1 Patch Transdermal every 72 hours  influenza   Vaccine 0.5 milliLiter(s) IntraMuscular once  magnesium hydroxide Suspension 30 milliLiter(s) Oral daily  melatonin 3 milliGRAM(s) Oral at bedtime  methocarbamol 750 milliGRAM(s) Oral every 6 hours  ondansetron   Disintegrating Tablet 4 milliGRAM(s) Oral every 6 hours  senna 2 Tablet(s) Oral at bedtime  tiotropium 18 MICROgram(s) Capsule 1 Capsule(s) Inhalation daily  zinc sulfate 220 milliGRAM(s) Oral daily    MEDICATIONS  (PRN):  ALBUTerol/ipratropium for Nebulization 3 milliLiter(s) Nebulizer every 6 hours PRN Shortness of Breath  ALPRAZolam 0.5 milliGRAM(s) Oral at bedtime PRN anxiety/agitation/restlessness  ALPRAZolam 0.5 milliGRAM(s) Oral every 8 hours PRN anxiety  HYDROmorphone  Injectable 0.5 milliGRAM(s) IV Push every 4 hours PRN breakthrough pain only  ondansetron Injectable 4 milliGRAM(s) IV Push every 6 hours PRN Nausea  oxyCODONE    IR 5 milliGRAM(s) Oral every 3 hours PRN Moderate Pain (4 - 6)  oxyCODONE    IR 10 milliGRAM(s) Oral every 3 hours PRN Severe Pain (7 - 10)  pantoprazole  Injectable 20 milliGRAM(s) IV Push daily PRN GERD  tiotropium 18 MICROgram(s) Capsule 1 Capsule(s) Inhalation daily PRN dyspnea      LABS:                        10.4   10.6  )-----------( 823      ( 05 Jan 2018 04:16 )             32.5     01-05    132<L>  |  94<L>  |  19.0  ----------------------------<  83  4.1   |  28.0  |  0.51    Ca    8.8      05 Jan 2018 04:16  Phos  3.5     01-05  Mg     2.0     01-05              RADIOLOGY & ADDITIONAL STUDIES:

## 2018-01-05 NOTE — DISCHARGE NOTE ADULT - HOSPITAL COURSE
77 M hx parkinsons BIB BLS s/p fall on baby asa, found down by wife for unknown amount of time. trauma activation was not called. during dinner lost consciousness, +headstrike +witnessed LOC prior to fall and continued after fall per wife. slowed responses after second fall. altered mental status in the field GCS 13 per EMTs, epistaxis which resolved without packing. Denies cp/sob/n/v/f/c.    Hospital Course: CT cervical spine on admission showed acute fracture through the right para midline region of the body of C2 as well as an acute fracture through the right lamina at C7. CT head showed right occipital lobe infarct of uncertain age, no evidence of associated hemorrhage, large right temporal parietal arachnoid cyst. Elbow XR showed supracondylar fracture of the distal humerus. Patient was admitted to the trauma service, orthopedics, neurosurgery, neurology and spine consulted. MRI c-spine showed multilevel disc bulges or protrusions with some areas of stenosis, no cord contusion, compression fracture, central stenosis or other acute traumatic injuries seen. MRI brain showed late acute to early subacute right occipital infarct, demonstrating gyriform parenchymal enhancement, right middle cranial fossa anterior temporal arachnoid cyst, chronic small vessel ischemic changes, severe bilateral mastoid inflammation/effusions, scattered chronic mucosal disease of paranasal sinuses. Ortho requested CT LUE - mildly displaced transcondylar fracture of the distal humerus. Recommended non-op mgmt of humeral fx with splint / sling and pt to remain NWB. Recommended non-op mgmt of cerivcal fx with c-collar when OOB and pt may remain WBAT. Neurology requested EEG and b/l carotid duplex - EEG showed global slowing suggests mild diffuse cerebral dysfunction, no seizure activity seen during time of the recording. Carotid duplex showed no hemodynamically significant internal carotid artery stenosis. Neurology recommended outpatient follow-up. 77 M hx parkinsons BIB BLS s/p fall on baby asa, found down by wife for unknown amount of time. trauma activation was not called. during dinner lost consciousness, +headstrike +witnessed LOC prior to fall and continued after fall per wife. slowed responses after second fall. altered mental status in the field GCS 13 per EMTs, epistaxis which resolved without packing. Denies cp/sob/n/v/f/c.    Hospital Course: CT cervical spine on admission showed acute fracture through the right para midline region of the body of C2 as well as an acute fracture through the right lamina at C7. CT head showed right occipital lobe infarct of uncertain age, no evidence of associated hemorrhage, large right temporal parietal arachnoid cyst. Elbow XR showed supracondylar fracture of the distal humerus. Patient was admitted to the trauma service, orthopedics, neurosurgery, neurology and spine consulted. MRI c-spine showed multilevel disc bulges or protrusions with some areas of stenosis, no cord contusion, compression fracture, central stenosis or other acute traumatic injuries seen. MRI brain showed late acute to early subacute right occipital infarct, demonstrating gyriform parenchymal enhancement, right middle cranial fossa anterior temporal arachnoid cyst, chronic small vessel ischemic changes, severe bilateral mastoid inflammation/effusions, scattered chronic mucosal disease of paranasal sinuses. Ortho requested CT LUE - mildly displaced transcondylar fracture of the distal humerus. Recommended non-op mgmt of humeral fx with splint / sling and pt to remain NWB. Recommended non-op mgmt of cerivcal fx with c-collar when OOB and pt may remain WBAT. Neurology requested EEG and b/l carotid duplex - EEG showed global slowing suggests mild diffuse cerebral dysfunction, no seizure activity seen during time of the recording. Carotid duplex showed no hemodynamically significant internal carotid artery stenosis. Neurology recommended outpatient follow-up. Endocrinology consulted for poorly controlled glucose, type 2 DM, and adjusted insulin dosing. Patient has worked with PT OT and PM&R throughout duration of admission. Discharge to acute rehab. Remains HD well, tolerating diet, pain controlled.    Patient is advised to RETURN TO THE EMERGENCY DEPARTMENT for any of the following - worsening pain, fever/chills, nausea/vomiting, altered mental status, chest pain, shortness of breath, or any other new / worsening symptom.

## 2018-01-05 NOTE — CONSULT NOTE ADULT - CONSULT REQUESTED DATE/TIME
02-Jan-2018 19:54
02-Jan-2018 20:08
02-Jan-2018 20:19
03-Jan-2018 13:28
03-Jan-2018 19:38
05-Jan-2018 14:42

## 2018-01-05 NOTE — CONSULT NOTE ADULT - SUBJECTIVE AND OBJECTIVE BOX
77M was admitted on 1/2/18 after LOC for unknown period of time. Field GCS=13. In ER, GCS=15.   Workup showed a Mildly displaced transcondylar fracture of the distal humerus.     MRI of the brain showed    1. Late acute to early subacute right occipital infarct, demonstrating gyriform parenchymal enhancement short term follow-up in 4-6 weeks is suggested to ensure stability.  2. right middle cranial fossa anterior temporal arachnoid cyst.  3. chronic small vessel ischemic changes.  4. Severe bilateral mastoid inflammation/effusions.  5 scattered chronic mucosal disease of paranasal sinuses.    MRI C Spine showed DJD  CT C spine showed Acute fracture through the right paramidline region of the body of C2 as well as an acute fracture through the right lamina at C7.    He is NWB to the left UE and C Collar OOB.    Cardiology recommended workup of synconpe.    REVIEW OF SYSTEMS  Constitutional - No fever, No weight loss, No fatigue  HEENT - No eye pain, No visual disturbances, No difficulty hearing, No tinnitus, No vertigo, No neck pain  Respiratory - No cough, No wheezing, No shortness of breath  Cardiovascular - No chest pain, No palpitations  Gastrointestinal - No abdominal pain, No nausea, No vomiting, No diarrhea, No constipation  Genitourinary - No dysuria, No frequency, No hematuria, No incontinence  Neurological - No headaches, No memory loss, No loss of strength, No numbness, No tremors  Skin - No itching, No rashes, No lesions   Endocrine - No temperature intolerance  Musculoskeletal - No joint pain, No joint swelling, No muscle pain  Psychiatric - No depression, No anxiety    PAST MEDICAL & SURGICAL HISTORY  Legally blind  BPH (benign prostatic hyperplasia)  Type 2 diabetes mellitus  HTN (hypertension)  Parkinson disease  S/P ORIF (open reduction internal fixation) fracture  S/P TURP      SOCIAL HISTORY  Smoking - Denied  EtOH - Denied   Drugs - Denied    FUNCTIONAL HISTORY  Lives at home with spouse, NEVAEH into home and bedroom  Independent with RW    CURRENT FUNCTIONAL STATUS  1/4  Gait Skills:     · Level of [place]	moderate assist (50% patients effort)	  · Assistive Device	PT assist from the front	  · Gait Distance	5 feet	    Gait Analysis:     · Gait Deviations Noted	decreased weight-shifting ability; decreased stride length; decreased step length	  · Impairments Contributing to Gait Deviations	impaired balance; decreased strength	    Stair Negotiation:     · Level of [place]	TBA when appropriate	    Bathing Training:     · Level of [place]	maximum assist (25% patients effort); LB and Right UE	  · Physical Assist/Nonphysical Assist	1 person assist; verbal cues	    Upper Body Dressing Training:     · Level of [place]	moderate assist (50% patients effort); 2 cervical and Left UE	  · Physical Assist/Nonphysical Assist	1 person assist; verbal cues	    Lower Body Dressing Training:     · Level of [place]	dependent (less than 25% patients effort); 2 cervical precautions	  · Physical Assist/Nonphysical Assist	1 person assist; verbal cues	    Toilet Hygiene Training:     · Level of [place]	moderate assist (50% patients effort)	  · Physical Assist/Nonphysical Assist	1 person assist; verbal cues	    Grooming Training:     · Level of [place]	minimum assist (75% patients effort)	  · Physical Assist/Nonphysical Assist	1 person assist; verbal cues	    Eating/Self-Feeding Training:     · Level of [place]	supervision	  · Physical Assist/Nonphysical Assist	set-up required; supervision	    FAMILY HISTORY   No pertinent family history in first degree relatives      RECENT LABS/IMAGING  CBC Full  -  ( 05 Jan 2018 07:08 )  WBC Count : 7.8 K/uL  Hemoglobin : 10.9 g/dL  Hematocrit : 33.2 %  Platelet Count - Automated : 253 K/uL  Mean Cell Volume : 94.9 fl  Mean Cell Hemoglobin : 31.1 pg  Mean Cell Hemoglobin Concentration : 32.8 g/dL  Auto Neutrophil # : 5.4 K/uL  Auto Lymphocyte # : 1.4 K/uL  Auto Monocyte # : 0.8 K/uL  Auto Eosinophil # : 0.2 K/uL  Auto Basophil # : 0.0 K/uL  Auto Neutrophil % : 68.5 %  Auto Lymphocyte % : 18.0 %  Auto Monocyte % : 10.7 %  Auto Eosinophil % : 2.3 %  Auto Basophil % : 0.4 %    01-05    138  |  96<L>  |  36.0<H>  ----------------------------<  107  3.9   |  29.0  |  1.29    Ca    9.3      05 Jan 2018 07:08  Phos  3.5     01-04  Mg     2.2     01-05          VITALS  T(C): 36.8 (01-05-18 @ 08:44), Max: 36.8 (01-04-18 @ 19:55)  HR: 62 (01-05-18 @ 08:44) (58 - 81)  BP: 114/68 (01-05-18 @ 08:44) (110/66 - 138/79)  RR: 18 (01-05-18 @ 08:44) (17 - 18)  SpO2: 100% (01-05-18 @ 08:44) (95% - 100%)  Wt(kg): --    ALLERGIES  No Known Allergies      MEDICATIONS   acetaminophen   Tablet. 650 milliGRAM(s) Oral every 6 hours  amLODIPine   Tablet 10 milliGRAM(s) Oral daily  aspirin  chewable 81 milliGRAM(s) Oral daily  atorvastatin 20 milliGRAM(s) Oral at bedtime  BACItracin   Ointment 1 Application(s) Topical two times a day  carbidopa/levodopa  25/100 1 Tablet(s) Oral every 8 hours  carvedilol 6.25 milliGRAM(s) Oral every 12 hours  dextrose 5%. 1000 milliLiter(s) IV Continuous <Continuous>  dextrose 50% Injectable 12.5 Gram(s) IV Push once  dextrose 50% Injectable 25 Gram(s) IV Push once  dextrose 50% Injectable 25 Gram(s) IV Push once  dextrose Gel 1 Dose(s) Oral once PRN  escitalopram 10 milliGRAM(s) Oral daily  finasteride 5 milliGRAM(s) Oral daily  glucagon  Injectable 1 milliGRAM(s) IntraMuscular once PRN  heparin  Injectable 5000 Unit(s) SubCutaneous every 8 hours  HYDROmorphone  Injectable 0.5 milliGRAM(s) IV Push every 4 hours PRN  insulin glargine Injectable (LANTUS) 24 Unit(s) SubCutaneous at bedtime  insulin regular  human corrective regimen sliding scale   SubCutaneous three times a day before meals  lisinopril 5 milliGRAM(s) Oral daily  metolazone 5 milliGRAM(s) Oral daily  tamsulosin 0.8 milliGRAM(s) Oral at bedtime  timolol 0.5% Solution 1 Drop(s) Both EYES every 6 hours  torsemide 20 milliGRAM(s) Oral daily 77M was admitted on 1/2/18 after LOC for unknown period of time. Field GCS=13. In ER, GCS=15. Workup showed a Mildly displaced transcondylar fracture of the distal humerus.     MRI of the brain showed    1. Late acute to early subacute right occipital infarct, demonstrating gyriform parenchymal enhancement short term follow-up in 4-6 weeks is suggested to ensure stability.  2. right middle cranial fossa anterior temporal arachnoid cyst.  3. chronic small vessel ischemic changes.  4. Severe bilateral mastoid inflammation/effusions.  5 scattered chronic mucosal disease of paranasal sinuses.    MRI C Spine showed DJD   CT C spine showed Acute fracture through the right paramidline region of the body of C2 as well as an acute fracture through the right lamina at C7.    He is NWB to the left UE and C Collar OOB.     Cardiology recommended workup of syncope.    Patient in bed, feeding himself, after set up. Complains of pain in the left arm and the right knee - which resolved with repositioning.     REVIEW OF SYSTEMS  Constitutional - No fever, No weight loss, +fatigue  HEENT - No eye pain, +visual disturbances, No difficulty hearing, No tinnitus, No vertigo, No neck pain  Respiratory - No cough, No wheezing, No shortness of breath  Cardiovascular - No chest pain, No palpitations  Gastrointestinal - No abdominal pain, No nausea, No vomiting, No diarrhea, No constipation  Genitourinary - No dysuria, No frequency, No hematuria, No incontinence  Neurological - No headaches, No memory loss, +loss of strength, No numbness, +tremors  Skin - No itching, No rashes, No lesions   Endocrine - No temperature intolerance  Musculoskeletal - +joint pain, +joint swelling, +muscle pain  Psychiatric - No depression, No anxiety    PAST MEDICAL & SURGICAL HISTORY  Legally blind  BPH (benign prostatic hyperplasia)  Type 2 diabetes mellitus  HTN (hypertension)  Parkinson disease  S/P ORIF (open reduction internal fixation) fracture  S/P TURP    SOCIAL HISTORY  Smoking - Denied  EtOH - Denied   Drugs - Denied    FUNCTIONAL HISTORY  Lives at home with spouse, NEVAEH into home and bedroom  Independent with RW    CURRENT FUNCTIONAL STATUS  1/4  Gait Skills:     · Level of [place]	moderate assist (50% patients effort)	  · Assistive Device	PT assist from the front	  · Gait Distance	5 feet	    Gait Analysis:     · Gait Deviations Noted	decreased weight-shifting ability; decreased stride length; decreased step length	  · Impairments Contributing to Gait Deviations	impaired balance; decreased strength	    Stair Negotiation:     · Level of [place]	TBA when appropriate	    Bathing Training:     · Level of [place]	maximum assist (25% patients effort); LB and Right UE	  · Physical Assist/Nonphysical Assist	1 person assist; verbal cues	    Upper Body Dressing Training:     · Level of [place]	moderate assist (50% patients effort); 2 cervical and Left UE	  · Physical Assist/Nonphysical Assist	1 person assist; verbal cues	    Lower Body Dressing Training:     · Level of [place]	dependent (less than 25% patients effort); 2 cervical precautions	  · Physical Assist/Nonphysical Assist	1 person assist; verbal cues	    Toilet Hygiene Training:     · Level of [place]	moderate assist (50% patients effort)	  · Physical Assist/Nonphysical Assist	1 person assist; verbal cues	    Grooming Training:     · Level of [place]	minimum assist (75% patients effort)	  · Physical Assist/Nonphysical Assist	1 person assist; verbal cues	    Eating/Self-Feeding Training:     · Level of [place]	supervision	  · Physical Assist/Nonphysical Assist	set-up required; supervision	    FAMILY HISTORY   No pertinent family history in first degree relatives      RECENT LABS/IMAGING  CBC Full  -  ( 05 Jan 2018 07:08 )  WBC Count : 7.8 K/uL  Hemoglobin : 10.9 g/dL  Hematocrit : 33.2 %  Platelet Count - Automated : 253 K/uL  Mean Cell Volume : 94.9 fl  Mean Cell Hemoglobin : 31.1 pg  Mean Cell Hemoglobin Concentration : 32.8 g/dL  Auto Neutrophil # : 5.4 K/uL  Auto Lymphocyte # : 1.4 K/uL  Auto Monocyte # : 0.8 K/uL  Auto Eosinophil # : 0.2 K/uL  Auto Basophil # : 0.0 K/uL  Auto Neutrophil % : 68.5 %  Auto Lymphocyte % : 18.0 %  Auto Monocyte % : 10.7 %  Auto Eosinophil % : 2.3 %  Auto Basophil % : 0.4 %    01-05    138  |  96<L>  |  36.0<H>  ----------------------------<  107  3.9   |  29.0  |  1.29    Ca    9.3      05 Jan 2018 07:08  Phos  3.5     01-04  Mg     2.2     01-05          VITALS  T(C): 36.8 (01-05-18 @ 08:44), Max: 36.8 (01-04-18 @ 19:55)  HR: 62 (01-05-18 @ 08:44) (58 - 81)  BP: 114/68 (01-05-18 @ 08:44) (110/66 - 138/79)  RR: 18 (01-05-18 @ 08:44) (17 - 18)  SpO2: 100% (01-05-18 @ 08:44) (95% - 100%)  Wt(kg): --    ALLERGIES  No Known Allergies      MEDICATIONS   acetaminophen   Tablet. 650 milliGRAM(s) Oral every 6 hours  amLODIPine   Tablet 10 milliGRAM(s) Oral daily  aspirin  chewable 81 milliGRAM(s) Oral daily  atorvastatin 20 milliGRAM(s) Oral at bedtime  BACItracin   Ointment 1 Application(s) Topical two times a day  carbidopa/levodopa  25/100 1 Tablet(s) Oral every 8 hours  carvedilol 6.25 milliGRAM(s) Oral every 12 hours  dextrose 5%. 1000 milliLiter(s) IV Continuous <Continuous>  dextrose 50% Injectable 12.5 Gram(s) IV Push once  dextrose 50% Injectable 25 Gram(s) IV Push once  dextrose 50% Injectable 25 Gram(s) IV Push once  dextrose Gel 1 Dose(s) Oral once PRN  escitalopram 10 milliGRAM(s) Oral daily  finasteride 5 milliGRAM(s) Oral daily  glucagon  Injectable 1 milliGRAM(s) IntraMuscular once PRN  heparin  Injectable 5000 Unit(s) SubCutaneous every 8 hours  HYDROmorphone  Injectable 0.5 milliGRAM(s) IV Push every 4 hours PRN  insulin glargine Injectable (LANTUS) 24 Unit(s) SubCutaneous at bedtime  insulin regular  human corrective regimen sliding scale   SubCutaneous three times a day before meals  lisinopril 5 milliGRAM(s) Oral daily  metolazone 5 milliGRAM(s) Oral daily  tamsulosin 0.8 milliGRAM(s) Oral at bedtime  timolol 0.5% Solution 1 Drop(s) Both EYES every 6 hours  torsemide 20 milliGRAM(s) Oral daily    ----------------------------------------------------------------------------------------  PHYSICAL EXAM  Constitutional - NAD, Comfortable  HEENT - Left cranial abraision - healing, dry  Neck - Supple, + limited ROM  Chest - Breathing comfortably  Cardiovascular - S1S2   Abdomen - Soft, NTND  Extremities - Left hand edema, Left UE in sling/ACE wrapped  Neurologic Exam -                    Cognitive - Awake, Alert, AAO to self, place, date, year, situation     Communication - Fluent, Mild dysarthria     Motor -                      LEFT    UE - ShAB 1/5, WE 1/5,  2/5                    RIGHT UE - ShAB 2/5, EF 4/5, EE 4/5,  5/5                    LEFT    LE - HF 1/5, KE 3/5, DF 4/5, PF 4/5                    RIGHT LE - HF 1/5, KE 3/5, DF 4/5, PF 4/5        Sensory - Intact to LT  Psychiatric - Mood stable, Affect Flat  ----------------------------------------------------------------------------------------  ASSESSMENT/PLAN  77yMale with functional deficits after a fall sustaining a left humeral fracture now is NWB.   Pain - Tylenol, Dilaudid IV (will need to be DC'ed at discharge)  DVT PPX - Heparin  Rehab - Recommend ACUTE inpatient rehabilitation for the functional deficits consisting of 3 hours of therapy/day & 24 hour RN/daily PMR physician for comorbid medical management. Will continue to follow for ongoing rehab needs and recommendations.

## 2018-01-05 NOTE — DISCHARGE NOTE ADULT - CARE PROVIDERS DIRECT ADDRESSES
,[email or web address],[email or web address],DirectAddress_Unknown

## 2018-01-05 NOTE — DISCHARGE NOTE ADULT - MEDICATION SUMMARY - MEDICATIONS TO STOP TAKING
I will STOP taking the medications listed below when I get home from the hospital:    furosemide    potassium chloride

## 2018-01-05 NOTE — DISCHARGE NOTE ADULT - CARE PROVIDER_API CALL
Fer Sung (MD), Orthopaedic Surgery  [address] [place] [address]  Phone: [phone]  Fax: [phone]    Wilian Britton (), Orthopaedic Surgery  [address] [place] [address]  Phone: [phone]  Fax: [phone] Fer Sung (MD), Orthopaedic Surgery  [address] [place] [address]  Phone: [phone]  Fax: [phone]    Wilian Britton (DO), Orthopaedic Surgery  [address] [place] [address]  Phone: [phone]  Fax: [phone]    Valentin Vila (MD; PhD), Neurology; Vascular Neurology  [address] [place] [address]  Phone: [phone]  Fax: [phone]    Fer Blanchard (MD), EndocrinologyMetabDiabetes; Internal Medicine  [address] [place] [address]  Phone: [phone]  Fax: [phone] Fer Sung (MD), Orthopaedic Surgery  [address] [place] [address]  Phone: [phone]  Fax: [phone]    Wilian Britton (DO), Orthopaedic Surgery  [address] [place] [address]  Phone: [phone]  Fax: [phone]    Valentin Vila (MD; PhD), Neurology; Vascular Neurology  [address] [place] [address]  Phone: [phone]  Fax: [phone]    Fer Blanchard (MD), EndocrinologyMetabDiabetes; Internal Medicine  [address] [place] [address]  Phone: [phone]  Fax: [phone]

## 2018-01-05 NOTE — DISCHARGE NOTE ADULT - SECONDARY DIAGNOSIS.
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

## 2018-01-05 NOTE — DISCHARGE NOTE ADULT - CARE PLAN
Principal Discharge DX:	Other closed nondisplaced fracture of second cervical vertebra, initial encounter  Goal:	Improved function and pain control  Instructions for follow-up, activity and diet:	* follow-up with Dr Sung for humerus fracture in 1-2 weeks.  * continue use of sling and splint  * NWB of the LEFT UE  * Follow-up with Dr Britton in 1-2 weeks for cervical fractures  * Collar when out of bed - may be removed for washing, eating and when in bed    *Pain control as needed  Secondary Diagnosis:	Closed fracture of distal end of left humerus, unspecified fracture morphology, initial encounter Principal Discharge DX:	Other closed nondisplaced fracture of second cervical vertebra, initial encounter  Goal:	Improved function and pain control  Instructions for follow-up, activity and diet:	Follow up: Please call and make an appointment with DR. PEARSON 1-2 weeks after discharge. Also, please call and make an appointment with your primary care physician as per your usual schedule.   Activity: Please remain NON-WEIGHT BEARING of left upper extremity with splint and sling in place  Diet: May continue regular diet.  Medications: Please take all home medications as prescribed by your primary care doctor. Pain medication has been prescribed for you. Please, take it as it has been prescribed, do not drive or operate heavy machinery while taking narcotics.  You are encouraged to take over-the-counter tylenol and/or ibuprofen for pain relief when you feel your pain no longer warrants the use of narcotic pain medications.  Wound Care: Please, keep wound site clean and dry. You may shower, but do not bathe and keep splint dry when showering.  Patient is advised to RETURN TO THE EMERGENCY DEPARTMENT for any of the following - worsening pain, fever/chills, nausea/vomiting, altered mental status, chest pain, shortness of breath, or any other new / worsening symptom.  Secondary Diagnosis:	Closed fracture of distal end of left humerus, unspecified fracture morphology, initial encounter  Instructions for follow-up, activity and diet:	Follow-up: Please call and make an appointment with DR. OWENS 1-2 weeks after discharge  Activity: Cervical collar must be worn when patient is out of bed - it may be removed for washing, eating, and when patient is in bed. Principal Discharge DX:	Other closed nondisplaced fracture of second cervical vertebra, initial encounter  Goal:	Improved function and pain control  Instructions for follow-up, activity and diet:	Follow up: Please call and make an appointment with DR. PEARSON 1-2 weeks after discharge. Also, please call and make an appointment with your primary care physician as per your usual schedule.   Activity: Please remain NON-WEIGHT BEARING of left upper extremity with splint and sling in place  Diet: May continue regular diet.  Medications: Please take all home medications as prescribed by your primary care doctor. Pain medication has been prescribed for you. Please, take it as it has been prescribed, do not drive or operate heavy machinery while taking narcotics.  You are encouraged to take over-the-counter tylenol and/or ibuprofen for pain relief when you feel your pain no longer warrants the use of narcotic pain medications.  Wound Care: Please, keep wound site clean and dry. You may shower, but do not bathe and keep splint dry when showering.  Patient is advised to RETURN TO THE EMERGENCY DEPARTMENT for any of the following - worsening pain, fever/chills, nausea/vomiting, altered mental status, chest pain, shortness of breath, or any other new / worsening symptom.  Secondary Diagnosis:	Closed fracture of distal end of left humerus, unspecified fracture morphology, initial encounter  Instructions for follow-up, activity and diet:	Follow-up: Please call and make an appointment with DR. OWENS 1-2 weeks after discharge  Activity: Cervical collar must be worn when patient is out of bed - it may be removed for washing, eating, and when patient is in bed.  Secondary Diagnosis:	Arachnoid cyst  Secondary Diagnosis:	Cerebrovascular accident (CVA) due to embolism of right posterior cerebral artery  Instructions for follow-up, activity and diet:	Please continue medications as listed above and call to make a follow-up appointment with DR. PACHECO (neurology) after discharge.  Secondary Diagnosis:	Type 2 diabetes mellitus  Instructions for follow-up, activity and diet:	Please continue insulin doses as listed above, frequent blood glucose monitoring, and call to make a follow-up appointment with DR. GUZMAN (endocrinology) after discharge. Principal Discharge DX:	Other closed nondisplaced fracture of second cervical vertebra, initial encounter  Goal:	Improved function and pain control  Instructions for follow-up, activity and diet:	Follow up: Please call and make an appointment with DR. PEARSON 1-2 weeks after discharge. Also, please call and make an appointment with your primary care physician as per your usual schedule.   Activity: Please remain NON-WEIGHT BEARING of left upper extremity with splint and sling in place  Diet: May continue regular diet.  Medications: Please take all home medications as prescribed by your primary care doctor. Pain medication has been prescribed for you. Please, take it as it has been prescribed, do not drive or operate heavy machinery while taking narcotics.  You are encouraged to take over-the-counter tylenol and/or ibuprofen for pain relief when you feel your pain no longer warrants the use of narcotic pain medications.  Wound Care: Please, keep wound site clean and dry. You may shower, but do not bathe and keep splint dry when showering.  Patient is advised to RETURN TO THE EMERGENCY DEPARTMENT for any of the following - worsening pain, fever/chills, nausea/vomiting, altered mental status, chest pain, shortness of breath, or any other new / worsening symptom.  Secondary Diagnosis:	Closed fracture of distal end of left humerus, unspecified fracture morphology, initial encounter  Instructions for follow-up, activity and diet:	Follow-up: Please call and make an appointment with DR. OWENS 1-2 weeks after discharge  Activity: Cervical collar must be worn when patient is out of bed - it may be removed for washing, eating, and when patient is in bed.  Secondary Diagnosis:	Arachnoid cyst  Instructions for follow-up, activity and diet:	Please call and make an appointment with neurosurgery for further surveillance and management - Dr. Trinidad Fry, [phone], [address] [facility] [address], [place] [address].  Secondary Diagnosis:	Cerebrovascular accident (CVA) due to embolism of right posterior cerebral artery  Instructions for follow-up, activity and diet:	Please continue medications as listed above and call to make a follow-up appointment with DR. PACHECO (neurology) after discharge.  Secondary Diagnosis:	Type 2 diabetes mellitus  Instructions for follow-up, activity and diet:	Please continue insulin doses as listed above, frequent blood glucose monitoring, and call to make a follow-up appointment with DR. GUZMAN (endocrinology) after discharge. Principal Discharge DX:	Other closed nondisplaced fracture of second cervical vertebra, initial encounter  Goal:	Improved function and pain control  Instructions for follow-up, activity and diet:	Follow up: Please call and make an appointment with DR. PEARSON 1-2 weeks after discharge. Also, please call and make an appointment with your primary care physician as per your usual schedule.   Activity: Please remain NON-WEIGHT BEARING of left upper extremity with splint and sling in place  Diet: May continue regular diet.  Medications: Please take all home medications as prescribed by your primary care doctor. Pain medication has been prescribed for you. Please, take it as it has been prescribed, do not drive or operate heavy machinery while taking narcotics.  You are encouraged to take over-the-counter tylenol and/or ibuprofen for pain relief when you feel your pain no longer warrants the use of narcotic pain medications.  Wound Care: Please, keep wound site clean and dry. You may shower, but do not bathe and keep splint dry when showering.  Patient is advised to RETURN TO THE EMERGENCY DEPARTMENT for any of the following - worsening pain, fever/chills, nausea/vomiting, altered mental status, chest pain, shortness of breath, or any other new / worsening symptom.  Secondary Diagnosis:	Closed fracture of distal end of left humerus, unspecified fracture morphology, initial encounter  Instructions for follow-up, activity and diet:	Follow-up: Please call and make an appointment with DR. OWENS 1-2 weeks after discharge  Activity: Cervical collar must be worn when patient is out of bed - it may be removed for washing, eating, and when patient is in bed.  Secondary Diagnosis:	Arachnoid cyst  Instructions for follow-up, activity and diet:	Please call and make an appointment with neurosurgery for further surveillance and management - Dr. Trinidad Fry, [phone], [address] [facility] [address], [place] [address].  Secondary Diagnosis:	Cerebrovascular accident (CVA) due to embolism of right posterior cerebral artery  Instructions for follow-up, activity and diet:	Please continue medications as listed above and call to make a follow-up appointment with DR. PACHECO (neurology) after discharge.  Secondary Diagnosis:	Type 2 diabetes mellitus  Instructions for follow-up, activity and diet:	Please continue insulin doses as listed above, frequent blood glucose monitoring, and call to make a follow-up appointment with DR. GUZMAN (endocrinology) after discharge.  Secondary Diagnosis:	Urinary retention  Instructions for follow-up, activity and diet:	After multiple episodes of urinary retention during your hospital stay and initiation of medications to assist with this problem, a DODGE CATHETER remains in place. Dodge may be removed and repeat trails of void performed at the discretion of your providers at rehab. Principal Discharge DX:	Other closed nondisplaced fracture of second cervical vertebra, initial encounter  Goal:	Improved function and pain control  Instructions for follow-up, activity and diet:	Follow up: Please call and make an appointment with DR. PEARSON 1-2 weeks after discharge. Also, please call and make an appointment with your primary care physician as per your usual schedule.   Activity: Please remain NON-WEIGHT BEARING of left upper extremity with splint and sling in place  Diet: May continue regular diet.  Medications: Please take all home medications as prescribed by your primary care doctor. Pain medication has been prescribed for you. Please, take it as it has been prescribed, do not drive or operate heavy machinery while taking narcotics.  You are encouraged to take over-the-counter tylenol and/or ibuprofen for pain relief when you feel your pain no longer warrants the use of narcotic pain medications.  Wound Care: Please, keep wound site clean and dry. You may shower, but do not bathe and keep splint dry when showering.  Patient is advised to RETURN TO THE EMERGENCY DEPARTMENT for any of the following - worsening pain, fever/chills, nausea/vomiting, altered mental status, chest pain, shortness of breath, or any other new / worsening symptom.  Secondary Diagnosis:	Closed fracture of distal end of left humerus, unspecified fracture morphology, initial encounter  Instructions for follow-up, activity and diet:	Follow-up: Please call and make an appointment with DR. OWENS 1-2 weeks after discharge  Activity: Cervical collar must be worn when patient is out of bed - it may be removed for washing, eating, and when patient is in bed.  Secondary Diagnosis:	Arachnoid cyst  Instructions for follow-up, activity and diet:	Please call and make an appointment with neurosurgery for further surveillance and management - Dr. Trinidad Fry, [phone], [address] [facility] [address], [place] [address].  Secondary Diagnosis:	Cerebrovascular accident (CVA) due to embolism of right posterior cerebral artery  Instructions for follow-up, activity and diet:	Please continue medications as listed above and call to make a follow-up appointment with DR. PACHECO (neurology) after discharge.  Secondary Diagnosis:	Type 2 diabetes mellitus  Instructions for follow-up, activity and diet:	Please continue insulin doses as listed above, frequent blood glucose monitoring, and call to make a follow-up appointment with DR. GUZMAN (endocrinology) after discharge.  Secondary Diagnosis:	Urinary retention  Instructions for follow-up, activity and diet:	After multiple episodes of urinary retention during your hospital stay and initiation of medications to assist with this problem, a DODGE CATHETER remains in place. Dodge may be removed and repeat trails of void performed at the discretion of your providers at rehab.  Recommend urology follow up.

## 2018-01-05 NOTE — CONSULT NOTE ADULT - ASSESSMENT
77 year old male with Type 2 DM, HTN, BPH, Parkinsons admitted after syncopal event, found to have CVA, humerus fracture and C2/ C7 fracture.  His diabetes is uncontrolled as an outpatient based on his A1c of 10%.      Plan:  1.  Type 2 DM-  continue current dose of Lantus.  Will add standing 2 units of humalog with meals and switch sliding scale to humalog.  Patient is already on insulin at home.  2. HTN-  controlled on Lisinopril and amlodipine.

## 2018-01-06 LAB
ANION GAP SERPL CALC-SCNC: 14 MMOL/L — SIGNIFICANT CHANGE UP (ref 5–17)
BASOPHILS # BLD AUTO: 0 K/UL — SIGNIFICANT CHANGE UP (ref 0–0.2)
BASOPHILS NFR BLD AUTO: 0.5 % — SIGNIFICANT CHANGE UP (ref 0–2)
BUN SERPL-MCNC: 37 MG/DL — HIGH (ref 8–20)
CALCIUM SERPL-MCNC: 8.8 MG/DL — SIGNIFICANT CHANGE UP (ref 8.6–10.2)
CHLORIDE SERPL-SCNC: 95 MMOL/L — LOW (ref 98–107)
CO2 SERPL-SCNC: 30 MMOL/L — HIGH (ref 22–29)
CREAT SERPL-MCNC: 1.31 MG/DL — HIGH (ref 0.5–1.3)
EOSINOPHIL # BLD AUTO: 0.3 K/UL — SIGNIFICANT CHANGE UP (ref 0–0.5)
EOSINOPHIL NFR BLD AUTO: 4.4 % — SIGNIFICANT CHANGE UP (ref 0–5)
GLUCOSE BLDC GLUCOMTR-MCNC: 109 MG/DL — HIGH (ref 70–99)
GLUCOSE BLDC GLUCOMTR-MCNC: 126 MG/DL — HIGH (ref 70–99)
GLUCOSE BLDC GLUCOMTR-MCNC: 136 MG/DL — HIGH (ref 70–99)
GLUCOSE BLDC GLUCOMTR-MCNC: 183 MG/DL — HIGH (ref 70–99)
GLUCOSE BLDC GLUCOMTR-MCNC: 190 MG/DL — HIGH (ref 70–99)
GLUCOSE BLDC GLUCOMTR-MCNC: 57 MG/DL — LOW (ref 70–99)
GLUCOSE BLDC GLUCOMTR-MCNC: 64 MG/DL — LOW (ref 70–99)
GLUCOSE SERPL-MCNC: 141 MG/DL — HIGH (ref 70–115)
HCT VFR BLD CALC: 31.2 % — LOW (ref 42–52)
HGB BLD-MCNC: 10.3 G/DL — LOW (ref 14–18)
LYMPHOCYTES # BLD AUTO: 1.5 K/UL — SIGNIFICANT CHANGE UP (ref 1–4.8)
LYMPHOCYTES # BLD AUTO: 24.6 % — SIGNIFICANT CHANGE UP (ref 20–55)
MAGNESIUM SERPL-MCNC: 2.1 MG/DL — SIGNIFICANT CHANGE UP (ref 1.6–2.6)
MCHC RBC-ENTMCNC: 30.9 PG — SIGNIFICANT CHANGE UP (ref 27–31)
MCHC RBC-ENTMCNC: 33 G/DL — SIGNIFICANT CHANGE UP (ref 32–36)
MCV RBC AUTO: 93.7 FL — SIGNIFICANT CHANGE UP (ref 80–94)
MONOCYTES # BLD AUTO: 0.7 K/UL — SIGNIFICANT CHANGE UP (ref 0–0.8)
MONOCYTES NFR BLD AUTO: 11 % — HIGH (ref 3–10)
NEUTROPHILS # BLD AUTO: 3.6 K/UL — SIGNIFICANT CHANGE UP (ref 1.8–8)
NEUTROPHILS NFR BLD AUTO: 59.3 % — SIGNIFICANT CHANGE UP (ref 37–73)
PHOSPHATE SERPL-MCNC: 4.8 MG/DL — HIGH (ref 2.4–4.7)
PLATELET # BLD AUTO: 220 K/UL — SIGNIFICANT CHANGE UP (ref 150–400)
POTASSIUM SERPL-MCNC: 3.5 MMOL/L — SIGNIFICANT CHANGE UP (ref 3.5–5.3)
POTASSIUM SERPL-SCNC: 3.5 MMOL/L — SIGNIFICANT CHANGE UP (ref 3.5–5.3)
RBC # BLD: 3.33 M/UL — LOW (ref 4.6–6.2)
RBC # FLD: 13.5 % — SIGNIFICANT CHANGE UP (ref 11–15.6)
SODIUM SERPL-SCNC: 139 MMOL/L — SIGNIFICANT CHANGE UP (ref 135–145)
WBC # BLD: 6.1 K/UL — SIGNIFICANT CHANGE UP (ref 4.8–10.8)
WBC # FLD AUTO: 6.1 K/UL — SIGNIFICANT CHANGE UP (ref 4.8–10.8)

## 2018-01-06 RX ORDER — INSULIN GLARGINE 100 [IU]/ML
18 INJECTION, SOLUTION SUBCUTANEOUS AT BEDTIME
Qty: 0 | Refills: 0 | Status: DISCONTINUED | OUTPATIENT
Start: 2018-01-06 | End: 2018-01-11

## 2018-01-06 RX ORDER — OXYCODONE AND ACETAMINOPHEN 5; 325 MG/1; MG/1
1 TABLET ORAL EVERY 6 HOURS
Qty: 0 | Refills: 0 | Status: DISCONTINUED | OUTPATIENT
Start: 2018-01-06 | End: 2018-01-09

## 2018-01-06 RX ORDER — DEXTROSE 50 % IN WATER 50 %
12.5 SYRINGE (ML) INTRAVENOUS ONCE
Qty: 0 | Refills: 0 | Status: COMPLETED | OUTPATIENT
Start: 2018-01-06 | End: 2018-01-06

## 2018-01-06 RX ORDER — OXYCODONE AND ACETAMINOPHEN 5; 325 MG/1; MG/1
2 TABLET ORAL EVERY 6 HOURS
Qty: 0 | Refills: 0 | Status: DISCONTINUED | OUTPATIENT
Start: 2018-01-06 | End: 2018-01-09

## 2018-01-06 RX ORDER — SODIUM CHLORIDE 9 MG/ML
1000 INJECTION INTRAMUSCULAR; INTRAVENOUS; SUBCUTANEOUS
Qty: 0 | Refills: 0 | Status: DISCONTINUED | OUTPATIENT
Start: 2018-01-06 | End: 2018-01-08

## 2018-01-06 RX ORDER — DEXTROSE 50 % IN WATER 50 %
1 SYRINGE (ML) INTRAVENOUS ONCE
Qty: 0 | Refills: 0 | Status: COMPLETED | OUTPATIENT
Start: 2018-01-06 | End: 2018-01-06

## 2018-01-06 RX ORDER — DEXTROSE 50 % IN WATER 50 %
1 SYRINGE (ML) INTRAVENOUS ONCE
Qty: 0 | Refills: 0 | Status: DISCONTINUED | OUTPATIENT
Start: 2018-01-06 | End: 2018-01-11

## 2018-01-06 RX ADMIN — CARBIDOPA AND LEVODOPA 1 TABLET(S): 25; 100 TABLET ORAL at 11:26

## 2018-01-06 RX ADMIN — Medication 650 MILLIGRAM(S): at 05:38

## 2018-01-06 RX ADMIN — Medication 1 APPLICATION(S): at 05:46

## 2018-01-06 RX ADMIN — CARVEDILOL PHOSPHATE 6.25 MILLIGRAM(S): 80 CAPSULE, EXTENDED RELEASE ORAL at 05:38

## 2018-01-06 RX ADMIN — Medication 650 MILLIGRAM(S): at 17:50

## 2018-01-06 RX ADMIN — OXYCODONE AND ACETAMINOPHEN 2 TABLET(S): 5; 325 TABLET ORAL at 23:00

## 2018-01-06 RX ADMIN — CARBIDOPA AND LEVODOPA 1 TABLET(S): 25; 100 TABLET ORAL at 05:46

## 2018-01-06 RX ADMIN — Medication 650 MILLIGRAM(S): at 06:15

## 2018-01-06 RX ADMIN — Medication 1 DROP(S): at 17:49

## 2018-01-06 RX ADMIN — Medication 81 MILLIGRAM(S): at 11:26

## 2018-01-06 RX ADMIN — TAMSULOSIN HYDROCHLORIDE 0.8 MILLIGRAM(S): 0.4 CAPSULE ORAL at 21:31

## 2018-01-06 RX ADMIN — SODIUM CHLORIDE 120 MILLILITER(S): 9 INJECTION INTRAMUSCULAR; INTRAVENOUS; SUBCUTANEOUS at 15:53

## 2018-01-06 RX ADMIN — FINASTERIDE 5 MILLIGRAM(S): 5 TABLET, FILM COATED ORAL at 11:26

## 2018-01-06 RX ADMIN — INSULIN GLARGINE 18 UNIT(S): 100 INJECTION, SOLUTION SUBCUTANEOUS at 22:42

## 2018-01-06 RX ADMIN — LISINOPRIL 5 MILLIGRAM(S): 2.5 TABLET ORAL at 05:47

## 2018-01-06 RX ADMIN — SODIUM CHLORIDE 120 MILLILITER(S): 9 INJECTION INTRAMUSCULAR; INTRAVENOUS; SUBCUTANEOUS at 23:51

## 2018-01-06 RX ADMIN — Medication 650 MILLIGRAM(S): at 11:26

## 2018-01-06 RX ADMIN — OXYCODONE AND ACETAMINOPHEN 2 TABLET(S): 5; 325 TABLET ORAL at 07:20

## 2018-01-06 RX ADMIN — OXYCODONE AND ACETAMINOPHEN 2 TABLET(S): 5; 325 TABLET ORAL at 06:24

## 2018-01-06 RX ADMIN — OXYCODONE AND ACETAMINOPHEN 2 TABLET(S): 5; 325 TABLET ORAL at 13:45

## 2018-01-06 RX ADMIN — Medication 20 MILLIGRAM(S): at 05:39

## 2018-01-06 RX ADMIN — ATORVASTATIN CALCIUM 20 MILLIGRAM(S): 80 TABLET, FILM COATED ORAL at 21:31

## 2018-01-06 RX ADMIN — Medication 2 UNIT(S): at 09:49

## 2018-01-06 RX ADMIN — Medication 650 MILLIGRAM(S): at 23:50

## 2018-01-06 RX ADMIN — HEPARIN SODIUM 5000 UNIT(S): 5000 INJECTION INTRAVENOUS; SUBCUTANEOUS at 05:46

## 2018-01-06 RX ADMIN — Medication 1 DROP(S): at 11:27

## 2018-01-06 RX ADMIN — OXYCODONE AND ACETAMINOPHEN 2 TABLET(S): 5; 325 TABLET ORAL at 21:32

## 2018-01-06 RX ADMIN — AMLODIPINE BESYLATE 10 MILLIGRAM(S): 2.5 TABLET ORAL at 05:39

## 2018-01-06 RX ADMIN — HEPARIN SODIUM 5000 UNIT(S): 5000 INJECTION INTRAVENOUS; SUBCUTANEOUS at 13:11

## 2018-01-06 RX ADMIN — OXYCODONE AND ACETAMINOPHEN 2 TABLET(S): 5; 325 TABLET ORAL at 13:11

## 2018-01-06 RX ADMIN — Medication 2: at 11:26

## 2018-01-06 RX ADMIN — Medication 1 APPLICATION(S): at 17:50

## 2018-01-06 RX ADMIN — Medication 2 UNIT(S): at 17:49

## 2018-01-06 RX ADMIN — ESCITALOPRAM OXALATE 10 MILLIGRAM(S): 10 TABLET, FILM COATED ORAL at 11:26

## 2018-01-06 RX ADMIN — Medication 12.5 GRAM(S): at 07:04

## 2018-01-06 RX ADMIN — CARBIDOPA AND LEVODOPA 1 TABLET(S): 25; 100 TABLET ORAL at 21:31

## 2018-01-06 RX ADMIN — HEPARIN SODIUM 5000 UNIT(S): 5000 INJECTION INTRAVENOUS; SUBCUTANEOUS at 21:31

## 2018-01-06 RX ADMIN — Medication 1 DROP(S): at 05:47

## 2018-01-06 RX ADMIN — Medication 2 UNIT(S): at 11:27

## 2018-01-06 RX ADMIN — Medication 650 MILLIGRAM(S): at 11:28

## 2018-01-06 RX ADMIN — Medication 1 DOSE(S): at 06:43

## 2018-01-06 NOTE — PROGRESS NOTE ADULT - ASSESSMENT
77 year old male with Type 2 DM, HTN, BPH, Parkinsons admitted after syncopal event, found to have CVA, humerus fracture and C2/ C7 fracture.  He developed mild hypoglycemia this AM due to excess basal insulin.      Plan:  1.  Type 2 DM-  Reduce Lantus to 18 units qhs.  Continue premeal humalog.    2. HTN-  controlled on Lisinoprilm, amlodipine and coreg.

## 2018-01-06 NOTE — PROGRESS NOTE ADULT - SUBJECTIVE AND OBJECTIVE BOX
Interval HPI/ Overnight Events:  Patient seen for follow up of type 2 DM.  Complains of pain in his neck today.  Was OOB to chair earlier today.    MEDICATIONS  (STANDING):  acetaminophen   Tablet. 650 milliGRAM(s) Oral every 6 hours  amLODIPine   Tablet 10 milliGRAM(s) Oral daily  aspirin  chewable 81 milliGRAM(s) Oral daily  atorvastatin 20 milliGRAM(s) Oral at bedtime  BACItracin   Ointment 1 Application(s) Topical two times a day  carbidopa/levodopa  25/100 1 Tablet(s) Oral every 8 hours  carvedilol 6.25 milliGRAM(s) Oral every 12 hours  dextrose 5%. 1000 milliLiter(s) (50 mL/Hr) IV Continuous <Continuous>  dextrose 50% Injectable 12.5 Gram(s) IV Push once  dextrose 50% Injectable 25 Gram(s) IV Push once  dextrose 50% Injectable 25 Gram(s) IV Push once  escitalopram 10 milliGRAM(s) Oral daily  finasteride 5 milliGRAM(s) Oral daily  heparin  Injectable 5000 Unit(s) SubCutaneous every 8 hours  insulin glargine Injectable (LANTUS) 24 Unit(s) SubCutaneous at bedtime  insulin lispro (HumaLOG) corrective regimen sliding scale   SubCutaneous three times a day before meals  insulin lispro (HumaLOG) corrective regimen sliding scale   SubCutaneous at bedtime  insulin lispro Injectable (HumaLOG) 2 Unit(s) SubCutaneous three times a day with meals  lisinopril 5 milliGRAM(s) Oral daily  metolazone 5 milliGRAM(s) Oral daily  sodium chloride 0.9%. 1000 milliLiter(s) (120 mL/Hr) IV Continuous <Continuous>  tamsulosin 0.8 milliGRAM(s) Oral at bedtime  timolol 0.5% Solution 1 Drop(s) Both EYES every 6 hours  torsemide 20 milliGRAM(s) Oral daily    Vital Signs Last 24 Hrs  T(C): 36.6 (06 Jan 2018 17:21), Max: 36.9 (05 Jan 2018 19:29)  T(F): 97.8 (06 Jan 2018 17:21), Max: 98.4 (05 Jan 2018 19:29)  HR: 63 (06 Jan 2018 17:21) (58 - 66)  BP: 108/67 (06 Jan 2018 17:21) (107/54 - 142/77)  RR: 18 (06 Jan 2018 17:21) (17 - 20)  SpO2: 98% (06 Jan 2018 17:21) (96% - 99%)    PE:  Gen: elderly male, NAD  HEENT:  sclera anicteric, MMM  Neck:  no thyromegaly, no LAD  CV:  nl S1 + S2, RRR, no m/r/g  Resp:  nl respiratory effort, CTA b/l  GI/ Abd: soft, NT/ ND, BS +  MS:  no c/c/e, nl muscle tone    LABS:  01-06    139  |  95<L>  |  37.0<H>  ----------------------------<  141<H>  3.5   |  30.0<H>  |  1.31<H>    Ca    8.8      06 Jan 2018 07:38  Phos  4.8     01-06  Mg     2.1     01-06                        10.3   6.1   )-----------( 220      ( 06 Jan 2018 07:38 )             31.2     CAPILLARY BLOOD GLUCOSE  POCT Blood Glucose.: 109 mg/dL (06 Jan 2018 17:47)  POCT Blood Glucose.: 190 mg/dL (06 Jan 2018 11:07)  POCT Blood Glucose.: 126 mg/dL (06 Jan 2018 08:14)  POCT Blood Glucose.: 136 mg/dL (06 Jan 2018 07:09)  POCT Blood Glucose.: 64 mg/dL (06 Jan 2018 06:51)  POCT Blood Glucose.: 57 mg/dL (06 Jan 2018 06:36)  POCT Blood Glucose.: 113 mg/dL (05 Jan 2018 23:01)  POCT Blood Glucose.: 188 mg/dL (05 Jan 2018 18:06)

## 2018-01-06 NOTE — PROGRESS NOTE ADULT - SUBJECTIVE AND OBJECTIVE BOX
[facility]                                     [facility] [place]                                 [address]                                  [address]                                        [place], [place], [address]                                             [phone]      Vital signs:  T(C): 36.6 (01-06-18 @ 09:12), Max: 36.9 (01-05-18 @ 19:29)  HR: 63 (01-06-18 @ 11:00) (58 - 66)  BP: 110/62 (01-06-18 @ 09:12) (96/56 - 142/77)  RR: 17 (01-06-18 @ 11:00) (17 - 20)  SpO2: 98% (01-06-18 @ 11:00) (96% - 99%)  Wt(kg): --    Exam:    No new complaints.  Awake and alert.  speech language intact  Pupils react.  Face symmetric smile and sensation  hearing symmetric  tongue ML  no focal weakness with limited testing of left arm  normal tone, no tremor  intact FT x 4 ext

## 2018-01-06 NOTE — PROGRESS NOTE ADULT - ASSESSMENT
78 y/o male s/p fall with C2 C7 fracture, L distal humerus fx, found to have arachnoid cyst on CT head, PMHx of parkinsons and CVA  - Cervical collar when OOB  - Patient to remain NWB to LUE with splint & sling in place  - Continue work with PT, OT, PM&R - disposition is acute rehab  - Pain control PRN  - Will monitor blood glucose closely - endocrinology consult appreciated  - Cr increased today - urine 'lytes ordered, IVF started, will continue to monitor  - Strict I&Os, monitor UOP  -  Neurology and cardiology recommendations appreciated: will give pt information for outpatient f/u  - Heparin subQ and b/l SCDs for DVT ppx

## 2018-01-06 NOTE — PROGRESS NOTE ADULT - SUBJECTIVE AND OBJECTIVE BOX
HPI/OVERNIGHT EVENTS: Patient seen and examined at bedside. Early this morning patient c/o dizziness, found to be hypoglycemic for which dextrose was given. Patient's blood glucoses have since been in the 100s and he denies any additional episodes of dizziness / lightheadedness. He currently complains of back pain, stating he was out of bed to chair for too long and chair was uncomfortable. Ledesma replaced overnight after multiple straight caths for urinary retention. He has been tolerating diet without nausea / vomiting. Other than the back pain patient currently experiencing he states medications have helped to give him pain relief. Denies numbness / tingling of LUE. Denies fever, chills, CP, or SOB at this time.    MEDICATIONS  (STANDING):  acetaminophen   Tablet. 650 milliGRAM(s) Oral every 6 hours  amLODIPine   Tablet 10 milliGRAM(s) Oral daily  aspirin  chewable 81 milliGRAM(s) Oral daily  atorvastatin 20 milliGRAM(s) Oral at bedtime  BACItracin   Ointment 1 Application(s) Topical two times a day  carbidopa/levodopa  25/100 1 Tablet(s) Oral every 8 hours  carvedilol 6.25 milliGRAM(s) Oral every 12 hours  dextrose 5%. 1000 milliLiter(s) (50 mL/Hr) IV Continuous <Continuous>  dextrose 50% Injectable 12.5 Gram(s) IV Push once  dextrose 50% Injectable 25 Gram(s) IV Push once  dextrose 50% Injectable 25 Gram(s) IV Push once  escitalopram 10 milliGRAM(s) Oral daily  finasteride 5 milliGRAM(s) Oral daily  heparin  Injectable 5000 Unit(s) SubCutaneous every 8 hours  insulin glargine Injectable (LANTUS) 24 Unit(s) SubCutaneous at bedtime  insulin lispro (HumaLOG) corrective regimen sliding scale   SubCutaneous three times a day before meals  insulin lispro (HumaLOG) corrective regimen sliding scale   SubCutaneous at bedtime  insulin lispro Injectable (HumaLOG) 2 Unit(s) SubCutaneous three times a day with meals  lisinopril 5 milliGRAM(s) Oral daily  metolazone 5 milliGRAM(s) Oral daily  sodium chloride 0.9%. 1000 milliLiter(s) (120 mL/Hr) IV Continuous <Continuous>  tamsulosin 0.8 milliGRAM(s) Oral at bedtime  timolol 0.5% Solution 1 Drop(s) Both EYES every 6 hours  torsemide 20 milliGRAM(s) Oral daily    MEDICATIONS  (PRN):  dextrose Gel 1 Dose(s) Oral once PRN Blood Glucose LESS THAN 70 milliGRAM(s)/deciliter  dextrose Gel 1 Dose(s) Oral once PRN Blood Glucose LESS THAN 70 milliGRAM(s)/deciliter  dextrose Gel 1 Dose(s) Oral once PRN Blood Glucose LESS THAN 70 milliGRAM(s)/deciliter  glucagon  Injectable 1 milliGRAM(s) IntraMuscular once PRN Glucose LESS THAN 70 milligrams/deciliter  oxyCODONE    5 mG/acetaminophen 325 mG 1 Tablet(s) Oral every 6 hours PRN Moderate Pain (4 - 6)  oxyCODONE    5 mG/acetaminophen 325 mG 2 Tablet(s) Oral every 6 hours PRN Severe Pain (7 - 10)      Vital Signs Last 24 Hrs  T(C): 36.6 (06 Jan 2018 09:12), Max: 36.9 (05 Jan 2018 19:29)  T(F): 97.9 (06 Jan 2018 09:12), Max: 98.4 (05 Jan 2018 19:29)  HR: 63 (06 Jan 2018 11:00) (58 - 66)  BP: 110/62 (06 Jan 2018 09:12) (96/56 - 142/77)  BP(mean): --  RR: 17 (06 Jan 2018 11:00) (17 - 20)  SpO2: 98% (06 Jan 2018 11:00) (96% - 99%)    Constitutional: patient lying in bed, easily aroused from sleep, appears uncomfortable  HEENT: mucous membranes appear dry, nasal cannula in place, EOMI, PERRL  Respiratory: respirations are unlabored, no accessory muscle use, no conversational dyspnea  Cardiovascular: regular rate & rhythm  Gastrointestinal: abdomen soft, non-tender, non-distended, no rebound tenderness / guarding  Neurological: GCS: 15 (4/5/6). A&O x 3; no gross sensory / motor / coordination deficits  Extremities: LUE in sling, distal sensation grossly intact, moving fingers freely, skin is warm with brisk capillary refill      I&O's Detail    05 Jan 2018 07:01  -  06 Jan 2018 07:00  --------------------------------------------------------  IN:  Total IN: 0 mL    OUT:    Post-Void Residual per Intermittent Catheterization: 30 mL    Voided: 1075 mL  Total OUT: 1105 mL    Total NET: -1105 mL          LABS:                        10.3   6.1   )-----------( 220      ( 06 Jan 2018 07:38 )             31.2     01-06    139  |  95<L>  |  37.0<H>  ----------------------------<  141<H>  3.5   |  30.0<H>  |  1.31<H>    Ca    8.8      06 Jan 2018 07:38  Phos  4.8     01-06  Mg     2.1     01-06 HPI/OVERNIGHT EVENTS: Patient seen and examined at bedside. Early this morning patient c/o dizziness, found to be hypoglycemic for which dextrose was given. Patient's blood glucoses have since been in the 100s and he denies any additional episodes of dizziness / lightheadedness. He currently complains of back pain, stating he was out of bed to chair for too long and chair was uncomfortable. Ledesma replaced overnight after multiple straight caths for urinary retention. He has been tolerating diet without nausea / vomiting. Other than the back pain patient currently experiencing he states medications have helped to give him pain relief. Denies numbness / tingling of LUE. Tolerating diet. Denies fever, chills, CP, or SOB at this time.    MEDICATIONS  (STANDING):  acetaminophen   Tablet. 650 milliGRAM(s) Oral every 6 hours  amLODIPine   Tablet 10 milliGRAM(s) Oral daily  aspirin  chewable 81 milliGRAM(s) Oral daily  atorvastatin 20 milliGRAM(s) Oral at bedtime  BACItracin   Ointment 1 Application(s) Topical two times a day  carbidopa/levodopa  25/100 1 Tablet(s) Oral every 8 hours  carvedilol 6.25 milliGRAM(s) Oral every 12 hours  dextrose 5%. 1000 milliLiter(s) (50 mL/Hr) IV Continuous <Continuous>  dextrose 50% Injectable 12.5 Gram(s) IV Push once  dextrose 50% Injectable 25 Gram(s) IV Push once  dextrose 50% Injectable 25 Gram(s) IV Push once  escitalopram 10 milliGRAM(s) Oral daily  finasteride 5 milliGRAM(s) Oral daily  heparin  Injectable 5000 Unit(s) SubCutaneous every 8 hours  insulin glargine Injectable (LANTUS) 24 Unit(s) SubCutaneous at bedtime  insulin lispro (HumaLOG) corrective regimen sliding scale   SubCutaneous three times a day before meals  insulin lispro (HumaLOG) corrective regimen sliding scale   SubCutaneous at bedtime  insulin lispro Injectable (HumaLOG) 2 Unit(s) SubCutaneous three times a day with meals  lisinopril 5 milliGRAM(s) Oral daily  metolazone 5 milliGRAM(s) Oral daily  sodium chloride 0.9%. 1000 milliLiter(s) (120 mL/Hr) IV Continuous <Continuous>  tamsulosin 0.8 milliGRAM(s) Oral at bedtime  timolol 0.5% Solution 1 Drop(s) Both EYES every 6 hours  torsemide 20 milliGRAM(s) Oral daily    MEDICATIONS  (PRN):  dextrose Gel 1 Dose(s) Oral once PRN Blood Glucose LESS THAN 70 milliGRAM(s)/deciliter  dextrose Gel 1 Dose(s) Oral once PRN Blood Glucose LESS THAN 70 milliGRAM(s)/deciliter  dextrose Gel 1 Dose(s) Oral once PRN Blood Glucose LESS THAN 70 milliGRAM(s)/deciliter  glucagon  Injectable 1 milliGRAM(s) IntraMuscular once PRN Glucose LESS THAN 70 milligrams/deciliter  oxyCODONE    5 mG/acetaminophen 325 mG 1 Tablet(s) Oral every 6 hours PRN Moderate Pain (4 - 6)  oxyCODONE    5 mG/acetaminophen 325 mG 2 Tablet(s) Oral every 6 hours PRN Severe Pain (7 - 10)      Vital Signs Last 24 Hrs  T(C): 36.6 (06 Jan 2018 09:12), Max: 36.9 (05 Jan 2018 19:29)  T(F): 97.9 (06 Jan 2018 09:12), Max: 98.4 (05 Jan 2018 19:29)  HR: 63 (06 Jan 2018 11:00) (58 - 66)  BP: 110/62 (06 Jan 2018 09:12) (96/56 - 142/77)  BP(mean): --  RR: 17 (06 Jan 2018 11:00) (17 - 20)  SpO2: 98% (06 Jan 2018 11:00) (96% - 99%)    Constitutional: patient lying in bed, easily aroused from sleep, appears uncomfortable  HEENT: mucous membranes appear dry, nasal cannula in place, EOMI, PERRL  Respiratory: respirations are unlabored, no accessory muscle use, no conversational dyspnea  Cardiovascular: regular rate & rhythm  Gastrointestinal: abdomen soft, non-tender, non-distended, no rebound tenderness / guarding  Neurological: GCS: 15 (4/5/6). A&O x 3; no gross sensory / motor / coordination deficits  Extremities: LUE in sling, distal sensation grossly intact, moving fingers freely, skin is warm with brisk capillary refill      I&O's Detail    05 Jan 2018 07:01  -  06 Jan 2018 07:00  --------------------------------------------------------  IN:  Total IN: 0 mL    OUT:    Post-Void Residual per Intermittent Catheterization: 30 mL    Voided: 1075 mL  Total OUT: 1105 mL    Total NET: -1105 mL          LABS:                        10.3   6.1   )-----------( 220      ( 06 Jan 2018 07:38 )             31.2     01-06    139  |  95<L>  |  37.0<H>  ----------------------------<  141<H>  3.5   |  30.0<H>  |  1.31<H>    Ca    8.8      06 Jan 2018 07:38  Phos  4.8     01-06  Mg     2.1     01-06

## 2018-01-06 NOTE — PROGRESS NOTE ADULT - ASSESSMENT
The patient is a 77y Male with syncope, Parkinson's disease and CVA    Neuro workup completed    Continue ASA 81 qd for CVA  continue sinemet 25/100 tid for PD    Office follow up on discharge    Thank you for allowing me to participate in the care of your patient    Valentin Vila MD, PhD   [id number]

## 2018-01-06 NOTE — PROGRESS NOTE ADULT - SUBJECTIVE AND OBJECTIVE BOX
Cardiology Follow-up    CRITICAL [place] was seen and examined. No events overnight. The patient denies any chest pain, SOB, palpitations, orthopnea, PND or abdominal pain.He c/o neck pain and is wearing a neck brace.    PROBLEM LIST:  Syncope and collapse  Cerebrovascular accident (CVA) due to embolism of right posterior cerebral artery  Cerebrovascular accident (CVA) due to occlusion of left middle cerebral artery  Hypertension, unspecified type  Parkinson disease  Legally blind  Other closed nondisplaced fracture of second cervical vertebra, initial encounter    PAST MEDICAL HISTORY:  Legally blind  BPH (benign prostatic hyperplasia)  Type 2 diabetes mellitus  HTN (hypertension)  Parkinson disease    PAST SURGICAL HISTORY:  S/P ORIF (open reduction internal fixation) fracture  S/P TURP    MEDICATIONS  (STANDING):  acetaminophen   Tablet. 650 milliGRAM(s) Oral every 6 hours  amLODIPine   Tablet 10 milliGRAM(s) Oral daily  aspirin  chewable 81 milliGRAM(s) Oral daily  atorvastatin 20 milliGRAM(s) Oral at bedtime  BACItracin   Ointment 1 Application(s) Topical two times a day  carbidopa/levodopa  25/100 1 Tablet(s) Oral every 8 hours  carvedilol 6.25 milliGRAM(s) Oral every 12 hours  dextrose 5%. 1000 milliLiter(s) (50 mL/Hr) IV Continuous <Continuous>  dextrose 50% Injectable 12.5 Gram(s) IV Push once  dextrose 50% Injectable 25 Gram(s) IV Push once  dextrose 50% Injectable 25 Gram(s) IV Push once  escitalopram 10 milliGRAM(s) Oral daily  finasteride 5 milliGRAM(s) Oral daily  heparin  Injectable 5000 Unit(s) SubCutaneous every 8 hours  insulin glargine Injectable (LANTUS) 24 Unit(s) SubCutaneous at bedtime  insulin lispro (HumaLOG) corrective regimen sliding scale   SubCutaneous three times a day before meals  insulin lispro (HumaLOG) corrective regimen sliding scale   SubCutaneous at bedtime  insulin lispro Injectable (HumaLOG) 2 Unit(s) SubCutaneous three times a day with meals  lisinopril 5 milliGRAM(s) Oral daily  metolazone 5 milliGRAM(s) Oral daily  tamsulosin 0.8 milliGRAM(s) Oral at bedtime  timolol 0.5% Solution 1 Drop(s) Both EYES every 6 hours  torsemide 20 milliGRAM(s) Oral daily    MEDICATIONS  (PRN):  dextrose Gel 1 Dose(s) Oral once PRN Blood Glucose LESS THAN 70 milliGRAM(s)/deciliter  dextrose Gel 1 Dose(s) Oral once PRN Blood Glucose LESS THAN 70 milliGRAM(s)/deciliter  dextrose Gel 1 Dose(s) Oral once PRN Blood Glucose LESS THAN 70 milliGRAM(s)/deciliter  glucagon  Injectable 1 milliGRAM(s) IntraMuscular once PRN Glucose LESS THAN 70 milligrams/deciliter  oxyCODONE    5 mG/acetaminophen 325 mG 1 Tablet(s) Oral every 6 hours PRN Moderate Pain (4 - 6)  oxyCODONE    5 mG/acetaminophen 325 mG 2 Tablet(s) Oral every 6 hours PRN Severe Pain (7 - 10)    ALLERGIES:  No Known Allergies    PHYSICAL EXAM:  T(C): 36.6 (18 @ 09:12), Max: 36.9 (18 @ 19:29)  T(F): 97.9 (18 @ 09:12), Max: 98.4 (18 @ 19:29)  HR: 63 (18 @ 11:00) (58 - 66)  BP: 110/62 (18 @ 09:12) (96/56 - 142/77)  RR: 17 (18 @ 11:00) (17 - 20)  SpO2: 98% (18 @ 11:00) (96% - 99%)  Wt(kg): --  I&O's Summary    2018 07:01  -  2018 07:00  --------------------------------------------------------  IN: 0 mL / OUT: 1105 mL / NET: -1105 mL      Telemetry: sinus rhythm,PAC's  General: comfortable, in NAD  Heart: +S1S2, RRR, 1/6 systolic murmur at the LSB, no rubs, no gallops  Lungs: clear to auscultation bilaterally, no wheezes, no rales, no rhonchi  Abdomen: soft, non-tender, non-distended, + bowel sounds  Extremities: no clubbing, no cyanosis, no edema  Neuro: A&O x3    LABS:                              10.3   6.1   )-----------( 220      ( 2018 07:38 )             31.2     -    139  |  95<L>  |  37.0<H>  ----------------------------<  141<H>  3.5   |  30.0<H>  |  1.31<H>    Ca    8.8      2018 07:38  Phos  4.8       Mg     2.1             RADIOLOGY & ADDITIONAL TESTS:    ASSESSMENT:  TATIANA MARIE is a 77y old male with a history of essential hypertension,IDDM,hyperlipidemia,Parkinson's disease,pulmonic regurgitation,CVA,legally blind who presented with syncope.No further syncope has been noted.    Please permit me to suggest the followin. Continue with physical therapy to increase his activity level  2.Encourage po intake which has been poor  3.Will follow prn

## 2018-01-07 DIAGNOSIS — T14.8XXA OTHER INJURY OF UNSPECIFIED BODY REGION, INITIAL ENCOUNTER: ICD-10-CM

## 2018-01-07 LAB
ANION GAP SERPL CALC-SCNC: 12 MMOL/L — SIGNIFICANT CHANGE UP (ref 5–17)
BASOPHILS # BLD AUTO: 0.1 K/UL — SIGNIFICANT CHANGE UP (ref 0–0.2)
BASOPHILS NFR BLD AUTO: 1.1 % — SIGNIFICANT CHANGE UP (ref 0–2)
BUN SERPL-MCNC: 36 MG/DL — HIGH (ref 8–20)
CALCIUM SERPL-MCNC: 8.4 MG/DL — LOW (ref 8.6–10.2)
CHLORIDE SERPL-SCNC: 98 MMOL/L — SIGNIFICANT CHANGE UP (ref 98–107)
CO2 SERPL-SCNC: 29 MMOL/L — SIGNIFICANT CHANGE UP (ref 22–29)
CREAT SERPL-MCNC: 1.4 MG/DL — HIGH (ref 0.5–1.3)
EOSINOPHIL # BLD AUTO: 0.4 K/UL — SIGNIFICANT CHANGE UP (ref 0–0.5)
EOSINOPHIL NFR BLD AUTO: 6.2 % — HIGH (ref 0–5)
GLUCOSE BLDC GLUCOMTR-MCNC: 120 MG/DL — HIGH (ref 70–99)
GLUCOSE BLDC GLUCOMTR-MCNC: 130 MG/DL — HIGH (ref 70–99)
GLUCOSE BLDC GLUCOMTR-MCNC: 141 MG/DL — HIGH (ref 70–99)
GLUCOSE BLDC GLUCOMTR-MCNC: 192 MG/DL — HIGH (ref 70–99)
GLUCOSE SERPL-MCNC: 136 MG/DL — HIGH (ref 70–115)
HCT VFR BLD CALC: 31.3 % — LOW (ref 42–52)
HGB BLD-MCNC: 10.1 G/DL — LOW (ref 14–18)
LYMPHOCYTES # BLD AUTO: 1.8 K/UL — SIGNIFICANT CHANGE UP (ref 1–4.8)
LYMPHOCYTES # BLD AUTO: 31.8 % — SIGNIFICANT CHANGE UP (ref 20–55)
MAGNESIUM SERPL-MCNC: 2.1 MG/DL — SIGNIFICANT CHANGE UP (ref 1.6–2.6)
MCHC RBC-ENTMCNC: 30.5 PG — SIGNIFICANT CHANGE UP (ref 27–31)
MCHC RBC-ENTMCNC: 32.3 G/DL — SIGNIFICANT CHANGE UP (ref 32–36)
MCV RBC AUTO: 94.6 FL — HIGH (ref 80–94)
MONOCYTES # BLD AUTO: 0.7 K/UL — SIGNIFICANT CHANGE UP (ref 0–0.8)
MONOCYTES NFR BLD AUTO: 11.8 % — HIGH (ref 3–10)
NEUTROPHILS # BLD AUTO: 2.8 K/UL — SIGNIFICANT CHANGE UP (ref 1.8–8)
NEUTROPHILS NFR BLD AUTO: 48.9 % — SIGNIFICANT CHANGE UP (ref 37–73)
PHOSPHATE SERPL-MCNC: 4.2 MG/DL — SIGNIFICANT CHANGE UP (ref 2.4–4.7)
PLATELET # BLD AUTO: 232 K/UL — SIGNIFICANT CHANGE UP (ref 150–400)
POTASSIUM SERPL-MCNC: 3.9 MMOL/L — SIGNIFICANT CHANGE UP (ref 3.5–5.3)
POTASSIUM SERPL-SCNC: 3.9 MMOL/L — SIGNIFICANT CHANGE UP (ref 3.5–5.3)
RBC # BLD: 3.31 M/UL — LOW (ref 4.6–6.2)
RBC # FLD: 13.7 % — SIGNIFICANT CHANGE UP (ref 11–15.6)
SODIUM SERPL-SCNC: 139 MMOL/L — SIGNIFICANT CHANGE UP (ref 135–145)
WBC # BLD: 5.7 K/UL — SIGNIFICANT CHANGE UP (ref 4.8–10.8)
WBC # FLD AUTO: 5.7 K/UL — SIGNIFICANT CHANGE UP (ref 4.8–10.8)

## 2018-01-07 RX ADMIN — Medication 1 DROP(S): at 17:00

## 2018-01-07 RX ADMIN — FINASTERIDE 5 MILLIGRAM(S): 5 TABLET, FILM COATED ORAL at 11:32

## 2018-01-07 RX ADMIN — LISINOPRIL 5 MILLIGRAM(S): 2.5 TABLET ORAL at 05:40

## 2018-01-07 RX ADMIN — CARBIDOPA AND LEVODOPA 1 TABLET(S): 25; 100 TABLET ORAL at 11:31

## 2018-01-07 RX ADMIN — ESCITALOPRAM OXALATE 10 MILLIGRAM(S): 10 TABLET, FILM COATED ORAL at 11:31

## 2018-01-07 RX ADMIN — OXYCODONE AND ACETAMINOPHEN 2 TABLET(S): 5; 325 TABLET ORAL at 19:38

## 2018-01-07 RX ADMIN — Medication 20 MILLIGRAM(S): at 05:18

## 2018-01-07 RX ADMIN — ATORVASTATIN CALCIUM 20 MILLIGRAM(S): 80 TABLET, FILM COATED ORAL at 20:51

## 2018-01-07 RX ADMIN — HEPARIN SODIUM 5000 UNIT(S): 5000 INJECTION INTRAVENOUS; SUBCUTANEOUS at 05:15

## 2018-01-07 RX ADMIN — Medication 2 UNIT(S): at 17:01

## 2018-01-07 RX ADMIN — Medication 650 MILLIGRAM(S): at 17:01

## 2018-01-07 RX ADMIN — Medication 1 APPLICATION(S): at 05:14

## 2018-01-07 RX ADMIN — HEPARIN SODIUM 5000 UNIT(S): 5000 INJECTION INTRAVENOUS; SUBCUTANEOUS at 20:50

## 2018-01-07 RX ADMIN — OXYCODONE AND ACETAMINOPHEN 2 TABLET(S): 5; 325 TABLET ORAL at 11:30

## 2018-01-07 RX ADMIN — CARVEDILOL PHOSPHATE 6.25 MILLIGRAM(S): 80 CAPSULE, EXTENDED RELEASE ORAL at 17:01

## 2018-01-07 RX ADMIN — Medication 2 UNIT(S): at 09:15

## 2018-01-07 RX ADMIN — Medication 650 MILLIGRAM(S): at 17:31

## 2018-01-07 RX ADMIN — Medication 650 MILLIGRAM(S): at 11:32

## 2018-01-07 RX ADMIN — Medication 1 APPLICATION(S): at 17:01

## 2018-01-07 RX ADMIN — CARBIDOPA AND LEVODOPA 1 TABLET(S): 25; 100 TABLET ORAL at 20:51

## 2018-01-07 RX ADMIN — Medication 650 MILLIGRAM(S): at 05:14

## 2018-01-07 RX ADMIN — Medication 650 MILLIGRAM(S): at 06:50

## 2018-01-07 RX ADMIN — OXYCODONE AND ACETAMINOPHEN 2 TABLET(S): 5; 325 TABLET ORAL at 12:00

## 2018-01-07 RX ADMIN — Medication 1 DROP(S): at 23:14

## 2018-01-07 RX ADMIN — CARBIDOPA AND LEVODOPA 1 TABLET(S): 25; 100 TABLET ORAL at 04:50

## 2018-01-07 RX ADMIN — AMLODIPINE BESYLATE 10 MILLIGRAM(S): 2.5 TABLET ORAL at 05:40

## 2018-01-07 RX ADMIN — Medication 1 DROP(S): at 05:18

## 2018-01-07 RX ADMIN — Medication 650 MILLIGRAM(S): at 00:30

## 2018-01-07 RX ADMIN — OXYCODONE AND ACETAMINOPHEN 2 TABLET(S): 5; 325 TABLET ORAL at 05:30

## 2018-01-07 RX ADMIN — HEPARIN SODIUM 5000 UNIT(S): 5000 INJECTION INTRAVENOUS; SUBCUTANEOUS at 15:27

## 2018-01-07 RX ADMIN — TAMSULOSIN HYDROCHLORIDE 0.8 MILLIGRAM(S): 0.4 CAPSULE ORAL at 20:51

## 2018-01-07 RX ADMIN — Medication 81 MILLIGRAM(S): at 11:31

## 2018-01-07 RX ADMIN — OXYCODONE AND ACETAMINOPHEN 2 TABLET(S): 5; 325 TABLET ORAL at 04:51

## 2018-01-07 RX ADMIN — INSULIN GLARGINE 18 UNIT(S): 100 INJECTION, SOLUTION SUBCUTANEOUS at 20:51

## 2018-01-07 RX ADMIN — Medication 1 DROP(S): at 11:29

## 2018-01-07 RX ADMIN — Medication 650 MILLIGRAM(S): at 23:14

## 2018-01-07 RX ADMIN — OXYCODONE AND ACETAMINOPHEN 2 TABLET(S): 5; 325 TABLET ORAL at 20:30

## 2018-01-07 RX ADMIN — Medication 2 UNIT(S): at 11:32

## 2018-01-07 RX ADMIN — SODIUM CHLORIDE 120 MILLILITER(S): 9 INJECTION INTRAMUSCULAR; INTRAVENOUS; SUBCUTANEOUS at 23:17

## 2018-01-07 RX ADMIN — CARVEDILOL PHOSPHATE 6.25 MILLIGRAM(S): 80 CAPSULE, EXTENDED RELEASE ORAL at 05:40

## 2018-01-07 NOTE — PROGRESS NOTE ADULT - SUBJECTIVE AND OBJECTIVE BOX
Ortho Post Op Check    Name: SHARON FELDMAN    MR #: [id number]    Patient being followed for Left distal humerus fx, and Cervical spine C2 vertebral body fx, C7 lamina fx   Surgeon: Vivian Britton and Ana Lilia     PAST MEDICAL & SURGICAL HISTORY:  Legally blind  BPH (benign prostatic hyperplasia)  Type 2 diabetes mellitus  HTN (hypertension)  Parkinson disease  S/P ORIF (open reduction internal fixation) fracture: right hip  S/P TURP      Pt is complaining of neck pain  Denies CP, SOB, N/V, numbness/tingling     General Exam:  Vital Signs Last 24 Hrs  T(C): 36.8 (01-07-18 @ 04:40), Max: 36.8 (01-07-18 @ 04:40)  T(F): 98.3 (01-07-18 @ 04:40), Max: 98.3 (01-07-18 @ 04:40)  HR: 61 (01-07-18 @ 05:30) (58 - 61)  BP: 124/67 (01-07-18 @ 05:30) (102/57 - 124/67)  BP(mean): --  RR: 19 (01-07-18 @ 04:40) (19 - 19)  SpO2: 95% (01-07-18 @ 04:40) (95% - 95%)    General: Pt Alert and oriented, NAD, controlled pain.  Left arm in splint and sling. Sling removed for patient comfort while in bed  Pulses: Cap refill < 2 sec.  Sensation: Grossly intact to light touch without deficit.  Motor: Radial nerve intact. Right arm has full ROM  Neurological: Sensation is grossly intact to light touch of upper and lower extremities manually (left upper extremity examined proximal and distal to splint). No focal deficits or weaknesses found.  Pathologic reflexes: no Corley,  no  Clonus                                        10.1   5.7   )-----------( 232      ( 07 Jan 2018 05:53 )             31.3   07 Jan 2018 05:53    139    |  98     |  36.0   ----------------------------<  136    3.9     |  29.0   |  1.40     Ca    8.4        07 Jan 2018 05:53  Phos  4.2       07 Jan 2018 05:53  Mg     2.1       07 Jan 2018 05:53      MEDICATIONS  (STANDING):  acetaminophen   Tablet. 650 milliGRAM(s) Oral every 6 hours  amLODIPine   Tablet 10 milliGRAM(s) Oral daily  aspirin  chewable 81 milliGRAM(s) Oral daily  atorvastatin 20 milliGRAM(s) Oral at bedtime  BACItracin   Ointment 1 Application(s) Topical two times a day  carbidopa/levodopa  25/100 1 Tablet(s) Oral every 8 hours  carvedilol 6.25 milliGRAM(s) Oral every 12 hours  dextrose 5%. 1000 milliLiter(s) (50 mL/Hr) IV Continuous <Continuous>  dextrose 50% Injectable 12.5 Gram(s) IV Push once  dextrose 50% Injectable 25 Gram(s) IV Push once  dextrose 50% Injectable 25 Gram(s) IV Push once  escitalopram 10 milliGRAM(s) Oral daily  finasteride 5 milliGRAM(s) Oral daily  heparin  Injectable 5000 Unit(s) SubCutaneous every 8 hours  insulin glargine Injectable (LANTUS) 18 Unit(s) SubCutaneous at bedtime  insulin lispro (HumaLOG) corrective regimen sliding scale   SubCutaneous three times a day before meals  insulin lispro (HumaLOG) corrective regimen sliding scale   SubCutaneous at bedtime  insulin lispro Injectable (HumaLOG) 2 Unit(s) SubCutaneous three times a day with meals  lisinopril 5 milliGRAM(s) Oral daily  metolazone 5 milliGRAM(s) Oral daily  sodium chloride 0.9%. 1000 milliLiter(s) (120 mL/Hr) IV Continuous <Continuous>  tamsulosin 0.8 milliGRAM(s) Oral at bedtime  timolol 0.5% Solution 1 Drop(s) Both EYES every 6 hours  torsemide 20 milliGRAM(s) Oral daily    MEDICATIONS  (PRN):  dextrose Gel 1 Dose(s) Oral once PRN Blood Glucose LESS THAN 70 milliGRAM(s)/deciliter  dextrose Gel 1 Dose(s) Oral once PRN Blood Glucose LESS THAN 70 milliGRAM(s)/deciliter  dextrose Gel 1 Dose(s) Oral once PRN Blood Glucose LESS THAN 70 milliGRAM(s)/deciliter  glucagon  Injectable 1 milliGRAM(s) IntraMuscular once PRN Glucose LESS THAN 70 milligrams/deciliter  oxyCODONE    5 mG/acetaminophen 325 mG 1 Tablet(s) Oral every 6 hours PRN Moderate Pain (4 - 6)  oxyCODONE    5 mG/acetaminophen 325 mG 2 Tablet(s) Oral every 6 hours PRN Severe Pain (7 - 10)      A/P: 77yMale with Left distal humerus fx and C2 and C7 fx- being treated conservatively.    - Stable  - Pain Control  - DVT ppx: Heparin  - Weight bearing status: WBAT B/L LE and R UE. NWB Left upper extremity

## 2018-01-07 NOTE — PROGRESS NOTE ADULT - ASSESSMENT
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 Hospital course complicated by urinary retention - Ledesma was initial.ly d/c but had to be re-inserted 1/6. Cr levels also noted to have been increasing gradually.

## 2018-01-07 NOTE — PROGRESS NOTE ADULT - SUBJECTIVE AND OBJECTIVE BOX
HPI/OVERNIGHT EVENTS: Patient seen and examined at bedside this AM. Nurses concerned that patient does not ask for his pain medications. Continues to complain of unchanged back and right shoulder pain. Tolerating diabetic diet. Exhibiting normal bowel function. Endocrine left recommendations regarding insulin regimen given patient's high fingerstick readings. Denies fever, chills, nausea, vomiting chest pain, SOB, dizziness, abd pain or any other concerning symptoms    Vital Signs Last 24 Hrs  T(C): 36.8 (07 Jan 2018 08:58), Max: 36.8 (07 Jan 2018 04:40)  T(F): 98.2 (07 Jan 2018 08:58), Max: 98.3 (07 Jan 2018 04:40)  HR: 68 (07 Jan 2018 08:58) (58 - 68)  BP: 118/68 (07 Jan 2018 08:58) (102/57 - 133/69)  BP(mean): --  RR: 18 (07 Jan 2018 08:58) (17 - 19)  SpO2: 96% (07 Jan 2018 08:58) (95% - 98%)    I&O's Detail    06 Jan 2018 07:01  -  07 Jan 2018 07:00  --------------------------------------------------------  IN:    sodium chloride 0.9%.: 1800 mL  Total IN: 1800 mL    OUT:    Indwelling Catheter - Urethral: 950 mL    Voided: 200 mL  Total OUT: 1150 mL    Total NET: 650 mL    Constitutional: patient resting comfortably in bed, in no acute distress  HEENT: EOMI / PERRL b/l  Neck: No JVD, full ROM without pain  Respiratory: CTAB with unlabored respirations, no accessory muscle use, no conversational dyspnea  Cardiovascular: regular rate & rhythm   Gastrointestinal: Abdomen soft, non-tender, non-distended, no rebound tenderness / guarding  Rectal: Not indicated   Neurological: GCS: 15 (4/5/6). A&O x 3; no gross sensory / motor / coordination deficits   Psychiatric: Normal mood, normal affect  Musculoskeletal: LLE in sling for comfort, motor and sensory intact, compartments soft, extremities WWP    LABS:                        10.1   5.7   )-----------( 232      ( 07 Jan 2018 05:53 )             31.3     01-07    139  |  98  |  36.0<H>  ----------------------------<  136<H>  3.9   |  29.0  |  1.40<H>    Ca    8.4<L>      07 Jan 2018 05:53  Phos  4.2     01-07  Mg     2.1     01-07    MEDICATIONS  (STANDING):  acetaminophen   Tablet. 650 milliGRAM(s) Oral every 6 hours  amLODIPine   Tablet 10 milliGRAM(s) Oral daily  aspirin  chewable 81 milliGRAM(s) Oral daily  atorvastatin 20 milliGRAM(s) Oral at bedtime  BACItracin   Ointment 1 Application(s) Topical two times a day  carbidopa/levodopa  25/100 1 Tablet(s) Oral every 8 hours  carvedilol 6.25 milliGRAM(s) Oral every 12 hours  dextrose 5%. 1000 milliLiter(s) (50 mL/Hr) IV Continuous <Continuous>  dextrose 50% Injectable 12.5 Gram(s) IV Push once  dextrose 50% Injectable 25 Gram(s) IV Push once  dextrose 50% Injectable 25 Gram(s) IV Push once  escitalopram 10 milliGRAM(s) Oral daily  finasteride 5 milliGRAM(s) Oral daily  heparin  Injectable 5000 Unit(s) SubCutaneous every 8 hours  insulin glargine Injectable (LANTUS) 18 Unit(s) SubCutaneous at bedtime  insulin lispro (HumaLOG) corrective regimen sliding scale   SubCutaneous three times a day before meals  insulin lispro (HumaLOG) corrective regimen sliding scale   SubCutaneous at bedtime  insulin lispro Injectable (HumaLOG) 2 Unit(s) SubCutaneous three times a day with meals  lisinopril 5 milliGRAM(s) Oral daily  metolazone 5 milliGRAM(s) Oral daily  sodium chloride 0.9%. 1000 milliLiter(s) (120 mL/Hr) IV Continuous <Continuous>  tamsulosin 0.8 milliGRAM(s) Oral at bedtime  timolol 0.5% Solution 1 Drop(s) Both EYES every 6 hours  torsemide 20 milliGRAM(s) Oral daily    MEDICATIONS  (PRN):  dextrose Gel 1 Dose(s) Oral once PRN Blood Glucose LESS THAN 70 milliGRAM(s)/deciliter  dextrose Gel 1 Dose(s) Oral once PRN Blood Glucose LESS THAN 70 milliGRAM(s)/deciliter  dextrose Gel 1 Dose(s) Oral once PRN Blood Glucose LESS THAN 70 milliGRAM(s)/deciliter  glucagon  Injectable 1 milliGRAM(s) IntraMuscular once PRN Glucose LESS THAN 70 milligrams/deciliter  oxyCODONE    5 mG/acetaminophen 325 mG 1 Tablet(s) Oral every 6 hours PRN Moderate Pain (4 - 6)  oxyCODONE    5 mG/acetaminophen 325 mG 2 Tablet(s) Oral every 6 hours PRN Severe Pain (7 - 10)      MICRO:   Cultures     STUDIES:   EKG, CXR, U/S, CT, MRI

## 2018-01-08 LAB
ANION GAP SERPL CALC-SCNC: 10 MMOL/L — SIGNIFICANT CHANGE UP (ref 5–17)
BASOPHILS # BLD AUTO: 0 K/UL — SIGNIFICANT CHANGE UP (ref 0–0.2)
BASOPHILS NFR BLD AUTO: 0.7 % — SIGNIFICANT CHANGE UP (ref 0–2)
BUN SERPL-MCNC: 32 MG/DL — HIGH (ref 8–20)
CALCIUM SERPL-MCNC: 8.4 MG/DL — LOW (ref 8.6–10.2)
CHLORIDE SERPL-SCNC: 102 MMOL/L — SIGNIFICANT CHANGE UP (ref 98–107)
CO2 SERPL-SCNC: 29 MMOL/L — SIGNIFICANT CHANGE UP (ref 22–29)
CREAT ?TM UR-MCNC: 22 MG/DL — SIGNIFICANT CHANGE UP
CREAT SERPL-MCNC: 1.22 MG/DL — SIGNIFICANT CHANGE UP (ref 0.5–1.3)
EOSINOPHIL # BLD AUTO: 0.5 K/UL — SIGNIFICANT CHANGE UP (ref 0–0.5)
EOSINOPHIL NFR BLD AUTO: 8 % — HIGH (ref 0–5)
GLUCOSE BLDC GLUCOMTR-MCNC: 150 MG/DL — HIGH (ref 70–99)
GLUCOSE BLDC GLUCOMTR-MCNC: 186 MG/DL — HIGH (ref 70–99)
GLUCOSE BLDC GLUCOMTR-MCNC: 88 MG/DL — SIGNIFICANT CHANGE UP (ref 70–99)
GLUCOSE BLDC GLUCOMTR-MCNC: 90 MG/DL — SIGNIFICANT CHANGE UP (ref 70–99)
GLUCOSE SERPL-MCNC: 112 MG/DL — SIGNIFICANT CHANGE UP (ref 70–115)
HCT VFR BLD CALC: 30.8 % — LOW (ref 42–52)
HGB BLD-MCNC: 9.9 G/DL — LOW (ref 14–18)
LYMPHOCYTES # BLD AUTO: 1.7 K/UL — SIGNIFICANT CHANGE UP (ref 1–4.8)
LYMPHOCYTES # BLD AUTO: 28.3 % — SIGNIFICANT CHANGE UP (ref 20–55)
MAGNESIUM SERPL-MCNC: 1.9 MG/DL — SIGNIFICANT CHANGE UP (ref 1.6–2.6)
MCHC RBC-ENTMCNC: 30.7 PG — SIGNIFICANT CHANGE UP (ref 27–31)
MCHC RBC-ENTMCNC: 32.1 G/DL — SIGNIFICANT CHANGE UP (ref 32–36)
MCV RBC AUTO: 95.7 FL — HIGH (ref 80–94)
MONOCYTES # BLD AUTO: 0.7 K/UL — SIGNIFICANT CHANGE UP (ref 0–0.8)
MONOCYTES NFR BLD AUTO: 11.6 % — HIGH (ref 3–10)
NEUTROPHILS # BLD AUTO: 3 K/UL — SIGNIFICANT CHANGE UP (ref 1.8–8)
NEUTROPHILS NFR BLD AUTO: 51.2 % — SIGNIFICANT CHANGE UP (ref 37–73)
PHOSPHATE SERPL-MCNC: 3.7 MG/DL — SIGNIFICANT CHANGE UP (ref 2.4–4.7)
PLATELET # BLD AUTO: 218 K/UL — SIGNIFICANT CHANGE UP (ref 150–400)
POTASSIUM SERPL-MCNC: 4.3 MMOL/L — SIGNIFICANT CHANGE UP (ref 3.5–5.3)
POTASSIUM SERPL-SCNC: 4.3 MMOL/L — SIGNIFICANT CHANGE UP (ref 3.5–5.3)
RBC # BLD: 3.22 M/UL — LOW (ref 4.6–6.2)
RBC # FLD: 13.6 % — SIGNIFICANT CHANGE UP (ref 11–15.6)
SODIUM SERPL-SCNC: 141 MMOL/L — SIGNIFICANT CHANGE UP (ref 135–145)
SODIUM UR-SCNC: 128 MMOL/L — SIGNIFICANT CHANGE UP
WBC # BLD: 5.9 K/UL — SIGNIFICANT CHANGE UP (ref 4.8–10.8)
WBC # FLD AUTO: 5.9 K/UL — SIGNIFICANT CHANGE UP (ref 4.8–10.8)

## 2018-01-08 PROCEDURE — 99233 SBSQ HOSP IP/OBS HIGH 50: CPT

## 2018-01-08 RX ORDER — ACETAMINOPHEN 500 MG
1000 TABLET ORAL
Qty: 0 | Refills: 0 | Status: DISCONTINUED | OUTPATIENT
Start: 2018-01-08 | End: 2018-01-09

## 2018-01-08 RX ORDER — ACETAMINOPHEN 500 MG
1000 TABLET ORAL ONCE
Qty: 0 | Refills: 0 | Status: COMPLETED | OUTPATIENT
Start: 2018-01-08 | End: 2018-01-08

## 2018-01-08 RX ORDER — SODIUM CHLORIDE 9 MG/ML
1000 INJECTION INTRAMUSCULAR; INTRAVENOUS; SUBCUTANEOUS
Qty: 0 | Refills: 0 | Status: DISCONTINUED | OUTPATIENT
Start: 2018-01-08 | End: 2018-01-11

## 2018-01-08 RX ORDER — CELECOXIB 200 MG/1
100 CAPSULE ORAL
Qty: 0 | Refills: 0 | Status: DISCONTINUED | OUTPATIENT
Start: 2018-01-08 | End: 2018-01-11

## 2018-01-08 RX ORDER — DOCUSATE SODIUM 100 MG
100 CAPSULE ORAL DAILY
Qty: 0 | Refills: 0 | Status: DISCONTINUED | OUTPATIENT
Start: 2018-01-08 | End: 2018-01-11

## 2018-01-08 RX ORDER — SENNA PLUS 8.6 MG/1
1 TABLET ORAL DAILY
Qty: 0 | Refills: 0 | Status: DISCONTINUED | OUTPATIENT
Start: 2018-01-08 | End: 2018-01-11

## 2018-01-08 RX ADMIN — Medication 81 MILLIGRAM(S): at 12:44

## 2018-01-08 RX ADMIN — CELECOXIB 100 MILLIGRAM(S): 200 CAPSULE ORAL at 17:50

## 2018-01-08 RX ADMIN — Medication 650 MILLIGRAM(S): at 00:10

## 2018-01-08 RX ADMIN — Medication 2 UNIT(S): at 17:52

## 2018-01-08 RX ADMIN — Medication 100 MILLIGRAM(S): at 17:49

## 2018-01-08 RX ADMIN — Medication 400 MILLIGRAM(S): at 10:45

## 2018-01-08 RX ADMIN — INSULIN GLARGINE 18 UNIT(S): 100 INJECTION, SOLUTION SUBCUTANEOUS at 22:05

## 2018-01-08 RX ADMIN — Medication 1000 MILLIGRAM(S): at 11:00

## 2018-01-08 RX ADMIN — CARBIDOPA AND LEVODOPA 1 TABLET(S): 25; 100 TABLET ORAL at 22:06

## 2018-01-08 RX ADMIN — OXYCODONE AND ACETAMINOPHEN 2 TABLET(S): 5; 325 TABLET ORAL at 04:56

## 2018-01-08 RX ADMIN — Medication 2 UNIT(S): at 09:04

## 2018-01-08 RX ADMIN — Medication 650 MILLIGRAM(S): at 13:15

## 2018-01-08 RX ADMIN — Medication 1 APPLICATION(S): at 17:50

## 2018-01-08 RX ADMIN — Medication 20 MILLIGRAM(S): at 04:56

## 2018-01-08 RX ADMIN — TAMSULOSIN HYDROCHLORIDE 0.8 MILLIGRAM(S): 0.4 CAPSULE ORAL at 22:08

## 2018-01-08 RX ADMIN — Medication 650 MILLIGRAM(S): at 18:20

## 2018-01-08 RX ADMIN — HEPARIN SODIUM 5000 UNIT(S): 5000 INJECTION INTRAVENOUS; SUBCUTANEOUS at 13:24

## 2018-01-08 RX ADMIN — FINASTERIDE 5 MILLIGRAM(S): 5 TABLET, FILM COATED ORAL at 12:45

## 2018-01-08 RX ADMIN — CARVEDILOL PHOSPHATE 6.25 MILLIGRAM(S): 80 CAPSULE, EXTENDED RELEASE ORAL at 17:48

## 2018-01-08 RX ADMIN — Medication 1 DROP(S): at 12:44

## 2018-01-08 RX ADMIN — ATORVASTATIN CALCIUM 20 MILLIGRAM(S): 80 TABLET, FILM COATED ORAL at 22:06

## 2018-01-08 RX ADMIN — SENNA PLUS 1 TABLET(S): 8.6 TABLET ORAL at 17:49

## 2018-01-08 RX ADMIN — CELECOXIB 100 MILLIGRAM(S): 200 CAPSULE ORAL at 18:20

## 2018-01-08 RX ADMIN — LISINOPRIL 5 MILLIGRAM(S): 2.5 TABLET ORAL at 04:56

## 2018-01-08 RX ADMIN — CARBIDOPA AND LEVODOPA 1 TABLET(S): 25; 100 TABLET ORAL at 12:46

## 2018-01-08 RX ADMIN — CARVEDILOL PHOSPHATE 6.25 MILLIGRAM(S): 80 CAPSULE, EXTENDED RELEASE ORAL at 04:56

## 2018-01-08 RX ADMIN — Medication 1 DROP(S): at 17:49

## 2018-01-08 RX ADMIN — HEPARIN SODIUM 5000 UNIT(S): 5000 INJECTION INTRAVENOUS; SUBCUTANEOUS at 22:06

## 2018-01-08 RX ADMIN — Medication 650 MILLIGRAM(S): at 17:49

## 2018-01-08 RX ADMIN — Medication 650 MILLIGRAM(S): at 12:44

## 2018-01-08 RX ADMIN — Medication 1 DROP(S): at 04:56

## 2018-01-08 RX ADMIN — SODIUM CHLORIDE 60 MILLILITER(S): 9 INJECTION INTRAMUSCULAR; INTRAVENOUS; SUBCUTANEOUS at 09:03

## 2018-01-08 RX ADMIN — Medication 1 APPLICATION(S): at 04:55

## 2018-01-08 RX ADMIN — CARBIDOPA AND LEVODOPA 1 TABLET(S): 25; 100 TABLET ORAL at 04:56

## 2018-01-08 RX ADMIN — OXYCODONE AND ACETAMINOPHEN 2 TABLET(S): 5; 325 TABLET ORAL at 05:50

## 2018-01-08 RX ADMIN — OXYCODONE AND ACETAMINOPHEN 2 TABLET(S): 5; 325 TABLET ORAL at 13:15

## 2018-01-08 RX ADMIN — Medication 2 UNIT(S): at 13:24

## 2018-01-08 RX ADMIN — AMLODIPINE BESYLATE 10 MILLIGRAM(S): 2.5 TABLET ORAL at 04:56

## 2018-01-08 RX ADMIN — HEPARIN SODIUM 5000 UNIT(S): 5000 INJECTION INTRAVENOUS; SUBCUTANEOUS at 04:56

## 2018-01-08 RX ADMIN — ESCITALOPRAM OXALATE 10 MILLIGRAM(S): 10 TABLET, FILM COATED ORAL at 12:45

## 2018-01-08 RX ADMIN — OXYCODONE AND ACETAMINOPHEN 2 TABLET(S): 5; 325 TABLET ORAL at 12:43

## 2018-01-08 NOTE — PROGRESS NOTE ADULT - SUBJECTIVE AND OBJECTIVE BOX
Patient in and OOB.  States he does not feel well and it is not a good day.  As per RN, has not been mobilized through the weekend and appeared to require more assist this AM.  Have requested to be seen more frequently.   Continues to have pain in the left shoulder/elbow.  No numbness in the hand.   Failed TOV. ON max dose of Flomax. May need increase in proscar, however, this can worsen his BP, which adds to the SE from PD medications.  On IVF for decreased GFR which has improved.     FUNCTIONAL PROGRESS  1/8  Bed Mobility  Bed Mobility Training Supine-to-Sit: maximum assist (25% patient effort);  verbal cues;  1 person assist;  bed rails;  cues for LUE NWB adherence  Bed Mobility Training Limitations: decreased ability to use arms for pushing/pulling;  decreased ability to use legs for bridging/pushing;  impaired ability to control trunk for mobility;  decreased strength;  impaired coordination    Bed-Chair Transfer Training  Transfer Training Bed-to-Chair Transfer: maximum assist (25% patient effort);  verbal cues;  1 person assist;  left UE   nonweight-bearing   SPT  Bed-to-Chair Transfer Training Transfer Safety Analysis: decreased balance;  decreased strength;  impaired coordination;  stand pivot transfer    Sit-Stand Transfer Training  Transfer Training Sit-to-Stand Transfer: maximum assist (25% patient effort);  verbal cues;  1 person assist;  Left UE   nonweight-bearing  Transfer Training Stand-to-Sit Transfer: maximum assist (25% patient effort);  1 person assist;  verbal cues;  left UE   nonweight-bearing  Sit-to-Stand Transfer Training Transfer Safety Analysis: decreased strength;  impaired balance;  LUE NWB    1/5  Bed Mobility  Bed Mobility Training Sit-to-Supine: moderate assist (50% patient effort);  1 person assist;  verbal cues;  [place] collar, with left UE in sling and NWB at left UE  Bed Mobility Training Supine-to-Sit: moderate assist (50% patient effort);  1 person assist;  verbal cues;  [place] collar, with left UE in sling and NWB at left UE  Bed Mobility Training Limitations: decreased ability to use arms for pushing/pulling;  pain;  decreased strength;  decreased ROM;  [place] collar, with left UE in sling and NWB at left UE    Sit-Stand Transfer Training  Transfer Training Sit-to-Stand Transfer: moderate assist (50% patient effort);  1 person assist;  verbal cues;  weight-bearing as tolerated   [place], [place] collar, with left UE in sling and NWB at left UE   Transfer Training Stand-to-Sit Transfer: moderate assist (50% patient effort);  1 person assist;  verbal cues;  weight-bearing as tolerated   Chau cane, [place] collar, with left UE in sling and NWB at left UE  Sit-to-Stand Transfer Training Transfer Safety Analysis: Chau cane, [place] collar, with left UE in sling and NWB at left UE;  pain;  decreased strength;  decreased ROM    Gait Training  Gait Training: moderate assist (50% patient effort);  1 person assist;  verbal cues;  weight-bearing as tolerated   15 feet;  [place], [place] collar, with left UE in sling and NWB at left UE  Gait Analysis: 3-point gait   [place] collar, with left UE in sling and NWB at left UE   decreased tara;  decreased step length;  decreased stride length;  pain;  decreased ROM;  decreased strength;  15 feet      REVIEW OF SYSTEMS  Constitutional - No fever,  +fatigue  Neurological - No headaches, No memory loss, No loss of strength, No numbness, No tremors  Skin - No rashes, No lesions   Musculoskeletal - No joint pain, No joint swelling, No muscle pain  Psychiatric - No depression, No anxiety    VITALS  T(C): 36.8 (01-08-18 @ 08:15), Max: 37.1 (01-08-18 @ 04:50)  HR: 60 (01-08-18 @ 08:15) (60 - 68)  BP: 134/73 (01-08-18 @ 08:15) (124/70 - 144/78)  RR: 16 (01-08-18 @ 08:15) (16 - 20)  SpO2: 93% (01-08-18 @ 08:15) (92% - 97%)  Wt(kg): --    MEDICATIONS   acetaminophen   Tablet. 650 milliGRAM(s) every 6 hours  acetaminophen  IVPB. 1000 milliGRAM(s) once PRN  amLODIPine   Tablet 10 milliGRAM(s) daily  aspirin  chewable 81 milliGRAM(s) daily  atorvastatin 20 milliGRAM(s) at bedtime  BACItracin   Ointment 1 Application(s) two times a day  carbidopa/levodopa  25/100 1 Tablet(s) every 8 hours  carvedilol 6.25 milliGRAM(s) every 12 hours  dextrose 5%. 1000 milliLiter(s) <Continuous>  dextrose 50% Injectable 12.5 Gram(s) once  dextrose 50% Injectable 25 Gram(s) once  dextrose 50% Injectable 25 Gram(s) once  dextrose Gel 1 Dose(s) once PRN  dextrose Gel 1 Dose(s) once PRN  dextrose Gel 1 Dose(s) once PRN  escitalopram 10 milliGRAM(s) daily  finasteride 5 milliGRAM(s) daily  glucagon  Injectable 1 milliGRAM(s) once PRN  heparin  Injectable 5000 Unit(s) every 8 hours  insulin glargine Injectable (LANTUS) 18 Unit(s) at bedtime  insulin lispro (HumaLOG) corrective regimen sliding scale   three times a day before meals  insulin lispro (HumaLOG) corrective regimen sliding scale   at bedtime  insulin lispro Injectable (HumaLOG) 2 Unit(s) three times a day with meals  lisinopril 5 milliGRAM(s) daily  metolazone 5 milliGRAM(s) daily  oxyCODONE    5 mG/acetaminophen 325 mG 1 Tablet(s) every 6 hours PRN  oxyCODONE    5 mG/acetaminophen 325 mG 2 Tablet(s) every 6 hours PRN  sodium chloride 0.9%. 1000 milliLiter(s) <Continuous>  tamsulosin 0.8 milliGRAM(s) at bedtime  timolol 0.5% Solution 1 Drop(s) every 6 hours  torsemide 20 milliGRAM(s) daily      RECENT LABS/IMAGING  CBC Full  -  ( 08 Jan 2018 07:02 )  WBC Count : 5.9 K/uL  Hemoglobin : 9.9 g/dL  Hematocrit : 30.8 %  Platelet Count - Automated : 218 K/uL  Mean Cell Volume : 95.7 fl  Mean Cell Hemoglobin : 30.7 pg  Mean Cell Hemoglobin Concentration : 32.1 g/dL  Auto Neutrophil # : 3.0 K/uL  Auto Lymphocyte # : 1.7 K/uL  Auto Monocyte # : 0.7 K/uL  Auto Eosinophil # : 0.5 K/uL  Auto Basophil # : 0.0 K/uL  Auto Neutrophil % : 51.2 %  Auto Lymphocyte % : 28.3 %  Auto Monocyte % : 11.6 %  Auto Eosinophil % : 8.0 %  Auto Basophil % : 0.7 %    01-08    141  |  102  |  32.0<H>  ----------------------------<  112  4.3   |  29.0  |  1.22    Ca    8.4<L>      08 Jan 2018 07:02  Phos  3.7     01-08  Mg     1.9     01-08 Patient in and OOB.  States he does not feel well and it is not a good day.  As per RN, has not been mobilized through the weekend and appeared to require more assist this AM.  Have requested to be seen more frequently.   Continues to have pain in the left shoulder/elbow.  No numbness in the hand.   Failed TOV. On Proscar and max dose of Flomax.  On IVF for decreased GFR which has improved.     FUNCTIONAL PROGRESS  1/8  Bed Mobility  Bed Mobility Training Supine-to-Sit: maximum assist (25% patient effort);  verbal cues;  1 person assist;  bed rails;  cues for LUE NWB adherence  Bed Mobility Training Limitations: decreased ability to use arms for pushing/pulling;  decreased ability to use legs for bridging/pushing;  impaired ability to control trunk for mobility;  decreased strength;  impaired coordination    Bed-Chair Transfer Training  Transfer Training Bed-to-Chair Transfer: maximum assist (25% patient effort);  verbal cues;  1 person assist;  left UE   nonweight-bearing   SPT  Bed-to-Chair Transfer Training Transfer Safety Analysis: decreased balance;  decreased strength;  impaired coordination;  stand pivot transfer    Sit-Stand Transfer Training  Transfer Training Sit-to-Stand Transfer: maximum assist (25% patient effort);  verbal cues;  1 person assist;  Left UE   nonweight-bearing  Transfer Training Stand-to-Sit Transfer: maximum assist (25% patient effort);  1 person assist;  verbal cues;  left UE   nonweight-bearing  Sit-to-Stand Transfer Training Transfer Safety Analysis: decreased strength;  impaired balance;  LUE NWB    1/5  Bed Mobility  Bed Mobility Training Sit-to-Supine: moderate assist (50% patient effort);  1 person assist;  verbal cues;  [place] collar, with left UE in sling and NWB at left UE  Bed Mobility Training Supine-to-Sit: moderate assist (50% patient effort);  1 person assist;  verbal cues;  [place] collar, with left UE in sling and NWB at left UE  Bed Mobility Training Limitations: decreased ability to use arms for pushing/pulling;  pain;  decreased strength;  decreased ROM;  [place] collar, with left UE in sling and NWB at left UE    Sit-Stand Transfer Training  Transfer Training Sit-to-Stand Transfer: moderate assist (50% patient effort);  1 person assist;  verbal cues;  weight-bearing as tolerated   Hemicane, [place] collar, with left UE in sling and NWB at left UE   Transfer Training Stand-to-Sit Transfer: moderate assist (50% patient effort);  1 person assist;  verbal cues;  weight-bearing as tolerated   Chau cane, [place] collar, with left UE in sling and NWB at left UE  Sit-to-Stand Transfer Training Transfer Safety Analysis: Chau cane, [place] collar, with left UE in sling and NWB at left UE;  pain;  decreased strength;  decreased ROM    Gait Training  Gait Training: moderate assist (50% patient effort);  1 person assist;  verbal cues;  weight-bearing as tolerated   15 feet;  [place], [place] collar, with left UE in sling and NWB at left UE  Gait Analysis: 3-point gait   [place] collar, with left UE in sling and NWB at left UE   decreased tara;  decreased step length;  decreased stride length;  pain;  decreased ROM;  decreased strength;  15 feet      REVIEW OF SYSTEMS  Constitutional - No fever,  +fatigue  Neurological - No headaches, No memory loss, +loss of strength, No numbness, +tremors  Skin - No rashes, No lesions   Musculoskeletal - +joint pain, No joint swelling, +muscle pain  Psychiatric - +depression, No anxiety    VITALS  T(C): 36.8 (01-08-18 @ 08:15), Max: 37.1 (01-08-18 @ 04:50)  HR: 60 (01-08-18 @ 08:15) (60 - 68)  BP: 134/73 (01-08-18 @ 08:15) (124/70 - 144/78)  RR: 16 (01-08-18 @ 08:15) (16 - 20)  SpO2: 93% (01-08-18 @ 08:15) (92% - 97%)  Wt(kg): --    MEDICATIONS   acetaminophen   Tablet. 650 milliGRAM(s) every 6 hours  acetaminophen  IVPB. 1000 milliGRAM(s) once PRN  amLODIPine   Tablet 10 milliGRAM(s) daily  aspirin  chewable 81 milliGRAM(s) daily  atorvastatin 20 milliGRAM(s) at bedtime  BACItracin   Ointment 1 Application(s) two times a day  carbidopa/levodopa  25/100 1 Tablet(s) every 8 hours  carvedilol 6.25 milliGRAM(s) every 12 hours  dextrose 5%. 1000 milliLiter(s) <Continuous>  dextrose 50% Injectable 12.5 Gram(s) once  dextrose 50% Injectable 25 Gram(s) once  dextrose 50% Injectable 25 Gram(s) once  dextrose Gel 1 Dose(s) once PRN  dextrose Gel 1 Dose(s) once PRN  dextrose Gel 1 Dose(s) once PRN  escitalopram 10 milliGRAM(s) daily  finasteride 5 milliGRAM(s) daily  glucagon  Injectable 1 milliGRAM(s) once PRN  heparin  Injectable 5000 Unit(s) every 8 hours  insulin glargine Injectable (LANTUS) 18 Unit(s) at bedtime  insulin lispro (HumaLOG) corrective regimen sliding scale   three times a day before meals  insulin lispro (HumaLOG) corrective regimen sliding scale   at bedtime  insulin lispro Injectable (HumaLOG) 2 Unit(s) three times a day with meals  lisinopril 5 milliGRAM(s) daily  metolazone 5 milliGRAM(s) daily  oxyCODONE    5 mG/acetaminophen 325 mG 1 Tablet(s) every 6 hours PRN  oxyCODONE    5 mG/acetaminophen 325 mG 2 Tablet(s) every 6 hours PRN  sodium chloride 0.9%. 1000 milliLiter(s) <Continuous>  tamsulosin 0.8 milliGRAM(s) at bedtime  timolol 0.5% Solution 1 Drop(s) every 6 hours  torsemide 20 milliGRAM(s) daily      RECENT LABS/IMAGING  CBC Full  -  ( 08 Jan 2018 07:02 )  WBC Count : 5.9 K/uL  Hemoglobin : 9.9 g/dL  Hematocrit : 30.8 %  Platelet Count - Automated : 218 K/uL  Mean Cell Volume : 95.7 fl  Mean Cell Hemoglobin : 30.7 pg  Mean Cell Hemoglobin Concentration : 32.1 g/dL  Auto Neutrophil # : 3.0 K/uL  Auto Lymphocyte # : 1.7 K/uL  Auto Monocyte # : 0.7 K/uL  Auto Eosinophil # : 0.5 K/uL  Auto Basophil # : 0.0 K/uL  Auto Neutrophil % : 51.2 %  Auto Lymphocyte % : 28.3 %  Auto Monocyte % : 11.6 %  Auto Eosinophil % : 8.0 %  Auto Basophil % : 0.7 %    01-08    141  |  102  |  32.0<H>  ----------------------------<  112  4.3   |  29.0  |  1.22    Ca    8.4<L>      08 Jan 2018 07:02  Phos  3.7     01-08  Mg     1.9     01-08      -----------------------------------------------------------------------  PHYSICAL EXAM  Constitutional - NAD, Comfortable  HEENT - Left cranial abrasion - healing   Neck - Supple, + limited ROM  Extremities - Left hand edema, Left UE in sling/ACE wrapped  Neurologic Exam -                    Cognitive - Awake, Alert, AAO to self, place, date, year, situation     Communication - Fluent, Mild dysarthria     Motor -                      LEFT    UE - ShAB 1/5, WE 1/5,  2/5                    RIGHT UE - ShAB 2/5, EF 4/5, EE 4/5,  5/5                    LEFT    LE - HF 1/5, KE 4/5, DF 4/5, PF 4/5                    RIGHT LE - HF 1/5, KE 4/5, DF 4/5, PF 4/5        Sensory - Intact to LT to left hand/digits  Psychiatric - Mood depressed, Affect Flat  ----------------------------------------------------------------------------------------  ASSESSMENT/PLAN  77yMale with functional deficits after a fall sustaining a left humeral fracture now is NWB.   Pain - Tylenol, Percocet  DVT PPX - Heparin  Urinary retention - Callie, can be managed at acute rehab  Rehab - Recommend ACUTE inpatient rehabilitation for the functional deficits consisting of 3 hours of therapy/day & 24 hour RN/daily PMR physician for comorbid medical management. Will continue to follow for ongoing rehab needs and recommendations.

## 2018-01-08 NOTE — DIETITIAN INITIAL EVALUATION ADULT. - OTHER INFO
Pt seen at bedside. Pt currently on Consistent CHO with evening snacks diet. Pt states having good appetite and PO intake currently and PTA. Pt denies n/v/c/d. Denies difficulty chewing/swallowing. No significant weight change. Pt has been long time user of HumaLOG and Lantus for T2DM. Pt is familiar with consistent CHO diet education.

## 2018-01-08 NOTE — PROGRESS NOTE ADULT - SUBJECTIVE AND OBJECTIVE BOX
Interval HPI/ Overnight Events:  Patient seen for follow up of type 2 DM.  Complains of pain in his neck today.  Was OOB to chair earlier     MEDICATIONS  (STANDING):  acetaminophen   Tablet. 650 milliGRAM(s) Oral every 6 hours  acetaminophen   Tablet. 1000 milliGRAM(s) Oral two times a day  amLODIPine   Tablet 10 milliGRAM(s) Oral daily  aspirin  chewable 81 milliGRAM(s) Oral daily  atorvastatin 20 milliGRAM(s) Oral at bedtime  BACItracin   Ointment 1 Application(s) Topical two times a day  carbidopa/levodopa  25/100 1 Tablet(s) Oral every 8 hours  carvedilol 6.25 milliGRAM(s) Oral every 12 hours  celecoxib 100 milliGRAM(s) Oral two times a day with meals  dextrose 5%. 1000 milliLiter(s) (50 mL/Hr) IV Continuous <Continuous>  dextrose 50% Injectable 12.5 Gram(s) IV Push once  dextrose 50% Injectable 25 Gram(s) IV Push once  dextrose 50% Injectable 25 Gram(s) IV Push once  docusate sodium 100 milliGRAM(s) Oral daily  escitalopram 10 milliGRAM(s) Oral daily  finasteride 5 milliGRAM(s) Oral daily  heparin  Injectable 5000 Unit(s) SubCutaneous every 8 hours  insulin glargine Injectable (LANTUS) 18 Unit(s) SubCutaneous at bedtime  insulin lispro (HumaLOG) corrective regimen sliding scale   SubCutaneous three times a day before meals  insulin lispro (HumaLOG) corrective regimen sliding scale   SubCutaneous at bedtime  insulin lispro Injectable (HumaLOG) 2 Unit(s) SubCutaneous three times a day with meals  lisinopril 5 milliGRAM(s) Oral daily  metolazone 5 milliGRAM(s) Oral daily  senna 1 Tablet(s) Oral daily  sodium chloride 0.9%. 1000 milliLiter(s) (60 mL/Hr) IV Continuous <Continuous>  tamsulosin 0.8 milliGRAM(s) Oral at bedtime  timolol 0.5% Solution 1 Drop(s) Both EYES every 6 hours  torsemide 20 milliGRAM(s) Oral daily    MEDICATIONS  (PRN):  dextrose Gel 1 Dose(s) Oral once PRN Blood Glucose LESS THAN 70 milliGRAM(s)/deciliter  dextrose Gel 1 Dose(s) Oral once PRN Blood Glucose LESS THAN 70 milliGRAM(s)/deciliter  dextrose Gel 1 Dose(s) Oral once PRN Blood Glucose LESS THAN 70 milliGRAM(s)/deciliter  glucagon  Injectable 1 milliGRAM(s) IntraMuscular once PRN Glucose LESS THAN 70 milligrams/deciliter  oxyCODONE    5 mG/acetaminophen 325 mG 1 Tablet(s) Oral every 6 hours PRN Moderate Pain (4 - 6)  oxyCODONE    5 mG/acetaminophen 325 mG 2 Tablet(s) Oral every 6 hours PRN Severe Pain (7 - 10)      Allergies  No Known Allergies    Review of systems: some neck pain, all the rest 10 negative.     Vital Signs Last 24 Hrs  T(C): 36.8 (08 Jan 2018 08:15), Max: 37.1 (08 Jan 2018 04:50)  T(F): 98.2 (08 Jan 2018 08:15), Max: 98.7 (08 Jan 2018 04:50)  HR: 60 (08 Jan 2018 08:15) (60 - 68)  BP: 134/73 (08 Jan 2018 08:15) (124/70 - 144/78)  BP(mean): --  RR: 16 (08 Jan 2018 08:15) (16 - 20)  SpO2: 93% (08 Jan 2018 08:15) (92% - 96%)    PHYSICAL EXAM:  Gen: elderly male, NAD  HEENT:  sclera anicteric, MMM  Neck:  no thyromegaly, no LAD  CV:  nl S1 + S2, RRR, no m/r/g  Resp:  nl respiratory effort, CTA b/l  GI/ Abd: soft, NT/ ND, BS +  MS:  no c/c/e, nl muscle tone    LABS:                        9.9    5.9   )-----------( 218      ( 08 Jan 2018 07:02 )             30.8     01-08    141  |  102  |  32.0<H>  ----------------------------<  112  4.3   |  29.0  |  1.22    Ca    8.4<L>      08 Jan 2018 07:02  Phos  3.7     01-08  Mg     1.9     01-08            CAPILLARY BLOOD GLUCOSE      RADIOLOGY & ADDITIONAL TESTS:

## 2018-01-08 NOTE — PROGRESS NOTE ADULT - ASSESSMENT
77 year old male with Type 2 DM, HTN, BPH, Parkinsons admitted after syncopal event, found to have CVA, humerus fracture and C2/ C7 fracture. al insulin.      Plan:  1.  Type 2 DM  continue checking BGS actid and hs   continue lantus 18 u HS   continue lispro       2. HTN-  controlled on Lisinopril, amlodipine and coreg.

## 2018-01-08 NOTE — PROGRESS NOTE ADULT - SUBJECTIVE AND OBJECTIVE BOX
ORTHO-SPINE PROGRESS NOTE:    Pt Name: SHARON FELDMAN    MRN: [id number]      Patient is a being followed for C2 vertebral fracture and C7 lamina Fx and left distal humerus fracture. He is being treated non-operatively. He is pending transfer to rehab.       PAST MEDICAL & SURGICAL HISTORY:  Legally blind  BPH (benign prostatic hyperplasia)  Type 2 diabetes mellitus  HTN (hypertension)  Parkinson disease  S/P ORIF (open reduction internal fixation) fracture: right hip  S/P TURP      Allergies: No Known Allergies      Medications: acetaminophen   Tablet. 650 milliGRAM(s) Oral every 6 hours  amLODIPine   Tablet 10 milliGRAM(s) Oral daily  aspirin  chewable 81 milliGRAM(s) Oral daily  atorvastatin 20 milliGRAM(s) Oral at bedtime  BACItracin   Ointment 1 Application(s) Topical two times a day  carbidopa/levodopa  25/100 1 Tablet(s) Oral every 8 hours  carvedilol 6.25 milliGRAM(s) Oral every 12 hours  dextrose 5%. 1000 milliLiter(s) IV Continuous <Continuous>  dextrose 50% Injectable 12.5 Gram(s) IV Push once  dextrose 50% Injectable 25 Gram(s) IV Push once  dextrose 50% Injectable 25 Gram(s) IV Push once  dextrose Gel 1 Dose(s) Oral once PRN  dextrose Gel 1 Dose(s) Oral once PRN  dextrose Gel 1 Dose(s) Oral once PRN  escitalopram 10 milliGRAM(s) Oral daily  finasteride 5 milliGRAM(s) Oral daily  glucagon  Injectable 1 milliGRAM(s) IntraMuscular once PRN  heparin  Injectable 5000 Unit(s) SubCutaneous every 8 hours  insulin glargine Injectable (LANTUS) 18 Unit(s) SubCutaneous at bedtime  insulin lispro (HumaLOG) corrective regimen sliding scale   SubCutaneous three times a day before meals  insulin lispro (HumaLOG) corrective regimen sliding scale   SubCutaneous at bedtime  insulin lispro Injectable (HumaLOG) 2 Unit(s) SubCutaneous three times a day with meals  lisinopril 5 milliGRAM(s) Oral daily  metolazone 5 milliGRAM(s) Oral daily  oxyCODONE    5 mG/acetaminophen 325 mG 1 Tablet(s) Oral every 6 hours PRN  oxyCODONE    5 mG/acetaminophen 325 mG 2 Tablet(s) Oral every 6 hours PRN  sodium chloride 0.9%. 1000 milliLiter(s) IV Continuous <Continuous>  tamsulosin 0.8 milliGRAM(s) Oral at bedtime  timolol 0.5% Solution 1 Drop(s) Both EYES every 6 hours  torsemide 20 milliGRAM(s) Oral daily        Ambulation: Walking independently [ ] With Cane [ ] With Walker [x]  Bedbound [ ]                           9.9    5.9   )-----------( 218      ( 08 Jan 2018 07:02 )             30.8     01-08    141  |  102  |  32.0<H>  ----------------------------<  112  4.3   |  29.0  |  1.22    Ca    8.4<L>      08 Jan 2018 07:02  Phos  3.7     01-08  Mg     1.9     01-08        PHYSICAL EXAM:    Vital Signs Last 24 Hrs  T(C): 37.1 (08 Jan 2018 04:50), Max: 37.1 (08 Jan 2018 04:50)  T(F): 98.7 (08 Jan 2018 04:50), Max: 98.7 (08 Jan 2018 04:50)  HR: 64 (08 Jan 2018 04:50) (60 - 68)  BP: 144/78 (08 Jan 2018 04:50) (118/68 - 144/78)  BP(mean): --  RR: 18 (08 Jan 2018 04:50) (18 - 20)  SpO2: 92% (08 Jan 2018 04:50) (92% - 97%)  Daily     Daily     Appearance: Alert, responsive, in no acute distress.    Neurological: Sensation is grossly intact to light touch. 4+/5 motor function of all extremities. No focal deficits or weaknesses found.    Skin: no rash on visible skin. Skin is clean, dry and intact. No bleeding. No abrasions. No ulcerations. No breakdown noted of the edges of the splint of the LEFT UE.    Vascular: 2+ distal pulses. Cap refill < 2 sec. No signs of venous   insufficiency   or stasis. No extremity ulcerations. No cyanosis.    Musculoskeletal:         Left Upper Extremity: + posterior splint noted.        Right Upper Extremity:  + NROM. Non-tender. No signs of trauma.       A/P:  Pt is a  77y Male with non-operative treatment of C2 vertebral fracture and C7 lamina Fx and left distal humerus fracture    PLAN:   * continue use of collar when out of bed   * continue use of posterior splint of the left UE  * continue MO / OT  * pending transfer to rehab ORTHO PROGRESS NOTE:    Pt Name: SHARON FELDMAN    MRN: [id number]      Patient is a being followed for left distal humerus fracture. He is being treated non-operatively. He is pending transfer to rehab.       PAST MEDICAL & SURGICAL HISTORY:  Legally blind  BPH (benign prostatic hyperplasia)  Type 2 diabetes mellitus  HTN (hypertension)  Parkinson disease  S/P ORIF (open reduction internal fixation) fracture: right hip  S/P TURP      Allergies: No Known Allergies      Medications: acetaminophen   Tablet. 650 milliGRAM(s) Oral every 6 hours  amLODIPine   Tablet 10 milliGRAM(s) Oral daily  aspirin  chewable 81 milliGRAM(s) Oral daily  atorvastatin 20 milliGRAM(s) Oral at bedtime  BACItracin   Ointment 1 Application(s) Topical two times a day  carbidopa/levodopa  25/100 1 Tablet(s) Oral every 8 hours  carvedilol 6.25 milliGRAM(s) Oral every 12 hours  dextrose 5%. 1000 milliLiter(s) IV Continuous <Continuous>  dextrose 50% Injectable 12.5 Gram(s) IV Push once  dextrose 50% Injectable 25 Gram(s) IV Push once  dextrose 50% Injectable 25 Gram(s) IV Push once  dextrose Gel 1 Dose(s) Oral once PRN  dextrose Gel 1 Dose(s) Oral once PRN  dextrose Gel 1 Dose(s) Oral once PRN  escitalopram 10 milliGRAM(s) Oral daily  finasteride 5 milliGRAM(s) Oral daily  glucagon  Injectable 1 milliGRAM(s) IntraMuscular once PRN  heparin  Injectable 5000 Unit(s) SubCutaneous every 8 hours  insulin glargine Injectable (LANTUS) 18 Unit(s) SubCutaneous at bedtime  insulin lispro (HumaLOG) corrective regimen sliding scale   SubCutaneous three times a day before meals  insulin lispro (HumaLOG) corrective regimen sliding scale   SubCutaneous at bedtime  insulin lispro Injectable (HumaLOG) 2 Unit(s) SubCutaneous three times a day with meals  lisinopril 5 milliGRAM(s) Oral daily  metolazone 5 milliGRAM(s) Oral daily  oxyCODONE    5 mG/acetaminophen 325 mG 1 Tablet(s) Oral every 6 hours PRN  oxyCODONE    5 mG/acetaminophen 325 mG 2 Tablet(s) Oral every 6 hours PRN  sodium chloride 0.9%. 1000 milliLiter(s) IV Continuous <Continuous>  tamsulosin 0.8 milliGRAM(s) Oral at bedtime  timolol 0.5% Solution 1 Drop(s) Both EYES every 6 hours  torsemide 20 milliGRAM(s) Oral daily        Ambulation: Walking independently [ ] With Cane [ ] With Walker [x]  Bedbound [ ]                           9.9    5.9   )-----------( 218      ( 08 Jan 2018 07:02 )             30.8     01-08    141  |  102  |  32.0<H>  ----------------------------<  112  4.3   |  29.0  |  1.22    Ca    8.4<L>      08 Jan 2018 07:02  Phos  3.7     01-08  Mg     1.9     01-08        PHYSICAL EXAM:    Vital Signs Last 24 Hrs  T(C): 37.1 (08 Jan 2018 04:50), Max: 37.1 (08 Jan 2018 04:50)  T(F): 98.7 (08 Jan 2018 04:50), Max: 98.7 (08 Jan 2018 04:50)  HR: 64 (08 Jan 2018 04:50) (60 - 68)  BP: 144/78 (08 Jan 2018 04:50) (118/68 - 144/78)  BP(mean): --  RR: 18 (08 Jan 2018 04:50) (18 - 20)  SpO2: 92% (08 Jan 2018 04:50) (92% - 97%)  Daily     Daily     Appearance: Alert, responsive, in no acute distress.    Neurological: Sensation is grossly intact to light touch. 4+/5 motor function of all extremities. No focal deficits or weaknesses found.    Skin: no rash on visible skin. Skin is clean, dry and intact. No bleeding. No abrasions. No ulcerations. No breakdown noted of the edges of the splint of the LEFT UE.    Vascular: 2+ distal pulses. Cap refill < 2 sec. No signs of venous   insufficiency   or stasis. No extremity ulcerations. No cyanosis.    Musculoskeletal:         Left Upper Extremity: + posterior splint noted.        Right Upper Extremity:  + NROM. Non-tender. No signs of trauma.       A/P:  Pt is a  77y Male with non-operative treatment of left distal humerus fracture    PLAN:   * continue use of posterior splint of the left UE  * continue DC / OT  * pending transfer to rehab

## 2018-01-08 NOTE — PROGRESS NOTE ADULT - SUBJECTIVE AND OBJECTIVE BOX
Patient seen and examined at bedside this AM. No acute events overnight. Continues to complain of left shoulder and back pain. Tolerating diabetic diet. Exhibiting normal bowel function. Denies fever, chills, nausea, vomiting chest pain, SOB, dizziness, abd pain or any other concerning symptoms    Vital Signs Last 24 Hrs  T(C): 36.8 (08 Jan 2018 08:15), Max: 37.1 (08 Jan 2018 04:50)  T(F): 98.2 (08 Jan 2018 08:15), Max: 98.7 (08 Jan 2018 04:50)  HR: 60 (08 Jan 2018 08:15) (60 - 68)  BP: 134/73 (08 Jan 2018 08:15) (124/70 - 144/78)  BP(mean): --  RR: 16 (08 Jan 2018 08:15) (16 - 20)  SpO2: 93% (08 Jan 2018 08:15) (92% - 96%)    I&O's Detail    07 Jan 2018 07:01  -  08 Jan 2018 07:00  --------------------------------------------------------  IN:    Oral Fluid: 420 mL    sodium chloride 0.9%: 2040 mL  Total IN: 2460 mL    OUT:    Indwelling Catheter - Urethral: 1850 mL  Total OUT: 1850 mL    Total NET: 610 mL      08 Jan 2018 07:01  -  08 Jan 2018 14:06  --------------------------------------------------------  IN:    Oral Fluid: 120 mL    sodium chloride 0.9%.: 180 mL  Total IN: 300 mL    OUT:    Indwelling Catheter - Urethral: 1000 mL    Voided: 500 mL  Total OUT: 1500 mL    Total NET: -1200 mL    Constitutional: patient resting comfortably in bed, in no acute distress  HEENT: EOMI / PERRL b/l  Neck: No JVD, full ROM without pain  Respiratory: CTAB with unlabored respirations, no accessory muscle use, no conversational dyspnea  Cardiovascular: regular rate & rhythm   Gastrointestinal: Abdomen soft, non-tender, non-distended, no rebound tenderness / guarding  Rectal: Not indicated   Neurological: GCS: 15 (4/5/6). A&O x 3; no gross sensory / motor / coordination deficits   Psychiatric: Depressed mood, flat affect   Musculoskeletal: LLE in sling for comfort, motor and sensory intact, compartments soft, extremities WWP    LABS:                        9.9    5.9   )-----------( 218      ( 08 Jan 2018 07:02 )             30.8     01-08    141  |  102  |  32.0<H>  ----------------------------<  112  4.3   |  29.0  |  1.22    Ca    8.4<L>      08 Jan 2018 07:02  Phos  3.7     01-08  Mg     1.9     01-08    MEDICATIONS  (STANDING):  acetaminophen   Tablet. 650 milliGRAM(s) Oral every 6 hours  acetaminophen   Tablet. 1000 milliGRAM(s) Oral two times a day  amLODIPine   Tablet 10 milliGRAM(s) Oral daily  aspirin  chewable 81 milliGRAM(s) Oral daily  atorvastatin 20 milliGRAM(s) Oral at bedtime  BACItracin   Ointment 1 Application(s) Topical two times a day  carbidopa/levodopa  25/100 1 Tablet(s) Oral every 8 hours  carvedilol 6.25 milliGRAM(s) Oral every 12 hours  celecoxib 100 milliGRAM(s) Oral two times a day with meals  dextrose 5%. 1000 milliLiter(s) (50 mL/Hr) IV Continuous <Continuous>  dextrose 50% Injectable 12.5 Gram(s) IV Push once  dextrose 50% Injectable 25 Gram(s) IV Push once  dextrose 50% Injectable 25 Gram(s) IV Push once  escitalopram 10 milliGRAM(s) Oral daily  finasteride 5 milliGRAM(s) Oral daily  heparin  Injectable 5000 Unit(s) SubCutaneous every 8 hours  insulin glargine Injectable (LANTUS) 18 Unit(s) SubCutaneous at bedtime  insulin lispro (HumaLOG) corrective regimen sliding scale   SubCutaneous three times a day before meals  insulin lispro (HumaLOG) corrective regimen sliding scale   SubCutaneous at bedtime  insulin lispro Injectable (HumaLOG) 2 Unit(s) SubCutaneous three times a day with meals  lisinopril 5 milliGRAM(s) Oral daily  metolazone 5 milliGRAM(s) Oral daily  sodium chloride 0.9%. 1000 milliLiter(s) (60 mL/Hr) IV Continuous <Continuous>  tamsulosin 0.8 milliGRAM(s) Oral at bedtime  timolol 0.5% Solution 1 Drop(s) Both EYES every 6 hours  torsemide 20 milliGRAM(s) Oral daily    MEDICATIONS  (PRN):  dextrose Gel 1 Dose(s) Oral once PRN Blood Glucose LESS THAN 70 milliGRAM(s)/deciliter  dextrose Gel 1 Dose(s) Oral once PRN Blood Glucose LESS THAN 70 milliGRAM(s)/deciliter  dextrose Gel 1 Dose(s) Oral once PRN Blood Glucose LESS THAN 70 milliGRAM(s)/deciliter  glucagon  Injectable 1 milliGRAM(s) IntraMuscular once PRN Glucose LESS THAN 70 milligrams/deciliter  oxyCODONE    5 mG/acetaminophen 325 mG 1 Tablet(s) Oral every 6 hours PRN Moderate Pain (4 - 6)  oxyCODONE    5 mG/acetaminophen 325 mG 2 Tablet(s) Oral every 6 hours PRN Severe Pain (7 - 10)      MICRO:       STUDIES: Patient seen and examined at bedside this AM. No acute events overnight. Continues to complain of left shoulder and back pain. Tolerating diabetic diet. Denies fever, chills, nausea, vomiting chest pain, SOB, dizziness, abd pain or any other concerning symptoms    Vital Signs Last 24 Hrs  T(C): 36.8 (08 Jan 2018 08:15), Max: 37.1 (08 Jan 2018 04:50)  T(F): 98.2 (08 Jan 2018 08:15), Max: 98.7 (08 Jan 2018 04:50)  HR: 60 (08 Jan 2018 08:15) (60 - 68)  BP: 134/73 (08 Jan 2018 08:15) (124/70 - 144/78)  BP(mean): --  RR: 16 (08 Jan 2018 08:15) (16 - 20)  SpO2: 93% (08 Jan 2018 08:15) (92% - 96%)    I&O's Detail    07 Jan 2018 07:01  -  08 Jan 2018 07:00  --------------------------------------------------------  IN:    Oral Fluid: 420 mL    sodium chloride 0.9%: 2040 mL  Total IN: 2460 mL    OUT:    Indwelling Catheter - Urethral: 1850 mL  Total OUT: 1850 mL    Total NET: 610 mL      08 Jan 2018 07:01  -  08 Jan 2018 14:06  --------------------------------------------------------  IN:    Oral Fluid: 120 mL    sodium chloride 0.9%.: 180 mL  Total IN: 300 mL    OUT:    Indwelling Catheter - Urethral: 1000 mL    Voided: 500 mL  Total OUT: 1500 mL    Total NET: -1200 mL    Constitutional: patient resting comfortably in bed, in no acute distress  HEENT: EOMI / PERRL b/l  Neck: No JVD, full ROM without pain  Respiratory: CTAB with unlabored respirations, no accessory muscle use, no conversational dyspnea  Cardiovascular: regular rate & rhythm   Gastrointestinal: Abdomen soft, non-tender, non-distended, no rebound tenderness / guarding  Rectal: Not indicated   Neurological: GCS: 15 (4/5/6). A&O x 3; no gross sensory / motor / coordination deficits   Psychiatric: Depressed mood, flat affect   Musculoskeletal: LLE in sling for comfort, motor and sensory intact, compartments soft, extremities WWP    LABS:                        9.9    5.9   )-----------( 218      ( 08 Jan 2018 07:02 )             30.8     01-08    141  |  102  |  32.0<H>  ----------------------------<  112  4.3   |  29.0  |  1.22    Ca    8.4<L>      08 Jan 2018 07:02  Phos  3.7     01-08  Mg     1.9     01-08    MEDICATIONS  (STANDING):  acetaminophen   Tablet. 650 milliGRAM(s) Oral every 6 hours  acetaminophen   Tablet. 1000 milliGRAM(s) Oral two times a day  amLODIPine   Tablet 10 milliGRAM(s) Oral daily  aspirin  chewable 81 milliGRAM(s) Oral daily  atorvastatin 20 milliGRAM(s) Oral at bedtime  BACItracin   Ointment 1 Application(s) Topical two times a day  carbidopa/levodopa  25/100 1 Tablet(s) Oral every 8 hours  carvedilol 6.25 milliGRAM(s) Oral every 12 hours  celecoxib 100 milliGRAM(s) Oral two times a day with meals  dextrose 5%. 1000 milliLiter(s) (50 mL/Hr) IV Continuous <Continuous>  dextrose 50% Injectable 12.5 Gram(s) IV Push once  dextrose 50% Injectable 25 Gram(s) IV Push once  dextrose 50% Injectable 25 Gram(s) IV Push once  escitalopram 10 milliGRAM(s) Oral daily  finasteride 5 milliGRAM(s) Oral daily  heparin  Injectable 5000 Unit(s) SubCutaneous every 8 hours  insulin glargine Injectable (LANTUS) 18 Unit(s) SubCutaneous at bedtime  insulin lispro (HumaLOG) corrective regimen sliding scale   SubCutaneous three times a day before meals  insulin lispro (HumaLOG) corrective regimen sliding scale   SubCutaneous at bedtime  insulin lispro Injectable (HumaLOG) 2 Unit(s) SubCutaneous three times a day with meals  lisinopril 5 milliGRAM(s) Oral daily  metolazone 5 milliGRAM(s) Oral daily  sodium chloride 0.9%. 1000 milliLiter(s) (60 mL/Hr) IV Continuous <Continuous>  tamsulosin 0.8 milliGRAM(s) Oral at bedtime  timolol 0.5% Solution 1 Drop(s) Both EYES every 6 hours  torsemide 20 milliGRAM(s) Oral daily    MEDICATIONS  (PRN):  dextrose Gel 1 Dose(s) Oral once PRN Blood Glucose LESS THAN 70 milliGRAM(s)/deciliter  dextrose Gel 1 Dose(s) Oral once PRN Blood Glucose LESS THAN 70 milliGRAM(s)/deciliter  dextrose Gel 1 Dose(s) Oral once PRN Blood Glucose LESS THAN 70 milliGRAM(s)/deciliter  glucagon  Injectable 1 milliGRAM(s) IntraMuscular once PRN Glucose LESS THAN 70 milligrams/deciliter  oxyCODONE    5 mG/acetaminophen 325 mG 1 Tablet(s) Oral every 6 hours PRN Moderate Pain (4 - 6)  oxyCODONE    5 mG/acetaminophen 325 mG 2 Tablet(s) Oral every 6 hours PRN Severe Pain (7 - 10)      MICRO:       STUDIES:

## 2018-01-08 NOTE — PROGRESS NOTE ADULT - SUBJECTIVE AND OBJECTIVE BOX
ORTHO-SPINE PROGRESS NOTE:    Pt Name: SHARON FELDMAN    MRN: [id number]      Patient is a being followed for C2 vertebral fracture and C7 lamina Fx . He is being treated non-operatively. He is pending transfer to rehab.       PAST MEDICAL & SURGICAL HISTORY:  Legally blind  BPH (benign prostatic hyperplasia)  Type 2 diabetes mellitus  HTN (hypertension)  Parkinson disease  S/P ORIF (open reduction internal fixation) fracture: right hip  S/P TURP      Allergies: No Known Allergies      Medications: acetaminophen   Tablet. 650 milliGRAM(s) Oral every 6 hours  amLODIPine   Tablet 10 milliGRAM(s) Oral daily  aspirin  chewable 81 milliGRAM(s) Oral daily  atorvastatin 20 milliGRAM(s) Oral at bedtime  BACItracin   Ointment 1 Application(s) Topical two times a day  carbidopa/levodopa  25/100 1 Tablet(s) Oral every 8 hours  carvedilol 6.25 milliGRAM(s) Oral every 12 hours  dextrose 5%. 1000 milliLiter(s) IV Continuous <Continuous>  dextrose 50% Injectable 12.5 Gram(s) IV Push once  dextrose 50% Injectable 25 Gram(s) IV Push once  dextrose 50% Injectable 25 Gram(s) IV Push once  dextrose Gel 1 Dose(s) Oral once PRN  dextrose Gel 1 Dose(s) Oral once PRN  dextrose Gel 1 Dose(s) Oral once PRN  escitalopram 10 milliGRAM(s) Oral daily  finasteride 5 milliGRAM(s) Oral daily  glucagon  Injectable 1 milliGRAM(s) IntraMuscular once PRN  heparin  Injectable 5000 Unit(s) SubCutaneous every 8 hours  insulin glargine Injectable (LANTUS) 18 Unit(s) SubCutaneous at bedtime  insulin lispro (HumaLOG) corrective regimen sliding scale   SubCutaneous three times a day before meals  insulin lispro (HumaLOG) corrective regimen sliding scale   SubCutaneous at bedtime  insulin lispro Injectable (HumaLOG) 2 Unit(s) SubCutaneous three times a day with meals  lisinopril 5 milliGRAM(s) Oral daily  metolazone 5 milliGRAM(s) Oral daily  oxyCODONE    5 mG/acetaminophen 325 mG 1 Tablet(s) Oral every 6 hours PRN  oxyCODONE    5 mG/acetaminophen 325 mG 2 Tablet(s) Oral every 6 hours PRN  sodium chloride 0.9%. 1000 milliLiter(s) IV Continuous <Continuous>  tamsulosin 0.8 milliGRAM(s) Oral at bedtime  timolol 0.5% Solution 1 Drop(s) Both EYES every 6 hours  torsemide 20 milliGRAM(s) Oral daily        Ambulation: Walking independently [ ] With Cane [ ] With Walker [x]  Bedbound [ ]                           9.9    5.9   )-----------( 218      ( 08 Jan 2018 07:02 )             30.8     01-08    141  |  102  |  32.0<H>  ----------------------------<  112  4.3   |  29.0  |  1.22    Ca    8.4<L>      08 Jan 2018 07:02  Phos  3.7     01-08  Mg     1.9     01-08        PHYSICAL EXAM:    Vital Signs Last 24 Hrs  T(C): 37.1 (08 Jan 2018 04:50), Max: 37.1 (08 Jan 2018 04:50)  T(F): 98.7 (08 Jan 2018 04:50), Max: 98.7 (08 Jan 2018 04:50)  HR: 64 (08 Jan 2018 04:50) (60 - 68)  BP: 144/78 (08 Jan 2018 04:50) (118/68 - 144/78)  BP(mean): --  RR: 18 (08 Jan 2018 04:50) (18 - 20)  SpO2: 92% (08 Jan 2018 04:50) (92% - 97%)  Daily     Daily     Appearance: Alert, responsive, in no acute distress.    Neurological: Sensation is grossly intact to light touch. 4+/5 motor function of all extremities. No focal deficits or weaknesses found.    Skin: no rash on visible skin. Skin is clean, dry and intact. No bleeding. No abrasions. No ulcerations.     Vascular: 2+ distal pulses. Cap refill < 2 sec. No signs of venous   insufficiency   or stasis. No extremity ulcerations. No cyanosis.    Musculoskeletal:         Left Upper Extremity: + posterior splint noted.        Right Upper Extremity:  + NROM. Non-tender. No signs of trauma.       A/P:  Pt is a  77y Male with non-operative treatment of C2 vertebral fracture and C7 lamina Fx     PLAN:   * continue use of collar when out of bed   * continue AL / OT  * pending transfer to rehab

## 2018-01-08 NOTE — PROGRESS NOTE ADULT - ASSESSMENT
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 Hospital course complicated by urinary retention - Ledesma was initial.ly d/c but had to be re-inserted 1/6. Urine lytes subsequently obtained 1/7.

## 2018-01-09 LAB
GLUCOSE BLDC GLUCOMTR-MCNC: 122 MG/DL — HIGH (ref 70–99)
GLUCOSE BLDC GLUCOMTR-MCNC: 142 MG/DL — HIGH (ref 70–99)
GLUCOSE BLDC GLUCOMTR-MCNC: 163 MG/DL — HIGH (ref 70–99)

## 2018-01-09 PROCEDURE — 99232 SBSQ HOSP IP/OBS MODERATE 35: CPT

## 2018-01-09 PROCEDURE — 99231 SBSQ HOSP IP/OBS SF/LOW 25: CPT

## 2018-01-09 RX ORDER — LIDOCAINE 4 G/100G
1 CREAM TOPICAL DAILY
Qty: 0 | Refills: 0 | Status: DISCONTINUED | OUTPATIENT
Start: 2018-01-09 | End: 2018-01-11

## 2018-01-09 RX ORDER — OXYCODONE HYDROCHLORIDE 5 MG/1
1 TABLET ORAL
Qty: 0 | Refills: 0 | COMMUNITY
Start: 2018-01-09

## 2018-01-09 RX ORDER — AMLODIPINE BESYLATE 2.5 MG/1
1 TABLET ORAL
Qty: 0 | Refills: 0 | COMMUNITY
Start: 2018-01-09

## 2018-01-09 RX ORDER — TRAMADOL HYDROCHLORIDE 50 MG/1
0.5 TABLET ORAL
Qty: 0 | Refills: 0 | COMMUNITY
Start: 2018-01-09

## 2018-01-09 RX ORDER — OXYCODONE HYDROCHLORIDE 5 MG/1
5 TABLET ORAL EVERY 4 HOURS
Qty: 0 | Refills: 0 | Status: DISCONTINUED | OUTPATIENT
Start: 2018-01-09 | End: 2018-01-11

## 2018-01-09 RX ORDER — DOCUSATE SODIUM 100 MG
1 CAPSULE ORAL
Qty: 0 | Refills: 0 | COMMUNITY
Start: 2018-01-09

## 2018-01-09 RX ORDER — INSULIN LISPRO 100/ML
2 VIAL (ML) SUBCUTANEOUS
Qty: 0 | Refills: 0 | COMMUNITY
Start: 2018-01-09

## 2018-01-09 RX ORDER — ACETAMINOPHEN 500 MG
2 TABLET ORAL
Qty: 0 | Refills: 0 | COMMUNITY
Start: 2018-01-09

## 2018-01-09 RX ORDER — SENNA PLUS 8.6 MG/1
1 TABLET ORAL
Qty: 0 | Refills: 0 | COMMUNITY
Start: 2018-01-09

## 2018-01-09 RX ORDER — TAMSULOSIN HYDROCHLORIDE 0.4 MG/1
2 CAPSULE ORAL
Qty: 0 | Refills: 0 | COMMUNITY
Start: 2018-01-09

## 2018-01-09 RX ORDER — INSULIN GLARGINE 100 [IU]/ML
18 INJECTION, SOLUTION SUBCUTANEOUS
Qty: 0 | Refills: 0 | COMMUNITY
Start: 2018-01-09

## 2018-01-09 RX ORDER — TRAMADOL HYDROCHLORIDE 50 MG/1
25 TABLET ORAL EVERY 4 HOURS
Qty: 0 | Refills: 0 | Status: DISCONTINUED | OUTPATIENT
Start: 2018-01-09 | End: 2018-01-09

## 2018-01-09 RX ORDER — ATORVASTATIN CALCIUM 80 MG/1
1 TABLET, FILM COATED ORAL
Qty: 0 | Refills: 0 | COMMUNITY
Start: 2018-01-09

## 2018-01-09 RX ORDER — BACITRACIN ZINC 500 UNIT/G
1 OINTMENT IN PACKET (EA) TOPICAL
Qty: 0 | Refills: 0 | COMMUNITY
Start: 2018-01-09

## 2018-01-09 RX ORDER — INSULIN LISPRO 100/ML
0 VIAL (ML) SUBCUTANEOUS
Qty: 0 | Refills: 0 | COMMUNITY
Start: 2018-01-09

## 2018-01-09 RX ORDER — OXYCODONE HYDROCHLORIDE 5 MG/1
10 TABLET ORAL EVERY 4 HOURS
Qty: 0 | Refills: 0 | Status: DISCONTINUED | OUTPATIENT
Start: 2018-01-09 | End: 2018-01-11

## 2018-01-09 RX ORDER — CARBIDOPA AND LEVODOPA 25; 100 MG/1; MG/1
1 TABLET ORAL
Qty: 0 | Refills: 0 | COMMUNITY
Start: 2018-01-09

## 2018-01-09 RX ORDER — LISINOPRIL 2.5 MG/1
1 TABLET ORAL
Qty: 0 | Refills: 0 | COMMUNITY
Start: 2018-01-09

## 2018-01-09 RX ORDER — TIMOLOL 0.5 %
1 DROPS OPHTHALMIC (EYE)
Qty: 0 | Refills: 0 | COMMUNITY
Start: 2018-01-09

## 2018-01-09 RX ORDER — CELECOXIB 200 MG/1
1 CAPSULE ORAL
Qty: 0 | Refills: 0 | COMMUNITY
Start: 2018-01-09

## 2018-01-09 RX ORDER — LIDOCAINE 4 G/100G
1 CREAM TOPICAL
Qty: 0 | Refills: 0 | COMMUNITY
Start: 2018-01-09

## 2018-01-09 RX ORDER — OXYCODONE HYDROCHLORIDE 5 MG/1
5 TABLET ORAL EVERY 4 HOURS
Qty: 0 | Refills: 0 | Status: DISCONTINUED | OUTPATIENT
Start: 2018-01-09 | End: 2018-01-09

## 2018-01-09 RX ORDER — CARVEDILOL PHOSPHATE 80 MG/1
1 CAPSULE, EXTENDED RELEASE ORAL
Qty: 0 | Refills: 0 | COMMUNITY
Start: 2018-01-09

## 2018-01-09 RX ORDER — TRAMADOL HYDROCHLORIDE 50 MG/1
1 TABLET ORAL
Qty: 0 | Refills: 0 | COMMUNITY
Start: 2018-01-09

## 2018-01-09 RX ORDER — FINASTERIDE 5 MG/1
1 TABLET, FILM COATED ORAL
Qty: 0 | Refills: 0 | COMMUNITY
Start: 2018-01-09

## 2018-01-09 RX ORDER — ESCITALOPRAM OXALATE 10 MG/1
1 TABLET, FILM COATED ORAL
Qty: 0 | Refills: 0 | COMMUNITY
Start: 2018-01-09

## 2018-01-09 RX ORDER — OXYCODONE HYDROCHLORIDE 5 MG/1
10 TABLET ORAL EVERY 4 HOURS
Qty: 0 | Refills: 0 | Status: DISCONTINUED | OUTPATIENT
Start: 2018-01-09 | End: 2018-01-09

## 2018-01-09 RX ORDER — TRAMADOL HYDROCHLORIDE 50 MG/1
50 TABLET ORAL EVERY 4 HOURS
Qty: 0 | Refills: 0 | Status: DISCONTINUED | OUTPATIENT
Start: 2018-01-09 | End: 2018-01-09

## 2018-01-09 RX ADMIN — Medication 650 MILLIGRAM(S): at 06:12

## 2018-01-09 RX ADMIN — INSULIN GLARGINE 18 UNIT(S): 100 INJECTION, SOLUTION SUBCUTANEOUS at 22:58

## 2018-01-09 RX ADMIN — Medication 650 MILLIGRAM(S): at 08:07

## 2018-01-09 RX ADMIN — FINASTERIDE 5 MILLIGRAM(S): 5 TABLET, FILM COATED ORAL at 14:18

## 2018-01-09 RX ADMIN — SENNA PLUS 1 TABLET(S): 8.6 TABLET ORAL at 14:18

## 2018-01-09 RX ADMIN — TAMSULOSIN HYDROCHLORIDE 0.8 MILLIGRAM(S): 0.4 CAPSULE ORAL at 22:57

## 2018-01-09 RX ADMIN — Medication 650 MILLIGRAM(S): at 06:42

## 2018-01-09 RX ADMIN — Medication 2 UNIT(S): at 14:19

## 2018-01-09 RX ADMIN — Medication 1 DROP(S): at 00:35

## 2018-01-09 RX ADMIN — LIDOCAINE 1 PATCH: 4 CREAM TOPICAL at 14:19

## 2018-01-09 RX ADMIN — AMLODIPINE BESYLATE 10 MILLIGRAM(S): 2.5 TABLET ORAL at 06:12

## 2018-01-09 RX ADMIN — OXYCODONE HYDROCHLORIDE 10 MILLIGRAM(S): 5 TABLET ORAL at 14:48

## 2018-01-09 RX ADMIN — HEPARIN SODIUM 5000 UNIT(S): 5000 INJECTION INTRAVENOUS; SUBCUTANEOUS at 14:19

## 2018-01-09 RX ADMIN — CELECOXIB 100 MILLIGRAM(S): 200 CAPSULE ORAL at 17:13

## 2018-01-09 RX ADMIN — HEPARIN SODIUM 5000 UNIT(S): 5000 INJECTION INTRAVENOUS; SUBCUTANEOUS at 06:11

## 2018-01-09 RX ADMIN — Medication 650 MILLIGRAM(S): at 14:18

## 2018-01-09 RX ADMIN — CARBIDOPA AND LEVODOPA 1 TABLET(S): 25; 100 TABLET ORAL at 20:45

## 2018-01-09 RX ADMIN — HEPARIN SODIUM 5000 UNIT(S): 5000 INJECTION INTRAVENOUS; SUBCUTANEOUS at 22:57

## 2018-01-09 RX ADMIN — ESCITALOPRAM OXALATE 10 MILLIGRAM(S): 10 TABLET, FILM COATED ORAL at 14:18

## 2018-01-09 RX ADMIN — Medication 81 MILLIGRAM(S): at 14:18

## 2018-01-09 RX ADMIN — Medication 20 MILLIGRAM(S): at 06:07

## 2018-01-09 RX ADMIN — Medication 1 APPLICATION(S): at 16:41

## 2018-01-09 RX ADMIN — Medication 1 DROP(S): at 16:43

## 2018-01-09 RX ADMIN — CELECOXIB 100 MILLIGRAM(S): 200 CAPSULE ORAL at 09:34

## 2018-01-09 RX ADMIN — OXYCODONE HYDROCHLORIDE 5 MILLIGRAM(S): 5 TABLET ORAL at 17:13

## 2018-01-09 RX ADMIN — CARBIDOPA AND LEVODOPA 1 TABLET(S): 25; 100 TABLET ORAL at 06:11

## 2018-01-09 RX ADMIN — Medication 1 DROP(S): at 14:17

## 2018-01-09 RX ADMIN — Medication 100 MILLIGRAM(S): at 14:19

## 2018-01-09 RX ADMIN — ATORVASTATIN CALCIUM 20 MILLIGRAM(S): 80 TABLET, FILM COATED ORAL at 22:57

## 2018-01-09 RX ADMIN — Medication 1 DROP(S): at 06:07

## 2018-01-09 RX ADMIN — CELECOXIB 100 MILLIGRAM(S): 200 CAPSULE ORAL at 09:35

## 2018-01-09 RX ADMIN — Medication 650 MILLIGRAM(S): at 00:35

## 2018-01-09 RX ADMIN — CELECOXIB 100 MILLIGRAM(S): 200 CAPSULE ORAL at 16:43

## 2018-01-09 RX ADMIN — CARVEDILOL PHOSPHATE 6.25 MILLIGRAM(S): 80 CAPSULE, EXTENDED RELEASE ORAL at 16:43

## 2018-01-09 RX ADMIN — CARBIDOPA AND LEVODOPA 1 TABLET(S): 25; 100 TABLET ORAL at 14:18

## 2018-01-09 RX ADMIN — OXYCODONE HYDROCHLORIDE 5 MILLIGRAM(S): 5 TABLET ORAL at 16:42

## 2018-01-09 RX ADMIN — Medication 2: at 14:19

## 2018-01-09 RX ADMIN — Medication 1 APPLICATION(S): at 06:11

## 2018-01-09 RX ADMIN — Medication 650 MILLIGRAM(S): at 14:48

## 2018-01-09 RX ADMIN — CARVEDILOL PHOSPHATE 6.25 MILLIGRAM(S): 80 CAPSULE, EXTENDED RELEASE ORAL at 06:11

## 2018-01-09 RX ADMIN — Medication 1 DROP(S): at 23:00

## 2018-01-09 RX ADMIN — LISINOPRIL 5 MILLIGRAM(S): 2.5 TABLET ORAL at 06:07

## 2018-01-09 RX ADMIN — OXYCODONE HYDROCHLORIDE 10 MILLIGRAM(S): 5 TABLET ORAL at 14:18

## 2018-01-09 NOTE — PROGRESS NOTE ADULT - SUBJECTIVE AND OBJECTIVE BOX
Patient in chair and is more positive about his ability that yesterday.  Worked with therapy this AM and requires significant assist.   Pain is located in the elbow only.   No numbness.   Patient feels weak.     FUNCTIONAL PROGRESS  1/9  Bed Mobility  Bed Mobility Training Sit-to-Supine: maximum assist (25% patient effort);  1 person assist  Bed Mobility Training Supine-to-Sit: maximum assist (25% patient effort);  1 person assist    Sit-Stand Transfer Training  Transfer Training Sit-to-Stand Transfer: moderate assist (50% patient effort);  1 person assist;  weight-bearing as tolerated   rolling walker  Transfer Training Stand-to-Sit Transfer: moderate assist (50% patient effort);  1 person assist;  weight-bearing as tolerated   rolling walker  Sit-to-Stand Transfer Training Transfer Safety Analysis: decreased weight-shifting ability;  decreased balance    Gait Training  Gait Training: maximum assist (25% patient effort);  1 person assist;  verbal cues;  nonweight-bearing   L UE   hha;  5 feet;  bed to chair    REVIEW OF SYSTEMS  Constitutional - No fever,  +fatigue  HEENT - No vertigo, No neck pain  Neurological - No headaches, No memory loss, +loss of strength, No numbness, No tremors  Skin - No rashes, No lesions   Musculoskeletal - +joint pain, No joint swelling, +muscle pain  Psychiatric - +depression, No anxiety    VITALS  T(C): 37.1 (01-09-18 @ 04:50), Max: 37.2 (01-08-18 @ 23:30)  HR: 64 (01-09-18 @ 04:50) (62 - 64)  BP: 132/67 (01-09-18 @ 04:50) (132/67 - 154/77)  RR: 18 (01-09-18 @ 04:50) (18 - 19)  SpO2: 96% (01-09-18 @ 04:50) (94% - 96%)  Wt(kg): --    MEDICATIONS   acetaminophen   Tablet. 650 milliGRAM(s) every 6 hours  acetaminophen   Tablet. 1000 milliGRAM(s) two times a day  amLODIPine   Tablet 10 milliGRAM(s) daily  aspirin  chewable 81 milliGRAM(s) daily  atorvastatin 20 milliGRAM(s) at bedtime  BACItracin   Ointment 1 Application(s) two times a day  carbidopa/levodopa  25/100 1 Tablet(s) every 8 hours  carvedilol 6.25 milliGRAM(s) every 12 hours  celecoxib 100 milliGRAM(s) two times a day with meals  dextrose 5%. 1000 milliLiter(s) <Continuous>  dextrose 50% Injectable 12.5 Gram(s) once  dextrose 50% Injectable 25 Gram(s) once  dextrose 50% Injectable 25 Gram(s) once  dextrose Gel 1 Dose(s) once PRN  dextrose Gel 1 Dose(s) once PRN  dextrose Gel 1 Dose(s) once PRN  docusate sodium 100 milliGRAM(s) daily  escitalopram 10 milliGRAM(s) daily  finasteride 5 milliGRAM(s) daily  glucagon  Injectable 1 milliGRAM(s) once PRN  heparin  Injectable 5000 Unit(s) every 8 hours  insulin glargine Injectable (LANTUS) 18 Unit(s) at bedtime  insulin lispro (HumaLOG) corrective regimen sliding scale   three times a day before meals  insulin lispro (HumaLOG) corrective regimen sliding scale   at bedtime  insulin lispro Injectable (HumaLOG) 2 Unit(s) three times a day with meals  lisinopril 5 milliGRAM(s) daily  metolazone 5 milliGRAM(s) daily  oxyCODONE    5 mG/acetaminophen 325 mG 1 Tablet(s) every 6 hours PRN  oxyCODONE    5 mG/acetaminophen 325 mG 2 Tablet(s) every 6 hours PRN  senna 1 Tablet(s) daily  sodium chloride 0.9%. 1000 milliLiter(s) <Continuous>  tamsulosin 0.8 milliGRAM(s) at bedtime  timolol 0.5% Solution 1 Drop(s) every 6 hours  torsemide 20 milliGRAM(s) daily      RECENT LABS/IMAGING  CBC Full  -  ( 08 Jan 2018 07:02 )  WBC Count : 5.9 K/uL  Hemoglobin : 9.9 g/dL  Hematocrit : 30.8 %  Platelet Count - Automated : 218 K/uL  Mean Cell Volume : 95.7 fl  Mean Cell Hemoglobin : 30.7 pg  Mean Cell Hemoglobin Concentration : 32.1 g/dL  Auto Neutrophil # : 3.0 K/uL  Auto Lymphocyte # : 1.7 K/uL  Auto Monocyte # : 0.7 K/uL  Auto Eosinophil # : 0.5 K/uL  Auto Basophil # : 0.0 K/uL  Auto Neutrophil % : 51.2 %  Auto Lymphocyte % : 28.3 %  Auto Monocyte % : 11.6 %  Auto Eosinophil % : 8.0 %  Auto Basophil % : 0.7 %    01-08    141  |  102  |  32.0<H>  ----------------------------<  112  4.3   |  29.0  |  1.22    Ca    8.4<L>      08 Jan 2018 07:02  Phos  3.7     01-08  Mg     1.9     01-08        -----------------------------------------------------------------------  PHYSICAL EXAM  Constitutional - NAD, Comfortable  HEENT - Left cranial abrasion - healing   Neck - Supple, CCollar  Extremities - Left hand edema, Left UE in sling/ACE wrapped  Neurologic Exam -                    Cognitive - Awake, Alert, AAO to self, place, date, year, situation     Communication - Fluent, Mild dysarthria     Motor -                      LEFT    UE - ShAB 1/5, WE 1/5,  2/5                    RIGHT UE - ShAB 2/5, EF 4/5, EE 4/5,  5/5                    LEFT    LE - HF 1/5, KE 4/5, DF 4/5, PF 4/5                    RIGHT LE - HF 1/5, KE 4/5, DF 4/5, PF 4/5        Sensory - Intact to LT to left hand/digits  Psychiatric - Mood depressed, Affect Flat  ----------------------------------------------------------------------------------------  ASSESSMENT/PLAN  77yMale with functional deficits after a fall sustaining a left humeral fracture, C2 and C7 fracture with subacute CVA after syncope.   Pain - Tylenol, Percocet  DVT PPX - Heparin  Urinary retention - Callie, can be managed at acute rehab  Rehab - Recommend ACUTE inpatient rehabilitation for the functional deficits consisting of 3 hours of therapy/day & 24 hour RN/daily PMR physician for comorbid medical management. Will continue to follow for ongoing rehab needs and recommendations.

## 2018-01-09 NOTE — PROGRESS NOTE ADULT - PROBLEM SELECTOR PLAN 2
Details: Left distal humerus fx, cervical spine C2 vertebral body fx, C7 lamina fx. Deemed WBAT B/L LE and R UE; NWB Left upper extremity. Continue conservative mgmt per Vivian Britton and Ana Lilia
EEG and carotid duplex unremarkable
Vivian Britton and Ana Lilia seeing patient for left distal humerus fx, cervical spine C2 vertebral body fx, C7 lamina fx  -Conservative mgmt  -Deemed WBAT B/L LE and R UE  -NWB Left upper extremity
Vivian Britton and Ana Lilia seeing patient for left distal humerus fx, cervical spine C2 vertebral body fx, C7 lamina fx. Deemed WBAT B/L LE and R UE; NWB Left upper extremity. Continue conservative mgmt.

## 2018-01-09 NOTE — PROGRESS NOTE ADULT - SUBJECTIVE AND OBJECTIVE BOX
[facility] - Mr Armstrong is post op day 1 from sigmoid colectomy for diverticulitis. Cefoxitin day 2. NPO.     HPI/OVERNIGHT EVENTS: Patient seen and examined at bedside this AM. Nursing reports (any overnight issues) How the patient is feeling (current complaints) Evidence of bowel function or dysfunction (flatus vs n/v)     Patient seen and examined at bedside this AM. No acute events overnight     OR    The patient had an unwitnessed fall while attempting to get out of bed. He said he fell on his left side. Neurological and musculoskeletal exams have been unchanged from baseline. CT of the head was unremarkable.     Denies fever, chills, nausea, vomitting, chest pain, SOB, dizziness, abd pain or any other concerning symptoms    Vital Signs Last 24 Hrs  T(C): 37 (09 Jan 2018 08:33), Max: 37.2 (08 Jan 2018 23:30)  T(F): 98.6 (09 Jan 2018 08:33), Max: 98.9 (08 Jan 2018 23:30)  HR: 62 (09 Jan 2018 08:33) (62 - 64)  BP: 130/68 (09 Jan 2018 08:33) (130/68 - 154/77)  BP(mean): --  RR: 18 (09 Jan 2018 08:33) (18 - 19)  SpO2: 96% (09 Jan 2018 08:33) (94% - 96%)    I&O's Detail    08 Jan 2018 07:01  -  09 Jan 2018 07:00  --------------------------------------------------------  IN:    Oral Fluid: 1080 mL    sodium chloride 0.9%.: 1200 mL  Total IN: 2280 mL    OUT:    Indwelling Catheter - Urethral: 2600 mL  Total OUT: 2600 mL    Total NET: -320 mL          Ins - IVF type & rate, TNP, PO, TUbe feeds type and rate   Outs - Urine, BM, Drains, NG tube, Chest tube (amount & kind) EDUARDO#1 right put out 75cc serosang fluid    Constitutional: patient resting comfortably in bed, in no acute distress  HEENT: EOMI / PERRL b/l, scleral icterus? any active drainage or redness?   Neck: No JVD, full ROM without pain? Thyroid midline? Masses? Lymphadenopathy?   Respiratory: CTAB? respirations are unlabored, no accessory muscle use, no conversational dyspnea  Cardiovascular: regular rate & rhythm? Arrhythmias? Murmurs?   Gastrointestinal: Abdomen soft, non-tender, non-distended, no rebound tenderness / guarding  Incision/Wound: Clean, dry and intact? Monitor for erythema, warmth, drainage Remove post op dressing on POD#2 then change daily  Rectal: Visible/palpable lesions? Tone? Tenderness? Stool? Fecal occult blood?   Neurological: GCS: 15 (4/5/6). A&O x 3; no gross sensory / motor / coordination deficits  Back: CVA tenderness   Psychiatric: Normal mood, normal affect  Musculoskeletal: No joint pain, swelling or deformity; no limitation of movement; Color? EDema? Lesions? Cold? Weak? Insensate or WWP?     LABS:                        9.9    5.9   )-----------( 218      ( 08 Jan 2018 07:02 )             30.8     01-08    141  |  102  |  32.0<H>  ----------------------------<  112  4.3   |  29.0  |  1.22    Ca    8.4<L>      08 Jan 2018 07:02  Phos  3.7     01-08  Mg     1.9     01-08            MEDICATIONS  (STANDING):  acetaminophen   Tablet. 650 milliGRAM(s) Oral every 6 hours  amLODIPine   Tablet 10 milliGRAM(s) Oral daily  aspirin  chewable 81 milliGRAM(s) Oral daily  atorvastatin 20 milliGRAM(s) Oral at bedtime  BACItracin   Ointment 1 Application(s) Topical two times a day  carbidopa/levodopa  25/100 1 Tablet(s) Oral every 8 hours  carvedilol 6.25 milliGRAM(s) Oral every 12 hours  celecoxib 100 milliGRAM(s) Oral two times a day with meals  dextrose 5%. 1000 milliLiter(s) (50 mL/Hr) IV Continuous <Continuous>  dextrose 50% Injectable 12.5 Gram(s) IV Push once  dextrose 50% Injectable 25 Gram(s) IV Push once  dextrose 50% Injectable 25 Gram(s) IV Push once  docusate sodium 100 milliGRAM(s) Oral daily  escitalopram 10 milliGRAM(s) Oral daily  finasteride 5 milliGRAM(s) Oral daily  heparin  Injectable 5000 Unit(s) SubCutaneous every 8 hours  insulin glargine Injectable (LANTUS) 18 Unit(s) SubCutaneous at bedtime  insulin lispro (HumaLOG) corrective regimen sliding scale   SubCutaneous three times a day before meals  insulin lispro (HumaLOG) corrective regimen sliding scale   SubCutaneous at bedtime  insulin lispro Injectable (HumaLOG) 2 Unit(s) SubCutaneous three times a day with meals  lisinopril 5 milliGRAM(s) Oral daily  metolazone 5 milliGRAM(s) Oral daily  senna 1 Tablet(s) Oral daily  sodium chloride 0.9%. 1000 milliLiter(s) (60 mL/Hr) IV Continuous <Continuous>  tamsulosin 0.8 milliGRAM(s) Oral at bedtime  timolol 0.5% Solution 1 Drop(s) Both EYES every 6 hours  torsemide 20 milliGRAM(s) Oral daily    MEDICATIONS  (PRN):  dextrose Gel 1 Dose(s) Oral once PRN Blood Glucose LESS THAN 70 milliGRAM(s)/deciliter  dextrose Gel 1 Dose(s) Oral once PRN Blood Glucose LESS THAN 70 milliGRAM(s)/deciliter  dextrose Gel 1 Dose(s) Oral once PRN Blood Glucose LESS THAN 70 milliGRAM(s)/deciliter  glucagon  Injectable 1 milliGRAM(s) IntraMuscular once PRN Glucose LESS THAN 70 milligrams/deciliter  oxyCODONE    IR 5 milliGRAM(s) Oral every 4 hours PRN Moderate Pain (4 - 6)  oxyCODONE    IR 10 milliGRAM(s) Oral every 4 hours PRN Severe Pain (7 - 10)      MICRO:   Cultures     STUDIES:   EKG, CXR, U/S, CT, MRI Patient seen and examined at bedside this AM. No acute events overnight. Continues to complain of left shoulder and back pain. Tolerating diabetic diet. Minimally ambulatory with PT assistance. Denies fever, chills, nausea, vomiting chest pain, SOB, dizziness, abd pain or any other concerning symptoms.     Vital Signs Last 24 Hrs  T(C): 37 (09 Jan 2018 08:33), Max: 37.2 (08 Jan 2018 23:30)  T(F): 98.6 (09 Jan 2018 08:33), Max: 98.9 (08 Jan 2018 23:30)  HR: 62 (09 Jan 2018 08:33) (62 - 64)  BP: 130/68 (09 Jan 2018 08:33) (130/68 - 154/77)  BP(mean): --  RR: 18 (09 Jan 2018 08:33) (18 - 19)  SpO2: 96% (09 Jan 2018 08:33) (94% - 96%)    I&O's Detail    08 Jan 2018 07:01  -  09 Jan 2018 07:00  --------------------------------------------------------  IN:    Oral Fluid: 1080 mL    sodium chloride 0.9%.: 1200 mL  Total IN: 2280 mL    OUT:    Indwelling Catheter - Urethral: 2600 mL  Total OUT: 2600 mL    Total NET: -320 mL    Constitutional: patient resting comfortably in bed, in no acute distress  HEENT: EOMI / PERRL b/l  Neck: No JVD, full ROM without pain  Respiratory: CTAB with unlabored respirations, no accessory muscle use, no conversational dyspnea  Cardiovascular: regular rate & rhythm   Gastrointestinal: Abdomen soft, non-tender, non-distended, no rebound tenderness / guarding  Rectal: Not indicated   Neurological: GCS: 15 (4/5/6). A&O x 3; no gross sensory / motor / coordination deficits   Psychiatric: Depressed mood, flat affect   Musculoskeletal: LLE in sling for comfort, motor and sensory intact, compartments soft, extremities WWP    LABS:                        9.9    5.9   )-----------( 218      ( 08 Jan 2018 07:02 )             30.8     01-08    141  |  102  |  32.0<H>  ----------------------------<  112  4.3   |  29.0  |  1.22    Ca    8.4<L>      08 Jan 2018 07:02  Phos  3.7     01-08  Mg     1.9     01-08    MEDICATIONS  (STANDING):  acetaminophen   Tablet. 650 milliGRAM(s) Oral every 6 hours  amLODIPine   Tablet 10 milliGRAM(s) Oral daily  aspirin  chewable 81 milliGRAM(s) Oral daily  atorvastatin 20 milliGRAM(s) Oral at bedtime  BACItracin   Ointment 1 Application(s) Topical two times a day  carbidopa/levodopa  25/100 1 Tablet(s) Oral every 8 hours  carvedilol 6.25 milliGRAM(s) Oral every 12 hours  celecoxib 100 milliGRAM(s) Oral two times a day with meals  dextrose 5%. 1000 milliLiter(s) (50 mL/Hr) IV Continuous <Continuous>  dextrose 50% Injectable 12.5 Gram(s) IV Push once  dextrose 50% Injectable 25 Gram(s) IV Push once  dextrose 50% Injectable 25 Gram(s) IV Push once  docusate sodium 100 milliGRAM(s) Oral daily  escitalopram 10 milliGRAM(s) Oral daily  finasteride 5 milliGRAM(s) Oral daily  heparin  Injectable 5000 Unit(s) SubCutaneous every 8 hours  insulin glargine Injectable (LANTUS) 18 Unit(s) SubCutaneous at bedtime  insulin lispro (HumaLOG) corrective regimen sliding scale   SubCutaneous three times a day before meals  insulin lispro (HumaLOG) corrective regimen sliding scale   SubCutaneous at bedtime  insulin lispro Injectable (HumaLOG) 2 Unit(s) SubCutaneous three times a day with meals  lisinopril 5 milliGRAM(s) Oral daily  metolazone 5 milliGRAM(s) Oral daily  senna 1 Tablet(s) Oral daily  sodium chloride 0.9%. 1000 milliLiter(s) (60 mL/Hr) IV Continuous <Continuous>  tamsulosin 0.8 milliGRAM(s) Oral at bedtime  timolol 0.5% Solution 1 Drop(s) Both EYES every 6 hours  torsemide 20 milliGRAM(s) Oral daily    MEDICATIONS  (PRN):  dextrose Gel 1 Dose(s) Oral once PRN Blood Glucose LESS THAN 70 milliGRAM(s)/deciliter  dextrose Gel 1 Dose(s) Oral once PRN Blood Glucose LESS THAN 70 milliGRAM(s)/deciliter  dextrose Gel 1 Dose(s) Oral once PRN Blood Glucose LESS THAN 70 milliGRAM(s)/deciliter  glucagon  Injectable 1 milliGRAM(s) IntraMuscular once PRN Glucose LESS THAN 70 milligrams/deciliter  oxyCODONE    IR 5 milliGRAM(s) Oral every 4 hours PRN Moderate Pain (4 - 6)  oxyCODONE    IR 10 milliGRAM(s) Oral every 4 hours PRN Severe Pain (7 - 10)      MICRO:       STUDIES:

## 2018-01-09 NOTE — PROGRESS NOTE ADULT - ASSESSMENT
77 year old male s/p syncope and fall found to have an old R occipital lobe infarct, C2 & C7 fx and mildly displaced transcondylar fx of L distal humerus. Hospital course complicated by urinary retention requiring Ledesma re-insertion. Urine lytes sent and FeNa noted to be 5%, possibly contrast induced.

## 2018-01-09 NOTE — PROGRESS NOTE ADULT - PROBLEM SELECTOR PROBLEM 1
Syncope and collapse
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
Syncope and collapse
Syncope and collapse
Other closed nondisplaced fracture of second cervical vertebra, initial encounter

## 2018-01-09 NOTE — PROGRESS NOTE ADULT - PROBLEM SELECTOR PLAN 1
1. Neuro - Pain control improved today given yesterday's dose changes   2. Cardiovascular - On home BP meds with no new issues  3. Pulmonary - Encourage ICS use   4. Fluids/Electrolytes/Nutrition (FEN) - Replace electrolytes appropriately per AM labs   5. GI - Diabetic diet and bowel regimen available    6. /Renal - Voiding via Ledesma; Urine lytes (Urine Na - 128; Urine Cr - 22; FeNa calculated to be 5.03%)   7. Heme - Stable asymptomatic anemia with H/H 9.9/30.8<-10.1/31.3 yesterday (no new CBC results today)  8. ID - Afebrile with no leukocytosis   9. Endocrine - Last fingersticks 142<-186<-158; Currently on Lantus to 18 units QHS and continue premeal humalog per endocrine recs   10. MSK - Worked with physical therapy early this AM and noted to require a lot of assistance   11. Prophylaxis - Hep SubQ   12. Lines/Tubes/Drains - Peripheral IV access   13. Disposition - Acute rehab per PT and PMNR
1. Neuro - Additional pain medications added (tylenol and celebrex)   2. Cardiovascular - On home BP meds with no new issues  3. Pulmonary - Encourage ICS use   4. Fluids/Electrolytes/Nutrition (FEN) - Replace electrolytes appropriately per AM labs   5. GI - Diabetic diet and bowel regimen available    6. /Renal - Voiding via Ledesma; Urine lytes (Urine Na - 128; Urine Cr - 22; FeNa calculated to be 5.03%)   7. Heme - Stable asymptomatic anemia with H/H 9.9/30.8<-10.1/31.3  8. ID - Afebrile with no leukocytosis   9. Endocrine - Lantus to 18 units QHS and continue premeal humalog per endocrine recs   10. MSK - Encourage Out of bed->Chair->Ambulating->Range of Motion exercises   11. Prophylaxis - Hep SubQ   12. Lines/Tubes/Drains - Peripheral IV access   13. Disposition - Acute rehab per PT and PMNR
1. Neuro - On Parkinson home medication; encourage patient to ask for pain medication and encourage nurses to offer   2. Cardiovascular - On home BP meds with no new issues  3. Pulmonary - Encourage ICS use   4. Fluids/Electrolytes/Nutrition (FEN) - Replace electrolytes appropriately per AM labs   5. GI - Diabetic diet having normal bowel function   6. /Renal - Voiding via Ledesma; Obtain urine lytes today   7. Heme - Stable asymptomatic anemia with H/H 10/31  8. ID - Afebrile with no leukocytosis   9. Endocrine - Lantus to 18 units QHS and continue premeal humalog per endocrine recs   10. MSK - Encourage Out of bed->Chair->Ambulating->Range of Motion exercises   11. Prophylaxis - Hep SubQ   12. Lines/Tubes/Drains - Peripheral IV access   13. Disposition - Acute rehab per PT and PMNR
ASA if ok with trauma  carotid duplex no stenosis

## 2018-01-10 LAB
GLUCOSE BLDC GLUCOMTR-MCNC: 224 MG/DL — HIGH (ref 70–99)
GLUCOSE BLDC GLUCOMTR-MCNC: 238 MG/DL — HIGH (ref 70–99)
GLUCOSE BLDC GLUCOMTR-MCNC: 86 MG/DL — SIGNIFICANT CHANGE UP (ref 70–99)

## 2018-01-10 PROCEDURE — 99232 SBSQ HOSP IP/OBS MODERATE 35: CPT

## 2018-01-10 RX ADMIN — CELECOXIB 100 MILLIGRAM(S): 200 CAPSULE ORAL at 12:46

## 2018-01-10 RX ADMIN — CARBIDOPA AND LEVODOPA 1 TABLET(S): 25; 100 TABLET ORAL at 05:59

## 2018-01-10 RX ADMIN — Medication 100 MILLIGRAM(S): at 12:51

## 2018-01-10 RX ADMIN — OXYCODONE HYDROCHLORIDE 5 MILLIGRAM(S): 5 TABLET ORAL at 03:30

## 2018-01-10 RX ADMIN — CELECOXIB 100 MILLIGRAM(S): 200 CAPSULE ORAL at 20:00

## 2018-01-10 RX ADMIN — Medication 650 MILLIGRAM(S): at 18:31

## 2018-01-10 RX ADMIN — HEPARIN SODIUM 5000 UNIT(S): 5000 INJECTION INTRAVENOUS; SUBCUTANEOUS at 14:22

## 2018-01-10 RX ADMIN — ESCITALOPRAM OXALATE 10 MILLIGRAM(S): 10 TABLET, FILM COATED ORAL at 12:51

## 2018-01-10 RX ADMIN — Medication 650 MILLIGRAM(S): at 13:00

## 2018-01-10 RX ADMIN — Medication 4: at 18:29

## 2018-01-10 RX ADMIN — OXYCODONE HYDROCHLORIDE 5 MILLIGRAM(S): 5 TABLET ORAL at 02:55

## 2018-01-10 RX ADMIN — CARBIDOPA AND LEVODOPA 1 TABLET(S): 25; 100 TABLET ORAL at 12:53

## 2018-01-10 RX ADMIN — Medication 650 MILLIGRAM(S): at 23:19

## 2018-01-10 RX ADMIN — Medication 650 MILLIGRAM(S): at 06:01

## 2018-01-10 RX ADMIN — CELECOXIB 100 MILLIGRAM(S): 200 CAPSULE ORAL at 09:00

## 2018-01-10 RX ADMIN — Medication 1 DROP(S): at 18:32

## 2018-01-10 RX ADMIN — ATORVASTATIN CALCIUM 20 MILLIGRAM(S): 80 TABLET, FILM COATED ORAL at 21:34

## 2018-01-10 RX ADMIN — CARVEDILOL PHOSPHATE 6.25 MILLIGRAM(S): 80 CAPSULE, EXTENDED RELEASE ORAL at 06:01

## 2018-01-10 RX ADMIN — LISINOPRIL 5 MILLIGRAM(S): 2.5 TABLET ORAL at 06:00

## 2018-01-10 RX ADMIN — Medication 650 MILLIGRAM(S): at 20:00

## 2018-01-10 RX ADMIN — Medication 1 DROP(S): at 23:19

## 2018-01-10 RX ADMIN — Medication 2 UNIT(S): at 09:00

## 2018-01-10 RX ADMIN — OXYCODONE HYDROCHLORIDE 10 MILLIGRAM(S): 5 TABLET ORAL at 13:21

## 2018-01-10 RX ADMIN — Medication 650 MILLIGRAM(S): at 12:19

## 2018-01-10 RX ADMIN — Medication 1 DROP(S): at 06:01

## 2018-01-10 RX ADMIN — Medication 1 APPLICATION(S): at 06:01

## 2018-01-10 RX ADMIN — Medication 650 MILLIGRAM(S): at 23:22

## 2018-01-10 RX ADMIN — CARBIDOPA AND LEVODOPA 1 TABLET(S): 25; 100 TABLET ORAL at 21:35

## 2018-01-10 RX ADMIN — OXYCODONE HYDROCHLORIDE 10 MILLIGRAM(S): 5 TABLET ORAL at 12:51

## 2018-01-10 RX ADMIN — HEPARIN SODIUM 5000 UNIT(S): 5000 INJECTION INTRAVENOUS; SUBCUTANEOUS at 06:01

## 2018-01-10 RX ADMIN — TAMSULOSIN HYDROCHLORIDE 0.8 MILLIGRAM(S): 0.4 CAPSULE ORAL at 21:34

## 2018-01-10 RX ADMIN — LIDOCAINE 1 PATCH: 4 CREAM TOPICAL at 12:51

## 2018-01-10 RX ADMIN — Medication 81 MILLIGRAM(S): at 18:20

## 2018-01-10 RX ADMIN — SENNA PLUS 1 TABLET(S): 8.6 TABLET ORAL at 18:25

## 2018-01-10 RX ADMIN — SODIUM CHLORIDE 60 MILLILITER(S): 9 INJECTION INTRAMUSCULAR; INTRAVENOUS; SUBCUTANEOUS at 09:00

## 2018-01-10 RX ADMIN — AMLODIPINE BESYLATE 10 MILLIGRAM(S): 2.5 TABLET ORAL at 06:01

## 2018-01-10 RX ADMIN — Medication 1 APPLICATION(S): at 18:32

## 2018-01-10 RX ADMIN — INSULIN GLARGINE 18 UNIT(S): 100 INJECTION, SOLUTION SUBCUTANEOUS at 21:37

## 2018-01-10 RX ADMIN — CELECOXIB 100 MILLIGRAM(S): 200 CAPSULE ORAL at 18:31

## 2018-01-10 RX ADMIN — Medication 2 UNIT(S): at 18:29

## 2018-01-10 RX ADMIN — Medication 20 MILLIGRAM(S): at 06:00

## 2018-01-10 RX ADMIN — CARVEDILOL PHOSPHATE 6.25 MILLIGRAM(S): 80 CAPSULE, EXTENDED RELEASE ORAL at 18:31

## 2018-01-10 RX ADMIN — FINASTERIDE 5 MILLIGRAM(S): 5 TABLET, FILM COATED ORAL at 12:51

## 2018-01-10 RX ADMIN — Medication 2 UNIT(S): at 12:51

## 2018-01-10 RX ADMIN — HEPARIN SODIUM 5000 UNIT(S): 5000 INJECTION INTRAVENOUS; SUBCUTANEOUS at 21:34

## 2018-01-10 NOTE — PROGRESS NOTE ADULT - ASSESSMENT
77 year old male with Type 2 DM, HTN, BPH, Parkinsons admitted after syncopal event, found to have CVA, humerus fracture and C2/ C7 fracture. al insulin.      Plan:  1.  Type 2 DM: very good Bgs right now.   continue checking BGS actid and hs   continue lantus 18 u HS   continue lispro       2. HTN-  controlled on Lisinopril, amlodipine and coreg.

## 2018-01-10 NOTE — PROGRESS NOTE ADULT - SUBJECTIVE AND OBJECTIVE BOX
INTERVAL HPI/OVERNIGHT EVENTS:  Follow up for diabetes.   Patient working with OT and PT.  Bharathi, but states has no pain, its because of his weakness.  Poor body control with retropulsion.     MEDICATIONS  (STANDING):  acetaminophen   Tablet. 650 milliGRAM(s) Oral every 6 hours  amLODIPine   Tablet 10 milliGRAM(s) Oral daily  aspirin  chewable 81 milliGRAM(s) Oral daily  atorvastatin 20 milliGRAM(s) Oral at bedtime  BACItracin   Ointment 1 Application(s) Topical two times a day  carbidopa/levodopa  25/100 1 Tablet(s) Oral every 8 hours  carvedilol 6.25 milliGRAM(s) Oral every 12 hours  celecoxib 100 milliGRAM(s) Oral two times a day with meals  dextrose 5%. 1000 milliLiter(s) (50 mL/Hr) IV Continuous <Continuous>  dextrose 50% Injectable 12.5 Gram(s) IV Push once  dextrose 50% Injectable 25 Gram(s) IV Push once  dextrose 50% Injectable 25 Gram(s) IV Push once  docusate sodium 100 milliGRAM(s) Oral daily  escitalopram 10 milliGRAM(s) Oral daily  finasteride 5 milliGRAM(s) Oral daily  heparin  Injectable 5000 Unit(s) SubCutaneous every 8 hours  insulin glargine Injectable (LANTUS) 18 Unit(s) SubCutaneous at bedtime  insulin lispro (HumaLOG) corrective regimen sliding scale   SubCutaneous three times a day before meals  insulin lispro (HumaLOG) corrective regimen sliding scale   SubCutaneous at bedtime  insulin lispro Injectable (HumaLOG) 2 Unit(s) SubCutaneous three times a day with meals  lidocaine   Patch 1 Patch Transdermal daily  lisinopril 5 milliGRAM(s) Oral daily  metolazone 5 milliGRAM(s) Oral daily  senna 1 Tablet(s) Oral daily  sodium chloride 0.9%. 1000 milliLiter(s) (60 mL/Hr) IV Continuous <Continuous>  tamsulosin 0.8 milliGRAM(s) Oral at bedtime  timolol 0.5% Solution 1 Drop(s) Both EYES every 6 hours  torsemide 20 milliGRAM(s) Oral daily    MEDICATIONS  (PRN):  dextrose Gel 1 Dose(s) Oral once PRN Blood Glucose LESS THAN 70 milliGRAM(s)/deciliter  dextrose Gel 1 Dose(s) Oral once PRN Blood Glucose LESS THAN 70 milliGRAM(s)/deciliter  dextrose Gel 1 Dose(s) Oral once PRN Blood Glucose LESS THAN 70 milliGRAM(s)/deciliter  glucagon  Injectable 1 milliGRAM(s) IntraMuscular once PRN Glucose LESS THAN 70 milligrams/deciliter  oxyCODONE    IR 5 milliGRAM(s) Oral every 4 hours PRN Moderate Pain (4 - 6)  oxyCODONE    IR 10 milliGRAM(s) Oral every 4 hours PRN Severe Pain (7 - 10)      Allergies  No Known Allergies    Review of systems: generalized weakness otherwise all negative.     Vital Signs Last 24 Hrs  T(C): 36.5 (10 [place] 2018 09:22), Max: 37 (09 Jan 2018 23:18)  T(F): 97.7 (10 [place] 2018 09:22), Max: 98.6 (09 Jan 2018 23:18)  HR: 63 (10 [place] 2018 09:22) (62 - 65)  BP: 135/81 (10 [place] 2018 09:22) (111/68 - 152/77)  BP(mean): --  RR: 18 (10 [place] 2018 09:22) (17 - 18)  SpO2: 97% (10 [place] 2018 09:22) (92% - 97%)    PHYSICAL EXAM:  Gen: elderly male, NAD  HEENT:  sclera anicteric, MMM  Neck:  no thyromegaly, no LAD  CV:  nl S1 + S2, RRR, no m/r/g  Resp:  nl respiratory effort, CTA b/l  GI/ Abd: soft, NT/ ND, BS +  MS:  no c/c/e, nl muscle tone

## 2018-01-10 NOTE — PROGRESS NOTE ADULT - SUBJECTIVE AND OBJECTIVE BOX
Patient working with OT and PT.  Bharathi, but states has no pain, its because of his weakness.  Poor body control with retropulsion.   Patient's mood still guarded about his recovery.    FUNCTIONAL PROGRESS  1/9  Bed Mobility  Bed Mobility Training Sit-to-Supine: maximum assist (25% patient effort);  1 person assist  Bed Mobility Training Supine-to-Sit: maximum assist (25% patient effort);  1 person assist    Sit-Stand Transfer Training  Transfer Training Sit-to-Stand Transfer: moderate assist (50% patient effort);  1 person assist;  weight-bearing as tolerated   rolling walker  Transfer Training Stand-to-Sit Transfer: moderate assist (50% patient effort);  1 person assist;  weight-bearing as tolerated   rolling walker  Sit-to-Stand Transfer Training Transfer Safety Analysis: decreased weight-shifting ability;  decreased balance    Gait Training  Gait Training: maximum assist (25% patient effort);  1 person assist;  verbal cues;  nonweight-bearing   L UE   hha;  5 feet;  bed to chair  Gait Analysis: 3-point gait   decreased step length;  increased time in double stance;  decreased     REVIEW OF SYSTEMS  Constitutional - No fever,  +fatigue  HEENT - No vertigo, No neck pain  Neurological - +loss of strength, No numbness, +tremors  Musculoskeletal - No joint pain, No joint swelling, No muscle pain  Psychiatric - +depression, +anxiety    VITALS  T(C): 36.5 (01-10-18 @ 09:22), Max: 37.1 (01-09-18 @ 15:27)  HR: 63 (01-10-18 @ 09:22) (62 - 65)  BP: 135/81 (01-10-18 @ 09:22) (111/68 - 152/77)  RR: 18 (01-10-18 @ 09:22) (17 - 18)  SpO2: 97% (01-10-18 @ 09:22) (92% - 97%)  Wt(kg): --    MEDICATIONS   acetaminophen   Tablet. 650 milliGRAM(s) every 6 hours  amLODIPine   Tablet 10 milliGRAM(s) daily  aspirin  chewable 81 milliGRAM(s) daily  atorvastatin 20 milliGRAM(s) at bedtime  BACItracin   Ointment 1 Application(s) two times a day  carbidopa/levodopa  25/100 1 Tablet(s) every 8 hours  carvedilol 6.25 milliGRAM(s) every 12 hours  celecoxib 100 milliGRAM(s) two times a day with meals  dextrose 5%. 1000 milliLiter(s) <Continuous>  dextrose 50% Injectable 12.5 Gram(s) once  dextrose 50% Injectable 25 Gram(s) once  dextrose 50% Injectable 25 Gram(s) once  dextrose Gel 1 Dose(s) once PRN  dextrose Gel 1 Dose(s) once PRN  dextrose Gel 1 Dose(s) once PRN  docusate sodium 100 milliGRAM(s) daily  escitalopram 10 milliGRAM(s) daily  finasteride 5 milliGRAM(s) daily  glucagon  Injectable 1 milliGRAM(s) once PRN  heparin  Injectable 5000 Unit(s) every 8 hours  insulin glargine Injectable (LANTUS) 18 Unit(s) at bedtime  insulin lispro (HumaLOG) corrective regimen sliding scale   three times a day before meals  insulin lispro (HumaLOG) corrective regimen sliding scale   at bedtime  insulin lispro Injectable (HumaLOG) 2 Unit(s) three times a day with meals  lidocaine   Patch 1 Patch daily  lisinopril 5 milliGRAM(s) daily  metolazone 5 milliGRAM(s) daily  oxyCODONE    IR 5 milliGRAM(s) every 4 hours PRN  oxyCODONE    IR 10 milliGRAM(s) every 4 hours PRN  senna 1 Tablet(s) daily  sodium chloride 0.9%. 1000 milliLiter(s) <Continuous>  tamsulosin 0.8 milliGRAM(s) at bedtime  timolol 0.5% Solution 1 Drop(s) every 6 hours  torsemide 20 milliGRAM(s) daily        -----------------------------------------------------------------------  PHYSICAL EXAM  Constitutional - NAD, Uncomfortable  HEENT - Left cranial abrasion - healing   Neck - C-Collar  Extremities - Left hand edema - improved, Left UE in sling/ACE wrapped  Neurologic Exam -                    Cognitive - AAOx3     Communication - Fluent, Mild dysarthria     Motor -  Standing balance significantly impaired                    LEFT    UE - ShAB 1/5, WE 1/5,  2/5                    RIGHT UE - ShAB 2/5, EF 4/5, EE 4/5,  5/5                    LEFT    LE - HF 1/5, KE 4/5, DF 4/5, PF 4/5                    RIGHT LE - HF 1/5, KE 4/5, DF 4/5, PF 4/5        Sensory - Intact to LT to left hand/digits  Psychiatric - Mood depressed, Affect Flat  ----------------------------------------------------------------------------------------  ASSESSMENT/PLAN  77yMale with functional deficits after a fall sustaining a left humeral fracture, C2 and C7 fracture with subacute CVA after syncope.   Pain - Tylenol, Oxycodone, Celebrex  DVT PPX - Heparin  Urinary retention - Callie, can be managed at acute rehab  Rehab - Recommend ACUTE inpatient rehabilitation for the functional deficits consisting of 3 hours of therapy/day & 24 hour RN/daily PMR physician for comorbid medical management. Will continue to follow for ongoing rehab needs and recommendations.

## 2018-01-10 NOTE — PROGRESS NOTE ADULT - SUBJECTIVE AND OBJECTIVE BOX
Acute Care Surgery /Trauma PROGRESS NOTE:     SUBJECTIVE: Pt seen and examined at bedside. No acute overnight events. Pain well controlled. Tolerating diet, no n/v. Voiding well. Passing flatus, normal BM. Denies f/c/sob/cp. Ambulatory OOB    Vital Signs Last 24 Hrs  T(C): 36.5 (10 Herberth 2018 09:22), Max: 37.1 (09 Jan 2018 15:27)  T(F): 97.7 (10 Herberth 2018 09:22), Max: 98.7 (09 Jan 2018 15:27)  HR: 63 (10 Herberth 2018 09:22) (62 - 65)  BP: 135/81 (10 Herberth 2018 09:22) (111/68 - 152/77)  BP(mean): --  RR: 18 (10 Herberth 2018 09:22) (17 - 18)  SpO2: 97% (10 Herberth 2018 09:22) (92% - 97%)    OBJECTIVE:  NAD  NC/AT  RRR  No respiratory distress  Abd soft, nondistended, nontender, no guarding or rebound. No peritoneal signs.  No pedal edema    I&O's Detail    09 Jan 2018 07:01  -  10 Herberth 2018 07:00  --------------------------------------------------------  IN:    Oral Fluid: 1020 mL    sodium chloride 0.9%.: 1020 mL  Total IN: 2040 mL    OUT:    Indwelling Catheter - Urethral: 2325 mL  Total OUT: 2325 mL    Total NET: -285 mL      10 Ehrberth 2018 07:01  -  10 Herberth 2018 11:17  --------------------------------------------------------  IN:    Oral Fluid: 120 mL    sodium chloride 0.9%.: 180 mL  Total IN: 300 mL    OUT:    Indwelling Catheter - Urethral: 1400 mL  Total OUT: 1400 mL    Total NET: -1100 mL          MEDICATIONS  (STANDING):  acetaminophen   Tablet. 650 milliGRAM(s) Oral every 6 hours  amLODIPine   Tablet 10 milliGRAM(s) Oral daily  aspirin  chewable 81 milliGRAM(s) Oral daily  atorvastatin 20 milliGRAM(s) Oral at bedtime  BACItracin   Ointment 1 Application(s) Topical two times a day  carbidopa/levodopa  25/100 1 Tablet(s) Oral every 8 hours  carvedilol 6.25 milliGRAM(s) Oral every 12 hours  celecoxib 100 milliGRAM(s) Oral two times a day with meals  dextrose 5%. 1000 milliLiter(s) (50 mL/Hr) IV Continuous <Continuous>  dextrose 50% Injectable 12.5 Gram(s) IV Push once  dextrose 50% Injectable 25 Gram(s) IV Push once  dextrose 50% Injectable 25 Gram(s) IV Push once  docusate sodium 100 milliGRAM(s) Oral daily  escitalopram 10 milliGRAM(s) Oral daily  finasteride 5 milliGRAM(s) Oral daily  heparin  Injectable 5000 Unit(s) SubCutaneous every 8 hours  insulin glargine Injectable (LANTUS) 18 Unit(s) SubCutaneous at bedtime  insulin lispro (HumaLOG) corrective regimen sliding scale   SubCutaneous three times a day before meals  insulin lispro (HumaLOG) corrective regimen sliding scale   SubCutaneous at bedtime  insulin lispro Injectable (HumaLOG) 2 Unit(s) SubCutaneous three times a day with meals  lidocaine   Patch 1 Patch Transdermal daily  lisinopril 5 milliGRAM(s) Oral daily  metolazone 5 milliGRAM(s) Oral daily  senna 1 Tablet(s) Oral daily  sodium chloride 0.9%. 1000 milliLiter(s) (60 mL/Hr) IV Continuous <Continuous>  tamsulosin 0.8 milliGRAM(s) Oral at bedtime  timolol 0.5% Solution 1 Drop(s) Both EYES every 6 hours  torsemide 20 milliGRAM(s) Oral daily    MEDICATIONS  (PRN):  dextrose Gel 1 Dose(s) Oral once PRN Blood Glucose LESS THAN 70 milliGRAM(s)/deciliter  dextrose Gel 1 Dose(s) Oral once PRN Blood Glucose LESS THAN 70 milliGRAM(s)/deciliter  dextrose Gel 1 Dose(s) Oral once PRN Blood Glucose LESS THAN 70 milliGRAM(s)/deciliter  glucagon  Injectable 1 milliGRAM(s) IntraMuscular once PRN Glucose LESS THAN 70 milligrams/deciliter  oxyCODONE    IR 5 milliGRAM(s) Oral every 4 hours PRN Moderate Pain (4 - 6)  oxyCODONE    IR 10 milliGRAM(s) Oral every 4 hours PRN Severe Pain (7 - 10)      LABS:                  RADIOLOGY & ADDITIONAL STUDIES: Acute Care Surgery /Trauma PROGRESS NOTE:     SUBJECTIVE: Pt seen and examined at bedside. No new issues, awaiting placement    Vital Signs Last 24 Hrs  T(C): 36.5 (10 Herberth 2018 09:22), Max: 37.1 (09 Jan 2018 15:27)  T(F): 97.7 (10 Herberth 2018 09:22), Max: 98.7 (09 Jan 2018 15:27)  HR: 63 (10 Herberth 2018 09:22) (62 - 65)  BP: 135/81 (10 Herberth 2018 09:22) (111/68 - 152/77)  BP(mean): --  RR: 18 (10 Herberth 2018 09:22) (17 - 18)  SpO2: 97% (10 Herberth 2018 09:22) (92% - 97%)    OBJECTIVE:  NAD  NC/AT  RRR  No respiratory distress  Abd soft, nondistended, nontender, no guarding or rebound. No peritoneal signs.  No pedal edema  neuro grossly itnact    I&O's Detail    09 Jan 2018 07:01  -  10 Herberth 2018 07:00  --------------------------------------------------------  IN:    Oral Fluid: 1020 mL    sodium chloride 0.9%.: 1020 mL  Total IN: 2040 mL    OUT:    Indwelling Catheter - Urethral: 2325 mL  Total OUT: 2325 mL    Total NET: -285 mL      10 Herberth 2018 07:01  -  10 Herberth 2018 11:17  --------------------------------------------------------  IN:    Oral Fluid: 120 mL    sodium chloride 0.9%.: 180 mL  Total IN: 300 mL    OUT:    Indwelling Catheter - Urethral: 1400 mL  Total OUT: 1400 mL    Total NET: -1100 mL          MEDICATIONS  (STANDING):  acetaminophen   Tablet. 650 milliGRAM(s) Oral every 6 hours  amLODIPine   Tablet 10 milliGRAM(s) Oral daily  aspirin  chewable 81 milliGRAM(s) Oral daily  atorvastatin 20 milliGRAM(s) Oral at bedtime  BACItracin   Ointment 1 Application(s) Topical two times a day  carbidopa/levodopa  25/100 1 Tablet(s) Oral every 8 hours  carvedilol 6.25 milliGRAM(s) Oral every 12 hours  celecoxib 100 milliGRAM(s) Oral two times a day with meals  dextrose 5%. 1000 milliLiter(s) (50 mL/Hr) IV Continuous <Continuous>  dextrose 50% Injectable 12.5 Gram(s) IV Push once  dextrose 50% Injectable 25 Gram(s) IV Push once  dextrose 50% Injectable 25 Gram(s) IV Push once  docusate sodium 100 milliGRAM(s) Oral daily  escitalopram 10 milliGRAM(s) Oral daily  finasteride 5 milliGRAM(s) Oral daily  heparin  Injectable 5000 Unit(s) SubCutaneous every 8 hours  insulin glargine Injectable (LANTUS) 18 Unit(s) SubCutaneous at bedtime  insulin lispro (HumaLOG) corrective regimen sliding scale   SubCutaneous three times a day before meals  insulin lispro (HumaLOG) corrective regimen sliding scale   SubCutaneous at bedtime  insulin lispro Injectable (HumaLOG) 2 Unit(s) SubCutaneous three times a day with meals  lidocaine   Patch 1 Patch Transdermal daily  lisinopril 5 milliGRAM(s) Oral daily  metolazone 5 milliGRAM(s) Oral daily  senna 1 Tablet(s) Oral daily  sodium chloride 0.9%. 1000 milliLiter(s) (60 mL/Hr) IV Continuous <Continuous>  tamsulosin 0.8 milliGRAM(s) Oral at bedtime  timolol 0.5% Solution 1 Drop(s) Both EYES every 6 hours  torsemide 20 milliGRAM(s) Oral daily    MEDICATIONS  (PRN):  dextrose Gel 1 Dose(s) Oral once PRN Blood Glucose LESS THAN 70 milliGRAM(s)/deciliter  dextrose Gel 1 Dose(s) Oral once PRN Blood Glucose LESS THAN 70 milliGRAM(s)/deciliter  dextrose Gel 1 Dose(s) Oral once PRN Blood Glucose LESS THAN 70 milliGRAM(s)/deciliter  glucagon  Injectable 1 milliGRAM(s) IntraMuscular once PRN Glucose LESS THAN 70 milligrams/deciliter  oxyCODONE    IR 5 milliGRAM(s) Oral every 4 hours PRN Moderate Pain (4 - 6)  oxyCODONE    IR 10 milliGRAM(s) Oral every 4 hours PRN Severe Pain (7 - 10)      LABS:                  RADIOLOGY & ADDITIONAL STUDIES:

## 2018-01-10 NOTE — PROGRESS NOTE ADULT - ASSESSMENT
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 Hospital course complicated by urinary retention requiring Healy re-insertion. Urine lytes sent and FeNa noted to be 5%  -reg diet  -pain control  -DVT ppx  -strict I/Os  -encourage OOB  -C collar when OOB  -incentive spirometer  -healy for retention-discharge with healy intact  -ISS  -PT  -dispo: acute rehab today

## 2018-01-11 ENCOUNTER — INPATIENT (INPATIENT)
Facility: HOSPITAL | Age: 78
LOS: 20 days | Discharge: NURSING, REHAB OR LONG-TERM CARE | DRG: 949 | End: 2018-02-01
Attending: PHYSICAL MEDICINE & REHABILITATION | Admitting: PHYSICAL MEDICINE & REHABILITATION
Payer: COMMERCIAL

## 2018-01-11 VITALS
HEART RATE: 67 BPM | DIASTOLIC BLOOD PRESSURE: 63 MMHG | OXYGEN SATURATION: 95 % | RESPIRATION RATE: 18 BRPM | SYSTOLIC BLOOD PRESSURE: 134 MMHG | TEMPERATURE: 98 F

## 2018-01-11 DIAGNOSIS — S12.100D UNSPECIFIED DISPLACED FRACTURE OF SECOND CERVICAL VERTEBRA, SUBSEQUENT ENCOUNTER FOR FRACTURE WITH ROUTINE HEALING: ICD-10-CM

## 2018-01-11 DIAGNOSIS — S12.9XXA FRACTURE OF NECK, UNSPECIFIED, INITIAL ENCOUNTER: ICD-10-CM

## 2018-01-11 DIAGNOSIS — D64.9 ANEMIA, UNSPECIFIED: ICD-10-CM

## 2018-01-11 DIAGNOSIS — S12.600D UNSPECIFIED DISPLACED FRACTURE OF SEVENTH CERVICAL VERTEBRA, SUBSEQUENT ENCOUNTER FOR FRACTURE WITH ROUTINE HEALING: ICD-10-CM

## 2018-01-11 DIAGNOSIS — Z96.7 PRESENCE OF OTHER BONE AND TENDON IMPLANTS: Chronic | ICD-10-CM

## 2018-01-11 DIAGNOSIS — Z91.81 HISTORY OF FALLING: ICD-10-CM
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
## 2018-01-11 DIAGNOSIS — S42.472D: ICD-10-CM

## 2018-01-11 DIAGNOSIS — E11.9 TYPE 2 DIABETES MELLITUS WITHOUT COMPLICATIONS: ICD-10-CM

## 2018-01-11 DIAGNOSIS — G20 PARKINSON'S DISEASE: ICD-10-CM

## 2018-01-11 DIAGNOSIS — M50.20 OTHER CERVICAL DISC DISPLACEMENT, UNSPECIFIED CERVICAL REGION: ICD-10-CM

## 2018-01-11 DIAGNOSIS — E86.0 DEHYDRATION: ICD-10-CM

## 2018-01-11 DIAGNOSIS — E87.1 HYPO-OSMOLALITY AND HYPONATREMIA: ICD-10-CM

## 2018-01-11 DIAGNOSIS — Z51.89 ENCOUNTER FOR OTHER SPECIFIED AFTERCARE: ICD-10-CM

## 2018-01-11 DIAGNOSIS — Z86.73 PERSONAL HISTORY OF TRANSIENT ISCHEMIC ATTACK (TIA), AND CEREBRAL INFARCTION WITHOUT RESIDUAL DEFICITS: ICD-10-CM

## 2018-01-11 DIAGNOSIS — Z90.79 ACQUIRED ABSENCE OF OTHER GENITAL ORGAN(S): Chronic | ICD-10-CM

## 2018-01-11 DIAGNOSIS — I10 ESSENTIAL (PRIMARY) HYPERTENSION: ICD-10-CM
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
## 2018-01-11 DIAGNOSIS — H54.8 LEGAL BLINDNESS, AS DEFINED IN USA: ICD-10-CM

## 2018-01-11 DIAGNOSIS — R33.9 RETENTION OF URINE, UNSPECIFIED: ICD-10-CM

## 2018-01-11 LAB
GLUCOSE BLDC GLUCOMTR-MCNC: 171 MG/DL — HIGH (ref 70–99)
GLUCOSE BLDC GLUCOMTR-MCNC: 94 MG/DL — SIGNIFICANT CHANGE UP (ref 70–99)

## 2018-01-11 PROCEDURE — 72141 MRI NECK SPINE W/O DYE: CPT

## 2018-01-11 PROCEDURE — 99285 EMERGENCY DEPT VISIT HI MDM: CPT | Mod: 25

## 2018-01-11 PROCEDURE — 83036 HEMOGLOBIN GLYCOSYLATED A1C: CPT

## 2018-01-11 PROCEDURE — 70450 CT HEAD/BRAIN W/O DYE: CPT

## 2018-01-11 PROCEDURE — 97163 PT EVAL HIGH COMPLEX 45 MIN: CPT

## 2018-01-11 PROCEDURE — 97116 GAIT TRAINING THERAPY: CPT

## 2018-01-11 PROCEDURE — 83735 ASSAY OF MAGNESIUM: CPT

## 2018-01-11 PROCEDURE — 80307 DRUG TEST PRSMV CHEM ANLYZR: CPT

## 2018-01-11 PROCEDURE — 82150 ASSAY OF AMYLASE: CPT

## 2018-01-11 PROCEDURE — 85027 COMPLETE CBC AUTOMATED: CPT

## 2018-01-11 PROCEDURE — 80048 BASIC METABOLIC PNL TOTAL CA: CPT

## 2018-01-11 PROCEDURE — 93880 EXTRACRANIAL BILAT STUDY: CPT

## 2018-01-11 PROCEDURE — 86850 RBC ANTIBODY SCREEN: CPT

## 2018-01-11 PROCEDURE — 84100 ASSAY OF PHOSPHORUS: CPT

## 2018-01-11 PROCEDURE — 97110 THERAPEUTIC EXERCISES: CPT

## 2018-01-11 PROCEDURE — 76377 3D RENDER W/INTRP POSTPROCES: CPT

## 2018-01-11 PROCEDURE — 83690 ASSAY OF LIPASE: CPT

## 2018-01-11 PROCEDURE — 84484 ASSAY OF TROPONIN QUANT: CPT

## 2018-01-11 PROCEDURE — 82962 GLUCOSE BLOOD TEST: CPT

## 2018-01-11 PROCEDURE — 73070 X-RAY EXAM OF ELBOW: CPT

## 2018-01-11 PROCEDURE — 72125 CT NECK SPINE W/O DYE: CPT

## 2018-01-11 PROCEDURE — 85610 PROTHROMBIN TIME: CPT

## 2018-01-11 PROCEDURE — 93005 ELECTROCARDIOGRAM TRACING: CPT

## 2018-01-11 PROCEDURE — 85730 THROMBOPLASTIN TIME PARTIAL: CPT

## 2018-01-11 PROCEDURE — 86900 BLOOD TYPING SEROLOGIC ABO: CPT

## 2018-01-11 PROCEDURE — 97530 THERAPEUTIC ACTIVITIES: CPT

## 2018-01-11 PROCEDURE — 71045 X-RAY EXAM CHEST 1 VIEW: CPT

## 2018-01-11 PROCEDURE — 80053 COMPREHEN METABOLIC PANEL: CPT

## 2018-01-11 PROCEDURE — 84300 ASSAY OF URINE SODIUM: CPT

## 2018-01-11 PROCEDURE — 36415 COLL VENOUS BLD VENIPUNCTURE: CPT

## 2018-01-11 PROCEDURE — 86901 BLOOD TYPING SEROLOGIC RH(D): CPT

## 2018-01-11 PROCEDURE — 70553 MRI BRAIN STEM W/O & W/DYE: CPT

## 2018-01-11 PROCEDURE — 83605 ASSAY OF LACTIC ACID: CPT

## 2018-01-11 PROCEDURE — 93306 TTE W/DOPPLER COMPLETE: CPT

## 2018-01-11 PROCEDURE — 95819 EEG AWAKE AND ASLEEP: CPT

## 2018-01-11 PROCEDURE — 73200 CT UPPER EXTREMITY W/O DYE: CPT

## 2018-01-11 PROCEDURE — 97167 OT EVAL HIGH COMPLEX 60 MIN: CPT

## 2018-01-11 PROCEDURE — 97535 SELF CARE MNGMENT TRAINING: CPT

## 2018-01-11 PROCEDURE — 82570 ASSAY OF URINE CREATININE: CPT

## 2018-01-11 RX ADMIN — Medication 81 MILLIGRAM(S): at 12:09

## 2018-01-11 RX ADMIN — FINASTERIDE 5 MILLIGRAM(S): 5 TABLET, FILM COATED ORAL at 12:09

## 2018-01-11 RX ADMIN — CARVEDILOL PHOSPHATE 6.25 MILLIGRAM(S): 80 CAPSULE, EXTENDED RELEASE ORAL at 05:40

## 2018-01-11 RX ADMIN — Medication 650 MILLIGRAM(S): at 13:00

## 2018-01-11 RX ADMIN — SENNA PLUS 1 TABLET(S): 8.6 TABLET ORAL at 12:09

## 2018-01-11 RX ADMIN — Medication 650 MILLIGRAM(S): at 12:09

## 2018-01-11 RX ADMIN — OXYCODONE HYDROCHLORIDE 5 MILLIGRAM(S): 5 TABLET ORAL at 12:09

## 2018-01-11 RX ADMIN — Medication 2: at 08:56

## 2018-01-11 RX ADMIN — Medication 650 MILLIGRAM(S): at 05:40

## 2018-01-11 RX ADMIN — OXYCODONE HYDROCHLORIDE 10 MILLIGRAM(S): 5 TABLET ORAL at 05:00

## 2018-01-11 RX ADMIN — Medication 1 DROP(S): at 12:09

## 2018-01-11 RX ADMIN — ESCITALOPRAM OXALATE 10 MILLIGRAM(S): 10 TABLET, FILM COATED ORAL at 12:09

## 2018-01-11 RX ADMIN — SODIUM CHLORIDE 60 MILLILITER(S): 9 INJECTION INTRAMUSCULAR; INTRAVENOUS; SUBCUTANEOUS at 12:10

## 2018-01-11 RX ADMIN — Medication 2 UNIT(S): at 08:56

## 2018-01-11 RX ADMIN — Medication 20 MILLIGRAM(S): at 05:40

## 2018-01-11 RX ADMIN — CARBIDOPA AND LEVODOPA 1 TABLET(S): 25; 100 TABLET ORAL at 12:55

## 2018-01-11 RX ADMIN — OXYCODONE HYDROCHLORIDE 10 MILLIGRAM(S): 5 TABLET ORAL at 03:48

## 2018-01-11 RX ADMIN — Medication 650 MILLIGRAM(S): at 05:41

## 2018-01-11 RX ADMIN — AMLODIPINE BESYLATE 10 MILLIGRAM(S): 2.5 TABLET ORAL at 05:40

## 2018-01-11 RX ADMIN — CELECOXIB 100 MILLIGRAM(S): 200 CAPSULE ORAL at 09:00

## 2018-01-11 RX ADMIN — CARBIDOPA AND LEVODOPA 1 TABLET(S): 25; 100 TABLET ORAL at 05:40

## 2018-01-11 RX ADMIN — OXYCODONE HYDROCHLORIDE 5 MILLIGRAM(S): 5 TABLET ORAL at 18:27

## 2018-01-11 RX ADMIN — Medication 1 APPLICATION(S): at 05:40

## 2018-01-11 RX ADMIN — Medication 1 DROP(S): at 05:40

## 2018-01-11 RX ADMIN — CELECOXIB 100 MILLIGRAM(S): 200 CAPSULE ORAL at 08:56

## 2018-01-11 RX ADMIN — Medication 100 MILLIGRAM(S): at 12:28

## 2018-01-11 RX ADMIN — LISINOPRIL 5 MILLIGRAM(S): 2.5 TABLET ORAL at 05:39

## 2018-01-11 RX ADMIN — LIDOCAINE 1 PATCH: 4 CREAM TOPICAL at 00:00

## 2018-01-11 RX ADMIN — HEPARIN SODIUM 5000 UNIT(S): 5000 INJECTION INTRAVENOUS; SUBCUTANEOUS at 05:40

## 2018-01-11 NOTE — PROGRESS NOTE ADULT - SUBJECTIVE AND OBJECTIVE BOX
INTERVAL HPI/OVERNIGHT EVENTS/SUBJECTIVE:  Pt seen and examined at the bedside.  No acute events ON.  No complaints at this time.  Pt was supposed to leave yesterday but issues with insurance postponed discharge.  Healy removed today.  Peer to peer with insurance being done for placement.      ICU Vital Signs Last 24 Hrs  T(C): 36.7 (11 Jan 2018 08:47), Max: 37.1 (10 Herberth 2018 16:18)  T(F): 98 (11 Jan 2018 08:47), Max: 98.7 (10 Herberth 2018 16:18)  HR: 68 (11 Jan 2018 08:47) (62 - 68)  BP: 128/68 (11 Jan 2018 08:47) (115/62 - 134/63)  RR: 18 (11 Jan 2018 08:47) (17 - 20)  SpO2: 96% (11 Jan 2018 08:47) (94% - 97%)      I&O's Detail    10 Herberth 2018 07:01  -  11 Jan 2018 07:00  --------------------------------------------------------  IN:    Oral Fluid: 120 mL    sodium chloride 0.9%.: 960 mL  Total IN: 1080 mL    OUT:    Indwelling Catheter - Urethral: 2825 mL  Total OUT: 2825 mL    Total NET: -1745 mL      MEDICATIONS  (STANDING):  acetaminophen   Tablet. 650 milliGRAM(s) Oral every 6 hours  amLODIPine   Tablet 10 milliGRAM(s) Oral daily  aspirin  chewable 81 milliGRAM(s) Oral daily  atorvastatin 20 milliGRAM(s) Oral at bedtime  BACItracin   Ointment 1 Application(s) Topical two times a day  carbidopa/levodopa  25/100 1 Tablet(s) Oral every 8 hours  carvedilol 6.25 milliGRAM(s) Oral every 12 hours  celecoxib 100 milliGRAM(s) Oral two times a day with meals  dextrose 5%. 1000 milliLiter(s) (50 mL/Hr) IV Continuous <Continuous>  dextrose 50% Injectable 12.5 Gram(s) IV Push once  dextrose 50% Injectable 25 Gram(s) IV Push once  dextrose 50% Injectable 25 Gram(s) IV Push once  docusate sodium 100 milliGRAM(s) Oral daily  escitalopram 10 milliGRAM(s) Oral daily  finasteride 5 milliGRAM(s) Oral daily  heparin  Injectable 5000 Unit(s) SubCutaneous every 8 hours  insulin glargine Injectable (LANTUS) 18 Unit(s) SubCutaneous at bedtime  insulin lispro (HumaLOG) corrective regimen sliding scale   SubCutaneous three times a day before meals  insulin lispro (HumaLOG) corrective regimen sliding scale   SubCutaneous at bedtime  insulin lispro Injectable (HumaLOG) 2 Unit(s) SubCutaneous three times a day with meals  lidocaine   Patch 1 Patch Transdermal daily  lisinopril 5 milliGRAM(s) Oral daily  metolazone 5 milliGRAM(s) Oral daily  senna 1 Tablet(s) Oral daily  sodium chloride 0.9%. 1000 milliLiter(s) (60 mL/Hr) IV Continuous <Continuous>  tamsulosin 0.8 milliGRAM(s) Oral at bedtime  timolol 0.5% Solution 1 Drop(s) Both EYES every 6 hours  torsemide 20 milliGRAM(s) Oral daily    MEDICATIONS  (PRN):  dextrose Gel 1 Dose(s) Oral once PRN Blood Glucose LESS THAN 70 milliGRAM(s)/deciliter  dextrose Gel 1 Dose(s) Oral once PRN Blood Glucose LESS THAN 70 milliGRAM(s)/deciliter  dextrose Gel 1 Dose(s) Oral once PRN Blood Glucose LESS THAN 70 milliGRAM(s)/deciliter  glucagon  Injectable 1 milliGRAM(s) IntraMuscular once PRN Glucose LESS THAN 70 milligrams/deciliter  oxyCODONE    IR 5 milliGRAM(s) Oral every 4 hours PRN Moderate Pain (4 - 6)  oxyCODONE    IR 10 milliGRAM(s) Oral every 4 hours PRN Severe Pain (7 - 10)    PHYSICAL EXAM:    Constitutional: NAD, resting comfortably, WNWD  Eyes: PERRLA, EOM intact   Head: NCAT  Neck: trachea midline, FROM  Respiratory: CTA b/l, no WRR  Cardiovascular: S1S2, RRR  Gastrointestinal: abd. soft, NT/ND, +BSx4  Genitourinary: -healy  Extremities: no LE edema b/l  Neurological: AOx3, sensation grossly intact  Skin: warm, dry, intact

## 2018-01-11 NOTE — PROGRESS NOTE ADULT - ASSESSMENT
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 Hospital course complicated by urinary retention requiring Ledesma re-insertion.  Neuro: Pain control as prescribed  Pulm: Incentive spirometry and deep breathing   GI: Diet: Regular   : Ledesma removed, f/u TOV  MS: Encourage OOB and ambulation  Dispo Acute rehab today

## 2018-01-11 NOTE — PROGRESS NOTE ADULT - ATTENDING COMMENTS
see above will be in collar oob, Neurology agrees stroke is at least weeks old, no deficits, will get oob today, po intake, resume home meds.
Ortho Trauma Attending:  Agree with above PA note.  Note edited where necessary.  Fracture is nondisplaced and impacted. Will plan on treating non-op. continue sling for now, plan to change to cast as swelling subsides. Please call with questions or concerns.    Fer Sung MD  Orthopaedic Trauma Surgery
Needs acute inpatient rehab. Had peer to peer review session via phone and patient should expect approval for such. Trial of void for urinary retention. if Fails again, plan for reinsertion, continuation of flomax and urology consultation and f/u.
Agree with above.  The patient is with a generalized complaint of discomfort.  He has no abdominal tenderness, healy catheter is in place with minimal urine.  Will need to provide IV hydration and monitor urine output.  Follow labs and continue with pain control.  Continue with PT/OT.  Eventual rehab placement.
Clinically stable. Dispo planning

## 2018-01-11 NOTE — PROGRESS NOTE ADULT - ASSESSMENT
77 year old male with Type 2 DM, HTN, BPH, Parkinsons admitted after syncopal event, found to have CVA, humerus fracture and C2/ C7 fracture. al insulin.      Plan:  1.  Type 2 DM: very good Bgs right now.   continue checking BGS actid and hs   continue lantus 18 u HS   continue lispro 2 u TID w meals  continue SSI for coverage       2. HTN-  controlled on Lisinopril, amlodipine and coreg.

## 2018-01-11 NOTE — PROGRESS NOTE ADULT - SUBJECTIVE AND OBJECTIVE BOX
INTERVAL HPI / OVERNIGHT EVENTS:  Pt seen and examined at the bedside.  No acute events.  No complaints at this time.  Pt was supposed to leave yesterday but issues with insurance postponed discharge.       MEDICATIONS  (STANDING):  acetaminophen   Tablet. 650 milliGRAM(s) Oral every 6 hours  amLODIPine   Tablet 10 milliGRAM(s) Oral daily  aspirin  chewable 81 milliGRAM(s) Oral daily  atorvastatin 20 milliGRAM(s) Oral at bedtime  BACItracin   Ointment 1 Application(s) Topical two times a day  carbidopa/levodopa  25/100 1 Tablet(s) Oral every 8 hours  carvedilol 6.25 milliGRAM(s) Oral every 12 hours  celecoxib 100 milliGRAM(s) Oral two times a day with meals  dextrose 5%. 1000 milliLiter(s) (50 mL/Hr) IV Continuous <Continuous>  dextrose 50% Injectable 12.5 Gram(s) IV Push once  dextrose 50% Injectable 25 Gram(s) IV Push once  dextrose 50% Injectable 25 Gram(s) IV Push once  docusate sodium 100 milliGRAM(s) Oral daily  escitalopram 10 milliGRAM(s) Oral daily  finasteride 5 milliGRAM(s) Oral daily  heparin  Injectable 5000 Unit(s) SubCutaneous every 8 hours  insulin glargine Injectable (LANTUS) 18 Unit(s) SubCutaneous at bedtime  insulin lispro corrective sliding scale   SubCutaneous three times a day before meals  insulin lispro (HumaLOG) corrective regimen sliding scale   SubCutaneous at bedtime  insulin lispro Injectable (HumaLOG) 2 Unit(s) SubCutaneous three times a day w meals  lidocaine   Patch 1 Patch Transdermal daily  lisinopril 5 milliGRAM(s) Oral daily  metolazone 5 milliGRAM(s) Oral daily  senna 1 Tablet(s) Oral daily  sodium chloride 0.9%. 1000 milliLiter(s) (60 mL/Hr) IV Continuous <Continuous>  tamsulosin 0.8 milliGRAM(s) Oral at bedtime  timolol 0.5% Solution 1 Drop(s) Both EYES every 6 hours  torsemide 20 milliGRAM(s) Oral daily    MEDICATIONS  (PRN):  dextrose Gel 1 Dose(s) Oral once PRN Blood Glucose LESS THAN 70 milliGRAM(s)/deciliter  dextrose Gel 1 Dose(s) Oral once PRN Blood Glucose LESS THAN 70 milliGRAM(s)/deciliter  dextrose Gel 1 Dose(s) Oral once PRN Blood Glucose LESS THAN 70 milliGRAM(s)/deciliter  glucagon  Injectable 1 milliGRAM(s) IntraMuscular once PRN Glucose LESS THAN 70 milligrams/deciliter  oxyCODONE    IR 5 milliGRAM(s) Oral every 4 hours PRN Moderate Pain (4 - 6)  oxyCODONE    IR 10 milliGRAM(s) Oral every 4 hours PRN Severe Pain (7 - 10)    Allergies  No Known Allergies    Review of systems: generalized weakness otherwise all negative.     Vital Signs Last 24 Hrs  T(C): 37 (11 Jan 2018 16:31), Max: 37 (11 Jan 2018 16:31)  T(F): 98.6 (11 Jan 2018 16:31), Max: 98.6 (11 Jan 2018 16:31)  HR: 61 (11 Jan 2018 16:31) (61 - 68)  BP: 129/73 (11 Jan 2018 16:31) (115/62 - 129/73)  BP(mean): --  RR: 20 (11 Jan 2018 16:31) (17 - 20)  SpO2: 97% (11 Jan 2018 16:31) (94% - 97%)    PHYSICAL EXAM:  Gen: elderly male, NAD  HEENT:  sclera anicteric, MMM  Neck:  no thyromegaly, no LAD  CV:  nl S1 + S2, RRR, no m/r/g  Resp:  nl respiratory effort, CTA b/l  GI/ Abd: soft, NT/ ND, BS +  MS:  no c/c/e, nl muscle tone

## 2018-01-11 NOTE — PROGRESS NOTE ADULT - PROVIDER SPECIALTY LIST ADULT
Cardiology
Cardiology
Endocrinology
Neurology
Neurology
Orthopedics
Rehab Medicine
Surgery
Trauma Surgery
Surgery
SICU
Rehab Medicine

## 2018-01-12 PROCEDURE — 99222 1ST HOSP IP/OBS MODERATE 55: CPT | Mod: AI,GC

## 2018-01-12 PROCEDURE — 99223 1ST HOSP IP/OBS HIGH 75: CPT

## 2018-01-12 RX ORDER — ESCITALOPRAM OXALATE 10 MG/1
0 TABLET, FILM COATED ORAL
Qty: 0 | Refills: 0 | COMMUNITY

## 2018-01-12 RX ORDER — ASPIRIN/CALCIUM CARB/MAGNESIUM 324 MG
1 TABLET ORAL
Qty: 0 | Refills: 0 | COMMUNITY

## 2018-01-12 RX ORDER — TAMSULOSIN HYDROCHLORIDE 0.4 MG/1
0 CAPSULE ORAL
Qty: 0 | Refills: 0 | COMMUNITY

## 2018-01-12 RX ORDER — LISINOPRIL 2.5 MG/1
0 TABLET ORAL
Qty: 0 | Refills: 0 | COMMUNITY

## 2018-01-12 RX ORDER — FUROSEMIDE 40 MG
0 TABLET ORAL
Qty: 0 | Refills: 0 | COMMUNITY

## 2018-01-12 RX ORDER — POTASSIUM CHLORIDE 20 MEQ
0 PACKET (EA) ORAL
Qty: 0 | Refills: 0 | COMMUNITY

## 2018-01-12 RX ORDER — FINASTERIDE 5 MG/1
0 TABLET, FILM COATED ORAL
Qty: 0 | Refills: 0 | COMMUNITY

## 2018-01-12 RX ORDER — CARBIDOPA AND LEVODOPA 25; 100 MG/1; MG/1
0 TABLET ORAL
Qty: 0 | Refills: 0 | COMMUNITY

## 2018-01-12 RX ORDER — CARVEDILOL PHOSPHATE 80 MG/1
0 CAPSULE, EXTENDED RELEASE ORAL
Qty: 0 | Refills: 0 | COMMUNITY

## 2018-01-12 RX ORDER — AMLODIPINE BESYLATE 2.5 MG/1
0 TABLET ORAL
Qty: 0 | Refills: 0 | COMMUNITY

## 2018-01-12 RX ORDER — ATORVASTATIN CALCIUM 80 MG/1
0 TABLET, FILM COATED ORAL
Qty: 0 | Refills: 0 | COMMUNITY

## 2018-01-13 PROCEDURE — 99232 SBSQ HOSP IP/OBS MODERATE 35: CPT | Mod: GC

## 2018-01-14 PROCEDURE — 99232 SBSQ HOSP IP/OBS MODERATE 35: CPT | Mod: GC

## 2018-01-15 PROCEDURE — 99232 SBSQ HOSP IP/OBS MODERATE 35: CPT | Mod: GC

## 2018-01-16 PROCEDURE — 99232 SBSQ HOSP IP/OBS MODERATE 35: CPT | Mod: GC

## 2018-01-16 PROCEDURE — 99223 1ST HOSP IP/OBS HIGH 75: CPT

## 2018-01-17 PROCEDURE — 99233 SBSQ HOSP IP/OBS HIGH 50: CPT

## 2018-01-17 PROCEDURE — 99232 SBSQ HOSP IP/OBS MODERATE 35: CPT

## 2018-01-18 PROCEDURE — 99232 SBSQ HOSP IP/OBS MODERATE 35: CPT

## 2018-01-19 PROCEDURE — 99232 SBSQ HOSP IP/OBS MODERATE 35: CPT | Mod: GC

## 2018-01-20 PROCEDURE — 99232 SBSQ HOSP IP/OBS MODERATE 35: CPT

## 2018-01-21 PROCEDURE — 99232 SBSQ HOSP IP/OBS MODERATE 35: CPT

## 2018-01-22 PROCEDURE — 99232 SBSQ HOSP IP/OBS MODERATE 35: CPT | Mod: GC

## 2018-01-23 PROCEDURE — 99232 SBSQ HOSP IP/OBS MODERATE 35: CPT

## 2018-01-24 PROCEDURE — 99232 SBSQ HOSP IP/OBS MODERATE 35: CPT

## 2018-01-24 PROCEDURE — 99233 SBSQ HOSP IP/OBS HIGH 50: CPT

## 2018-01-25 PROCEDURE — 99232 SBSQ HOSP IP/OBS MODERATE 35: CPT

## 2018-01-26 PROCEDURE — 99233 SBSQ HOSP IP/OBS HIGH 50: CPT | Mod: 25,GC

## 2018-01-27 PROCEDURE — 99232 SBSQ HOSP IP/OBS MODERATE 35: CPT

## 2018-01-28 PROCEDURE — 99232 SBSQ HOSP IP/OBS MODERATE 35: CPT

## 2018-01-29 PROCEDURE — 99232 SBSQ HOSP IP/OBS MODERATE 35: CPT | Mod: GC

## 2018-01-30 PROCEDURE — 99233 SBSQ HOSP IP/OBS HIGH 50: CPT

## 2018-01-30 PROCEDURE — 99232 SBSQ HOSP IP/OBS MODERATE 35: CPT | Mod: GC

## 2018-01-31 PROCEDURE — 99232 SBSQ HOSP IP/OBS MODERATE 35: CPT | Mod: GC

## 2018-02-01 PROCEDURE — 99232 SBSQ HOSP IP/OBS MODERATE 35: CPT | Mod: GC

## 2018-02-01 PROCEDURE — 99238 HOSP IP/OBS DSCHRG MGMT 30/<: CPT

## 2018-02-02 PROCEDURE — 80053 COMPREHEN METABOLIC PANEL: CPT

## 2018-02-02 PROCEDURE — 85027 COMPLETE CBC AUTOMATED: CPT

## 2018-02-02 PROCEDURE — 92610 EVALUATE SWALLOWING FUNCTION: CPT

## 2018-02-02 PROCEDURE — 97163 PT EVAL HIGH COMPLEX 45 MIN: CPT

## 2018-02-02 PROCEDURE — 97535 SELF CARE MNGMENT TRAINING: CPT

## 2018-02-02 PROCEDURE — 82652 VIT D 1 25-DIHYDROXY: CPT

## 2018-02-02 PROCEDURE — 92507 TX SP LANG VOICE COMM INDIV: CPT

## 2018-02-02 PROCEDURE — 84443 ASSAY THYROID STIM HORMONE: CPT

## 2018-02-02 PROCEDURE — 97110 THERAPEUTIC EXERCISES: CPT

## 2018-02-02 PROCEDURE — 84100 ASSAY OF PHOSPHORUS: CPT

## 2018-02-02 PROCEDURE — 81003 URINALYSIS AUTO W/O SCOPE: CPT

## 2018-02-02 PROCEDURE — 85025 COMPLETE CBC W/AUTO DIFF WBC: CPT

## 2018-02-02 PROCEDURE — 82570 ASSAY OF URINE CREATININE: CPT

## 2018-02-02 PROCEDURE — 97530 THERAPEUTIC ACTIVITIES: CPT

## 2018-02-02 PROCEDURE — 82575 CREATININE CLEARANCE TEST: CPT

## 2018-02-02 PROCEDURE — 97116 GAIT TRAINING THERAPY: CPT

## 2018-02-02 PROCEDURE — 80048 BASIC METABOLIC PNL TOTAL CA: CPT

## 2018-02-02 PROCEDURE — 97112 NEUROMUSCULAR REEDUCATION: CPT

## 2018-02-02 PROCEDURE — 82607 VITAMIN B-12: CPT

## 2018-02-02 PROCEDURE — 92523 SPEECH SOUND LANG COMPREHEN: CPT

## 2018-02-02 PROCEDURE — 84300 ASSAY OF URINE SODIUM: CPT

## 2018-02-02 PROCEDURE — 83735 ASSAY OF MAGNESIUM: CPT

## 2018-02-02 PROCEDURE — 84439 ASSAY OF FREE THYROXINE: CPT

## 2018-02-02 PROCEDURE — 87205 SMEAR GRAM STAIN: CPT

## 2018-02-02 PROCEDURE — 97167 OT EVAL HIGH COMPLEX 60 MIN: CPT

## 2018-02-08 DIAGNOSIS — S42.472A DISPLACED TRANSCONDYLAR FRACTURE OF LEFT HUMERUS, INITIAL ENCOUNTER FOR CLOSED FRACTURE: ICD-10-CM

## 2018-02-20 PROBLEM — G20 PARKINSON'S DISEASE: Chronic | Status: ACTIVE | Noted: 2018-01-02

## 2018-02-27 ENCOUNTER — APPOINTMENT (OUTPATIENT)
Age: 78
End: 2018-02-27

## 2018-03-13 ENCOUNTER — OTHER (OUTPATIENT)
Age: 78
End: 2018-03-13

## 2018-03-19 ENCOUNTER — APPOINTMENT (OUTPATIENT)
Dept: ORTHOPEDIC SURGERY | Facility: CLINIC | Age: 78
End: 2018-03-19
Payer: MEDICARE

## 2018-03-19 VITALS
WEIGHT: 218 LBS | HEART RATE: 73 BPM | DIASTOLIC BLOOD PRESSURE: 70 MMHG | HEIGHT: 72 IN | SYSTOLIC BLOOD PRESSURE: 123 MMHG | BODY MASS INDEX: 29.53 KG/M2

## 2018-03-19 DIAGNOSIS — M79.603 PAIN IN ARM, UNSPECIFIED: ICD-10-CM

## 2018-03-19 DIAGNOSIS — S42.413D: ICD-10-CM

## 2018-03-19 PROCEDURE — 99024 POSTOP FOLLOW-UP VISIT: CPT

## 2018-03-19 PROCEDURE — 73080 X-RAY EXAM OF ELBOW: CPT | Mod: LT

## 2018-05-07 ENCOUNTER — NON-APPOINTMENT (OUTPATIENT)
Age: 78
End: 2018-05-07

## 2018-05-07 ENCOUNTER — LABORATORY RESULT (OUTPATIENT)
Age: 78
End: 2018-05-07

## 2018-05-07 ENCOUNTER — APPOINTMENT (OUTPATIENT)
Dept: FAMILY MEDICINE | Facility: CLINIC | Age: 78
End: 2018-05-07
Payer: MEDICARE

## 2018-05-07 VITALS
RESPIRATION RATE: 14 BRPM | HEIGHT: 72 IN | DIASTOLIC BLOOD PRESSURE: 70 MMHG | SYSTOLIC BLOOD PRESSURE: 130 MMHG | BODY MASS INDEX: 26.14 KG/M2 | OXYGEN SATURATION: 98 % | HEART RATE: 64 BPM | WEIGHT: 193 LBS

## 2018-05-07 DIAGNOSIS — H61.23 IMPACTED CERUMEN, BILATERAL: ICD-10-CM

## 2018-05-07 PROCEDURE — 99215 OFFICE O/P EST HI 40 MIN: CPT | Mod: 25

## 2018-05-07 PROCEDURE — 36415 COLL VENOUS BLD VENIPUNCTURE: CPT

## 2018-05-07 PROCEDURE — 69210 REMOVE IMPACTED EAR WAX UNI: CPT

## 2018-05-07 RX ORDER — LISINOPRIL 5 MG/1
5 TABLET ORAL DAILY
Qty: 90 | Refills: 0 | Status: DISCONTINUED | COMMUNITY
End: 2018-05-07

## 2018-05-07 RX ORDER — FUROSEMIDE 20 MG/1
20 TABLET ORAL DAILY
Qty: 180 | Refills: 0 | Status: DISCONTINUED | COMMUNITY
Start: 2017-02-13 | End: 2018-05-07

## 2018-05-07 RX ORDER — POTASSIUM CHLORIDE 750 MG/1
10 TABLET, FILM COATED, EXTENDED RELEASE ORAL DAILY
Qty: 90 | Refills: 0 | Status: DISCONTINUED | COMMUNITY
End: 2018-05-07

## 2018-05-07 RX ORDER — CHLORHEXIDINE GLUCONATE 4 %
325 (65 FE) LIQUID (ML) TOPICAL TWICE DAILY
Refills: 0 | Status: DISCONTINUED | COMMUNITY
End: 2018-05-07

## 2018-05-07 RX ORDER — ERGOCALCIFEROL 1.25 MG/1
1.25 MG CAPSULE, LIQUID FILLED ORAL
Qty: 8 | Refills: 0 | Status: DISCONTINUED | COMMUNITY
Start: 2017-02-13 | End: 2018-05-07

## 2018-05-07 RX ORDER — INSULIN ASPART 100 [IU]/ML
100 INJECTION, SOLUTION INTRAVENOUS; SUBCUTANEOUS
Qty: 1 | Refills: 3 | Status: DISCONTINUED | COMMUNITY
Start: 2017-10-02 | End: 2018-05-07

## 2018-05-07 RX ORDER — AMLODIPINE BESYLATE 10 MG/1
10 TABLET ORAL DAILY
Qty: 90 | Refills: 1 | Status: DISCONTINUED | COMMUNITY
Start: 2017-02-13 | End: 2018-05-07

## 2018-05-07 RX ORDER — CHLORHEXIDINE GLUCONATE 4 %
5 LIQUID (ML) TOPICAL
Refills: 0 | Status: DISCONTINUED | COMMUNITY
End: 2018-05-07

## 2018-05-07 RX ORDER — MULTIVITAMIN
TABLET ORAL
Refills: 0 | Status: ACTIVE | COMMUNITY

## 2018-05-07 RX ORDER — METOLAZONE 5 MG/1
5 TABLET ORAL DAILY
Qty: 3 | Refills: 2 | Status: DISCONTINUED | COMMUNITY
Start: 2017-12-19 | End: 2018-05-07

## 2018-05-07 RX ORDER — SYRING-NEEDL,DISP,INSUL,0.3 ML 31 GX5/16"
31G X 5/16" SYRINGE, EMPTY DISPOSABLE MISCELLANEOUS
Qty: 2 | Refills: 0 | Status: ACTIVE | COMMUNITY
Start: 2017-10-02 | End: 1900-01-01

## 2018-05-07 RX ORDER — PEN NEEDLE, DIABETIC 31 G X1/4"
31G X 6 MM NEEDLE, DISPOSABLE MISCELLANEOUS
Qty: 1 | Refills: 1 | Status: ACTIVE | COMMUNITY
Start: 2017-07-19 | End: 1900-01-01

## 2018-05-29 LAB
ALBUMIN SERPL ELPH-MCNC: 3 G/DL
ALP BLD-CCNC: 87 U/L
ALT SERPL-CCNC: 5 U/L
ANION GAP SERPL CALC-SCNC: 14 MMOL/L
AST SERPL-CCNC: 8 U/L
BASOPHILS # BLD AUTO: 0.04 K/UL
BASOPHILS NFR BLD AUTO: 0.6 %
BILIRUB SERPL-MCNC: 0.3 MG/DL
BUN SERPL-MCNC: 19 MG/DL
CALCIUM SERPL-MCNC: 9.1 MG/DL
CHLORIDE SERPL-SCNC: 98 MMOL/L
CO2 SERPL-SCNC: 22 MMOL/L
CREAT SERPL-MCNC: 1.02 MG/DL
EOSINOPHIL # BLD AUTO: 0.25 K/UL
EOSINOPHIL NFR BLD AUTO: 3.6 %
GLUCOSE SERPL-MCNC: 392 MG/DL
HBA1C MFR BLD HPLC: 8.9 %
HCT VFR BLD CALC: 30.2 %
HGB BLD-MCNC: 10 G/DL
IMM GRANULOCYTES NFR BLD AUTO: 0.3 %
LYMPHOCYTES # BLD AUTO: 1.5 K/UL
LYMPHOCYTES NFR BLD AUTO: 21.6 %
MAN DIFF?: NORMAL
MCHC RBC-ENTMCNC: 31.6 PG
MCHC RBC-ENTMCNC: 33.1 GM/DL
MCV RBC AUTO: 95.6 FL
MONOCYTES # BLD AUTO: 0.53 K/UL
MONOCYTES NFR BLD AUTO: 7.6 %
NEUTROPHILS # BLD AUTO: 4.6 K/UL
NEUTROPHILS NFR BLD AUTO: 66.3 %
PLATELET # BLD AUTO: 283 K/UL
POTASSIUM SERPL-SCNC: 4.5 MMOL/L
PROT SERPL-MCNC: 6.1 G/DL
RBC # BLD: 3.16 M/UL
RBC # FLD: 14.3 %
SODIUM SERPL-SCNC: 134 MMOL/L
WBC # FLD AUTO: 6.94 K/UL

## 2018-05-31 ENCOUNTER — MEDICATION RENEWAL (OUTPATIENT)
Age: 78
End: 2018-05-31

## 2018-06-06 ENCOUNTER — APPOINTMENT (OUTPATIENT)
Dept: FAMILY MEDICINE | Facility: CLINIC | Age: 78
End: 2018-06-06
Payer: MEDICARE

## 2018-06-06 ENCOUNTER — INPATIENT (INPATIENT)
Facility: HOSPITAL | Age: 78
LOS: 7 days | Discharge: NURSING, REHAB OR LONG-TERM CARE | DRG: 86 | End: 2018-06-14
Attending: SURGERY | Admitting: SURGERY
Payer: COMMERCIAL

## 2018-06-06 VITALS
TEMPERATURE: 98 F | WEIGHT: 190.7 LBS | RESPIRATION RATE: 14 BRPM | DIASTOLIC BLOOD PRESSURE: 83 MMHG | OXYGEN SATURATION: 96 % | SYSTOLIC BLOOD PRESSURE: 172 MMHG | HEART RATE: 73 BPM | HEIGHT: 69.29 IN

## 2018-06-06 VITALS
WEIGHT: 195 LBS | OXYGEN SATURATION: 96 % | HEART RATE: 79 BPM | BODY MASS INDEX: 26.41 KG/M2 | HEIGHT: 72 IN | SYSTOLIC BLOOD PRESSURE: 109 MMHG | DIASTOLIC BLOOD PRESSURE: 65 MMHG

## 2018-06-06 DIAGNOSIS — Z90.79 ACQUIRED ABSENCE OF OTHER GENITAL ORGAN(S): Chronic | ICD-10-CM

## 2018-06-06 DIAGNOSIS — I10 ESSENTIAL (PRIMARY) HYPERTENSION: ICD-10-CM

## 2018-06-06 DIAGNOSIS — I62.00 NONTRAUMATIC SUBDURAL HEMORRHAGE, UNSPECIFIED: ICD-10-CM

## 2018-06-06 DIAGNOSIS — H91.90 UNSPECIFIED HEARING LOSS, UNSPECIFIED EAR: ICD-10-CM

## 2018-06-06 DIAGNOSIS — Z96.7 PRESENCE OF OTHER BONE AND TENDON IMPLANTS: Chronic | ICD-10-CM

## 2018-06-06 DIAGNOSIS — S12.100A UNSPECIFIED DISPLACED FRACTURE OF SECOND CERVICAL VERTEBRA, INITIAL ENCOUNTER FOR CLOSED FRACTURE: ICD-10-CM

## 2018-06-06 LAB
ALBUMIN SERPL ELPH-MCNC: 3 G/DL — LOW (ref 3.3–5.2)
ALP SERPL-CCNC: 116 U/L — SIGNIFICANT CHANGE UP (ref 40–120)
ALT FLD-CCNC: 7 U/L — SIGNIFICANT CHANGE UP
ANION GAP SERPL CALC-SCNC: 16 MMOL/L — SIGNIFICANT CHANGE UP (ref 5–17)
AST SERPL-CCNC: 13 U/L — SIGNIFICANT CHANGE UP
BILIRUB SERPL-MCNC: <0.2 MG/DL — LOW (ref 0.4–2)
BUN SERPL-MCNC: 24 MG/DL — HIGH (ref 8–20)
CALCIUM SERPL-MCNC: 8.8 MG/DL — SIGNIFICANT CHANGE UP (ref 8.6–10.2)
CHLORIDE SERPL-SCNC: 94 MMOL/L — LOW (ref 98–107)
CO2 SERPL-SCNC: 24 MMOL/L — SIGNIFICANT CHANGE UP (ref 22–29)
CREAT SERPL-MCNC: 1.13 MG/DL — SIGNIFICANT CHANGE UP (ref 0.5–1.3)
GLUCOSE BLDC GLUCOMTR-MCNC: 355 MG/DL — HIGH (ref 70–99)
GLUCOSE BLDC GLUCOMTR-MCNC: 359 MG/DL — HIGH (ref 70–99)
GLUCOSE SERPL-MCNC: 388 MG/DL — HIGH (ref 70–115)
HBA1C MFR BLD HPLC: 8.9
MAGNESIUM SERPL-MCNC: 1.9 MG/DL — SIGNIFICANT CHANGE UP (ref 1.6–2.6)
PHOSPHATE SERPL-MCNC: 3.1 MG/DL — SIGNIFICANT CHANGE UP (ref 2.4–4.7)
POTASSIUM SERPL-MCNC: 3.5 MMOL/L — SIGNIFICANT CHANGE UP (ref 3.5–5.3)
POTASSIUM SERPL-SCNC: 3.5 MMOL/L — SIGNIFICANT CHANGE UP (ref 3.5–5.3)
PROT SERPL-MCNC: 6.1 G/DL — LOW (ref 6.6–8.7)
SODIUM SERPL-SCNC: 134 MMOL/L — LOW (ref 135–145)

## 2018-06-06 PROCEDURE — 72125 CT NECK SPINE W/O DYE: CPT | Mod: 26

## 2018-06-06 PROCEDURE — 99223 1ST HOSP IP/OBS HIGH 75: CPT

## 2018-06-06 PROCEDURE — 71045 X-RAY EXAM CHEST 1 VIEW: CPT | Mod: 26

## 2018-06-06 PROCEDURE — 99214 OFFICE O/P EST MOD 30 MIN: CPT

## 2018-06-06 PROCEDURE — 93010 ELECTROCARDIOGRAM REPORT: CPT

## 2018-06-06 PROCEDURE — 70450 CT HEAD/BRAIN W/O DYE: CPT | Mod: 26

## 2018-06-06 RX ORDER — TAMSULOSIN HYDROCHLORIDE 0.4 MG/1
0.8 CAPSULE ORAL AT BEDTIME
Qty: 0 | Refills: 0 | Status: DISCONTINUED | OUTPATIENT
Start: 2018-06-06 | End: 2018-06-14

## 2018-06-06 RX ORDER — INSULIN LISPRO 100/ML
VIAL (ML) SUBCUTANEOUS EVERY 4 HOURS
Qty: 0 | Refills: 0 | Status: DISCONTINUED | OUTPATIENT
Start: 2018-06-07 | End: 2018-06-10

## 2018-06-06 RX ORDER — INSULIN LISPRO 100/ML
VIAL (ML) SUBCUTANEOUS EVERY 6 HOURS
Qty: 0 | Refills: 0 | Status: DISCONTINUED | OUTPATIENT
Start: 2018-06-06 | End: 2018-06-06

## 2018-06-06 RX ORDER — HYDRALAZINE HCL 50 MG
10 TABLET ORAL ONCE
Qty: 0 | Refills: 0 | Status: COMPLETED | OUTPATIENT
Start: 2018-06-06 | End: 2018-06-06

## 2018-06-06 RX ORDER — TETANUS TOXOID, REDUCED DIPHTHERIA TOXOID AND ACELLULAR PERTUSSIS VACCINE, ADSORBED 5; 2.5; 8; 8; 2.5 [IU]/.5ML; [IU]/.5ML; UG/.5ML; UG/.5ML; UG/.5ML
0.5 SUSPENSION INTRAMUSCULAR ONCE
Qty: 0 | Refills: 0 | Status: COMPLETED | OUTPATIENT
Start: 2018-06-06 | End: 2018-06-06

## 2018-06-06 RX ORDER — SODIUM CHLORIDE 9 MG/ML
1000 INJECTION, SOLUTION INTRAVENOUS
Qty: 0 | Refills: 0 | Status: DISCONTINUED | OUTPATIENT
Start: 2018-06-06 | End: 2018-06-08

## 2018-06-06 RX ORDER — LISINOPRIL 2.5 MG/1
5 TABLET ORAL DAILY
Qty: 0 | Refills: 0 | Status: DISCONTINUED | OUTPATIENT
Start: 2018-06-07 | End: 2018-06-14

## 2018-06-06 RX ORDER — POTASSIUM CHLORIDE 20 MEQ
10 PACKET (EA) ORAL
Qty: 0 | Refills: 0 | Status: COMPLETED | OUTPATIENT
Start: 2018-06-06 | End: 2018-06-07

## 2018-06-06 RX ORDER — SODIUM CHLORIDE 9 MG/ML
250 INJECTION, SOLUTION INTRAVENOUS ONCE
Qty: 0 | Refills: 0 | Status: COMPLETED | OUTPATIENT
Start: 2018-06-06 | End: 2018-06-06

## 2018-06-06 RX ORDER — ATORVASTATIN CALCIUM 80 MG/1
20 TABLET, FILM COATED ORAL AT BEDTIME
Qty: 0 | Refills: 0 | Status: DISCONTINUED | OUTPATIENT
Start: 2018-06-06 | End: 2018-06-14

## 2018-06-06 RX ORDER — ACETAMINOPHEN 500 MG
1000 TABLET ORAL ONCE
Qty: 0 | Refills: 0 | Status: COMPLETED | OUTPATIENT
Start: 2018-06-06 | End: 2018-06-07

## 2018-06-06 RX ORDER — MAGNESIUM SULFATE 500 MG/ML
1 VIAL (ML) INJECTION ONCE
Qty: 0 | Refills: 0 | Status: COMPLETED | OUTPATIENT
Start: 2018-06-06 | End: 2018-06-06

## 2018-06-06 RX ORDER — METOPROLOL TARTRATE 50 MG
5 TABLET ORAL ONCE
Qty: 0 | Refills: 0 | Status: COMPLETED | OUTPATIENT
Start: 2018-06-06 | End: 2018-06-06

## 2018-06-06 RX ORDER — NYSTATIN CREAM 100000 [USP'U]/G
1 CREAM TOPICAL
Qty: 0 | Refills: 0 | Status: DISCONTINUED | OUTPATIENT
Start: 2018-06-06 | End: 2018-06-07

## 2018-06-06 RX ORDER — ACETAMINOPHEN 500 MG
1000 TABLET ORAL ONCE
Qty: 0 | Refills: 0 | Status: COMPLETED | OUTPATIENT
Start: 2018-06-06 | End: 2018-06-06

## 2018-06-06 RX ORDER — INSULIN GLARGINE 100 [IU]/ML
10 INJECTION, SOLUTION SUBCUTANEOUS AT BEDTIME
Qty: 0 | Refills: 0 | Status: DISCONTINUED | OUTPATIENT
Start: 2018-06-06 | End: 2018-06-07

## 2018-06-06 RX ORDER — ESCITALOPRAM OXALATE 10 MG/1
10 TABLET, FILM COATED ORAL DAILY
Qty: 0 | Refills: 0 | Status: DISCONTINUED | OUTPATIENT
Start: 2018-06-07 | End: 2018-06-14

## 2018-06-06 RX ADMIN — Medication 100 GRAM(S): at 22:48

## 2018-06-06 RX ADMIN — SODIUM CHLORIDE 250 MILLILITER(S): 9 INJECTION, SOLUTION INTRAVENOUS at 23:17

## 2018-06-06 RX ADMIN — Medication 1000 MILLIGRAM(S): at 20:00

## 2018-06-06 RX ADMIN — ATORVASTATIN CALCIUM 20 MILLIGRAM(S): 80 TABLET, FILM COATED ORAL at 22:20

## 2018-06-06 RX ADMIN — Medication 100 MILLIEQUIVALENT(S): at 22:49

## 2018-06-06 RX ADMIN — TETANUS TOXOID, REDUCED DIPHTHERIA TOXOID AND ACELLULAR PERTUSSIS VACCINE, ADSORBED 0.5 MILLILITER(S): 5; 2.5; 8; 8; 2.5 SUSPENSION INTRAMUSCULAR at 18:30

## 2018-06-06 RX ADMIN — Medication 10: at 22:29

## 2018-06-06 RX ADMIN — NYSTATIN CREAM 1 APPLICATION(S): 100000 CREAM TOPICAL at 22:21

## 2018-06-06 RX ADMIN — INSULIN GLARGINE 10 UNIT(S): 100 INJECTION, SOLUTION SUBCUTANEOUS at 23:19

## 2018-06-06 RX ADMIN — Medication 400 MILLIGRAM(S): at 18:30

## 2018-06-06 RX ADMIN — Medication 5 MILLIGRAM(S): at 21:03

## 2018-06-06 RX ADMIN — SODIUM CHLORIDE 75 MILLILITER(S): 9 INJECTION, SOLUTION INTRAVENOUS at 22:48

## 2018-06-06 RX ADMIN — Medication 10 MILLIGRAM(S): at 22:49

## 2018-06-06 RX ADMIN — TAMSULOSIN HYDROCHLORIDE 0.8 MILLIGRAM(S): 0.4 CAPSULE ORAL at 22:20

## 2018-06-06 NOTE — CONSULT NOTE ADULT - SUBJECTIVE AND OBJECTIVE BOX
HPI:  69 y/o M BIBEMS s/p fall from standing. as per the EMS pt was waiting outside his doctor when he fell backwards and hit his head on the floor. he had some loc for about 4 mins but was awake when the EMS got to him. denies nausea and vomiting.  pt is able to move all extremities and answer questions in the trauma bay.  he is complaining of pain in his chin.  pt is on aspirin    Primary survey:  A: intact  B: CTAB  C: b/l femoral 2 +  D: GCS: 15, pupils R2mm L3mm RRR  E: skin tear over the left parietal scalp  no step offs  no other visible sign of trauma    Secondary survey:  pt alert, Ox3  Chest: CTAB, non tender  CVS: S1S2  abd: soft, non tender  Pelvis: stable  Power: 5/5 b/l lower and upper extremity (06 Jun 2018 17:49)    PAST MEDICAL & SURGICAL HISTORY:      REVIEW OF SYSTEMS:    CONSTITUTIONAL: No fever, weight loss, or fatigue  EYES: No eye pain, visual disturbances, or discharge  ENMT:  No difficulty hearing, tinnitus, vertigo; No sinus or throat pain  NECK: No pain or stiffness  BREASTS: No pain, masses, or nipple discharge  RESPIRATORY: No cough, wheezing, chills or hemoptysis; No shortness of breath  CARDIOVASCULAR: No chest pain, palpitations, dizziness, or leg swelling  GASTROINTESTINAL: No abdominal or epigastric pain. No nausea, vomiting, or hematemesis; No diarrhea or constipation. No melena or hematochezia.  GENITOURINARY: No dysuria, frequency, hematuria, or incontinence  NEUROLOGICAL: No headaches, memory loss, loss of strength, numbness, or tremors  SKIN: No itching, burning, rashes, or lesions   LYMPH NODES: No enlarged glands  ENDOCRINE: No heat or cold intolerance; No hair loss  MUSCULOSKELETAL: No joint pain or swelling; No muscle, back, or extremity pain  PSYCHIATRIC: No depression, anxiety, mood swings, or difficulty sleeping  HEME/LYMPH: No easy bruising, or bleeding gums  ALLERY AND IMMUNOLOGIC: No hives or eczema    Allergies    Allergy Status Unknown    Intolerances      MEDICATIONS  (STANDING):  acetaminophen  IVPB. 1000 milliGRAM(s) IV Intermittent once  diphtheria/tetanus/pertussis (acellular) Vaccine (ADAcel) 0.5 milliLiter(s) IntraMuscular once    MEDICATIONS  (PRN):    SOCIAL HISTORY:  FAMILY HISTORY:    Vital Signs Last 24 Hrs  T(C): --  T(F): --  HR: --  BP: --  BP(mean): --  RR: --  SpO2: --    PHYSICAL EXAM:    GENERAL: NAD, well-groomed, well-developed  HEAD:  Atraumatic, Normocephalic  EYES: EOMI, PERRLA, conjunctiva and sclera clear  ENMT: No tonsillar erythema, exudates, or enlargement; Moist mucous membranes, Good dentition, No lesions  NECK: Supple, No JVD, Normal thyroid  NERVOUS SYSTEM:  Alert & Oriented X3, Good concentration; Motor Strength 5/5 B/L upper and lower extremities; DTRs 2+ intact and symmetric  CHEST/LUNG: Clear to percussion bilaterally; No rales, rhonchi, wheezing, or rubs  HEART: Regular rate and rhythm; No murmurs, rubs, or gallops  ABDOMEN: Soft, Nontender, Nondistended; Bowel sounds present  EXTREMITIES:  2+ Peripheral Pulses, No clubbing, cyanosis, or edema  LYMPH: No lymphadenopathy noted  SKIN: No rashes or lesions    LABS:                        10.9   7.1   )-----------( 278      ( 06 Jun 2018 17:57 )             33.1                 RADIOLOGY & ADDITIONAL STUDIES:  ~~~~~~~~~~~~~~~~~~~~~~~~~~~~~~ SOURCE: PATRIENT HIMSELF, POOR HISTORIAN POOR HEARING    HPI: 69 y/o M BIBEMS s/p fall from standing. as per the EMS pt was walking with walker  when he fell backwards and hit his head on the floor. he had some loc for about 4 mins but was awake when the EMS got to him. denies nausea and vomiting, no change motor/sensory, base line poor hearing and vision.   pt is able to move all extremities and answer questions in the trauma bay.  he is complaining of pain occipital area with lac s/p repair.   pt is on aspirin 81 mg /day  gcs=14 ( partial date)      PAST MEDICAL:  parkinsons , htn, bph, cholesterolemia,  left eye blind, right eye cataract, bilateral hearing defect     SURGICAL HISTORY:  R HIP ORIF, R SHOULDER SURG, L ELBOW FX ( NO SURG??), TURP      REVIEW OF SYSTEMS:    CONSTITUTIONAL: No fever, weight loss, or fatigue  EYES: No eye pain, visual disturbances, or discharge  ENMT:  No difficulty hearing, tinnitus, vertigo; No sinus or throat pain  NECK: No pain or stiffness  BREASTS: No pain, masses, or nipple discharge  RESPIRATORY: No cough, wheezing, chills or hemoptysis; No shortness of breath  CARDIOVASCULAR: No chest pain, palpitations, dizziness, or leg swelling  GASTROINTESTINAL: No abdominal or epigastric pain. No nausea, vomiting, or hematemesis; No diarrhea or constipation. No melena or hematochezia.  GENITOURINARY: No dysuria,+ some frequency, hematuria, + some incontinence  NEUROLOGICAL: No headaches, memory loss, loss of strength, numbness, or tremors, walks with walker due to parkinsons  SKIN: No itching, burning, rashes, or lesions   LYMPH NODES: No enlarged glands  ENDOCRINE: No heat or cold intolerance; No hair loss  MUSCULOSKELETAL: No joint pain or swelling; No muscle, back, or extremity pain  PSYCHIATRIC: No depression, anxiety, mood swings, or difficulty sleeping  HEME/LYMPH: No easy bruising, or bleeding gums  ALLERY AND IMMUNOLOGIC: No hives or eczema    Allergies: Allergy Status Unknown      MEDICATIONS (HOME): AMLODIPINE, CARBIDOPA, CARVEDILOL, ATORVASTATIN,  ASA 81 QD, TAMSULOSIN, VIT D3, ESCITALOPRAM   acetaminophen  IVPB. 1000 milliGRAM(s) IV Intermittent once  diphtheria/tetanus/pertussis (acellular) Vaccine (ADAcel) 0.5 milliLiter(s) IntraMuscular once        SOCIAL HISTORY: [facility] CIGS 30 YRS AGO, NO ETOH, NO DRUGS  FAMILY HISTORY: HAS NO RECALL OF FAM HEALTH HX O    Vital Signs Last 24 Hrs  PENDING    PHYSICAL EXAM:    GENERAL: NAD, poorly groomed, well-developed  HEAD:  left occiptial lac s/p repair, Normocephalic  EYES: EOMI, PERRLA, conjunctiva and sclera clear  NECK: Supple, No JVD, c collar in place  NERVOUS SYSTEM:  Alert & Oriented X2 with partial date, poor vision, can not count fingers, poor hearing bilat,   Motor Strength 5-/5 B/L upper and lower extremities;   CHEST/LUNG: Clear  bilaterally; No rales, rhonchi, wheezing, or rubs, no sternal or rib tenderness  HEART: Regular rate   ABDOMEN: Soft, Nontender, Nondistended; Bowel sounds present  EXTREMITIES:  2+ Peripheral Pulses rad, and DP , No clubbing, cyanosis, or + 1+edema feet and ankles   LYMPH: No lymphadenopathy noted  SKIN: No rashes or lesions, chronic finger and toe nail thickening    LABS:                        10.9   7.1   )-----------( 278      ( 06 Jun 2018 17:57 )             33.1     PENDING  RADIOLOGY & ADDITIONAL STUDIES:  Acute on chronic right-sided subdural hematoma with a 6 mm left-sided   midline shift. The cystic component may represent a large subarachnoid cyst.   Severe diffuse cortical atrophy with chronic ischemic white matter disease .   Number age-indeterminate nonhemorrhagic right occipital lobe infarct with   cytotoxic edema .   No fracture.   Bilateral mastoiditis with right otitis media.     ~~~~~~~~~~~~~~~~~~~~~~~~~~~~~~

## 2018-06-06 NOTE — PATIENT PROFILE ADULT. - VISION (WITH CORRECTIVE LENSES IF THE PATIENT USUALLY WEARS THEM):
left eye legally blind/Severely impaired: cannot locate objects without hearing or touching them or patient nonresponsive.

## 2018-06-06 NOTE — ED PROVIDER NOTE - CRITICAL CARE PROVIDED
interpretation of diagnostic studies/direct patient care (not related to procedure)/35 MINUTES/consultation with other physicians/documentation

## 2018-06-06 NOTE — H&P ADULT - ATTENDING COMMENTS
69 yo male BIBEMS as trauma activation B s/p fall from standing.  Patient was reportedly uses a walker but refused to wait for it, ambulated and fell backwards striking his head on the ground.  Wife witnessed events and reported to EMS a 4 minute LOC.  EMS states poor GCS on their arrival with improvements over time.  On ASA.  Had cervical spine injury about a year ago but no further details and EMS did not know level.  Patient states chin and head pain.  Airway intact.  GCS 14 ([id number]). Following commands.  Primary survey intact.  Secondary survey shows a 3cm non-bleeding lac to occiput.  Moves all extremities.  Hemodynamically normal.  Metabolically and physiologically ok.  Base excess of 2.  CXR negative for traumatic injury.  CT head shows an acute on chronic ICH.  C-spine shows an age-indeterminate C1 b/l lamina fracture.  Admit to SICU for hourly neurological monitoring.  Will need repeat head CT in 6 hours or sooner should his mental status deteriorate.  Neurosurgery consult.  AED if requested by NS.  Hold all anticoagulation and antiplatelets.  [place]-J collar and ortho spine for cervical spine fracture.  Spinal precautions.  Pain control.

## 2018-06-06 NOTE — PROGRESS NOTE ADULT - SUBJECTIVE AND OBJECTIVE BOX
CT scan on the head and Neck showed:  Acute on chronic right-sided subdural hematoma with a 6 mm left-sided   midline shift. The cystic component may represent a large subarachnoid cyst.   Severe diffuse cortical atrophy with chronic ischemic white matter disease .   Number age-indeterminate nonhemorrhagic right occipital lobe infarct with   cytotoxic edema .   No fracture.   Bilateral mastoiditis with right otitis media.   C1 bilateral lamina fractures.     Pt was admitted to SICU.  Neurosurgery and ortho spine consulted  will repeat CT head in 6 hrs

## 2018-06-06 NOTE — ED PROVIDER NOTE - OBJECTIVE STATEMENT
69 y/o M with past hx of DM presents to the ED c/o chin pain s/p fall which onset today. As per EMS, pt normally ambulates with a walker. Today, he stood up without his walker, fell, and hit his head on the pavement. His wife notes that pt lost consciousness for 4 minutes. He was alert when EMS arrived but his mental status gradually reduced. As per EMS, PTA, pt had GCS=14, was hypertensive at 200/100, and had irregular heart rate of 78. Pt is not on any blood thinners. No further complaints at this time. 69 y/o M with past hx of DM presents to the ED c/o chin pain s/p fall which onset today. As per EMS, pt normally ambulates with a walker. Today, he stood up without his walker, fell backwards, and hit his head on the pavement. His wife notes that pt lost consciousness for 4 minutes. He was alert when EMS arrived but his mental status gradually reduced. As per EMS, PTA, pt had GCS=14, was hypertensive at 200/100, and had irregular heart rate of 78. Pt is on aspirin. No further complaints at this time.

## 2018-06-06 NOTE — H&P ADULT - NSHPPHYSICALEXAM_GEN_ALL_CORE
Primary survey:  A: intact  B: CTAB  C: b/l femoral 2 +  D: GCS: 15, pupils R2mm L3mm RRR  E: skin tear over the left parietal scalp  no step offs  no other visible sign of trauma    Secondary survey:  pt alert, Ox3  Chest: CTAB, non tender  CVS: S1S2  abd: soft, non tender  Pelvis: stable  Power: 5/5 b/l lower and upper extremity

## 2018-06-06 NOTE — CONSULT NOTE ADULT - CONSULT REASON
SDH Acute on chronic right-sided subdural hematoma with a 6 mm left-sided   midline shift. The cystic component may represent a large subarachnoid cyst.   Severe diffuse cortical atrophy with chronic ischemic white matter disease .   Number age-indeterminate nonhemorrhagic right occipital lobe infarct with   cytotoxic edema .   No fracture.   Bilateral mastoiditis with right otitis media.

## 2018-06-06 NOTE — HISTORY OF PRESENT ILLNESS
[FreeTextEntry1] : Patient at office for follow up,pt appeared 35 min late for appt,  review BW results dated 5.7.18. Patient has appointment with Dr Meyer pending, office is to call with appointment time. Patients BS this morning around 9am 315 after breakfast of scambled eggs, repeated at 1:30 and was "500 and change." Patient saw ENT and placed on steroids secondary to water behind the ear, last dose last week. Patient Patient taking Levemir 10 units every morning and if sugars are increased will give another 10 units of levemir. BS checked after breakfast lunch and dinenr and covered with sliding scale Novolog. Patient also fell Wednesday night, states did not fall on head, denies injury. pt noted went to dr meyer, got to office , and he was gone for the day, and person said dr meyer  wont be there for most of summer. pt needs help w his diabetes , will ask ercole to help coordinate and if no help from endocrine as pt is diffiicult ,  will refer elsewhere as per pt request [de-identified] : pt hearing is a little bit better, pt had [facility]x elbow and neck in january, , possible hearing loss  worsened secondary to sugars, pt needs meds stiaghterned out, wife herefor refills

## 2018-06-06 NOTE — REVIEW OF SYSTEMS
[Fever] : no fever [Chest Pain] : no chest pain [Shortness Of Breath] : no shortness of breath [FreeTextEntry4] : ear is better [FreeTextEntry1] : sugars are not well controlled

## 2018-06-06 NOTE — ED PROVIDER NOTE - CARDIAC, MLM
Normal rate, regular rhythm.  Heart sounds S1, S2.  No murmurs, rubs or gallops. Radial pulses 2+, femoral pulses 2+

## 2018-06-06 NOTE — ED PROVIDER NOTE - MUSCULOSKELETAL, MLM
Spine appears normal, range of motion is not limited, no muscle or joint tenderness. No chest wall TTP. No extremity injuries. No C/T/L spine TTP, no step off.

## 2018-06-06 NOTE — ED PROVIDER NOTE - CRITICAL CARE INDICATION, MLM
patient was critically ill... Patient was critically ill with a high probability of imminent or life threatening deterioration. STAT TRAUMA CODE STAT IV LAB XRAY CTS ADMIT

## 2018-06-06 NOTE — H&P ADULT - ASSESSMENT
69 y/o M on aspirin s/p mechanical fall from standing and hitting his head.   pain management  IV fluids  CXR in trauma bay didnt show any significant pathology  Plan for CT head and C spine, awaiting official read  will possibly admit the pt under trauma  will clear C collar once CT negative  Tertiary survey  PT/OT 69 y/o M on aspirin s/p mechanical fall from standing and hitting his head.   pain management  IV fluids  CXR in trauma bay didnt show any significant pathology  Plan for CT head and C spine, awaiting official read  will possibly admit the pt under trauma  Tertiary survey  PT/OT

## 2018-06-06 NOTE — H&P ADULT - HISTORY OF PRESENT ILLNESS
71 y/o M BIBEMS s/p fall from standing. as per the EMS pt was waiting outside his doctor when he fell backwards and hit his head on the floor. he had some loc for about 4 mins but was awake when the EMS got to him. denies nausea and vomiting.  pt is able to move all extremities and answer questions in the trauma bay.  he is complaining of pain in his chin.  pt is on aspirin    Primary survey:  A: intact  B: CTAB  C: b/l femoral 2 +  D: GCS: 15, pupils R2mm L3mm RRR  E: skin tear over the left parietal scalp  no step offs  no other visible sign of trauma    Secondary survey:  pt alert, Ox3  Chest: CTAB, non tender  CVS: S1S2  abd: soft, non tender  Pelvis: stable  Power: 5/5 b/l lower and upper extremity

## 2018-06-06 NOTE — CONSULT NOTE ADULT - SUBJECTIVE AND OBJECTIVE BOX
Asked by the ACS team to evaluate a 70 year old male who was admitted after falling today.  Patient is found this evening in the SICU laying on his side in his bed.  Presently, he has a collar on, but denying and neck pain.  He is also denying any numbness and tingling to his extremities.  Patient usually uses his walker to help with ambulating but today outside of a doctor's office he tried to ambulate with out using a walker and sustained a fall.  When he arrived at the ED X-rays and CT scans were obtained.  There was a finding of a C1 Lamina fracture.  He is admitted for observation and further workup.      PMHx:  parkinsons  htn  bph  cholesterolemia  left eye blind'  right eye cataract  bilateral hearing defect    PSHx:  R HIP ORIF  R SHOULDER SURG  L ELBOW FX ( NO SURG??)  TURP    Allergies:  NKDA    Review of Systems:  Muscular Skeletal: C1 Fracture found on CT  Remaining Systems were found to be negative from the history obtained    Physical  Vital Signs Last 24 Hrs  T(C): 36.6 (06 Jun 2018 20:00), Max: 36.6 (06 Jun 2018 20:00)  T(F): 97.9 (06 Jun 2018 20:00), Max: 97.9 (06 Jun 2018 20:00)  HR: 72 (06 Jun 2018 21:00) (72 - 73)  BP: 169/76 (06 Jun 2018 21:00) (169/76 - 172/83)  BP(mean): 115 (06 Jun 2018 21:00) (115 - 118)  RR: 13 (06 Jun 2018 21:00) (13 - 14)  SpO2: 98% (06 Jun 2018 21:00) (96% - 98%)    Cervical Spine, Extremities  Non-tender to palpation of cervical spine  Collar Remains in Place  5/5 strength in upper extremity  + Sensation  No pathological reflexes noted    CT Scan Cervical Spine;    A/P C1 Bilateral Lamina Fracture  - Bed Rest  - collar  - Spine precautions  - F/U MRI of cervical Spine  - Pain medication as needed  - Continue care per ACS team

## 2018-06-06 NOTE — CONSULT NOTE ADULT - ATTENDING COMMENTS
Plan:  DISCUSSED WITH DR JACK  ICU  Q1H NEURO CKS  HOB UP 35 DEGREES  REPEAT CT HEAD 6 HRS FROM 1ST OR STAT IF ANY NEURO STATUS CHANGE  SBP CONTROL <150  HOLD ANTI COAGS/ANTIPLATELETS  LIMIT SEDATION/NARCOTICS Plan:  DISCUSSED WITH DR JACK  ICU  Q1H NEURO CKS  HOB UP 35 DEGREES  REPEAT CT HEAD 6 HRS FROM 1ST OR STAT IF ANY NEURO STATUS CHANGE  SBP CONTROL <150  HOLD ANTI COAGS/ANTIPLATELETS  LIMIT SEDATION/NARCOTICS    NSGY Attg:    see above    imaging reviewed    agree with plan as documented

## 2018-06-07 LAB
ANION GAP SERPL CALC-SCNC: 14 MMOL/L — SIGNIFICANT CHANGE UP (ref 5–17)
BASOPHILS # BLD AUTO: 0 K/UL — SIGNIFICANT CHANGE UP (ref 0–0.2)
BASOPHILS NFR BLD AUTO: 0.1 % — SIGNIFICANT CHANGE UP (ref 0–2)
BUN SERPL-MCNC: 20 MG/DL — SIGNIFICANT CHANGE UP (ref 8–20)
CALCIUM SERPL-MCNC: 8.6 MG/DL — SIGNIFICANT CHANGE UP (ref 8.6–10.2)
CHLORIDE SERPL-SCNC: 98 MMOL/L — SIGNIFICANT CHANGE UP (ref 98–107)
CO2 SERPL-SCNC: 24 MMOL/L — SIGNIFICANT CHANGE UP (ref 22–29)
CREAT SERPL-MCNC: 1 MG/DL — SIGNIFICANT CHANGE UP (ref 0.5–1.3)
EOSINOPHIL # BLD AUTO: 0.2 K/UL — SIGNIFICANT CHANGE UP (ref 0–0.5)
EOSINOPHIL NFR BLD AUTO: 1.5 % — SIGNIFICANT CHANGE UP (ref 0–5)
GLUCOSE BLDC GLUCOMTR-MCNC: 135 MG/DL — HIGH (ref 70–99)
GLUCOSE BLDC GLUCOMTR-MCNC: 136 MG/DL — HIGH (ref 70–99)
GLUCOSE BLDC GLUCOMTR-MCNC: 154 MG/DL — HIGH (ref 70–99)
GLUCOSE BLDC GLUCOMTR-MCNC: 207 MG/DL — HIGH (ref 70–99)
GLUCOSE BLDC GLUCOMTR-MCNC: 210 MG/DL — HIGH (ref 70–99)
GLUCOSE BLDC GLUCOMTR-MCNC: 231 MG/DL — HIGH (ref 70–99)
GLUCOSE SERPL-MCNC: 189 MG/DL — HIGH (ref 70–115)
HBA1C BLD-MCNC: 12 % — HIGH (ref 4–5.6)
HCT VFR BLD CALC: 30.8 % — LOW (ref 42–52)
HGB BLD-MCNC: 10.3 G/DL — LOW (ref 14–18)
LYMPHOCYTES # BLD AUTO: 1.1 K/UL — SIGNIFICANT CHANGE UP (ref 1–4.8)
LYMPHOCYTES # BLD AUTO: 10.9 % — LOW (ref 20–55)
MAGNESIUM SERPL-MCNC: 2 MG/DL — SIGNIFICANT CHANGE UP (ref 1.6–2.6)
MCHC RBC-ENTMCNC: 30.4 PG — SIGNIFICANT CHANGE UP (ref 27–31)
MCHC RBC-ENTMCNC: 33.4 G/DL — SIGNIFICANT CHANGE UP (ref 32–36)
MCV RBC AUTO: 90.9 FL — SIGNIFICANT CHANGE UP (ref 80–94)
MONOCYTES # BLD AUTO: 0.7 K/UL — SIGNIFICANT CHANGE UP (ref 0–0.8)
MONOCYTES NFR BLD AUTO: 7.2 % — SIGNIFICANT CHANGE UP (ref 3–10)
NEUTROPHILS # BLD AUTO: 8.1 K/UL — HIGH (ref 1.8–8)
NEUTROPHILS NFR BLD AUTO: 80.1 % — HIGH (ref 37–73)
PHOSPHATE SERPL-MCNC: 2.1 MG/DL — LOW (ref 2.4–4.7)
PLATELET # BLD AUTO: 243 K/UL — SIGNIFICANT CHANGE UP (ref 150–400)
POTASSIUM SERPL-MCNC: 3.2 MMOL/L — LOW (ref 3.5–5.3)
POTASSIUM SERPL-SCNC: 3.2 MMOL/L — LOW (ref 3.5–5.3)
RBC # BLD: 3.39 M/UL — LOW (ref 4.6–6.2)
RBC # FLD: 13.2 % — SIGNIFICANT CHANGE UP (ref 11–15.6)
SODIUM SERPL-SCNC: 136 MMOL/L — SIGNIFICANT CHANGE UP (ref 135–145)
WBC # BLD: 10.2 K/UL — SIGNIFICANT CHANGE UP (ref 4.8–10.8)
WBC # FLD AUTO: 10.2 K/UL — SIGNIFICANT CHANGE UP (ref 4.8–10.8)

## 2018-06-07 PROCEDURE — 70498 CT ANGIOGRAPHY NECK: CPT | Mod: 26

## 2018-06-07 PROCEDURE — 99291 CRITICAL CARE FIRST HOUR: CPT

## 2018-06-07 PROCEDURE — 70450 CT HEAD/BRAIN W/O DYE: CPT | Mod: 26

## 2018-06-07 PROCEDURE — 72141 MRI NECK SPINE W/O DYE: CPT | Mod: 26

## 2018-06-07 PROCEDURE — 99223 1ST HOSP IP/OBS HIGH 75: CPT

## 2018-06-07 RX ORDER — ENOXAPARIN SODIUM 100 MG/ML
40 INJECTION SUBCUTANEOUS
Qty: 0 | Refills: 0 | Status: DISCONTINUED | OUTPATIENT
Start: 2018-06-07 | End: 2018-06-14

## 2018-06-07 RX ORDER — LABETALOL HCL 100 MG
10 TABLET ORAL ONCE
Qty: 0 | Refills: 0 | Status: COMPLETED | OUTPATIENT
Start: 2018-06-07 | End: 2018-06-07

## 2018-06-07 RX ORDER — ACETAMINOPHEN 500 MG
975 TABLET ORAL ONCE
Qty: 0 | Refills: 0 | Status: COMPLETED | OUTPATIENT
Start: 2018-06-07 | End: 2018-06-07

## 2018-06-07 RX ORDER — HYDRALAZINE HCL 50 MG
10 TABLET ORAL EVERY 6 HOURS
Qty: 0 | Refills: 0 | Status: DISCONTINUED | OUTPATIENT
Start: 2018-06-07 | End: 2018-06-14

## 2018-06-07 RX ORDER — POTASSIUM CHLORIDE 20 MEQ
40 PACKET (EA) ORAL EVERY 4 HOURS
Qty: 0 | Refills: 0 | Status: DISCONTINUED | OUTPATIENT
Start: 2018-06-07 | End: 2018-06-07

## 2018-06-07 RX ORDER — INSULIN GLARGINE 100 [IU]/ML
15 INJECTION, SOLUTION SUBCUTANEOUS AT BEDTIME
Qty: 0 | Refills: 0 | Status: DISCONTINUED | OUTPATIENT
Start: 2018-06-07 | End: 2018-06-08

## 2018-06-07 RX ORDER — HYDRALAZINE HCL 50 MG
10 TABLET ORAL EVERY 4 HOURS
Qty: 0 | Refills: 0 | Status: DISCONTINUED | OUTPATIENT
Start: 2018-06-07 | End: 2018-06-07

## 2018-06-07 RX ORDER — POTASSIUM PHOSPHATE, MONOBASIC POTASSIUM PHOSPHATE, DIBASIC 236; 224 MG/ML; MG/ML
15 INJECTION, SOLUTION INTRAVENOUS ONCE
Qty: 0 | Refills: 0 | Status: COMPLETED | OUTPATIENT
Start: 2018-06-07 | End: 2018-06-07

## 2018-06-07 RX ORDER — POTASSIUM CHLORIDE 20 MEQ
40 PACKET (EA) ORAL EVERY 4 HOURS
Qty: 0 | Refills: 0 | Status: COMPLETED | OUTPATIENT
Start: 2018-06-07 | End: 2018-06-07

## 2018-06-07 RX ORDER — INSULIN GLARGINE 100 [IU]/ML
20 INJECTION, SOLUTION SUBCUTANEOUS AT BEDTIME
Qty: 0 | Refills: 0 | Status: DISCONTINUED | OUTPATIENT
Start: 2018-06-07 | End: 2018-06-07

## 2018-06-07 RX ORDER — ACETAMINOPHEN 500 MG
1000 TABLET ORAL ONCE
Qty: 0 | Refills: 0 | Status: COMPLETED | OUTPATIENT
Start: 2018-06-07 | End: 2018-06-07

## 2018-06-07 RX ADMIN — ENOXAPARIN SODIUM 40 MILLIGRAM(S): 100 INJECTION SUBCUTANEOUS at 21:21

## 2018-06-07 RX ADMIN — Medication 6: at 02:08

## 2018-06-07 RX ADMIN — Medication 40 MILLIEQUIVALENT(S): at 19:06

## 2018-06-07 RX ADMIN — Medication 100 MILLIEQUIVALENT(S): at 02:08

## 2018-06-07 RX ADMIN — LISINOPRIL 5 MILLIGRAM(S): 2.5 TABLET ORAL at 05:00

## 2018-06-07 RX ADMIN — Medication 10 MILLIGRAM(S): at 21:25

## 2018-06-07 RX ADMIN — Medication 400 MILLIGRAM(S): at 06:39

## 2018-06-07 RX ADMIN — Medication 4: at 21:54

## 2018-06-07 RX ADMIN — Medication 6: at 10:52

## 2018-06-07 RX ADMIN — Medication 40 MILLIEQUIVALENT(S): at 05:48

## 2018-06-07 RX ADMIN — Medication 1000 MILLIGRAM(S): at 07:04

## 2018-06-07 RX ADMIN — Medication 100 MILLIEQUIVALENT(S): at 00:40

## 2018-06-07 RX ADMIN — ESCITALOPRAM OXALATE 10 MILLIGRAM(S): 10 TABLET, FILM COATED ORAL at 19:06

## 2018-06-07 RX ADMIN — POTASSIUM PHOSPHATE, MONOBASIC POTASSIUM PHOSPHATE, DIBASIC 62.5 MILLIMOLE(S): 236; 224 INJECTION, SOLUTION INTRAVENOUS at 05:00

## 2018-06-07 RX ADMIN — Medication 10 MILLIGRAM(S): at 16:04

## 2018-06-07 RX ADMIN — Medication 1000 MILLIGRAM(S): at 00:36

## 2018-06-07 RX ADMIN — Medication 975 MILLIGRAM(S): at 16:39

## 2018-06-07 RX ADMIN — Medication 10 MILLIGRAM(S): at 05:47

## 2018-06-07 RX ADMIN — ATORVASTATIN CALCIUM 20 MILLIGRAM(S): 80 TABLET, FILM COATED ORAL at 21:59

## 2018-06-07 RX ADMIN — Medication 10 MILLIGRAM(S): at 02:11

## 2018-06-07 RX ADMIN — Medication 6: at 19:13

## 2018-06-07 RX ADMIN — Medication 400 MILLIGRAM(S): at 00:16

## 2018-06-07 RX ADMIN — TAMSULOSIN HYDROCHLORIDE 0.8 MILLIGRAM(S): 0.4 CAPSULE ORAL at 21:59

## 2018-06-07 RX ADMIN — INSULIN GLARGINE 15 UNIT(S): 100 INJECTION, SOLUTION SUBCUTANEOUS at 21:55

## 2018-06-07 NOTE — CONSULT NOTE ADULT - SUBJECTIVE AND OBJECTIVE BOX
77yM was admitted on 06-06 after a fall backwards from standing with LOC of 4 min outside of his doctor's office. In ED,  GCS=15.     Imaging showed:  HEAD CT - Acute on chronic right-sided subdural hematoma with a 6 mm left-sided midline shift. The cystic component may represent a large subarachnoid cyst. Severe diffuse cortical atrophy with chronic ischemic white matter disease . Number age-indeterminate nonhemorrhagic right occipital lobe infarct with cytotoxic edema . No fracture. Bilateral mastoiditis with right otitis media.    Repeat showed  1.  Small right posterior parafalcine, temporal, frontal and parietal contrecoup acute subdural hematoma measuring up to 10 mm in width and right parietal lobe. There is mild mass effect on right frontal lobe. No midline shift or herniation. Overall the hematoma is slightly improved. 2.  5.6 x 4.2 cm right anterior temporal fossa and right frontal CSF density collection.  Finding is consistent with a large arachnoid cyst. No interval change compared to prior study.3.  Fluid filled right mastoid air cells with fluid in right middle air  cavity.  Please correlate clinically for otitis media and mastoiditis.4.  Partial fluid filled LEFT mastoid air cells.5.  Chronic ischemic changes bilaterally. 6.  Nondisplaced posterior right C1 arch fracture. Developmental nonfused   midline posterior C1 arch deformity.     MRI C SPINE - No acute fractures    As per patient recollection of events is not as charted. Fell getting out of his car at his home. Currently complains of no pain or dizziness.     REVIEW OF SYSTEMS  Constitutional - No fever, No weight loss, +fatigue  HEENT - No eye pain, No visual disturbances, +difficulty hearing, No tinnitus, No vertigo, No neck pain  Respiratory - No cough, No wheezing, No shortness of breath  Cardiovascular - No chest pain, No palpitations  Gastrointestinal - No abdominal pain, No nausea, No vomiting, No diarrhea, No constipation  Genitourinary - No dysuria, No frequency, No hematuria, No incontinence  Neurological - No headaches, +memory loss, +loss of strength, No numbness, No tremors  Skin - No itching, No rashes, No lesions   Endocrine - No temperature intolerance  Musculoskeletal - No joint pain, No joint swelling, No muscle pain  Psychiatric - +depression, No anxiety    VITALS  T(C): 36.9 (06-07-18 @ 08:00), Max: 36.9 (06-07-18 @ 04:00)  HR: 77 (06-07-18 @ 12:00) (67 - 77)  BP: 162/81 (06-07-18 @ 12:00) (140/65 - 187/84)  RR: 20 (06-07-18 @ 12:00) (13 - 24)  SpO2: 97% (06-07-18 @ 12:00) (95% - 98%)  Wt(kg): --    PAST MEDICAL & SURGICAL HISTORY  Legally blind  BPH (benign prostatic hyperplasia)  Type 2 diabetes mellitus  HTN (hypertension)  Parkinson disease  S/P ORIF (open reduction internal fixation) fracture  S/P TURP    SOCIAL HISTORY  Smoking - Denied  EtOH - Denied   Drugs - Denied    FUNCTIONAL HISTORY  Lives with wife  Prior to fall in Jan 2018 was Independent with RW  Currently, needs ?assist, as per charting, attended a day program 3x/week which has now been DC    CURRENT FUNCTIONAL STATUS  Total A    FAMILY HISTORY   NC    RECENT LABS/IMAGING  CBC Full  -  ( 07 Jun 2018 03:51 )  WBC Count : 10.2 K/uL  Hemoglobin : 10.3 g/dL  Hematocrit : 30.8 %  Platelet Count - Automated : 243 K/uL  Mean Cell Volume : 90.9 fl  Mean Cell Hemoglobin : 30.4 pg  Mean Cell Hemoglobin Concentration : 33.4 g/dL  Auto Neutrophil # : 8.1 K/uL  Auto Lymphocyte # : 1.1 K/uL  Auto Monocyte # : 0.7 K/uL  Auto Eosinophil # : 0.2 K/uL  Auto Basophil # : 0.0 K/uL  Auto Neutrophil % : 80.1 %  Auto Lymphocyte % : 10.9 %  Auto Monocyte % : 7.2 %  Auto Eosinophil % : 1.5 %  Auto Basophil % : 0.1 %    06-07    136  |  98  |  20.0  ----------------------------<  189<H>  3.2<L>   |  24.0  |  1.00    Ca    8.6      07 Jun 2018 03:51  Phos  2.1     06-07  Mg     2.0     06-07    TPro  6.1<L>  /  Alb  3.0<L>  /  TBili  <0.2<L>  /  DBili  x   /  AST  13  /  ALT  7   /  AlkPhos  116  06-06        ALLERGIES  No Known Allergies      MEDICATIONS   atorvastatin 20 milliGRAM(s) Oral at bedtime  escitalopram 10 milliGRAM(s) Oral daily  insulin glargine Injectable (LANTUS) 10 Unit(s) SubCutaneous at bedtime  insulin lispro (HumaLOG) corrective regimen sliding scale   SubCutaneous every 4 hours  lisinopril 5 milliGRAM(s) Oral daily  metolazone 5 milliGRAM(s) Oral daily  multiple electrolytes Injection Type 1 1000 milliLiter(s) IV Continuous <Continuous>  potassium chloride   Powder 40 milliEquivalent(s) Oral every 4 hours  tamsulosin 0.8 milliGRAM(s) Oral at bedtime      ----------------------------------------------------------------------------------------  PHYSICAL EXAM  Constitutional - NAD, Comfortable  Neck - Supple, No limited ROM  Chest - Breathing comfortably, No wheezing  Cardiovascular - S1S2   Abdomen - Soft   Extremities - No calf tenderness   Neurologic Exam -                    Cognitive - Awake, Alert, AAO to self, fall and SSH      Communication - LImited verbalizations, slowed processing     Motor - Limited participation, no apparent focal/ lateralizing deficits     Sensory - Intact to LT  Psychiatric - Mood irritable, withdrawn  ----------------------------------------------------------------------------------------  ASSESSMENT/PLAN  77yMale with functional deficits after a fall with TBI  Pain - Tylenol  Mood - Lexapro  DVT PPX - SCDs  **recommend merging of old chart [id number]**   Rehab - At this time, will continue to follow. Patient was recently hospitalized with multiple fractures/TBI and went to , he was then DC to [facility] and was in a day program. Will await formal evals and more input on most recent level of function. Patient may not be able to achieve certain functional goals for DC home.

## 2018-06-07 NOTE — CONSULT NOTE ADULT - SUBJECTIVE AND OBJECTIVE BOX
HPI:  71 y/o M BIBEMS s/p fall from standing. as per the EMS pt was waiting outside his doctor when he fell backwards and hit his head on the floor. he had some loc for about 4 mins but was awake when the EMS got to him pt supposed to use as walker but would not wait   found to have large arachnoid cyst and acute on chronic SDHpariteal area with 6 mm midline shift   pt admitted to ICU for neuro checks   Bs been out of control with A1c 12.0%          PAST MEDICAL & SURGICAL HISTORY:    BPH   Parkinsons   t2DM  on 24 untis Levemir daily     FAMILY HISTORY:      SOCIAL HISTORY:    REVIEW OF SYSTEMS:    Constitutional: No fever, no chills, no change in weight.    Eyes: No eye swelling,no  blurry vision, no redness, no loss of vision.    Neck: No neck pain, no change in voice.    Lungs: No shortness of breath, no wheezing, no cough    CV: No chest pain, no palpitations, no pain with walking.    GI: No nausea, no vomiting, no constipation, no diarrhea, no abdominal pain    : No urinary frequency, no blood in urine, no urinary burning, no difficulty voiding.    Musculoskeletal: No muscle pain, no joint pain, no swelling.    Skin: No rash, no infections.    Neurologic: No headaches, no weakness, no burning or pain in feet, no tremor.    Endocrine: No heat intolerance, no cold intolerance, no increased sweating, no shakiness between meals.    Psych: No depression, no anxiety, no trouble concentrating    MEDICATIONS  (STANDING):  atorvastatin 20 milliGRAM(s) Oral at bedtime  enoxaparin Injectable 40 milliGRAM(s) SubCutaneous <User Schedule>  escitalopram 10 milliGRAM(s) Oral daily  insulin glargine Injectable (LANTUS) 20 Unit(s) SubCutaneous at bedtime  insulin lispro (HumaLOG) corrective regimen sliding scale   SubCutaneous every 4 hours  lisinopril 5 milliGRAM(s) Oral daily  metolazone 5 milliGRAM(s) Oral daily  multiple electrolytes Injection Type 1 1000 milliLiter(s) (75 mL/Hr) IV Continuous <Continuous>  tamsulosin 0.8 milliGRAM(s) Oral at bedtime    MEDICATIONS  (PRN):      Allergies    No Known Allergies    Intolerances          CAPILLARY BLOOD GLUCOSE  CAPILLARY BLOOD GLUCOSE      POCT Blood Glucose.: 207 mg/dL (07 Jun 2018 19:11)  POCT Blood Glucose.: 136 mg/dL (07 Jun 2018 13:47)  POCT Blood Glucose.: 210 mg/dL (07 Jun 2018 10:23)  POCT Blood Glucose.: 135 mg/dL (07 Jun 2018 05:06)  POCT Blood Glucose.: 231 mg/dL (07 Jun 2018 02:03)  POCT Blood Glucose.: 355 mg/dL (06 Jun 2018 22:29)  POCT Blood Glucose.: 359 mg/dL (06 Jun 2018 22:27)      POCT Blood Glucose.: 207 mg/dL (07 Jun 2018 19:11)      PHYSICAL EXAM:    Vital Signs Last 24 Hrs  T(C): 36.8 (07 Jun 2018 20:00), Max: 36.9 (07 Jun 2018 04:00)  T(F): 98.3 (07 Jun 2018 20:00), Max: 98.5 (07 Jun 2018 04:00)  HR: 81 (07 Jun 2018 20:00) (67 - 92)  BP: 162/77 (07 Jun 2018 20:00) (140/65 - 219/88)  BP(mean): 111 (07 Jun 2018 20:00) (94 - 127)  RR: 14 (07 Jun 2018 20:00) (13 - 24)  SpO2: 97% (07 Jun 2018 20:00) (95% - 98%)    General appearance: Well developed, well nourished.    Eyes: Pupils equal and reactive to light. EOM full. No exophthalmos.    Neck: Trachea midline. No thyroid enlargement.    Lungs: Normal respiratory excursion. Lungs clear.    CV: Regular cardiac rhythm. No murmur. Carotid and pedal pulses intact.    Abdomen: Soft, non tender, no organomegaly or mass.    Musculoskeletal: No cyanosis, clubbing, or edema. No pedal lesions.    Skin: Warm and moist. No rash. No acanthosis.    Neuro: Cranial nerves intact. Normal motor and sensory function. DTR's normal.    Psych: Normal affect, good judgement.            LABS:                        10.3   10.2  )-----------( 243      ( 07 Jun 2018 03:51 )             30.8     06-07    136  |  98  |  20.0  ----------------------------<  189<H>  3.2<L>   |  24.0  |  1.00    Ca    8.6      07 Jun 2018 03:51  Phos  2.1     06-07  Mg     2.0     06-07    TPro  6.1<L>  /  Alb  3.0<L>  /  TBili  <0.2<L>  /  DBili  x   /  AST  13  /  ALT  7   /  AlkPhos  116  06-06      LIVER FUNCTIONS - ( 06 Jun 2018 22:06 )  Alb: 3.0 g/dL / Pro: 6.1 g/dL / ALK PHOS: 116 U/L / ALT: 7 U/L / AST: 13 U/L / GGT: x           Hemoglobin A1C, Whole Blood: 12.0 % (06-07 @ 03:51)        CAPILLARY BLOOD GLUCOSE      RADIOLOGY & ADDITIONAL STUDIES:          Hemoglobin A1C, Whole Blood: 12.0 % (06-07 @ 03:51)    CAPILLARY BLOOD GLUCOSE      RADIOLOGY & ADDITIONAL TESTS: HPI:  71 y/o M BIBEMS s/p fall from standing. as per the EMS pt was waiting outside his doctor when he fell backwards and hit his head on the floor. he had some loc for about 4 mins but was awake when the EMS got to him pt supposed to use as walker but would not wait   found to have large arachnoid cyst and acute on chronic SDHpariteal area with 6 mm midline shift   pt admitted to ICU for neuro checks   Bs been out of control with A1c 12.0%          PAST MEDICAL & SURGICAL HISTORY:    BPH   Parkinsons   t2DM  on 24 untis Levemir daily   pt states he dos not miss doses of insulin   FAMILY HISTORY:      SOCIAL HISTORY:    REVIEW OF SYSTEMS:    Constitutional: No fever, no chills, no change in weight. + HA     Neck: No neck pain, no change in voice.    Lungs: No shortness of breath, no wheezing, no cough    CV: No chest pain, no palpitations, no pain with walking.    GI: No nausea, no vomiting, no constipation, no diarrhea, no abdominal pain    : No urinary frequency, no blood in urine, no urinary burning, no difficulty voiding.    Musculoskeletal: No muscle pain, no joint pain, no swelling.    Skin: No rash, no infections.    Neurologic: + HA since fell and hit head       Psych: No depression, no anxiety, no trouble concentrating    MEDICATIONS  (STANDING):  atorvastatin 20 milliGRAM(s) Oral at bedtime  enoxaparin Injectable 40 milliGRAM(s) SubCutaneous <User Schedule>  escitalopram 10 milliGRAM(s) Oral daily  insulin glargine Injectable (LANTUS) 20 Unit(s) SubCutaneous at bedtime  insulin lispro (HumaLOG) corrective regimen sliding scale   SubCutaneous every 4 hours  lisinopril 5 milliGRAM(s) Oral daily  metolazone 5 milliGRAM(s) Oral daily  multiple electrolytes Injection Type 1 1000 milliLiter(s) (75 mL/Hr) IV Continuous <Continuous>  tamsulosin 0.8 milliGRAM(s) Oral at bedtime    MEDICATIONS  (PRN):      Allergies    No Known Allergies    Intolerances          CAPILLARY BLOOD GLUCOSE  CAPILLARY BLOOD GLUCOSE      POCT Blood Glucose.: 207 mg/dL (07 Jun 2018 19:11)  POCT Blood Glucose.: 136 mg/dL (07 Jun 2018 13:47)  POCT Blood Glucose.: 210 mg/dL (07 Jun 2018 10:23)  POCT Blood Glucose.: 135 mg/dL (07 Jun 2018 05:06)  POCT Blood Glucose.: 231 mg/dL (07 Jun 2018 02:03)  POCT Blood Glucose.: 355 mg/dL (06 Jun 2018 22:29)  POCT Blood Glucose.: 359 mg/dL (06 Jun 2018 22:27)      POCT Blood Glucose.: 207 mg/dL (07 Jun 2018 19:11)      PHYSICAL EXAM:    Vital Signs Last 24 Hrs  T(C): 36.8 (07 Jun 2018 20:00), Max: 36.9 (07 Jun 2018 04:00)  T(F): 98.3 (07 Jun 2018 20:00), Max: 98.5 (07 Jun 2018 04:00)  HR: 81 (07 Jun 2018 20:00) (67 - 92)  BP: 162/77 (07 Jun 2018 20:00) (140/65 - 219/88)  BP(mean): 111 (07 Jun 2018 20:00) (94 - 127)  RR: 14 (07 Jun 2018 20:00) (13 - 24)  SpO2: 97% (07 Jun 2018 20:00) (95% - 98%)    General appearance: Well developed, well nourished.  laceration on scalp     Neck: Trachea midline. No thyroid enlargement.    Lungs: Normal respiratory excursion. Lungs clear.    CV: Regular cardiac rhythm. No murmur. Carotid and pedal pulses intact.    Abdomen: Soft, non tender, no organomegaly or mass.    Musculoskeletal: No cyanosis, clubbing, or edema. No pedal lesions.    Skin: Warm and moist. No rash. No acanthosis.  l.            LABS:                        10.3   10.2  )-----------( 243      ( 07 Jun 2018 03:51 )             30.8     06-07    136  |  98  |  20.0  ----------------------------<  189<H>  3.2<L>   |  24.0  |  1.00    Ca    8.6      07 Jun 2018 03:51  Phos  2.1     06-07  Mg     2.0     06-07    TPro  6.1<L>  /  Alb  3.0<L>  /  TBili  <0.2<L>  /  DBili  x   /  AST  13  /  ALT  7   /  AlkPhos  116  06-06      LIVER FUNCTIONS - ( 06 Jun 2018 22:06 )  Alb: 3.0 g/dL / Pro: 6.1 g/dL / ALK PHOS: 116 U/L / ALT: 7 U/L / AST: 13 U/L / GGT: x           Hemoglobin A1C, Whole Blood: 12.0 % (06-07 @ 03:51)        CAPILLARY BLOOD GLUCOSE      RADIOLOGY & ADDITIONAL STUDIES:  < from: CT Head No Cont (06.07.18 @ 00:05) >   EXAM:  CT BRAIN                          PROCEDURE DATE:  06/07/2018          INTERPRETATION:    CLINICAL HISTORY:  70 years old, male; Injury or trauma; Fall; Follow-up   exam; Bleeding / hemorrhage    TECHNIQUE:  Axial computed tomography imagesof the head/brain without   intravenous contrast.    Coronal and sagittal reformatted images were created and reviewed.    COMPARISON:  CT HEAD 2018-06-06 17:56    FINDINGS:    Brain:  Small right posterior parafalcine, temporal, frontal and   parietal contrecoup acute subdural hematoma measuring up to 10 mm in   width and right parietal lobe. There is mild mass effect on right frontal   lobe. No midline shift   or herniation. The  5.6 x 4.2 cm right anterior temporal fossa and right   frontal CSF density collection.  There are areas of diminished density in   the white matter bilaterally consistent with chronic small vessel   ischemic changes.    Gray-white matter differentiation is intact and unremarkable. No mass   lesion.  No evidence of intracranial hemorrhage.    Ventricles:  Unremarkable.  No ventriculomegaly.    Bones/joints:  Nondisplaced posterior right C1 arch fracture.   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    Soft tissues:  Left lateral scalp soft tissue swelling. Adjacent skin   clips seen.    Sinuses:  Unremarkable as visualized.  No acute sinusitis.    Mastoid air cells:  Fluid filled right mastoid air cells with fluid in   right middle air cavity.  Partial fluid filled LEFT mastoid air cells.    IMPRESSION:       1.  Small right posterior parafalcine, temporal, frontal and parietal   contrecoup acute subdural hematoma measuring up to 10 mm in width and   right parietal lobe. There is mild mass effect on right frontal lobe. No   midline shift  or herniation. Overall the hematoma is slightly improved.  2.  5.6 x 4.2 cm right anterior temporal fossa and right frontal CSF   density collection.  Finding is consistent with a large arachnoid cyst.   No interval change compared to prior study.  3.  Fluid filled right mastoid air cells with fluid in right middle air   cavity.  Please correlate clinically for otitis media and mastoiditis.  4.  Partial fluid filled LEFT mastoid air cells.  5.  Chronic ischemic changes bilaterally.  6.  Nondisplaced posterior right C1 arch fracture. Developmental nonfused   midline posterior C1 arch deformity.  Preliminary report provided by KESHA DURAND M.D.;[facility] RADIOLOGIST who   spoke with Randal MEDINA and findings were discussed.     < end of copied text >

## 2018-06-07 NOTE — PROGRESS NOTE ADULT - ASSESSMENT
71 y/o M on [id number] daily, presented 6/6 s/p fall from standing, fell backwards and hit his occiput on the floor. Reported to have lost consciousness for about 4 mins but was awake upon EMS arrival. Presented intact with GCS15 at baseline.  HCT with large temporal arachnoid cyst and small R posterior parafalcine, temporal, frontal and parietal acute SDH 10mm in width. Concern for worsening on 6 hour repeat HCT.     PLAN:  - Repeat HCT  - Continue to hold antiplatelet/anticoagulation therapy    NOTE IS INCOMPLETE 71 y/o M on [id number] daily, presented 6/6 s/p fall from standing, fell backwards and hit his occiput on the floor. Reported to have lost consciousness for about 4 mins but was awake upon EMS arrival. Presented intact with GCS15 at baseline.  HCT with large temporal arachnoid cyst and small R posterior parafalcine, temporal, frontal and parietal acute SDH 10mm in width. Repeat HCT stable.    PLAN:  - Continue to hold antiplatelet/anticoagulation therapy  - Agree with CTA neck to r/o posterior circulation injury  - Continue q1h neurochecks

## 2018-06-07 NOTE — PROGRESS NOTE ADULT - SUBJECTIVE AND OBJECTIVE BOX
MRI was reviewed by Dr. Britton.    PLAN:  * No operative interventions noted  * C-collar when out of bed  * Patient may work with PT  * PT evaluation ordered

## 2018-06-07 NOTE — ADVANCED PRACTICE NURSE CONSULT - ASSESSMENT
pt is a+ox2-3 follows commends. his wife is taking care of all his diabetes care pt is a+ox2-3 follows commends. his wife is taking care of all his diabetes care. no family @ bedside @ this time

## 2018-06-07 NOTE — PROGRESS NOTE ADULT - SUBJECTIVE AND OBJECTIVE BOX
ORTHO-SPINE PROGRESS NOTE:    Pt Name: YARI SIMPSON    MRN: [id number]      Patient is a being followed for C1 bilateral lamina fx. He is currently in ICU with head trauma. he is pending a MRI of the cervical spine. He reports of normal distal sensation and motor function.       PAST MEDICAL:  parkinsons , htn, bph, cholesterolemia,  left eye blind, right eye cataract, bilateral hearing defect     SURGICAL HISTORY:  R HIP ORIF, R SHOULDER SURG, L ELBOW FX ( NO SURG??), TURP        Allergies: No Known Allergies      Medications: atorvastatin 20 milliGRAM(s) Oral at bedtime  escitalopram 10 milliGRAM(s) Oral daily  hydrALAZINE Injectable 10 milliGRAM(s) IV Push every 4 hours PRN  insulin glargine Injectable (LANTUS) 10 Unit(s) SubCutaneous at bedtime  insulin lispro (HumaLOG) corrective regimen sliding scale   SubCutaneous every 4 hours  lisinopril 5 milliGRAM(s) Oral daily  metolazone 5 milliGRAM(s) Oral daily  multiple electrolytes Injection Type 1 1000 milliLiter(s) IV Continuous <Continuous>  nystatin Powder 1 Application(s) Topical two times a day  potassium chloride   Powder 40 milliEquivalent(s) Oral every 4 hours  tamsulosin 0.8 milliGRAM(s) Oral at bedtime                                10.3   10.2  )-----------( 243      ( 07 Jun 2018 03:51 )             30.8     06-07    136  |  98  |  20.0  ----------------------------<  189<H>  3.2<L>   |  24.0  |  1.00    Ca    8.6      07 Jun 2018 03:51  Phos  2.1     06-07  Mg     2.0     06-07    TPro  6.1<L>  /  Alb  3.0<L>  /  TBili  <0.2<L>  /  DBili  x   /  AST  13  /  ALT  7   /  AlkPhos  116  06-06      PHYSICAL EXAM:    Vital Signs Last 24 Hrs  T(C): 36.9 (07 Jun 2018 04:00), Max: 36.9 (07 Jun 2018 04:00)  T(F): 98.5 (07 Jun 2018 04:00), Max: 98.5 (07 Jun 2018 04:00)  HR: 73 (07 Jun 2018 07:00) (67 - 73)  BP: 151/67 (07 Jun 2018 07:00) (140/65 - 187/84)  BP(mean): 96 (07 Jun 2018 07:00) (94 - 120)  RR: 17 (07 Jun 2018 07:00) (13 - 24)  SpO2: 96% (07 Jun 2018 07:00) (95% - 98%)  Daily Height in cm: 176 (06 Jun 2018 20:00)    Daily     Appearance: Alert and responsive, in no acute distress. C-collar in place    Neurological: Sensation is grossly intact to light touch. 5/5 motor function of all extremities. No focal deficits or weaknesses found.    Skin: no rash on visible skin. Skin is clean, dry and intact. No bleeding. No abrasions. No ulcerations.    Vascular: 2+ distal pulses. Cap refill < 2 sec. No signs of venous   insufficiency   or stasis. No extremity ulcerations. No cyanosis.    Musculoskeletal:         Left Upper Extremity: no bony tenderness.        Right Upper Extremity:  no bony tenderness.        Left Lower Extremity: no calf tenderness.  no bony tenderness.        Right Lower Extremity: no calf tenderness.  no bony tenderness.       A/P:  Pt is a  70y Male with age indeterminant C1 b/l lamina fracture.    PLAN:   * continue use of c-collar  * pending MRI scan of cervical spine

## 2018-06-07 NOTE — PROGRESS NOTE ADULT - SUBJECTIVE AND OBJECTIVE BOX
INTERVAL EVENTS:  Patient is pending repeat HCT after second scan showed possible worsening SDH.    Vital Signs Last 24 Hrs  T(C): 36.9 (07 Jun 2018 04:00), Max: 36.9 (07 Jun 2018 04:00)  T(F): 98.5 (07 Jun 2018 04:00), Max: 98.5 (07 Jun 2018 04:00)  HR: 73 (07 Jun 2018 07:00) (67 - 73)  BP: 151/67 (07 Jun 2018 07:00) (140/65 - 187/84)  BP(mean): 96 (07 Jun 2018 07:00) (94 - 120)  RR: 17 (07 Jun 2018 07:00) (13 - 24)  SpO2: 96% (07 Jun 2018 07:00) (95% - 98%)  PHYSICAL EXAM:  GENERAL: NAD, poorly groomed, well-developed  HEAD:  left occiptial lac s/p repair, Normocephalic  EYES: EOMI, PERRLA, conjunctiva and sclera clear  NECK: Supple, No JVD, c collar in place  NERVOUS SYSTEM:  Alert & Oriented X2 with partial date, poor vision, can not count fingers, poor hearing bilat,   Motor Strength 5-/5 B/L upper and lower extremities;   CHEST/LUNG: Clear  bilaterally; No rales, rhonchi, wheezing, or rubs, no sternal or rib tenderness  HEART: Regular rate   ABDOMEN: Soft, Nontender, Nondistended; Bowel sounds present  EXTREMITIES:  2+ Peripheral Pulses rad, and DP , No clubbing, cyanosis, or + 1+edema feet and ankles   SKIN: No rashes or lesions, chronic finger and toe nail thickening      LABS:             10.3   10.2  )-----------( 243                   30.8     136  |  98  |  20.0  ----------------------------<  189<H>  3.2<L>   |  24.0  |  1.00    Ca    8.6        Phos  2.1      Mg     2.0         TPro  6.1<L>  /  Alb  3.0<L>  /  TBili  <0.2<L>  /  DBili  x   /  AST  13  /  ALT  7   /  AlkPhos  116      PT/INR - ( 06 Jun 2018 17:57 )   PT: 9.0 sec;   INR: 0.82 ratio   PTT:25.4 sec      06-06 @ 07:01  -  06-07 @ 07:00  --------------------------------------------------------  IN: 1487.5 mL / OUT: 0 mL / NET: 1487.5 mL    06-07 @ 07:01  -  06-07 @ 08:48  --------------------------------------------------------  IN: 62.5 mL / OUT: 0 mL / NET: 62.5 mL        RADIOLOGY & ADDITIONAL TESTS:  CT Head No Cont (06.07.18 @ 00:05)  IMPRESSION:       1.  Small right posterior parafalcine, temporal, frontal and parietal   contrecoup acute subdural hematoma measuring up to 10 mm in width and   right upper lobe. There is mild mass effect on right frontal lobe. No midline shift   or herniation.   2.  5.6 x 4.2 cm right anterior temporal fossa and right frontal CSF density   collection.  Finding is consistent with a large arachnoid cyst. No interval   change compared to prior study.  3.  Fluid filled right mastoid air cells with fluid in right middle air cavity.    Please correlate clinically for otitis media and mastoiditis.  4.  Partial fluid filled LEFT mastoid air cells.  5.  Chronic ischemic changes bilaterally.    CT Head No Cont (06.06.18 @ 18:07)  IMPRESSION:  Acute on chronic right-sided subdural hematoma with a 6 mm left-sided   midline shift. The cystic component may represent a large subarachnoid   cyst.  Severe diffuse cortical atrophy with chronic ischemic white matter disease .  Number age-indeterminate nonhemorrhagic right occipital lobe infarct with   cytotoxic edema .  No fracture.  Bilateral mastoiditis with right otitis media. INTERVAL EVENTS:  Patient is pending repeat HCT after second scan showed possible worsening SDH.    Vital Signs Last 24 Hrs  T(C): 36.9 (07 Jun 2018 04:00), Max: 36.9 (07 Jun 2018 04:00)  T(F): 98.5 (07 Jun 2018 04:00), Max: 98.5 (07 Jun 2018 04:00)  HR: 73 (07 Jun 2018 07:00) (67 - 73)  BP: 151/67 (07 Jun 2018 07:00) (140/65 - 187/84)  BP(mean): 96 (07 Jun 2018 07:00) (94 - 120)  RR: 17 (07 Jun 2018 07:00) (13 - 24)  SpO2: 96% (07 Jun 2018 07:00) (95% - 98%)  PHYSICAL EXAM:  GENERAL: NAD, poorly groomed, well-developed  HEAD:  left occiptial lac s/p repair, Normocephalic  EYES: EOMI, PERRLA, conjunctiva and sclera clear  NECK: Supple, No JVD, c collar in place  NERVOUS SYSTEM:  Alert & Oriented X2 with partial date, poor vision, can not count fingers, R ptosis, poor hearing bilat, follows visual commands, antigravity x4   CHEST/LUNG: Clear  bilaterally; No rales, rhonchi, wheezing, or rubs, no sternal or rib tenderness  HEART: Regular rate   EXTREMITIES:  2+ Peripheral Pulses rad, and DP , No clubbing, cyanosis, 1+edema feet and ankles   SKIN: No rashes or lesions, chronic finger and toe nail thickening      LABS:             10.3   10.2  )-----------( 243                   30.8     136  |  98  |  20.0  ----------------------------<  189<H>  3.2<L>   |  24.0  |  1.00    Ca    8.6        Phos  2.1      Mg     2.0         TPro  6.1<L>  /  Alb  3.0<L>  /  TBili  <0.2<L>  /  DBili  x   /  AST  13  /  ALT  7   /  AlkPhos  116      PT/INR - ( 06 Jun 2018 17:57 )   PT: 9.0 sec;   INR: 0.82 ratio   PTT:25.4 sec      06-06 @ 07:01  -  06-07 @ 07:00  --------------------------------------------------------  IN: 1487.5 mL / OUT: 0 mL / NET: 1487.5 mL    06-07 @ 07:01  -  06-07 @ 08:48  --------------------------------------------------------  IN: 62.5 mL / OUT: 0 mL / NET: 62.5 mL        RADIOLOGY & ADDITIONAL TESTS:  CT Head No Cont (06.07.18 @ 00:05)  IMPRESSION:       1.  Small right posterior parafalcine, temporal, frontal and parietal   contrecoup acute subdural hematoma measuring up to 10 mm in width and   right upper lobe. There is mild mass effect on right frontal lobe. No midline shift   or herniation.   2.  5.6 x 4.2 cm right anterior temporal fossa and right frontal CSF density   collection.  Finding is consistent with a large arachnoid cyst. No interval   change compared to prior study.  3.  Fluid filled right mastoid air cells with fluid in right middle air cavity.    Please correlate clinically for otitis media and mastoiditis.  4.  Partial fluid filled LEFT mastoid air cells.  5.  Chronic ischemic changes bilaterally.    CT Head No Cont (06.06.18 @ 18:07)  IMPRESSION:  Acute on chronic right-sided subdural hematoma with a 6 mm left-sided   midline shift. The cystic component may represent a large subarachnoid   cyst.  Severe diffuse cortical atrophy with chronic ischemic white matter disease .  Number age-indeterminate nonhemorrhagic right occipital lobe infarct with   cytotoxic edema .  No fracture.  Bilateral mastoiditis with right otitis media. INTERVAL EVENTS:  No overnight events. Patient denies new complaints. Collar in place.     Vital Signs Last 24 Hrs  T(C): 36.9 (07 Jun 2018 04:00), Max: 36.9 (07 Jun 2018 04:00)  T(F): 98.5 (07 Jun 2018 04:00), Max: 98.5 (07 Jun 2018 04:00)  HR: 73 (07 Jun 2018 07:00) (67 - 73)  BP: 151/67 (07 Jun 2018 07:00) (140/65 - 187/84)  BP(mean): 96 (07 Jun 2018 07:00) (94 - 120)  RR: 17 (07 Jun 2018 07:00) (13 - 24)  SpO2: 96% (07 Jun 2018 07:00) (95% - 98%)  PHYSICAL EXAM:  GENERAL: NAD, poorly groomed, well-developed  HEAD:  left occiptial lac s/p repair, Normocephalic  EYES: EOMI, conjunctiva and sclera clear  NECK: collar in place  NERVOUS SYSTEM:  Alert & Oriented X2 with partial date, poor vision, can not count fingers <1ft, R ptosis and R diminished NLF, poor hearing bilat, follows visual commands, antigravity x4 with +LUE pronator  HEART: Regular rate   EXTREMITIES:  2+ Peripheral Pulses rad, and DP , No clubbing, cyanosis, 1+edema feet and ankles   SKIN: No rashes or lesions, chronic finger and toe nail thickening      LABS:             10.3   10.2  )-----------( 243                   30.8     136  |  98  |  20.0  ----------------------------<  189<H>  3.2<L>   |  24.0  |  1.00    Ca    8.6        Phos  2.1      Mg     2.0         TPro  6.1<L>  /  Alb  3.0<L>  /  TBili  <0.2<L>  /  DBili  x   /  AST  13  /  ALT  7   /  AlkPhos  116      PT/INR - ( 06 Jun 2018 17:57 )   PT: 9.0 sec;   INR: 0.82 ratio   PTT:25.4 sec      RADIOLOGY & ADDITIONAL TESTS:  CT Head No Cont (06.07.18 @ 00:05)  IMPRESSION:       1.  Small right posterior parafalcine, temporal, frontal and parietal   contrecoup acute subdural hematoma measuring up to 10 mm in width and   right upper lobe. There is mild mass effect on right frontal lobe. No midline shift   or herniation.   2.  5.6 x 4.2 cm right anterior temporal fossa and right frontal CSF density   collection.  Finding is consistent with a large arachnoid cyst. No interval   change compared to prior study.  3.  Fluid filled right mastoid air cells with fluid in right middle air cavity.    Please correlate clinically for otitis media and mastoiditis.  4.  Partial fluid filled LEFT mastoid air cells.  5.  Chronic ischemic changes bilaterally.    CT Head No Cont (06.06.18 @ 18:07)  IMPRESSION:  Acute on chronic right-sided subdural hematoma with a 6 mm left-sided   midline shift. The cystic component may represent a large subarachnoid   cyst.  Severe diffuse cortical atrophy with chronic ischemic white matter disease .  Number age-indeterminate nonhemorrhagic right occipital lobe infarct with   cytotoxic edema .  No fracture.  Bilateral mastoiditis with right otitis media.

## 2018-06-07 NOTE — PROGRESS NOTE ADULT - SUBJECTIVE AND OBJECTIVE BOX
INTERVAL HPI/OVERNIGHT EVENTS/SUBJECTIVE:  Admitted with SDH after mechanical fall.      ICU Vital Signs Last 24 Hrs  T(C): 36.9 (07 Jun 2018 08:00), Max: 36.9 (07 Jun 2018 04:00)  T(F): 98.5 (07 Jun 2018 08:00), Max: 98.5 (07 Jun 2018 04:00)  HR: 73 (07 Jun 2018 10:00) (67 - 74)  BP: 165/77 (07 Jun 2018 10:00) (140/65 - 187/84)  BP(mean): 96 (07 Jun 2018 07:00) (94 - 120)  ABP: --  ABP(mean): --  RR: 20 (07 Jun 2018 10:00) (13 - 24)  SpO2: 97% (07 Jun 2018 10:00) (95% - 98%)      I&O's Detail    06 Jun 2018 07:01  -  07 Jun 2018 07:00  --------------------------------------------------------  IN:    multiple electrolytes Injection Type 1: 700 mL    Solution: 187.5 mL    Solution: 200 mL    Solution: 100 mL    Solution: 300 mL  Total IN: 1487.5 mL    OUT:  Total OUT: 0 mL    Total NET: 1487.5 mL      07 Jun 2018 07:01  -  07 Jun 2018 11:40  --------------------------------------------------------  IN:    multiple electrolytes Injection Type 1: 375 mL    Solution: 62.5 mL  Total IN: 437.5 mL    OUT:    Voided: 500 mL  Total OUT: 500 mL    Total NET: -62.5 mL                MEDICATIONS  (STANDING):  atorvastatin 20 milliGRAM(s) Oral at bedtime  escitalopram 10 milliGRAM(s) Oral daily  insulin glargine Injectable (LANTUS) 10 Unit(s) SubCutaneous at bedtime  insulin lispro (HumaLOG) corrective regimen sliding scale   SubCutaneous every 4 hours  lisinopril 5 milliGRAM(s) Oral daily  metolazone 5 milliGRAM(s) Oral daily  multiple electrolytes Injection Type 1 1000 milliLiter(s) (75 mL/Hr) IV Continuous <Continuous>  nystatin Powder 1 Application(s) Topical two times a day  potassium chloride   Powder 40 milliEquivalent(s) Oral every 4 hours  tamsulosin 0.8 milliGRAM(s) Oral at bedtime    MEDICATIONS  (PRN):  hydrALAZINE Injectable 10 milliGRAM(s) IV Push every 4 hours PRN Systolic > 160        PHYSICAL EXAM:    Gen:  NAD    Eyes:  PERRLA    Neurological: Non focal, aaox3, cam neg    Neck: Trachea midline    Pulmonary: Non labored    Cardiovascular:  RRR    Gastrointestinal:  Soft    Extremities:  No edema.                LABS:  CBC Full  -  ( 07 Jun 2018 03:51 )  WBC Count : 10.2 K/uL  Hemoglobin : 10.3 g/dL  Hematocrit : 30.8 %  Platelet Count - Automated : 243 K/uL  Mean Cell Volume : 90.9 fl  Mean Cell Hemoglobin : 30.4 pg  Mean Cell Hemoglobin Concentration : 33.4 g/dL  Auto Neutrophil # : 8.1 K/uL  Auto Lymphocyte # : 1.1 K/uL  Auto Monocyte # : 0.7 K/uL  Auto Eosinophil # : 0.2 K/uL  Auto Basophil # : 0.0 K/uL  Auto Neutrophil % : 80.1 %  Auto Lymphocyte % : 10.9 %  Auto Monocyte % : 7.2 %  Auto Eosinophil % : 1.5 %  Auto Basophil % : 0.1 %    06-07    136  |  98  |  20.0  ----------------------------<  189<H>  3.2<L>   |  24.0  |  1.00    Ca    8.6      07 Jun 2018 03:51  Phos  2.1     06-07  Mg     2.0     06-07    TPro  6.1<L>  /  Alb  3.0<L>  /  TBili  <0.2<L>  /  DBili  x   /  AST  13  /  ALT  7   /  AlkPhos  116  06-06    PT/INR - ( 06 Jun 2018 17:57 )   PT: 9.0 sec;   INR: 0.82 ratio         PTT - ( 06 Jun 2018 17:57 )  PTT:25.4 sec    RECENT CULTURES:      LIVER FUNCTIONS - ( 06 Jun 2018 22:06 )  Alb: 3.0 g/dL / Pro: 6.1 g/dL / ALK PHOS: 116 U/L / ALT: 7 U/L / AST: 13 U/L / GGT: x               CAPILLARY BLOOD GLUCOSE      RADIOLOGY & ADDITIONAL STUDIES:    ASSESSMENT/PLAN:  77yMale presenting with:  Traumatic  acute SDH, chronic c1 arch fx, hyperglycemia from uncontrolled DM2.  Parkinson disease.      Neuro:  Continue close neuro obs.  Repeat CT as per NS.  Second ct was stable.  Does need CTA neck because was never worked up at time of fx.  Vertebral artery injury needs to be excluded.  Need to reconcile meds.    CV:  HTN essential.  restart home meds.      Pulm:  IS, pulm toilet.      GI/Nutrition: Diet    Endo: Lantus and SSI.  Diabetic educator and endocrine consults.      Proph: SCD's for now.  If CT remains stable lovenox tonight.      Dispo:  ICU      CRITICAL CARE TIME SPENT:  35 min

## 2018-06-07 NOTE — CONSULT NOTE ADULT - ASSESSMENT
T2DM - uncontrolled Pt likely noncomlpliant with home  insulin regimen     pt was given 10 untis Lantus alst olivia with good results  can give 15 untis tonight   contineu accucheck q4h  pt apptite not good  long armenta with parkinsons check Vitamin B12 level and 25-OH vit d
IMP:   Acute on chronic right-sided subdural hematoma with a 6 mm left-sided   midline shift. The cystic component may represent a large subarachnoid cyst.   Severe diffuse cortical atrophy with chronic ischemic white matter disease .   Number age-indeterminate nonhemorrhagic right occipital lobe infarct with   cytotoxic edema .   No fracture.   Bilateral mastoiditis with right otitis media.     Neuro exam grossly intact, with some base line defects  patient with base line parkinsons disease wi ambulation with walker  partial orientation to date    C1 fx with questionable chronicity with ortho spine following

## 2018-06-08 LAB
ACETONE SERPL-MCNC: NEGATIVE — SIGNIFICANT CHANGE UP
ANION GAP SERPL CALC-SCNC: 11 MMOL/L — SIGNIFICANT CHANGE UP (ref 5–17)
BASOPHILS # BLD AUTO: 0 K/UL — SIGNIFICANT CHANGE UP (ref 0–0.2)
BASOPHILS NFR BLD AUTO: 0.2 % — SIGNIFICANT CHANGE UP (ref 0–2)
BUN SERPL-MCNC: 13 MG/DL — SIGNIFICANT CHANGE UP (ref 8–20)
CALCIUM SERPL-MCNC: 8.3 MG/DL — LOW (ref 8.6–10.2)
CHLORIDE SERPL-SCNC: 97 MMOL/L — LOW (ref 98–107)
CO2 SERPL-SCNC: 26 MMOL/L — SIGNIFICANT CHANGE UP (ref 22–29)
CREAT SERPL-MCNC: 0.81 MG/DL — SIGNIFICANT CHANGE UP (ref 0.5–1.3)
EOSINOPHIL # BLD AUTO: 0.1 K/UL — SIGNIFICANT CHANGE UP (ref 0–0.5)
EOSINOPHIL NFR BLD AUTO: 1.1 % — SIGNIFICANT CHANGE UP (ref 0–5)
GLUCOSE BLDC GLUCOMTR-MCNC: 114 MG/DL — HIGH (ref 70–99)
GLUCOSE BLDC GLUCOMTR-MCNC: 140 MG/DL — HIGH (ref 70–99)
GLUCOSE BLDC GLUCOMTR-MCNC: 163 MG/DL — HIGH (ref 70–99)
GLUCOSE BLDC GLUCOMTR-MCNC: 186 MG/DL — HIGH (ref 70–99)
GLUCOSE BLDC GLUCOMTR-MCNC: 63 MG/DL — LOW (ref 70–99)
GLUCOSE BLDC GLUCOMTR-MCNC: 85 MG/DL — SIGNIFICANT CHANGE UP (ref 70–99)
GLUCOSE BLDC GLUCOMTR-MCNC: 89 MG/DL — SIGNIFICANT CHANGE UP (ref 70–99)
GLUCOSE SERPL-MCNC: 76 MG/DL — SIGNIFICANT CHANGE UP (ref 70–115)
HCT VFR BLD CALC: 33.1 % — LOW (ref 42–52)
HGB BLD-MCNC: 11 G/DL — LOW (ref 14–18)
LYMPHOCYTES # BLD AUTO: 1 K/UL — SIGNIFICANT CHANGE UP (ref 1–4.8)
LYMPHOCYTES # BLD AUTO: 10.5 % — LOW (ref 20–55)
MAGNESIUM SERPL-MCNC: 2 MG/DL — SIGNIFICANT CHANGE UP (ref 1.6–2.6)
MCHC RBC-ENTMCNC: 30.5 PG — SIGNIFICANT CHANGE UP (ref 27–31)
MCHC RBC-ENTMCNC: 33.2 G/DL — SIGNIFICANT CHANGE UP (ref 32–36)
MCV RBC AUTO: 91.7 FL — SIGNIFICANT CHANGE UP (ref 80–94)
MONOCYTES # BLD AUTO: 0.8 K/UL — SIGNIFICANT CHANGE UP (ref 0–0.8)
MONOCYTES NFR BLD AUTO: 8.5 % — SIGNIFICANT CHANGE UP (ref 3–10)
NEUTROPHILS # BLD AUTO: 7.3 K/UL — SIGNIFICANT CHANGE UP (ref 1.8–8)
NEUTROPHILS NFR BLD AUTO: 79.5 % — HIGH (ref 37–73)
PHOSPHATE SERPL-MCNC: 2.7 MG/DL — SIGNIFICANT CHANGE UP (ref 2.4–4.7)
PLATELET # BLD AUTO: 269 K/UL — SIGNIFICANT CHANGE UP (ref 150–400)
POTASSIUM SERPL-MCNC: 4.1 MMOL/L — SIGNIFICANT CHANGE UP (ref 3.5–5.3)
POTASSIUM SERPL-SCNC: 4.1 MMOL/L — SIGNIFICANT CHANGE UP (ref 3.5–5.3)
RBC # BLD: 3.61 M/UL — LOW (ref 4.6–6.2)
RBC # FLD: 13.8 % — SIGNIFICANT CHANGE UP (ref 11–15.6)
SODIUM SERPL-SCNC: 134 MMOL/L — LOW (ref 135–145)
WBC # BLD: 9.2 K/UL — SIGNIFICANT CHANGE UP (ref 4.8–10.8)
WBC # FLD AUTO: 9.2 K/UL — SIGNIFICANT CHANGE UP (ref 4.8–10.8)

## 2018-06-08 PROCEDURE — 99223 1ST HOSP IP/OBS HIGH 75: CPT | Mod: 57

## 2018-06-08 PROCEDURE — 22310 CLOSED TX VERT FX W/O MANJ: CPT

## 2018-06-08 PROCEDURE — 70450 CT HEAD/BRAIN W/O DYE: CPT | Mod: 26

## 2018-06-08 PROCEDURE — 99232 SBSQ HOSP IP/OBS MODERATE 35: CPT

## 2018-06-08 PROCEDURE — 99233 SBSQ HOSP IP/OBS HIGH 50: CPT

## 2018-06-08 RX ORDER — ONDANSETRON 8 MG/1
4 TABLET, FILM COATED ORAL ONCE
Qty: 0 | Refills: 0 | Status: COMPLETED | OUTPATIENT
Start: 2018-06-08 | End: 2018-06-08

## 2018-06-08 RX ORDER — ACETAMINOPHEN 500 MG
1000 TABLET ORAL ONCE
Qty: 0 | Refills: 0 | Status: COMPLETED | OUTPATIENT
Start: 2018-06-08 | End: 2018-06-08

## 2018-06-08 RX ORDER — SODIUM CHLORIDE 9 MG/ML
1000 INJECTION, SOLUTION INTRAVENOUS
Qty: 0 | Refills: 0 | Status: DISCONTINUED | OUTPATIENT
Start: 2018-06-08 | End: 2018-06-11

## 2018-06-08 RX ORDER — INSULIN GLARGINE 100 [IU]/ML
12 INJECTION, SOLUTION SUBCUTANEOUS AT BEDTIME
Qty: 0 | Refills: 0 | Status: DISCONTINUED | OUTPATIENT
Start: 2018-06-08 | End: 2018-06-09

## 2018-06-08 RX ORDER — CARBIDOPA AND LEVODOPA 25; 100 MG/1; MG/1
1 TABLET ORAL THREE TIMES A DAY
Qty: 0 | Refills: 0 | Status: DISCONTINUED | OUTPATIENT
Start: 2018-06-08 | End: 2018-06-14

## 2018-06-08 RX ORDER — AMLODIPINE BESYLATE 2.5 MG/1
10 TABLET ORAL DAILY
Qty: 0 | Refills: 0 | Status: DISCONTINUED | OUTPATIENT
Start: 2018-06-08 | End: 2018-06-14

## 2018-06-08 RX ADMIN — ENOXAPARIN SODIUM 40 MILLIGRAM(S): 100 INJECTION SUBCUTANEOUS at 20:08

## 2018-06-08 RX ADMIN — CARBIDOPA AND LEVODOPA 1 TABLET(S): 25; 100 TABLET ORAL at 11:57

## 2018-06-08 RX ADMIN — Medication 400 MILLIGRAM(S): at 10:58

## 2018-06-08 RX ADMIN — Medication 400 MILLIGRAM(S): at 02:14

## 2018-06-08 RX ADMIN — Medication 1000 MILLIGRAM(S): at 03:45

## 2018-06-08 RX ADMIN — Medication 4: at 21:45

## 2018-06-08 RX ADMIN — AMLODIPINE BESYLATE 10 MILLIGRAM(S): 2.5 TABLET ORAL at 18:32

## 2018-06-08 RX ADMIN — SODIUM CHLORIDE 75 MILLILITER(S): 9 INJECTION, SOLUTION INTRAVENOUS at 01:40

## 2018-06-08 RX ADMIN — ONDANSETRON 4 MILLIGRAM(S): 8 TABLET, FILM COATED ORAL at 10:00

## 2018-06-08 RX ADMIN — TAMSULOSIN HYDROCHLORIDE 0.8 MILLIGRAM(S): 0.4 CAPSULE ORAL at 21:41

## 2018-06-08 RX ADMIN — Medication 1000 MILLIGRAM(S): at 20:23

## 2018-06-08 RX ADMIN — Medication 1000 MILLIGRAM(S): at 11:15

## 2018-06-08 RX ADMIN — ESCITALOPRAM OXALATE 10 MILLIGRAM(S): 10 TABLET, FILM COATED ORAL at 11:54

## 2018-06-08 RX ADMIN — INSULIN GLARGINE 12 UNIT(S): 100 INJECTION, SOLUTION SUBCUTANEOUS at 22:05

## 2018-06-08 RX ADMIN — Medication 4: at 18:00

## 2018-06-08 RX ADMIN — LISINOPRIL 5 MILLIGRAM(S): 2.5 TABLET ORAL at 05:30

## 2018-06-08 RX ADMIN — ATORVASTATIN CALCIUM 20 MILLIGRAM(S): 80 TABLET, FILM COATED ORAL at 21:41

## 2018-06-08 RX ADMIN — Medication 400 MILLIGRAM(S): at 20:08

## 2018-06-08 RX ADMIN — Medication 10 MILLIGRAM(S): at 08:40

## 2018-06-08 RX ADMIN — CARBIDOPA AND LEVODOPA 1 TABLET(S): 25; 100 TABLET ORAL at 21:41

## 2018-06-08 RX ADMIN — ONDANSETRON 4 MILLIGRAM(S): 8 TABLET, FILM COATED ORAL at 22:40

## 2018-06-08 RX ADMIN — ONDANSETRON 4 MILLIGRAM(S): 8 TABLET, FILM COATED ORAL at 00:45

## 2018-06-08 RX ADMIN — Medication 10 MILLIGRAM(S): at 17:05

## 2018-06-08 NOTE — PROGRESS NOTE ADULT - SUBJECTIVE AND OBJECTIVE BOX
INTERVAL HPI/OVERNIGHT EVENTS:  follow upT2DM   SDH   pt not eating much today    BS low this am     MEDICATIONS  (STANDING):  atorvastatin 20 milliGRAM(s) Oral at bedtime  carbidopa/levodopa  25/100 1 Tablet(s) Oral three times a day  enoxaparin Injectable 40 milliGRAM(s) SubCutaneous <User Schedule>  escitalopram 10 milliGRAM(s) Oral daily  insulin glargine Injectable (LANTUS) 12 Unit(s) SubCutaneous at bedtime  insulin lispro (HumaLOG) corrective regimen sliding scale   SubCutaneous every 4 hours  lisinopril 5 milliGRAM(s) Oral daily  metolazone 5 milliGRAM(s) Oral daily  tamsulosin 0.8 milliGRAM(s) Oral at bedtime    MEDICATIONS  (PRN):  hydrALAZINE Injectable 10 milliGRAM(s) IV Push every 6 hours PRN SBP >180      Allergies    No Known Allergies    Intolerances        Review of systems:    Vital Signs Last 24 Hrs  T(C): 37.3 (08 Jun 2018 12:00), Max: 37.3 (08 Jun 2018 12:00)  T(F): 99.1 (08 Jun 2018 12:00), Max: 99.1 (08 Jun 2018 12:00)  HR: 85 (08 Jun 2018 12:00) (75 - 92)  BP: 140/67 (08 Jun 2018 12:00) (140/67 - 219/88)  BP(mean): 105 (08 Jun 2018 06:00) (93 - 127)  RR: 20 (08 Jun 2018 12:00) (12 - 22)  SpO2: 97% (08 Jun 2018 12:00) (95% - 98%)    PHYSICAL EXAM:  pt awakens to calling name but does not give very detailed answers -   Respiratory: CTAB  Cardiovascular: S1 and S2, RRR, no M/G/R  Extremities: No peripheral edema, no pedal lesions venodynes in place   Musculoskeletal: 5/5 strength b/l upper and lower extremities  Skin: No rashes, no acanthosis        LABS:                        11.0   9.2   )-----------( 269      ( 08 Jun 2018 03:40 )             33.1     06-08    134<L>  |  97<L>  |  13.0  ----------------------------<  76  4.1   |  26.0  |  0.81    Ca    8.3<L>      08 Jun 2018 03:40  Phos  2.7     06-08  Mg     2.0     06-08    TPro  6.1<L>  /  Alb  3.0<L>  /  TBili  <0.2<L>  /  DBili  x   /  AST  13  /  ALT  7   /  AlkPhos  116  06-06          CAPILLARY BLOOD GLUCOSE  CAPILLARY BLOOD GLUCOSE      POCT Blood Glucose.: 114 mg/dL (08 Jun 2018 09:58)  POCT Blood Glucose.: 89 mg/dL (08 Jun 2018 07:03)  POCT Blood Glucose.: 63 mg/dL (08 Jun 2018 05:24)  POCT Blood Glucose.: 85 mg/dL (08 Jun 2018 02:20)  POCT Blood Glucose.: 154 mg/dL (07 Jun 2018 21:52)  POCT Blood Glucose.: 207 mg/dL (07 Jun 2018 19:11)  POCT Blood Glucose.: 136 mg/dL (07 Jun 2018 13:47)      RADIOLOGY & ADDITIONAL TESTS:

## 2018-06-08 NOTE — OCCUPATIONAL THERAPY INITIAL EVALUATION ADULT - DIAGNOSIS, OT EVAL
Right posterior parafalcine, temporal, frontal, and parietal contrecoup SDH; C1 nondisplaced arch fx

## 2018-06-08 NOTE — PHYSICAL THERAPY INITIAL EVALUATION ADULT - ADDITIONAL COMMENTS
PLOF unclear, pt confused at times. Reports living in a house with 6/8 steps to main level with HR. Uses RW and has a shower chair. States he was independent.

## 2018-06-08 NOTE — PROGRESS NOTE ADULT - ASSESSMENT
71 y/o M on [id number] daily, presented 6/6 s/p fall from standing, fell backwards and hit his occiput on the floor. Reported to have lost consciousness for about 4 mins but was awake upon EMS arrival. Presented intact with GCS15 at baseline.  HCT with large temporal arachnoid cyst and small R posterior parafalcine, temporal, frontal and parietal acute SDH 10mm in width. Repeat HCT stable.  - 6/8 morning nausea - repeat HCT stable   - CTA negative for vertebral injury  - Ortho following for c spine    PLAN:  - Continue to hold antiplatelet/anticoagulation therapy  - Continue frequent neurochecks  - PT/OT/ST  - PM&R eval appreciated

## 2018-06-08 NOTE — PHYSICAL THERAPY INITIAL EVALUATION ADULT - IMPAIRMENTS CONTRIBUTING TO GAIT DEVIATIONS, PT EVAL
states he is going to get sick, vomited once back in bed./decreased strength/impaired balance/impaired postural control

## 2018-06-08 NOTE — DIETITIAN INITIAL EVALUATION ADULT. - PERTINENT LABORATORY DATA
06-08 Na134 mmol/L<L> Glu 76 mg/dL K+ 4.1 mmol/L Cr  0.81 mg/dL BUN 13.0 mg/dL Phos 2.7 mg/dL Alb n/a   PAB n/a

## 2018-06-08 NOTE — PROGRESS NOTE ADULT - SUBJECTIVE AND OBJECTIVE BOX
Patient in bed, keeps eyes closed.   States "Im sick". Does not elaborate further. Does not answer questions.  Somnolent.     FUNCTIONAL PROGRESS  Total A    REVIEW OF SYSTEMS  Constitutional - No fever,  +fatigue  Neurological - +memory loss, +loss of strength    VITALS  T(C): 37.1 (06-08-18 @ 08:00), Max: 37.1 (06-08-18 @ 08:00)  HR: 79 (06-08-18 @ 08:00) (73 - 92)  BP: 181/84 (06-08-18 @ 08:00) (142/67 - 219/88)  RR: 18 (06-08-18 @ 08:00) (12 - 22)  SpO2: 96% (06-08-18 @ 08:00) (95% - 98%)  Wt(kg): --    MEDICATIONS   atorvastatin 20 milliGRAM(s) at bedtime  enoxaparin Injectable 40 milliGRAM(s) <User Schedule>  escitalopram 10 milliGRAM(s) daily  hydrALAZINE Injectable 10 milliGRAM(s) every 6 hours PRN  insulin glargine Injectable (LANTUS) 15 Unit(s) at bedtime  insulin lispro (HumaLOG) corrective regimen sliding scale   every 4 hours  lisinopril 5 milliGRAM(s) daily  metolazone 5 milliGRAM(s) daily  tamsulosin 0.8 milliGRAM(s) at bedtime      RECENT LABS/IMAGING  CBC Full  -  ( 08 Jun 2018 03:40 )  WBC Count : 9.2 K/uL  Hemoglobin : 11.0 g/dL  Hematocrit : 33.1 %  Platelet Count - Automated : 269 K/uL  Mean Cell Volume : 91.7 fl  Mean Cell Hemoglobin : 30.5 pg  Mean Cell Hemoglobin Concentration : 33.2 g/dL  Auto Neutrophil # : 7.3 K/uL  Auto Lymphocyte # : 1.0 K/uL  Auto Monocyte # : 0.8 K/uL  Auto Eosinophil # : 0.1 K/uL  Auto Basophil # : 0.0 K/uL  Auto Neutrophil % : 79.5 %  Auto Lymphocyte % : 10.5 %  Auto Monocyte % : 8.5 %  Auto Eosinophil % : 1.1 %  Auto Basophil % : 0.2 %    06-08    134<L>  |  97<L>  |  13.0  ----------------------------<  76  4.1   |  26.0  |  0.81    Ca    8.3<L>      08 Jun 2018 03:40  Phos  2.7     06-08  Mg     2.0     06-08    TPro  6.1<L>  /  Alb  3.0<L>  /  TBili  <0.2<L>  /  DBili  x   /  AST  13  /  ALT  7   /  AlkPhos  116  06-06      ----------------------------------------------------------------------------------------  PHYSICAL EXAM  Constitutional - NAD, Comfortable  Extremities - No calf tenderness   Neurologic Exam -                    Cognitive - somnolent, awakens briefly     Communication - little verbalizations, slowed processing  Psychiatric - somnolent, withdrawn  ----------------------------------------------------------------------------------------  ASSESSMENT/PLAN  77yMale with functional deficits after a fall with TBI  Pain - Tylenol  Mood - Lexapro  DVT PPX - SCDs, Lovenox  **recommend merging of old chart [id number]**   Rehab - Recommend OT, PT and OOB throughout the day with mobilization throughout the day with staff to maintain cardiopulmonary function and prevention of secondary complications related to debility.   At this time, will continue to follow. Patient was recently hospitalized with multiple fractures/TBI and went to , he was then DC to [facility] ([place]) and was in a day program. Will await formal evals and more input on most recent level of function. Patient may not be able to achieve certain functional goals for DC home nor be able to tolerate intense rehabilitation.

## 2018-06-08 NOTE — PROGRESS NOTE ADULT - SUBJECTIVE AND OBJECTIVE BOX
INTERVAL EVENTS:  Patient with nausea this morning and replies "I'm sick." Increased alertness today.     Vital Signs Last 24 Hrs  T(C): 37.3 (08 Jun 2018 12:00), Max: 37.3 (08 Jun 2018 12:00)  T(F): 99.1 (08 Jun 2018 12:00), Max: 99.1 (08 Jun 2018 12:00)  HR: 81 (08 Jun 2018 14:00) (75 - 92)  BP: 145/70 (08 Jun 2018 14:00) (140/67 - 219/88)  BP(mean): 105 (08 Jun 2018 06:00) (93 - 127)  RR: 20 (08 Jun 2018 14:00) (12 - 22)  SpO2: 97% (08 Jun 2018 14:00) (95% - 98%)  PHYSICAL EXAM:  GENERAL: NAD, poorly groomed, well-developed  HEAD:  left occiptial lac s/p repair, Normocephalic  EYES: EOMI, conjunctiva and sclera clear  NECK: collar in place  NERVOUS SYSTEM:  Alert & Oriented X2 with partial date, poor vision, can not count fingers <1ft, R ptosis and R diminished NLF, poor hearing bilat, follows visual commands, antigravity x4 with +LUE pronator  HEART: Regular rate   EXTREMITIES:  2+ Peripheral Pulses rad, and DP , No clubbing, cyanosis, 1+edema feet and ankles   SKIN: No rashes or lesions, chronic finger and toe nail thickening      LABS:             11.0   9.2   )-----------( 269                 33.1     134<L>  |  97<L>  |  13.0  ----------------------------<  76  4.1   |  26.0  |  0.81    Ca    8.3<L>      Phos  2.7    Mg     2.0       TPro  6.1<L>  /  Alb  3.0<L>  /  TBili  <0.2<L>  /  DBili  x   /  AST  13  /  ALT  7   /  AlkPhos  116        RADIOLOGY & ADDITIONAL TESTS:  CT Head No Cont (06.08.18 @ 13:50)  IMPRESSION:     Right posterior convexity subdural hematoma unchanged as compared to   previous exam.  Chronic small vessel ischemic changes.  Bilateral mastoid opacification.    CT Angio Neck w/ IV Cont (06.07.18 @ 15:13)  IMPRESSION:        No evidence of arterial injury.    CT Head No Cont (06.07.18 @ 00:05)  IMPRESSION:       1.  Small right posterior parafalcine, temporal, frontal and parietal   contrecoup acute subdural hematoma measuring up to 10 mm in width and   right upper lobe. There is mild mass effect on right frontal lobe. No midline shift   or herniation.   2.  5.6 x 4.2 cm right anterior temporal fossa and right frontal CSF density   collection.  Finding is consistent with a large arachnoid cyst. No interval   change compared to prior study.  3.  Fluid filled right mastoid air cells with fluid in right middle air cavity.    Please correlate clinically for otitis media and mastoiditis.  4.  Partial fluid filled LEFT mastoid air cells.  5.  Chronic ischemic changes bilaterally.    CT Head No Cont (06.06.18 @ 18:07)  IMPRESSION:  Acute on chronic right-sided subdural hematoma with a 6 mm left-sided   midline shift. The cystic component may represent a large subarachnoid   cyst.  Severe diffuse cortical atrophy with chronic ischemic white matter disease .  Number age-indeterminate nonhemorrhagic right occipital lobe infarct with   cytotoxic edema .  No fracture.  Bilateral mastoiditis with right otitis media.

## 2018-06-08 NOTE — PHYSICAL THERAPY INITIAL EVALUATION ADULT - ACTIVE RANGE OF MOTION EXAMINATION, REHAB EVAL
b/l shoulder flexion to 90deg/bilateral  lower extremity Active ROM was WFL (within functional limits)/bilateral upper extremity Active ROM was WFL (within functional limits)

## 2018-06-08 NOTE — PROGRESS NOTE ADULT - SUBJECTIVE AND OBJECTIVE BOX
INTERVAL HPI/OVERNIGHT EVENTS/SUBJECTIVE:  + vomiting this AM.      ICU Vital Signs Last 24 Hrs  T(C): 37.1 (08 Jun 2018 08:00), Max: 37.1 (08 Jun 2018 08:00)  T(F): 98.7 (08 Jun 2018 08:00), Max: 98.7 (08 Jun 2018 08:00)  HR: 82 (08 Jun 2018 10:00) (75 - 92)  BP: 141/66 (08 Jun 2018 10:00) (141/66 - 219/88)  BP(mean): 105 (08 Jun 2018 06:00) (93 - 127)  ABP: --  ABP(mean): --  RR: 20 (08 Jun 2018 10:00) (12 - 22)  SpO2: 97% (08 Jun 2018 10:00) (95% - 98%)      I&O's Detail    07 Jun 2018 07:01  -  08 Jun 2018 07:00  --------------------------------------------------------  IN:    multiple electrolytes Injection Type 1multiple electrolytes Injection Type 1: 1725 mL    Oral Fluid: 300 mL    Solution: 62.5 mL  Total IN: 2087.5 mL    OUT:    Voided: 1700 mL  Total OUT: 1700 mL    Total NET: 387.5 mL      08 Jun 2018 07:01  -  08 Jun 2018 10:32  --------------------------------------------------------  IN:  Total IN: 0 mL    OUT:    Voided: 125 mL  Total OUT: 125 mL    Total NET: -125 mL                MEDICATIONS  (STANDING):  atorvastatin 20 milliGRAM(s) Oral at bedtime  enoxaparin Injectable 40 milliGRAM(s) SubCutaneous <User Schedule>  escitalopram 10 milliGRAM(s) Oral daily  insulin glargine Injectable (LANTUS) 15 Unit(s) SubCutaneous at bedtime  insulin lispro (HumaLOG) corrective regimen sliding scale   SubCutaneous every 4 hours  lisinopril 5 milliGRAM(s) Oral daily  metolazone 5 milliGRAM(s) Oral daily  tamsulosin 0.8 milliGRAM(s) Oral at bedtime    MEDICATIONS  (PRN):  acetaminophen  IVPB. 1000 milliGRAM(s) IV Intermittent once PRN pain  hydrALAZINE Injectable 10 milliGRAM(s) IV Push every 6 hours PRN SBP >180  ondansetron Injectable 4 milliGRAM(s) IV Push once PRN Nausea and/or Vomiting          PHYSICAL EXAM:    Gen:  NAD    Eyes: PERRLA    Neurological:  Sleepy but arouses to voice.  oriented x2.  Non focal.  mild resting tremor.      Neck: Trachea midline    Pulmonary:  Non labored.      Cardiovascular:  RRR    Gastrointestinal: soft    Extremities:  no edema.          LABS:  CBC Full  -  ( 08 Jun 2018 03:40 )  WBC Count : 9.2 K/uL  Hemoglobin : 11.0 g/dL  Hematocrit : 33.1 %  Platelet Count - Automated : 269 K/uL  Mean Cell Volume : 91.7 fl  Mean Cell Hemoglobin : 30.5 pg  Mean Cell Hemoglobin Concentration : 33.2 g/dL  Auto Neutrophil # : 7.3 K/uL  Auto Lymphocyte # : 1.0 K/uL  Auto Monocyte # : 0.8 K/uL  Auto Eosinophil # : 0.1 K/uL  Auto Basophil # : 0.0 K/uL  Auto Neutrophil % : 79.5 %  Auto Lymphocyte % : 10.5 %  Auto Monocyte % : 8.5 %  Auto Eosinophil % : 1.1 %  Auto Basophil % : 0.2 %    06-08    134<L>  |  97<L>  |  13.0  ----------------------------<  76  4.1   |  26.0  |  0.81    Ca    8.3<L>      08 Jun 2018 03:40  Phos  2.7     06-08  Mg     2.0     06-08    TPro  6.1<L>  /  Alb  3.0<L>  /  TBili  <0.2<L>  /  DBili  x   /  AST  13  /  ALT  7   /  AlkPhos  116  06-06    PT/INR - ( 06 Jun 2018 17:57 )   PT: 9.0 sec;   INR: 0.82 ratio         PTT - ( 06 Jun 2018 17:57 )  PTT:25.4 sec    RECENT CULTURES:      LIVER FUNCTIONS - ( 06 Jun 2018 22:06 )  Alb: 3.0 g/dL / Pro: 6.1 g/dL / ALK PHOS: 116 U/L / ALT: 7 U/L / AST: 13 U/L / GGT: x               CAPILLARY BLOOD GLUCOSE      RADIOLOGY & ADDITIONAL STUDIES:    ASSESSMENT/PLAN:  77yMale presenting with:  mTBI, SDH.  today with nausea and vomiting  chronic poorly controlled essential htn, poorly controlled DM2.  Parkinson disease.      Neuro:   Mildly worsened neuro exam.  With vomiting will check repeat head CT.  Prior amission was on carbidop/levadopa but have been unable to fully reconcile meds. Will need to look into this.      CV: HTN.  home meds.  Goal SBP under 180.    Pulm: Pulm hygiene.  IS.     GI/Nutrition: Diet when will tolerate.      Endo: Latus, ssi.  was hypoglycemic overnight. Will lower lantus dose until eating better.  endocrine following.      Proph: Lovenox.      Dispo:  ICU.  PT/OT/PMR.

## 2018-06-08 NOTE — OCCUPATIONAL THERAPY INITIAL EVALUATION ADULT - PLANNED THERAPY INTERVENTIONS, OT EVAL
cognitive, visual perceptual/neuromuscular re-education/ADL retraining/balance training/bed mobility training/strengthening/transfer training/motor coordination training/ROM

## 2018-06-08 NOTE — PROGRESS NOTE ADULT - ASSESSMENT
T2DM- willlower Lantus to 12 untis from 15   dw ICU team   delaney thakur - pt not likely taking his inuslin   contineu supportive care

## 2018-06-08 NOTE — PHYSICAL THERAPY INITIAL EVALUATION ADULT - PLANNED THERAPY INTERVENTIONS, PT EVAL
strengthening/transfer training/balance training/stairs/bed mobility training/gait training/postural re-education

## 2018-06-08 NOTE — OCCUPATIONAL THERAPY INITIAL EVALUATION ADULT - ADDITIONAL COMMENTS
Pt somewhat poor historian  Pt has shower stall with door  Pt owns a RW, shower chair  Unable to determine handedness

## 2018-06-08 NOTE — DIETITIAN INITIAL EVALUATION ADULT. - OTHER INFO
pt provided limited information, nodding off during interview. Left literature at bedside, not candidate for diet education at this time

## 2018-06-09 DIAGNOSIS — I62.00 NONTRAUMATIC SUBDURAL HEMORRHAGE, UNSPECIFIED: ICD-10-CM

## 2018-06-09 LAB
ANION GAP SERPL CALC-SCNC: 13 MMOL/L — SIGNIFICANT CHANGE UP (ref 5–17)
BASOPHILS # BLD AUTO: 0 K/UL — SIGNIFICANT CHANGE UP (ref 0–0.2)
BASOPHILS NFR BLD AUTO: 0.2 % — SIGNIFICANT CHANGE UP (ref 0–2)
BUN SERPL-MCNC: 13 MG/DL — SIGNIFICANT CHANGE UP (ref 8–20)
CALCIUM SERPL-MCNC: 8.3 MG/DL — LOW (ref 8.6–10.2)
CHLORIDE SERPL-SCNC: 93 MMOL/L — LOW (ref 98–107)
CHLORIDE UR-SCNC: 106 MMOL/L — SIGNIFICANT CHANGE UP
CO2 SERPL-SCNC: 26 MMOL/L — SIGNIFICANT CHANGE UP (ref 22–29)
CREAT ?TM UR-MCNC: 50 MG/DL — SIGNIFICANT CHANGE UP
CREAT SERPL-MCNC: 0.85 MG/DL — SIGNIFICANT CHANGE UP (ref 0.5–1.3)
EOSINOPHIL # BLD AUTO: 0 K/UL — SIGNIFICANT CHANGE UP (ref 0–0.5)
EOSINOPHIL NFR BLD AUTO: 0.4 % — SIGNIFICANT CHANGE UP (ref 0–5)
GLUCOSE BLDC GLUCOMTR-MCNC: 101 MG/DL — HIGH (ref 70–99)
GLUCOSE BLDC GLUCOMTR-MCNC: 109 MG/DL — HIGH (ref 70–99)
GLUCOSE BLDC GLUCOMTR-MCNC: 129 MG/DL — HIGH (ref 70–99)
GLUCOSE BLDC GLUCOMTR-MCNC: 226 MG/DL — HIGH (ref 70–99)
GLUCOSE BLDC GLUCOMTR-MCNC: 237 MG/DL — HIGH (ref 70–99)
GLUCOSE BLDC GLUCOMTR-MCNC: 96 MG/DL — SIGNIFICANT CHANGE UP (ref 70–99)
GLUCOSE SERPL-MCNC: 86 MG/DL — SIGNIFICANT CHANGE UP (ref 70–115)
HCT VFR BLD CALC: 34.9 % — LOW (ref 42–52)
HGB BLD-MCNC: 11.5 G/DL — LOW (ref 14–18)
LYMPHOCYTES # BLD AUTO: 1.1 K/UL — SIGNIFICANT CHANGE UP (ref 1–4.8)
LYMPHOCYTES # BLD AUTO: 12.1 % — LOW (ref 20–55)
MAGNESIUM SERPL-MCNC: 2.1 MG/DL — SIGNIFICANT CHANGE UP (ref 1.6–2.6)
MCHC RBC-ENTMCNC: 30.8 PG — SIGNIFICANT CHANGE UP (ref 27–31)
MCHC RBC-ENTMCNC: 33 G/DL — SIGNIFICANT CHANGE UP (ref 32–36)
MCV RBC AUTO: 93.6 FL — SIGNIFICANT CHANGE UP (ref 80–94)
MONOCYTES # BLD AUTO: 0.9 K/UL — HIGH (ref 0–0.8)
MONOCYTES NFR BLD AUTO: 9.7 % — SIGNIFICANT CHANGE UP (ref 3–10)
NEUTROPHILS # BLD AUTO: 7.1 K/UL — SIGNIFICANT CHANGE UP (ref 1.8–8)
NEUTROPHILS NFR BLD AUTO: 77.3 % — HIGH (ref 37–73)
OSMOLALITY SERPL: 293 MOSM/KG — SIGNIFICANT CHANGE UP (ref 280–300)
OSMOLALITY UR: 532 MOSM/KG — SIGNIFICANT CHANGE UP (ref 300–1000)
PHOSPHATE SERPL-MCNC: 3.5 MG/DL — SIGNIFICANT CHANGE UP (ref 2.4–4.7)
PLATELET # BLD AUTO: 288 K/UL — SIGNIFICANT CHANGE UP (ref 150–400)
POTASSIUM SERPL-MCNC: 4.1 MMOL/L — SIGNIFICANT CHANGE UP (ref 3.5–5.3)
POTASSIUM SERPL-SCNC: 4.1 MMOL/L — SIGNIFICANT CHANGE UP (ref 3.5–5.3)
RBC # BLD: 3.73 M/UL — LOW (ref 4.6–6.2)
RBC # FLD: 14.4 % — SIGNIFICANT CHANGE UP (ref 11–15.6)
SODIUM SERPL-SCNC: 132 MMOL/L — LOW (ref 135–145)
SODIUM UR-SCNC: 106 MMOL/L — SIGNIFICANT CHANGE UP
WBC # BLD: 9.2 K/UL — SIGNIFICANT CHANGE UP (ref 4.8–10.8)
WBC # FLD AUTO: 9.2 K/UL — SIGNIFICANT CHANGE UP (ref 4.8–10.8)

## 2018-06-09 PROCEDURE — 99232 SBSQ HOSP IP/OBS MODERATE 35: CPT

## 2018-06-09 PROCEDURE — 99233 SBSQ HOSP IP/OBS HIGH 50: CPT

## 2018-06-09 RX ORDER — METOCLOPRAMIDE HCL 10 MG
10 TABLET ORAL ONCE
Qty: 0 | Refills: 0 | Status: COMPLETED | OUTPATIENT
Start: 2018-06-09 | End: 2018-06-09

## 2018-06-09 RX ORDER — INSULIN GLARGINE 100 [IU]/ML
15 INJECTION, SOLUTION SUBCUTANEOUS AT BEDTIME
Qty: 0 | Refills: 0 | Status: DISCONTINUED | OUTPATIENT
Start: 2018-06-09 | End: 2018-06-10

## 2018-06-09 RX ORDER — LANOLIN ALCOHOL/MO/W.PET/CERES
5 CREAM (GRAM) TOPICAL AT BEDTIME
Qty: 0 | Refills: 0 | Status: DISCONTINUED | OUTPATIENT
Start: 2018-06-09 | End: 2018-06-14

## 2018-06-09 RX ADMIN — INSULIN GLARGINE 15 UNIT(S): 100 INJECTION, SOLUTION SUBCUTANEOUS at 22:05

## 2018-06-09 RX ADMIN — Medication 5 MILLIGRAM(S): at 22:06

## 2018-06-09 RX ADMIN — CARBIDOPA AND LEVODOPA 1 TABLET(S): 25; 100 TABLET ORAL at 05:36

## 2018-06-09 RX ADMIN — CARBIDOPA AND LEVODOPA 1 TABLET(S): 25; 100 TABLET ORAL at 22:06

## 2018-06-09 RX ADMIN — LISINOPRIL 5 MILLIGRAM(S): 2.5 TABLET ORAL at 05:36

## 2018-06-09 RX ADMIN — Medication 6: at 18:12

## 2018-06-09 RX ADMIN — ATORVASTATIN CALCIUM 20 MILLIGRAM(S): 80 TABLET, FILM COATED ORAL at 22:06

## 2018-06-09 RX ADMIN — ENOXAPARIN SODIUM 40 MILLIGRAM(S): 100 INJECTION SUBCUTANEOUS at 22:05

## 2018-06-09 RX ADMIN — TAMSULOSIN HYDROCHLORIDE 0.8 MILLIGRAM(S): 0.4 CAPSULE ORAL at 22:06

## 2018-06-09 RX ADMIN — CARBIDOPA AND LEVODOPA 1 TABLET(S): 25; 100 TABLET ORAL at 12:25

## 2018-06-09 RX ADMIN — AMLODIPINE BESYLATE 10 MILLIGRAM(S): 2.5 TABLET ORAL at 05:36

## 2018-06-09 RX ADMIN — SODIUM CHLORIDE 75 MILLILITER(S): 9 INJECTION, SOLUTION INTRAVENOUS at 22:06

## 2018-06-09 RX ADMIN — Medication 10 MILLIGRAM(S): at 09:25

## 2018-06-09 RX ADMIN — ESCITALOPRAM OXALATE 10 MILLIGRAM(S): 10 TABLET, FILM COATED ORAL at 12:25

## 2018-06-09 RX ADMIN — Medication 6: at 13:23

## 2018-06-09 NOTE — PROGRESS NOTE ADULT - ASSESSMENT
69 y/o M on [id number] daily, presented 6/6 s/p fall from standing, fell backwards and hit his occiput on the floor. Reported to have lost consciousness for about 4 mins but was awake upon EMS arrival. Presented intact with GCS15 at baseline.  HCT with large temporal arachnoid cyst and small R posterior parafalcine, temporal, frontal and parietal acute SDH 10mm in width. Repeat HCT stable.  - 6/8 morning nausea - repeat HCT stable   - CTA negative for vertebral injury  - Ortho following for c spine    PLAN:  - Continue to hold antiplatelet/anticoagulation therapy  - stable from NSx standpoint/ ok to change NSx to q4 hrs   - PT/OT/ST  - PM&R eval appreciated  - PRN analgesia/ poor headache control

## 2018-06-09 NOTE — PROGRESS NOTE ADULT - SUBJECTIVE AND OBJECTIVE BOX
HISTORY  77y Male with SDH and chronic cervical spine fractures    24 HOUR EVENTS:  Mental status continues to wax and wain, + nausea overnight, unclear if it is due to head injury or hypoactive delerium  SUBJECTIVE/ROS:  [ ] A ten-point review of systems was otherwise negative except as noted.  [ x] Due to altered mental status/intubation, subjective information were not able to be obtained from the patient. History was obtained, to the extent possible, from review of the chart and collateral sources of information.      NEURO  RASS: 0 to -1  GCS: 14    CAM ICU:Positive  Exam:   Meds: carbidopa/levodopa  25/100 1 Tablet(s) Oral three times a day  escitalopram 10 milliGRAM(s) Oral daily    [x] Adequacy of sedation and pain control has been assessed and adjusted      RESPIRATORY  RR: 10 (06-09-18 @ 03:00) (10 - 29)  SpO2: 96% (06-09-18 @ 03:00) (94% - 97%)  Wt(kg): --  Exam: unlabored, clear to auscultation bilaterally  Mechanical Ventilation:     [ ] Extubation Readiness Assessed  Meds:       CARDIOVASCULAR  HR: 83 (06-09-18 @ 03:00) (77 - 96)  BP: 139/62 (06-09-18 @ 03:00) (113/57 - 191/85)  BP(mean): 89 (06-09-18 @ 03:00) (82 - 122)  ABP: --  ABP(mean): --  Wt(kg): --  CVP(cm H2O): --      Exam:  Cardiac Rhythm:  Perfusion     [x ]Adequate   [ ]Inadequate  Mentation   [x ]Normal       [ ]Reduced  Extremities  [ x]Warm         [ ]Cool  Volume Status [ ]Hypervolemic [x ]Euvolemic [ ]Hypovolemic  Meds: amLODIPine   Tablet 10 milliGRAM(s) Oral daily  hydrALAZINE Injectable 10 milliGRAM(s) IV Push every 6 hours PRN SBP >180  lisinopril 5 milliGRAM(s) Oral daily  metolazone 5 milliGRAM(s) Oral daily  tamsulosin 0.8 milliGRAM(s) Oral at bedtime        GI/NUTRITION  Exam: sof felisha nd  Diet: reg diet, po intake remains poor  Meds:     GENITOURINARY  I&O's Detail    06-07 @ 07:01  -  06-08 @ 07:00  --------------------------------------------------------  IN:    multiple electrolytes Injection Type 1multiple electrolytes Injection Type 1: 1725 mL    Oral Fluid: 300 mL    Solution: 62.5 mL  Total IN: 2087.5 mL    OUT:    Voided: 1700 mL  Total OUT: 1700 mL    Total NET: 387.5 mL      06-08 @ 07:01  -  06-09 @ 04:05  --------------------------------------------------------  IN:    multiple electrolytes Injection Type 1: 600 mL    Oral Fluid: 300 mL    Solution: 100 mL  Total IN: 1000 mL    OUT:    Voided: 575 mL  Total OUT: 575 mL    Total NET: 425 mL          06-08    134<L>  |  97<L>  |  13.0  ----------------------------<  76  4.1   |  26.0  |  0.81    Ca    8.3<L>      08 Jun 2018 03:40  Phos  2.7     06-08  Mg     2.0     06-08      [ ] Ledesma catheter, indication: N/A  Meds: multiple electrolytes Injection Type 1 1000 milliLiter(s) IV Continuous <Continuous>        HEMATOLOGIC  Meds: enoxaparin Injectable 40 milliGRAM(s) SubCutaneous <User Schedule>    [x] VTE Prophylaxis                        11.0   9.2   )-----------( 269      ( 08 Jun 2018 03:40 )             33.1       Transfusion     [ ] PRBC   [ ] Platelets   [ ] FFP   [ ] Cryoprecipitate      INFECTIOUS DISEASES  T(C): 36.8 (06-09-18 @ 00:00), Max: 37.3 (06-08-18 @ 12:00)  Wt(kg): --    Recent Cultures:    Meds:       ENDOCRINE  Capillary Blood Glucose    Meds: atorvastatin 20 milliGRAM(s) Oral at bedtime  insulin glargine Injectable (LANTUS) 12 Unit(s) SubCutaneous at bedtime  insulin lispro (HumaLOG) corrective regimen sliding scale   SubCutaneous every 4 hours        ACCESS DEVICES:  [ x] Peripheral IV  [ ] Central Venous Line	[ ] R	[ ] L	[ ] IJ	[ ] Fem	[ ] SC	Placed:   [ ] Arterial Line		[ ] R	[ ] L	[ ] Fem	[ ] Rad	[ ] Ax	Placed:   [ ] PICC:					[ ] Mediport  [ ] Urinary Catheter, Date Placed:   [ ] Necessity of urinary, arterial, and venous catheters discussed    OTHER MEDICATIONS:      CODE STATUS:     IMAGING:

## 2018-06-09 NOTE — PROGRESS NOTE ADULT - SUBJECTIVE AND OBJECTIVE BOX
NEUROSURGERY PROGRESS NOTE:    Pt s/p fall w LOC, GCS 14 on arrival w acute/chronic SDH w arachnoid cyst     pt seen today, states he "doesn't feel well today", admits to some posterior headache - somewhat controlled w PRN meds     - Nausea / - Vomiting  states he is at his baseline   + void/ urinal   + diet  + venodynes b/l       MEDICATIONS  (STANDING):  amLODIPine   Tablet 10 milliGRAM(s) Oral daily  atorvastatin 20 milliGRAM(s) Oral at bedtime  carbidopa/levodopa  25/100 1 Tablet(s) Oral three times a day  enoxaparin Injectable 40 milliGRAM(s) SubCutaneous <User Schedule>  escitalopram 10 milliGRAM(s) Oral daily  insulin glargine Injectable (LANTUS) 12 Unit(s) SubCutaneous at bedtime  insulin lispro (HumaLOG) corrective regimen sliding scale   SubCutaneous every 4 hours  lisinopril 5 milliGRAM(s) Oral daily  melatonin 5 milliGRAM(s) Oral at bedtime  metolazone 5 milliGRAM(s) Oral daily  multiple electrolytes Injection Type 1 1000 milliLiter(s) (75 mL/Hr) IV Continuous <Continuous>  tamsulosin 0.8 milliGRAM(s) Oral at bedtime    MEDICATIONS  (PRN):  hydrALAZINE Injectable 10 milliGRAM(s) IV Push every 6 hours PRN SBP >180      Allergies  No Known Allergies    Intolerances      Vital Signs Last 24 Hrs  T(C): 37.1 (09 Jun 2018 12:00), Max: 37.3 (08 Jun 2018 20:00)  T(F): 98.8 (09 Jun 2018 12:00), Max: 99.1 (08 Jun 2018 20:00)  HR: 87 (09 Jun 2018 12:00) (81 - 96)  BP: 143/66 (09 Jun 2018 12:00) (113/57 - 191/85)  BP(mean): 95 (09 Jun 2018 06:00) (82 - 122)  RR: 20 (09 Jun 2018 12:00) (10 - 29)  SpO2: 96% (09 Jun 2018 12:00) (94% - 97%)    PHYSICAL EXAM:  GENERAL: NAD, poorly groomed, well-developed, obese   HEAD:  left occipital lac s/p repair, Normocephalic  EYES: EOMI, conjunctiva and sclera clear, + b/l severe cataract   NECK: no collar noted on pt, aspen collar at bedside   NERVOUS SYSTEM:  Alert & Oriented X2 with partial date, poor vision, can not count fingers <1ft, L ptosis, poor hearing bilat, follows visual commands, antigravity x4 with +LUE pronator      LABS:                        11.5   9.2   )-----------( 288      ( 09 Jun 2018 04:47 )             34.9     06-09    132<L>  |  93<L>  |  13.0  ----------------------------<  86  4.1   |  26.0  |  0.85    Ca    8.3<L>      09 Jun 2018 04:47  Phos  3.5     06-09  Mg     2.1     06-09        RADIOLOGY & ADDITIONAL TESTS:  < from: CT Head No Cont (06.08.18 @ 13:50) >  EXAM:  CT BRAIN                          PROCEDURE DATE:  06/08/2018          INTERPRETATION:      CLINICAL INFORMATION: Status post fall assess for hemorrhage  ADMDIAG1:   I62.00 FELL/     TECHNIQUE: Contiguous non contrast axial images of brain from skull base   to vertex sagittal and coronal reformations.   This scan was performed   using automatic exposure control (radiation dose reduction software) to   obtain a diagnostic image quality scan with patient dose as low as   reasonably achievable.      COMPARISON: 6/7/2018 .    FINDINGS:       Again noted, there is right posterior convexity subdural hematoma   extending to the interhemispheric fissure and tentorium/infratentorial   fossa. No new hemorrhage is identified. There is prominence of the   ventricles and cortical sulci. Midline structures are intact. There are   patchy hypodensities in periventricular distribution consistent with   chronic small vessel ischemic changes. There is no CT evidence of acute   territorial infarction. There is CSF collection consistent with arachnoid   cyst measuring 5.5 x 4.2 cm right anterior cranial fossa.  There is no   acute hydrocephalus. The visualized posterior fossa structures are   intact. There are scattered intracranial arterial calcification.  The   visualized paranasal sinuses are clear.  There are bilateral mastoid   opacification. Nondisplaced posterior right C1 arch fracture.   Developmental nonfused  midline posterior C1 arch deformity.      IMPRESSION:       Right posterior convexity subdural hematoma unchanged as compared to   previous exam.    Chronic small vessel ischemic changes.    Bilateral mastoid opacification.                NANCY WELLS M.D., ATTENDING RADIOLOGIST  This document has been electronicallysigned. Jun 8 2018  1:59PM    < end of copied text >

## 2018-06-09 NOTE — PROGRESS NOTE ADULT - ATTENDING COMMENTS
NSGY Attg:    see above    patient seen and examined    repeat CT stable    agree with plan as documented
Agree with CAROL Felton assessment.  Also hyponatremia.  With hypochloremia suspect volume depleted.  On IV fluids.  Will evaluate urine lytes.
NSGY Attg:    see above    patient seen and examined    agree with exam as documented; patient [place] with LUE drift    CT reviewed — grossly stable SDH; + large arachnoid cyst    CTA neck negative per official read    A/P:  agree with plan as above  cont q 1 hour neuro checks  will follow  c-spine evaluation per ortho

## 2018-06-09 NOTE — PROGRESS NOTE ADULT - PROBLEM SELECTOR PLAN 1
Neuro- promote good sleep hygiene and activity through out the day, tart melatonin QHS, Parkinsons meds re-started.  Resp-oob, PT  Card- on all home meds  GI- encourage po intake  Endo- glycemia better controlled  PPX- continue chemo ppx for dvt

## 2018-06-10 LAB
ANION GAP SERPL CALC-SCNC: 15 MMOL/L — SIGNIFICANT CHANGE UP (ref 5–17)
BASOPHILS # BLD AUTO: 0 K/UL — SIGNIFICANT CHANGE UP (ref 0–0.2)
BASOPHILS NFR BLD AUTO: 0.2 % — SIGNIFICANT CHANGE UP (ref 0–2)
BUN SERPL-MCNC: 18 MG/DL — SIGNIFICANT CHANGE UP (ref 8–20)
CALCIUM SERPL-MCNC: 7.8 MG/DL — LOW (ref 8.6–10.2)
CHLORIDE SERPL-SCNC: 95 MMOL/L — LOW (ref 98–107)
CO2 SERPL-SCNC: 26 MMOL/L — SIGNIFICANT CHANGE UP (ref 22–29)
CREAT SERPL-MCNC: 0.96 MG/DL — SIGNIFICANT CHANGE UP (ref 0.5–1.3)
EOSINOPHIL # BLD AUTO: 0.1 K/UL — SIGNIFICANT CHANGE UP (ref 0–0.5)
EOSINOPHIL NFR BLD AUTO: 1.1 % — SIGNIFICANT CHANGE UP (ref 0–5)
GLUCOSE BLDC GLUCOMTR-MCNC: 109 MG/DL — HIGH (ref 70–99)
GLUCOSE BLDC GLUCOMTR-MCNC: 137 MG/DL — HIGH (ref 70–99)
GLUCOSE BLDC GLUCOMTR-MCNC: 139 MG/DL — HIGH (ref 70–99)
GLUCOSE BLDC GLUCOMTR-MCNC: 151 MG/DL — HIGH (ref 70–99)
GLUCOSE BLDC GLUCOMTR-MCNC: 195 MG/DL — HIGH (ref 70–99)
GLUCOSE BLDC GLUCOMTR-MCNC: 76 MG/DL — SIGNIFICANT CHANGE UP (ref 70–99)
GLUCOSE SERPL-MCNC: 80 MG/DL — SIGNIFICANT CHANGE UP (ref 70–115)
HCT VFR BLD CALC: 31.7 % — LOW (ref 42–52)
HGB BLD-MCNC: 10.5 G/DL — LOW (ref 14–18)
LYMPHOCYTES # BLD AUTO: 1.8 K/UL — SIGNIFICANT CHANGE UP (ref 1–4.8)
LYMPHOCYTES # BLD AUTO: 19.1 % — LOW (ref 20–55)
MAGNESIUM SERPL-MCNC: 2.1 MG/DL — SIGNIFICANT CHANGE UP (ref 1.6–2.6)
MCHC RBC-ENTMCNC: 30.8 PG — SIGNIFICANT CHANGE UP (ref 27–31)
MCHC RBC-ENTMCNC: 33.1 G/DL — SIGNIFICANT CHANGE UP (ref 32–36)
MCV RBC AUTO: 93 FL — SIGNIFICANT CHANGE UP (ref 80–94)
MONOCYTES # BLD AUTO: 1 K/UL — HIGH (ref 0–0.8)
MONOCYTES NFR BLD AUTO: 11.1 % — HIGH (ref 3–10)
NEUTROPHILS # BLD AUTO: 6.4 K/UL — SIGNIFICANT CHANGE UP (ref 1.8–8)
NEUTROPHILS NFR BLD AUTO: 68.3 % — SIGNIFICANT CHANGE UP (ref 37–73)
PHOSPHATE SERPL-MCNC: 3.3 MG/DL — SIGNIFICANT CHANGE UP (ref 2.4–4.7)
PLATELET # BLD AUTO: 277 K/UL — SIGNIFICANT CHANGE UP (ref 150–400)
POTASSIUM SERPL-MCNC: 3.8 MMOL/L — SIGNIFICANT CHANGE UP (ref 3.5–5.3)
POTASSIUM SERPL-SCNC: 3.8 MMOL/L — SIGNIFICANT CHANGE UP (ref 3.5–5.3)
RBC # BLD: 3.41 M/UL — LOW (ref 4.6–6.2)
RBC # FLD: 14.1 % — SIGNIFICANT CHANGE UP (ref 11–15.6)
SODIUM SERPL-SCNC: 136 MMOL/L — SIGNIFICANT CHANGE UP (ref 135–145)
WBC # BLD: 9.3 K/UL — SIGNIFICANT CHANGE UP (ref 4.8–10.8)
WBC # FLD AUTO: 9.3 K/UL — SIGNIFICANT CHANGE UP (ref 4.8–10.8)

## 2018-06-10 RX ORDER — ACETAMINOPHEN 500 MG
1000 TABLET ORAL ONCE
Qty: 0 | Refills: 0 | Status: COMPLETED | OUTPATIENT
Start: 2018-06-10 | End: 2018-06-10

## 2018-06-10 RX ORDER — INSULIN LISPRO 100/ML
VIAL (ML) SUBCUTANEOUS
Qty: 0 | Refills: 0 | Status: DISCONTINUED | OUTPATIENT
Start: 2018-06-10 | End: 2018-06-14

## 2018-06-10 RX ORDER — ONDANSETRON 8 MG/1
4 TABLET, FILM COATED ORAL ONCE
Qty: 0 | Refills: 0 | Status: COMPLETED | OUTPATIENT
Start: 2018-06-10 | End: 2018-06-10

## 2018-06-10 RX ORDER — POTASSIUM CHLORIDE 20 MEQ
40 PACKET (EA) ORAL ONCE
Qty: 0 | Refills: 0 | Status: COMPLETED | OUTPATIENT
Start: 2018-06-10 | End: 2018-06-10

## 2018-06-10 RX ORDER — INSULIN GLARGINE 100 [IU]/ML
12 INJECTION, SOLUTION SUBCUTANEOUS AT BEDTIME
Qty: 0 | Refills: 0 | Status: DISCONTINUED | OUTPATIENT
Start: 2018-06-10 | End: 2018-06-12

## 2018-06-10 RX ADMIN — Medication 1000 MILLIGRAM(S): at 06:00

## 2018-06-10 RX ADMIN — ENOXAPARIN SODIUM 40 MILLIGRAM(S): 100 INJECTION SUBCUTANEOUS at 22:27

## 2018-06-10 RX ADMIN — SODIUM CHLORIDE 75 MILLILITER(S): 9 INJECTION, SOLUTION INTRAVENOUS at 22:25

## 2018-06-10 RX ADMIN — Medication 400 MILLIGRAM(S): at 05:29

## 2018-06-10 RX ADMIN — LISINOPRIL 5 MILLIGRAM(S): 2.5 TABLET ORAL at 05:31

## 2018-06-10 RX ADMIN — CARBIDOPA AND LEVODOPA 1 TABLET(S): 25; 100 TABLET ORAL at 15:30

## 2018-06-10 RX ADMIN — CARBIDOPA AND LEVODOPA 1 TABLET(S): 25; 100 TABLET ORAL at 05:31

## 2018-06-10 RX ADMIN — Medication 5 MILLIGRAM(S): at 22:26

## 2018-06-10 RX ADMIN — ATORVASTATIN CALCIUM 20 MILLIGRAM(S): 80 TABLET, FILM COATED ORAL at 22:26

## 2018-06-10 RX ADMIN — TAMSULOSIN HYDROCHLORIDE 0.8 MILLIGRAM(S): 0.4 CAPSULE ORAL at 22:26

## 2018-06-10 RX ADMIN — INSULIN GLARGINE 12 UNIT(S): 100 INJECTION, SOLUTION SUBCUTANEOUS at 21:21

## 2018-06-10 RX ADMIN — AMLODIPINE BESYLATE 10 MILLIGRAM(S): 2.5 TABLET ORAL at 05:31

## 2018-06-10 RX ADMIN — ONDANSETRON 4 MILLIGRAM(S): 8 TABLET, FILM COATED ORAL at 05:31

## 2018-06-10 RX ADMIN — Medication 40 MILLIEQUIVALENT(S): at 10:36

## 2018-06-10 RX ADMIN — CARBIDOPA AND LEVODOPA 1 TABLET(S): 25; 100 TABLET ORAL at 22:26

## 2018-06-10 RX ADMIN — ESCITALOPRAM OXALATE 10 MILLIGRAM(S): 10 TABLET, FILM COATED ORAL at 12:18

## 2018-06-10 RX ADMIN — Medication 4: at 14:10

## 2018-06-10 RX ADMIN — Medication 4: at 01:47

## 2018-06-10 NOTE — PROGRESS NOTE ADULT - SUBJECTIVE AND OBJECTIVE BOX
INTERVAL HPI/OVERNIGHT EVENTS: follow up of diabetes    MEDICATIONS  (STANDING):  amLODIPine   Tablet 10 milliGRAM(s) Oral daily  atorvastatin 20 milliGRAM(s) Oral at bedtime  carbidopa/levodopa  25/100 1 Tablet(s) Oral three times a day  enoxaparin Injectable 40 milliGRAM(s) SubCutaneous <User Schedule>  escitalopram 10 milliGRAM(s) Oral daily  insulin glargine Injectable (LANTUS) 15 Unit(s) SubCutaneous at bedtime  insulin lispro (HumaLOG) corrective regimen sliding scale   SubCutaneous every 4 hours  lisinopril 5 milliGRAM(s) Oral daily  melatonin 5 milliGRAM(s) Oral at bedtime  metolazone 5 milliGRAM(s) Oral daily  multiple electrolytes Injection Type 1 1000 milliLiter(s) (75 mL/Hr) IV Continuous <Continuous>  tamsulosin 0.8 milliGRAM(s) Oral at bedtime    MEDICATIONS  (PRN):  hydrALAZINE Injectable 10 milliGRAM(s) IV Push every 6 hours PRN SBP >180      Allergies    No Known Allergies    Intolerances        Review of systems: headache    Vital Signs Last 24 Hrs  T(C): 36.7 (10 Narinder 2018 15:35), Max: 37.3 (09 Jun 2018 16:00)  T(F): 98 (10 Narinder 2018 15:35), Max: 99.2 (09 Jun 2018 16:00)  HR: 83 (10 Narinder 2018 15:00) (75 - 89)  BP: 113/59 (10 Narinder 2018 15:00) (100/72 - 160/64)  BP(mean): 81 (10 Narinder 2018 15:00) (77 - 94)  RR: 19 (10 Narinder 2018 15:00) (11 - 20)  SpO2: 94% (10 Narinder 2018 15:00) (93% - 98%)      LABS:                        10.5   9.3   )-----------( 277      ( 10 Narinder 2018 05:06 )             31.7     06-10    136  |  95<L>  |  18.0  ----------------------------<  80  3.8   |  26.0  |  0.96    Ca    7.8<L>      10 Narinder 2018 05:06  Phos  3.3     06-10  Mg     2.1     06-10            POCT Blood Glucose.: 195 mg/dL (06-10-18 @ 14:08)  POCT Blood Glucose.: 109 mg/dL (06-10-18 @ 10:29)  POCT Blood Glucose.: 76 mg/dL (06-10-18 @ 05:40)  POCT Blood Glucose.: 151 mg/dL (06-10-18 @ 01:45)  POCT Blood Glucose.: 129 mg/dL (06-09-18 @ 22:04)  POCT Blood Glucose.: 226 mg/dL (06-09-18 @ 18:07)  POCT Blood Glucose.: 237 mg/dL (06-09-18 @ 13:22)  POCT Blood Glucose.: 109 mg/dL (06-09-18 @ 10:20)  POCT Blood Glucose.: 96 mg/dL (06-09-18 @ 05:34)  POCT Blood Glucose.: 101 mg/dL (06-09-18 @ 01:40)  POCT Blood Glucose.: 163 mg/dL (06-08-18 @ 21:44)  POCT Blood Glucose.: 186 mg/dL (06-08-18 @ 17:57)  POCT Blood Glucose.: 140 mg/dL (06-08-18 @ 14:25)  POCT Blood Glucose.: 114 mg/dL (06-08-18 @ 09:58)  POCT Blood Glucose.: 89 mg/dL (06-08-18 @ 07:03)  POCT Blood Glucose.: 63 mg/dL (06-08-18 @ 05:24)  POCT Blood Glucose.: 85 mg/dL (06-08-18 @ 02:20)  POCT Blood Glucose.: 154 mg/dL (06-07-18 @ 21:52)  POCT Blood Glucose.: 207 mg/dL (06-07-18 @ 19:11)      RADIOLOGY & ADDITIONAL TESTS:

## 2018-06-10 NOTE — PROGRESS NOTE ADULT - SUBJECTIVE AND OBJECTIVE BOX
INTERVAL HPI/OVERNIGHT EVENTS/SUBJECTIVE:  No significant events.  Intermittent headaches.      ICU Vital Signs Last 24 Hrs  T(C): 36.7 (10 Narinder 2018 15:35), Max: 37.2 (10 Narinder 2018 00:17)  T(F): 98 (10 Narinder 2018 15:35), Max: 99 (10 Narinder 2018 08:00)  HR: 85 (10 Narinder 2018 17:00) (75 - 85)  BP: 140/63 (10 Narinder 2018 17:00) (100/72 - 160/64)  BP(mean): 91 (10 Narinder 2018 17:00) (77 - 94)  ABP: --  ABP(mean): --  RR: 15 (10 Narinder 2018 17:00) (11 - 20)  SpO2: 95% (10 Narinder 2018 17:00) (93% - 98%)      I&O's Detail    09 Jun 2018 07:01  -  10 Narinder 2018 07:00  --------------------------------------------------------  IN:    multiple electrolytes Injection Type 1: 1725 mL    Oral Fluid: 910 mL  Total IN: 2635 mL    OUT:    Voided: 800 mL  Total OUT: 800 mL    Total NET: 1835 mL      10 Narinder 2018 07:01  -  10 Narinder 2018 19:48  --------------------------------------------------------  IN:    multiple electrolytes Injection Type 1: 825 mL    Oral Fluid: 540 mL  Total IN: 1365 mL    OUT:    Voided: 1260 mL  Total OUT: 1260 mL    Total NET: 105 mL                MEDICATIONS  (STANDING):  amLODIPine   Tablet 10 milliGRAM(s) Oral daily  atorvastatin 20 milliGRAM(s) Oral at bedtime  carbidopa/levodopa  25/100 1 Tablet(s) Oral three times a day  enoxaparin Injectable 40 milliGRAM(s) SubCutaneous <User Schedule>  escitalopram 10 milliGRAM(s) Oral daily  insulin glargine Injectable (LANTUS) 12 Unit(s) SubCutaneous at bedtime  insulin lispro (HumaLOG) corrective regimen sliding scale   SubCutaneous Before meals and at bedtime  lisinopril 5 milliGRAM(s) Oral daily  melatonin 5 milliGRAM(s) Oral at bedtime  metolazone 5 milliGRAM(s) Oral daily  multiple electrolytes Injection Type 1 1000 milliLiter(s) (75 mL/Hr) IV Continuous <Continuous>  tamsulosin 0.8 milliGRAM(s) Oral at bedtime    MEDICATIONS  (PRN):  hydrALAZINE Injectable 10 milliGRAM(s) IV Push every 6 hours PRN SBP >180          PHYSICAL EXAM:    Gen: NAD    Eyes: PERRLA    Neurological: AAOx3.  Less lethargic than previously.  Otherwise stable exam.      Neck: Trachea midline.      Pulmonary:  Non labored.      Cardiovascular: RRR    Gastrointestinal: Soft    Extremities:  No edmeda      LABS:  CBC Full  -  ( 10 Narinder 2018 05:06 )  WBC Count : 9.3 K/uL  Hemoglobin : 10.5 g/dL  Hematocrit : 31.7 %  Platelet Count - Automated : 277 K/uL  Mean Cell Volume : 93.0 fl  Mean Cell Hemoglobin : 30.8 pg  Mean Cell Hemoglobin Concentration : 33.1 g/dL  Auto Neutrophil # : 6.4 K/uL  Auto Lymphocyte # : 1.8 K/uL  Auto Monocyte # : 1.0 K/uL  Auto Eosinophil # : 0.1 K/uL  Auto Basophil # : 0.0 K/uL  Auto Neutrophil % : 68.3 %  Auto Lymphocyte % : 19.1 %  Auto Monocyte % : 11.1 %  Auto Eosinophil % : 1.1 %  Auto Basophil % : 0.2 %    06-10    136  |  95<L>  |  18.0  ----------------------------<  80  3.8   |  26.0  |  0.96    Ca    7.8<L>      10 Narinder 2018 05:06  Phos  3.3     06-10  Mg     2.1     06-10          RECENT CULTURES:            CAPILLARY BLOOD GLUCOSE      RADIOLOGY & ADDITIONAL STUDIES:    ASSESSMENT/PLAN:  77yMale presenting with:  Traumatic SDH, DM2 with poor control, parkinson's      Neuro:  Supportive and symptomatic care for headaches.  Monitor neuro state.      CV: HTN better controlled. Continue current therapy/      Pulm: IS, pulm hygiene.      GI/Nutrition:  Diet.  Moderate PO intake.  Continue to encourage.  No further vomiting.      /Renal:  Hyponatremia improved.  Exam and urine lytes consistent with cerebral salt wasting.  Will continue IV fluids for now until hydrating self PO appropriately        Endo:  Hyperglycemia improved.  BS stating to be too low.  Will need to reduce lantus slightly.  Endocrine following.      Proph: Lovenox.      Dispo:  Ok for floor.   PT/OT.  Dispo likely to rehab.

## 2018-06-10 NOTE — PROVIDER CONTACT NOTE (EICU) - ACTION/TREATMENT ORDERED:
Case discussed with eICU attending Dr. Espinal, pt will need bedside neuro exam and possible repeat imaging. Discussed with bedside provider CAROL Pandya.

## 2018-06-10 NOTE — PROVIDER CONTACT NOTE (EICU) - ASSESSMENT
Pt laying in bed in no apparent distress, endorsing 8/10 headache. Neuro assessment very limited due to hearing impairment, pt poorly following commands despite assistance from bedside RN. Possible LUE deficit, previously noted however unclear if worsening.

## 2018-06-11 LAB
ANION GAP SERPL CALC-SCNC: 13 MMOL/L — SIGNIFICANT CHANGE UP (ref 5–17)
BASOPHILS # BLD AUTO: 0 K/UL — SIGNIFICANT CHANGE UP (ref 0–0.2)
BASOPHILS NFR BLD AUTO: 0.5 % — SIGNIFICANT CHANGE UP (ref 0–2)
BUN SERPL-MCNC: 16 MG/DL — SIGNIFICANT CHANGE UP (ref 8–20)
CALCIUM SERPL-MCNC: 8.1 MG/DL — LOW (ref 8.6–10.2)
CHLORIDE SERPL-SCNC: 94 MMOL/L — LOW (ref 98–107)
CO2 SERPL-SCNC: 27 MMOL/L — SIGNIFICANT CHANGE UP (ref 22–29)
CREAT SERPL-MCNC: 0.83 MG/DL — SIGNIFICANT CHANGE UP (ref 0.5–1.3)
EOSINOPHIL # BLD AUTO: 0.2 K/UL — SIGNIFICANT CHANGE UP (ref 0–0.5)
EOSINOPHIL NFR BLD AUTO: 2.6 % — SIGNIFICANT CHANGE UP (ref 0–5)
GLUCOSE BLDC GLUCOMTR-MCNC: 123 MG/DL — HIGH (ref 70–99)
GLUCOSE BLDC GLUCOMTR-MCNC: 124 MG/DL — HIGH (ref 70–99)
GLUCOSE BLDC GLUCOMTR-MCNC: 173 MG/DL — HIGH (ref 70–99)
GLUCOSE BLDC GLUCOMTR-MCNC: 197 MG/DL — HIGH (ref 70–99)
GLUCOSE SERPL-MCNC: 131 MG/DL — HIGH (ref 70–115)
HCT VFR BLD CALC: 33.6 % — LOW (ref 42–52)
HGB BLD-MCNC: 10.9 G/DL — LOW (ref 14–18)
LYMPHOCYTES # BLD AUTO: 1.1 K/UL — SIGNIFICANT CHANGE UP (ref 1–4.8)
LYMPHOCYTES # BLD AUTO: 17.7 % — LOW (ref 20–55)
MAGNESIUM SERPL-MCNC: 2.1 MG/DL — SIGNIFICANT CHANGE UP (ref 1.6–2.6)
MCHC RBC-ENTMCNC: 30.2 PG — SIGNIFICANT CHANGE UP (ref 27–31)
MCHC RBC-ENTMCNC: 32.4 G/DL — SIGNIFICANT CHANGE UP (ref 32–36)
MCV RBC AUTO: 93.1 FL — SIGNIFICANT CHANGE UP (ref 80–94)
MONOCYTES # BLD AUTO: 0.5 K/UL — SIGNIFICANT CHANGE UP (ref 0–0.8)
MONOCYTES NFR BLD AUTO: 7.9 % — SIGNIFICANT CHANGE UP (ref 3–10)
NEUTROPHILS # BLD AUTO: 4.6 K/UL — SIGNIFICANT CHANGE UP (ref 1.8–8)
NEUTROPHILS NFR BLD AUTO: 71 % — SIGNIFICANT CHANGE UP (ref 37–73)
PHOSPHATE SERPL-MCNC: 3.1 MG/DL — SIGNIFICANT CHANGE UP (ref 2.4–4.7)
PLATELET # BLD AUTO: 294 K/UL — SIGNIFICANT CHANGE UP (ref 150–400)
POTASSIUM SERPL-MCNC: 3.8 MMOL/L — SIGNIFICANT CHANGE UP (ref 3.5–5.3)
POTASSIUM SERPL-SCNC: 3.8 MMOL/L — SIGNIFICANT CHANGE UP (ref 3.5–5.3)
RBC # BLD: 3.61 M/UL — LOW (ref 4.6–6.2)
RBC # FLD: 13.6 % — SIGNIFICANT CHANGE UP (ref 11–15.6)
SODIUM SERPL-SCNC: 134 MMOL/L — LOW (ref 135–145)
WBC # BLD: 6.4 K/UL — SIGNIFICANT CHANGE UP (ref 4.8–10.8)
WBC # FLD AUTO: 6.4 K/UL — SIGNIFICANT CHANGE UP (ref 4.8–10.8)

## 2018-06-11 PROCEDURE — 99232 SBSQ HOSP IP/OBS MODERATE 35: CPT

## 2018-06-11 PROCEDURE — 99233 SBSQ HOSP IP/OBS HIGH 50: CPT

## 2018-06-11 RX ORDER — SODIUM CHLORIDE 9 MG/ML
1000 INJECTION INTRAMUSCULAR; INTRAVENOUS; SUBCUTANEOUS
Qty: 0 | Refills: 0 | Status: DISCONTINUED | OUTPATIENT
Start: 2018-06-11 | End: 2018-06-13

## 2018-06-11 RX ORDER — ACETAMINOPHEN 500 MG
650 TABLET ORAL EVERY 6 HOURS
Qty: 0 | Refills: 0 | Status: DISCONTINUED | OUTPATIENT
Start: 2018-06-11 | End: 2018-06-14

## 2018-06-11 RX ADMIN — ENOXAPARIN SODIUM 40 MILLIGRAM(S): 100 INJECTION SUBCUTANEOUS at 21:40

## 2018-06-11 RX ADMIN — CARBIDOPA AND LEVODOPA 1 TABLET(S): 25; 100 TABLET ORAL at 21:40

## 2018-06-11 RX ADMIN — ESCITALOPRAM OXALATE 10 MILLIGRAM(S): 10 TABLET, FILM COATED ORAL at 12:00

## 2018-06-11 RX ADMIN — INSULIN GLARGINE 12 UNIT(S): 100 INJECTION, SOLUTION SUBCUTANEOUS at 21:36

## 2018-06-11 RX ADMIN — Medication 650 MILLIGRAM(S): at 05:22

## 2018-06-11 RX ADMIN — Medication 4: at 21:37

## 2018-06-11 RX ADMIN — LISINOPRIL 5 MILLIGRAM(S): 2.5 TABLET ORAL at 05:21

## 2018-06-11 RX ADMIN — Medication 4: at 11:58

## 2018-06-11 RX ADMIN — SODIUM CHLORIDE 75 MILLILITER(S): 9 INJECTION INTRAMUSCULAR; INTRAVENOUS; SUBCUTANEOUS at 12:09

## 2018-06-11 RX ADMIN — ATORVASTATIN CALCIUM 20 MILLIGRAM(S): 80 TABLET, FILM COATED ORAL at 21:40

## 2018-06-11 RX ADMIN — Medication 650 MILLIGRAM(S): at 07:01

## 2018-06-11 RX ADMIN — AMLODIPINE BESYLATE 10 MILLIGRAM(S): 2.5 TABLET ORAL at 05:22

## 2018-06-11 RX ADMIN — TAMSULOSIN HYDROCHLORIDE 0.8 MILLIGRAM(S): 0.4 CAPSULE ORAL at 21:40

## 2018-06-11 RX ADMIN — Medication 5 MILLIGRAM(S): at 21:40

## 2018-06-11 RX ADMIN — CARBIDOPA AND LEVODOPA 1 TABLET(S): 25; 100 TABLET ORAL at 14:10

## 2018-06-11 RX ADMIN — CARBIDOPA AND LEVODOPA 1 TABLET(S): 25; 100 TABLET ORAL at 05:21

## 2018-06-11 NOTE — PROGRESS NOTE ADULT - ASSESSMENT
76 y/o M h/o Parkinson's, DM s/p fall with traumatic SDH, C1 bilateral lamina fracture. Hemodynamically stable.     Plan:  -Continue pain control as needed  -Continue regular diet  -Blood glucose control, appreciate endocrinology recommendations  -Encourage OOB, ambulation, IS  -Continue home medications  -C-collar when out of bed  -DVT ppx  -Dispo: pending continued progress with physical therapy

## 2018-06-11 NOTE — PROGRESS NOTE ADULT - SUBJECTIVE AND OBJECTIVE BOX
Chart reviewed. Patient seen at bedside.   Keeps eyes closed and c/o HA  Unable to tolerated bedside PT due to dizziness. denies vertigo.  labs with hyponatremia that is improving.  patient followed by [facility] for diabetic management    FUNCTIONAL PROGRESS  Total A    REVIEW OF SYSTEMS  Constitutional -  +fatigue  Neurological - +memory loss, +loss of strength  denies numbness    Vital Signs Last 24 Hrs  T(C): 36.7 (11 Jun 2018 07:00), Max: 36.9 (10 Narinder 2018 23:40)  T(F): 98 (11 Jun 2018 07:00), Max: 98.4 (10 Narinder 2018 23:40)  HR: 76 (11 Jun 2018 07:00) (76 - 86)  BP: 153/79 (11 Jun 2018 07:00) (113/59 - 153/79)  BP(mean): 91 (10 Narinder 2018 17:00) (81 - 91)  RR: 18 (11 Jun 2018 07:00) (12 - 19)  SpO2: 97% (11 Jun 2018 07:00) (94% - 97%)      ----------------------------------------------------------------------------------------  PHYSICAL EXAM  Constitutional - NAD, Comfortable  Heart:S1S2+  Lungs: clear  Extremities - No calf tenderness   Neurologic Exam -                    Cognitive - keeps eyes closed. follows simple commands  motor > 3/5 UE and LE                           10.9   6.4   )-----------( 294      ( 11 Jun 2018 07:49 )             33.6       06-11    134<L>  |  94<L>  |  16.0  ----------------------------<  131<H>  3.8   |  27.0  |  0.83    Ca    8.1<L>      11 Jun 2018 07:49  Phos  3.1     06-11  Mg     2.1     06-11        MEDICATIONS  (STANDING):  amLODIPine   Tablet 10 milliGRAM(s) Oral daily  atorvastatin 20 milliGRAM(s) Oral at bedtime  carbidopa/levodopa  25/100 1 Tablet(s) Oral three times a day  enoxaparin Injectable 40 milliGRAM(s) SubCutaneous <User Schedule>  escitalopram 10 milliGRAM(s) Oral daily  insulin glargine Injectable (LANTUS) 12 Unit(s) SubCutaneous at bedtime  insulin lispro (HumaLOG) corrective regimen sliding scale   SubCutaneous Before meals and at bedtime  lisinopril 5 milliGRAM(s) Oral daily  melatonin 5 milliGRAM(s) Oral at bedtime  metolazone 5 milliGRAM(s) Oral daily  sodium chloride 0.9%. 1000 milliLiter(s) (75 mL/Hr) IV Continuous <Continuous>  tamsulosin 0.8 milliGRAM(s) Oral at bedtime    MEDICATIONS  (PRN):  acetaminophen   Tablet. 650 milliGRAM(s) Oral every 6 hours PRN Mild Pain (1 - 3)  hydrALAZINE Injectable 10 milliGRAM(s) IV Push every 6 hours PRN SBP >180      ----------------------------------------------------------------------------------------  ASSESSMENT/PLAN  77yMale with functional deficits after a fall with TBI- SDH.   Patient with h/o Parkinsons and was in acute rehab earlier this year.    Rehab - Continue bedside PT daily as tolerated. Encouraged patient to be OOB    Will monitor progress with bedside therapy. However may not be able to tolerate intensity of acute rehab.  Thank you

## 2018-06-11 NOTE — PROGRESS NOTE ADULT - SUBJECTIVE AND OBJECTIVE BOX
INTERVAL HPI/OVERNIGHT EVENTS: No acute events overnight.    SUBJECTIVE: Patient does not complain of pain. Tolerating diet. No fever, chills, chest pain, dyspnea, nausea, or vomiting.       MEDICATIONS  (STANDING):  amLODIPine   Tablet 10 milliGRAM(s) Oral daily  atorvastatin 20 milliGRAM(s) Oral at bedtime  carbidopa/levodopa  25/100 1 Tablet(s) Oral three times a day  enoxaparin Injectable 40 milliGRAM(s) SubCutaneous <User Schedule>  escitalopram 10 milliGRAM(s) Oral daily  insulin glargine Injectable (LANTUS) 12 Unit(s) SubCutaneous at bedtime  insulin lispro (HumaLOG) corrective regimen sliding scale   SubCutaneous Before meals and at bedtime  lisinopril 5 milliGRAM(s) Oral daily  melatonin 5 milliGRAM(s) Oral at bedtime  metolazone 5 milliGRAM(s) Oral daily  sodium chloride 0.9%. 1000 milliLiter(s) (75 mL/Hr) IV Continuous <Continuous>  tamsulosin 0.8 milliGRAM(s) Oral at bedtime    MEDICATIONS  (PRN):  acetaminophen   Tablet. 650 milliGRAM(s) Oral every 6 hours PRN Mild Pain (1 - 3)  hydrALAZINE Injectable 10 milliGRAM(s) IV Push every 6 hours PRN SBP >180      Vital Signs Last 24 Hrs  T(C): 36.7 (11 Jun 2018 07:00), Max: 36.9 (10 Narinder 2018 23:40)  T(F): 98 (11 Jun 2018 07:00), Max: 98.4 (10 Narinder 2018 23:40)  HR: 76 (11 Jun 2018 07:00) (76 - 86)  BP: 153/79 (11 Jun 2018 07:00) (126/60 - 153/79)  BP(mean): 91 (10 Narinder 2018 17:00) (86 - 91)  RR: 18 (11 Jun 2018 07:00) (14 - 18)  SpO2: 97% (11 Jun 2018 07:00) (94% - 97%)    Physical exam:  General: NAD, AOx3, resting comfortably in bed  HEENT: PERRLA, EOMI  Neck: supple, nontender  Respiratory: no respiratory distress, lungs CTAB  Heart: regular rate and rhythm, no murmurs  Abdomen: soft, nontender, nondistended. Normal bowel sounds. No guarding or rebound.  Extremities: no peripheral edema. Normal ROM.      I&O's Detail    10 Narinder 2018 07:01  -  11 Jun 2018 07:00  --------------------------------------------------------  IN:    multiple electrolytes Injection Type 1multiple electrolytes Injection Type 1: 825 mL    Oral Fluid: 540 mL  Total IN: 1365 mL    OUT:    Voided: 1260 mL  Total OUT: 1260 mL    Total NET: 105 mL          LABS:                        10.9   6.4   )-----------( 294      ( 11 Jun 2018 07:49 )             33.6     06-11    134<L>  |  94<L>  |  16.0  ----------------------------<  131<H>  3.8   |  27.0  |  0.83    Ca    8.1<L>      11 Jun 2018 07:49  Phos  3.1     06-11  Mg     2.1     06-11

## 2018-06-11 NOTE — PROGRESS NOTE ADULT - SUBJECTIVE AND OBJECTIVE BOX
INTERVAL HPI/OVERNIGHT EVENTS:  follow up of diabetes    MEDICATIONS  (STANDING):  amLODIPine   Tablet 10 milliGRAM(s) Oral daily  atorvastatin 20 milliGRAM(s) Oral at bedtime  carbidopa/levodopa  25/100 1 Tablet(s) Oral three times a day  enoxaparin Injectable 40 milliGRAM(s) SubCutaneous <User Schedule>  escitalopram 10 milliGRAM(s) Oral daily  insulin glargine Injectable (LANTUS) 12 Unit(s) SubCutaneous at bedtime  insulin lispro (HumaLOG) corrective regimen sliding scale   SubCutaneous Before meals and at bedtime  lisinopril 5 milliGRAM(s) Oral daily  melatonin 5 milliGRAM(s) Oral at bedtime  metolazone 5 milliGRAM(s) Oral daily  sodium chloride 0.9%. 1000 milliLiter(s) (75 mL/Hr) IV Continuous <Continuous>  tamsulosin 0.8 milliGRAM(s) Oral at bedtime    MEDICATIONS  (PRN):  acetaminophen   Tablet. 650 milliGRAM(s) Oral every 6 hours PRN Mild Pain (1 - 3)  hydrALAZINE Injectable 10 milliGRAM(s) IV Push every 6 hours PRN SBP >180      Allergies    No Known Allergies    Intolerances        Review of systems: eating 75% of meals    Vital Signs Last 24 Hrs  T(C): 36.7 (11 Jun 2018 07:00), Max: 36.9 (10 Narinder 2018 23:40)  T(F): 98 (11 Jun 2018 07:00), Max: 98.4 (10 Narinder 2018 23:40)  HR: 76 (11 Jun 2018 07:00) (76 - 86)  BP: 153/79 (11 Jun 2018 07:00) (113/59 - 153/79)  BP(mean): 91 (10 Narinder 2018 17:00) (81 - 91)  RR: 18 (11 Jun 2018 07:00) (14 - 19)  SpO2: 97% (11 Jun 2018 07:00) (94% - 97%)        LABS:                        10.9   6.4   )-----------( 294      ( 11 Jun 2018 07:49 )             33.6     06-11    134<L>  |  94<L>  |  16.0  ----------------------------<  131<H>  3.8   |  27.0  |  0.83    Ca    8.1<L>      11 Jun 2018 07:49  Phos  3.1     06-11  Mg     2.1     06-11            POCT Blood Glucose.: 197 mg/dL (06-11-18 @ 11:54)  POCT Blood Glucose.: 124 mg/dL (06-11-18 @ 07:48)  POCT Blood Glucose.: 139 mg/dL (06-10-18 @ 20:21)  POCT Blood Glucose.: 137 mg/dL (06-10-18 @ 17:07)  POCT Blood Glucose.: 195 mg/dL (06-10-18 @ 14:08)  POCT Blood Glucose.: 109 mg/dL (06-10-18 @ 10:29)  POCT Blood Glucose.: 76 mg/dL (06-10-18 @ 05:40)  POCT Blood Glucose.: 151 mg/dL (06-10-18 @ 01:45)  POCT Blood Glucose.: 129 mg/dL (06-09-18 @ 22:04)  POCT Blood Glucose.: 226 mg/dL (06-09-18 @ 18:07)  POCT Blood Glucose.: 237 mg/dL (06-09-18 @ 13:22)  POCT Blood Glucose.: 109 mg/dL (06-09-18 @ 10:20)  POCT Blood Glucose.: 96 mg/dL (06-09-18 @ 05:34)  POCT Blood Glucose.: 101 mg/dL (06-09-18 @ 01:40)  POCT Blood Glucose.: 163 mg/dL (06-08-18 @ 21:44)  POCT Blood Glucose.: 186 mg/dL (06-08-18 @ 17:57)

## 2018-06-12 LAB
ANION GAP SERPL CALC-SCNC: 10 MMOL/L — SIGNIFICANT CHANGE UP (ref 5–17)
BUN SERPL-MCNC: 15 MG/DL — SIGNIFICANT CHANGE UP (ref 8–20)
CALCIUM SERPL-MCNC: 8 MG/DL — LOW (ref 8.6–10.2)
CHLORIDE SERPL-SCNC: 95 MMOL/L — LOW (ref 98–107)
CO2 SERPL-SCNC: 27 MMOL/L — SIGNIFICANT CHANGE UP (ref 22–29)
CREAT SERPL-MCNC: 0.77 MG/DL — SIGNIFICANT CHANGE UP (ref 0.5–1.3)
GLUCOSE BLDC GLUCOMTR-MCNC: 124 MG/DL — HIGH (ref 70–99)
GLUCOSE BLDC GLUCOMTR-MCNC: 187 MG/DL — HIGH (ref 70–99)
GLUCOSE BLDC GLUCOMTR-MCNC: 188 MG/DL — HIGH (ref 70–99)
GLUCOSE BLDC GLUCOMTR-MCNC: 65 MG/DL — LOW (ref 70–99)
GLUCOSE BLDC GLUCOMTR-MCNC: 87 MG/DL — SIGNIFICANT CHANGE UP (ref 70–99)
GLUCOSE SERPL-MCNC: 60 MG/DL — LOW (ref 70–115)
MAGNESIUM SERPL-MCNC: 1.9 MG/DL — SIGNIFICANT CHANGE UP (ref 1.6–2.6)
PHOSPHATE SERPL-MCNC: 2.8 MG/DL — SIGNIFICANT CHANGE UP (ref 2.4–4.7)
POTASSIUM SERPL-MCNC: 3.3 MMOL/L — LOW (ref 3.5–5.3)
POTASSIUM SERPL-SCNC: 3.3 MMOL/L — LOW (ref 3.5–5.3)
SODIUM SERPL-SCNC: 132 MMOL/L — LOW (ref 135–145)

## 2018-06-12 PROCEDURE — 99233 SBSQ HOSP IP/OBS HIGH 50: CPT

## 2018-06-12 RX ORDER — POTASSIUM CHLORIDE 20 MEQ
20 PACKET (EA) ORAL
Qty: 0 | Refills: 0 | Status: COMPLETED | OUTPATIENT
Start: 2018-06-12 | End: 2018-06-12

## 2018-06-12 RX ORDER — MAGNESIUM HYDROXIDE 400 MG/1
30 TABLET, CHEWABLE ORAL ONCE
Qty: 0 | Refills: 0 | Status: COMPLETED | OUTPATIENT
Start: 2018-06-12 | End: 2018-06-12

## 2018-06-12 RX ORDER — INSULIN GLARGINE 100 [IU]/ML
10 INJECTION, SOLUTION SUBCUTANEOUS AT BEDTIME
Qty: 0 | Refills: 0 | Status: DISCONTINUED | OUTPATIENT
Start: 2018-06-12 | End: 2018-06-14

## 2018-06-12 RX ADMIN — Medication 20 MILLIEQUIVALENT(S): at 18:37

## 2018-06-12 RX ADMIN — CARBIDOPA AND LEVODOPA 1 TABLET(S): 25; 100 TABLET ORAL at 05:31

## 2018-06-12 RX ADMIN — TAMSULOSIN HYDROCHLORIDE 0.8 MILLIGRAM(S): 0.4 CAPSULE ORAL at 21:19

## 2018-06-12 RX ADMIN — Medication 5 MILLIGRAM(S): at 21:19

## 2018-06-12 RX ADMIN — Medication 4: at 21:19

## 2018-06-12 RX ADMIN — MAGNESIUM HYDROXIDE 30 MILLILITER(S): 400 TABLET, CHEWABLE ORAL at 11:56

## 2018-06-12 RX ADMIN — CARBIDOPA AND LEVODOPA 1 TABLET(S): 25; 100 TABLET ORAL at 21:19

## 2018-06-12 RX ADMIN — Medication 4: at 18:38

## 2018-06-12 RX ADMIN — CARBIDOPA AND LEVODOPA 1 TABLET(S): 25; 100 TABLET ORAL at 13:07

## 2018-06-12 RX ADMIN — Medication 20 MILLIEQUIVALENT(S): at 13:07

## 2018-06-12 RX ADMIN — INSULIN GLARGINE 10 UNIT(S): 100 INJECTION, SOLUTION SUBCUTANEOUS at 21:19

## 2018-06-12 RX ADMIN — Medication 650 MILLIGRAM(S): at 14:28

## 2018-06-12 RX ADMIN — Medication 20 MILLIEQUIVALENT(S): at 14:28

## 2018-06-12 RX ADMIN — Medication 650 MILLIGRAM(S): at 01:52

## 2018-06-12 RX ADMIN — AMLODIPINE BESYLATE 10 MILLIGRAM(S): 2.5 TABLET ORAL at 05:31

## 2018-06-12 RX ADMIN — ENOXAPARIN SODIUM 40 MILLIGRAM(S): 100 INJECTION SUBCUTANEOUS at 21:18

## 2018-06-12 RX ADMIN — ESCITALOPRAM OXALATE 10 MILLIGRAM(S): 10 TABLET, FILM COATED ORAL at 11:30

## 2018-06-12 RX ADMIN — ATORVASTATIN CALCIUM 20 MILLIGRAM(S): 80 TABLET, FILM COATED ORAL at 21:19

## 2018-06-12 RX ADMIN — LISINOPRIL 5 MILLIGRAM(S): 2.5 TABLET ORAL at 05:31

## 2018-06-12 RX ADMIN — Medication 650 MILLIGRAM(S): at 15:28

## 2018-06-12 RX ADMIN — Medication 650 MILLIGRAM(S): at 02:50

## 2018-06-12 NOTE — PROGRESS NOTE ADULT - SUBJECTIVE AND OBJECTIVE BOX
INTERVAL HPI/OVERNIGHT EVENTS: follow up of diabetes    MEDICATIONS  (STANDING):  amLODIPine   Tablet 10 milliGRAM(s) Oral daily  atorvastatin 20 milliGRAM(s) Oral at bedtime  carbidopa/levodopa  25/100 1 Tablet(s) Oral three times a day  enoxaparin Injectable 40 milliGRAM(s) SubCutaneous <User Schedule>  escitalopram 10 milliGRAM(s) Oral daily  insulin glargine Injectable (LANTUS) 12 Unit(s) SubCutaneous at bedtime  insulin lispro (HumaLOG) corrective regimen sliding scale   SubCutaneous Before meals and at bedtime  lisinopril 5 milliGRAM(s) Oral daily  melatonin 5 milliGRAM(s) Oral at bedtime  metolazone 5 milliGRAM(s) Oral daily  potassium chloride    Tablet ER 20 milliEquivalent(s) Oral every 2 hours  sodium chloride 0.9%. 1000 milliLiter(s) (75 mL/Hr) IV Continuous <Continuous>  tamsulosin 0.8 milliGRAM(s) Oral at bedtime    MEDICATIONS  (PRN):  acetaminophen   Tablet. 650 milliGRAM(s) Oral every 6 hours PRN Mild Pain (1 - 3)  hydrALAZINE Injectable 10 milliGRAM(s) IV Push every 6 hours PRN SBP >180      Allergies    No Known Allergies    Intolerances    Vital Signs Last 24 Hrs  T(C): 36.7 (12 Jun 2018 15:51), Max: 37.1 (12 Jun 2018 00:20)  T(F): 98 (12 Jun 2018 15:51), Max: 98.7 (12 Jun 2018 00:20)  HR: 98 (12 Jun 2018 15:51) (67 - 98)  BP: 149/81 (12 Jun 2018 15:51) (142/78 - 150/80)  BP(mean): --  RR: 19 (12 Jun 2018 15:51) (14 - 19)  SpO2: 96% (12 Jun 2018 15:51) (96% - 97%)      LABS:                        10.9   6.4   )-----------( 294      ( 11 Jun 2018 07:49 )             33.6     06-12    132<L>  |  95<L>  |  15.0  ----------------------------<  60<L>  3.3<L>   |  27.0  |  0.77    Ca    8.0<L>      12 Jun 2018 08:24  Phos  2.8     06-12  Mg     1.9     06-12            POCT Blood Glucose.: 124 mg/dL (06-12-18 @ 11:26)  POCT Blood Glucose.: 87 mg/dL (06-12-18 @ 08:51)  POCT Blood Glucose.: 65 mg/dL (06-12-18 @ 08:11)  POCT Blood Glucose.: 173 mg/dL (06-11-18 @ 21:36)  POCT Blood Glucose.: 123 mg/dL (06-11-18 @ 16:45)  POCT Blood Glucose.: 197 mg/dL (06-11-18 @ 11:54)  POCT Blood Glucose.: 124 mg/dL (06-11-18 @ 07:48)  POCT Blood Glucose.: 139 mg/dL (06-10-18 @ 20:21)  POCT Blood Glucose.: 137 mg/dL (06-10-18 @ 17:07)  POCT Blood Glucose.: 195 mg/dL (06-10-18 @ 14:08)  POCT Blood Glucose.: 109 mg/dL (06-10-18 @ 10:29)  POCT Blood Glucose.: 76 mg/dL (06-10-18 @ 05:40)  POCT Blood Glucose.: 151 mg/dL (06-10-18 @ 01:45)  POCT Blood Glucose.: 129 mg/dL (06-09-18 @ 22:04)  POCT Blood Glucose.: 226 mg/dL (06-09-18 @ 18:07)      RADIOLOGY & ADDITIONAL TESTS:

## 2018-06-12 NOTE — PROGRESS NOTE ADULT - SUBJECTIVE AND OBJECTIVE BOX
INTERVAL HPI/OVERNIGHT EVENTS: No acute events overnight.     SUBJECTIVE: No complaints of pain. Patient states he needs help eating and doing activities of daily living. No fever, chills, chest pain, dyspnea, nausea, vomiting.       MEDICATIONS  (STANDING):  amLODIPine   Tablet 10 milliGRAM(s) Oral daily  atorvastatin 20 milliGRAM(s) Oral at bedtime  carbidopa/levodopa  25/100 1 Tablet(s) Oral three times a day  enoxaparin Injectable 40 milliGRAM(s) SubCutaneous <User Schedule>  escitalopram 10 milliGRAM(s) Oral daily  insulin glargine Injectable (LANTUS) 12 Unit(s) SubCutaneous at bedtime  insulin lispro (HumaLOG) corrective regimen sliding scale   SubCutaneous Before meals and at bedtime  lisinopril 5 milliGRAM(s) Oral daily  melatonin 5 milliGRAM(s) Oral at bedtime  metolazone 5 milliGRAM(s) Oral daily  potassium chloride    Tablet ER 20 milliEquivalent(s) Oral every 2 hours  sodium chloride 0.9%. 1000 milliLiter(s) (75 mL/Hr) IV Continuous <Continuous>  tamsulosin 0.8 milliGRAM(s) Oral at bedtime    MEDICATIONS  (PRN):  acetaminophen   Tablet. 650 milliGRAM(s) Oral every 6 hours PRN Mild Pain (1 - 3)  hydrALAZINE Injectable 10 milliGRAM(s) IV Push every 6 hours PRN SBP >180      Vital Signs Last 24 Hrs  T(C): 37 (12 Jun 2018 07:00), Max: 37.1 (12 Jun 2018 00:20)  T(F): 98.6 (12 Jun 2018 07:00), Max: 98.7 (12 Jun 2018 00:20)  HR: 70 (12 Jun 2018 07:00) (67 - 82)  BP: 150/80 (12 Jun 2018 07:00) (142/78 - 150/80)  BP(mean): --  RR: 18 (12 Jun 2018 07:00) (14 - 18)  SpO2: 96% (12 Jun 2018 07:00) (96% - 97%)    Physical exam:  General: NAD, AOx3, resting comfortably in bed  HEENT: PERRLA, EOMI  Neck: supple, nontender  Respiratory: no respiratory distress, lungs CTAB  Heart: regular rate and rhythm, no murmurs  Abdomen: soft, nontender, nondistended. Normal bowel sounds. No guarding or rebound.  Extremities: no peripheral edema. Normal ROM.      I&O's Detail    11 Jun 2018 07:01  -  12 Jun 2018 07:00  --------------------------------------------------------  IN:  Total IN: 0 mL    OUT:    Voided: 850 mL  Total OUT: 850 mL    Total NET: -850 mL          LABS:                        10.9   6.4   )-----------( 294      ( 11 Jun 2018 07:49 )             33.6     06-12    132<L>  |  95<L>  |  15.0  ----------------------------<  60<L>  3.3<L>   |  27.0  |  0.77    Ca    8.0<L>      12 Jun 2018 08:24  Phos  2.8     06-12  Mg     1.9     06-12

## 2018-06-12 NOTE — PROGRESS NOTE ADULT - ASSESSMENT
76 y/o M h/o Parkinson's, DM s/p fall with traumatic SDH, C1 bilateral lamina fracture. Hemodynamically stable.     Plan:  -Continue pain control as needed  -Continue regular diet  -Blood glucose control  -Encourage OOB, ambulation, IS  -Continue home medications  -C-collar when out of bed  -DVT ppx  -Dispo: pending continued progress with physical therapy

## 2018-06-12 NOTE — ADVANCED PRACTICE NURSE CONSULT - RECOMMEDATIONS
continue diabetes selfmanagement education w pt and family  wife to return demonstration of insulin injection w rn supervision  assist pt w meals  cc- pls consider longterm placement vs. [facility]
continue diabetes self management education w family  family to return demonstration of insulin preparation and administration w rn supervision  pls referre to social work  pls consider rehab vs. home care

## 2018-06-13 LAB
ANION GAP SERPL CALC-SCNC: 9 MMOL/L — SIGNIFICANT CHANGE UP (ref 5–17)
BUN SERPL-MCNC: 16 MG/DL — SIGNIFICANT CHANGE UP (ref 8–20)
CALCIUM SERPL-MCNC: 8.1 MG/DL — LOW (ref 8.6–10.2)
CHLORIDE SERPL-SCNC: 97 MMOL/L — LOW (ref 98–107)
CO2 SERPL-SCNC: 27 MMOL/L — SIGNIFICANT CHANGE UP (ref 22–29)
CREAT SERPL-MCNC: 0.85 MG/DL — SIGNIFICANT CHANGE UP (ref 0.5–1.3)
GLUCOSE BLDC GLUCOMTR-MCNC: 147 MG/DL — HIGH (ref 70–99)
GLUCOSE BLDC GLUCOMTR-MCNC: 152 MG/DL — HIGH (ref 70–99)
GLUCOSE BLDC GLUCOMTR-MCNC: 253 MG/DL — HIGH (ref 70–99)
GLUCOSE BLDC GLUCOMTR-MCNC: 96 MG/DL — SIGNIFICANT CHANGE UP (ref 70–99)
GLUCOSE SERPL-MCNC: 105 MG/DL — SIGNIFICANT CHANGE UP (ref 70–115)
MAGNESIUM SERPL-MCNC: 2.1 MG/DL — SIGNIFICANT CHANGE UP (ref 1.8–2.6)
PHOSPHATE SERPL-MCNC: 2.6 MG/DL — SIGNIFICANT CHANGE UP (ref 2.4–4.7)
POTASSIUM SERPL-MCNC: 4 MMOL/L — SIGNIFICANT CHANGE UP (ref 3.5–5.3)
POTASSIUM SERPL-SCNC: 4 MMOL/L — SIGNIFICANT CHANGE UP (ref 3.5–5.3)
SODIUM SERPL-SCNC: 133 MMOL/L — LOW (ref 135–145)

## 2018-06-13 PROCEDURE — 99232 SBSQ HOSP IP/OBS MODERATE 35: CPT

## 2018-06-13 RX ORDER — SODIUM CHLORIDE 9 MG/ML
1 INJECTION INTRAMUSCULAR; INTRAVENOUS; SUBCUTANEOUS
Qty: 0 | Refills: 0 | Status: DISCONTINUED | OUTPATIENT
Start: 2018-06-13 | End: 2018-06-14

## 2018-06-13 RX ADMIN — SODIUM CHLORIDE 75 MILLILITER(S): 9 INJECTION INTRAMUSCULAR; INTRAVENOUS; SUBCUTANEOUS at 04:15

## 2018-06-13 RX ADMIN — INSULIN GLARGINE 10 UNIT(S): 100 INJECTION, SOLUTION SUBCUTANEOUS at 22:08

## 2018-06-13 RX ADMIN — TAMSULOSIN HYDROCHLORIDE 0.8 MILLIGRAM(S): 0.4 CAPSULE ORAL at 22:08

## 2018-06-13 RX ADMIN — CARBIDOPA AND LEVODOPA 1 TABLET(S): 25; 100 TABLET ORAL at 22:08

## 2018-06-13 RX ADMIN — Medication 650 MILLIGRAM(S): at 04:11

## 2018-06-13 RX ADMIN — SODIUM CHLORIDE 1 GRAM(S): 9 INJECTION INTRAMUSCULAR; INTRAVENOUS; SUBCUTANEOUS at 18:34

## 2018-06-13 RX ADMIN — Medication 4: at 22:03

## 2018-06-13 RX ADMIN — CARBIDOPA AND LEVODOPA 1 TABLET(S): 25; 100 TABLET ORAL at 05:11

## 2018-06-13 RX ADMIN — Medication 5 MILLIGRAM(S): at 21:27

## 2018-06-13 RX ADMIN — AMLODIPINE BESYLATE 10 MILLIGRAM(S): 2.5 TABLET ORAL at 05:11

## 2018-06-13 RX ADMIN — ESCITALOPRAM OXALATE 10 MILLIGRAM(S): 10 TABLET, FILM COATED ORAL at 12:47

## 2018-06-13 RX ADMIN — ATORVASTATIN CALCIUM 20 MILLIGRAM(S): 80 TABLET, FILM COATED ORAL at 21:28

## 2018-06-13 RX ADMIN — Medication 650 MILLIGRAM(S): at 05:54

## 2018-06-13 RX ADMIN — Medication 8: at 12:47

## 2018-06-13 RX ADMIN — CARBIDOPA AND LEVODOPA 1 TABLET(S): 25; 100 TABLET ORAL at 12:48

## 2018-06-13 RX ADMIN — ENOXAPARIN SODIUM 40 MILLIGRAM(S): 100 INJECTION SUBCUTANEOUS at 21:27

## 2018-06-13 RX ADMIN — LISINOPRIL 5 MILLIGRAM(S): 2.5 TABLET ORAL at 05:11

## 2018-06-13 NOTE — PROGRESS NOTE ADULT - ASSESSMENT
Overall somewhat variable control of glucose, likely related to variations in oral intake. Unable to safely use prandial insulin. Can continue current regimen.

## 2018-06-13 NOTE — PROGRESS NOTE ADULT - SUBJECTIVE AND OBJECTIVE BOX
INTERVAL HPI/OVERNIGHT EVENTS:  follow up of diabetes    MEDICATIONS  (STANDING):  amLODIPine   Tablet 10 milliGRAM(s) Oral daily  atorvastatin 20 milliGRAM(s) Oral at bedtime  carbidopa/levodopa  25/100 1 Tablet(s) Oral three times a day  enoxaparin Injectable 40 milliGRAM(s) SubCutaneous <User Schedule>  escitalopram 10 milliGRAM(s) Oral daily  insulin glargine Injectable (LANTUS) 10 Unit(s) SubCutaneous at bedtime  insulin lispro (HumaLOG) corrective regimen sliding scale   SubCutaneous Before meals and at bedtime  lisinopril 5 milliGRAM(s) Oral daily  melatonin 5 milliGRAM(s) Oral at bedtime  metolazone 5 milliGRAM(s) Oral daily  sodium chloride 1 Gram(s) Oral two times a day  tamsulosin 0.8 milliGRAM(s) Oral at bedtime    MEDICATIONS  (PRN):  acetaminophen   Tablet. 650 milliGRAM(s) Oral every 6 hours PRN Mild Pain (1 - 3)  hydrALAZINE Injectable 10 milliGRAM(s) IV Push every 6 hours PRN SBP >180      Allergies    No Known Allergies    Intolerances      Vital Signs Last 24 Hrs  T(C): 36.7 (13 Jun 2018 15:39), Max: 37 (12 Jun 2018 23:39)  T(F): 98 (13 Jun 2018 15:39), Max: 98.6 (12 Jun 2018 23:39)  HR: 85 (13 Jun 2018 15:39) (47 - 86)  BP: 142/79 (13 Jun 2018 04:08) (142/79 - 147/76)  BP(mean): --  RR: 18 (13 Jun 2018 15:39) (18 - 18)  SpO2: 99% (13 Jun 2018 15:39) (98% - 99%)    LABS:    06-13    133<L>  |  97<L>  |  16.0  ----------------------------<  105  4.0   |  27.0  |  0.85    Ca    8.1<L>      13 Jun 2018 08:44  Phos  2.6     06-13  Mg     2.1     06-13            POCT Blood Glucose.: 253 mg/dL (06-13-18 @ 12:45)  POCT Blood Glucose.: 96 mg/dL (06-13-18 @ 09:09)  POCT Blood Glucose.: 187 mg/dL (06-12-18 @ 21:16)  POCT Blood Glucose.: 188 mg/dL (06-12-18 @ 16:42)  POCT Blood Glucose.: 124 mg/dL (06-12-18 @ 11:26)  POCT Blood Glucose.: 87 mg/dL (06-12-18 @ 08:51)  POCT Blood Glucose.: 65 mg/dL (06-12-18 @ 08:11)  POCT Blood Glucose.: 173 mg/dL (06-11-18 @ 21:36)  POCT Blood Glucose.: 123 mg/dL (06-11-18 @ 16:45)  POCT Blood Glucose.: 197 mg/dL (06-11-18 @ 11:54)  POCT Blood Glucose.: 124 mg/dL (06-11-18 @ 07:48)  POCT Blood Glucose.: 139 mg/dL (06-10-18 @ 20:21)  POCT Blood Glucose.: 137 mg/dL (06-10-18 @ 17:07)

## 2018-06-13 NOTE — PROGRESS NOTE ADULT - ASSESSMENT
76 y/o M h/o Parkinson's, DM s/p fall with traumatic SDH, C1 bilateral lamina fracture.  -Hemodynamically stable    Plan:  -D/C Fluids  -Will start NaCl tabs for hyponatremia  -Continue pain control as needed  -Continue regular diet; pt will need assistance for meals  -Blood glucose control  -Encourage OOB, ambulation, IS  -Continue home medications  -C-collar when out of bed  -DVT ppx  -Dispo: pending continued progress with physical therapy

## 2018-06-13 NOTE — PROGRESS NOTE ADULT - PROVIDER SPECIALTY LIST ADULT
Endocrinology
Neurosurgery
Orthopedics
Orthopedics
Rehab Medicine
SICU
Trauma Surgery
SICU

## 2018-06-13 NOTE — PROGRESS NOTE ADULT - SUBJECTIVE AND OBJECTIVE BOX
Chart reviewed. Patient seen at bedside.   States that he feels ok and slept better.  Limited progress with bedside therapy      REVIEW OF SYSTEMS  Constitutional -  +fatigue + mild HA  denies numbness    Vital Signs Last 24 Hrs  T(C): 37 (12 Jun 2018 23:39), Max: 37 (12 Jun 2018 23:39)  T(F): 98.6 (12 Jun 2018 23:39), Max: 98.6 (12 Jun 2018 23:39)  HR: 86 (13 Jun 2018 04:08) (47 - 98)  BP: 142/79 (13 Jun 2018 04:08) (142/79 - 149/81)  BP(mean): --  RR: 18 (12 Jun 2018 23:39) (18 - 19)  SpO2: 98% (12 Jun 2018 23:39) (96% - 98%)    ----------------------------------------------------------------------------------------  PHYSICAL EXAM  Constitutional - NAD, Comfortable  Heart:S1S2+  Lungs: clear  Extremities - No calf tenderness   Neurologic Exam -                    Cognitive - keeps eyes closed. follows simple commands  motor > 3/5 UE and LE                MEDICATIONS  (STANDING):  amLODIPine   Tablet 10 milliGRAM(s) Oral daily  atorvastatin 20 milliGRAM(s) Oral at bedtime  carbidopa/levodopa  25/100 1 Tablet(s) Oral three times a day  enoxaparin Injectable 40 milliGRAM(s) SubCutaneous <User Schedule>  escitalopram 10 milliGRAM(s) Oral daily  insulin glargine Injectable (LANTUS) 10 Unit(s) SubCutaneous at bedtime  insulin lispro (HumaLOG) corrective regimen sliding scale   SubCutaneous Before meals and at bedtime  lisinopril 5 milliGRAM(s) Oral daily  melatonin 5 milliGRAM(s) Oral at bedtime  metolazone 5 milliGRAM(s) Oral daily  sodium chloride 0.9%. 1000 milliLiter(s) (75 mL/Hr) IV Continuous <Continuous>  tamsulosin 0.8 milliGRAM(s) Oral at bedtime    MEDICATIONS  (PRN):  acetaminophen   Tablet. 650 milliGRAM(s) Oral every 6 hours PRN Mild Pain (1 - 3)  hydrALAZINE Injectable 10 milliGRAM(s) IV Push every 6 hours PRN SBP >180                            10.9   6.4   )-----------( 294      ( 11 Jun 2018 07:49 )             33.6     06-12    132<L>  |  95<L>  |  15.0  ----------------------------<  60<L>  3.3<L>   |  27.0  |  0.77    Ca    8.0<L>      12 Jun 2018 08:24  Phos  2.8     06-12  Mg     1.9     06-12        ----------------------------------------------------------------------------------------  ASSESSMENT/PLAN  77yMale with impaired gait/mobility after a fall with TBI- SDH.   Patient with h/o Parkinsons     Rehab - Continue bedside PT daily while hospitalised.  Encouraged patient to be OOB.    More appropriate for subacute rehab. d/w trauma team.    Thank you.

## 2018-06-13 NOTE — PROGRESS NOTE ADULT - SUBJECTIVE AND OBJECTIVE BOX
INTERVAL HPI/OVERNIGHT EVENTS:  Patient was seen and examined at bedside this AM. No acute events overnight. Continues to have difficulty with daily activities.  No fever, chills, chest pain, dyspnea, nausea, vomiting. Mental status continues to improve.    STATUS POST:      POST OPERATIVE DAY #:       MEDICATIONS  (STANDING):  amLODIPine   Tablet 10 milliGRAM(s) Oral daily  atorvastatin 20 milliGRAM(s) Oral at bedtime  carbidopa/levodopa  25/100 1 Tablet(s) Oral three times a day  enoxaparin Injectable 40 milliGRAM(s) SubCutaneous <User Schedule>  escitalopram 10 milliGRAM(s) Oral daily  insulin glargine Injectable (LANTUS) 10 Unit(s) SubCutaneous at bedtime  insulin lispro (HumaLOG) corrective regimen sliding scale   SubCutaneous Before meals and at bedtime  lisinopril 5 milliGRAM(s) Oral daily  melatonin 5 milliGRAM(s) Oral at bedtime  metolazone 5 milliGRAM(s) Oral daily  sodium chloride 1 Gram(s) Oral two times a day  tamsulosin 0.8 milliGRAM(s) Oral at bedtime    MEDICATIONS  (PRN):  acetaminophen   Tablet. 650 milliGRAM(s) Oral every 6 hours PRN Mild Pain (1 - 3)  hydrALAZINE Injectable 10 milliGRAM(s) IV Push every 6 hours PRN SBP >180      Vital Signs Last 24 Hrs  T(C): 36.6 (13 Jun 2018 08:00), Max: 37 (12 Jun 2018 23:39)  T(F): 97.8 (13 Jun 2018 08:00), Max: 98.6 (12 Jun 2018 23:39)  HR: 86 (13 Jun 2018 04:08) (47 - 98)  BP: 142/79 (13 Jun 2018 04:08) (142/79 - 149/81)  BP(mean): --  RR: 18 (12 Jun 2018 23:39) (18 - 19)  SpO2: 98% (12 Jun 2018 23:39) (96% - 98%)    Physical Exam:  Gen: NAD  Neurological:  No sensory/motor deficits  HEENT: PERRLA, EOMI  Respiratory: Breath Sounds equal & CTA bilaterally, no accessory muscle use  Cardiovascular: Regular rate & rhythm, normal S1, S2; no murmurs, gallops or rubs  Gastrointestinal: Soft, non-tender, nondistended  Vascular: Equal and normal pulses: 2+ peripheral pulses throughout  Musculoskeletal: No joint pain, swelling or deformity; no limitation of movement  Skin: No rashes      I&O's Detail    12 Jun 2018 07:01  -  13 Jun 2018 07:00  --------------------------------------------------------  IN:    sodium chloride 0.9%: 675 mL  Total IN: 675 mL    OUT:  Total OUT: 0 mL    Total NET: 675 mL          LABS:    06-13    133<L>  |  97<L>  |  16.0  ----------------------------<  105  4.0   |  27.0  |  0.85    Ca    8.1<L>      13 Jun 2018 08:44  Phos  2.6     06-13  Mg     2.1     06-13            RADIOLOGY & ADDITIONAL STUDIES:

## 2018-06-14 ENCOUNTER — TRANSCRIPTION ENCOUNTER (OUTPATIENT)
Age: 78
End: 2018-06-14

## 2018-06-14 VITALS — HEART RATE: 80 BPM | TEMPERATURE: 98 F | OXYGEN SATURATION: 96 % | RESPIRATION RATE: 19 BRPM

## 2018-06-14 LAB
ANION GAP SERPL CALC-SCNC: 13 MMOL/L — SIGNIFICANT CHANGE UP (ref 5–17)
BUN SERPL-MCNC: 15 MG/DL — SIGNIFICANT CHANGE UP (ref 8–20)
CALCIUM SERPL-MCNC: 8.3 MG/DL — LOW (ref 8.6–10.2)
CHLORIDE SERPL-SCNC: 94 MMOL/L — LOW (ref 98–107)
CO2 SERPL-SCNC: 25 MMOL/L — SIGNIFICANT CHANGE UP (ref 22–29)
CREAT SERPL-MCNC: 0.93 MG/DL — SIGNIFICANT CHANGE UP (ref 0.5–1.3)
GLUCOSE BLDC GLUCOMTR-MCNC: 130 MG/DL — HIGH (ref 70–99)
GLUCOSE BLDC GLUCOMTR-MCNC: 191 MG/DL — HIGH (ref 70–99)
GLUCOSE SERPL-MCNC: 189 MG/DL — HIGH (ref 70–115)
POTASSIUM SERPL-MCNC: 3.7 MMOL/L — SIGNIFICANT CHANGE UP (ref 3.5–5.3)
POTASSIUM SERPL-SCNC: 3.7 MMOL/L — SIGNIFICANT CHANGE UP (ref 3.5–5.3)
SODIUM SERPL-SCNC: 132 MMOL/L — LOW (ref 135–145)

## 2018-06-14 PROCEDURE — 93005 ELECTROCARDIOGRAM TRACING: CPT

## 2018-06-14 PROCEDURE — 84100 ASSAY OF PHOSPHORUS: CPT

## 2018-06-14 PROCEDURE — 85730 THROMBOPLASTIN TIME PARTIAL: CPT

## 2018-06-14 PROCEDURE — 85610 PROTHROMBIN TIME: CPT

## 2018-06-14 PROCEDURE — 72141 MRI NECK SPINE W/O DYE: CPT

## 2018-06-14 PROCEDURE — 82436 ASSAY OF URINE CHLORIDE: CPT

## 2018-06-14 PROCEDURE — 90715 TDAP VACCINE 7 YRS/> IM: CPT

## 2018-06-14 PROCEDURE — G0390: CPT

## 2018-06-14 PROCEDURE — 90471 IMMUNIZATION ADMIN: CPT

## 2018-06-14 PROCEDURE — 97167 OT EVAL HIGH COMPLEX 60 MIN: CPT

## 2018-06-14 PROCEDURE — 80053 COMPREHEN METABOLIC PANEL: CPT

## 2018-06-14 PROCEDURE — 85027 COMPLETE CBC AUTOMATED: CPT

## 2018-06-14 PROCEDURE — 83036 HEMOGLOBIN GLYCOSYLATED A1C: CPT

## 2018-06-14 PROCEDURE — 96374 THER/PROPH/DIAG INJ IV PUSH: CPT

## 2018-06-14 PROCEDURE — 72125 CT NECK SPINE W/O DYE: CPT

## 2018-06-14 PROCEDURE — 80048 BASIC METABOLIC PNL TOTAL CA: CPT

## 2018-06-14 PROCEDURE — 83935 ASSAY OF URINE OSMOLALITY: CPT

## 2018-06-14 PROCEDURE — 82009 KETONE BODYS QUAL: CPT

## 2018-06-14 PROCEDURE — 97530 THERAPEUTIC ACTIVITIES: CPT

## 2018-06-14 PROCEDURE — 70450 CT HEAD/BRAIN W/O DYE: CPT

## 2018-06-14 PROCEDURE — 71045 X-RAY EXAM CHEST 1 VIEW: CPT

## 2018-06-14 PROCEDURE — 83930 ASSAY OF BLOOD OSMOLALITY: CPT

## 2018-06-14 PROCEDURE — 84300 ASSAY OF URINE SODIUM: CPT

## 2018-06-14 PROCEDURE — 36415 COLL VENOUS BLD VENIPUNCTURE: CPT

## 2018-06-14 PROCEDURE — 82962 GLUCOSE BLOOD TEST: CPT

## 2018-06-14 PROCEDURE — 83735 ASSAY OF MAGNESIUM: CPT

## 2018-06-14 PROCEDURE — 99291 CRITICAL CARE FIRST HOUR: CPT | Mod: 25

## 2018-06-14 PROCEDURE — 97116 GAIT TRAINING THERAPY: CPT

## 2018-06-14 PROCEDURE — 82570 ASSAY OF URINE CREATININE: CPT

## 2018-06-14 PROCEDURE — 70498 CT ANGIOGRAPHY NECK: CPT

## 2018-06-14 RX ORDER — INSULIN GLARGINE 100 [IU]/ML
10 INJECTION, SOLUTION SUBCUTANEOUS
Qty: 0 | Refills: 0 | COMMUNITY
Start: 2018-06-14

## 2018-06-14 RX ORDER — LISINOPRIL 2.5 MG/1
1 TABLET ORAL
Qty: 0 | Refills: 0 | COMMUNITY
Start: 2018-06-14

## 2018-06-14 RX ORDER — ATORVASTATIN CALCIUM 80 MG/1
1 TABLET, FILM COATED ORAL
Qty: 0 | Refills: 0 | COMMUNITY
Start: 2018-06-14

## 2018-06-14 RX ORDER — TAMSULOSIN HYDROCHLORIDE 0.4 MG/1
2 CAPSULE ORAL
Qty: 0 | Refills: 0 | COMMUNITY
Start: 2018-06-14

## 2018-06-14 RX ORDER — ENOXAPARIN SODIUM 100 MG/ML
40 INJECTION SUBCUTANEOUS
Qty: 0 | Refills: 0 | COMMUNITY
Start: 2018-06-14

## 2018-06-14 RX ORDER — CARBIDOPA AND LEVODOPA 25; 100 MG/1; MG/1
1 TABLET ORAL
Qty: 0 | Refills: 0 | COMMUNITY
Start: 2018-06-14

## 2018-06-14 RX ORDER — ACETAMINOPHEN 500 MG
2 TABLET ORAL
Qty: 0 | Refills: 0 | COMMUNITY
Start: 2018-06-14

## 2018-06-14 RX ORDER — SODIUM CHLORIDE 9 MG/ML
1 INJECTION INTRAMUSCULAR; INTRAVENOUS; SUBCUTANEOUS
Qty: 0 | Refills: 0 | COMMUNITY
Start: 2018-06-14

## 2018-06-14 RX ORDER — AMLODIPINE BESYLATE 2.5 MG/1
1 TABLET ORAL
Qty: 0 | Refills: 0 | COMMUNITY
Start: 2018-06-14

## 2018-06-14 RX ORDER — LANOLIN ALCOHOL/MO/W.PET/CERES
1 CREAM (GRAM) TOPICAL
Qty: 0 | Refills: 0 | COMMUNITY
Start: 2018-06-14

## 2018-06-14 RX ORDER — ESCITALOPRAM OXALATE 10 MG/1
1 TABLET, FILM COATED ORAL
Qty: 0 | Refills: 0 | COMMUNITY
Start: 2018-06-14

## 2018-06-14 RX ORDER — INSULIN LISPRO 100/ML
4 VIAL (ML) SUBCUTANEOUS
Qty: 1 | Refills: 0 | OUTPATIENT
Start: 2018-06-14

## 2018-06-14 RX ADMIN — Medication 4: at 11:32

## 2018-06-14 RX ADMIN — AMLODIPINE BESYLATE 10 MILLIGRAM(S): 2.5 TABLET ORAL at 05:38

## 2018-06-14 RX ADMIN — Medication 650 MILLIGRAM(S): at 04:03

## 2018-06-14 RX ADMIN — SODIUM CHLORIDE 1 GRAM(S): 9 INJECTION INTRAMUSCULAR; INTRAVENOUS; SUBCUTANEOUS at 05:38

## 2018-06-14 RX ADMIN — LISINOPRIL 5 MILLIGRAM(S): 2.5 TABLET ORAL at 05:38

## 2018-06-14 RX ADMIN — Medication 650 MILLIGRAM(S): at 03:33

## 2018-06-14 RX ADMIN — ESCITALOPRAM OXALATE 10 MILLIGRAM(S): 10 TABLET, FILM COATED ORAL at 11:33

## 2018-06-14 RX ADMIN — CARBIDOPA AND LEVODOPA 1 TABLET(S): 25; 100 TABLET ORAL at 13:12

## 2018-06-14 RX ADMIN — Medication 650 MILLIGRAM(S): at 11:35

## 2018-06-14 RX ADMIN — CARBIDOPA AND LEVODOPA 1 TABLET(S): 25; 100 TABLET ORAL at 05:38

## 2018-06-14 NOTE — DISCHARGE NOTE ADULT - CARE PLAN
Principal Discharge DX:	Subdural hematoma  Goal:	Alleviation of pain and symptoms  Assessment and plan of treatment:	Follow up: Please call and make an appointment with DR. JACK 1-2 weeks after discharge. Also, please call and make an appointment with your primary care physician as per your usual schedule.     Activity: C-collar when out of bed. Please adhere to activity recommendations as per your providers at your rehab facility.    Diet: May continue regular diet.    Medications: Please take all home medications as prescribed by your primary care doctor. Tylenol for pain relief, as needed.    Wound Care: Please, keep wound site clean and dry. You may shower, but do not bathe.    Patient is advised to RETURN TO THE EMERGENCY DEPARTMENT for any of the following - worsening pain, fever/chills, nausea/vomiting, altered mental status, chest pain, shortness of breath, or any other new / worsening symptom.  Secondary Diagnosis:	C1 cervical fracture  Assessment and plan of treatment:	Follow up: Please call and make an appointment with DR. OWENS 1-2 weeks after discharge.    Activity: C-collar when out of bed.  Secondary Diagnosis:	Diabetes  Assessment and plan of treatment:	Please continue diabetes medications as listed above, monitor blood sugar at home, limit your intake of carbohydrates and sugars, and follow-up with your endocrinologist and/or your primary care provider as per your usual schedule. Contact information for one of the endocrinologists who saw you while in the hospital has been provided below.

## 2018-06-14 NOTE — DISCHARGE NOTE ADULT - CARE PROVIDER_API CALL
Marcio Disla), Neurosurgery  [address] [facility]  [address]  [place] [address]  Phone: [phone]  Fax: [phone]    Wilian Britton (DO), Orthopaedic Surgery  [address] [place] [address]  Phone: [phone]  Fax: [phone]    Mickey Polanco), EndocrinologyMetabDiabetes; Internal Medicine  [address] [place] [address]  Phone: [phone]  Fax: [phone]

## 2018-06-14 NOTE — DISCHARGE NOTE ADULT - MEDICATION SUMMARY - MEDICATIONS TO STOP TAKING
I will STOP taking the medications listed below when I get home from the hospital:    Lasix 40 mg oral tablet  -- 1 tab(s) by mouth once a day    potassium chloride 10 mEq oral tablet, extended release  -- 2 tab(s) by mouth once a day    metOLazone 5 mg oral tablet  -- 1 tab(s) by mouth once a day    Levemir  -- 24 unit(s) subcutaneous once a day

## 2018-06-14 NOTE — DISCHARGE NOTE ADULT - PLAN OF CARE
Alleviation of pain and symptoms Follow up: Please call and make an appointment with DR. JACK 1-2 weeks after discharge. Also, please call and make an appointment with your primary care physician as per your usual schedule.     Activity: C-collar when out of bed. Please adhere to activity recommendations as per your providers at your rehab facility.    Diet: May continue regular diet.    Medications: Please take all home medications as prescribed by your primary care doctor. Tylenol for pain relief, as needed.    Wound Care: Please, keep wound site clean and dry. You may shower, but do not bathe.    Patient is advised to RETURN TO THE EMERGENCY DEPARTMENT for any of the following - worsening pain, fever/chills, nausea/vomiting, altered mental status, chest pain, shortness of breath, or any other new / worsening symptom. Follow up: Please call and make an appointment with DR. OWENS 1-2 weeks after discharge.    Activity: C-collar when out of bed. Please continue diabetes medications as listed above, monitor blood sugar at home, limit your intake of carbohydrates and sugars, and follow-up with your endocrinologist and/or your primary care provider as per your usual schedule. Contact information for one of the endocrinologists who saw you while in the hospital has been provided below.

## 2018-06-14 NOTE — DISCHARGE NOTE ADULT - MEDICATION SUMMARY - MEDICATIONS TO TAKE
I will START or STAY ON the medications listed below when I get home from the hospital:    acetaminophen 325 mg oral tablet  -- 2 tab(s) by mouth every 6 hours, As needed, Mild Pain (1 - 3)  -- Indication: For Pain    lisinopril 5 mg oral tablet  -- 1 tab(s) by mouth once a day  -- Indication: For Hypertension    tamsulosin 0.4 mg oral capsule  -- 2 cap(s) by mouth once a day (at bedtime)  -- Indication: For BPH    enoxaparin  -- 40 milligram(s) subcutaneous once a day for DVT prophylaxis. Lovenox may be discontinued at the discrestion of your rehab provider once patient is more mobile.  -- Indication: For DVT ppx    escitalopram 10 mg oral tablet  -- 1 tab(s) by mouth once a day  -- Indication: For Antidepressant    insulin glargine  -- 10 unit(s) subcutaneous once a day (at bedtime)  -- Indication: For Diabetes    insulin lispro  -- ____ units subcutaneous, before meals and at bedtime  4 Unit(s) if Glucose 151 - 200  6 Unit(s) if Glucose 201 - 250  8 Unit(s) if Glucose 251 - 300  10 Unit(s) if Glucose 301 - 350  12 Unit(s) if Glucose 351 - 400  14 and notify attending Unit(s) if Glucose Greater Than 4  -- Indication: For Diabetes    atorvastatin 20 mg oral tablet  -- 1 tab(s) by mouth once a day (at bedtime)  -- Indication: For HLD    carbidopa-levodopa 25 mg-100 mg oral tablet  -- 1 tab(s) by mouth 3 times a day  -- Indication: For Parkinsons    amLODIPine 10 mg oral tablet  -- 1 tab(s) by mouth once a day  -- Indication: For Hypertension    metOLazone 5 mg oral tablet  -- 1 tab(s) by mouth once a day  -- Indication: For Diuretic    sodium chloride 1 g oral tablet  -- 1 tab(s) by mouth 2 times a day  -- Indication: For Electrolyte replacement    melatonin 5 mg oral tablet  -- 1 tab(s) by mouth once a day (at bedtime)  -- Indication: For Insomnia I will START or STAY ON the medications listed below when I get home from the hospital:    acetaminophen 325 mg oral tablet  -- 2 tab(s) by mouth every 6 hours, As needed, Mild Pain (1 - 3)  -- Indication: For Pain    lisinopril 5 mg oral tablet  -- 1 tab(s) by mouth once a day  -- Indication: For Hypertension    tamsulosin 0.4 mg oral capsule  -- 2 cap(s) by mouth once a day (at bedtime)  -- Indication: For BPH    enoxaparin  -- 40 milligram(s) subcutaneous once a day for DVT prophylaxis. Lovenox may be discontinued at the discrestion of your rehab provider once patient is more mobile.  -- Indication: For DVT ppx    escitalopram 10 mg oral tablet  -- 1 tab(s) by mouth once a day  -- Indication: For Antidepressant    insulin glargine  -- 10 unit(s) subcutaneous once a day (at bedtime)  -- Indication: For Diabetes    insulin lispro  -- ____ units subcutaneous, before meals and at bedtime  4 Unit(s) if Glucose 151 - 200  6 Unit(s) if Glucose 201 - 250  8 Unit(s) if Glucose 251 - 300  10 Unit(s) if Glucose 301 - 350  12 Unit(s) if Glucose 351 - 400  14 and notify attending Unit(s) if Glucose Greater Than 4  -- Indication: For Diabetes    atorvastatin 20 mg oral tablet  -- 1 tab(s) by mouth once a day (at bedtime)  -- Indication: For HLD    carbidopa-levodopa 25 mg-100 mg oral tablet  -- 1 tab(s) by mouth 3 times a day  -- Indication: For Parkinsons    amLODIPine 10 mg oral tablet  -- 1 tab(s) by mouth once a day  -- Indication: For Hypertension    metOLazone 5 mg oral tablet  -- 1 tab(s) by mouth once a day  -- Indication: For Diuretic    melatonin 5 mg oral tablet  -- 1 tab(s) by mouth once a day (at bedtime)  -- Indication: For Insomnia

## 2018-06-14 NOTE — DISCHARGE NOTE ADULT - HOSPITAL COURSE
69 y/o M BIBEMS s/p fall from standing. as per the EMS pt was waiting outside his doctor when he fell backwards and hit his head on the floor. he had some loc for about 4 mins but was awake when the EMS got to him. denies nausea and vomiting. pt is able to move all extremities and answer questions in the trauma bay. he is complaining of pain in his chin. pt is on aspirin.    Hospital Course: CT head on admission showed acute on chronic right-sided subdural hematoma with a 6 mm left-sided midline shift; cystic component may represent a large subarachnoid cyst; severe diffuse cortical atrophy with chronic ischemic white matter disease; number age-indeterminate nonhemorrhagic right occipital lobe infarct with cytotoxic edema; no fracture; bilateral mastoiditis with right otitis media. CT cervical spine revealed C1 bilateral lamina fracture. Patient was admitted to the SICU under the trauma service & neurosurgery consulted. Repeat head CT showed that hematoma had slightly improved. CTA neck with no evidence of arterial injury. Additional repeat CT head on 6/8 again showed that hematoma unchanged. [facility] consulted for C1 lamina fx. Requested MRI which showed no acute fracture. Recommended pt to wear c-collar when OOB. Patient was seen by PT, OT, and PM&R and continued to work with these services throughout his admission. Endocrinology also following pt during his stay to help improve glucose control. Patient remains hemodynamically well, tolerating diet, pain controlled. Stable for d/c to rehab at this time.    Patient is advised to RETURN TO THE EMERGENCY DEPARTMENT for any of the following - worsening pain, fever/chills, nausea/vomiting, altered mental status, chest pain, shortness of breath, or any other new / worsening symptom.

## 2018-06-14 NOTE — DISCHARGE NOTE ADULT - CARE PROVIDERS DIRECT ADDRESSES
[email or web address]

## 2018-06-14 NOTE — DISCHARGE NOTE ADULT - PATIENT PORTAL LINK FT
You can access the [facility], offered by [facility], by registering with the following website: [email or web address]

## 2018-06-19 RX ORDER — ATORVASTATIN CALCIUM 80 MG/1
1 TABLET, FILM COATED ORAL
Qty: 0 | Refills: 0 | COMMUNITY

## 2018-06-19 RX ORDER — TAMSULOSIN HYDROCHLORIDE 0.4 MG/1
2 CAPSULE ORAL
Qty: 0 | Refills: 0 | COMMUNITY

## 2018-06-19 RX ORDER — LISINOPRIL 2.5 MG/1
1 TABLET ORAL
Qty: 0 | Refills: 0 | COMMUNITY

## 2018-06-19 RX ORDER — INSULIN DETEMIR 100/ML (3)
24 INSULIN PEN (ML) SUBCUTANEOUS
Qty: 0 | Refills: 0 | COMMUNITY

## 2018-06-19 RX ORDER — POTASSIUM CHLORIDE 20 MEQ
2 PACKET (EA) ORAL
Qty: 0 | Refills: 0 | COMMUNITY

## 2018-06-19 RX ORDER — FUROSEMIDE 40 MG
1 TABLET ORAL
Qty: 0 | Refills: 0 | COMMUNITY

## 2018-06-19 RX ORDER — ESCITALOPRAM OXALATE 10 MG/1
1 TABLET, FILM COATED ORAL
Qty: 0 | Refills: 0 | COMMUNITY

## 2018-06-20 DIAGNOSIS — H40.1132 PRIMARY OPEN-ANGLE GLAUCOMA, BILATERAL, MODERATE STAGE: ICD-10-CM

## 2018-06-20 DIAGNOSIS — H35.81 RETINAL EDEMA: ICD-10-CM

## 2018-06-20 DIAGNOSIS — H25.13 AGE-RELATED NUCLEAR CATARACT, BILATERAL: ICD-10-CM

## 2018-07-16 ENCOUNTER — APPOINTMENT (OUTPATIENT)
Dept: FAMILY MEDICINE | Facility: CLINIC | Age: 78
End: 2018-07-16

## 2018-07-27 PROBLEM — Z86.73 HISTORY OF STROKE: Status: RESOLVED | Noted: 2017-03-16 | Resolved: 2018-07-27

## 2018-08-07 ENCOUNTER — OTHER (OUTPATIENT)
Age: 78
End: 2018-08-07

## 2018-08-07 RX ORDER — CARBIDOPA AND LEVODOPA 25; 100 MG/1; MG/1
25-100 TABLET, ORALLY DISINTEGRATING ORAL 3 TIMES DAILY
Qty: 90 | Refills: 1 | Status: DISCONTINUED | COMMUNITY
Start: 2017-02-13 | End: 2018-08-07

## 2018-08-07 RX ORDER — GLUCOSAMINE SULFATE 500 MG
3-2 TABLET ORAL
Qty: 30 | Refills: 0 | Status: DISCONTINUED | COMMUNITY
Start: 2017-09-08 | End: 2018-08-07

## 2018-08-07 RX ORDER — UBIDECARENONE/VIT E ACET 100MG-5
1000 CAPSULE ORAL DAILY
Refills: 0 | Status: DISCONTINUED | COMMUNITY
End: 2018-08-07

## 2018-08-07 RX ORDER — AMLODIPINE BESYLATE 2.5 MG/1
2.5 TABLET ORAL
Qty: 90 | Refills: 1 | Status: DISCONTINUED | COMMUNITY
End: 2018-08-07

## 2018-08-07 RX ORDER — PANTOPRAZOLE SODIUM 40 MG/1
40 TABLET, DELAYED RELEASE ORAL DAILY
Refills: 0 | Status: DISCONTINUED | COMMUNITY
End: 2018-08-07

## 2018-08-07 RX ORDER — CARVEDILOL 6.25 MG/1
6.25 TABLET, FILM COATED ORAL TWICE DAILY
Qty: 180 | Refills: 1 | Status: DISCONTINUED | COMMUNITY
Start: 1900-01-01 | End: 2018-08-07

## 2018-08-07 RX ORDER — UNDERPADS 30"X36"
5 EACH MISCELLANEOUS AT BEDTIME
Refills: 0 | Status: ACTIVE | COMMUNITY

## 2018-08-07 RX ORDER — NORMAL SALT TABLETS 1 G/G
1 TABLET ORAL TWICE DAILY
Refills: 0 | Status: ACTIVE | COMMUNITY

## 2018-08-07 RX ORDER — IPRATROPIUM/ALBUTEROL SULFATE 0.5-3MG/3
0.5-2.5 (3) AMPUL FOR NEBULIZATION (ML) INHALATION 4 TIMES DAILY
Refills: 0 | Status: ACTIVE | COMMUNITY

## 2018-08-07 RX ORDER — ASPIRIN ENTERIC COATED TABLETS 81 MG 81 MG/1
81 TABLET, DELAYED RELEASE ORAL DAILY
Refills: 0 | Status: ACTIVE | COMMUNITY
Start: 2017-02-13

## 2018-08-07 RX ORDER — ESCITALOPRAM OXALATE 10 MG/1
10 TABLET, FILM COATED ORAL DAILY
Refills: 0 | Status: DISCONTINUED | COMMUNITY
End: 2018-08-07

## 2018-08-07 RX ORDER — FINASTERIDE 5 MG/1
5 TABLET, FILM COATED ORAL DAILY
Qty: 90 | Refills: 1 | Status: DISCONTINUED | COMMUNITY
Start: 2017-02-13 | End: 2018-08-07

## 2018-08-07 RX ORDER — AMOXICILLIN 250 MG
8.6-5 CAPSULE ORAL
Refills: 0 | Status: ACTIVE | COMMUNITY

## 2018-08-07 RX ORDER — ZINC OXIDE 25% 25 MG/100MG
OINTMENT TOPICAL
Refills: 0 | Status: ACTIVE | COMMUNITY

## 2018-08-08 PROBLEM — I82.409 ACUTE EMBOLISM AND THROMBOSIS OF UNSPECIFIED DEEP VEINS OF UNSPECIFIED LOWER EXTREMITY: Chronic | Status: ACTIVE | Noted: 2017-07-11

## 2018-08-08 PROBLEM — H54.8 LEGAL BLINDNESS, AS DEFINED IN USA: Chronic | Status: ACTIVE | Noted: 2018-01-02

## 2018-08-08 PROBLEM — I10 ESSENTIAL (PRIMARY) HYPERTENSION: Chronic | Status: ACTIVE | Noted: 2018-01-02

## 2018-08-08 PROBLEM — G20 PARKINSON'S DISEASE: Chronic | Status: ACTIVE | Noted: 2017-07-11

## 2018-08-08 PROBLEM — E11.9 TYPE 2 DIABETES MELLITUS WITHOUT COMPLICATIONS: Chronic | Status: ACTIVE | Noted: 2018-01-02

## 2018-08-08 PROBLEM — N40.0 BENIGN PROSTATIC HYPERPLASIA WITHOUT LOWER URINARY TRACT SYMPTOMS: Chronic | Status: ACTIVE | Noted: 2018-01-02

## 2018-08-13 RX ORDER — IPRATROPIUM BROMIDE 21 UG/1
0.03 SPRAY, METERED NASAL
Refills: 0 | Status: DISCONTINUED | COMMUNITY
End: 2018-08-13

## 2018-08-13 RX ORDER — ALBUTEROL SULFATE 2.5 MG/3ML
(2.5 MG/3ML) SOLUTION RESPIRATORY (INHALATION)
Refills: 0 | Status: DISCONTINUED | COMMUNITY
End: 2018-08-13

## 2018-08-13 RX ORDER — DOCUSATE SODIUM 100 MG/1
100 CAPSULE ORAL
Refills: 0 | Status: DISCONTINUED | COMMUNITY
End: 2018-08-13

## 2018-08-13 RX ORDER — L.ACIDOPH/L.BULG/B.BIF/S.THERM 1B-250 MG
TABLET ORAL
Refills: 0 | Status: DISCONTINUED | COMMUNITY
End: 2018-08-13

## 2018-08-13 RX ORDER — INSULIN ASPART 100 [IU]/ML
100 INJECTION, SOLUTION INTRAVENOUS; SUBCUTANEOUS
Qty: 3 | Refills: 3 | Status: ACTIVE | COMMUNITY
Start: 2017-10-05

## 2018-08-13 RX ORDER — INSULIN LISPRO 100 [IU]/ML
100 INJECTION, SOLUTION INTRAVENOUS; SUBCUTANEOUS
Refills: 0 | Status: DISCONTINUED | COMMUNITY
End: 2018-08-13

## 2018-08-13 RX ORDER — INSULIN GLARGINE 100 [IU]/ML
100 INJECTION, SOLUTION SUBCUTANEOUS
Refills: 0 | Status: DISCONTINUED | COMMUNITY
End: 2018-08-13

## 2018-08-13 RX ORDER — BETHANECHOL CHLORIDE 10 MG/1
10 TABLET ORAL 3 TIMES DAILY
Refills: 0 | Status: DISCONTINUED | COMMUNITY
End: 2018-08-13

## 2018-08-13 RX ORDER — INSULIN DETEMIR 100 [IU]/ML
100 INJECTION, SOLUTION SUBCUTANEOUS
Qty: 1 | Refills: 0 | Status: DISCONTINUED | COMMUNITY
Start: 2017-11-14 | End: 2018-08-13

## 2018-08-18 ENCOUNTER — APPOINTMENT (OUTPATIENT)
Dept: FAMILY MEDICINE | Facility: CLINIC | Age: 78
End: 2018-08-18
Payer: MEDICARE

## 2018-08-18 ENCOUNTER — NON-APPOINTMENT (OUTPATIENT)
Age: 78
End: 2018-08-18

## 2018-08-18 VITALS — HEART RATE: 90 BPM | DIASTOLIC BLOOD PRESSURE: 68 MMHG | SYSTOLIC BLOOD PRESSURE: 109 MMHG | OXYGEN SATURATION: 96 %

## 2018-08-18 DIAGNOSIS — S06.5X9A TRAUMATIC SUBDURAL HEMORRHAGE WITH LOSS OF CONSCIOUSNESS OF UNSPECIFIED DURATION, INITIAL ENCOUNTER: ICD-10-CM

## 2018-08-18 DIAGNOSIS — R53.83 OTHER FATIGUE: ICD-10-CM

## 2018-08-18 DIAGNOSIS — E11.319 TYPE 2 DIABETES MELLITUS WITH UNSPECIFIED DIABETIC RETINOPATHY W/OUT MACULAR EDEMA: ICD-10-CM

## 2018-08-18 DIAGNOSIS — H35.30 UNSPECIFIED MACULAR DEGENERATION: ICD-10-CM

## 2018-08-18 PROCEDURE — 93000 ELECTROCARDIOGRAM COMPLETE: CPT

## 2018-08-18 PROCEDURE — 99495 TRANSJ CARE MGMT MOD F2F 14D: CPT | Mod: 25

## 2018-08-18 PROCEDURE — 36415 COLL VENOUS BLD VENIPUNCTURE: CPT

## 2018-08-18 RX ORDER — METOLAZONE 5 MG/1
5 TABLET ORAL DAILY
Refills: 0 | Status: DISCONTINUED | COMMUNITY
End: 2018-08-18

## 2018-08-18 RX ORDER — TIMOLOL MALEATE 2.5 MG/ML
0.25 SOLUTION/ DROPS OPHTHALMIC DAILY
Refills: 0 | Status: DISCONTINUED | COMMUNITY
End: 2018-08-18

## 2018-08-18 RX ORDER — TIMOLOL MALEATE 5 MG/ML
0.5 SOLUTION OPHTHALMIC
Qty: 5 | Refills: 0 | Status: DISCONTINUED | COMMUNITY
Start: 2017-12-11 | End: 2018-08-18

## 2018-08-18 RX ORDER — INSULIN DETEMIR 100 [IU]/ML
100 INJECTION, SOLUTION SUBCUTANEOUS
Qty: 1 | Refills: 3 | Status: ACTIVE | COMMUNITY
Start: 2017-02-13

## 2018-08-18 RX ORDER — TORSEMIDE 10 MG/1
10 TABLET ORAL
Qty: 40 | Refills: 0 | Status: DISCONTINUED | COMMUNITY
Start: 2017-08-15 | End: 2018-08-18

## 2018-08-20 LAB
ALBUMIN SERPL ELPH-MCNC: 3.4 G/DL
ALP BLD-CCNC: 94 U/L
ALT SERPL-CCNC: 6 U/L
ANION GAP SERPL CALC-SCNC: 21 MMOL/L
AST SERPL-CCNC: 16 U/L
BASOPHILS # BLD AUTO: 0.08 K/UL
BASOPHILS NFR BLD AUTO: 1.1 %
BILIRUB SERPL-MCNC: 0.2 MG/DL
BUN SERPL-MCNC: 39 MG/DL
CALCIUM SERPL-MCNC: 9.4 MG/DL
CHLORIDE SERPL-SCNC: 93 MMOL/L
CHOLEST SERPL-MCNC: 212 MG/DL
CHOLEST/HDLC SERPL: 3.2 RATIO
CO2 SERPL-SCNC: 19 MMOL/L
CREAT SERPL-MCNC: 1.3 MG/DL
EOSINOPHIL # BLD AUTO: 0.15 K/UL
EOSINOPHIL NFR BLD AUTO: 2.1 %
GLUCOSE SERPL-MCNC: 442 MG/DL
HCT VFR BLD CALC: 34.2 %
HDLC SERPL-MCNC: 66 MG/DL
HGB BLD-MCNC: 10.9 G/DL
IMM GRANULOCYTES NFR BLD AUTO: 0.4 %
LDLC SERPL CALC-MCNC: 124 MG/DL
LYMPHOCYTES # BLD AUTO: 1.42 K/UL
LYMPHOCYTES NFR BLD AUTO: 19.9 %
MAN DIFF?: NORMAL
MCHC RBC-ENTMCNC: 31.4 PG
MCHC RBC-ENTMCNC: 31.9 GM/DL
MCV RBC AUTO: 98.6 FL
MONOCYTES # BLD AUTO: 0.55 K/UL
MONOCYTES NFR BLD AUTO: 7.7 %
NEUTROPHILS # BLD AUTO: 4.89 K/UL
NEUTROPHILS NFR BLD AUTO: 68.8 %
PLATELET # BLD AUTO: 332 K/UL
POTASSIUM SERPL-SCNC: 4 MMOL/L
PROT SERPL-MCNC: 7 G/DL
RBC # BLD: 3.47 M/UL
RBC # FLD: 15.4 %
SODIUM SERPL-SCNC: 133 MMOL/L
TRIGL SERPL-MCNC: 112 MG/DL
TSH SERPL-ACNC: 1.31 UIU/ML
WBC # FLD AUTO: 7.12 K/UL

## 2018-08-20 NOTE — PHYSICAL EXAM
[Ill-Appearing] : ill-appearing [Normal Oropharynx] : the oropharynx was normal [No Lymphadenopathy] : no lymphadenopathy [Clear to Auscultation] : lungs were clear to auscultation bilaterally [Normal S1, S2] : normal S1 and S2 [Soft] : abdomen soft [No CVA Tenderness] : no CVA  tenderness [No Rash] : no rash [Normal Mood] : the mood was normal [de-identified] : able to ellveate both legs

## 2018-08-20 NOTE — HISTORY OF PRESENT ILLNESS
[Med Reconciliation] : medication reconciliation has been completed [Patient Contacted By: ____] : and contacted by [unfilled] [FreeTextEntry3] : done by nurse at the time, also  in touch ercole 3 times this week , [facility] [FreeTextEntry2] : Patient was at [facility] due to a fall he had in his drive way in June,then was sent to [facility] for rehab Pt was discharged from the rehab august 6th nd he is here to follow up. pt brings in paperwork from [facility] from  8/6dc, pt no longer on water  pill out of nh, pt in need of hospital bed\par pt sugars are not great, pt has not been to endocrine as advised, pt brought in by son and wife, pt meds are a mess as wife struggles w what is ordered and what is covered and his frequent admissions ,\par pt here in room somewhat tired, we did glucose and high,it appears wife resumed old levemir, not on this insulinn glarginine solution, pt wife resumed hiim at 30 of levemir

## 2018-09-21 ENCOUNTER — EMERGENCY (EMERGENCY)
Facility: HOSPITAL | Age: 78
LOS: 1 days | Discharge: HOME | End: 2018-09-21
Attending: EMERGENCY MEDICINE
Payer: COMMERCIAL

## 2018-09-21 VITALS
TEMPERATURE: 98 F | SYSTOLIC BLOOD PRESSURE: 119 MMHG | HEART RATE: 86 BPM | HEIGHT: 67 IN | WEIGHT: 160.06 LBS | RESPIRATION RATE: 16 BRPM | DIASTOLIC BLOOD PRESSURE: 70 MMHG | OXYGEN SATURATION: 99 %

## 2018-09-21 DIAGNOSIS — Z96.7 PRESENCE OF OTHER BONE AND TENDON IMPLANTS: Chronic | ICD-10-CM

## 2018-09-21 DIAGNOSIS — Z90.79 ACQUIRED ABSENCE OF OTHER GENITAL ORGAN(S): Chronic | ICD-10-CM

## 2018-09-21 PROCEDURE — 72125 CT NECK SPINE W/O DYE: CPT | Mod: 26

## 2018-09-21 PROCEDURE — 72125 CT NECK SPINE W/O DYE: CPT

## 2018-09-21 PROCEDURE — 70450 CT HEAD/BRAIN W/O DYE: CPT

## 2018-09-21 PROCEDURE — 82962 GLUCOSE BLOOD TEST: CPT

## 2018-09-21 PROCEDURE — 99284 EMERGENCY DEPT VISIT MOD MDM: CPT

## 2018-09-21 PROCEDURE — 70450 CT HEAD/BRAIN W/O DYE: CPT | Mod: 26

## 2018-09-21 PROCEDURE — 99284 EMERGENCY DEPT VISIT MOD MDM: CPT | Mod: 25

## 2018-09-21 NOTE — PROVIDER CONTACT NOTE (OTHER) - SITUATION
Pt presents to [facility] s/p fall at home. Pt taken to the hospital via ambulation.

## 2018-09-21 NOTE — PHYSICAL THERAPY INITIAL EVALUATION ADULT - PERTINENT HX OF CURRENT PROBLEM, REHAB EVAL
Pt presents to [facility] s/p fall at home. pt reporting he tripped, however per patient's wife his legs gave out.

## 2018-09-21 NOTE — ED PROVIDER NOTE - PMH
BPH (benign prostatic hyperplasia)    Diabetes    DVT (deep venous thrombosis)    High cholesterol    HTN (hypertension)    Hypertension    Legally blind    Parkinson disease    Parkinsons    Stroke  2016  Type 2 diabetes mellitus

## 2018-09-21 NOTE — ED ADULT TRIAGE NOTE - CHIEF COMPLAINT QUOTE
pt comes to ed from home s/p fall from standing height/trip and fall. patient denies any complaints. states his wife called the fire department to help him up but they called an ambulance. has no complaints s/p fall. no loc no anticoags.

## 2018-09-21 NOTE — ED PROVIDER NOTE - OBJECTIVE STATEMENT
77 yo M hx of DM, HTN, parkinson, walks with a walker at home, had a fall today. (-) loc (-) anticoagulation (-) nausea (-) vomiting (-) headache (-) vision change (-) neck pain

## 2018-09-21 NOTE — ED PROVIDER NOTE - PROGRESS NOTE DETAILS
CT head and neck negative, PT evaluated the patient, recommend home PT. I spoke to social work, will work on home PT. patient can  be discharged in the meantime.

## 2018-09-21 NOTE — CHART NOTE - NSCHARTNOTEFT_GEN_A_CORE
SWNote: p/c from pt's EDMD( dr Sage) to inform worker that pt was seen by PT and PT at home recommended.  not available at this moment, this worker will leave pt' info in the handoff for f/u in the am. As per note, family and pt interested in home care also. Worker will make notation for case management to address in the am. No other concerns reported at this moment.

## 2018-09-21 NOTE — ED ADULT NURSE NOTE - OBJECTIVE STATEMENT
78 year old a&ox3 male comes to ED complaining of fall from standing height. patient denies any complaints. no loc no anticoags.

## 2018-09-21 NOTE — PHYSICAL THERAPY INITIAL EVALUATION ADULT - ADDITIONAL COMMENTS
pt uses a RW at home. Per patient and wife, ambulates about 25 ft within the home. Pt has1 step to enter that he will be able to advance the walker into the home. Pt has an aide several days t/o the week 4 hrs on the day he visits. Pt was just d/c'd from home care program and would like services reinstated.

## 2018-09-21 NOTE — PROVIDER CONTACT NOTE (OTHER) - ASSESSMENT
Pt demonstrates therex, bed mobs, transfers and ambulates with supervision. RW for OOB activity and modified step negotiation. Pt beéln tx well without adverse effect and returned to the bed post tx in NAD.

## 2018-09-21 NOTE — ED PROVIDER NOTE - PHYSICAL EXAMINATION
VITAL SIGNS: I have reviewed nursing notes and confirm.  CONSTITUTIONAL: Well-developed; well-nourished; in no acute distress.  SKIN: Skin exam is warm and dry, no acute rash.  HEAD: Normocephalic; atraumatic.  EYES: PERRL, EOM intact; conjunctiva and sclera clear.  ENT: No nasal discharge; airway clear. Throat clear.  NECK: Supple; non tender.  No lymphadenopathy.  CARD: S1, S2 normal; no murmurs, gallops, or rubs. Regular rate and rhythm.  RESP: No wheezes, rales or rhonchi.  ABD: Normal bowel sounds; soft; non-distended; non-tender; no hepatosplenomegaly.  EXT: Normal ROM. No clubbing, cyanosis or edema.  NEURO: Alert, oriented. Grossly unremarkable. No focal deficits. no facial droop. moves all extremities.   patient able to ambulate with walker   PSYCH: Cooperative, appropriate.

## 2018-09-21 NOTE — PROVIDER CONTACT NOTE (OTHER) - BACKGROUND
Pt with single step to enter the home, HHA several days a week. Per patient and spouse, pt requires supervision at baseline. pt uses a RW for ambulation.

## 2018-09-22 ENCOUNTER — APPOINTMENT (OUTPATIENT)
Dept: FAMILY MEDICINE | Facility: CLINIC | Age: 78
End: 2018-09-22
Payer: MEDICARE

## 2018-09-22 VITALS — OXYGEN SATURATION: 96 % | SYSTOLIC BLOOD PRESSURE: 106 MMHG | HEART RATE: 80 BPM | DIASTOLIC BLOOD PRESSURE: 67 MMHG

## 2018-09-22 DIAGNOSIS — R29.6 REPEATED FALLS: ICD-10-CM

## 2018-09-22 DIAGNOSIS — E11.9 TYPE 2 DIABETES MELLITUS W/OUT COMPLICATIONS: ICD-10-CM

## 2018-09-22 DIAGNOSIS — E11.65 TYPE 2 DIABETES MELLITUS WITH HYPERGLYCEMIA: ICD-10-CM

## 2018-09-22 PROCEDURE — 99214 OFFICE O/P EST MOD 30 MIN: CPT | Mod: 25

## 2018-09-22 PROCEDURE — 82947 ASSAY GLUCOSE BLOOD QUANT: CPT | Mod: QW

## 2018-09-22 PROCEDURE — G0008: CPT

## 2018-09-22 PROCEDURE — 90662 IIV NO PRSV INCREASED AG IM: CPT

## 2018-09-22 NOTE — REVIEW OF SYSTEMS
[Fever] : no fever [Earache] : no earache [Chest Pain] : no chest pain [Shortness Of Breath] : no shortness of breath [Abdominal Pain] : no abdominal pain

## 2018-09-22 NOTE — DATA REVIEWED
[FreeTextEntry1] : ct brain in er was neg for acute process, ct neck was neg glucose in  er yesterday 130\par finger stick 500 today

## 2018-09-22 NOTE — HISTORY OF PRESENT ILLNESS
[FreeTextEntry8] : Patient needs refills on all meds. Amlodipine, Atorvastatin, Escitalopram, Feosol, Sodium chloride, Tamsulosin and he needs a new glucose monitor with needles\par .pt here for fu er , legs gave out and he was quided down and did not hit head, no loc, pt needs refills, had ct in er and released, , pt did pass out w change to chair when fire department arriived, pt went to hospital and no labs drawn, just ct and ct neck and neg as per record review [Spouse] : spouse

## 2018-09-26 ENCOUNTER — APPOINTMENT (OUTPATIENT)
Dept: ENDOCRINOLOGY | Facility: CLINIC | Age: 78
End: 2018-09-26

## 2018-10-31 NOTE — PROGRESS NOTE ADULT - PROBLEM/PLAN-1
[facility] [address]  Phone: [phone]  Fax: [phone]    11/1/2018    Delia Juarez  1966  [address] [facility] [address]      Dear Delia Juarez,    I regret to inform you that the provider(s) at [address] [place] [address] will no longer be able to provide your medical care. I recommend that you immediately find a new provider. We will continue to care for your urgent medical problems until 30 days from the date of this letter. We will also provide prescriptions for your current medications based on your past refill history to ensure you will have sufficient medications to last during the 30 days from the date of this letter. To obtain your medical records, please contact [facility] [place] at [phone] or the [address] at [phone]. You may contact your insurance provider, managed care organization (if applicable) or local Medical Society (if applicable number noted below) to obtain a current listing of physicians available to provide care. I urge you to see a new provider quickly so that there will be no interruption of your care. I wish you the best in your future medical care.       Sincerely,        Steffen Gaspar [facility] - [phone] DISPLAY PLAN FREE TEXT

## 2018-11-05 ENCOUNTER — MEDICATION RENEWAL (OUTPATIENT)
Age: 78
End: 2018-11-05

## 2018-11-13 ENCOUNTER — MEDICATION RENEWAL (OUTPATIENT)
Age: 78
End: 2018-11-13

## 2018-11-18 NOTE — ED PROVIDER NOTE - MEDICAL DECISION MAKING DETAILS
feet pain Will check labs, UA, US and re-eval Will check labs, UA, US and re-eval   pt with uti will start antibiotics  sono as noted ? improvement will f/u with urology

## 2018-12-14 ENCOUNTER — RX RENEWAL (OUTPATIENT)
Age: 78
End: 2018-12-14

## 2018-12-14 RX ORDER — ESCITALOPRAM OXALATE 10 MG/1
10 TABLET ORAL
Qty: 30 | Refills: 0 | Status: ACTIVE | COMMUNITY
Start: 2017-10-31 | End: 1900-01-01

## 2018-12-20 ENCOUNTER — RECORD ABSTRACTING (OUTPATIENT)
Age: 78
End: 2018-12-20

## 2018-12-20 RX ORDER — INSULIN ASPART 100 [IU]/ML
100 INJECTION, SOLUTION INTRAVENOUS; SUBCUTANEOUS
Refills: 0 | Status: ACTIVE | COMMUNITY

## 2018-12-21 ENCOUNTER — APPOINTMENT (OUTPATIENT)
Dept: NEUROLOGY | Facility: CLINIC | Age: 78
End: 2018-12-21

## 2018-12-28 ENCOUNTER — APPOINTMENT (OUTPATIENT)
Dept: ENDOCRINOLOGY | Facility: CLINIC | Age: 78
End: 2018-12-28

## 2019-01-02 ENCOUNTER — MEDICATION RENEWAL (OUTPATIENT)
Age: 79
End: 2019-01-02

## 2019-01-07 ENCOUNTER — RX RENEWAL (OUTPATIENT)
Age: 79
End: 2019-01-07

## 2019-03-19 ENCOUNTER — RX RENEWAL (OUTPATIENT)
Age: 79
End: 2019-03-19

## 2019-03-22 RX ORDER — TAMSULOSIN HYDROCHLORIDE 0.4 MG/1
0.4 CAPSULE ORAL DAILY
Qty: 180 | Refills: 1 | Status: ACTIVE | COMMUNITY
Start: 2017-02-13 | End: 1900-01-01

## 2019-03-22 RX ORDER — AMLODIPINE BESYLATE 10 MG/1
10 TABLET ORAL DAILY
Qty: 90 | Refills: 1 | Status: ACTIVE | COMMUNITY
Start: 1900-01-01 | End: 1900-01-01

## 2019-03-22 RX ORDER — FERROUS SULFATE 325(65) MG
325 (65 FE) TABLET ORAL TWICE DAILY
Qty: 180 | Refills: 0 | Status: ACTIVE | COMMUNITY
Start: 1900-01-01 | End: 1900-01-01

## 2019-04-12 ENCOUNTER — APPOINTMENT (OUTPATIENT)
Dept: NEUROLOGY | Facility: CLINIC | Age: 79
End: 2019-04-12
Payer: MEDICARE

## 2019-04-12 VITALS
DIASTOLIC BLOOD PRESSURE: 80 MMHG | WEIGHT: 185 LBS | HEIGHT: 72 IN | SYSTOLIC BLOOD PRESSURE: 140 MMHG | BODY MASS INDEX: 25.06 KG/M2

## 2019-04-12 DIAGNOSIS — F02.80 PARKINSON'S DISEASE WITHOUT DYSKINESIA, WITHOUT MENTION OF FLUCTUATIONS: ICD-10-CM

## 2019-04-12 DIAGNOSIS — I63.9 CEREBRAL INFARCTION, UNSPECIFIED: ICD-10-CM

## 2019-04-12 DIAGNOSIS — G20.A1 PARKINSON'S DISEASE WITHOUT DYSKINESIA, WITHOUT MENTION OF FLUCTUATIONS: ICD-10-CM

## 2019-04-12 DIAGNOSIS — G93.0 CEREBRAL CYSTS: ICD-10-CM

## 2019-04-12 PROCEDURE — 99214 OFFICE O/P EST MOD 30 MIN: CPT

## 2019-04-12 RX ORDER — CARBIDOPA AND LEVODOPA 25; 100 MG/1; MG/1
25-100 TABLET ORAL
Qty: 270 | Refills: 1 | Status: ACTIVE | COMMUNITY
Start: 2017-09-08 | End: 1900-01-01

## 2019-04-12 RX ORDER — POLYETHYLENE GLYCOL 3350 17 G/17G
17 POWDER, FOR SOLUTION ORAL DAILY
Refills: 0 | Status: COMPLETED | COMMUNITY
End: 2019-04-12

## 2019-04-12 NOTE — ASSESSMENT
[FreeTextEntry1] : This is a 78-year-old man with multiple medical conditions.\par \par He has Parkinson's disease.\par I will continue Sinemet.\par \par He is a history of stroke.\par I would recommend continuing antiplatelet and statin medication.\par \par There is no intervention indicated for the arachnoid cyst.\par \par I would be happy to see him back in 6 months.

## 2019-04-12 NOTE — HISTORY OF PRESENT ILLNESS
[FreeTextEntry1] : I saw this patient in the office today.\par \par He had been seen originally in January of 2018 it's outside hospital.\par He had presented after an episode of syncope that was witnessed at the dinner table.\par He was found to have a stroke on imaging.\par He had a history of Parkinson's prior to that admission.\par He has had cognitive impairment for many years.\par He ambulates short distances with a walker.\par \par \par

## 2019-04-12 NOTE — CONSULT LETTER
[Dear  ___] : Dear  [unfilled], [Courtesy Letter:] : I had the pleasure of seeing your patient, [unfilled], in my office today. [Consult Closing:] : Thank you very much for allowing me to participate in the care of this patient.  If you have any questions, please do not hesitate to contact me. [FreeTextEntry3] : Pato Blanca MD.  [Sincerely,] : Sincerely,

## 2019-04-12 NOTE — PHYSICAL EXAM
[General Appearance - Alert] : alert [General Appearance - In No Acute Distress] : in no acute distress [Oriented to Person] : oriented to person [Recall ___ / 3] : recall [unfilled] / 3 [Cranial Nerves Trigeminal (V)] : facial sensation intact symmetrically [Cranial Nerves Facial (VII)] : face symmetrical [Cranial Nerves Glossopharyngeal (IX)] : tongue and palate midline [Cranial Nerves Hypoglossal (XII)] : there was no tongue deviation with protrusion [Cranial Nerves Accessory (XI - Cranial And Spinal)] : head turning and shoulder shrug symmetric [Sensation Pain / Temperature Decrease] : pain and temperature was intact [Sensation Tactile Decrease] : light touch was intact [Sensation Vibration Decrease] : vibration was intact [Tremor] : a tremor present [2+] : Patella left 2+ [Optic Disc Abnormality] : the optic disc were normal in size and color [Edema] : there was no peripheral edema [Oriented to Time] : disoriented to time [Oriented to Place] : disoriented to place [Dysarthria] : no dysarthria [Aphasia] : no dysphasia/aphasia [Coordination - Dysmetria Impaired Finger-to-Nose Bilateral] : not present [Plantar Reflex Right Only] : normal on the right [Plantar Reflex Left Only] : normal on the left [FreeTextEntry6] : There is increased tone throughout [FreeTextEntry1] : See above.

## 2019-04-12 NOTE — DATA REVIEWED
[de-identified] : Brain MRI was performed on 1/2/18.\par The study demonstrated a large right middle cranial fossa arachnoid cyst.\par There was a late acute versus subacute right occipital infarct.

## 2019-10-08 NOTE — PATIENT PROFILE ADULT. - BLOOD TRANSFUSION, PREVIOUS, PROFILE
"""Follow drusen without treatment. Call if vision or distortion increases.  Recommend sunglasses "" no

## 2019-10-14 ENCOUNTER — APPOINTMENT (OUTPATIENT)
Dept: NEUROLOGY | Facility: CLINIC | Age: 79
End: 2019-10-14

## 2020-01-03 NOTE — DISCHARGE NOTE ADULT - NS AS DC FOLLOWUP STROKE INST
Stroke (includes: TIA/SAH/ICH/Ischemic Stroke) Birth Control Pills Pregnancy And Lactation Text: This medication should be avoided if pregnant and for the first 30 days post-partum.

## 2020-01-17 NOTE — PHYSICAL THERAPY INITIAL EVALUATION ADULT - LEVEL OF CONSCIOUSNESS, REHAB EVAL
Alert-The patient is alert, awake and responds to voice. The patient is oriented to time, place, and person. The triage nurse is able to obtain subjective information. alert

## 2020-01-21 NOTE — PATIENT PROFILE ADULT. - FUNCTIONAL SCREEN CURRENT LEVEL: DRESSING, MLM
2) assistive person

## 2020-05-21 NOTE — PHYSICAL THERAPY INITIAL EVALUATION ADULT - ASR WT BEARING STATUS EVAL
"CHF Week 2 Survey      Responses   [facility] patient discharged from?  [place]   Does the patient have one of the following disease processes/diagnoses(primary or secondary)?  CHF   Week 2 attempt successful?  Yes   Call start time  1446   Call end time  1449   Meds reviewed with patient/caregiver?  Yes   Is the patient having any side effects they believe may be caused by any medication additions or changes?  No   Does the patient have all medications ordered at discharge?  Yes   Is the patient taking all medications as directed (includes completed medication regime)?  Yes   Does the patient have a primary care provider?   Yes   Comments regarding PCP  WHEN DISCUSSING FOLLOW UP APPOINTMENTS, PATIENT JUST KEPT STATING, \"I NEED TO WAIT AND TALK TO TONJA.\"    What is the Home health agency?   [facility]    Home health comments  PATIENT STATES, \"THEY ONLY LET TONJA SEE ME.\"    Did the patient receive a copy of their discharge instructions?  Yes   Nursing interventions  Reviewed instructions with patient   What is the patient's perception of their health status since discharge?  Improving   Nursing interventions  Nurse provided patient education   Is the patient weighing daily?  Yes   Does the patient have scales?  Yes   Daily weight interventions  Education provided on importance of daily weight   Is the patient able to teach back Heart Failure diet management?  Yes   Is the patient able to teach back Heart Failure Zones?  Yes   Is the patient able to teach back signs and symptoms of worsening condition? (i.e. weight gain, shortness of air, etc.)  Yes   Is the patient/caregiver able to teach back the hierarchy of who to call/visit for symptoms/problems? PCP, Specialist, Home health nurse, Urgent Care, ED, 911  Yes   CHF Week 2 call completed?  Yes          Mounika Merchant LPN  " no weight-bearing restrictions

## 2021-02-16 NOTE — OCCUPATIONAL THERAPY INITIAL EVALUATION ADULT - LEVEL OF INDEPENDENCE, REHAB EVAL
We are working hard to begin vaccinating more people against COVID-19. Currently, we are only vaccinating individuals age 75 and older and Phase 1a workers - healthcare workers who are unable to do their job remotely. Vaccine availability is very limited.    If you are 75 or older, or a healthcare worker who is unable to do your job remotely, please log in to [facility] using this link to see if we have an open appointment and schedule an appointment.  If there are no appointments left, you will be unable to schedule and need to check back later.  If you are a healthcare worker, you will be asked to provide proof of employment at your appointment. If you cannot, you will be turned away.    Vaccine appointments are being added as they become available. Please check your [facility] account frequently for availability. If you have technical difficulty using [facility], call [phone] for assistance.    You can learn more about the [facility]'s phased approach to administering the vaccine, with details on each phase, [email or web address].      Aa vaccine supply increases and we are able to open appointments to more groups, we will share that information on our website [email or web address]. Check this website to stay up to date on COVID-19 vaccination information.         moderate assist (50% patients effort)

## 2021-04-07 NOTE — PHYSICAL THERAPY INITIAL EVALUATION ADULT - PERTINENT HX OF CURRENT PROBLEM, REHAB EVAL
HTN Albendazole Pregnancy And Lactation Text: This medication is Pregnancy Category C and it isn't known if it is safe during pregnancy. It is also excreted in breast milk.

## 2021-10-22 NOTE — PROGRESS NOTE ADULT - ASSESSMENT
This is 76Y M with PMH of DM, HTN, admitted for DKA and AMS, admitted to ICU, started on insulin drip, anion gap closed, off insulin drip, on Lantus 22 units. He was also noted to foul smelling discharge from groin, seen bu surgery, looks fungal infection, started on Nystatin powder, improving. MRI brain showed b/l stroke with mild petechial hemorrhage in right occipital lobe. JESUS MANUEL - Large PFO. acute DVT in LLE - not a good candidate for AC per Neuro and Cardio - s/p - IVC filter placement. S/P Ledesma cath placement , feeling better , c/o b/l LE itching / pain , no BM for few days, no other complaints .      A/P    > DKA due to Non compliant , resolved - continue diet , ISSC ,  episode of hypoglycemia this am , will add snack QHS  A1C: 12 , diabetic education consult provided    >Hypokalemia - resolved , follow up BMP     >B/L stroke with mild petechial hemorrhage in right occipital lobe - on ASA / Statin / continue rehab program    TTE normal, carotid doppler showed ?stenosis in distal intracranial stenosis, as per Neurology no need of CTA  JESUS MANUEL - Large PFO - as per cardiology  , follow up as outpatient     Acute DVT in LLE - not a good candidate for AC per Neuro and Cardio - s/p - IVC filter placement.       >HTN - Stable  continue  amlodipine 10 mg daily and lisinopril 10mg daily with parameters      >Groin fungal infection - improving , continue  Nystatin    >Parkinson disease  cont. sinemet as per Neurology    > UTI- on empiric  abx  ,  repeated cultures negative , done while on abx , will treat for presumed UTI caused by straight catheterization for total 7 days of abx    > Hyponatremia -  resolved    > Urinary retention - Hx of BPH and s/p TURP 10 yrs ago,  consult  noted  and appreciated , started on Flomax/ Proscar , Ledesma placed secondary to painful urination and painful straight  cath  ,  will give TOV in few days    > B/L LE edema - will increase Lasix to 40 mg Daily, monitor BMP , continue topical care of LE , add compressive stockings  b/l , leg elevation    > Constipation - add colace/Senna quit one week ago/Yes This is 76Y M with PMH of DM, HTN, admitted for DKA and AMS, admitted to ICU, started on insulin drip, anion gap closed, off insulin drip, on Lantus 22 units. He was also noted to foul smelling discharge from groin, seen bu surgery, looks fungal infection, started on Nystatin powder, improving. MRI brain showed b/l stroke with mild petechial hemorrhage in right occipital lobe. JESUS MANUEL - Large PFO. acute DVT in LLE - not a good candidate for AC per Neuro and Cardio - s/p - IVC filter placement. S/P Ledesma cath placement , feeling better , c/o b/l LE itching / pain , no BM for few days, no other complaints .      A/P    > DKA due to Non compliant , resolved - continue diet , ISSC ,  episode of hypoglycemia this am , will add snack QHS  A1C: 12 , diabetic education consult provided    >Hypokalemia - resolved , follow up BMP     >B/L stroke with mild petechial hemorrhage in right occipital lobe - on ASA / Statin / continue rehab program    TTE normal, carotid doppler showed ?stenosis in distal intracranial stenosis, as per Neurology no need of CTA  JESUS MANUEL - Large PFO - as per cardiology  , follow up as outpatient     Acute DVT in LLE - not a good candidate for AC per Neuro and Cardio - s/p - IVC filter placement.       >HTN - Stable  continue  amlodipine 10 mg daily and lisinopril 10mg daily with parameters      >Groin fungal infection - improving , continue  Nystatin    >Parkinson disease  cont. sinemet as per Neurology    > UTI- on empiric  abx  ,  repeated cultures negative , done while on abx , will treat for presumed UTI caused by straight catheterization for total 7 days of abx as patiet was symptomatic and urine was very cloudy    > Hyponatremia -  resolved    > Urinary retention - Hx of BPH and s/p TURP 10 yrs ago,  consult  noted  and appreciated , started on Flomax/ Proscar , Ledesma placed secondary to painful urination and painful straight  cath  ,  will give TOV in 2-3 days    > B/L LE edema - will increase Lasix to 40 mg Daily, monitor BMP , continue topical care of LE , add compressive stockings  b/l , leg elevation    > Constipation -  had large BM after several days ,continue current mgmt , add Colace , may d/c Calcium

## 2021-12-27 NOTE — PROGRESS NOTE ADULT - SUBJECTIVE AND OBJECTIVE BOX
Worked with SW to discussed rehab dispo.  Patient was denied initially.   Discussed essence of ambulating patient and was able to demonstrate ability.  Acute rehab was approved.  Patient continues to be depressed about his functional status and is in pain from sliding down in the chair.   Aide available to assist.    FUNCTIONAL PROGRESS  1/11  Bed Mobility  Bed Mobility Training Sit-to-Supine: maximum assist (25% patient effort);  1 person assist  Bed Mobility Training Supine-to-Sit: maximum assist (25% patient effort);  1 person assist    Sit-Stand Transfer Training  Transfer Training Sit-to-Stand Transfer: moderate assist (50% patient effort);  2 person assist;  nonweight-bearing   rolling walker;  L UE  Transfer Training Stand-to-Sit Transfer: moderate assist (50% patient effort);  2 person assist;  nonweight-bearing   l ue  Sit-to-Stand Transfer Training Transfer Safety Analysis: decreased weight-shifting ability    Gait Training  Gait Training: nonweight-bearing   L UE   moderate assist (50% patient effort);  1 person assist;  20 feet;  hha  Gait Analysis: 3-point gait   decreased step length;  impaired balance;  decreased strength;  impaired coordination;  hha  Brace/Orthotics Brace/Orthotics: cervical collar  Gait Number of Times:: x 1    REVIEW OF SYSTEMS  Constitutional - No fever,  +fatigue  HEENT - No vertigo, +neck pain  Neurological - No headaches, No memory loss, +loss of strength, No numbness, +tremors  Musculoskeletal - +joint pain, +muscle pain  Psychiatric - +depression, No anxiety    VITALS  T(C): 36.7 (01-11-18 @ 08:47), Max: 37.1 (01-10-18 @ 16:18)  HR: 68 (01-11-18 @ 08:47) (62 - 68)  BP: 128/68 (01-11-18 @ 08:47) (115/62 - 134/63)  RR: 18 (01-11-18 @ 08:47) (17 - 20)  SpO2: 96% (01-11-18 @ 08:47) (94% - 97%)  Wt(kg): --    MEDICATIONS   acetaminophen   Tablet. 650 milliGRAM(s) every 6 hours  amLODIPine   Tablet 10 milliGRAM(s) daily  aspirin  chewable 81 milliGRAM(s) daily  atorvastatin 20 milliGRAM(s) at bedtime  BACItracin   Ointment 1 Application(s) two times a day  carbidopa/levodopa  25/100 1 Tablet(s) every 8 hours  carvedilol 6.25 milliGRAM(s) every 12 hours  celecoxib 100 milliGRAM(s) two times a day with meals  dextrose 5%. 1000 milliLiter(s) <Continuous>  dextrose 50% Injectable 12.5 Gram(s) once  dextrose 50% Injectable 25 Gram(s) once  dextrose 50% Injectable 25 Gram(s) once  dextrose Gel 1 Dose(s) once PRN  dextrose Gel 1 Dose(s) once PRN  dextrose Gel 1 Dose(s) once PRN  docusate sodium 100 milliGRAM(s) daily  escitalopram 10 milliGRAM(s) daily  finasteride 5 milliGRAM(s) daily  glucagon  Injectable 1 milliGRAM(s) once PRN  heparin  Injectable 5000 Unit(s) every 8 hours  insulin glargine Injectable (LANTUS) 18 Unit(s) at bedtime  insulin lispro (HumaLOG) corrective regimen sliding scale   three times a day before meals  insulin lispro (HumaLOG) corrective regimen sliding scale   at bedtime  insulin lispro Injectable (HumaLOG) 2 Unit(s) three times a day with meals  lidocaine   Patch 1 Patch daily  lisinopril 5 milliGRAM(s) daily  metolazone 5 milliGRAM(s) daily  oxyCODONE    IR 5 milliGRAM(s) every 4 hours PRN  oxyCODONE    IR 10 milliGRAM(s) every 4 hours PRN  senna 1 Tablet(s) daily  sodium chloride 0.9%. 1000 milliLiter(s) <Continuous>  tamsulosin 0.8 milliGRAM(s) at bedtime  timolol 0.5% Solution 1 Drop(s) every 6 hours  torsemide 20 milliGRAM(s) daily    -----------------------------------------------------------------------  PHYSICAL EXAM  Constitutional - NAD, Uncomfortable  HEENT - Left cranial abrasion - healing   Neck - C-Collar  Extremities - Left hand edema - improved, Left UE in sling/ACE wrapped  Neurologic Exam -                    Cognitive - AAOx3     Communication - Fluent, Mild dysarthria     Motor -  Standing balance significantly impaired                    LEFT    UE - ShAB 1/5, WE 1/5,  2/5                    RIGHT UE - ShAB 2/5, EF 4/5, EE 4/5,  5/5                    LEFT    LE - HF 1/5, KE 4/5, DF 4/5, PF 4/5                    RIGHT LE - HF 1/5, KE 4/5, DF 4/5, PF 4/5        Sensory - Intact to LT to left hand/digits  Psychiatric - Mood depressed, Affect Flat  ----------------------------------------------------------------------------------------  ASSESSMENT/PLAN  77yMale with functional deficits after a fall sustaining a left humeral fracture, C2 and C7 fracture with subacute CVA after syncope.   Pain - Tylenol, Oxycodone, Celebrex, Lidoderm  DVT PPX - Heparin  Urinary retention - Ledesma, can be managed at acute rehab  Rehab - Recommend ACUTE inpatient rehabilitation for the functional deficits consisting of 3 hours of therapy/day & 24 hour RN/daily PMR physician for comorbid medical management. Will continue to follow for ongoing rehab needs and recommendations.    Continue bedside therapy as well as OOB throughout the day with mobilization throughout the day with staff to maintain cardiopulmonary function and prevention of secondary complications related to debility. [id number] Exp Problem Focused - Mod. Complex

## 2022-03-19 NOTE — CONSULT NOTE ADULT - ASSESSMENT
The patient is a 76y Male with DKA now under control.   He was also found to have CVA.    He has some Parkinsonian features which are not acute.    Agree with vascular work up as ordered.  Continue antiplatelet and statin.     Will need PT evaluation. Ambulatory

## 2022-03-29 NOTE — ED ADULT NURSE NOTE - CHEST APPEARANCE
Deana Paz (: 1983) is a 41 y.o. female, established patient, here for:    ASSESSMENT/PLAN:  1. Vitamin B12 deficiency  -     cyanocobalamin 1,000 mcg tablet; Take 1 Tablet by mouth daily. , Normal, Disp-90 Tablet, R-3  -     METABOLIC PANEL, COMPREHENSIVE; Future  -     VITAMIN B12 & FOLATE; Future  -     CBC WITH AUTOMATED DIFF; Future  2. PCOS (polycystic ovarian syndrome)  -     metFORMIN ER (GLUCOPHAGE XR) 500 mg tablet; Take 3 Tablets by mouth daily (with dinner). Start with 1 tab daily. Increase as tolerated., Normal, Disp-270 Tablet, R-3  -     METABOLIC PANEL, COMPREHENSIVE; Future  -     HEMOGLOBIN A1C WITH EAG; Future    Constellation of symptoms still without clear etiology. Affirmation that she does not have SLE is reassuring. Notes and labs reviewed    Will maximize metformin to 1500 mg, replace B12 to solidly normal range, maintain x 3-6 months. Reassess. Low threshold referral to neurology    Return for 3-6 months for B12 deficiency and PCOS follow up, labs prior (30). SUBJECTIVE/OBJECTIVE:  HPI    Post rheum evaluation by Dr. Tian Jung - no rheum pathology identified, though low normal B12    Recurrent chest pains remain most bothersome concern  Also with fatigue, numbness and recent attack of dizziness    PCOS - on metformin since 2022. No clear benefit to date      Physical Exam  Constitutional:       General: She is not in acute distress. Appearance: She is well-developed. HENT:      Head: Normocephalic and atraumatic. Pulmonary:      Effort: Pulmonary effort is normal.   Neurological:      Mental Status: She is alert and oriented to person, place, and time. Psychiatric:         Behavior: Behavior normal.         Thought Content:  Thought content normal.         Judgment: Judgment normal.               -- Allyson Sosa MD
Patient here to be seen today to discuss lab results (B12) done by rheumatology. No other concerns,       1. \"Have you been to the ER, urgent care clinic since your last visit? Hospitalized since your last visit? \" No    2. \"Have you seen or consulted any other health care providers outside of the [facility] since your last visit? \" Has seen Rheumatology     3. For patients aged 39-70: Has the patient had a colonoscopy / FIT/ Cologuard? NA - based on age      If the patient is female:    4. For patients aged 41-77: Has the patient had a mammogram within the past 2 years? NA - based on age or sex      11. For patients aged 21-65: Has the patient had a pap smear?  Yes - no Care Gap present
normal

## 2022-12-20 NOTE — ED ADULT NURSE NOTE - PATIENT DISCHARGE SIGNATURE
-- DO NOT REPLY / DO NOT REPLY ALL --  -- Message is from Engagement Center Operations (ECO) --    Offered Waitlist if Available for the Visit Type? No    Caller is requesting an appointment - at a sooner time than what was available.      Caller wants sooner appointment - offered other approved options    Reason for Visit:  intermittent upper foot/instep pain: 7/10, neuropathy, diabetic, wants cream or medication, stinging sensation, radiates toward heart    Is the patient currently scheduled? No    Preferred time to be seen: within 5 days    Caller Information       Type Contact Phone/Fax    12/20/2022 11:56 AM CST Phone (Incoming) Kiana Perry (Self) [phone] (M)          Alternative phone number: zena Langford [phone]    Can a detailed message be left? Yes    Message Turnaround:     [place]:    Please give this turnaround time to the caller:   \"This message will be sent to [state Provider's name]. The clinical team will fulfill your request as soon as they review your message.\"   12-Jul-2017

## 2023-06-06 NOTE — PHYSICAL THERAPY INITIAL EVALUATION ADULT - LEVEL OF CONSCIOUSNESS, REHAB EVAL
What Type Of Note Output Would You Prefer (Optional)?: Standard Output
Hpi Title: Evaluation of Skin Lesions
How Severe Are Your Spot(S)?: mild
Have Your Spot(S) Been Treated In The Past?: has not been treated
alert

## 2023-06-24 NOTE — ED PROVIDER NOTE - PSH
Problem: Discharge Planning  Goal: Discharge to home or other facility with appropriate resources  6/24/2023 0013 by Radha Berger  Outcome: Progressing  6/24/2023 0002 by Prateek Cruz  Outcome: Progressing     Problem: Pain  Goal: Verbalizes/displays adequate comfort level or baseline comfort level  6/24/2023 0013 by Radha Berger  Outcome: Progressing  6/24/2023 0002 by Prateek Cruz  Outcome: Progressing  6/23/2023 1100 by Earle Viera RN  Outcome: Progressing     Problem: Safety - Adult  Goal: Free from fall injury  6/24/2023 0013 by Radha Berger  Outcome: Progressing  6/24/2023 0002 by Prateek Cruz  Outcome: Progressing No significant past surgical history    S/P hip hemiarthroplasty  right hip repair june 2014

## 2023-08-03 NOTE — H&P ADULT. - REASON FOR ADMISSION
Stone prevention strategies include increased water intake on a daily basis with a goal of at least 2.5L fluid intake per day. For calcium stone formers, it is recommended to maintain adequate dietary calcium of at least 1000-1200mg a day. Adopting a low sodium diet as well as limiting non-dairy animal protein helps to reduce the components in the urine responsible for some stone growth. Stone inhibitors such as citrate can be effective in reducing stone formation and growth. Increase fruits and vegetables to increase citrate. Lemon juice or crystal light are a good source of citrate as well. Adding lemon juice to water can be a great way to prevent your stones.    
not feeling well lethargy

## 2023-10-10 NOTE — ED PROVIDER NOTE - ENMT, MLM
Yes Render In Strict Bullet Format?: No Airway patent, Nasal mucosa clear. Mouth with normal mucosa. Throat has no vesicles, no oropharyngeal exudates and uvula is midline. Initiate Treatment: Clobetasol oint Detail Level: Zone

## 2023-11-22 NOTE — ED ADULT NURSE NOTE - OBJECTIVE STATEMENT
as per family, pt found down on ground next to the bed. pt lethargic, confused at this time. pt is insulin dependent diabetic. fingerstick critical high. breathing even but labored. no signs of trauma noted. dr. lanza called to bedside. Nasalis-Muscle-Based Myocutaneous Island Pedicle Flap Text: Using a #15 blade, an incision was made around the donor flap to the level of the nasalis muscle. Wide lateral undermining was then performed in both the subcutaneous plane above the nasalis muscle, and in a submuscular plane just above periosteum. This allowed the formation of a free nasalis muscle axial pedicle (based on the angular artery) which was still attached to the actual cutaneous flap, increasing its mobility and vascular viability. Hemostasis was obtained with pinpoint electrocoagulation. The flap was mobilized into position and the pivotal anchor points positioned and stabilized with buried interrupted sutures. Subcutaneous and dermal tissues were closed in a multilayered fashion with sutures. Tissue redundancies were excised, and the epidermal edges were apposed without significant tension and sutured with sutures.

## 2024-03-26 NOTE — DIETITIAN INITIAL EVALUATION ADULT. - ETIOLOGY
Addended by: RAMYA BRIONES on: 3/26/2024 02:00 PM     Modules accepted: Orders    
related to increased physiologic demand to promote healing

## 2024-04-02 NOTE — PHYSICAL THERAPY INITIAL EVALUATION ADULT - LEVEL OF INDEPENDENCE: SUPINE/SIT, REHAB EVAL
no outdoor environmental allergies/no indoor environmental allergies
minimum assist (75% patients effort)

## 2024-04-09 NOTE — ED ADULT NURSE NOTE - GENITOURINARY WDL
Thoracic degenerative disc disease, Scoliosis of thoracic spine, unspecified scoliosis - XRAY 4/4/24 - PN 4/4/24     CAN SRS SEE?   Bladder non-tender and non-distended. Urine clear yellow.

## 2024-09-23 NOTE — DIETITIAN INITIAL EVALUATION ADULT. - NS AS NUTRI INTERV MEDICAL AND FOOD SUPPLEMENTS
Follow-up with cardiology regarding your loop recorder  Follow-up with orthopedics for your left knee you can use the home walker or crutches or cane to offload some weight from the left leg.  
Continue Glucerna TID/Commercial beverage

## 2024-09-25 NOTE — ED ADULT NURSE NOTE - CHPI ED SYMPTOMS NEG
no nausea/no tingling/no dizziness/no chills/no decreased eating/drinking/no weakness/no vomiting/no fever/no pain/no numbness regular

## 2024-11-05 NOTE — ED ADULT NURSE NOTE - BREATH SOUNDS, LEFT
"LPN      "
clear

## 2024-11-11 NOTE — ED ADULT NURSE NOTE - NSFALLRSKASSESASSIST_ED_ALL_ED
Diagnosis: Hyperkalemia [915275]   Future Attending Provider: JOSE ALBERTO PASCUAL [[id number]]   Reason for IP Medical Treatment  (Clinical interventions that can only be accomplished in the IP setting? ) :: Patient needs emergent dialysis.   Plans for Post-Acute care--if anticipated (pick the single best option):: A. No post acute care anticipated at this time   Special Needs:: No Special Needs [1]  
yes

## 2025-04-09 NOTE — DISCHARGE NOTE ADULT - WITHDRAWAL SYMPTOMS INCLUDE; NEGATIVE MOOD, URGES TO SMOKE, AND DIFFICULTY CONCENTRATING.
Detail Level: Detailed Patient Specific Counseling (Will Not Stick From Patient To Patient): Patients caretakers advised that these locations were being clinically diagnosed and treated with CO2 laser per what the patient was able to tolerate. Statement Selected

## 2025-05-21 NOTE — ED PROVIDER NOTE - OBJECTIVE STATEMENT
"*HUB TO RELAY*    Pt's pcp states:    \"Hemoglobin A1c is stable at 6.1.\"  " 76 y/o male w PMH of DM, HTN 76 y/o male w PMH of DM, DKA, HTN, CVA, DVT in Left LE, UTI, epididymitis/orchitis comes from home (lives w wife) [facility] when nurse noticed some blood in patients diaper.  Nurse had patient urinate and saw blood in urine.  Nurse also noticed that patient had a 15lbs weight gain 6/27/17.  Patient denies fevers, sob, chest pain, diarrhea, 78 y/o male w PMH of DM, DKA, HTN, CVA, Parkinsons, DVT, UTI, epididymitis/orchitis comes from home (lives w wife) was at adult day care center today when nurse noticed some blood in patients diaper.  Nurse had patient urinate and saw blood in urine.  Nurse also noticed that patient had a 15lbs weight gain 6/27/17.  Patient states that he has burning w urination for the past 3-4 weeks but states he has vision problems and could never see if his urine was bloody.  Patient denies fevers, sob, chest pain, diarrhea.
